# Patient Record
Sex: MALE | Race: WHITE | NOT HISPANIC OR LATINO | Employment: OTHER | URBAN - METROPOLITAN AREA
[De-identification: names, ages, dates, MRNs, and addresses within clinical notes are randomized per-mention and may not be internally consistent; named-entity substitution may affect disease eponyms.]

---

## 2017-03-23 DIAGNOSIS — Z01.810 PRE-OPERATIVE CARDIOVASCULAR EXAMINATION: ICD-10-CM

## 2017-03-23 DIAGNOSIS — Z01.812 PRE-OPERATIVE LABORATORY EXAMINATION: ICD-10-CM

## 2017-03-23 LAB
BASOPHILS # BLD AUTO: 2.3 % (ref 0–1.8)
BASOPHILS # BLD: 0.34 K/UL (ref 0–0.12)
EKG IMPRESSION: NORMAL
EOSINOPHIL # BLD AUTO: 0.73 K/UL (ref 0–0.51)
EOSINOPHIL NFR BLD: 5 % (ref 0–6.9)
ERYTHROCYTE [DISTWIDTH] IN BLOOD BY AUTOMATED COUNT: 44.3 FL (ref 35.9–50)
HCT VFR BLD AUTO: 54.7 % (ref 42–52)
HGB BLD-MCNC: 16.9 G/DL (ref 14–18)
IMM GRANULOCYTES # BLD AUTO: 0.07 K/UL (ref 0–0.11)
IMM GRANULOCYTES NFR BLD AUTO: 0.5 % (ref 0–0.9)
LYMPHOCYTES # BLD AUTO: 1.11 K/UL (ref 1–4.8)
LYMPHOCYTES NFR BLD: 7.5 % (ref 22–41)
MCH RBC QN AUTO: 24.7 PG (ref 27–33)
MCHC RBC AUTO-ENTMCNC: 30.9 G/DL (ref 33.7–35.3)
MCV RBC AUTO: 79.9 FL (ref 81.4–97.8)
MONOCYTES # BLD AUTO: 1.12 K/UL (ref 0–0.85)
MONOCYTES NFR BLD AUTO: 7.6 % (ref 0–13.4)
NEUTROPHILS # BLD AUTO: 11.35 K/UL (ref 1.82–7.42)
NEUTROPHILS NFR BLD: 77.1 % (ref 44–72)
NRBC # BLD AUTO: 0 K/UL
NRBC BLD AUTO-RTO: 0 /100 WBC
PLATELET # BLD AUTO: 474 K/UL (ref 164–446)
PMV BLD AUTO: 9.3 FL (ref 9–12.9)
RBC # BLD AUTO: 6.85 M/UL (ref 4.7–6.1)
WBC # BLD AUTO: 14.7 K/UL (ref 4.8–10.8)

## 2017-03-23 PROCEDURE — 85025 COMPLETE CBC W/AUTO DIFF WBC: CPT

## 2017-03-23 PROCEDURE — 36415 COLL VENOUS BLD VENIPUNCTURE: CPT

## 2017-03-28 ENCOUNTER — APPOINTMENT (OUTPATIENT)
Dept: RADIOLOGY | Facility: MEDICAL CENTER | Age: 72
End: 2017-03-28
Attending: UROLOGY
Payer: MEDICARE

## 2017-03-28 ENCOUNTER — HOSPITAL ENCOUNTER (OUTPATIENT)
Facility: MEDICAL CENTER | Age: 72
End: 2017-03-28
Attending: UROLOGY | Admitting: UROLOGY
Payer: MEDICARE

## 2017-03-28 VITALS
HEIGHT: 72 IN | WEIGHT: 124.78 LBS | OXYGEN SATURATION: 94 % | SYSTOLIC BLOOD PRESSURE: 163 MMHG | RESPIRATION RATE: 17 BRPM | DIASTOLIC BLOOD PRESSURE: 92 MMHG | BODY MASS INDEX: 16.9 KG/M2 | TEMPERATURE: 99.2 F

## 2017-03-28 PROBLEM — N39.41 URGE INCONTINENCE: Status: ACTIVE | Noted: 2017-03-28

## 2017-03-28 PROCEDURE — 700101 HCHG RX REV CODE 250

## 2017-03-28 PROCEDURE — 502240 HCHG MISC OR SUPPLY RC 0272: Performed by: UROLOGY

## 2017-03-28 PROCEDURE — 700111 HCHG RX REV CODE 636 W/ 250 OVERRIDE (IP)

## 2017-03-28 PROCEDURE — 500242 HCHG CATH, HEYMAN FOLLOWER: Performed by: UROLOGY

## 2017-03-28 PROCEDURE — A9270 NON-COVERED ITEM OR SERVICE: HCPCS

## 2017-03-28 PROCEDURE — 500793: Performed by: UROLOGY

## 2017-03-28 PROCEDURE — 110382 HCHG SHELL REV 271: Performed by: UROLOGY

## 2017-03-28 PROCEDURE — 700102 HCHG RX REV CODE 250 W/ 637 OVERRIDE(OP)

## 2017-03-28 PROCEDURE — 160039 HCHG SURGERY MINUTES - EA ADDL 1 MIN LEVEL 3: Performed by: UROLOGY

## 2017-03-28 PROCEDURE — 160002 HCHG RECOVERY MINUTES (STAT): Performed by: UROLOGY

## 2017-03-28 PROCEDURE — 501329 HCHG SET, CYSTO IRRIG Y TUR: Performed by: UROLOGY

## 2017-03-28 PROCEDURE — 500042 HCHG BAG, URINARY DRAINAGE (CLOSED): Performed by: UROLOGY

## 2017-03-28 PROCEDURE — A4338 INDWELLING CATHETER LATEX: HCPCS | Performed by: UROLOGY

## 2017-03-28 PROCEDURE — 160036 HCHG PACU - EA ADDL 30 MINS PHASE I: Performed by: UROLOGY

## 2017-03-28 PROCEDURE — 160009 HCHG ANES TIME/MIN: Performed by: UROLOGY

## 2017-03-28 PROCEDURE — 160028 HCHG SURGERY MINUTES - 1ST 30 MINS LEVEL 3: Performed by: UROLOGY

## 2017-03-28 PROCEDURE — 160048 HCHG OR STATISTICAL LEVEL 1-5: Performed by: UROLOGY

## 2017-03-28 PROCEDURE — A4606 OXYGEN PROBE USED W OXIMETER: HCPCS | Performed by: UROLOGY

## 2017-03-28 PROCEDURE — 160046 HCHG PACU - 1ST 60 MINS PHASE II: Performed by: UROLOGY

## 2017-03-28 PROCEDURE — 160047 HCHG PACU  - EA ADDL 30 MINS PHASE II: Performed by: UROLOGY

## 2017-03-28 PROCEDURE — 160035 HCHG PACU - 1ST 60 MINS PHASE I: Performed by: UROLOGY

## 2017-03-28 PROCEDURE — 110371 HCHG SHELL REV 272: Performed by: UROLOGY

## 2017-03-28 PROCEDURE — C1769 GUIDE WIRE: HCPCS | Performed by: UROLOGY

## 2017-03-28 PROCEDURE — 160025 RECOVERY II MINUTES (STATS): Performed by: UROLOGY

## 2017-03-28 RX ORDER — HYDROCODONE BITARTRATE AND ACETAMINOPHEN 5; 325 MG/1; MG/1
1 TABLET ORAL EVERY 4 HOURS PRN
Status: DISCONTINUED | OUTPATIENT
Start: 2017-03-28 | End: 2017-03-28 | Stop reason: HOSPADM

## 2017-03-28 RX ORDER — OXYCODONE HCL 5 MG/5 ML
SOLUTION, ORAL ORAL
Status: COMPLETED
Start: 2017-03-28 | End: 2017-03-28

## 2017-03-28 RX ORDER — LABETALOL HYDROCHLORIDE 5 MG/ML
INJECTION, SOLUTION INTRAVENOUS
Status: COMPLETED
Start: 2017-03-28 | End: 2017-03-28

## 2017-03-28 RX ORDER — ATROPA BELLADONNA AND OPIUM 16.2; 6 MG/1; MG/1
60 SUPPOSITORY RECTAL
Status: DISCONTINUED | OUTPATIENT
Start: 2017-03-28 | End: 2017-03-28 | Stop reason: HOSPADM

## 2017-03-28 RX ORDER — SODIUM CHLORIDE, SODIUM LACTATE, POTASSIUM CHLORIDE, CALCIUM CHLORIDE 600; 310; 30; 20 MG/100ML; MG/100ML; MG/100ML; MG/100ML
1000 INJECTION, SOLUTION INTRAVENOUS
Status: COMPLETED | OUTPATIENT
Start: 2017-03-28 | End: 2017-03-28

## 2017-03-28 RX ORDER — LIDOCAINE HYDROCHLORIDE 10 MG/ML
INJECTION, SOLUTION INFILTRATION; PERINEURAL
Status: COMPLETED
Start: 2017-03-28 | End: 2017-03-28

## 2017-03-28 RX ORDER — CEFTRIAXONE 1 G/1
INJECTION, POWDER, FOR SOLUTION INTRAMUSCULAR; INTRAVENOUS
Status: DISCONTINUED
Start: 2017-03-28 | End: 2017-03-28 | Stop reason: HOSPADM

## 2017-03-28 RX ADMIN — OXYCODONE HYDROCHLORIDE 5 MG: 5 SOLUTION ORAL at 09:25

## 2017-03-28 RX ADMIN — LIDOCAINE HYDROCHLORIDE: 10 INJECTION, SOLUTION INFILTRATION; PERINEURAL at 06:40

## 2017-03-28 RX ADMIN — LABETALOL HYDROCHLORIDE 5 MG: 5 INJECTION, SOLUTION INTRAVENOUS at 10:43

## 2017-03-28 RX ADMIN — SODIUM CHLORIDE, SODIUM LACTATE, POTASSIUM CHLORIDE, CALCIUM CHLORIDE 1000 ML: 600; 310; 30; 20 INJECTION, SOLUTION INTRAVENOUS at 06:40

## 2017-03-28 ASSESSMENT — PAIN SCALES - GENERAL
PAINLEVEL_OUTOF10: 2
PAINLEVEL_OUTOF10: 5
PAINLEVEL_OUTOF10: 2

## 2017-03-28 NOTE — OP REPORT
DATE OF SERVICE:  03/28/2017    INDICATION FOR PROCEDURE:  Patient is having poor urinary stream and inability   to empty despite urinary incontinence and was found on cystoscopy to have a   membranous and immediately proximal bulbar urethral stricture due to his   previous brachytherapy.  He presents today for retrograde urethrogram dilation   or DVIU of sphincter, possible suprapubic tube and possible incision of   bladder neck as needed.  He has been consented, the risks and benefits of the   procedure, the potential failure rate and outcomes and risks of infection,   bleeding and lower urinary tract injury and he understands these and desires   to proceed.      PREOPERATIVE DIAGNOSES:  Urinary incontinence, prostate cancer and urethral   stricture.    POSTOPERATIVE DIAGNOSES:  Urinary incontinence, prostate cancer and urethral   stricture.    PROCEDURE PERFORMED:  Cystoscopy, retrograde urethrogram and urethral   dilation.     SURGEON:  Gus Borges MD    ANESTHESIA:  Luis Lauren MD    SPECIMEN:  None.    ESTIMATED BLOOD LOSS:  Zero.    FINDINGS:  Stricture membranous urethra into bulbar urethra distal to  sphincter  1-1.5 cm   was dilated due to sphincter involvement  and 20-French catheter placed.    Cystoscopy showed only mild diffuse telangiectasia, but no mass, tumor or   other abnormal lesions.      COMPLICATIONS:  None.      DRAIN:  A 20-French 2-way Barrera.      DESCRIPTION OF PROCEDURE:  The procedure was carried out in the following   fashion.  The patient was properly identified, the labs reviewed, consent was   verified, the H and P updated.  The urine culture was negative.  He was given   preoperative antibiotics.  The patient was taken to the operative theater and   placed in supine position.  He was then anesthetized and we moved into a   modified flank position to shoot a retrograde urethrogram with care being   taken to pad all pressure points and avoid any perturbations of joints  that   may cause injury and this was maintained throughout the procedure.  We then   prepped and draped in sterile fashion and shot a retrograde urethrogram   showing the stricture at the membranous urethra as detailed above.  The   patient was then moved to dorsal lithotomy position again with great care and   prepped and draped.  Surgical timeout had previously been done and there were   no issues.  Antibiotics were previously given.  The site and the operation   were identified.  The patient then had a cystoscopy up to the sphincter, wire   was passed and because of the membranous sphincter involvement, we chose to   dilate to 24-Romanian and place a catheter over a wire, which did uneventfully.    There was no bleeding and there was good dilation of the sphincter with   minimal mucosal disruption.  The catheter was then placed and the bladder   emptied.  Patient tolerated the procedure well and was taken to PACU in stable   condition.       ____________________________________     MD ANTONINA MADDOX / LAMIN    DD:  03/28/2017 08:48:03  DT:  03/28/2017 10:11:36    D#:  [id number]  Job#:  [id number]

## 2017-03-28 NOTE — OR SURGEON
Immediate Post-Operative Note      PreOp Diagnosis: Urinary incontinence, PCA, Urethral stricture  PostOp Diagnosis: Delmy    Procedure(s):  URETHRAL DILATION - Wound Class: Clean Contaminated  RETROGRADE URETHRAGRAM - Wound Class: Clean Contaminated  CYSTOSCOPY - Wound Class: Clean Contaminated    Surgeon(s):  Gus Borges M.D.    Anesthesiologist/Type of Anesthesia:  Anesthesiologist: Luis Lauren M.D./General    Surgical Staff:  Circulator: Jennifer Blackwell R.N.  Scrub Person: Jonny Montilla    Specimen: none    Estimated Blood Loss: 0    Findings: stx at membraenous into immediate post sphincter urethral 1-1.5. Dilated due to sphincter inolvement to 24 F and 20 F catheter placed. Cysto nl x mild telangectasia diffuse.    Complications: none    Drain 20 F 2 way Barrera    [id number]        3/28/2017 8:41 AM Gus Borges

## 2017-03-28 NOTE — OR NURSING
"DISCHARGE INSTRUCTIONS GIVEN TO PATIENT AND SISTER IN [place].  PATIENT TO RETURN TO OFFICE IN SEVEN DAYS FOR FOLLOW UP.   PATIENT GIVEN BACITRACIN OINT AS ORDERED PER DR. LONDON AND SHOWN HOW TO APPLY ON PENIS.  GIVEN URINAL TO EMPTY WALKER. PATIENT STATES\" I HAVE HAD THIS BEFORE AND I KNOW HOW TO USE IT.  PATIENT TAKEN TO [facility] IN W/C ABLE TO TRANSFER WELL WITH ONE ASSIST.  "

## 2017-03-28 NOTE — IP AVS SNAPSHOT
3/28/2017          Isiah Lagos  [address]  [place] [address]    Dear Isiah:    Replaced by [facility] wants to ensure your discharge home is safe and you or your loved ones have had all your questions answered regarding your care after you leave the hospital.    You may receive a telephone call within two days of your discharge.  This call is to make certain you understand your discharge instructions as well as ensure we provided you with the best care possible during your stay with us.     The call will only last approximately 3-5 minutes and will be done by a nurse.    Once again, we want to ensure your discharge home is safe and that you have a clear understanding of any next steps in your care.  If you have any questions or concerns, please do not hesitate to contact us, we are here for you.  Thank you for choosing [facility] for your healthcare needs.    Sincerely,    Randy Lockhart    [facility]

## 2017-03-28 NOTE — IP AVS SNAPSHOT
Home Care Instructions                                                                                                                Name:Isiah Lagos  Medical Record Number:[id number]  CSN: [id number]    YOB: 1945   Age: 71 y.o.  Sex: male  HT:1.829 m (6') WT: 56.6 kg (124 lb 12.5 oz)          Admit Date: 3/28/2017     Discharge Date:   Today's Date: 3/28/2017  Attending Doctor:  Gus Borges M.D.                  Allergies:  Review of patient's allergies indicates no known allergies.                Discharge Instructions         ACTIVITY: Rest and take it easy for the first 24 hours.  A responsible adult is recommended to remain with you during that time.  It is normal to feel sleepy.  We encourage you to not do anything that requires balance, judgment or coordination.    MILD FLU-LIKE SYMPTOMS ARE NORMAL. YOU MAY EXPERIENCE GENERALIZED MUSCLE ACHES, THROAT IRRITATION, HEADACHE AND/OR SOME NAUSEA.    FOR 24 HOURS DO NOT:  Drive, operate machinery or run household appliances.  Drink beer or alcoholic beverages.   Make important decisions or sign legal documents.    SPECIAL INSTRUCTIONS: D/c home with  leg bag and night bag and teaching. Show how to put bacitracon or neosporin ointment  At meatus tid.   RTC in 7 days for VT. increae mybetriq to 50 mg po qd for Urge incontinence. CALL FOR FEVER CHILLS SWELLING NAUSEA AND VOMITTING OR SHORTNESS OF BREATH ledema . Or catheter issues. Meds on chart    DIET: To avoid nausea, slowly advance diet as tolerated, avoiding spicy or greasy foods for the first day.  Add more substantial food to your diet according to your physician's instructions.  Babies can be fed formula or breast milk as soon as they are hungry.  INCREASE FLUIDS AND FIBER TO AVOID CONSTIPATION.    SURGICAL DRESSING/BATHING: CLEAN TIP OF PENIS AS NEEDED.    FOLLOW-UP APPOINTMENT:  A follow-up appointment should be arranged with your doctor in 7 DAYS ; call to schedule.    You should CALL  YOUR PHYSICIAN if you develop:  Fever greater than 101 degrees F.  Pain not relieved by medication, or persistent nausea or vomiting.  Excessive bleeding (blood soaking through dressing) or unexpected drainage from the wound.  Extreme redness or swelling around the incision site, drainage of pus or foul smelling drainage.  Inability to urinate or empty your bladder within 8 hours.  Problems with breathing or chest pain.    You should call 911 if you develop problems with breathing or chest pain.  If you are unable to contact your doctor or surgical center, you should go to the nearest emergency room or urgent care center.  Physician's telephone #: [phone]    If any questions arise, call your doctor.  If your doctor is not available, please feel free to call the Surgical Center at {Surgical Dept Numbers:[id number]}.  The Center is open Monday through Friday from 7AM to 7PM.  You can also call the [employer] HOTLINE open 24 hours/day, 7 days/week and speak to a nurse at [phone], or toll free at [phone].    A registered nurse may call you a few days after your surgery to see how you are doing after your procedure.    MEDICATIONS: Resume taking daily medication.  Take prescribed pain medication with food.  If no medication is prescribed, you may take non-aspirin pain medication if needed.  PAIN MEDICATION CAN BE VERY CONSTIPATING.  Take a stool softener or laxative such as senokot, pericolace, or milk of magnesia if needed.    Prescription given for MYRBETRIQ .  Last pain medication given NONE.    If your physician has prescribed pain medication that includes Acetaminophen (Tylenol), do not take additional Acetaminophen (Tylenol) while taking the prescribed medication.    Depression / Suicide Risk    As you are discharged from this [facility] facility, it is important to learn how to keep safe from harming yourself.    Recognize the warning signs:  · Abrupt changes in personality, positive or negative-  including increase in energy   · Giving away possessions  · Change in eating patterns- significant weight changes-  positive or negative  · Change in sleeping patterns- unable to sleep or sleeping all the time   · Unwillingness or inability to communicate  · Depression  · Unusual sadness, discouragement and loneliness  · Talk of wanting to die  · Neglect of personal appearance   · Rebelliousness- reckless behavior  · Withdrawal from people/activities they love  · Confusion- inability to concentrate     If you or a loved one observes any of these behaviors or has concerns about self-harm, here's what you can do:  · Talk about it- your feelings and reasons for harming yourself  · Remove any means that you might use to hurt yourself (examples: pills, rope, extension cords, firearm)  · Get professional help from the community (Mental Health, Substance Abuse, psychological counseling)  · Do not be alone:Call your Safe Contact- someone whom you trust who will be there for you.  · Call your local CRISIS HOTLINE 232-2668 or 422-697-0821  · Call your local Children's Mobile Crisis Response Team [place] (142) 684-4029 or wwwManymoon  · Call the toll free National Suicide Prevention Hotlines   · National Suicide Prevention Lifeline 775-532-JAPO (4629)  · National Hope Line Network 800-SUICIDE (950-4300)       Medication List      CHANGE how you take these medications        Instructions    Mirabegron ER 25 MG Tb24   What changed:  how much to take   Commonly known as:  MYRBETRIQ    Take 50 mg by mouth every day.   Dose:  50 mg         CONTINUE taking these medications        Instructions    aspirin  MG Tbec   Commonly known as:  ECOTRIN    Take 325 mg by mouth every day.   Dose:  325 mg       levothyroxine 75 MCG Tabs   Commonly known as:  SYNTHROID    Take 75 mcg by mouth every morning before breakfast.   Dose:  75 mcg               Medication Information     Above and/or attached are the medications your  physician expects you to take upon discharge. Review all of your home medications and newly ordered medications with your doctor and/or pharmacist. Follow medication instructions as directed by your doctor and/or pharmacist. Please keep your medication list with you and share with your physician. Update the information when medications are discontinued, doses are changed, or new medications (including over-the-counter products) are added; and carry medication information at all times in the event of emergency situations.        Resources     Quit Smoking / Tobacco Use:    I understand the use of any tobacco products increases my chance of suffering from future heart disease or stroke and could cause other illnesses which may shorten my life. Quitting the use of tobacco products is the single most important thing I can do to improve my health. For further information on smoking / tobacco cessation call a Toll Free Quit Line at 1-321.143.4963 (*National [employer] [place]) or 1-148.954.9609 (American Lung Association) or you can access the web based program at www.lungMD Insider.org.    [place] Tobacco Users Help Line:  (925) 205-8025       Toll Free: 1-171.622.1481  Quit Tobacco Program [facility] (473)595-9031    Crisis Hotline:    [place] Crisis Hotline:  9-164-XOGQWBO or 1-683.456.3215    [place] Crisis Hotline:    1-596.818.9854 or 488-703-2976    Discharge Survey:   Thank you for choosing [facility]. We hope we did everything we could to make your hospital stay a pleasant one. You may be receiving a survey and we would appreciate your time and participation in answering the questions. Your input is very valuable to us in our efforts to improve our service to our patients and their families.            Signatures     My signature on this form indicates that:    1. I acknowledge receipt and understanding of these Home Care Instruction.  2. My questions regarding this information have been answered  to my satisfaction.  3. I have formulated a plan with my discharge nurse to obtain my prescribed medications for home.    __________________________________      __________________________________                   Patient Signature                                 Guardian/Responsible Adult Signature      __________________________________                 __________       ________                       Nurse Signature                                               Date                 Time

## 2017-03-28 NOTE — DISCHARGE INSTRUCTIONS
ACTIVITY: Rest and take it easy for the first 24 hours.  A responsible adult is recommended to remain with you during that time.  It is normal to feel sleepy.  We encourage you to not do anything that requires balance, judgment or coordination.    MILD FLU-LIKE SYMPTOMS ARE NORMAL. YOU MAY EXPERIENCE GENERALIZED MUSCLE ACHES, THROAT IRRITATION, HEADACHE AND/OR SOME NAUSEA.    FOR 24 HOURS DO NOT:  Drive, operate machinery or run household appliances.  Drink beer or alcoholic beverages.   Make important decisions or sign legal documents.    SPECIAL INSTRUCTIONS: D/c home with  leg bag and night bag and teaching. Show how to put bacitracon or neosporin ointment  At meatus tid.   RTC in 7 days for VT. increae mybetriq to 50 mg po qd for Urge incontinence. CALL FOR FEVER CHILLS SWELLING NAUSEA AND VOMITTING OR SHORTNESS OF BREATH ledema . Or catheter issues. Meds on chart    DIET: To avoid nausea, slowly advance diet as tolerated, avoiding spicy or greasy foods for the first day.  Add more substantial food to your diet according to your physician's instructions.  Babies can be fed formula or breast milk as soon as they are hungry.  INCREASE FLUIDS AND FIBER TO AVOID CONSTIPATION.    SURGICAL DRESSING/BATHING: CLEAN TIP OF PENIS AS NEEDED.    FOLLOW-UP APPOINTMENT:  A follow-up appointment should be arranged with your doctor in 7 DAYS ; call to schedule.    You should CALL YOUR PHYSICIAN if you develop:  Fever greater than 101 degrees F.  Pain not relieved by medication, or persistent nausea or vomiting.  Excessive bleeding (blood soaking through dressing) or unexpected drainage from the wound.  Extreme redness or swelling around the incision site, drainage of pus or foul smelling drainage.  Inability to urinate or empty your bladder within 8 hours.  Problems with breathing or chest pain.    You should call 911 if you develop problems with breathing or chest pain.  If you are unable to contact your doctor or surgical  center, you should go to the nearest emergency room or urgent care center.  Physician's telephone #: [phone]    If any questions arise, call your doctor.  If your doctor is not available, please feel free to call the Surgical Center at {Surgical Dept Numbers:[id number]}.  The Center is open Monday through Friday from 7AM to 7PM.  You can also call the HEALTH HOTLINE open 24 hours/day, 7 days/week and speak to a nurse at [phone], or toll free at [phone].    A registered nurse may call you a few days after your surgery to see how you are doing after your procedure.    MEDICATIONS: Resume taking daily medication.  Take prescribed pain medication with food.  If no medication is prescribed, you may take non-aspirin pain medication if needed.  PAIN MEDICATION CAN BE VERY CONSTIPATING.  Take a stool softener or laxative such as senokot, pericolace, or milk of magnesia if needed.    Prescription given for MYRBETRIQ .  Last pain medication given NONE.    If your physician has prescribed pain medication that includes Acetaminophen (Tylenol), do not take additional Acetaminophen (Tylenol) while taking the prescribed medication.    Depression / Suicide Risk    As you are discharged from this [facility] facility, it is important to learn how to keep safe from harming yourself.    Recognize the warning signs:  · Abrupt changes in personality, positive or negative- including increase in energy   · Giving away possessions  · Change in eating patterns- significant weight changes-  positive or negative  · Change in sleeping patterns- unable to sleep or sleeping all the time   · Unwillingness or inability to communicate  · Depression  · Unusual sadness, discouragement and loneliness  · Talk of wanting to die  · Neglect of personal appearance   · Rebelliousness- reckless behavior  · Withdrawal from people/activities they love  · Confusion- inability to concentrate     If you or a loved one observes any of these  behaviors or has concerns about self-harm, here's what you can do:  · Talk about it- your feelings and reasons for harming yourself  · Remove any means that you might use to hurt yourself (examples: pills, rope, extension cords, firearm)  · Get professional help from the community (Mental Health, Substance Abuse, psychological counseling)  · Do not be alone:Call your Safe Contact- someone whom you trust who will be there for you.  · Call your local CRISIS HOTLINE 711-7253 or 068-980-2056  · Call your local Children's Mobile Crisis Response Team [place] (322) 237-6828 or www.The Mother List  · Call the toll free National Suicide Prevention Hotlines   · National Suicide Prevention Lifeline 245-004-FGYP (4813)  · National Hope Line Network 800-SUICIDE (919-5771)

## 2017-03-28 NOTE — OR NURSING
PATIENT B/P 180/110. DR. GILMORE UPDATED AND TO GIVE PATIENT LABATOLOL AS ORDERED.  PAITENT DENIES PAIN. WALKER CATH DRAINING YELLOW URINE. NO DISTRESS.

## 2017-03-28 NOTE — PROGRESS NOTES
The Medication Reconciliation process has been completed by interviewing the patient    Allergies have been reviewed  Antibiotic use in 30 days - NONE    Home Pharmacy:  [facility] [place]

## 2017-04-08 ENCOUNTER — HOSPITAL ENCOUNTER (INPATIENT)
Facility: MEDICAL CENTER | Age: 72
LOS: 12 days | DRG: 252 | End: 2017-04-20
Attending: EMERGENCY MEDICINE | Admitting: HOSPITALIST
Payer: MEDICARE

## 2017-04-08 ENCOUNTER — APPOINTMENT (OUTPATIENT)
Dept: RADIOLOGY | Facility: MEDICAL CENTER | Age: 72
DRG: 252 | End: 2017-04-08
Attending: EMERGENCY MEDICINE
Payer: MEDICARE

## 2017-04-08 ENCOUNTER — RESOLUTE PROFESSIONAL BILLING HOSPITAL PROF FEE (OUTPATIENT)
Dept: HOSPITALIST | Facility: MEDICAL CENTER | Age: 72
End: 2017-04-08
Payer: MEDICARE

## 2017-04-08 DIAGNOSIS — F17.200 SMOKING: ICD-10-CM

## 2017-04-08 DIAGNOSIS — R29.6 FALLS FREQUENTLY: ICD-10-CM

## 2017-04-08 DIAGNOSIS — I99.8 ISCHEMIA OF LEFT LOWER EXTREMITY: ICD-10-CM

## 2017-04-08 DIAGNOSIS — N39.41 URGE INCONTINENCE: ICD-10-CM

## 2017-04-08 DIAGNOSIS — I10 HTN (HYPERTENSION), MALIGNANT: ICD-10-CM

## 2017-04-08 DIAGNOSIS — I73.9 PAD (PERIPHERAL ARTERY DISEASE) (HCC): ICD-10-CM

## 2017-04-08 DIAGNOSIS — G93.40 ENCEPHALOPATHY ACUTE: ICD-10-CM

## 2017-04-08 DIAGNOSIS — I70.202 ATHEROSCLEROSIS OF ARTERY OF LEFT LOWER EXTREMITY (HCC): ICD-10-CM

## 2017-04-08 DIAGNOSIS — L03.116 CELLULITIS OF LEFT LOWER EXTREMITY: ICD-10-CM

## 2017-04-08 DIAGNOSIS — R53.1 WEAKNESS OF LEFT SIDE OF BODY: ICD-10-CM

## 2017-04-08 DIAGNOSIS — I70.90 ARTERIAL OCCLUSION: ICD-10-CM

## 2017-04-08 DIAGNOSIS — E87.1 HYPONATREMIA: ICD-10-CM

## 2017-04-08 PROBLEM — F10.20 ALCOHOL DEPENDENCE (HCC): Status: ACTIVE | Noted: 2017-04-08

## 2017-04-08 LAB
ALBUMIN SERPL BCP-MCNC: 3.5 G/DL (ref 3.2–4.9)
ALBUMIN/GLOB SERPL: 1 G/DL
ALP SERPL-CCNC: 78 U/L (ref 30–99)
ALT SERPL-CCNC: 12 U/L (ref 2–50)
ANION GAP SERPL CALC-SCNC: 10 MMOL/L (ref 0–11.9)
APTT PPP: 33.1 SEC (ref 24.7–36)
AST SERPL-CCNC: 31 U/L (ref 12–45)
BASOPHILS # BLD AUTO: 1.5 % (ref 0–1.8)
BASOPHILS # BLD: 0.24 K/UL (ref 0–0.12)
BILIRUB SERPL-MCNC: 0.9 MG/DL (ref 0.1–1.5)
BUN SERPL-MCNC: 10 MG/DL (ref 8–22)
CALCIUM SERPL-MCNC: 9.1 MG/DL (ref 8.5–10.5)
CHLORIDE SERPL-SCNC: 89 MMOL/L (ref 96–112)
CO2 SERPL-SCNC: 23 MMOL/L (ref 20–33)
CREAT SERPL-MCNC: 0.66 MG/DL (ref 0.5–1.4)
CRP SERPL HS-MCNC: 8.2 MG/DL (ref 0–0.75)
EKG IMPRESSION: NORMAL
EOSINOPHIL # BLD AUTO: 0.46 K/UL (ref 0–0.51)
EOSINOPHIL NFR BLD: 2.9 % (ref 0–6.9)
ERYTHROCYTE [DISTWIDTH] IN BLOOD BY AUTOMATED COUNT: 43.3 FL (ref 35.9–50)
ERYTHROCYTE [SEDIMENTATION RATE] IN BLOOD BY WESTERGREN METHOD: 6 MM/HOUR (ref 0–20)
ETHANOL BLD-MCNC: 0.01 G/DL
GFR SERPL CREATININE-BSD FRML MDRD: >60 ML/MIN/1.73 M 2
GLOBULIN SER CALC-MCNC: 3.5 G/DL (ref 1.9–3.5)
GLUCOSE SERPL-MCNC: 77 MG/DL (ref 65–99)
HCT VFR BLD AUTO: 52.1 % (ref 42–52)
HGB BLD-MCNC: 16.8 G/DL (ref 14–18)
IMM GRANULOCYTES # BLD AUTO: 0.16 K/UL (ref 0–0.11)
IMM GRANULOCYTES NFR BLD AUTO: 1 % (ref 0–0.9)
INR PPP: 1.05 (ref 0.87–1.13)
LACTATE BLD-SCNC: 1.3 MMOL/L (ref 0.5–2)
LACTATE BLD-SCNC: 1.9 MMOL/L (ref 0.5–2)
LYMPHOCYTES # BLD AUTO: 1.12 K/UL (ref 1–4.8)
LYMPHOCYTES NFR BLD: 7 % (ref 22–41)
MCH RBC QN AUTO: 25.1 PG (ref 27–33)
MCHC RBC AUTO-ENTMCNC: 32.2 G/DL (ref 33.7–35.3)
MCV RBC AUTO: 77.8 FL (ref 81.4–97.8)
MONOCYTES # BLD AUTO: 0.99 K/UL (ref 0–0.85)
MONOCYTES NFR BLD AUTO: 6.2 % (ref 0–13.4)
NEUTROPHILS # BLD AUTO: 12.94 K/UL (ref 1.82–7.42)
NEUTROPHILS NFR BLD: 81.4 % (ref 44–72)
NRBC # BLD AUTO: 0 K/UL
NRBC BLD AUTO-RTO: 0 /100 WBC
PLATELET # BLD AUTO: 674 K/UL (ref 164–446)
PMV BLD AUTO: 8.9 FL (ref 9–12.9)
POTASSIUM SERPL-SCNC: 4.2 MMOL/L (ref 3.6–5.5)
PREALB SERPL-MCNC: 6 MG/DL (ref 18–38)
PROT SERPL-MCNC: 7 G/DL (ref 6–8.2)
PROTHROMBIN TIME: 14 SEC (ref 12–14.6)
RBC # BLD AUTO: 6.7 M/UL (ref 4.7–6.1)
SODIUM SERPL-SCNC: 122 MMOL/L (ref 135–145)
WBC # BLD AUTO: 15.9 K/UL (ref 4.8–10.8)

## 2017-04-08 PROCEDURE — 71010 DX-CHEST-PORTABLE (1 VIEW): CPT

## 2017-04-08 PROCEDURE — 93925 LOWER EXTREMITY STUDY: CPT | Mod: 26 | Performed by: SURGERY

## 2017-04-08 PROCEDURE — 85610 PROTHROMBIN TIME: CPT

## 2017-04-08 PROCEDURE — 700105 HCHG RX REV CODE 258: Performed by: HOSPITALIST

## 2017-04-08 PROCEDURE — 93922 UPR/L XTREMITY ART 2 LEVELS: CPT

## 2017-04-08 PROCEDURE — 36415 COLL VENOUS BLD VENIPUNCTURE: CPT

## 2017-04-08 PROCEDURE — 93005 ELECTROCARDIOGRAM TRACING: CPT | Performed by: EMERGENCY MEDICINE

## 2017-04-08 PROCEDURE — 87040 BLOOD CULTURE FOR BACTERIA: CPT

## 2017-04-08 PROCEDURE — 80307 DRUG TEST PRSMV CHEM ANLYZR: CPT

## 2017-04-08 PROCEDURE — 85652 RBC SED RATE AUTOMATED: CPT

## 2017-04-08 PROCEDURE — HZ2ZZZZ DETOXIFICATION SERVICES FOR SUBSTANCE ABUSE TREATMENT: ICD-10-PCS | Performed by: HOSPITALIST

## 2017-04-08 PROCEDURE — 96367 TX/PROPH/DG ADDL SEQ IV INF: CPT

## 2017-04-08 PROCEDURE — 770020 HCHG ROOM/CARE - TELE (206)

## 2017-04-08 PROCEDURE — 86140 C-REACTIVE PROTEIN: CPT

## 2017-04-08 PROCEDURE — 700111 HCHG RX REV CODE 636 W/ 250 OVERRIDE (IP): Performed by: HOSPITALIST

## 2017-04-08 PROCEDURE — 80053 COMPREHEN METABOLIC PANEL: CPT

## 2017-04-08 PROCEDURE — 99223 1ST HOSP IP/OBS HIGH 75: CPT | Mod: AI | Performed by: HOSPITALIST

## 2017-04-08 PROCEDURE — 93925 LOWER EXTREMITY STUDY: CPT

## 2017-04-08 PROCEDURE — 85730 THROMBOPLASTIN TIME PARTIAL: CPT

## 2017-04-08 PROCEDURE — 93922 UPR/L XTREMITY ART 2 LEVELS: CPT | Mod: 26 | Performed by: SURGERY

## 2017-04-08 PROCEDURE — 99285 EMERGENCY DEPT VISIT HI MDM: CPT

## 2017-04-08 PROCEDURE — 73590 X-RAY EXAM OF LOWER LEG: CPT | Mod: LT

## 2017-04-08 PROCEDURE — 94760 N-INVAS EAR/PLS OXIMETRY 1: CPT

## 2017-04-08 PROCEDURE — 700105 HCHG RX REV CODE 258: Performed by: EMERGENCY MEDICINE

## 2017-04-08 PROCEDURE — 85025 COMPLETE CBC W/AUTO DIFF WBC: CPT

## 2017-04-08 PROCEDURE — 96365 THER/PROPH/DIAG IV INF INIT: CPT

## 2017-04-08 PROCEDURE — 96366 THER/PROPH/DIAG IV INF ADDON: CPT

## 2017-04-08 PROCEDURE — 700102 HCHG RX REV CODE 250 W/ 637 OVERRIDE(OP): Performed by: HOSPITALIST

## 2017-04-08 PROCEDURE — 700111 HCHG RX REV CODE 636 W/ 250 OVERRIDE (IP): Performed by: EMERGENCY MEDICINE

## 2017-04-08 PROCEDURE — 73630 X-RAY EXAM OF FOOT: CPT | Mod: LT

## 2017-04-08 PROCEDURE — 84134 ASSAY OF PREALBUMIN: CPT

## 2017-04-08 PROCEDURE — A9270 NON-COVERED ITEM OR SERVICE: HCPCS | Performed by: HOSPITALIST

## 2017-04-08 PROCEDURE — 83605 ASSAY OF LACTIC ACID: CPT | Mod: 91

## 2017-04-08 RX ORDER — HYDRALAZINE HYDROCHLORIDE 10 MG/1
10 TABLET, FILM COATED ORAL EVERY 8 HOURS
Status: DISCONTINUED | OUTPATIENT
Start: 2017-04-08 | End: 2017-04-10

## 2017-04-08 RX ORDER — LORAZEPAM 1 MG/1
1 TABLET ORAL EVERY 4 HOURS PRN
Status: DISCONTINUED | OUTPATIENT
Start: 2017-04-08 | End: 2017-04-20 | Stop reason: HOSPADM

## 2017-04-08 RX ORDER — ACETAMINOPHEN 325 MG/1
650 TABLET ORAL EVERY 6 HOURS PRN
Status: DISCONTINUED | OUTPATIENT
Start: 2017-04-08 | End: 2017-04-20 | Stop reason: HOSPADM

## 2017-04-08 RX ORDER — ISOSORBIDE DINITRATE 10 MG/1
10 TABLET ORAL 3 TIMES DAILY
Status: DISCONTINUED | OUTPATIENT
Start: 2017-04-08 | End: 2017-04-11

## 2017-04-08 RX ORDER — BISACODYL 10 MG
10 SUPPOSITORY, RECTAL RECTAL
Status: DISCONTINUED | OUTPATIENT
Start: 2017-04-08 | End: 2017-04-20 | Stop reason: HOSPADM

## 2017-04-08 RX ORDER — ATORVASTATIN CALCIUM 40 MG/1
40 TABLET, FILM COATED ORAL
Status: DISCONTINUED | OUTPATIENT
Start: 2017-04-08 | End: 2017-04-20 | Stop reason: HOSPADM

## 2017-04-08 RX ORDER — LORAZEPAM 2 MG/ML
0.5 INJECTION INTRAMUSCULAR EVERY 4 HOURS PRN
Status: DISCONTINUED | OUTPATIENT
Start: 2017-04-08 | End: 2017-04-20 | Stop reason: HOSPADM

## 2017-04-08 RX ORDER — AMPICILLIN AND SULBACTAM 2; 1 G/1; G/1
3 INJECTION, POWDER, FOR SOLUTION INTRAMUSCULAR; INTRAVENOUS ONCE
Status: COMPLETED | OUTPATIENT
Start: 2017-04-08 | End: 2017-04-08

## 2017-04-08 RX ORDER — SODIUM CHLORIDE 9 MG/ML
INJECTION, SOLUTION INTRAVENOUS CONTINUOUS
Status: DISCONTINUED | OUTPATIENT
Start: 2017-04-08 | End: 2017-04-12

## 2017-04-08 RX ORDER — HEPARIN SODIUM 5000 [USP'U]/ML
5000 INJECTION, SOLUTION INTRAVENOUS; SUBCUTANEOUS EVERY 8 HOURS
Status: DISCONTINUED | OUTPATIENT
Start: 2017-04-08 | End: 2017-04-20 | Stop reason: HOSPADM

## 2017-04-08 RX ORDER — ACETAMINOPHEN 325 MG/1
650 TABLET ORAL EVERY 6 HOURS
Status: DISCONTINUED | OUTPATIENT
Start: 2017-04-08 | End: 2017-04-10

## 2017-04-08 RX ORDER — ONDANSETRON 2 MG/ML
4 INJECTION INTRAMUSCULAR; INTRAVENOUS EVERY 4 HOURS PRN
Status: DISCONTINUED | OUTPATIENT
Start: 2017-04-08 | End: 2017-04-20 | Stop reason: HOSPADM

## 2017-04-08 RX ORDER — AMOXICILLIN 250 MG
2 CAPSULE ORAL 2 TIMES DAILY
Status: DISCONTINUED | OUTPATIENT
Start: 2017-04-08 | End: 2017-04-20 | Stop reason: HOSPADM

## 2017-04-08 RX ORDER — LORAZEPAM 0.5 MG/1
0.5 TABLET ORAL EVERY 4 HOURS PRN
Status: DISCONTINUED | OUTPATIENT
Start: 2017-04-08 | End: 2017-04-20 | Stop reason: HOSPADM

## 2017-04-08 RX ORDER — POLYETHYLENE GLYCOL 3350 17 G/17G
1 POWDER, FOR SOLUTION ORAL
Status: DISCONTINUED | OUTPATIENT
Start: 2017-04-08 | End: 2017-04-20 | Stop reason: HOSPADM

## 2017-04-08 RX ORDER — LORAZEPAM 2 MG/ML
2 INJECTION INTRAMUSCULAR
Status: DISCONTINUED | OUTPATIENT
Start: 2017-04-08 | End: 2017-04-20 | Stop reason: HOSPADM

## 2017-04-08 RX ORDER — ONDANSETRON 4 MG/1
4 TABLET, ORALLY DISINTEGRATING ORAL EVERY 4 HOURS PRN
Status: DISCONTINUED | OUTPATIENT
Start: 2017-04-08 | End: 2017-04-20 | Stop reason: HOSPADM

## 2017-04-08 RX ORDER — LORAZEPAM 1 MG/1
3 TABLET ORAL
Status: DISCONTINUED | OUTPATIENT
Start: 2017-04-08 | End: 2017-04-20 | Stop reason: HOSPADM

## 2017-04-08 RX ORDER — OXYCODONE HYDROCHLORIDE 5 MG/1
5 TABLET ORAL EVERY 4 HOURS PRN
Status: DISCONTINUED | OUTPATIENT
Start: 2017-04-08 | End: 2017-04-20 | Stop reason: HOSPADM

## 2017-04-08 RX ORDER — NICOTINE 21 MG/24HR
14 PATCH, TRANSDERMAL 24 HOURS TRANSDERMAL
Status: DISCONTINUED | OUTPATIENT
Start: 2017-04-08 | End: 2017-04-20 | Stop reason: HOSPADM

## 2017-04-08 RX ORDER — LEVOTHYROXINE SODIUM 0.07 MG/1
75 TABLET ORAL
Status: DISCONTINUED | OUTPATIENT
Start: 2017-04-08 | End: 2017-04-20 | Stop reason: HOSPADM

## 2017-04-08 RX ORDER — LORAZEPAM 1 MG/1
2 TABLET ORAL
Status: DISCONTINUED | OUTPATIENT
Start: 2017-04-08 | End: 2017-04-20 | Stop reason: HOSPADM

## 2017-04-08 RX ORDER — LORAZEPAM 2 MG/ML
1.5 INJECTION INTRAMUSCULAR
Status: DISCONTINUED | OUTPATIENT
Start: 2017-04-08 | End: 2017-04-20 | Stop reason: HOSPADM

## 2017-04-08 RX ORDER — LORAZEPAM 2 MG/ML
1 INJECTION INTRAMUSCULAR
Status: DISCONTINUED | OUTPATIENT
Start: 2017-04-08 | End: 2017-04-20 | Stop reason: HOSPADM

## 2017-04-08 RX ORDER — LORAZEPAM 1 MG/1
4 TABLET ORAL
Status: DISCONTINUED | OUTPATIENT
Start: 2017-04-08 | End: 2017-04-20 | Stop reason: HOSPADM

## 2017-04-08 RX ORDER — OXYCODONE HYDROCHLORIDE 10 MG/1
10 TABLET ORAL EVERY 4 HOURS PRN
Status: DISCONTINUED | OUTPATIENT
Start: 2017-04-08 | End: 2017-04-20 | Stop reason: HOSPADM

## 2017-04-08 RX ORDER — THIAMINE MONONITRATE (VIT B1) 100 MG
100 TABLET ORAL DAILY
Status: DISCONTINUED | OUTPATIENT
Start: 2017-04-08 | End: 2017-04-20 | Stop reason: HOSPADM

## 2017-04-08 RX ADMIN — HEPARIN SODIUM 5000 UNITS: 5000 INJECTION, SOLUTION INTRAVENOUS; SUBCUTANEOUS at 14:22

## 2017-04-08 RX ADMIN — ATORVASTATIN CALCIUM 40 MG: 40 TABLET, FILM COATED ORAL at 21:13

## 2017-04-08 RX ADMIN — HEPARIN SODIUM 5000 UNITS: 5000 INJECTION, SOLUTION INTRAVENOUS; SUBCUTANEOUS at 21:15

## 2017-04-08 RX ADMIN — ACETAMINOPHEN 650 MG: 325 TABLET, FILM COATED ORAL at 10:46

## 2017-04-08 RX ADMIN — ACETAMINOPHEN 650 MG: 325 TABLET, FILM COATED ORAL at 21:14

## 2017-04-08 RX ADMIN — OXYCODONE HYDROCHLORIDE 5 MG: 5 TABLET ORAL at 13:09

## 2017-04-08 RX ADMIN — LEVOTHYROXINE SODIUM 75 MCG: 75 TABLET ORAL at 10:46

## 2017-04-08 RX ADMIN — Medication 100 MG: at 10:46

## 2017-04-08 RX ADMIN — AMPICILLIN SODIUM AND SULBACTAM SODIUM 3 G: 2; 1 INJECTION, POWDER, FOR SOLUTION INTRAMUSCULAR; INTRAVENOUS at 06:19

## 2017-04-08 RX ADMIN — NICOTINE 14 MG: 14 PATCH, EXTENDED RELEASE TRANSDERMAL at 10:46

## 2017-04-08 RX ADMIN — ISOSORBIDE DINITRATE 10 MG: 10 TABLET ORAL at 14:22

## 2017-04-08 RX ADMIN — HYDRALAZINE HYDROCHLORIDE 10 MG: 10 TABLET, FILM COATED ORAL at 10:46

## 2017-04-08 RX ADMIN — SODIUM CHLORIDE: 9 INJECTION, SOLUTION INTRAVENOUS at 10:55

## 2017-04-08 RX ADMIN — ASPIRIN 325 MG: 325 TABLET, DELAYED RELEASE ORAL at 10:46

## 2017-04-08 RX ADMIN — HYDRALAZINE HYDROCHLORIDE 10 MG: 10 TABLET, FILM COATED ORAL at 21:15

## 2017-04-08 RX ADMIN — ACETAMINOPHEN 650 MG: 325 TABLET, FILM COATED ORAL at 14:22

## 2017-04-08 RX ADMIN — VANCOMYCIN HYDROCHLORIDE 1500 MG: 100 INJECTION, POWDER, LYOPHILIZED, FOR SOLUTION INTRAVENOUS at 07:08

## 2017-04-08 RX ADMIN — LORAZEPAM 0.5 MG: 0.5 TABLET ORAL at 21:23

## 2017-04-08 RX ADMIN — HYDRALAZINE HYDROCHLORIDE 10 MG: 10 TABLET, FILM COATED ORAL at 14:22

## 2017-04-08 RX ADMIN — ISOSORBIDE DINITRATE 10 MG: 10 TABLET ORAL at 21:14

## 2017-04-08 ASSESSMENT — PAIN SCALES - GENERAL
PAINLEVEL_OUTOF10: 8
PAINLEVEL_OUTOF10: 5
PAINLEVEL_OUTOF10: 8
PAINLEVEL_OUTOF10: 8
PAINLEVEL_OUTOF10: 7
PAINLEVEL_OUTOF10: 5
PAINLEVEL_OUTOF10: 3
PAINLEVEL_OUTOF10: 7
PAINLEVEL_OUTOF10: 5

## 2017-04-08 ASSESSMENT — LIFESTYLE VARIABLES
HAVE PEOPLE ANNOYED YOU BY CRITICIZING YOUR DRINKING: NO
VISUAL DISTURBANCES: NOT PRESENT
ORIENTATION AND CLOUDING OF SENSORIUM: ORIENTED AND CAN DO SERIAL ADDITIONS
AUDITORY DISTURBANCES: NOT PRESENT
HEADACHE, FULLNESS IN HEAD: NOT PRESENT
TREMOR: TREMOR NOT VISIBLE BUT CAN BE FELT, FINGERTIP TO FINGERTIP
AGITATION: NORMAL ACTIVITY
TOTAL SCORE: 0
HEADACHE, FULLNESS IN HEAD: NOT PRESENT
NAUSEA AND VOMITING: NO NAUSEA AND NO VOMITING
AVERAGE NUMBER OF DAYS PER WEEK YOU HAVE A DRINK CONTAINING ALCOHOL: 7
AGITATION: NORMAL ACTIVITY
NAUSEA AND VOMITING: NO NAUSEA AND NO VOMITING
TREMOR: NO TREMOR
HAVE YOU EVER FELT YOU SHOULD CUT DOWN ON YOUR DRINKING: NO
EVER_SMOKED: YES
ANXIETY: *
DO YOU DRINK ALCOHOL: YES
TOTAL SCORE: 0
EVER FELT BAD OR GUILTY ABOUT YOUR DRINKING: NO
EVER HAD A DRINK FIRST THING IN THE MORNING TO STEADY YOUR NERVES TO GET RID OF A HANGOVER: NO
HEADACHE, FULLNESS IN HEAD: NOT PRESENT
AGITATION: NORMAL ACTIVITY
AUDITORY DISTURBANCES: NOT PRESENT
ORIENTATION AND CLOUDING OF SENSORIUM: DATE DISORIENTATION BY NO MORE THAN TWO CALENDAR DAYS
VISUAL DISTURBANCES: NOT PRESENT
TOTAL SCORE: 5
HOW MANY TIMES IN THE PAST YEAR HAVE YOU HAD 5 OR MORE DRINKS IN A DAY: 2
PAROXYSMAL SWEATS: NO SWEAT VISIBLE
TREMOR: NO TREMOR
ANXIETY: MILDLY ANXIOUS
TOTAL SCORE: 1
TOTAL SCORE: 3
CONSUMPTION TOTAL: POSITIVE
PAROXYSMAL SWEATS: NO SWEAT VISIBLE
ON A TYPICAL DAY WHEN YOU DRINK ALCOHOL HOW MANY DRINKS DO YOU HAVE: 2
VISUAL DISTURBANCES: NOT PRESENT
TOTAL SCORE: 0
NAUSEA AND VOMITING: NO NAUSEA AND NO VOMITING
ANXIETY: MILDLY ANXIOUS
PAROXYSMAL SWEATS: NO SWEAT VISIBLE
ORIENTATION AND CLOUDING OF SENSORIUM: DATE DISORIENTATION BY NO MORE THAN TWO CALENDAR DAYS
AUDITORY DISTURBANCES: NOT PRESENT

## 2017-04-08 ASSESSMENT — COPD QUESTIONNAIRES
HAVE YOU SMOKED AT LEAST 100 CIGARETTES IN YOUR ENTIRE LIFE: YES
COPD SCREENING SCORE: 5
DO YOU EVER COUGH UP ANY MUCUS OR PHLEGM?: YES, A FEW DAYS A WEEK OR MONTH
DURING THE PAST 4 WEEKS HOW MUCH DID YOU FEEL SHORT OF BREATH: NONE/LITTLE OF THE TIME

## 2017-04-08 NOTE — H&P
PRIMARY CARE:  [facility].    CHIEF COMPLAINT:  He had a heart attack, fell down.    HISTORY OF PRESENT ILLNESS:  The patient is a 71-year-old gentleman with   history of multiple medical problems including history of multiple CVAs,   alcohol dependence and recent hospital for urethral dilation.  Patient now   presents with complaints of having a heart attack and having fallen down.  He   is a very poor historian.  Patient states that he had a heart attack a long   time ago.  Patient also said he was falling and had fallen 2 days ago and they   called the [facility] today.  However, when trying to clarify what happened, the   patient did not recall exactly what happened.  The patient denied chest pain   or shortness of breath, nausea, vomiting or diarrhea.  Patient states that he   wants to have his Barrera catheter removed, although he does not have a Barrera   catheter.    PAST MEDICAL HISTORY:  Recent hospitalization for urethral dilation, CVA,   acute MI, probable COPD, hepatitis C, prostate cancer, hypothyroidism.    PAST SURGICAL HISTORY:  Urethral dilation.    FAMILY HISTORY:  Reviewed and negative.    SOCIAL HISTORY:  Patient has been known to drink approximately quart of beer   per day, but does not know when his last consumption was.  Also, he states   that his tobacco has been cut down to 3 cigarettes a day.  Denies IV drug use   or polysubstance abuse.    ALLERGIES:  NKDA.    MEDICATIONS:  Enteric-coated aspirin 325 mg daily _____ 50 mg daily.    REVIEW OF SYSTEMS:  Not obtainable due to patient's very confused state.    PHYSICAL EXAMINATION:  GENERAL:  Well-developed, undernourished white male in no acute distress.  VITAL SIGNS:  Temp 37.13, heart rate 79, respiration 20, blood pressure   151/74.  HEENT:  Normocephalic, atraumatic.  Pupils are equal, round and reactive to   light.  Anicteric sclerae.  Extraocular muscles intact.  NECK:  No cervical lymphadenopathy appreciated.  Oropharynx is  small with   Mallampati score of 4.  Mucosa is moist, clear, without ulcerations or   exudates.  PULMONARY:  Clear to auscultation bilaterally.  CARDIOVASCULAR:  Regular rate and rhythm without murmurs, rubs or gallops.  ABDOMEN:  Decreased bowel sounds, soft, nontender, nondistended.  EXTREMITIES:  No clubbing.  Significant cyanosis of the left number 2 through   4 toes.  No edema appreciated.  NEUROLOGIC:  Cranial nerves II-XII intact.  SKIN:  Patient has dry eschar as well as significant gangrenous changes to the   left numbers 2 through 4 toes.  No open ulcerations or drainage.    LABORATORY:  WBC of 15.9, hemoglobin 15.8, platelets 674.  Sodium 122,   potassium 4.2, chloride 89, bicarbonate 20, BUN 10, creatinine 0.66, glucose   of 77, calcium 9.1, alkaline phosphatase 78, AST 31, ALT 12, total bili 0.9.    Lactic acid is 1.9, PT of 14.0, INR 1.05, PTT of 33.    EKG shows sinus rhythm.  Chest x-ray shows no acute finding.  Left foot x-ray   shows no acute process.  Left tib-fib x-ray shows no evidence of fracture.    Left lower extremity arterial ultrasound shows right common femoral artery   stenosis of greater than 50% as well as right profunda femoral artery stenosis   greater than 50% and there is evidence of superficial femoral artery   occlusion.  On the left side, there is significant stenosis of the proximal   superficial femoral, proximal mid superficial femoral as mid superficial   femoral greater than 50% and distal peroneal artery occlusion as well as   stenosis of proximal anterior tibial artery greater than 50%.    ASSESSMENT:  A 71-year-old gentleman with history of multiple medical problems   including multiple cerebrovascular accidents, acute myocardial infarction and   probable chronic obstructive pulmonary disease as well as alcohol dependence,   now with having multiple falls and findings of left toe gangrene and   ischemia.    PLAN:  1.  Left lower extremity ischemia of the gangrenous  changes:  We will initiate   aspirin and atorvastatin as well as IVF.  Vascular surgeon has been   consulted.  2.  Ground level falls:  We will obtain PT, OT evaluation.  3.  Acute issues are resolved.  4.  Peripheral artery disease:  Continue with the aspirin and atorvastatin and   check a fasting lipid profile.  5.  Alcohol dependence:  Provide cessation education and monitor for   withdrawal with CIWA protocol.  6.  Leukocytosis:  We will check blood cultures as well as CRP level for   possible infectious etiology.  7.  Thrombocytosis, likely reactive.  Followup.  8.  Hypertension:  This may be secondary to pain.  We will follow with CIWA   and pain regimen and provide hydralazine and Isordil.  9.  Hyponatremia:  Likely secondary to beer consumption.  We will follow with   normal saline infusion.  10.  Chronic obstructive pulmonary disease:  Provide supplementary oxygen,   respiratory protocol, incentive spirometry, Pneumovax and Fluvax as   appropriate.  11.  Prostate cancer history:  Outpatient followup.  12.  Stable issues:  Medical history including hypothyroidism and hepatitis C,   acute MI and CVAs.  13.  Preventives:  Provide stool softener and DVT prophylaxis.  14.  Disposition:  Complex and guarded.       ____________________________________     MD CHRISTIN TANNER / LAMIN    DD:  04/08/2017 09:04:43  DT:  04/08/2017 09:56:58    D#:  [id number]  Job#:  [id number]    cc: CARLOS VELEZ DO

## 2017-04-08 NOTE — ED PROVIDER NOTES
ED Provider Note      CHIEF COMPLAINT   Chief Complaint   Patient presents with   • Foot Pain     left foot wound; foot slate gray color; pain 7/10   • GLF     5x in past week       HPI   Isiah Lagos is a 71 y.o. male who presents with left foot pain. He reports pain for the last 1 week. He states that he thinks he hit his foot on a heavy table. Since then he said progressive pain. Burning and throbbing discomfort over the dorsal left foot. Has mild pain over the left lower leg. No fevers or chills. He does admit to having several falls recently. He denies other associated area of pain. He states that sometimes his walker just gets away from them. He does drink alcohol relatively heavily every night. He smokes cigarettes. He denies having syncope. He denies hitting his head during fall. Denies neck pain or back pain.    REVIEW OF SYSTEMS   As per HPI, all systems reviewed and negative    PAST MEDICAL HISTORY   Past Medical History   Diagnosis Date   • Hypothyroid    • Emphysema    • Arthritis      osteoarthritis   • Cancer (CMS-HCC) 2001     prostate   • Stroke (CMS-HCC) 2015   • Myocardial infarct (CMS-HCC) 2010       SOCIAL HISTORY  Social History   Substance Use Topics   • Smoking status: Current Every Day Smoker -- 0.25 packs/day for 50 years     Types: Cigarettes   • Smokeless tobacco: Never Used   • Alcohol Use: Yes      Comment: 2 quarts of beer/day       ALLERGIES   See chart    PHYSICAL EXAM  VITAL SIGNS: /104 mmHg  Pulse 90  Temp(Src) 37.1 °C (98.8 °F)  Resp 18  Ht 1.829 m (6')  Wt 58.06 kg (128 lb)  BMI 17.36 kg/m2  Head: Atraumatic  Eyes: Eyes normal inspection  Neck: has full range of motion, normal inspection.  Constitutional: Awake alert. Cachectic. Bearded male who appears poorly kept  Cardiovascular: Normal heart rate. Diminished pulses bilateral lower extremities. Diminished to a greater degree on the left than on the right. Bedside Doppler shows normal sounds over the dorsalis  pedis on the left  Thorax & Lungs: No respiratory distress  Skin: Skin breakdown on and/or abrasion over the dorsal left foot. Cyanotic toes over the 2nd 3rd and to a lesser degree over the 1st digit on the left. Cold digits.  Musculoskeletal: Tenderness over the area of skin breakdown and/or abrasion of the left foot. Tenderness diffusely over the left foot. Limited motor function of the left toes. Mild tenderness over the mid left tibia. Patient has diffuse muscle atrophy.  Neurologic:  Normal sensory and limited motor left lower extremity as mentioned above    RADIOLOGY/PROCEDURES  LE ART DUPLX/IMAG (Specify in Comments Left, Right Or Bilateral)   Preliminary Result      LE ART/GREGG   Preliminary Result      DX-FOOT-COMPLETE 3+ LEFT   Final Result      No radiographic evidence of acute traumatic injury or bony erosion. Plain films are frequently insensitive for the detection of osteomyelitis; consider MRI or bone scan for further assessment if clinically warranted.      DX-TIBIA AND FIBULA LEFT   Final Result      No radiographic evidence of acute traumatic injury.      DX-CHEST-PORTABLE (1 VIEW)   Final Result      No acute cardiac or pulmonary abnormalities are identified.        Imaging is interpreted by radiologist    Results for orders placed or performed during the hospital encounter of 04/08/17   PROTHROMBIN TIME   Result Value Ref Range    PT 14.0 12.0 - 14.6 sec    INR 1.05 0.87 - 1.13   APTT   Result Value Ref Range    APTT 33.1 24.7 - 36.0 sec   CBC WITH DIFFERENTIAL   Result Value Ref Range    WBC 15.9 (H) 4.8 - 10.8 K/uL    RBC 6.70 (H) 4.70 - 6.10 M/uL    Hemoglobin 16.8 14.0 - 18.0 g/dL    Hematocrit 52.1 (H) 42.0 - 52.0 %    MCV 77.8 (L) 81.4 - 97.8 fL    MCH 25.1 (L) 27.0 - 33.0 pg    MCHC 32.2 (L) 33.7 - 35.3 g/dL    RDW 43.3 35.9 - 50.0 fL    Platelet Count 674 (H) 164 - 446 K/uL    MPV 8.9 (L) 9.0 - 12.9 fL    Neutrophils-Polys 81.40 (H) 44.00 - 72.00 %    Lymphocytes 7.00 (L) 22.00 - 41.00 %     Monocytes 6.20 0.00 - 13.40 %    Eosinophils 2.90 0.00 - 6.90 %    Basophils 1.50 0.00 - 1.80 %    Immature Granulocytes 1.00 (H) 0.00 - 0.90 %    Nucleated RBC 0.00 /100 WBC    Neutrophils (Absolute) 12.94 (H) 1.82 - 7.42 K/uL    Lymphs (Absolute) 1.12 1.00 - 4.80 K/uL    Monos (Absolute) 0.99 (H) 0.00 - 0.85 K/uL    Eos (Absolute) 0.46 0.00 - 0.51 K/uL    Baso (Absolute) 0.24 (H) 0.00 - 0.12 K/uL    Immature Granulocytes (abs) 0.16 (H) 0.00 - 0.11 K/uL    NRBC (Absolute) 0.00 K/uL   LACTIC ACID   Result Value Ref Range    Lactic Acid 1.9 0.5 - 2.0 mmol/L   LACTIC ACID   Result Value Ref Range    Lactic Acid 1.3 0.5 - 2.0 mmol/L   COMP METABOLIC PANEL   Result Value Ref Range    Sodium 122 (L) 135 - 145 mmol/L    Potassium 4.2 3.6 - 5.5 mmol/L    Chloride 89 (L) 96 - 112 mmol/L    Co2 23 20 - 33 mmol/L    Anion Gap 10.0 0.0 - 11.9    Glucose 77 65 - 99 mg/dL    Bun 10 8 - 22 mg/dL    Creatinine 0.66 0.50 - 1.40 mg/dL    Calcium 9.1 8.5 - 10.5 mg/dL    AST(SGOT) 31 12 - 45 U/L    ALT(SGPT) 12 2 - 50 U/L    Alkaline Phosphatase 78 30 - 99 U/L    Total Bilirubin 0.9 0.1 - 1.5 mg/dL    Albumin 3.5 3.2 - 4.9 g/dL    Total Protein 7.0 6.0 - 8.2 g/dL    Globulin 3.5 1.9 - 3.5 g/dL    A-G Ratio 1.0 g/dL   ESTIMATED GFR   Result Value Ref Range    GFR If African American >60 >60 mL/min/1.73 m 2    GFR If Non African American >60 >60 mL/min/1.73 m 2   EKG (NOW)   Result Value Ref Range    Report       [facility].    Test Date:  2017  Pt Name:    STAN WALLS                    Department: ER  MRN:        [id number]                      Room:       [facility]  Gender:     M                            Technician: [id number]  :        1945                   Requested By:ROLANDO LOBO  Order #:    [id number]                    Reading MD: ROLANDO LOBO MD    Measurements  Intervals                                Axis  Rate:       80                           P:          85  AK:          164                          QRS:        20  QRSD:       100                          T:          68  QT:         444  QTc:        513    Interpretive Statements  SINUS RHYTHM  BIATRIAL ABNORMALITIES  ANTERIOR INFARCT, OLD  PROLONGED QT INTERVAL  Compared to ECG 03/23/2017 15:52:20  No significant changes    Electronically Signed On 4-8-2017 9:26:19 PDT by ROLANDO LOBO MD           COURSE & MEDICAL DECISION MAKING  Patient presents with left foot pain. He reports trauma, but has clinical appearance of ischemic extremity with the necrotic type ulcer and some mild surrounding redness. He has since by Doppler. I obtained arterial ultrasound. He does not have atrial fibrillation. Arterial ultrasound shows occluded popliteal artery. This appears chronic rather than acutely embolic. I consulted Dr. Monroy who will see the patient. Did not recommend immediate anticoagulation and deferred to hospitalist. Laboratory data shows leukocytosis. He does have some mild redness around his eschar. He was given Unasyn and ankle. He does not have evidence otherwise of sepsis. He will be admitted to the hospitalist team. Dr. Grace was consulted.      FINAL IMPRESSION  1. Cellulitis left foot  2. Arterial insufficiency, left foot secondary to occluded popliteal artery  3. Alcoholism  4. Hyponatremia      This dictation was created using voice recognition software. The accuracy of the dictation is limited to the abilities of the software. I expect there may be some errors of grammar and possibly content. The nursing notes were reviewed and certain aspects of this information were incorporated into this note.      Electronically signed by: Rolando Lobo, 4/8/2017 4:23 AM

## 2017-04-08 NOTE — IP AVS SNAPSHOT
Mitralign Access Code: [email or web address]  Expires: 5/20/2017 12:14 PM    Your email address is not on file at [facility].  Email Addresses are required for you to sign up for Mitralign, please contact [phone] to verify your personal information and to provide your email address prior to attempting to register for Mitralign.    Isiah Lagos  [address]  [place] [address]    Mitralign  A secure, online tool to manage your health information     [facility]’s Mitralign® is a secure, online tool that connects you to your personalized health information from the privacy of your home -- day or night - making it very easy for you to manage your healthcare. Once the activation process is completed, you can even access your medical information using the Mitralign brown, which is available for free in the Apple Brown store or Google Play store.     To learn more about Mitralign, visit [email or web address]    There are two levels of access available (as shown below):   My Chart Features  [facility] Primary Care Doctor [facility]  Specialists [facility]  Urgent  Care Non-[facility] Primary Care Doctor   Email your healthcare team securely and privately 24/7 X X X    Manage appointments: schedule your next appointment; view details of past/upcoming appointments X      Request prescription refills. X      View recent personal medical records, including lab and immunizations X X X X   View health record, including health history, allergies, medications X X X X   Read reports about your outpatient visits, procedures, consult and ER notes X X X X   See your discharge summary, which is a recap of your hospital and/or ER visit that includes your diagnosis, lab results, and care plan X X  X     How to register for [employer]-iT Solutionst:  Once your e-mail address has been verified, follow the following steps to sign up for Mitralign.     1. Go to  [email or web address]  2. Click on the Sign Up Now box, which takes you to the New Member Sign Up page. You  will need to provide the following information:  a. Enter your MailInBlack Access Code exactly as it appears at the top of this page. (You will not need to use this code after you’ve completed the sign-up process. If you do not sign up before the expiration date, you must request a new code.)   b. Enter your date of birth.   c. Enter your home email address.   d. Click Submit, and follow the next screen’s instructions.  3. Create a [employer]t ID. This will be your MailInBlack login ID and cannot be changed, so think of one that is secure and easy to remember.  4. Create a MailInBlack password. You can change your password at any time.  5. Enter your Password Reset Question and Answer. This can be used at a later time if you forget your password.   6. Enter your e-mail address. This allows you to receive e-mail notifications when new information is available in MailInBlack.  7. Click Sign Up. You can now view your health information.    For assistance activating your MailInBlack account, call [phone]

## 2017-04-08 NOTE — ED NOTES
"Pt bib EMS from home after caregiver Norma called d/t:  Chief Complaint   Patient presents with   • Foot Pain     left foot wound; foot slate gray color; pain 7/10   • GLF     5x in past week     Patient A&O, reports ambulatory with walker.  Admits to drinking \" a few quarts of beer nightly\".   Pt wearing brief on admit, reports incontinence, skin red, tender and open.      PTA PIV placed.    Pt changed into gown, chart up for ERP.       "

## 2017-04-08 NOTE — ED NOTES
Sister-in-law, Norma Lagos called for update on patient status, patient approved RN to speak with caller.

## 2017-04-08 NOTE — PROGRESS NOTES
Surgical Progress Note    Author: Joesph Monroy Date & Time created: 2017   8:59 AM     Interval Events:  Debilitated alcoholic who barely walks with walker has chronic ischemia of the legs with ulceration of the left dorsal forefoot.  ABIs fair but imaging shows extensive vessel occlusions and pvd  ROS  Hemodynamics:  Temp (24hrs), Av.1 °C (98.8 °F), Min:37.1 °C (98.8 °F), Max:37.1 °C (98.8 °F)  Temperature: 37.1 °C (98.8 °F)  Pulse  Av  Min: 90  Max: 90Heart Rate (Monitored): 84  Blood Pressure : (!) 192/104 mmHg, NIBP: 141/74 mmHg     Respiratory:    Respiration: 17        RUL Breath Sounds: Crackles, RML Breath Sounds: Crackles, RLL Breath Sounds: Diminished, RIGO Breath Sounds: Crackles, LLL Breath Sounds: Diminished  Neuro:  GCS       Fluids:  No intake or output data in the 24 hours ending 17 0859  Weight: 58.06 kg (128 lb)  Current Diet Order   Procedures   • Diet Order     Physical Exam  Labs:  Recent Results (from the past 24 hour(s))   PROTHROMBIN TIME    Collection Time: 17  4:30 AM   Result Value Ref Range    PT 14.0 12.0 - 14.6 sec    INR 1.05 0.87 - 1.13   APTT    Collection Time: 17  4:30 AM   Result Value Ref Range    APTT 33.1 24.7 - 36.0 sec   CBC WITH DIFFERENTIAL    Collection Time: 17  4:30 AM   Result Value Ref Range    WBC 15.9 (H) 4.8 - 10.8 K/uL    RBC 6.70 (H) 4.70 - 6.10 M/uL    Hemoglobin 16.8 14.0 - 18.0 g/dL    Hematocrit 52.1 (H) 42.0 - 52.0 %    MCV 77.8 (L) 81.4 - 97.8 fL    MCH 25.1 (L) 27.0 - 33.0 pg    MCHC 32.2 (L) 33.7 - 35.3 g/dL    RDW 43.3 35.9 - 50.0 fL    Platelet Count 674 (H) 164 - 446 K/uL    MPV 8.9 (L) 9.0 - 12.9 fL    Neutrophils-Polys 81.40 (H) 44.00 - 72.00 %    Lymphocytes 7.00 (L) 22.00 - 41.00 %    Monocytes 6.20 0.00 - 13.40 %    Eosinophils 2.90 0.00 - 6.90 %    Basophils 1.50 0.00 - 1.80 %    Immature Granulocytes 1.00 (H) 0.00 - 0.90 %    Nucleated RBC 0.00 /100 WBC    Neutrophils (Absolute) 12.94 (H) 1.82 - 7.42 K/uL     Lymphs (Absolute) 1.12 1.00 - 4.80 K/uL    Monos (Absolute) 0.99 (H) 0.00 - 0.85 K/uL    Eos (Absolute) 0.46 0.00 - 0.51 K/uL    Baso (Absolute) 0.24 (H) 0.00 - 0.12 K/uL    Immature Granulocytes (abs) 0.16 (H) 0.00 - 0.11 K/uL    NRBC (Absolute) 0.00 K/uL   EKG (NOW)    Collection Time: 17  4:50 AM   Result Value Ref Range    Report       [facility] Emergency Dept.    Test Date:  2017  Pt Name:    STAN WALLS                    Department: ER  MRN:        [id number]                      Room:        20  Gender:     M                            Technician: [id number]  :        1945                   Requested By:ROLANDO LOBO  Order #:    [id number]                    Reading MD:    Measurements  Intervals                                Axis  Rate:       80                           P:          85  IA:         164                          QRS:        20  QRSD:       100                          T:          68  QT:         444  QTc:        513    Interpretive Statements  SINUS RHYTHM  BIATRIAL ABNORMALITIES  ANTERIOR INFARCT, OLD  PROLONGED QT INTERVAL  Compared to ECG 2017 15:52:20  No significant changes     LACTIC ACID    Collection Time: 17  6:25 AM   Result Value Ref Range    Lactic Acid 1.9 0.5 - 2.0 mmol/L   COMP METABOLIC PANEL    Collection Time: 17  6:25 AM   Result Value Ref Range    Sodium 122 (L) 135 - 145 mmol/L    Potassium 4.2 3.6 - 5.5 mmol/L    Chloride 89 (L) 96 - 112 mmol/L    Co2 23 20 - 33 mmol/L    Anion Gap 10.0 0.0 - 11.9    Glucose 77 65 - 99 mg/dL    Bun 10 8 - 22 mg/dL    Creatinine 0.66 0.50 - 1.40 mg/dL    Calcium 9.1 8.5 - 10.5 mg/dL    AST(SGOT) 31 12 - 45 U/L    ALT(SGPT) 12 2 - 50 U/L    Alkaline Phosphatase 78 30 - 99 U/L    Total Bilirubin 0.9 0.1 - 1.5 mg/dL    Albumin 3.5 3.2 - 4.9 g/dL    Total Protein 7.0 6.0 - 8.2 g/dL    Globulin 3.5 1.9 - 3.5 g/dL    A-G Ratio 1.0 g/dL   ESTIMATED GFR    Collection Time: 17  6:25  AM   Result Value Ref Range    GFR If African American >60 >60 mL/min/1.73 m 2    GFR If Non African American >60 >60 mL/min/1.73 m 2   LACTIC ACID    Collection Time: 04/08/17  8:08 AM   Result Value Ref Range    Lactic Acid 1.3 0.5 - 2.0 mmol/L     Medical Decision Making, by Problem:  Active Hospital Problems    Diagnosis   • Alcohol dependence (CMS-HCC) [F10.20]   • Falls frequently [R29.6]   • PAD (peripheral artery disease) (CMS-HCC) [I73.9]   • Ischemia of left lower extremity [I99.8]     Plan:  Will consider angio and possible reconstruction pending correction of hyponatremia and discussions with family    Quality Measures:  Core Measures

## 2017-04-08 NOTE — IP AVS SNAPSHOT
" <p align=\"LEFT\"><IMG SRC=\"//[email or web address]$/[email or web address]\" alt=\"Image\" WIDTH=\"50%\" HEIGHT=\"200\" BORDER=\"\"></p>                   Name:Isiah Lagos  Medical Record Number:[id number]  CSN: [id number]    YOB: 1945   Age: 71 y.o.  Sex: male  HT:1.829 m (6' 0.01\") WT: 64 kg (141 lb 1.5 oz)          Admit Date: 4/8/2017     Discharge Date:   Today's Date: 4/20/2017  Attending Doctor:  Clark Calix M.D.                  Allergies:  Review of patient's allergies indicates no known allergies.          Follow-up Information     1. Follow up with Omaira Avila M.D.. Schedule an appointment as soon as possible for a visit in 2 weeks.    Specialty:  Family Medicine    Why:  Hospital follow-up appointment with PCP    Contact information    [address]  [address]  [facility] [address]  [phone]          2. Follow up In 1 week.        3. Follow up with Omaira Avila M.D. In 1 week.    Specialty:  Family Medicine    Contact information    [address]  [facility] [address]  [phone]          4. Follow up with Joesph Monroy M.D. In 1 week.    Specialties:  Surgery, Radiology    Contact information    [address]  [facility] [id number] [phone]           Medication List      Take these Medications        Instructions    aspirin  MG Tbec   Commonly known as:  ECOTRIN    Take 325 mg by mouth every day.   Dose:  325 mg       atorvastatin 40 MG Tabs   Commonly known as:  LIPITOR    Take 1 Tab by mouth every bedtime.   Dose:  40 mg       ciprofloxacin 500 MG Tabs   Commonly known as:  CIPRO    Take 1 Tab by mouth 2 times a day for 3 days.   Dose:  500 mg       clonidine 0.1 MG Tabs   Commonly known as:  CATAPRES    Take 1 Tab by mouth 2 Times a Day.   Dose:  0.1 mg       levothyroxine 75 MCG Tabs   Commonly known as:  SYNTHROID    Take 75 mcg by mouth every morning before breakfast.   Dose:  75 mcg       metoprolol 50 MG Tabs   Commonly known as:  LOPRESSOR    Take 1 Tab " by mouth 2 Times a Day.   Dose:  50 mg       Mirabegron ER 25 MG Tb24   Commonly known as:  MYRBETRIQ    Take 50 mg by mouth every day.   Dose:  50 mg       oxycodone immediate-release 5 MG Tabs   Commonly known as:  ROXICODONE    Take 1 Tab by mouth every four hours as needed.   Dose:  5 mg       sodium chloride 1 GM Tabs   Commonly known as:  SALT    Take 1 Tab by mouth 3 times a day, with meals.   Dose:  1 g       thiamine 100 MG tablet   Commonly known as:  THIAMINE    Take 1 Tab by mouth every day.   Dose:  100 mg

## 2017-04-08 NOTE — PROGRESS NOTES
Received report from ER RN. Assumed pt care. Assessment completed. AA&Ox4. Pain 8/10.   Pt is currently in bed. Denies SOB, chest pain, dizziness.   Bed alarm on, call light within reach, pt calls appropriately and does not get out of bed. Bed in lowest position, bed locked, RN and CNA numbers provided, no further needs at this time. Safety precautions in place. Hourly rounding in progress.

## 2017-04-08 NOTE — IP AVS SNAPSHOT
4/20/2017    Isiah Lagos  [address]  [place] [address]    Dear Isiah:    [facility] wants to ensure your discharge home is safe and you or your loved ones have had all of your questions answered regarding your care after you leave the hospital.    Below is a list of resources and contact information should you have any questions regarding your hospital stay, follow-up instructions, or active medical symptoms.    Questions or Concerns Regarding… Contact   Medical Questions Related to Your Discharge  (7 days a week, 8am-5pm) Contact a Nurse Care Coordinator   [phone]   Medical Questions Not Related to Your Discharge  (24 hours a day / 7 days a week)  Contact the Nurse Health Line   [phone]    Medications or Discharge Instructions Refer to your discharge packet   or contact your [facility] Primary Care Provider   [phone]   Follow-up Appointment(s) Schedule your appointment via [employer]   or contact Scheduling [phone]   Billing Review your statement via [employer]  or contact Billing [phone]   Medical Records Review your records via [employer]   or contact Medical Records [phone]     You may receive a telephone call within two days of discharge. This call is to make certain you understand your discharge instructions and have the opportunity to have any questions answered. You can also easily access your medical information, test results and upcoming appointments via the [employer] free online health management tool. You can learn more and sign up at [employer]Want[employer]. For assistance setting up your [employer] account, please call [phone].    Once again, we want to ensure your discharge home is safe and that you have a clear understanding of any next steps in your care. If you have any questions or concerns, please do not hesitate to contact us, we are here for you. Thank you for choosing [facility] for your healthcare needs.    Sincerely,    Your [facility] Healthcare Team

## 2017-04-08 NOTE — CONSULTS
DATE OF SERVICE:  04/08/2017    CHIEF COMPLAINT:  Consult for chronic ischemia, left lower extremity.    HISTORY:  This 71-year-old male alcoholic is being admitted by Dr. Fermín Grace.    This patient is a poor historian and a chronic alcoholic.    He is homeless.    He states that he has had some recent foot pain, but his history is completely   unreliable.    He has a skin loss on the dorsum of the left forefoot and severe PVD on   arterial imaging.  He is admitted for correction of profound hyponatremia   related to alcoholism and he is seen now for evaluation.    PAST MEDICAL HISTORY AND OPERATIONS:  Multiple  procedures for urethral   dilatation.    MEDICAL DISEASES:  COPD, chronic alcoholism, generalized debility,   hypothyroidism.    MEDICATIONS:  Aspirin, Synthroid, mirabegron.    ALLERGIES:  None listed.    FAMILY HISTORY:  Positive for heart disease.    SOCIAL HISTORY:  Patient is a smoker and alcoholic.    REVIEW OF SYSTEMS:  His main complaint concerns is discomfort in his foot, but   his history is unreliable.    PHYSICAL EXAMINATION:  VITAL SIGNS:  Temperature 98.8, blood pressure 142/88, pulse 80.  GENERAL:  Very debilitated elderly male in the fetal position.  He is alert   and only oriented x1.  HEENT:  Without icterus.  Pupils equal, reactive to light and accommodation.  NECK:  Without masses, no JVD, no supraclavicular adenopathy, no thyromegaly.  CHEST:  Coarse breath sounds.  CARDIAC:  Auscultation unremarkable.  ABDOMEN:  Scaphoid mid abdominal bruits.  RECTAL AND GENITAL:  Deferred.  EXTREMITIES:  2+ femoral pulse and no distal pulses palpable.  He has rubor   both lower extremities and cyanosis of the toes, left foot.  He has a full   thickness skin loss on the dorsum of the forefoot on the left, which is dry.    IMPRESSION:  1.  Chronic ischemia, left lower extremity secondary to peripheral vascular   disease.  2.  Chronic alcoholism.  3.  Hyponatremia related to alcoholism.  4.  Chronic  obstructive pulmonary disease secondary to smoking.  5.  Generalized debility.  6.  Hypothyroidism.  7.  Anticoagulation with aspirin.    PLAN:  I will have a long discussion with the family.  This patient states   that he ambulates with a walker, but my suspicion is that he does not.    Once his hyponatremia is corrected, he could potentially have angiography   reconstruction, although this is problematic at the present time.    Thank you very much for this consultation.       ____________________________________     MD NICOLE DU / LAMIN    DD:  04/08/2017 09:06:22  DT:  04/08/2017 10:02:50    D#:  [id number]  Job#:  [id number]

## 2017-04-08 NOTE — IP AVS SNAPSHOT
" Home Care Instructions                                                                                                                  Name:Isiah Lagos  Medical Record Number:[id number]  CSN: [id number]    YOB: 1945   Age: 71 y.o.  Sex: male  HT:1.829 m (6' 0.01\") WT: 64 kg (141 lb 1.5 oz)          Admit Date: 4/8/2017     Discharge Date:   Today's Date: 4/20/2017  Attending Doctor:  Clark Calix M.D.                  Allergies:  Review of patient's allergies indicates no known allergies.            Discharge Instructions       Discharge Instructions    Discharged to other by ambulance with escort. Discharged via ambulance, hospital escort: Yes.  Special equipment needed: Not Applicable    Be sure to schedule a follow-up appointment with your primary care doctor or any specialists as instructed.     Discharge Plan: discussed  Smoking Cessation Offered: Patient Refused    I understand that a diet low in cholesterol, fat, and sodium is recommended for good health. Unless I have been given specific instructions below for another diet, I accept this instruction as my diet prescription.   Other diet: cardiac, dysphagia 2, nectar think    Special Instructions: pt going to rehab to improve circulation, follow up appointments.   Deep Vein Thrombosis Discharge Instructions    A deep vein thrombosis (DVT) is a blood clot (thrombus) that develops in a deep vein. A DVT is a clot in the deep, larger veins of the leg, arm, or pelvis. These are more dangerous than clots that might form in veins on the surface of the body. Deep vein thrombosis can lead to complications if the clot breaks off and travels in the bloodstream to the lungs.     CAUSES  Blood clots form in a vein for different reasons. Usually several things cause blood clots. They include:   · The flow of blood slows down.   · The inside of the vein is damaged in some way.   · The person has a condition that makes blood clot more easily. These conditions " may include:  · Older age (especially over 75 years old).  · Having a history of blood clots.  · Having major or lengthy surgery. Hip surgery is particularly high-risk.   · Breaking a hip or leg.  · Sitting or lying still for a long time.  · Cancer or cancer treatment.  · Having a long, thin tube (catheter) placed inside a vein during a medical procedure.   · Being overweight (obese).  · Pregnancy and childbirth.  · Medicines with estrogen.  · Smoking.  · Other circulation or heart problems.     SYMPTOMS  When a clot forms, it can either partially or totally block the blood flow in that vein. Symptoms of a DVT can include:  · Swelling of the leg or arm, especially if one side is much worse.  · Warmth and redness of the leg or arm, especially if one side is much worse.   · Pain in an arm or leg. If the clot is in the leg, symptoms may be more noticeable or worse when standing or walking.  If the blood clot travels to the lung, it may cause:  · Shortness of breath.  · Chest pain. The pain may be worsened by deep breaths.   · Coughing up thick mucus (phlegm), possibly flecked with blood.   Anyone with these symptoms should get emergency medical treatment right away. Call your local emergency  Services (911 in [place] if you have these symptoms.     DIAGNOSIS  If a DVT is suspected, your caregiver will take a full medical history. He or she will also perform a physical exam. Tests that also may be required include:   · Studies of the clotting properties of the blood.   · An ultrasound scan.   · X-rays to show the flow of blood when special dye is injected into the veins (venography).   · Studies of your lungs if you have any chest symptoms.     PREVENTION  · Exercise the legs regularly. Take a brisk 30 minute walk every day.   · Maintain a weight that is appropriate for your height.  · Avoid sitting or lying in bed for long periods of time without moving your legs.   · Women, particularly those over the age of 35, should  consider the risks and benefits of taking estrogen medicine, including birth control pills.   · Do not smoke, especially if you take estrogen medicines.   · Long-distance travel can increase your risk. You should exercise your legs by walking or pumping the muscles every hour.   · In hospital prevention: Prevention may include medical and non medical measures.     TREATMENT  · The most common treatment for DVT is blood thinning (anticoagulant) medicine, which reduces the blood's tendency to clot. Anticoagulants can stop new blood clots from forming and old ones from growing. They cannot dissolve existing clots. Your body does this by itself over time. Anticoagulants can be given by mouth, by intravenous (IV) access, or by injection. Your caregiver will determine the best program for you.   · Less commonly, clot-dissolving drugs (thrombolytics) are used to dissolve a DVT. They carry a high risk of bleeding, so they are used mainly in severe cases.   · Very rarely, a blood clot in the leg needs to be removed surgically.   · If you are unable to take anticoagulants, your caregiver may arrange for you to have a filter placed in a main vein in your belly (abdomen). This filter prevents clots from traveling to your lungs.     HOME CARE INSTRUCTIONS  Take all medicines prescribed by your caregiver. Follow the directions carefully.   · You will most likely continue taking anticoagulants after you leave the hospital. Your caregiver will advise you on the length of treatment (usually 3 to 5 months, sometimes for life).   · Taking too much or too little of an anticoagulant is dangerous. While taking this type of medicine, you will need to have regular blood tests to be sure the dose is correct. The dose can change for many reasons. It is critically important that you take this medicine exactly as prescribed, and that you have blood tests exactly as directed.   · Many foods can interfere with anticoagulants. These include foods  high in vitamin K, such as spinach, kale, broccoli, cabbage, anatoliy and turnip greens, Bonita sprouts, peas, cauliflower, seaweed, parsley, beef and pork liver, green tea, and soybean oil. Your caregiver should discuss limits on these foods with you or you should arrange a visit with a dietician to answer your questions.   · Many medicines can interfere with anticoagulants. You must tell your caregiver about any and all medicines you take. This includes all vitamins and supplements. Be especially cautious with aspirin and anti-inflammatory medicines. Ask your caregiver before taking these.   · Anticoagulants can have side effects, mostly excessive bruising or bleeding. You will need to hold pressure over cuts for longer than usual. Avoid alcoholic drinks or consume only very small amounts while taking this medicine.    If you are taking an anticoagulant:  · Wear a medical alert bracelet.  · Notify your dentist or other caregivers before procedures.  · Avoid contact sports.    · Ask your caregiver how soon you can go back to normal activities. Not being active can lead to new clots. Ask for a list of what you should and should not do.   · Exercise your lower leg muscles. This is important while traveling.   · You may need to wear compression stockings. These are tight elastic stockings that apply pressure to the lower legs. This can help keep the blood in the legs from clotting.   · If you are a smoker, you should quit.   · Learn as much as you can about DVT.     SEEK MEDICAL CARE IF:  · You have unusual bruising or any bleeding problems.  · The swelling or pain in your affected arm or leg is not gradually improving.   · You anticipate surgery or long-distance travel. You should get specific advice on DVT prevention.   · You discover other family members with blood clots. This may require further testing for inherited diseases or conditions.     SEEK IMMEDIATE MEDICAL CARE IF:  · You develop chest pain.  · You  develop severe shortness of breath.  · You begin to cough up bloody mucus or phlegm (sputum).  · You feel dizzy or faint.   · You develop swelling or pain in the leg.  · You have breathing problems after traveling.    MAKE SURE YOU:  · Understand these instructions.  · Will watch your condition.  Will get help right away if you are not doing well or get worse.     · Is patient discharged on Warfarin / Coumadin?   No     · Is patient Post Blood Transfusion?  No    Depression / Suicide Risk    As you are discharged from this [facility] facility, it is important to learn how to keep safe from harming yourself.    Recognize the warning signs:  · Abrupt changes in personality, positive or negative- including increase in energy   · Giving away possessions  · Change in eating patterns- significant weight changes-  positive or negative  · Change in sleeping patterns- unable to sleep or sleeping all the time   · Unwillingness or inability to communicate  · Depression  · Unusual sadness, discouragement and loneliness  · Talk of wanting to die  · Neglect of personal appearance   · Rebelliousness- reckless behavior  · Withdrawal from people/activities they love  · Confusion- inability to concentrate     If you or a loved one observes any of these behaviors or has concerns about self-harm, here's what you can do:  · Talk about it- your feelings and reasons for harming yourself  · Remove any means that you might use to hurt yourself (examples: pills, rope, extension cords, firearm)  · Get professional help from the community (Mental Health, Substance Abuse, psychological counseling)  · Do not be alone:Call your Safe Contact- someone whom you trust who will be there for you.  · Call your local CRISIS HOTLINE 130-9677 or 897-046-3325  · Call your local Children's Mobile Crisis Response Team [place] (388) 292-4580 or www.wesync.tv  · Call the toll free National Suicide Prevention Hotlines   · National Suicide  Prevention Lifeline 922-320-UTFM (4108)  · [facility] Line Network 800-SUICIDE (051-2172)        Follow-up Information     1. Follow up with Omaira Avila M.D.. Schedule an appointment as soon as possible for a visit in 2 weeks.    Specialty:  Family Medicine    Why:  Hospital follow-up appointment with PCP    Contact information    [address]  [address]  [place] [address]  [phone]          2. Follow up In 1 week.        3. Follow up with Omaira Avila M.D. In 1 week.    Specialty:  Family Medicine    Contact information    [address]  [address]  [place] [address]  [phone]          4. Follow up with Joesph Monroy M.D. In 1 week.    Specialties:  Surgery, Radiology    Contact information    [address]  [place] [address]  [phone]           Discharge Medication Instructions:    Below are the medications your physician expects you to take upon discharge:    Review all your home medications and newly ordered medications with your doctor and/or pharmacist. Follow medication instructions as directed by your doctor and/or pharmacist.    Please keep your medication list with you and share with your physician.               Medication List      START taking these medications        Instructions    Morning Afternoon Evening Bedtime    atorvastatin 40 MG Tabs   Last time this was given:  40 mg on 4/19/2017  8:36 PM   Commonly known as:  LIPITOR   Next Dose Due:  Tonight          Take 1 Tab by mouth every bedtime.   Dose:  40 mg                        ciprofloxacin 500 MG Tabs   Commonly known as:  CIPRO   Next Dose Due:  Start tomorrow        Take 1 Tab by mouth 2 times a day for 3 days.   Dose:  500 mg                        clonidine 0.1 MG Tabs   Last time this was given:  0.1 mg on 4/20/2017 10:11 AM   Commonly known as:  CATAPRES   Next Dose Due:  Tonight          Take 1 Tab by mouth 2 Times a Day.   Dose:  0.1 mg                        metoprolol 50 MG Tabs      Last time this was given:  50 mg on 4/20/2017 10:11 AM   Commonly known as:  LOPRESSOR   Next Dose Due:  Tonight          Take 1 Tab by mouth 2 Times a Day.   Dose:  50 mg                        oxycodone immediate-release 5 MG Tabs   Last time this was given:  5 mg on 4/20/2017 12:08 PM   Commonly known as:  ROXICODONE   Next Dose Due:  As needed        Take 1 Tab by mouth every four hours as needed.   Dose:  5 mg                        sodium chloride 1 GM Tabs   Last time this was given:  1 g on 4/20/2017 10:10 AM   Commonly known as:  SALT   Next Dose Due:  3pm and 9 pm        Take 1 Tab by mouth 3 times a day, with meals.   Dose:  1 g                        thiamine 100 MG tablet   Last time this was given:  100 mg on 4/20/2017 10:11 AM   Commonly known as:  THIAMINE   Next Dose Due:  Tonight          Take 1 Tab by mouth every day.   Dose:  100 mg                          CONTINUE taking these medications        Instructions    Morning Afternoon Evening Bedtime    aspirin  MG Tbec   Last time this was given:  325 mg on 4/20/2017 10:11 AM   Commonly known as:  ECOTRIN   Next Dose Due:  Tomorrow Morning            Take 325 mg by mouth every day.   Dose:  325 mg                        levothyroxine 75 MCG Tabs   Last time this was given:  75 mcg on 4/20/2017  5:53 AM   Commonly known as:  SYNTHROID   Next Dose Due:  Tomorrow Morning          Take 75 mcg by mouth every morning before breakfast.   Dose:  75 mcg                        Mirabegron ER 25 MG Tb24   Last time this was given:  50 mg on 4/20/2017 10:12 AM   Commonly known as:  MYRBETRIQ   Next Dose Due:  Tomorrow Morning          Take 50 mg by mouth every day.   Dose:  50 mg                             Where to Get Your Medications      Information about where to get these medications is not yet available     ! Ask your nurse or doctor about these medications    - atorvastatin 40 MG Tabs  - ciprofloxacin 500 MG Tabs  - clonidine 0.1 MG Tabs  -  metoprolol 50 MG Tabs  - oxycodone immediate-release 5 MG Tabs  - sodium chloride 1 GM Tabs  - thiamine 100 MG tablet            Orders for after discharge     REFERRAL TO PHYSIATRY (PMR)    Complete by:  As directed        REFERRAL TO SKILLED NURSING FACILITY    Complete by:  As directed              Instructions           Diet / Nutrition:    Follow any diet instructions given to you by your doctor or the dietician, including how much salt (sodium) you are allowed each day.    If you are overweight, talk to your doctor about a weight reduction plan.    Activity:    Remain physically active following your doctor's instructions about exercise and activity.    Rest often.     Any time you become even a little tired or short of breath, SIT DOWN and rest.    Worsening Symptoms:    Report any of the following signs and symptoms to the doctor's office immediately:    *Pain of jaw, arm, or neck  *Chest pain not relieved by medication                               *Dizziness or loss of consciousness  *Difficulty breathing even when at rest   *More tired than usual                                       *Bleeding drainage or swelling of surgical site  *Swelling of feet, ankles, legs or stomach                 *Fever (>100ºF)  *Pink or blood tinged sputum  *Weight gain (3lbs/day or 5lbs /week)           *Shock from internal defibrillator (if applicable)  *Palpitations or irregular heartbeats                *Cool and/or numb extremities    Stroke Awareness    Common Risk Factors for Stroke include:    Age  Atrial Fibrillation  Carotid Artery Stenosis  Diabetes Mellitus  Excessive alcohol consumption  High blood pressure  Overweight   Physical inactivity  Smoking    Warning signs and symptoms of a stroke include:    *Sudden numbness or weakness of the face, arm or leg (especially on one side of the body).  *Sudden confusion, trouble speaking or understanding.  *Sudden trouble seeing in one or both eyes.  *Sudden trouble  walking, dizziness, loss of balance or coordination.Sudden severe headache with no known cause.    It is very important to get treatment quickly when a stroke occurs. If you experience any of the above warning signs, call 911 immediately.                   Disclaimer         Quit Smoking / Tobacco Use:    I understand the use of any tobacco products increases my chance of suffering from future heart disease or stroke and could cause other illnesses which may shorten my life. Quitting the use of tobacco products is the single most important thing I can do to improve my health. For further information on smoking / tobacco cessation call a Toll Free Quit Line at 1-291.611.9671 (*[place] [employer] [place]) or 1-768.992.9242 (American Lung Association) or you can access the web based program at www.lungFoundations in Learning.org.    [place] Tobacco Users Help Line:  (875) 654-8403       Toll Free: 1-309.509.8814  Quit Tobacco Program [facility] (791)047-3339    Crisis Hotline:    [place] Crisis Hotline:  2-727-VXOKGYU or 1-403.351.1613    [place] Crisis Hotline:    1-298.791.4215 or 951-558-9890    Discharge Survey:   Thank you for choosing [facility]. We hope we did everything we could to make your hospital stay a pleasant one. You may be receiving a phone survey and we would appreciate your time and participation in answering the questions. Your input is very valuable to us in our efforts to improve our service to our patients and their families.        My signature on this form indicates that:    1. I have reviewed and understand the above information.  2. My questions regarding this information have been answered to my satisfaction.  3. I have formulated a plan with my discharge nurse to obtain my prescribed medications for home.                  Disclaimer         __________________________________                     __________       ________                       Patient Signature                                                  Date                    Time

## 2017-04-08 NOTE — DIETARY
Nutrition Services:   Consult received for diabetic diet education. Attempted to visit pt for diabetic diet education, pt was sleeping. RD will follow up for diet education.     RD available prn.

## 2017-04-09 PROBLEM — E87.1 HYPONATREMIA: Status: ACTIVE | Noted: 2017-04-09

## 2017-04-09 PROBLEM — D72.829 LEUCOCYTOSIS: Status: ACTIVE | Noted: 2017-04-09

## 2017-04-09 PROBLEM — F17.200 SMOKING: Status: ACTIVE | Noted: 2017-04-09

## 2017-04-09 PROBLEM — F10.939 ALCOHOL WITHDRAWAL (HCC): Status: ACTIVE | Noted: 2017-04-09

## 2017-04-09 LAB
ANION GAP SERPL CALC-SCNC: 7 MMOL/L (ref 0–11.9)
BUN SERPL-MCNC: 17 MG/DL (ref 8–22)
CALCIUM SERPL-MCNC: 8.7 MG/DL (ref 8.5–10.5)
CHLORIDE SERPL-SCNC: 95 MMOL/L (ref 96–112)
CHOLEST SERPL-MCNC: 136 MG/DL (ref 100–199)
CO2 SERPL-SCNC: 24 MMOL/L (ref 20–33)
CREAT SERPL-MCNC: 0.81 MG/DL (ref 0.5–1.4)
ERYTHROCYTE [DISTWIDTH] IN BLOOD BY AUTOMATED COUNT: 45.2 FL (ref 35.9–50)
GFR SERPL CREATININE-BSD FRML MDRD: >60 ML/MIN/1.73 M 2
GLUCOSE SERPL-MCNC: 80 MG/DL (ref 65–99)
HCT VFR BLD AUTO: 47.2 % (ref 42–52)
HDLC SERPL-MCNC: 53 MG/DL
HGB BLD-MCNC: 14.7 G/DL (ref 14–18)
LDLC SERPL CALC-MCNC: 72 MG/DL
MCH RBC QN AUTO: 24.3 PG (ref 27–33)
MCHC RBC AUTO-ENTMCNC: 31.1 G/DL (ref 33.7–35.3)
MCV RBC AUTO: 77.9 FL (ref 81.4–97.8)
PLATELET # BLD AUTO: 671 K/UL (ref 164–446)
PMV BLD AUTO: 8.7 FL (ref 9–12.9)
POTASSIUM SERPL-SCNC: 4 MMOL/L (ref 3.6–5.5)
RBC # BLD AUTO: 6.06 M/UL (ref 4.7–6.1)
SODIUM SERPL-SCNC: 126 MMOL/L (ref 135–145)
TRIGL SERPL-MCNC: 53 MG/DL (ref 0–149)
WBC # BLD AUTO: 13.2 K/UL (ref 4.8–10.8)

## 2017-04-09 PROCEDURE — 700102 HCHG RX REV CODE 250 W/ 637 OVERRIDE(OP): Performed by: HOSPITALIST

## 2017-04-09 PROCEDURE — 700111 HCHG RX REV CODE 636 W/ 250 OVERRIDE (IP): Performed by: HOSPITALIST

## 2017-04-09 PROCEDURE — 700105 HCHG RX REV CODE 258: Performed by: HOSPITALIST

## 2017-04-09 PROCEDURE — 99407 BEHAV CHNG SMOKING > 10 MIN: CPT | Performed by: INTERNAL MEDICINE

## 2017-04-09 PROCEDURE — 80061 LIPID PANEL: CPT

## 2017-04-09 PROCEDURE — 99233 SBSQ HOSP IP/OBS HIGH 50: CPT | Mod: 25 | Performed by: INTERNAL MEDICINE

## 2017-04-09 PROCEDURE — 302255 BARRIER CREAM MOISTURE BAZA PROTECT: Performed by: INTERNAL MEDICINE

## 2017-04-09 PROCEDURE — 36415 COLL VENOUS BLD VENIPUNCTURE: CPT

## 2017-04-09 PROCEDURE — 85027 COMPLETE CBC AUTOMATED: CPT

## 2017-04-09 PROCEDURE — A9270 NON-COVERED ITEM OR SERVICE: HCPCS | Performed by: HOSPITALIST

## 2017-04-09 PROCEDURE — 700101 HCHG RX REV CODE 250: Performed by: INTERNAL MEDICINE

## 2017-04-09 PROCEDURE — 770020 HCHG ROOM/CARE - TELE (206)

## 2017-04-09 PROCEDURE — 80048 BASIC METABOLIC PNL TOTAL CA: CPT

## 2017-04-09 RX ORDER — LABETALOL HYDROCHLORIDE 5 MG/ML
5 INJECTION, SOLUTION INTRAVENOUS EVERY 4 HOURS PRN
Status: DISCONTINUED | OUTPATIENT
Start: 2017-04-09 | End: 2017-04-20 | Stop reason: HOSPADM

## 2017-04-09 RX ADMIN — LORAZEPAM 2 MG: 1 TABLET ORAL at 03:59

## 2017-04-09 RX ADMIN — HEPARIN SODIUM 5000 UNITS: 5000 INJECTION, SOLUTION INTRAVENOUS; SUBCUTANEOUS at 13:42

## 2017-04-09 RX ADMIN — LORAZEPAM 1 MG: 2 INJECTION INTRAMUSCULAR at 19:45

## 2017-04-09 RX ADMIN — ACETAMINOPHEN 650 MG: 325 TABLET, FILM COATED ORAL at 03:58

## 2017-04-09 RX ADMIN — LORAZEPAM 1 MG: 2 INJECTION INTRAMUSCULAR at 07:54

## 2017-04-09 RX ADMIN — LABETALOL HYDROCHLORIDE 5 MG: 5 INJECTION INTRAVENOUS at 21:09

## 2017-04-09 RX ADMIN — LORAZEPAM 0.5 MG: 2 INJECTION INTRAMUSCULAR at 22:00

## 2017-04-09 RX ADMIN — LORAZEPAM 2 MG: 1 TABLET ORAL at 05:16

## 2017-04-09 RX ADMIN — SODIUM CHLORIDE: 9 INJECTION, SOLUTION INTRAVENOUS at 04:19

## 2017-04-09 RX ADMIN — HYDRALAZINE HYDROCHLORIDE 10 MG: 10 TABLET, FILM COATED ORAL at 05:16

## 2017-04-09 RX ADMIN — SODIUM CHLORIDE: 9 INJECTION, SOLUTION INTRAVENOUS at 21:09

## 2017-04-09 RX ADMIN — LORAZEPAM 2 MG: 1 TABLET ORAL at 01:03

## 2017-04-09 RX ADMIN — HEPARIN SODIUM 5000 UNITS: 5000 INJECTION, SOLUTION INTRAVENOUS; SUBCUTANEOUS at 05:16

## 2017-04-09 RX ADMIN — NICOTINE 14 MG: 14 PATCH, EXTENDED RELEASE TRANSDERMAL at 05:16

## 2017-04-09 RX ADMIN — HEPARIN SODIUM 5000 UNITS: 5000 INJECTION, SOLUTION INTRAVENOUS; SUBCUTANEOUS at 22:01

## 2017-04-09 ASSESSMENT — LIFESTYLE VARIABLES
TREMOR: TREMOR NOT VISIBLE BUT CAN BE FELT, FINGERTIP TO FINGERTIP
VISUAL DISTURBANCES: NOT PRESENT
HEADACHE, FULLNESS IN HEAD: MODERATE
NAUSEA AND VOMITING: NO NAUSEA AND NO VOMITING
AUDITORY DISTURBANCES: NOT PRESENT
TREMOR: NO TREMOR
TOTAL SCORE: 4
ANXIETY: MODERATELY ANXIOUS OR GUARDED, SO ANXIETY IS INFERRED
HEADACHE, FULLNESS IN HEAD: NOT PRESENT
ORIENTATION AND CLOUDING OF SENSORIUM: DISORIENTED FOR PLACE AND / OR PERSON
HEADACHE, FULLNESS IN HEAD: NOT PRESENT
AGITATION: *
TOTAL SCORE: 13
PAROXYSMAL SWEATS: BARELY PERCEPTIBLE SWEATING. PALMS MOIST
PAROXYSMAL SWEATS: NO SWEAT VISIBLE
NAUSEA AND VOMITING: NO NAUSEA AND NO VOMITING
HEADACHE, FULLNESS IN HEAD: VERY MILD
ORIENTATION AND CLOUDING OF SENSORIUM: DISORIENTED FOR PLACE AND / OR PERSON
PAROXYSMAL SWEATS: BARELY PERCEPTIBLE SWEATING. PALMS MOIST
ANXIETY: NO ANXIETY (AT EASE)
TOTAL SCORE: VERY MILD ITCHING, PINS AND NEEDLES SENSATION, BURNING OR NUMBNESS
PAROXYSMAL SWEATS: NO SWEAT VISIBLE
ORIENTATION AND CLOUDING OF SENSORIUM: DISORIENTED FOR PLACE AND / OR PERSON
AGITATION: NORMAL ACTIVITY
ANXIETY: *
ANXIETY: *
AGITATION: NORMAL ACTIVITY
NAUSEA AND VOMITING: NO NAUSEA AND NO VOMITING
TREMOR: NO TREMOR
TREMOR: TREMOR NOT VISIBLE BUT CAN BE FELT, FINGERTIP TO FINGERTIP
NAUSEA AND VOMITING: NO NAUSEA AND NO VOMITING
HEADACHE, FULLNESS IN HEAD: NOT PRESENT
AUDITORY DISTURBANCES: NOT PRESENT
NAUSEA AND VOMITING: NO NAUSEA AND NO VOMITING
AGITATION: MODERATELY FIDGETY AND RESTLESS
HEADACHE, FULLNESS IN HEAD: NOT PRESENT
AUDITORY DISTURBANCES: NOT PRESENT
TOTAL SCORE: VERY MILD ITCHING, PINS AND NEEDLES SENSATION, BURNING OR NUMBNESS
TREMOR: *
TREMOR: TREMOR NOT VISIBLE BUT CAN BE FELT, FINGERTIP TO FINGERTIP
TREMOR: NO TREMOR
VISUAL DISTURBANCES: NOT PRESENT
VISUAL DISTURBANCES: NOT PRESENT
TOTAL SCORE: 12
TOTAL SCORE: 11
ANXIETY: NO ANXIETY (AT EASE)
PAROXYSMAL SWEATS: BARELY PERCEPTIBLE SWEATING. PALMS MOIST
ANXIETY: MILDLY ANXIOUS
TOTAL SCORE: 11
ORIENTATION AND CLOUDING OF SENSORIUM: DATE DISORIENTATION BY NO MORE THAN TWO CALENDAR DAYS
VISUAL DISTURBANCES: NOT PRESENT
TOTAL SCORE: VERY MILD ITCHING, PINS AND NEEDLES SENSATION, BURNING OR NUMBNESS
HEADACHE, FULLNESS IN HEAD: NOT PRESENT
VISUAL DISTURBANCES: NOT PRESENT
AUDITORY DISTURBANCES: NOT PRESENT
PAROXYSMAL SWEATS: NO SWEAT VISIBLE
VISUAL DISTURBANCES: NOT PRESENT
AGITATION: SOMEWHAT MORE THAN NORMAL ACTIVITY
AUDITORY DISTURBANCES: NOT PRESENT
ANXIETY: *
ORIENTATION AND CLOUDING OF SENSORIUM: DISORIENTED FOR PLACE AND / OR PERSON
AGITATION: NORMAL ACTIVITY
AGITATION: *
NAUSEA AND VOMITING: NO NAUSEA AND NO VOMITING
AUDITORY DISTURBANCES: NOT PRESENT
AGITATION: *
TOTAL SCORE: 9
TREMOR: TREMOR NOT VISIBLE BUT CAN BE FELT, FINGERTIP TO FINGERTIP
PAROXYSMAL SWEATS: NO SWEAT VISIBLE
VISUAL DISTURBANCES: NOT PRESENT
ORIENTATION AND CLOUDING OF SENSORIUM: DISORIENTED FOR PLACE AND / OR PERSON
ORIENTATION AND CLOUDING OF SENSORIUM: DISORIENTED FOR PLACE AND / OR PERSON
PAROXYSMAL SWEATS: NO SWEAT VISIBLE
AUDITORY DISTURBANCES: NOT PRESENT
AGITATION: NORMAL ACTIVITY
TOTAL SCORE: 11
ORIENTATION AND CLOUDING OF SENSORIUM: DISORIENTED FOR PLACE AND / OR PERSON
ANXIETY: NO ANXIETY (AT EASE)
AUDITORY DISTURBANCES: NOT PRESENT
ANXIETY: MILDLY ANXIOUS
NAUSEA AND VOMITING: NO NAUSEA AND NO VOMITING
TOTAL SCORE: 4
AUDITORY DISTURBANCES: NOT PRESENT
VISUAL DISTURBANCES: NOT PRESENT
TREMOR: TREMOR NOT VISIBLE BUT CAN BE FELT, FINGERTIP TO FINGERTIP
HEADACHE, FULLNESS IN HEAD: NOT PRESENT
SUBSTANCE_ABUSE: 0
PAROXYSMAL SWEATS: BARELY PERCEPTIBLE SWEATING. PALMS MOIST
NAUSEA AND VOMITING: NO NAUSEA AND NO VOMITING
TOTAL SCORE: 4
NAUSEA AND VOMITING: NO NAUSEA AND NO VOMITING
VISUAL DISTURBANCES: NOT PRESENT
TOTAL SCORE: VERY MILD ITCHING, PINS AND NEEDLES SENSATION, BURNING OR NUMBNESS
HEADACHE, FULLNESS IN HEAD: NOT PRESENT
ORIENTATION AND CLOUDING OF SENSORIUM: DISORIENTED FOR PLACE AND / OR PERSON

## 2017-04-09 ASSESSMENT — ENCOUNTER SYMPTOMS
NECK PAIN: 0
SENSORY CHANGE: 1
SPUTUM PRODUCTION: 0
BLURRED VISION: 0
DIZZINESS: 0
HEARTBURN: 0
FEVER: 0
FALLS: 0
CONSTIPATION: 0
CHILLS: 0
SPEECH CHANGE: 0
EYE PAIN: 0
HALLUCINATIONS: 1
SEIZURES: 0
WHEEZING: 0
ABDOMINAL PAIN: 0
BACK PAIN: 0
VOMITING: 0
PALPITATIONS: 0
WEAKNESS: 0
SHORTNESS OF BREATH: 0
NAUSEA: 0
COUGH: 0
PND: 0
WEIGHT LOSS: 0
TREMORS: 1
DIARRHEA: 0
HEADACHES: 0
DEPRESSION: 1

## 2017-04-09 ASSESSMENT — PAIN SCALES - GENERAL
PAINLEVEL_OUTOF10: ASSUMED PAIN PRESENT
PAINLEVEL_OUTOF10: 4
PAINLEVEL_OUTOF10: 4

## 2017-04-09 NOTE — PROGRESS NOTES
Surgical Progress Note    Author: Joesph Monroy Date & Time created: 2017   5:54 AM     Interval Events:  D/w nursing.  Asleep now but agitated with EtOH withdrawal  ROS  Hemodynamics:  Temp (24hrs), Av.7 °C (98.1 °F), Min:36.2 °C (97.1 °F), Max:37.2 °C (99 °F)  Temperature: 36.3 °C (97.4 °F)  Pulse  Av.8  Min: 70  Max: 90Heart Rate (Monitored): 77  Blood Pressure : 124/66 mmHg, NIBP: 145/67 mmHg     Respiratory:    Respiration: 19, Pulse Oximetry: 94 %, O2 Daily Delivery Respiratory : Room Air with O2 Available     Work Of Breathing / Effort: Mild  RUL Breath Sounds: Crackles, RML Breath Sounds: Crackles, RLL Breath Sounds: Diminished, RIGO Breath Sounds: Crackles, LLL Breath Sounds: Diminished  Neuro:  GCS       Fluids:    Intake/Output Summary (Last 24 hours) at 17 0554  Last data filed at 17 1600   Gross per 24 hour   Intake    360 ml   Output      0 ml   Net    360 ml     Weight: 56.3 kg (124 lb 1.9 oz)  Current Diet Order   Procedures   • Diet Order     Physical Exam  Labs:  Recent Results (from the past 24 hour(s))   LACTIC ACID    Collection Time: 17  6:25 AM   Result Value Ref Range    Lactic Acid 1.9 0.5 - 2.0 mmol/L   COMP METABOLIC PANEL    Collection Time: 17  6:25 AM   Result Value Ref Range    Sodium 122 (L) 135 - 145 mmol/L    Potassium 4.2 3.6 - 5.5 mmol/L    Chloride 89 (L) 96 - 112 mmol/L    Co2 23 20 - 33 mmol/L    Anion Gap 10.0 0.0 - 11.9    Glucose 77 65 - 99 mg/dL    Bun 10 8 - 22 mg/dL    Creatinine 0.66 0.50 - 1.40 mg/dL    Calcium 9.1 8.5 - 10.5 mg/dL    AST(SGOT) 31 12 - 45 U/L    ALT(SGPT) 12 2 - 50 U/L    Alkaline Phosphatase 78 30 - 99 U/L    Total Bilirubin 0.9 0.1 - 1.5 mg/dL    Albumin 3.5 3.2 - 4.9 g/dL    Total Protein 7.0 6.0 - 8.2 g/dL    Globulin 3.5 1.9 - 3.5 g/dL    A-G Ratio 1.0 g/dL   ESTIMATED GFR    Collection Time: 17  6:25 AM   Result Value Ref Range    GFR If African American >60 >60 mL/min/1.73 m 2    GFR If Non African  American >60 >60 mL/min/1.73 m 2   CRP QUANTITIVE (NON-CARDIAC)    Collection Time: 04/08/17  6:25 AM   Result Value Ref Range    Stat C-Reactive Protein 8.20 (H) 0.00 - 0.75 mg/dL   PREALBUMIN    Collection Time: 04/08/17  6:25 AM   Result Value Ref Range    Pre-Albumin 6.0 (L) 18.0 - 38.0 mg/dL   DIAGNOSTIC ALCOHOL    Collection Time: 04/08/17  6:25 AM   Result Value Ref Range    Diagnostic Alcohol 0.01 (H) 0.00 g/dL   LACTIC ACID    Collection Time: 04/08/17  8:08 AM   Result Value Ref Range    Lactic Acid 1.3 0.5 - 2.0 mmol/L   Basic Metabolic Panel (BMP)    Collection Time: 04/09/17  2:45 AM   Result Value Ref Range    Sodium 126 (L) 135 - 145 mmol/L    Potassium 4.0 3.6 - 5.5 mmol/L    Chloride 95 (L) 96 - 112 mmol/L    Co2 24 20 - 33 mmol/L    Glucose 80 65 - 99 mg/dL    Bun 17 8 - 22 mg/dL    Creatinine 0.81 0.50 - 1.40 mg/dL    Calcium 8.7 8.5 - 10.5 mg/dL    Anion Gap 7.0 0.0 - 11.9   CBC without Differential    Collection Time: 04/09/17  2:45 AM   Result Value Ref Range    WBC 13.2 (H) 4.8 - 10.8 K/uL    RBC 6.06 4.70 - 6.10 M/uL    Hemoglobin 14.7 14.0 - 18.0 g/dL    Hematocrit 47.2 42.0 - 52.0 %    MCV 77.9 (L) 81.4 - 97.8 fL    MCH 24.3 (L) 27.0 - 33.0 pg    MCHC 31.1 (L) 33.7 - 35.3 g/dL    RDW 45.2 35.9 - 50.0 fL    Platelet Count 671 (H) 164 - 446 K/uL    MPV 8.7 (L) 9.0 - 12.9 fL   Lipid Profile (Lipid Panel) Fasting    Collection Time: 04/09/17  2:45 AM   Result Value Ref Range    Cholesterol,Tot 136 100 - 199 mg/dL    Triglycerides 53 0 - 149 mg/dL    HDL 53 >=40 mg/dL    LDL 72 <100 mg/dL   ESTIMATED GFR    Collection Time: 04/09/17  2:45 AM   Result Value Ref Range    GFR If African American >60 >60 mL/min/1.73 m 2    GFR If Non African American >60 >60 mL/min/1.73 m 2     Medical Decision Making, by Problem:  Active Hospital Problems    Diagnosis   • Alcohol dependence (CMS-HCC) [F10.20]   • Falls frequently [R29.6]   • PAD (peripheral artery disease) (CMS-HCC) [I73.9]   • Ischemia of left  lower extremity [I99.8]     Plan:  Will angio when withdrawal over and ?vascular reconstruction    Quality Measures:  Core Measures

## 2017-04-09 NOTE — DIETARY
"Nutrition Services: pressure ulcer consult and low BMI <19 (16.8)    72y/o M admit with heart attack and fell down with known past hx significant for COPD, urethral dilation, CVA, acute MI, COPD, prostate cancer, hypothyroidism, and alcohol dependence per H&P. Pressure ulcer consult received, per RN documentation pt w/ wound to L dorsal foot. However, wound team evaluation not yet completed. Also noted pt on cardiac diet with suboptimal intake 2' observed untouched meal trays at pt bedside. Noted pt with hx of ETOH abuse which may contribute to pt's low BMI d/t inefficient fat storage/synthesis 2' thiamine deficiency. Also, ETOH intake may have replaced adequate kcal/protein intake from food. Pt delirious and an increased fatigue at this time - intake may improve once mental status improves.     PMHx: COPD, urethral dilation, CVA, acute MI, COPD, hepatitis C, prostate cancer, hypothyroidism, alcohol dependence     Ht: 72\" Weight: 56.3kg (124#) BMI: 16.8 IBW: 81kg (178#) % IBW: 70%    Labs: glucose: 80 Na: 126 K: 4.0 albumin: 3.5 Pre-albumin: 6.0  Meds: synthroid, senna, thiamine  GI: last BM PTA   Skin: wound to L dorsal foot per RN documentation   Diet: Cardiac    Recommendations:  1) RD to encourage adequate intake of meals and nutrition rep to honor pt food preferences to optimize PO intake.  2) Pt to benefit from Boost Plus TID for additional kcals/protein daily.     RD to monitor PO adequacy, wt, and labs to leave further recommendations accordingly.     "

## 2017-04-09 NOTE — PROGRESS NOTES
Pt sleeping, in no distress. Perineal care done, sheets changed. Condom catheter in place.    POC discussed with pt's spouse.

## 2017-04-09 NOTE — PROGRESS NOTES
Hospital Medicine Interval Note  Date of Service:  4/9/2017    Chief Complaint  71 y.o.-year-old male admitted 4/8/2017 with ETOH withdraw and ischemic leg    Interval Problem Update  4/9 still quite delirius due to withdraw.need CIWA and ativan. Leg pain Patient's pain is local, 6-8/10, intermittent and does not radiate to other location, sharp and with some tingling. Can be controlled by pain meds. Dressing in place.      Consultants/Specialty  Vascular surgery    Disposition  tele     Review of Systems   Constitutional: Negative for fever, chills and weight loss.   HENT: Negative for congestion, ear discharge, ear pain, hearing loss and nosebleeds.    Eyes: Negative for blurred vision and pain.   Respiratory: Negative for cough, sputum production, shortness of breath and wheezing.    Cardiovascular: Negative for chest pain, palpitations, leg swelling and PND.   Gastrointestinal: Negative for heartburn, nausea, vomiting, abdominal pain, diarrhea and constipation.   Genitourinary: Negative for dysuria, frequency and hematuria.   Musculoskeletal: Negative for back pain, joint pain, falls and neck pain.   Skin: Negative for rash.   Neurological: Positive for tremors and sensory change. Negative for dizziness, speech change, seizures, weakness and headaches.   Psychiatric/Behavioral: Positive for depression and hallucinations. Negative for suicidal ideas and substance abuse.      Physical Exam Laboratory/Imaging   Filed Vitals:    04/09/17 0000 04/09/17 0400 04/09/17 0800 04/09/17 1200   BP: 133/77 124/66 118/68 125/79   Pulse: 76 70 96 97   Temp: 36.9 °C (98.4 °F) 36.3 °C (97.4 °F) 36.3 °C (97.4 °F) 36.6 °C (97.9 °F)   Resp: 16 19 18 18   Height:       Weight:       SpO2: 93% 94% 95% 95%     Physical Exam   Constitutional: He appears well-developed and well-nourished.   HENT:   Head: Normocephalic.   Eyes: EOM are normal. Pupils are equal, round, and reactive to light.   Neck: Normal range of motion. Neck supple. No  JVD present. No thyromegaly present.   Cardiovascular: Normal rate, regular rhythm and normal heart sounds.  Exam reveals no gallop and no friction rub.    No murmur heard.  Pulmonary/Chest: Effort normal and breath sounds normal. No respiratory distress. He has no wheezes. He has no rales. He exhibits no tenderness.   Abdominal: Soft. Bowel sounds are normal. He exhibits no distension and no mass. There is no tenderness. There is no rebound and no guarding.   Musculoskeletal: Normal range of motion. He exhibits no edema.   Lymphadenopathy:     He has no cervical adenopathy.   Neurological: He is alert. He displays normal reflexes. No cranial nerve deficit.   AAO x 2 due to detox   Skin: Skin is dry. No rash noted. There is erythema (left foot necrotic changes but no significant signs of infection).   Psychiatric: He has a normal mood and affect.   Nursing note and vitals reviewed.   Lab Results   Component Value Date/Time    WBC 13.2* 04/09/2017 02:45 AM    HEMOGLOBIN 14.7 04/09/2017 02:45 AM    HEMATOCRIT 47.2 04/09/2017 02:45 AM    PLATELET COUNT 671* 04/09/2017 02:45 AM     Lab Results   Component Value Date/Time    SODIUM 126* 04/09/2017 02:45 AM    POTASSIUM 4.0 04/09/2017 02:45 AM    CHLORIDE 95* 04/09/2017 02:45 AM    CO2 24 04/09/2017 02:45 AM    GLUCOSE 80 04/09/2017 02:45 AM    BUN 17 04/09/2017 02:45 AM    CREATININE 0.81 04/09/2017 02:45 AM      Assessment/Plan    Alcohol dependence (CMS-HCC)  Assessment & Plan  - education provided  - patient says do not want to quit  - but need surgery so can not DC with ETOH    Ischemia of left lower extremity  Assessment & Plan  - vascular to follow  - need angiogram after detox  - cont to follow      Leucocytosis  Assessment & Plan  - stress induced?  - no fever  - LE not infected  - cont to monitor    Alcohol withdrawal (CMS-HCC)  Assessment & Plan  - CIWA  - banana bag iv  - thiamin and B12 and folate po  - tele    Hypothyroidism  Assessment & Plan  - stbale and  cont home meds     Falls frequently  Assessment & Plan  -pt/ot    Hyponatremia  Assessment & Plan  - chronic ETOH  - volume depletion  - ivf    Smoking  Assessment & Plan  -cessation education provided     Spent 11 minutes on tobacco cessation counseling including nicotine patches, gum, and dangers of smoking.    The pt indicated that he will consider quiting.     32146 (smoking and tobacco cessation counseling visit; intensive, greater than 10 minutes).   Labs reviewed, Medications reviewed and Radiology images reviewed  Barrera catheter: No Barrera      DVT Prophylaxis: Heparin  DVT prophylaxis - mechanical: SCDs      Assessed for rehab: Patient was assess for and/or received rehabilitation services during this hospitalization

## 2017-04-09 NOTE — CARE PLAN
Problem: Venous Thromboembolism (VTW)/Deep Vein Thrombosis (DVT) Prevention:  Goal: Patient will participate in Venous Thrombosis (VTE)/Deep Vein Thrombosis (DVT)Prevention Measures  Outcome: PROGRESSING AS EXPECTED  Intervention: Encourage patient to perform ankle flex, foot rotation, and knee flex exercises in addition to other prophylatic measures every hour while awake  Heparin subcut as VTE prophylaxis.      Problem: Psychosocial Needs:  Goal: Level of anxiety will decrease  Outcome: PROGRESSING AS EXPECTED  Intervention: Identify and develop with patient strategies to cope with anxiety triggers  Ativan available per [facility] protocol.

## 2017-04-09 NOTE — PROGRESS NOTES
Skin assessment completed with Mustapha RN. Small cyst noted below right ear lobe, Pink and balanching noted on buttocks, IAD noted on groin and penis, Wound on left dorsal foot and digit 2,3 and 4 on left foot.  Scab noted on right forearm/wrist

## 2017-04-09 NOTE — PROGRESS NOTES
During hourly rounding, found patient sitting at edge of bed having removed his gown and pulled out his IV.  Patient oriented to self only at this time and appeared agitated and anxious. Completed CIWA assessment and gave PRN medication as ordered.

## 2017-04-09 NOTE — ASSESSMENT & PLAN NOTE
- stress induced?he may have aspirated    - cont to monitor    Now likely due to LLE gangrene foot

## 2017-04-09 NOTE — PROGRESS NOTES
Patient again disoriented, pulled out IV and removed tele monitor.  Areas of redness and excoriation observed around lead stickers.  Replaced with hypoallergenic lead stickers and tele box in place.

## 2017-04-09 NOTE — PROGRESS NOTES
PT's SBP is 182, pt has not received any BP meds since he is not awake enough to take any. Labetalol PRN available for  & above. Per Dr. Kulkarni, pt does not have to be medicated for current BP as he is a CIWA pt & Ativan is used per [facility] protocol.

## 2017-04-09 NOTE — PROGRESS NOTES
Bedside report completed.  Assumed pt care. Morning assessments done.  Patient in bed, restless, & agitated. Ativan given per CIWA score. Pt in no apparent distress.  AAOx0. Linens & gown changed. Bed alarms on, safety precautions in place.

## 2017-04-09 NOTE — RESPIRATORY CARE
COPD EDUCATION by COPD CLINICAL EDUCATOR  4/9/2017 at 7:01 AM by Keely Scherer     Patient reviewed by COPD education team. Patient does not qualify for COPD program.

## 2017-04-09 NOTE — CARE PLAN
Problem: Safety  Goal: Will remain free from injury  Outcome: PROGRESSING AS EXPECTED  Fall precautions in place. Treaded socks on pt. Appropriate signs on doorway. IV pole on same side as bathroom. Bedrails up. Bed in lowest position and locked.  Call light and phone within reach. Patient educated on importance of calling nurses before getting out of bed, verbalizes understanding. Bed alarm active    Problem: Knowledge Deficit  Goal: Knowledge of disease process/condition, treatment plan, diagnostic tests, and medications will improve  Outcome: PROGRESSING AS EXPECTED  Patient  educated about POC.  All questions answered in regards to disease process, treatment, and diet.  Patient  verbalized understanding and voiced no further questions at this time.

## 2017-04-09 NOTE — PROGRESS NOTES
Bedside report received, pt care assumed, tele box on.  Pt denies any additional needs at this time. Bed in lowest position, bed alarm on pt educated on fall risk and verbalized understanding, call light within reach.

## 2017-04-10 PROCEDURE — G8991 OTHER PT/OT GOAL STATUS: HCPCS | Mod: CH

## 2017-04-10 PROCEDURE — 700101 HCHG RX REV CODE 250: Performed by: INTERNAL MEDICINE

## 2017-04-10 PROCEDURE — 700105 HCHG RX REV CODE 258: Performed by: HOSPITALIST

## 2017-04-10 PROCEDURE — 302146: Performed by: INTERNAL MEDICINE

## 2017-04-10 PROCEDURE — 700105 HCHG RX REV CODE 258: Performed by: INTERNAL MEDICINE

## 2017-04-10 PROCEDURE — 99232 SBSQ HOSP IP/OBS MODERATE 35: CPT | Performed by: INTERNAL MEDICINE

## 2017-04-10 PROCEDURE — 700111 HCHG RX REV CODE 636 W/ 250 OVERRIDE (IP): Performed by: INTERNAL MEDICINE

## 2017-04-10 PROCEDURE — 700102 HCHG RX REV CODE 250 W/ 637 OVERRIDE(OP): Performed by: HOSPITALIST

## 2017-04-10 PROCEDURE — G8990 OTHER PT/OT CURRENT STATUS: HCPCS | Mod: CH

## 2017-04-10 PROCEDURE — 770020 HCHG ROOM/CARE - TELE (206)

## 2017-04-10 PROCEDURE — A9270 NON-COVERED ITEM OR SERVICE: HCPCS | Performed by: HOSPITALIST

## 2017-04-10 PROCEDURE — 97161 PT EVAL LOW COMPLEX 20 MIN: CPT

## 2017-04-10 PROCEDURE — 700111 HCHG RX REV CODE 636 W/ 250 OVERRIDE (IP): Performed by: HOSPITALIST

## 2017-04-10 PROCEDURE — 81001 URINALYSIS AUTO W/SCOPE: CPT

## 2017-04-10 PROCEDURE — 700102 HCHG RX REV CODE 250 W/ 637 OVERRIDE(OP): Performed by: INTERNAL MEDICINE

## 2017-04-10 RX ORDER — FOLIC ACID 1 MG/1
1 TABLET ORAL DAILY
Status: COMPLETED | OUTPATIENT
Start: 2017-04-11 | End: 2017-04-14

## 2017-04-10 RX ORDER — HYDRALAZINE HYDROCHLORIDE 25 MG/1
25 TABLET, FILM COATED ORAL EVERY 8 HOURS
Status: DISCONTINUED | OUTPATIENT
Start: 2017-04-10 | End: 2017-04-11

## 2017-04-10 RX ORDER — MICONAZOLE NITRATE 20 MG/G
CREAM TOPICAL EVERY 6 HOURS PRN
Status: DISCONTINUED | OUTPATIENT
Start: 2017-04-10 | End: 2017-04-20 | Stop reason: HOSPADM

## 2017-04-10 RX ORDER — THIAMINE MONONITRATE (VIT B1) 100 MG
100 TABLET ORAL DAILY
Status: DISCONTINUED | OUTPATIENT
Start: 2017-04-11 | End: 2017-04-10

## 2017-04-10 RX ADMIN — MICONAZOLE NITRATE: 20 CREAM TOPICAL at 20:10

## 2017-04-10 RX ADMIN — HYDRALAZINE HYDROCHLORIDE 10 MG: 10 TABLET, FILM COATED ORAL at 05:22

## 2017-04-10 RX ADMIN — LABETALOL HYDROCHLORIDE 5 MG: 5 INJECTION INTRAVENOUS at 14:39

## 2017-04-10 RX ADMIN — LORAZEPAM 0.5 MG: 2 INJECTION INTRAMUSCULAR at 02:03

## 2017-04-10 RX ADMIN — NICOTINE 14 MG: 14 PATCH, EXTENDED RELEASE TRANSDERMAL at 05:22

## 2017-04-10 RX ADMIN — LORAZEPAM 0.5 MG: 2 INJECTION INTRAMUSCULAR at 10:08

## 2017-04-10 RX ADMIN — LORAZEPAM 1 MG: 2 INJECTION INTRAMUSCULAR at 20:00

## 2017-04-10 RX ADMIN — FOLIC ACID: 5 INJECTION, SOLUTION INTRAMUSCULAR; INTRAVENOUS; SUBCUTANEOUS at 16:51

## 2017-04-10 RX ADMIN — LORAZEPAM 1 MG: 2 INJECTION INTRAMUSCULAR at 22:28

## 2017-04-10 RX ADMIN — LORAZEPAM 0.5 MG: 2 INJECTION INTRAMUSCULAR at 14:23

## 2017-04-10 RX ADMIN — LORAZEPAM 1 MG: 2 INJECTION INTRAMUSCULAR at 16:47

## 2017-04-10 RX ADMIN — LEVOTHYROXINE SODIUM 75 MCG: 75 TABLET ORAL at 05:22

## 2017-04-10 RX ADMIN — SODIUM CHLORIDE: 9 INJECTION, SOLUTION INTRAVENOUS at 14:27

## 2017-04-10 RX ADMIN — HEPARIN SODIUM 5000 UNITS: 5000 INJECTION, SOLUTION INTRAVENOUS; SUBCUTANEOUS at 20:06

## 2017-04-10 RX ADMIN — HEPARIN SODIUM 5000 UNITS: 5000 INJECTION, SOLUTION INTRAVENOUS; SUBCUTANEOUS at 14:00

## 2017-04-10 RX ADMIN — LORAZEPAM 1 MG: 2 INJECTION INTRAMUSCULAR at 18:51

## 2017-04-10 RX ADMIN — HEPARIN SODIUM 5000 UNITS: 5000 INJECTION, SOLUTION INTRAVENOUS; SUBCUTANEOUS at 05:22

## 2017-04-10 ASSESSMENT — LIFESTYLE VARIABLES
NAUSEA AND VOMITING: NO NAUSEA AND NO VOMITING
VISUAL DISTURBANCES: NOT PRESENT
TOTAL SCORE: 13
TOTAL SCORE: 13
VISUAL DISTURBANCES: NOT PRESENT
NAUSEA AND VOMITING: NO NAUSEA AND NO VOMITING
PAROXYSMAL SWEATS: BARELY PERCEPTIBLE SWEATING. PALMS MOIST
AUDITORY DISTURBANCES: NOT PRESENT
TOTAL SCORE: 13
TREMOR: *
NAUSEA AND VOMITING: NO NAUSEA AND NO VOMITING
AGITATION: *
TREMOR: TREMOR NOT VISIBLE BUT CAN BE FELT, FINGERTIP TO FINGERTIP
NAUSEA AND VOMITING: NO NAUSEA AND NO VOMITING
TOTAL SCORE: VERY MILD ITCHING, PINS AND NEEDLES SENSATION, BURNING OR NUMBNESS
ORIENTATION AND CLOUDING OF SENSORIUM: DISORIENTED FOR PLACE AND / OR PERSON
HEADACHE, FULLNESS IN HEAD: NOT PRESENT
HEADACHE, FULLNESS IN HEAD: NOT PRESENT
TREMOR: *
AGITATION: *
PAROXYSMAL SWEATS: NO SWEAT VISIBLE
AUDITORY DISTURBANCES: NOT PRESENT
VISUAL DISTURBANCES: NOT PRESENT
AUDITORY DISTURBANCES: NOT PRESENT
VISUAL DISTURBANCES: NOT PRESENT
TREMOR: *
AGITATION: NORMAL ACTIVITY
TOTAL SCORE: 14
ORIENTATION AND CLOUDING OF SENSORIUM: DISORIENTED FOR PLACE AND / OR PERSON
NAUSEA AND VOMITING: NO NAUSEA AND NO VOMITING
AGITATION: NORMAL ACTIVITY
ANXIETY: MILDLY ANXIOUS
ORIENTATION AND CLOUDING OF SENSORIUM: DISORIENTED FOR PLACE AND / OR PERSON
AUDITORY DISTURBANCES: NOT PRESENT
HEADACHE, FULLNESS IN HEAD: NOT PRESENT
ANXIETY: *
TOTAL SCORE: 9
VISUAL DISTURBANCES: NOT PRESENT
HEADACHE, FULLNESS IN HEAD: VERY MILD
AUDITORY DISTURBANCES: NOT PRESENT
AUDITORY DISTURBANCES: NOT PRESENT
NAUSEA AND VOMITING: NO NAUSEA AND NO VOMITING
PAROXYSMAL SWEATS: BARELY PERCEPTIBLE SWEATING. PALMS MOIST
AGITATION: *
PAROXYSMAL SWEATS: BARELY PERCEPTIBLE SWEATING. PALMS MOIST
TREMOR: TREMOR NOT VISIBLE BUT CAN BE FELT, FINGERTIP TO FINGERTIP
NAUSEA AND VOMITING: NO NAUSEA AND NO VOMITING
NAUSEA AND VOMITING: NO NAUSEA AND NO VOMITING
ANXIETY: *
ORIENTATION AND CLOUDING OF SENSORIUM: DISORIENTED FOR PLACE AND / OR PERSON
TOTAL SCORE: 4
VISUAL DISTURBANCES: NOT PRESENT
ORIENTATION AND CLOUDING OF SENSORIUM: DISORIENTED FOR PLACE AND / OR PERSON
PAROXYSMAL SWEATS: BARELY PERCEPTIBLE SWEATING. PALMS MOIST
HEADACHE, FULLNESS IN HEAD: NOT PRESENT
AGITATION: *
ORIENTATION AND CLOUDING OF SENSORIUM: DISORIENTED FOR PLACE AND / OR PERSON
HEADACHE, FULLNESS IN HEAD: NOT PRESENT
TREMOR: TREMOR NOT VISIBLE BUT CAN BE FELT, FINGERTIP TO FINGERTIP
ANXIETY: *
VISUAL DISTURBANCES: NOT PRESENT
AUDITORY DISTURBANCES: NOT PRESENT
ANXIETY: NO ANXIETY (AT EASE)
SUBSTANCE_ABUSE: 0
HEADACHE, FULLNESS IN HEAD: NOT PRESENT
VISUAL DISTURBANCES: NOT PRESENT
ORIENTATION AND CLOUDING OF SENSORIUM: DISORIENTED FOR PLACE AND / OR PERSON
TOTAL SCORE: 8
ANXIETY: *
ORIENTATION AND CLOUDING OF SENSORIUM: DISORIENTED FOR PLACE AND / OR PERSON
PAROXYSMAL SWEATS: BARELY PERCEPTIBLE SWEATING. PALMS MOIST
TOTAL SCORE: 10
ANXIETY: *
TREMOR: *
TREMOR: NO TREMOR
AGITATION: SOMEWHAT MORE THAN NORMAL ACTIVITY
HEADACHE, FULLNESS IN HEAD: NOT PRESENT
PAROXYSMAL SWEATS: NO SWEAT VISIBLE
PAROXYSMAL SWEATS: BARELY PERCEPTIBLE SWEATING. PALMS MOIST
AGITATION: *
AUDITORY DISTURBANCES: NOT PRESENT
ANXIETY: *

## 2017-04-10 ASSESSMENT — ENCOUNTER SYMPTOMS
TREMORS: 1
WHEEZING: 0
DIZZINESS: 0
SEIZURES: 0
FEVER: 0
DIAPHORESIS: 0
BLURRED VISION: 0
DEPRESSION: 1
SENSORY CHANGE: 1
PHOTOPHOBIA: 0
NECK PAIN: 0
SHORTNESS OF BREATH: 0
PND: 0
PALPITATIONS: 0
VOMITING: 0
EYE PAIN: 0
CONSTIPATION: 0
HEARTBURN: 0
COUGH: 0
SPEECH CHANGE: 0
WEIGHT LOSS: 0
DIARRHEA: 0
NAUSEA: 0
FALLS: 0
HEADACHES: 0
HALLUCINATIONS: 1

## 2017-04-10 NOTE — PROGRESS NOTES
Full bed bath. Pt is incontinent. Unable to collect UA. Applied float boots bilaterally and barrier cream to back and buttocks.

## 2017-04-10 NOTE — PROGRESS NOTES
Received bedside report from PM nurse. Assumed patient care. Chart reviewed. Pt was resting in bed. A&O x 0. Patient moans with repositioning. Left foot is necrotic with 1+pulses. POC updated with pt and on the patient communication board. Bed locked and in the lowest position. Call light within reach. Tele box on. Will continue to monitor.

## 2017-04-10 NOTE — PROGRESS NOTES
Bedside report received, assumed care of patient. Assessment performed. Pt becoming restless, claiming he has a headache and asking for beer.   Discussed plan of care with pt. Reassessed CIWA score and medicated per policy. Bed alarm on, call light within reach, pt educated to call for assistance.

## 2017-04-10 NOTE — PROGRESS NOTES
Surgical Progress Note    Author: Joesph Monroy Date & Time created: 4/10/2017   5:36 AM     Interval Events:  Delirious with EtOH withdrawal.  Foot stable and has probably been like that for some time.  Pt somewhat contractured at knee and hip  ROS  Hemodynamics:  Temp (24hrs), Av.7 °C (98.1 °F), Min:36.3 °C (97.4 °F), Max:37.3 °C (99.2 °F)  Temperature: 36.7 °C (98.1 °F)  Pulse  Av.3  Min: 70  Max: 101   Blood Pressure : (!) 178/87 mmHg (rn noitified)     Respiratory:    Respiration: 18, Pulse Oximetry: 91 %     Work Of Breathing / Effort: Mild  RUL Breath Sounds: Diminished, RML Breath Sounds: Diminished, RLL Breath Sounds: Diminished, RIGO Breath Sounds: Diminished, LLL Breath Sounds: Diminished  Neuro:  GCS       Fluids:    Intake/Output Summary (Last 24 hours) at 04/10/17 0536  Last data filed at 17 2200   Gross per 24 hour   Intake    820 ml   Output      0 ml   Net    820 ml     Weight: 56.3 kg (124 lb 1.9 oz)  Current Diet Order   Procedures   • Diet Order     Physical Exam  Labs:  No results found for this or any previous visit (from the past 24 hour(s)).  Medical Decision Making, by Problem:  Active Hospital Problems    Diagnosis   • Leucocytosis [D72.829]     Priority: High   • Alcohol withdrawal (CMS-HCC) [F10.239]     Priority: High   • Alcohol dependence (CMS-HCC) [F10.20]     Priority: High   • Ischemia of left lower extremity [I99.8]     Priority: High   • Hyponatremia [E87.1]   • Smoking [F17.200]   • Falls frequently [R29.6]   • Hypothyroidism [E03.9]     Plan:  Will consider angio if and when CNS better    Quality Measures:  Core Measures

## 2017-04-10 NOTE — PROGRESS NOTES
Rounds with Dr. Kulkarni. Updated MD that pt is unable to swallow and follow commands. Pt is A&O x 0, unable to tell me his name. Pt mumbles, not forming words.   PO route is unsafe. Per MD, medicate for SBP over 190 with PRNs and hold all PO meds at this time.   Will keep re assessing for ability to swallow and mentation.

## 2017-04-10 NOTE — PROGRESS NOTES
Hospital Medicine Interval Note  Date of Service:  4/10/2017    Chief Complaint  71 y.o.-year-old male admitted 4/8/2017 with ETOH withdraw and ischemic leg    Interval Problem Update  4/9 still quite delirius due to withdraw.need CIWA and ativan. Leg pain Patient's pain is local, 6-8/10, intermittent and does not radiate to other location, sharp and with some tingling. Can be controlled by pain meds. Dressing in place.  4/10 still very confused due to detox, no CP or SOB. No fever.    Consultants/Specialty  Vascular surgery    Disposition  tele     Review of Systems   Constitutional: Negative for fever, weight loss, malaise/fatigue and diaphoresis.   HENT: Negative for ear pain, hearing loss, nosebleeds and tinnitus.    Eyes: Negative for blurred vision, photophobia and pain.   Respiratory: Negative for cough, shortness of breath and wheezing.    Cardiovascular: Negative for chest pain, palpitations, leg swelling and PND.   Gastrointestinal: Negative for heartburn, nausea, vomiting, diarrhea and constipation.   Genitourinary: Negative for dysuria, frequency and hematuria.   Musculoskeletal: Negative for joint pain, falls and neck pain.   Skin: Negative for rash.   Neurological: Positive for tremors and sensory change. Negative for dizziness, speech change, seizures and headaches.   Psychiatric/Behavioral: Positive for depression and hallucinations. Negative for suicidal ideas and substance abuse.      Physical Exam Laboratory/Imaging   Filed Vitals:    04/10/17 0400 04/10/17 0800 04/10/17 1200 04/10/17 1440   BP: 178/87 189/102 192/104 189/103   Pulse: 86 80 107 115   Temp: 36.7 °C (98.1 °F) 37.3 °C (99.1 °F) 37.1 °C (98.7 °F)    Resp: 18 20 20 22   Height:       Weight:       SpO2: 91% 93% 95% 96%     Physical Exam   Constitutional: He appears well-developed.   HENT:   Head: Normocephalic and atraumatic.   Eyes: Conjunctivae and EOM are normal. Pupils are equal, round, and reactive to light.   Neck: Normal range  of motion. Neck supple. No JVD present. No tracheal deviation present. No thyromegaly present.   Cardiovascular: Normal rate, regular rhythm and normal heart sounds.  Exam reveals no friction rub.    No murmur heard.  Pulmonary/Chest: Effort normal and breath sounds normal. No respiratory distress. He has no wheezes. He exhibits no tenderness.   Abdominal: Soft. Bowel sounds are normal. He exhibits no distension and no mass. There is no tenderness. There is no rebound.   Musculoskeletal: Normal range of motion. He exhibits no edema.   Lymphadenopathy:     He has no cervical adenopathy.   Neurological: He is alert. He displays normal reflexes. No cranial nerve deficit.   AAO x 2 due to detox   Skin: Skin is dry. No rash noted. There is erythema (left foot necrotic changes but no significant signs of infection).   Psychiatric: He has a normal mood and affect.   Nursing note and vitals reviewed.   Lab Results   Component Value Date/Time    WBC 13.2* 04/09/2017 02:45 AM    HEMOGLOBIN 14.7 04/09/2017 02:45 AM    HEMATOCRIT 47.2 04/09/2017 02:45 AM    PLATELET COUNT 671* 04/09/2017 02:45 AM     Lab Results   Component Value Date/Time    SODIUM 126* 04/09/2017 02:45 AM    POTASSIUM 4.0 04/09/2017 02:45 AM    CHLORIDE 95* 04/09/2017 02:45 AM    CO2 24 04/09/2017 02:45 AM    GLUCOSE 80 04/09/2017 02:45 AM    BUN 17 04/09/2017 02:45 AM    CREATININE 0.81 04/09/2017 02:45 AM      Assessment/Plan    Alcohol dependence (CMS-HCC)  Assessment & Plan  - education provided  - patient says do not want to quit  - need to cont with CIWAA and prepare for surgery    Ischemia of left lower extremity  Assessment & Plan  - vascular to follow  - need angiogram after detox  - cont to follow      Leucocytosis  Assessment & Plan  - stress induced? WBC down 15.9->13.2  - no fever  - LE not infected  - cont to monitor  - stable    Alcohol withdrawal (CMS-HCC)  Assessment & Plan  - CIWA  - banana bag iv  - thiamin and B12 and folate po  -  tele    Hypothyroidism  Assessment & Plan  - stbale and cont home meds     Falls frequently  Assessment & Plan  -pt/ot    Hyponatremia  Assessment & Plan  - chronic ETOH  - volume depletion  - ivf    Smoking  Assessment & Plan  -cessation education provided       Spent 11 minutes on tobacco cessation counseling including nicotine patches, gum, and dangers of smoking.    The pt indicated that he will consider quiting.     37789 (smoking and tobacco cessation counseling visit; intensive, greater than 10 minutes).   Labs reviewed, Medications reviewed and Radiology images reviewed  Barrera catheter: No Barrera      DVT Prophylaxis: Heparin  DVT prophylaxis - mechanical: SCDs      Assessed for rehab: Patient was assess for and/or received rehabilitation services during this hospitalization

## 2017-04-10 NOTE — CARE PLAN
Problem: Communication  Goal: The ability to communicate needs accurately and effectively will improve  Intervention: Reorient patient to environment as needed  Patient reoriented as needed to environment.      Problem: Safety  Goal: Will remain free from falls  Intervention: Implement fall precautions  Bed alarm on and fall precautions in place.

## 2017-04-10 NOTE — CARE PLAN
Problem: Skin Integrity  Goal: Risk for impaired skin integrity will decrease  Skin is assessed for integrity throughout the shift. Pt is encouraged to reposition and is turned at least every 2 hours. Pt is kept dry and clean. Heel float boots and barrier cream applied.    Problem: Psychosocial Needs:  Goal: Level of anxiety will decrease  Anxiety triggers identified. Calming techniques provided to patient. Patient is involved in POC and is encouraged to verbalize questions and concerns.   CIWA assessed.

## 2017-04-10 NOTE — PROGRESS NOTES
Patient now more alert when aroused and able to swallow some pills.  Still only oriented to self at this time.

## 2017-04-10 NOTE — WOUND TEAM
[facility]  Inpatient Services  Initial Wound and Skin Care Evaluation    Admission Date:     HPI, PMH, SH: Reviewed  Unit where seen by Wound Team:    WOUND CONSULT RELATED TO:     SUBJECTIVE:      Self Report / Pain Level:     OBJECTIVE:    WOUND TYPE, LOCATION, CHARACTERISTICS (Pressure ulcers: location, stage, POA or date identified)    Location and type of wound: POA IAD, sacrum/coccyx, buttocks, perineum, scrotum, penis        Periwound: red, satellite lesions, blanching everywhere     Drainage: none     Tissue Type and %:   100% red  Wound Edges: attached    Odor:  none     Exposed structure(s): skin on scrotum and penis looks very raw  S&S of Infection: none      Location and type of wound: L foot dorsal surface, toes 3 and 4- gangrenous changes        Periwound: pink, peeling skin     Drainage: none     Tissue Type and %: 100% brown/black    Wound Edges: attached    Odor:  none     Exposed structure(s): JORGE  S&S of Infection: JORGE      Measurements: 4/10/17 ( dorsal foot wound )  Length: 4.5     Width:  4.5     Depth: JORGE    Wound is circumferential on toes       INTERVENTIONS BY WOUND TEAM:Groin, penis and scrotum and sacrum examined. Nsg has been using Tania protect. nsg will order antifungal therapy for area. Skin care orders written.      Interdisciplinary consultation: Nsg    EVALUATION:Patient has GREGG of .54 on R and .83 on left. Vascular surgery has been consulted. Arterial duplex has been done. Patient is going through active ETOH withdrawal. He was asleep when he was examined and did not wake up.    Factors affecting wound healing:PAD, ischemia of LLE, alcohol dependence, smoking  Goals: decrease IAD fungal infection     NURSING PLAN OF CARE ORDERS (X):    Dressing changes: See Dressing Maintenance orders:   Skin care: See Skin Care orders: X  Rectal tube care: See Rectal Tube Care orders:   Other orders:    RSKIN: CURRENT (X) ORDERED (O)  Q shift Pool:  X  Q shift pressure point  assessments:  X  Pressure redistribution mattress   X     SARAH      Bariatric SARAH      Bariatric foam        Heel float boots  X     Heels floated on pillows      Barrier wipes      Barrier Cream     Barrier paste      Sacral silicone dressing      Silicone O2 tubing x     Anchorfast      Trach with Optifoam split foam       Waffle cushion      Rectal tube or BMS      Antifungal tx  X  Turn q 2 hours X    Up to chair     Ambulate   PT/OT   X  Dietician      PO     TF   TPN     PVN    NPO   # days   Other       WOUND TEAM PLAN OF CARE (X):   NPWT change 3 x week:        Dressing changes by wound team:       Follow up as needed:  X     Other (explain):    Anticipated discharge plans (X):  SNF:           Home Care:           Outpatient Wound Center:            Self Care:            Other:  tbd

## 2017-04-11 ENCOUNTER — APPOINTMENT (OUTPATIENT)
Dept: RADIOLOGY | Facility: MEDICAL CENTER | Age: 72
DRG: 252 | End: 2017-04-11
Attending: SURGERY
Payer: MEDICARE

## 2017-04-11 ENCOUNTER — APPOINTMENT (OUTPATIENT)
Dept: RADIOLOGY | Facility: MEDICAL CENTER | Age: 72
DRG: 252 | End: 2017-04-11
Attending: HOSPITALIST
Payer: MEDICARE

## 2017-04-11 PROBLEM — I10 HTN (HYPERTENSION), MALIGNANT: Status: ACTIVE | Noted: 2017-04-11

## 2017-04-11 PROBLEM — G93.40 ENCEPHALOPATHY ACUTE: Status: ACTIVE | Noted: 2017-04-11

## 2017-04-11 PROCEDURE — 700111 HCHG RX REV CODE 636 W/ 250 OVERRIDE (IP): Performed by: NURSE PRACTITIONER

## 2017-04-11 PROCEDURE — 700102 HCHG RX REV CODE 250 W/ 637 OVERRIDE(OP): Performed by: HOSPITALIST

## 2017-04-11 PROCEDURE — G8987 SELF CARE CURRENT STATUS: HCPCS | Mod: CM

## 2017-04-11 PROCEDURE — 97166 OT EVAL MOD COMPLEX 45 MIN: CPT

## 2017-04-11 PROCEDURE — 770020 HCHG ROOM/CARE - TELE (206)

## 2017-04-11 PROCEDURE — G8988 SELF CARE GOAL STATUS: HCPCS | Mod: CJ

## 2017-04-11 PROCEDURE — A9270 NON-COVERED ITEM OR SERVICE: HCPCS | Performed by: INTERNAL MEDICINE

## 2017-04-11 PROCEDURE — 700101 HCHG RX REV CODE 250: Performed by: INTERNAL MEDICINE

## 2017-04-11 PROCEDURE — 700102 HCHG RX REV CODE 250 W/ 637 OVERRIDE(OP): Performed by: INTERNAL MEDICINE

## 2017-04-11 PROCEDURE — 99233 SBSQ HOSP IP/OBS HIGH 50: CPT | Performed by: HOSPITALIST

## 2017-04-11 PROCEDURE — 700105 HCHG RX REV CODE 258: Performed by: HOSPITALIST

## 2017-04-11 PROCEDURE — 97530 THERAPEUTIC ACTIVITIES: CPT

## 2017-04-11 PROCEDURE — 700111 HCHG RX REV CODE 636 W/ 250 OVERRIDE (IP): Performed by: HOSPITALIST

## 2017-04-11 PROCEDURE — G8978 MOBILITY CURRENT STATUS: HCPCS | Mod: CM

## 2017-04-11 PROCEDURE — A9270 NON-COVERED ITEM OR SERVICE: HCPCS | Performed by: HOSPITALIST

## 2017-04-11 PROCEDURE — G8979 MOBILITY GOAL STATUS: HCPCS | Mod: CJ

## 2017-04-11 PROCEDURE — 97161 PT EVAL LOW COMPLEX 20 MIN: CPT

## 2017-04-11 PROCEDURE — 302136 NUTRITION PUMP: Performed by: HOSPITALIST

## 2017-04-11 RX ORDER — HYDRALAZINE HYDROCHLORIDE 20 MG/ML
20 INJECTION INTRAMUSCULAR; INTRAVENOUS EVERY 6 HOURS PRN
Status: DISCONTINUED | OUTPATIENT
Start: 2017-04-11 | End: 2017-04-20 | Stop reason: HOSPADM

## 2017-04-11 RX ORDER — METOPROLOL TARTRATE 50 MG/1
50 TABLET, FILM COATED ORAL TWICE DAILY
Status: DISCONTINUED | OUTPATIENT
Start: 2017-04-11 | End: 2017-04-18

## 2017-04-11 RX ADMIN — FOLIC ACID 1 MG: 1 TABLET ORAL at 17:51

## 2017-04-11 RX ADMIN — METOPROLOL TARTRATE 50 MG: 50 TABLET, FILM COATED ORAL at 14:30

## 2017-04-11 RX ADMIN — LORAZEPAM 1.5 MG: 2 INJECTION INTRAMUSCULAR at 00:10

## 2017-04-11 RX ADMIN — NICOTINE 14 MG: 14 PATCH, EXTENDED RELEASE TRANSDERMAL at 05:35

## 2017-04-11 RX ADMIN — LABETALOL HYDROCHLORIDE 5 MG: 5 INJECTION INTRAVENOUS at 00:00

## 2017-04-11 RX ADMIN — SODIUM CHLORIDE: 9 INJECTION, SOLUTION INTRAVENOUS at 06:42

## 2017-04-11 RX ADMIN — HEPARIN SODIUM 5000 UNITS: 5000 INJECTION, SOLUTION INTRAVENOUS; SUBCUTANEOUS at 05:34

## 2017-04-11 RX ADMIN — ACETAMINOPHEN 650 MG: 325 TABLET, FILM COATED ORAL at 17:49

## 2017-04-11 RX ADMIN — THERA TABS 1 TABLET: TAB at 17:50

## 2017-04-11 RX ADMIN — METOPROLOL TARTRATE 50 MG: 50 TABLET, FILM COATED ORAL at 21:15

## 2017-04-11 RX ADMIN — LORAZEPAM 1 MG: 2 INJECTION INTRAMUSCULAR at 03:50

## 2017-04-11 RX ADMIN — SODIUM CHLORIDE: 9 INJECTION, SOLUTION INTRAVENOUS at 22:37

## 2017-04-11 RX ADMIN — STANDARDIZED SENNA CONCENTRATE AND DOCUSATE SODIUM 2 TABLET: 8.6; 5 TABLET, FILM COATED ORAL at 21:15

## 2017-04-11 RX ADMIN — LORAZEPAM 1.5 MG: 2 INJECTION INTRAMUSCULAR at 01:12

## 2017-04-11 RX ADMIN — LORAZEPAM 1 MG: 2 INJECTION INTRAMUSCULAR at 05:34

## 2017-04-11 RX ADMIN — ATORVASTATIN CALCIUM 40 MG: 40 TABLET, FILM COATED ORAL at 21:16

## 2017-04-11 RX ADMIN — HEPARIN SODIUM 5000 UNITS: 5000 INJECTION, SOLUTION INTRAVENOUS; SUBCUTANEOUS at 21:15

## 2017-04-11 RX ADMIN — HYDRALAZINE HYDROCHLORIDE 20 MG: 20 INJECTION INTRAMUSCULAR; INTRAVENOUS at 01:12

## 2017-04-11 ASSESSMENT — LIFESTYLE VARIABLES
PAROXYSMAL SWEATS: BARELY PERCEPTIBLE SWEATING. PALMS MOIST
AGITATION: NORMAL ACTIVITY
TREMOR: NO TREMOR
AUDITORY DISTURBANCES: NOT PRESENT
TOTAL SCORE: 4
ANXIETY: *
NAUSEA AND VOMITING: NO NAUSEA AND NO VOMITING
ORIENTATION AND CLOUDING OF SENSORIUM: DISORIENTED FOR PLACE AND / OR PERSON
ANXIETY: NO ANXIETY (AT EASE)
ORIENTATION AND CLOUDING OF SENSORIUM: DISORIENTED FOR PLACE AND / OR PERSON
AUDITORY DISTURBANCES: NOT PRESENT
TREMOR: NO TREMOR
ANXIETY: *
NAUSEA AND VOMITING: NO NAUSEA AND NO VOMITING
VISUAL DISTURBANCES: NOT PRESENT
AUDITORY DISTURBANCES: NOT PRESENT
VISUAL DISTURBANCES: NOT PRESENT
TREMOR: NO TREMOR
AGITATION: NORMAL ACTIVITY
TOTAL SCORE: 12
TOTAL SCORE: 5
VISUAL DISTURBANCES: NOT PRESENT
VISUAL DISTURBANCES: NOT PRESENT
HEADACHE, FULLNESS IN HEAD: NOT PRESENT
AGITATION: MODERATELY FIDGETY AND RESTLESS
AGITATION: NORMAL ACTIVITY
ANXIETY: NO ANXIETY (AT EASE)
TREMOR: NO TREMOR
AUDITORY DISTURBANCES: NOT PRESENT
NAUSEA AND VOMITING: NO NAUSEA AND NO VOMITING
ORIENTATION AND CLOUDING OF SENSORIUM: DISORIENTED FOR PLACE AND / OR PERSON
TREMOR: NO TREMOR
TOTAL SCORE: 13
ANXIETY: NO ANXIETY (AT EASE)
TREMOR: *
AUDITORY DISTURBANCES: NOT PRESENT
TOTAL SCORE: 4
VISUAL DISTURBANCES: NOT PRESENT
TOTAL SCORE: 13
PAROXYSMAL SWEATS: BARELY PERCEPTIBLE SWEATING. PALMS MOIST
AUDITORY DISTURBANCES: NOT PRESENT
TREMOR: *
PAROXYSMAL SWEATS: BARELY PERCEPTIBLE SWEATING. PALMS MOIST
PAROXYSMAL SWEATS: NO SWEAT VISIBLE
ORIENTATION AND CLOUDING OF SENSORIUM: DISORIENTED FOR PLACE AND / OR PERSON
AGITATION: NORMAL ACTIVITY
VISUAL DISTURBANCES: NOT PRESENT
TOTAL SCORE: 5
AGITATION: NORMAL ACTIVITY
PAROXYSMAL SWEATS: *
ORIENTATION AND CLOUDING OF SENSORIUM: DISORIENTED FOR PLACE AND / OR PERSON
AUDITORY DISTURBANCES: NOT PRESENT
ORIENTATION AND CLOUDING OF SENSORIUM: DISORIENTED FOR PLACE AND / OR PERSON
TREMOR: *
AGITATION: *
NAUSEA AND VOMITING: NO NAUSEA AND NO VOMITING
VISUAL DISTURBANCES: NOT PRESENT
NAUSEA AND VOMITING: NO NAUSEA AND NO VOMITING
TOTAL SCORE: 16
AUDITORY DISTURBANCES: NOT PRESENT
NAUSEA AND VOMITING: NO NAUSEA AND NO VOMITING
PAROXYSMAL SWEATS: BARELY PERCEPTIBLE SWEATING. PALMS MOIST
PAROXYSMAL SWEATS: BARELY PERCEPTIBLE SWEATING. PALMS MOIST
AGITATION: *
HEADACHE, FULLNESS IN HEAD: NOT PRESENT
ANXIETY: MODERATELY ANXIOUS OR GUARDED, SO ANXIETY IS INFERRED
ORIENTATION AND CLOUDING OF SENSORIUM: DISORIENTED FOR PLACE AND / OR PERSON
PAROXYSMAL SWEATS: NO SWEAT VISIBLE
PAROXYSMAL SWEATS: *
TOTAL SCORE: 5
HEADACHE, FULLNESS IN HEAD: NOT PRESENT
TOTAL SCORE: 15
VISUAL DISTURBANCES: NOT PRESENT
NAUSEA AND VOMITING: NO NAUSEA AND NO VOMITING
TREMOR: *
AUDITORY DISTURBANCES: NOT PRESENT
TREMOR: *
ANXIETY: NO ANXIETY (AT EASE)
HEADACHE, FULLNESS IN HEAD: NOT PRESENT
ORIENTATION AND CLOUDING OF SENSORIUM: DISORIENTED FOR PLACE AND / OR PERSON
ANXIETY: *
HEADACHE, FULLNESS IN HEAD: NOT PRESENT
AGITATION: *
VISUAL DISTURBANCES: NOT PRESENT
PAROXYSMAL SWEATS: BARELY PERCEPTIBLE SWEATING. PALMS MOIST
AUDITORY DISTURBANCES: NOT PRESENT
ORIENTATION AND CLOUDING OF SENSORIUM: DISORIENTED FOR PLACE AND / OR PERSON
AGITATION: *
NAUSEA AND VOMITING: NO NAUSEA AND NO VOMITING
ORIENTATION AND CLOUDING OF SENSORIUM: DATE DISORIENTATION BY MORE THAN TWO CALENDAR DAYS
HEADACHE, FULLNESS IN HEAD: NOT PRESENT
ANXIETY: NO ANXIETY (AT EASE)
HEADACHE, FULLNESS IN HEAD: NOT PRESENT
NAUSEA AND VOMITING: NO NAUSEA AND NO VOMITING
HEADACHE, FULLNESS IN HEAD: NOT PRESENT
NAUSEA AND VOMITING: NO NAUSEA AND NO VOMITING
VISUAL DISTURBANCES: NOT PRESENT
ANXIETY: *

## 2017-04-11 ASSESSMENT — ENCOUNTER SYMPTOMS: HALLUCINATIONS: 0

## 2017-04-11 ASSESSMENT — GAIT ASSESSMENTS
GAIT LEVEL OF ASSIST: UNABLE TO PARTICIPATE
GAIT LEVEL OF ASSIST: UNABLE TO PARTICIPATE

## 2017-04-11 ASSESSMENT — PAIN SCALES - GENERAL: PAINLEVEL_OUTOF10: 0

## 2017-04-11 NOTE — CARE PLAN
Problem: Safety  Goal: Will remain free from injury  Pt on safety precautions.. Pt is wearing moon boots, in bed in the low locked position and the call light is within reach. Strip alarm in place. Frequent rounding    Problem: Skin Integrity  Goal: Risk for impaired skin integrity will decrease  Skin and mobility assessment complete. Q2h turns with pillows in place. Schulz boots on. Tania cream applied to groin and sacrum

## 2017-04-11 NOTE — DIETARY
Nutrition Services: Patient with ischemic leg and EtOH withdrawal. Following for PO adequacy and low BMI (16.35 kg/m^2). Day 3 of admit. The patient has been NPO since yesterday, prior to that he was on a cardiac diet with 0% intake of meals. The patient appears thin and cachetic with obvious subcutaneous fat loss and muscle wasting. The patient appears malnourished and is in need of nutrition.    Plan/Recommendation: Consider SLP eval to evaluate swallow safety. Start PO diet as soon as medically feasible or re-consult RD for tube feed assessment if the patient is unable to take PO diet. RD monitoring.

## 2017-04-11 NOTE — PROGRESS NOTES
Pt SBP maintaining mid 180's to mid 190's, notified CULLEN Garrison and received order for hydralazine PRN.  After administration of hydralazine, pt SBP dropped to 140's-160's.

## 2017-04-11 NOTE — DOCUMENTATION QUERY
"DOCUMENTATION QUERY    PROVIDERS: Please select “Cosign w/ note”to reply to query.    To better represent the severity of illness and risk of mortality of your patient, please review the following information and exercise your independent professional judgment in responding to this query.     A low BMI 16.8 (<19), Pre-Albumin 6 and cachectic are noted in the Lab Results, Dietary notes and ED report. Considering the clinical findings, risk factors, and treatment, can a diagnosis be provided to support these findings?    • Mild Protein Calorie Malnutrition  • Moderate Protein Calorie Malnutrition  • Severe Protein Calorie Malnutrition  • Cachexia  • Underweight  • Findings of no clinical significance  • Other explanation of clinical findings  • Unable to determine        The medical record reflects the following:   Clinical Findings 4/11- RD notes: \" The patient appears thin and cachetic with obvious subcutaneous fat loss and muscle wasting. The patient appears malnourished and is in need of nutrition; BMI 16.8; Pre-Albumin 6; Wounds   Treatment  Dietary evaluation/monitoring/treatment; Cortrak; Tube feedings   Risk Factors  Alcohol abuse/withdrawl; Age   Location within medical record  Progress Notes, Lab Results  and ED Report     Thank you,   Renee Woodson RN   Clinical   Phone - [phone]          "

## 2017-04-11 NOTE — PROGRESS NOTES
Hospital Medicine Interval Note  Date of Service:  4/11/2017    Chief Complaint  71 y.o.-year-old male admitted 4/8/2017 with ETOH withdraw and ischemic leg    Interval Problem Update  Going thru active withdrawals    Not following commands    Unsafe to eat  By mouth at this time    bp is elevated and uncontrolled    tachycardia      Consultants/Specialty  Vascular surgery    Disposition  tele     Review of Systems   Psychiatric/Behavioral: Negative for hallucinations.      Physical Exam Laboratory/Imaging   Filed Vitals:    04/11/17 0400 04/11/17 0800 04/11/17 1111 04/11/17 1200   BP: 153/75 175/97  178/97   Pulse: 74 112 94 105   Temp: 36.8 °C (98.3 °F) 37.3 °C (99.2 °F)  37.1 °C (98.7 °F)   Resp: 26 24 26 22   Height:       Weight:       SpO2: 92% 94%  95%     Physical Exam   Constitutional: He appears well-developed and well-nourished. No distress.   HENT:   Head: Normocephalic.   Eyes: EOM are normal. Pupils are equal, round, and reactive to light.   Neck: Normal range of motion. Neck supple. No JVD present. No thyromegaly present.   Cardiovascular: Regular rhythm and normal heart sounds.  Exam reveals no gallop and no friction rub.    No murmur heard.  tachycardia   Pulmonary/Chest: Effort normal and breath sounds normal. No respiratory distress. He has no wheezes. He has no rales. He exhibits no tenderness.   Abdominal: Soft. Bowel sounds are normal. He exhibits no distension and no mass. There is no tenderness. There is no rebound and no guarding.   Musculoskeletal: Normal range of motion. He exhibits no edema.   Lymphadenopathy:     He has no cervical adenopathy.   Neurological: He is alert. He displays normal reflexes. No cranial nerve deficit.   arousable  Will not follow commands    Not oriented   Skin: Skin is dry. No rash noted. He is not diaphoretic. There is erythema (left foot necrotic changes ).   Psychiatric: He has a normal mood and affect.   Nursing note and vitals reviewed.   Lab Results    Component Value Date/Time    WBC 13.2* 04/09/2017 02:45 AM    HEMOGLOBIN 14.7 04/09/2017 02:45 AM    HEMATOCRIT 47.2 04/09/2017 02:45 AM    PLATELET COUNT 671* 04/09/2017 02:45 AM     Lab Results   Component Value Date/Time    SODIUM 126* 04/09/2017 02:45 AM    POTASSIUM 4.0 04/09/2017 02:45 AM    CHLORIDE 95* 04/09/2017 02:45 AM    CO2 24 04/09/2017 02:45 AM    GLUCOSE 80 04/09/2017 02:45 AM    BUN 17 04/09/2017 02:45 AM    CREATININE 0.81 04/09/2017 02:45 AM      Assessment/Plan    Alcohol dependence (CMS-HCC)  Assessment & Plan  - education provided earlier this admission  -he is too altered, at this time    Ischemia of left lower extremity  Assessment & Plan  - vascular to follow  - need angiogram after detox  - cont to follow      Leucocytosis  Assessment & Plan  - stress induced?  - no fever  - LE not infected  - cont to monitor    Alcohol withdrawal (CMS-HCC)  Assessment & Plan  - CIWA  - banana bag iv  - thiamin and B12 and folate po  - tele    Hypothyroidism  Assessment & Plan  - stable and cont home meds     Falls frequently  Assessment & Plan  -pt/ot    Hyponatremia  Assessment & Plan  - chronic ETOH  - volume depletion  - ivf    Smoking  Assessment & Plan  -cessation education provided earlier    Encephalopathy acute  Assessment & Plan  unsafe to eat    Place cortrack    Start tube feeds    Restraint for safety    HTN (hypertension), malignant  Assessment & Plan  Due to etoh w/d    Stop hydralazine    Started lopressor 50mg po bid as pt is tachycardic as well         Check am cbc, bmp Labs reviewed, Medications reviewed and Radiology images reviewed  Barrera catheter: No Barrera      DVT Prophylaxis: Heparin  DVT prophylaxis - mechanical: SCDs      Assessed for rehab: Patient was assess for and/or received rehabilitation services during this hospitalization

## 2017-04-11 NOTE — PROGRESS NOTES
Bedside report completed. Assumed pt care. Pt A&OX0, pt does not respond to any commands. Pt frequently twists in bed to a side-ways position, bed alarm is on, feet are elevated in heal float boots.  Pt is sleeping with no signs of labored breathing on RA. Pt tele monitor in place, cardiac rhythm being monitored. Pt call light within reach, bed in low position, 3 bed rails up, heel float boots in place. Pt doesn't appear to be in pain or distress at this time. Patient was updated on the plan of care for the night. All fall precautions in place. Will continue to monitor. Pt is on CIWA protocol.

## 2017-04-11 NOTE — DISCHARGE PLANNING
Current documentation indicates a potential for acute inpatient rehabilitation. Please consider a rehabilitation consult/referral to assist with discharge planning. Anticipated therapy need.

## 2017-04-11 NOTE — PROGRESS NOTES
Received patient from night shift nurse. Assessment complete. A&Ox1. Denies pain. Call light within reach. Bed in low position, moon boots on feet. Pt is somnolent. Pt is able to follow some commands, but is not speaking clearly. Pt mumbles and moans. Held Ativan dose for CIWA of 5 d/t somnolence. MD notified.  All needs attended to at this time.

## 2017-04-11 NOTE — THERAPY
"Physical Therapy Evaluation completed.   Bed Mobility:  Supine to Sit: Maximal Assist  Transfers: Sit to Stand: Maximal Assist  Gait: Level Of Assist: Unable to Participate with Front-Wheel Walker       Plan of Care: Will benefit from Physical Therapy 3 times per week  Discharge Recommendations: Equipment: Will Continue to Assess for Equipment Needs. Post-acute therapy Discharge to a transitional care facility for continued skilled therapy services.    See \"Rehab Therapy-Acute\" Patient Summary Report for complete documentation.     "

## 2017-04-11 NOTE — THERAPY
"Physical Therapy Treatment completed.   Bed Mobility:  Supine to Sit: Maximal Assist  Transfers: Sit to Stand: Maximal Assist (holding legs off the ground)  Gait: Level Of Assist: Unable to Participate       Plan of Care: Will benefit from Physical Therapy 3 times per week  Discharge Recommendations: Equipment: Will Continue to Assess for Equipment Needs.   Pt mouthing words this session; appears to respond to painful stimuli in left foot with flexion syngery of whole body; 4WW at bedside and muscle length in hips/knees suggest he was ambulatory; no flexion contractors noted in hips/knees but required manual assist to relax into extension. Acute PT will continue to follow; anticipate need for placement, however will continue to update as pt becomes more responsive.   See \"Rehab Therapy-Acute\" Patient Summary Report for complete documentation.       "

## 2017-04-11 NOTE — PROGRESS NOTES
Surgical Progress Note    Author: Joesph Monroy Date & Time created: 2017   5:13 AM     Interval Events:  Sedated.  Pt tentatively scheduled for angio, but has contractures at hip and knee and positioning may be problematic.  No family available  ROS  Hemodynamics:  Temp (24hrs), Av.3 °C (99.1 °F), Min:36.8 °C (98.3 °F), Max:37.8 °C (100 °F)  Temperature: 36.8 °C (98.3 °F)  Pulse  Av.1  Min: 70  Max: 115   Blood Pressure : 153/75 mmHg     Respiratory:    Respiration: (!) 26, Pulse Oximetry: 92 %     Work Of Breathing / Effort: Mild  RUL Breath Sounds: Diminished, RML Breath Sounds: Diminished, RLL Breath Sounds: Diminished, RIGO Breath Sounds: Diminished, LLL Breath Sounds: Diminished  Neuro:  GCS       Fluids:    Intake/Output Summary (Last 24 hours) at 17 0513  Last data filed at 17 0500   Gross per 24 hour   Intake   1524 ml   Output    350 ml   Net   1174 ml     Weight: 54.7 kg (120 lb 9.5 oz)  Current Diet Order   Procedures   • DIET NPO     Physical Exam  Labs:  Recent Results (from the past 24 hour(s))   URINALYSIS    Collection Time: 04/10/17  6:42 PM   Result Value Ref Range    Micro Urine Req Microscopic      Medical Decision Making, by Problem:  Active Hospital Problems    Diagnosis   • Leucocytosis [D72.829]     Priority: High   • Alcohol withdrawal (CMS-HCC) [F10.239]     Priority: High   • Alcohol dependence (CMS-HCC) [F10.20]     Priority: High   • Ischemia of left lower extremity [I99.8]     Priority: High   • Hyponatremia [E87.1]   • Smoking [F17.200]   • Falls frequently [R29.6]   • Hypothyroidism [E03.9]     Plan:  Pt's problem is chronic, so if we have to, pt could be followed after stint in ECF, etc for w/u.  Long term outcome for limb salvage poor    Quality Measures:  Core Measures

## 2017-04-11 NOTE — CARE PLAN
Problem: Nutritional:  Goal: Achieve adequate nutritional intake  Patient will consume 50% of meals   Outcome: NOT MET

## 2017-04-12 ENCOUNTER — APPOINTMENT (OUTPATIENT)
Dept: RADIOLOGY | Facility: MEDICAL CENTER | Age: 72
DRG: 252 | End: 2017-04-12
Attending: HOSPITALIST
Payer: MEDICARE

## 2017-04-12 ENCOUNTER — APPOINTMENT (OUTPATIENT)
Dept: RADIOLOGY | Facility: MEDICAL CENTER | Age: 72
DRG: 252 | End: 2017-04-12
Attending: SURGERY
Payer: MEDICARE

## 2017-04-12 PROBLEM — J69.0 ASPIRATION PNEUMONIA (HCC): Status: ACTIVE | Noted: 2017-04-12

## 2017-04-12 LAB
AMMONIA PLAS-SCNC: 41 UMOL/L (ref 11–45)
ANION GAP SERPL CALC-SCNC: 6 MMOL/L (ref 0–11.9)
APPEARANCE UR: CLEAR
APPEARANCE UR: CLEAR
BACTERIA #/AREA URNS HPF: ABNORMAL /HPF
BACTERIA #/AREA URNS HPF: ABNORMAL /HPF
BASOPHILS # BLD AUTO: 1.3 % (ref 0–1.8)
BASOPHILS # BLD: 0.24 K/UL (ref 0–0.12)
BILIRUB UR QL STRIP.AUTO: NEGATIVE
BILIRUB UR QL STRIP.AUTO: NEGATIVE
BUN SERPL-MCNC: 8 MG/DL (ref 8–22)
CALCIUM SERPL-MCNC: 8.6 MG/DL (ref 8.5–10.5)
CHLORIDE SERPL-SCNC: 100 MMOL/L (ref 96–112)
CO2 SERPL-SCNC: 22 MMOL/L (ref 20–33)
COLOR UR: ABNORMAL
COLOR UR: YELLOW
CREAT SERPL-MCNC: 0.59 MG/DL (ref 0.5–1.4)
EOSINOPHIL # BLD AUTO: 0.35 K/UL (ref 0–0.51)
EOSINOPHIL NFR BLD: 1.9 % (ref 0–6.9)
EPI CELLS #/AREA URNS HPF: ABNORMAL /HPF
EPI CELLS #/AREA URNS HPF: ABNORMAL /HPF
ERYTHROCYTE [DISTWIDTH] IN BLOOD BY AUTOMATED COUNT: 47.9 FL (ref 35.9–50)
GFR SERPL CREATININE-BSD FRML MDRD: >60 ML/MIN/1.73 M 2
GLUCOSE BLD-MCNC: 82 MG/DL (ref 65–99)
GLUCOSE BLD-MCNC: 91 MG/DL (ref 65–99)
GLUCOSE SERPL-MCNC: 114 MG/DL (ref 65–99)
GLUCOSE UR STRIP.AUTO-MCNC: NEGATIVE MG/DL
GLUCOSE UR STRIP.AUTO-MCNC: NEGATIVE MG/DL
HCT VFR BLD AUTO: 50 % (ref 42–52)
HGB BLD-MCNC: 15.4 G/DL (ref 14–18)
HYALINE CASTS #/AREA URNS LPF: ABNORMAL /LPF
IMM GRANULOCYTES # BLD AUTO: 0.14 K/UL (ref 0–0.11)
IMM GRANULOCYTES NFR BLD AUTO: 0.8 % (ref 0–0.9)
KETONES UR STRIP.AUTO-MCNC: 80 MG/DL
KETONES UR STRIP.AUTO-MCNC: NEGATIVE MG/DL
LEUKOCYTE ESTERASE UR QL STRIP.AUTO: ABNORMAL
LEUKOCYTE ESTERASE UR QL STRIP.AUTO: ABNORMAL
LYMPHOCYTES # BLD AUTO: 0.9 K/UL (ref 1–4.8)
LYMPHOCYTES NFR BLD: 4.9 % (ref 22–41)
MCH RBC QN AUTO: 24.9 PG (ref 27–33)
MCHC RBC AUTO-ENTMCNC: 30.8 G/DL (ref 33.7–35.3)
MCV RBC AUTO: 80.8 FL (ref 81.4–97.8)
MICRO URNS: ABNORMAL
MICRO URNS: ABNORMAL
MONOCYTES # BLD AUTO: 1.38 K/UL (ref 0–0.85)
MONOCYTES NFR BLD AUTO: 7.5 % (ref 0–13.4)
MUCOUS THREADS #/AREA URNS HPF: ABNORMAL /HPF
MUCOUS THREADS #/AREA URNS HPF: ABNORMAL /HPF
NEUTROPHILS # BLD AUTO: 15.34 K/UL (ref 1.82–7.42)
NEUTROPHILS NFR BLD: 83.6 % (ref 44–72)
NITRITE UR QL STRIP.AUTO: NEGATIVE
NITRITE UR QL STRIP.AUTO: NEGATIVE
NRBC # BLD AUTO: 0 K/UL
NRBC BLD AUTO-RTO: 0 /100 WBC
PH UR STRIP.AUTO: 5 [PH]
PH UR STRIP.AUTO: 5.5 [PH]
PLATELET # BLD AUTO: 449 K/UL (ref 164–446)
PMV BLD AUTO: 8.9 FL (ref 9–12.9)
POTASSIUM SERPL-SCNC: 3.7 MMOL/L (ref 3.6–5.5)
PROT UR QL STRIP: 70 MG/DL
PROT UR QL STRIP: NEGATIVE MG/DL
RBC # BLD AUTO: 6.19 M/UL (ref 4.7–6.1)
RBC # URNS HPF: ABNORMAL /HPF
RBC # URNS HPF: ABNORMAL /HPF
RBC UR QL AUTO: ABNORMAL
RBC UR QL AUTO: ABNORMAL
SODIUM SERPL-SCNC: 128 MMOL/L (ref 135–145)
SP GR UR STRIP.AUTO: 1.01
SP GR UR STRIP.AUTO: 1.01
WBC # BLD AUTO: 18.4 K/UL (ref 4.8–10.8)
WBC #/AREA URNS HPF: ABNORMAL /HPF
WBC #/AREA URNS HPF: ABNORMAL /HPF
YEAST BUDDING URNS QL: PRESENT /HPF
YEAST HYPHAE #/AREA URNS HPF: PRESENT /HPF
YEAST HYPHAE #/AREA URNS HPF: PRESENT /HPF

## 2017-04-12 PROCEDURE — 82140 ASSAY OF AMMONIA: CPT

## 2017-04-12 PROCEDURE — 85025 COMPLETE CBC W/AUTO DIFF WBC: CPT

## 2017-04-12 PROCEDURE — 75625 CONTRAST EXAM ABDOMINL AORTA: CPT

## 2017-04-12 PROCEDURE — 82962 GLUCOSE BLOOD TEST: CPT | Mod: 91

## 2017-04-12 PROCEDURE — 700111 HCHG RX REV CODE 636 W/ 250 OVERRIDE (IP)

## 2017-04-12 PROCEDURE — 770020 HCHG ROOM/CARE - TELE (206)

## 2017-04-12 PROCEDURE — 700111 HCHG RX REV CODE 636 W/ 250 OVERRIDE (IP): Performed by: HOSPITALIST

## 2017-04-12 PROCEDURE — 99233 SBSQ HOSP IP/OBS HIGH 50: CPT | Performed by: HOSPITALIST

## 2017-04-12 PROCEDURE — 75630 X-RAY AORTA LEG ARTERIES: CPT

## 2017-04-12 PROCEDURE — 36415 COLL VENOUS BLD VENIPUNCTURE: CPT

## 2017-04-12 PROCEDURE — 700102 HCHG RX REV CODE 250 W/ 637 OVERRIDE(OP): Performed by: HOSPITALIST

## 2017-04-12 PROCEDURE — C1769 GUIDE WIRE: HCPCS

## 2017-04-12 PROCEDURE — B41D1ZZ FLUOROSCOPY OF AORTA AND BILATERAL LOWER EXTREMITY ARTERIES USING LOW OSMOLAR CONTRAST: ICD-10-PCS | Performed by: SURGERY

## 2017-04-12 PROCEDURE — 700111 HCHG RX REV CODE 636 W/ 250 OVERRIDE (IP): Performed by: SURGERY

## 2017-04-12 PROCEDURE — 700102 HCHG RX REV CODE 250 W/ 637 OVERRIDE(OP): Performed by: INTERNAL MEDICINE

## 2017-04-12 PROCEDURE — 36200 PLACE CATHETER IN AORTA: CPT

## 2017-04-12 PROCEDURE — 700101 HCHG RX REV CODE 250

## 2017-04-12 PROCEDURE — 700105 HCHG RX REV CODE 258: Performed by: HOSPITALIST

## 2017-04-12 PROCEDURE — 81001 URINALYSIS AUTO W/SCOPE: CPT

## 2017-04-12 PROCEDURE — A9270 NON-COVERED ITEM OR SERVICE: HCPCS | Performed by: HOSPITALIST

## 2017-04-12 PROCEDURE — 80048 BASIC METABOLIC PNL TOTAL CA: CPT

## 2017-04-12 PROCEDURE — 75716 ARTERY X-RAYS ARMS/LEGS: CPT

## 2017-04-12 PROCEDURE — A9270 NON-COVERED ITEM OR SERVICE: HCPCS | Performed by: INTERNAL MEDICINE

## 2017-04-12 RX ORDER — CLONIDINE HYDROCHLORIDE 0.1 MG/1
0.1 TABLET ORAL TWICE DAILY
Status: DISCONTINUED | OUTPATIENT
Start: 2017-04-12 | End: 2017-04-18

## 2017-04-12 RX ORDER — SODIUM CHLORIDE 1 G/1
1 TABLET ORAL
Status: DISCONTINUED | OUTPATIENT
Start: 2017-04-12 | End: 2017-04-20 | Stop reason: HOSPADM

## 2017-04-12 RX ORDER — PROTAMINE SULFATE 10 MG/ML
25 INJECTION, SOLUTION INTRAVENOUS ONCE
Status: COMPLETED | OUTPATIENT
Start: 2017-04-12 | End: 2017-04-12

## 2017-04-12 RX ORDER — FUROSEMIDE 10 MG/ML
20 INJECTION INTRAMUSCULAR; INTRAVENOUS ONCE
Status: COMPLETED | OUTPATIENT
Start: 2017-04-12 | End: 2017-04-12

## 2017-04-12 RX ORDER — FLUMAZENIL 0.1 MG/ML
INJECTION INTRAVENOUS
Status: COMPLETED
Start: 2017-04-12 | End: 2017-04-12

## 2017-04-12 RX ORDER — NALOXONE HYDROCHLORIDE 0.4 MG/ML
INJECTION, SOLUTION INTRAMUSCULAR; INTRAVENOUS; SUBCUTANEOUS
Status: COMPLETED
Start: 2017-04-12 | End: 2017-04-12

## 2017-04-12 RX ORDER — MIDAZOLAM HYDROCHLORIDE 1 MG/ML
INJECTION INTRAMUSCULAR; INTRAVENOUS
Status: COMPLETED
Start: 2017-04-12 | End: 2017-04-12

## 2017-04-12 RX ADMIN — AMPICILLIN SODIUM AND SULBACTAM SODIUM 3 G: 2; 1 INJECTION, POWDER, FOR SOLUTION INTRAMUSCULAR; INTRAVENOUS at 14:33

## 2017-04-12 RX ADMIN — ATORVASTATIN CALCIUM 40 MG: 40 TABLET, FILM COATED ORAL at 21:01

## 2017-04-12 RX ADMIN — NICOTINE 14 MG: 14 PATCH, EXTENDED RELEASE TRANSDERMAL at 05:16

## 2017-04-12 RX ADMIN — HEPARIN SODIUM 5000 UNITS: 5000 INJECTION, SOLUTION INTRAVENOUS; SUBCUTANEOUS at 14:33

## 2017-04-12 RX ADMIN — THERA TABS 1 TABLET: TAB at 10:34

## 2017-04-12 RX ADMIN — HEPARIN SODIUM 5000 UNITS: 5000 INJECTION, SOLUTION INTRAVENOUS; SUBCUTANEOUS at 05:16

## 2017-04-12 RX ADMIN — AMPICILLIN SODIUM AND SULBACTAM SODIUM 3 G: 2; 1 INJECTION, POWDER, FOR SOLUTION INTRAMUSCULAR; INTRAVENOUS at 18:42

## 2017-04-12 RX ADMIN — HEPARIN SODIUM 5000 UNITS: 5000 INJECTION, SOLUTION INTRAVENOUS; SUBCUTANEOUS at 20:58

## 2017-04-12 RX ADMIN — ASPIRIN 325 MG: 325 TABLET, DELAYED RELEASE ORAL at 10:34

## 2017-04-12 RX ADMIN — CLONIDINE HYDROCHLORIDE 0.1 MG: 0.1 TABLET ORAL at 21:01

## 2017-04-12 RX ADMIN — Medication 100 MG: at 10:35

## 2017-04-12 RX ADMIN — PROTAMINE SULFATE 25 MG: 10 INJECTION, SOLUTION INTRAVENOUS at 15:32

## 2017-04-12 RX ADMIN — STANDARDIZED SENNA CONCENTRATE AND DOCUSATE SODIUM 2 TABLET: 8.6; 5 TABLET, FILM COATED ORAL at 21:01

## 2017-04-12 RX ADMIN — METOPROLOL TARTRATE 50 MG: 50 TABLET, FILM COATED ORAL at 21:01

## 2017-04-12 RX ADMIN — FUROSEMIDE 20 MG: 10 INJECTION, SOLUTION INTRAVENOUS at 11:04

## 2017-04-12 RX ADMIN — METOPROLOL TARTRATE 50 MG: 50 TABLET, FILM COATED ORAL at 10:34

## 2017-04-12 RX ADMIN — LEVOTHYROXINE SODIUM 75 MCG: 75 TABLET ORAL at 05:10

## 2017-04-12 RX ADMIN — AMPICILLIN SODIUM AND SULBACTAM SODIUM 3 G: 2; 1 INJECTION, POWDER, FOR SOLUTION INTRAMUSCULAR; INTRAVENOUS at 23:45

## 2017-04-12 RX ADMIN — FOLIC ACID 1 MG: 1 TABLET ORAL at 10:34

## 2017-04-12 RX ADMIN — SODIUM CHLORIDE TAB 1 GM 1 G: 1 TAB at 23:46

## 2017-04-12 ASSESSMENT — LIFESTYLE VARIABLES
HEADACHE, FULLNESS IN HEAD: NOT PRESENT
VISUAL DISTURBANCES: NOT PRESENT
ANXIETY: NO ANXIETY (AT EASE)
ORIENTATION AND CLOUDING OF SENSORIUM: DATE DISORIENTATION BY MORE THAN TWO CALENDAR DAYS
TOTAL SCORE: 3
TOTAL SCORE: 3
AUDITORY DISTURBANCES: NOT PRESENT
ANXIETY: NO ANXIETY (AT EASE)
AUDITORY DISTURBANCES: NOT PRESENT
HEADACHE, FULLNESS IN HEAD: NOT PRESENT
NAUSEA AND VOMITING: NO NAUSEA AND NO VOMITING
AUDITORY DISTURBANCES: NOT PRESENT
VISUAL DISTURBANCES: NOT PRESENT
PAROXYSMAL SWEATS: NO SWEAT VISIBLE
NAUSEA AND VOMITING: NO NAUSEA AND NO VOMITING
AGITATION: NORMAL ACTIVITY
TREMOR: NO TREMOR
AGITATION: NORMAL ACTIVITY
TREMOR: NO TREMOR
NAUSEA AND VOMITING: NO NAUSEA AND NO VOMITING
TREMOR: NO TREMOR
AGITATION: NORMAL ACTIVITY
TREMOR: NO TREMOR
TOTAL SCORE: 3
ORIENTATION AND CLOUDING OF SENSORIUM: DATE DISORIENTATION BY MORE THAN TWO CALENDAR DAYS
TREMOR: NO TREMOR
TOTAL SCORE: 3
HEADACHE, FULLNESS IN HEAD: NOT PRESENT
VISUAL DISTURBANCES: NOT PRESENT
ANXIETY: NO ANXIETY (AT EASE)
AUDITORY DISTURBANCES: NOT PRESENT
ORIENTATION AND CLOUDING OF SENSORIUM: DISORIENTED FOR PLACE AND / OR PERSON
AGITATION: NORMAL ACTIVITY
HEADACHE, FULLNESS IN HEAD: NOT PRESENT
VISUAL DISTURBANCES: NOT PRESENT
PAROXYSMAL SWEATS: NO SWEAT VISIBLE
AGITATION: NORMAL ACTIVITY
AUDITORY DISTURBANCES: NOT PRESENT
PAROXYSMAL SWEATS: NO SWEAT VISIBLE
VISUAL DISTURBANCES: NOT PRESENT
PAROXYSMAL SWEATS: NO SWEAT VISIBLE
ANXIETY: NO ANXIETY (AT EASE)
NAUSEA AND VOMITING: NO NAUSEA AND NO VOMITING
ORIENTATION AND CLOUDING OF SENSORIUM: DATE DISORIENTATION BY MORE THAN TWO CALENDAR DAYS
PAROXYSMAL SWEATS: NO SWEAT VISIBLE
HEADACHE, FULLNESS IN HEAD: NOT PRESENT
NAUSEA AND VOMITING: NO NAUSEA AND NO VOMITING
TOTAL SCORE: 4
ANXIETY: NO ANXIETY (AT EASE)
ORIENTATION AND CLOUDING OF SENSORIUM: DATE DISORIENTATION BY MORE THAN TWO CALENDAR DAYS

## 2017-04-12 ASSESSMENT — PAIN SCALES - GENERAL
PAINLEVEL_OUTOF10: 0
PAINLEVEL_OUTOF10: 3
PAINLEVEL_OUTOF10: 0
PAINLEVEL_OUTOF10: 0

## 2017-04-12 NOTE — PROGRESS NOTES
IR Progress Note:    Abdominal aortagram with bilateral runoff by Dr. Monroy. No sedation per Dr. Monroy. Pt tolerated procedure well and remained hemodynamically stable throughout. Pt report to JESSICA Gonzales. Pt on transport to [facility].    [facility], Angio-seal VIP Vascular Closure Device, REF# [id number], LOT #[id number]

## 2017-04-12 NOTE — THERAPY
"Occupational Therapy Evaluation completed.   Functional Status:  Pt appeared lethargic laying in fetal position in bed.  Pt able to achieve full ext of BLE with assistance to position them.  Pt did not consistently follow commands.  Pt noted to have DIP flex contractures in his right hand of 3rd, 4th & 5th digits as well as his left 5th DIP.  Pt required Max A for supine to sit EOB.  Pt unable to maintain sitting balance unsupported EOB.  Pt attempted to stand with Max A x 2 but was unable.  Pt mumbled some uninteligable words.  Plan of Care: Will benefit from Occupational Therapy 2 times per week  Discharge Recommendations:  Equipment: Will Continue to Assess for Equipment Needs. Post-acute therapy Discharge to a transitional care facility for continued skilled therapy services.    See \"Rehab Therapy-Acute\" Patient Summary Report for complete documentation.    "

## 2017-04-12 NOTE — OR SURGEON
Immediate Post-Operative Note      PreOp Diagnosis: ischemic left foot    PostOp Diagnosis:   Same    Aortogram and runoff            Anesthesiologist/Type of Anesthesia:  Anesthesia staff cannot be found from this context./* No surgery found *    Surgical Staff:  * Surgery not found *    Specimen:       Estimated Blood Loss:       Findings:       Complications:           4/12/2017 3:47 PM Joesph Monroy

## 2017-04-12 NOTE — CARE PLAN
Problem: Safety  Goal: Will remain free from injury  Intervention: Provide assistance with mobility  Pt mobility assessed at beginning of shift, pt on bedrest for unstable condition.  Requiring 2 assist for repositioning.  Fall precautions in place, non-slip socks on, bed in lowest, locked position, pt is in soft wrist restraints to prohibit pt from pulling out cortrak and IV line.  Pt is confused and lethargic, does not call for assistance, hourly rounding in place.         Problem: Skin Integrity  Goal: Risk for impaired skin integrity will decrease  Intervention: Assess risk factors for impaired skin integrity and/or pressure ulcers  pt turns self side to side, moisture and barrier cream in use.  Floating heels in heel float boots.

## 2017-04-12 NOTE — PROGRESS NOTES
Surgical Progress Note    Author: Joesph Monroy Date & Time created: 2017   5:32 AM     Interval Events:  Some confusion about date of angio, which is today  ROS  Hemodynamics:  Temp (24hrs), Av.5 °C (99.5 °F), Min:37.1 °C (98.7 °F), Max:38.1 °C (100.6 °F)  Temperature: 37.9 °C (100.2 °F)  Pulse  Av.8  Min: 70  Max: 118   Blood Pressure : (!) 167/92 mmHg (rn notified )     Respiratory:    Respiration: (!) 28, Pulse Oximetry: 93 %     Work Of Breathing / Effort: Mild  RUL Breath Sounds: Diminished, RML Breath Sounds: Diminished, RLL Breath Sounds: Diminished, RIGO Breath Sounds: Diminished, LLL Breath Sounds: Diminished  Neuro:  GCS       Fluids:    Intake/Output Summary (Last 24 hours) at 17 0532  Last data filed at 17   Gross per 24 hour   Intake      0 ml   Output      0 ml   Net      0 ml     Weight: 59.1 kg (130 lb 4.7 oz)  Current Diet Order   Procedures   • DIET NPO     Physical Exam  Labs:  Recent Results (from the past 24 hour(s))   CBC WITH DIFFERENTIAL    Collection Time: 17  4:55 AM   Result Value Ref Range    WBC 18.4 (H) 4.8 - 10.8 K/uL    RBC 6.19 (H) 4.70 - 6.10 M/uL    Hemoglobin 15.4 14.0 - 18.0 g/dL    Hematocrit 50.0 42.0 - 52.0 %    MCV 80.8 (L) 81.4 - 97.8 fL    MCH 24.9 (L) 27.0 - 33.0 pg    MCHC 30.8 (L) 33.7 - 35.3 g/dL    RDW 47.9 35.9 - 50.0 fL    Platelet Count 449 (H) 164 - 446 K/uL    MPV 8.9 (L) 9.0 - 12.9 fL    Neutrophils-Polys 83.60 (H) 44.00 - 72.00 %    Lymphocytes 4.90 (L) 22.00 - 41.00 %    Monocytes 7.50 0.00 - 13.40 %    Eosinophils 1.90 0.00 - 6.90 %    Basophils 1.30 0.00 - 1.80 %    Immature Granulocytes 0.80 0.00 - 0.90 %    Nucleated RBC 0.00 /100 WBC    Neutrophils (Absolute) 15.34 (H) 1.82 - 7.42 K/uL    Lymphs (Absolute) 0.90 (L) 1.00 - 4.80 K/uL    Monos (Absolute) 1.38 (H) 0.00 - 0.85 K/uL    Eos (Absolute) 0.35 0.00 - 0.51 K/uL    Baso (Absolute) 0.24 (H) 0.00 - 0.12 K/uL    Immature Granulocytes (abs) 0.14 (H) 0.00 - 0.11  K/uL    NRBC (Absolute) 0.00 K/uL     Medical Decision Making, by Problem:  Active Hospital Problems    Diagnosis   • Leucocytosis [D72.829]     Priority: High   • Alcohol withdrawal (CMS-HCC) [F10.239]     Priority: High   • Alcohol dependence (CMS-HCC) [F10.20]     Priority: High   • Ischemia of left lower extremity [I99.8]     Priority: High   • Encephalopathy acute [G93.40]   • HTN (hypertension), malignant [I10]   • Hyponatremia [E87.1]   • Smoking [F17.200]   • Falls frequently [R29.6]   • Hypothyroidism [E03.9]     Plan:  Angio and ?bypass--will try to find a family member for discussion    Quality Measures:  Core Measures

## 2017-04-12 NOTE — PROGRESS NOTES
Bedside report completed. Assumed pt care. Pt A&O 0-1(pt sometimes will respond with his name when asked what his name is), resting in bed comfortably with no signs of labored breathing on 2L O2. Pt tele monitor in place, cardiac rhythm being monitored. Pt call light within reach, bed in low position, upper bed rails up, non skid socks in place, soft wrist restraints in place. Pt doesn't appear to be in any pain or distress. Patient was updated on the plan of care for the night.  All fall precautions in place. Will continue to monitor. Cortrak in place, will start fibersource at 25 per protocol once kangaroo pump arrives.

## 2017-04-12 NOTE — PROGRESS NOTES
1430 Pt pt down to preop.  1620 Pt returned to preop, right groin soft, no eccy, no oozing, no hematoma noted.

## 2017-04-12 NOTE — PROGRESS NOTES
Pt resting in bed at this time, even visible chest rise, no apparent signs of distress, bed alarm on, call light in place, bed in low and locked position. Soft wrist restraints in place.  Kangaroo pump running Fibersource at 25 through cortrak per protocol.

## 2017-04-12 NOTE — PROGRESS NOTES
Hospital Medicine Interval Note  Date of Service:  4/12/2017    Chief Complaint  71 y.o.-year-old male admitted 4/8/2017 with ETOH withdraw and ischemic leg    Interval Problem Update  Going thru active withdrawals    Not following commands    Unsafe to eat  By mouth at this time..cortrack placed    He may of aspirated    bp is elevated and uncontrolled    Tachycardia    Wbc worse at 18    Sodium 128      Consultants/Specialty  Vascular surgery    Disposition  tele     Review of Systems   Unable to perform ROS: mental status change      Physical Exam Laboratory/Imaging   Filed Vitals:    04/11/17 1600 04/11/17 2000 04/12/17 0000 04/12/17 0400   BP: 175/91 160/90 152/81 167/92   Pulse: 118 93 90 109   Temp: 38.1 °C (100.6 °F) 37.4 °C (99.3 °F) 37.3 °C (99.2 °F) 37.9 °C (100.2 °F)   Resp: 22 18 22 28   Height:       Weight:  59.1 kg (130 lb 4.7 oz)     SpO2: 99% 96% 94% 93%     Physical Exam   Constitutional: He appears well-developed and well-nourished. No distress.   HENT:   Head: Normocephalic.   Eyes: EOM are normal. Pupils are equal, round, and reactive to light.   Neck: Normal range of motion. Neck supple. No JVD present. No thyromegaly present.   Cardiovascular: Regular rhythm and normal heart sounds.  Exam reveals no gallop and no friction rub.    No murmur heard.  tachycardia   Pulmonary/Chest: Effort normal and breath sounds normal. No respiratory distress. He has no wheezes. He has no rales. He exhibits no tenderness.   Mod b/l rhonchi   Abdominal: Soft. Bowel sounds are normal. He exhibits no distension and no mass. There is no tenderness. There is no rebound and no guarding.   Musculoskeletal: Normal range of motion. He exhibits no edema.   Lymphadenopathy:     He has no cervical adenopathy.   Neurological: He is alert. He displays normal reflexes. No cranial nerve deficit.   arousable  Will not follow commands    Not oriented   Skin: Skin is dry. No rash noted. He is not diaphoretic. There is erythema  (left foot necrotic changes ).   Psychiatric: He has a normal mood and affect.   Nursing note and vitals reviewed.   Lab Results   Component Value Date/Time    WBC 18.4* 04/12/2017 04:55 AM    HEMOGLOBIN 15.4 04/12/2017 04:55 AM    HEMATOCRIT 50.0 04/12/2017 04:55 AM    PLATELET COUNT 449* 04/12/2017 04:55 AM     Lab Results   Component Value Date/Time    SODIUM 128* 04/12/2017 04:55 AM    POTASSIUM 3.7 04/12/2017 04:55 AM    CHLORIDE 100 04/12/2017 04:55 AM    CO2 22 04/12/2017 04:55 AM    GLUCOSE 114* 04/12/2017 04:55 AM    BUN 8 04/12/2017 04:55 AM    CREATININE 0.59 04/12/2017 04:55 AM      Assessment/Plan    Alcohol dependence (CMS-HCC)  Assessment & Plan  - education provided earlier this admission  -he is too altered, at this time    Ischemia of left lower extremity  Assessment & Plan  - vascular to follow  -  angiogram today  - cont to follow      Leucocytosis  Assessment & Plan  - stress induced?he may have aspirated in last 2 days    - cont to monitor    Alcohol withdrawal (CMS-[facility])  Assessment & Plan  - CIWA  - banana bag iv  - thiamin and B12 and folate po  - tele    Hypothyroidism  Assessment & Plan  - stable and cont home meds     Falls frequently  Assessment & Plan  -pt/ot    Hyponatremia  Assessment & Plan  - chronic ETOH  - volume depletion improved  - ivf stopped due to congestion    Started salt tabs via cortrack    Smoking  Assessment & Plan  -cessation education provided earlier    Encephalopathy acute  Assessment & Plan  unsafe to eat         tube feeds via cortrack    Restraint for safety    HTN (hypertension), malignant  Assessment & Plan  Due to etoh w/d         lopressor 50mg po bid as pt is tachycardic as well    Added clonidine 0.1 mg po bid    Aspiration pneumonia (HCC)  Assessment & Plan  Started iv unasyn         Check am cbc, bmp Labs reviewed, Medications reviewed and Radiology images reviewed  Barrera catheter: No Barrera      DVT Prophylaxis: Heparin  DVT prophylaxis - mechanical:  SCDs      Assessed for rehab: Patient was assess for and/or received rehabilitation services during this hospitalization

## 2017-04-12 NOTE — DIETARY
"Nutrition Support Assessment - Male  Isiah Lagos is a 71 y.o. male with admitting DX of Ischemia of left lower extremity, PAD (peripheral artery disease) (CMS-[facility]), Falls frequently, Alcohol dependence (CMS-HCC).    Pertinent History: Hypothyroid, emphysema, arthritis, prostate cancer, stroke,  MI.  Allergies:  Review of patient's allergies indicates no known allergies.  Height: 182.9 cm (6' 0.01\")  Weight: 59.1 kg (130 lb 4.7 oz)  Weight to Use in Calculations: 54.4 kg (119 lb 14.9 oz)  Ideal Body Weight: 80.74 kg (178 lb)  Percent Ideal Body Weight: 73.2  Body mass index is 17.67 kg/(m^2).     Pertinent Labs:   Recent Labs      17   0455   SODIUM 101  128*   POTASSIUM 102  3.7     8   CREATININE 109  0.59   GLUCOSE 112  114*   CALCIUM 105  8.6   WBC 1501  18.4*   HGB 1503  15.4   HCT 1504  50.0   RBC 1502  6.19*     Last BM:  (PTA)  Pertinent Medications: Lipitor, folic acid, theragran, synthroid, lopressor, pericolace, thiamine,   Pertinent Fluids: NS infusion  Surgery / Procedures: None noted this admit;   Skin: AID to buttock, sacrum/coccyx, perineum, scrotum and penis + wound to left foot.    Estimated Needs: MSJ x 1.2 = 1606 kcal/day  Total Calories / day: 1523 - 1741  (Calories / k - 32)  Total Grams Protein / day: 59.9 - 71  (Grams Protein / k.1 - 1.3)  Total Fluids ml / day: 1362.4 ml         Assessment / Evaluation:   Consult received for tube feeding. Gastric cortrak in place and verified.  Per discussion in rounds, Fibersource running @ 25 ml/hr per standard protocol.  Patient at risk for refeeding - monitor for refeeding: Order BMP w/ Mg and Phos x 7 days. Replete K, Phos and Mg prn. Supplement 100 mg Thiamine x 7 days to reduce risk of refeeding.      Additionally a consult was received  for diabetic diet education.   Patient with no noted history of diabetes and blood sugars have only been as high as 114 since admit.  No HbA1c documented and no insulin in MAR.  Diabetic " diet education does not appear necessary or warranted given patient with no history of diabetes.  Please re-consult RD if needed for DM diet education prior to D/C and check HbA1c for monitoring.  Synthroid in MAR.       Plan / Recommendation:   1) Advance TF per protocol to goal rate of Fibersource HN @ 55 ml/hr.  This will provide: 1584 kcals, 71 grams protein, 1069 mL free water.  2) Fluids per MD  3) Monitor for refeeding: Order BMP w/ Mg and Phos x 7 days. Replete K, Phos and Mg prn. Supplement 100 mg Thiamine x 7 days to reduce risk of refeeding.  4) Bi-weekly weights to monitor fluid and nutrition status.   5) PO diet when safe and appropriate.     RD monitoring

## 2017-04-13 LAB
ANION GAP SERPL CALC-SCNC: 10 MMOL/L (ref 0–11.9)
BACTERIA BLD CULT: NORMAL
BACTERIA BLD CULT: NORMAL
BASOPHILS # BLD AUTO: 2.3 % (ref 0–1.8)
BASOPHILS # BLD: 0.32 K/UL (ref 0–0.12)
BNP SERPL-MCNC: 257 PG/ML (ref 0–100)
BUN SERPL-MCNC: 11 MG/DL (ref 8–22)
CALCIUM SERPL-MCNC: 8.4 MG/DL (ref 8.5–10.5)
CHLORIDE SERPL-SCNC: 99 MMOL/L (ref 96–112)
CO2 SERPL-SCNC: 22 MMOL/L (ref 20–33)
CREAT SERPL-MCNC: 0.63 MG/DL (ref 0.5–1.4)
EOSINOPHIL # BLD AUTO: 0.65 K/UL (ref 0–0.51)
EOSINOPHIL NFR BLD: 4.8 % (ref 0–6.9)
ERYTHROCYTE [DISTWIDTH] IN BLOOD BY AUTOMATED COUNT: 47.8 FL (ref 35.9–50)
GFR SERPL CREATININE-BSD FRML MDRD: >60 ML/MIN/1.73 M 2
GLUCOSE SERPL-MCNC: 72 MG/DL (ref 65–99)
HCT VFR BLD AUTO: 46.1 % (ref 42–52)
HGB BLD-MCNC: 14.3 G/DL (ref 14–18)
IMM GRANULOCYTES # BLD AUTO: 0.08 K/UL (ref 0–0.11)
IMM GRANULOCYTES NFR BLD AUTO: 0.6 % (ref 0–0.9)
LYMPHOCYTES # BLD AUTO: 1.28 K/UL (ref 1–4.8)
LYMPHOCYTES NFR BLD: 9.4 % (ref 22–41)
MAGNESIUM SERPL-MCNC: 1.5 MG/DL (ref 1.5–2.5)
MCH RBC QN AUTO: 24.6 PG (ref 27–33)
MCHC RBC AUTO-ENTMCNC: 31 G/DL (ref 33.7–35.3)
MCV RBC AUTO: 79.3 FL (ref 81.4–97.8)
MONOCYTES # BLD AUTO: 1.09 K/UL (ref 0–0.85)
MONOCYTES NFR BLD AUTO: 8 % (ref 0–13.4)
NEUTROPHILS # BLD AUTO: 10.21 K/UL (ref 1.82–7.42)
NEUTROPHILS NFR BLD: 74.9 % (ref 44–72)
NRBC # BLD AUTO: 0 K/UL
NRBC BLD AUTO-RTO: 0 /100 WBC
PHOSPHATE SERPL-MCNC: 3 MG/DL (ref 2.5–4.5)
PLATELET # BLD AUTO: 472 K/UL (ref 164–446)
PMV BLD AUTO: 9 FL (ref 9–12.9)
POTASSIUM SERPL-SCNC: 3.4 MMOL/L (ref 3.6–5.5)
RBC # BLD AUTO: 5.81 M/UL (ref 4.7–6.1)
SIGNIFICANT IND 70042: NORMAL
SIGNIFICANT IND 70042: NORMAL
SITE SITE: NORMAL
SITE SITE: NORMAL
SODIUM SERPL-SCNC: 131 MMOL/L (ref 135–145)
SOURCE SOURCE: NORMAL
SOURCE SOURCE: NORMAL
WBC # BLD AUTO: 13.6 K/UL (ref 4.8–10.8)

## 2017-04-13 PROCEDURE — 99233 SBSQ HOSP IP/OBS HIGH 50: CPT | Performed by: HOSPITALIST

## 2017-04-13 PROCEDURE — 87186 SC STD MICRODIL/AGAR DIL: CPT

## 2017-04-13 PROCEDURE — 700102 HCHG RX REV CODE 250 W/ 637 OVERRIDE(OP): Performed by: HOSPITALIST

## 2017-04-13 PROCEDURE — 700102 HCHG RX REV CODE 250 W/ 637 OVERRIDE(OP): Performed by: NURSE PRACTITIONER

## 2017-04-13 PROCEDURE — 700105 HCHG RX REV CODE 258: Performed by: HOSPITALIST

## 2017-04-13 PROCEDURE — 700111 HCHG RX REV CODE 636 W/ 250 OVERRIDE (IP): Performed by: HOSPITALIST

## 2017-04-13 PROCEDURE — 87077 CULTURE AEROBIC IDENTIFY: CPT

## 2017-04-13 PROCEDURE — A9270 NON-COVERED ITEM OR SERVICE: HCPCS | Performed by: HOSPITALIST

## 2017-04-13 PROCEDURE — 83880 ASSAY OF NATRIURETIC PEPTIDE: CPT

## 2017-04-13 PROCEDURE — 770020 HCHG ROOM/CARE - TELE (206)

## 2017-04-13 PROCEDURE — 80048 BASIC METABOLIC PNL TOTAL CA: CPT

## 2017-04-13 PROCEDURE — A9270 NON-COVERED ITEM OR SERVICE: HCPCS | Performed by: NURSE PRACTITIONER

## 2017-04-13 PROCEDURE — A9270 NON-COVERED ITEM OR SERVICE: HCPCS | Performed by: INTERNAL MEDICINE

## 2017-04-13 PROCEDURE — 84100 ASSAY OF PHOSPHORUS: CPT

## 2017-04-13 PROCEDURE — 83735 ASSAY OF MAGNESIUM: CPT

## 2017-04-13 PROCEDURE — 85025 COMPLETE CBC W/AUTO DIFF WBC: CPT

## 2017-04-13 PROCEDURE — 700102 HCHG RX REV CODE 250 W/ 637 OVERRIDE(OP): Performed by: INTERNAL MEDICINE

## 2017-04-13 PROCEDURE — 87086 URINE CULTURE/COLONY COUNT: CPT

## 2017-04-13 PROCEDURE — 36415 COLL VENOUS BLD VENIPUNCTURE: CPT

## 2017-04-13 RX ORDER — DIPHENHYDRAMINE HCL 25 MG
25 TABLET ORAL ONCE
Status: COMPLETED | OUTPATIENT
Start: 2017-04-13 | End: 2017-04-13

## 2017-04-13 RX ORDER — POTASSIUM CHLORIDE 20 MEQ/1
40 TABLET, EXTENDED RELEASE ORAL ONCE
Status: COMPLETED | OUTPATIENT
Start: 2017-04-13 | End: 2017-04-13

## 2017-04-13 RX ADMIN — STANDARDIZED SENNA CONCENTRATE AND DOCUSATE SODIUM 2 TABLET: 8.6; 5 TABLET, FILM COATED ORAL at 21:34

## 2017-04-13 RX ADMIN — SODIUM CHLORIDE TAB 1 GM 1 G: 1 TAB at 09:25

## 2017-04-13 RX ADMIN — POTASSIUM CHLORIDE 40 MEQ: 1500 TABLET, EXTENDED RELEASE ORAL at 15:28

## 2017-04-13 RX ADMIN — HEPARIN SODIUM 5000 UNITS: 5000 INJECTION, SOLUTION INTRAVENOUS; SUBCUTANEOUS at 21:33

## 2017-04-13 RX ADMIN — NICOTINE 14 MG: 14 PATCH, EXTENDED RELEASE TRANSDERMAL at 05:29

## 2017-04-13 RX ADMIN — OXYCODONE HYDROCHLORIDE 10 MG: 10 TABLET ORAL at 09:24

## 2017-04-13 RX ADMIN — METOPROLOL TARTRATE 50 MG: 50 TABLET, FILM COATED ORAL at 09:25

## 2017-04-13 RX ADMIN — AMPICILLIN SODIUM AND SULBACTAM SODIUM 3 G: 2; 1 INJECTION, POWDER, FOR SOLUTION INTRAMUSCULAR; INTRAVENOUS at 18:19

## 2017-04-13 RX ADMIN — AMPICILLIN SODIUM AND SULBACTAM SODIUM 3 G: 2; 1 INJECTION, POWDER, FOR SOLUTION INTRAMUSCULAR; INTRAVENOUS at 05:30

## 2017-04-13 RX ADMIN — ATORVASTATIN CALCIUM 40 MG: 40 TABLET, FILM COATED ORAL at 21:35

## 2017-04-13 RX ADMIN — FOLIC ACID 1 MG: 1 TABLET ORAL at 09:25

## 2017-04-13 RX ADMIN — SODIUM CHLORIDE TAB 1 GM 1 G: 1 TAB at 18:21

## 2017-04-13 RX ADMIN — Medication 100 MG: at 09:12

## 2017-04-13 RX ADMIN — ASPIRIN 325 MG: 325 TABLET, DELAYED RELEASE ORAL at 09:25

## 2017-04-13 RX ADMIN — CLONIDINE HYDROCHLORIDE 0.1 MG: 0.1 TABLET ORAL at 09:25

## 2017-04-13 RX ADMIN — AMPICILLIN SODIUM AND SULBACTAM SODIUM 3 G: 2; 1 INJECTION, POWDER, FOR SOLUTION INTRAMUSCULAR; INTRAVENOUS at 12:51

## 2017-04-13 RX ADMIN — HEPARIN SODIUM 5000 UNITS: 5000 INJECTION, SOLUTION INTRAVENOUS; SUBCUTANEOUS at 13:25

## 2017-04-13 RX ADMIN — DIPHENHYDRAMINE HCL 25 MG: 25 TABLET ORAL at 00:37

## 2017-04-13 RX ADMIN — HEPARIN SODIUM 5000 UNITS: 5000 INJECTION, SOLUTION INTRAVENOUS; SUBCUTANEOUS at 05:29

## 2017-04-13 RX ADMIN — THERA TABS 1 TABLET: TAB at 09:24

## 2017-04-13 RX ADMIN — OXYCODONE HYDROCHLORIDE 10 MG: 10 TABLET ORAL at 15:46

## 2017-04-13 RX ADMIN — CLONIDINE HYDROCHLORIDE 0.1 MG: 0.1 TABLET ORAL at 21:33

## 2017-04-13 ASSESSMENT — LIFESTYLE VARIABLES
NAUSEA AND VOMITING: NO NAUSEA AND NO VOMITING
AGITATION: NORMAL ACTIVITY
NAUSEA AND VOMITING: NO NAUSEA AND NO VOMITING
PAROXYSMAL SWEATS: NO SWEAT VISIBLE
AUDITORY DISTURBANCES: NOT PRESENT
HEADACHE, FULLNESS IN HEAD: NOT PRESENT
AGITATION: NORMAL ACTIVITY
ORIENTATION AND CLOUDING OF SENSORIUM: DATE DISORIENTATION BY MORE THAN TWO CALENDAR DAYS
VISUAL DISTURBANCES: NOT PRESENT
TOTAL SCORE: 3
ANXIETY: NO ANXIETY (AT EASE)
PAROXYSMAL SWEATS: NO SWEAT VISIBLE
ORIENTATION AND CLOUDING OF SENSORIUM: DATE DISORIENTATION BY MORE THAN TWO CALENDAR DAYS
TOTAL SCORE: 1
AUDITORY DISTURBANCES: NOT PRESENT
EVER HAD A DRINK FIRST THING IN THE MORNING TO STEADY YOUR NERVES TO GET RID OF A HANGOVER: YES
AGITATION: NORMAL ACTIVITY
AGITATION: NORMAL ACTIVITY
TREMOR: NO TREMOR
HEADACHE, FULLNESS IN HEAD: NOT PRESENT
ANXIETY: NO ANXIETY (AT EASE)
HAVE PEOPLE ANNOYED YOU BY CRITICIZING YOUR DRINKING: NO
NAUSEA AND VOMITING: NO NAUSEA AND NO VOMITING
PAROXYSMAL SWEATS: NO SWEAT VISIBLE
NAUSEA AND VOMITING: NO NAUSEA AND NO VOMITING
ANXIETY: NO ANXIETY (AT EASE)
NAUSEA AND VOMITING: NO NAUSEA AND NO VOMITING
PAROXYSMAL SWEATS: NO SWEAT VISIBLE
VISUAL DISTURBANCES: NOT PRESENT
HEADACHE, FULLNESS IN HEAD: NOT PRESENT
AVERAGE NUMBER OF DAYS PER WEEK YOU HAVE A DRINK CONTAINING ALCOHOL: 7
ORIENTATION AND CLOUDING OF SENSORIUM: DATE DISORIENTATION BY MORE THAN TWO CALENDAR DAYS
TREMOR: NO TREMOR
TOTAL SCORE: 3
PAROXYSMAL SWEATS: NO SWEAT VISIBLE
NAUSEA AND VOMITING: NO NAUSEA AND NO VOMITING
HEADACHE, FULLNESS IN HEAD: NOT PRESENT
AUDITORY DISTURBANCES: NOT PRESENT
VISUAL DISTURBANCES: NOT PRESENT
AUDITORY DISTURBANCES: NOT PRESENT
HEADACHE, FULLNESS IN HEAD: NOT PRESENT
DO YOU DRINK ALCOHOL: YES
CONSUMPTION TOTAL: POSITIVE
AUDITORY DISTURBANCES: NOT PRESENT
VISUAL DISTURBANCES: NOT PRESENT
HOW MANY TIMES IN THE PAST YEAR HAVE YOU HAD 5 OR MORE DRINKS IN A DAY: 2
TOTAL SCORE: 3
ANXIETY: NO ANXIETY (AT EASE)
TOTAL SCORE: 1
TOTAL SCORE: 3
ORIENTATION AND CLOUDING OF SENSORIUM: DATE DISORIENTATION BY MORE THAN TWO CALENDAR DAYS
ON A TYPICAL DAY WHEN YOU DRINK ALCOHOL HOW MANY DRINKS DO YOU HAVE: 2
PAROXYSMAL SWEATS: NO SWEAT VISIBLE
TREMOR: NO TREMOR
HEADACHE, FULLNESS IN HEAD: NOT PRESENT
TREMOR: NO TREMOR
AUDITORY DISTURBANCES: NOT PRESENT
TREMOR: NO TREMOR
TOTAL SCORE: 3
TOTAL SCORE: 3
TOTAL SCORE: 1
TREMOR: NO TREMOR
VISUAL DISTURBANCES: NOT PRESENT
EVER FELT BAD OR GUILTY ABOUT YOUR DRINKING: NO
AGITATION: NORMAL ACTIVITY
AGITATION: NORMAL ACTIVITY
VISUAL DISTURBANCES: NOT PRESENT
ANXIETY: NO ANXIETY (AT EASE)
ORIENTATION AND CLOUDING OF SENSORIUM: DATE DISORIENTATION BY MORE THAN TWO CALENDAR DAYS
ANXIETY: NO ANXIETY (AT EASE)
ORIENTATION AND CLOUDING OF SENSORIUM: DATE DISORIENTATION BY MORE THAN TWO CALENDAR DAYS
HAVE YOU EVER FELT YOU SHOULD CUT DOWN ON YOUR DRINKING: NO

## 2017-04-13 ASSESSMENT — ENCOUNTER SYMPTOMS
COUGH: 0
DIZZINESS: 0
SENSORY CHANGE: 0
NAUSEA: 0
HEADACHES: 0
SPEECH CHANGE: 0
TINGLING: 0
DOUBLE VISION: 0
TREMORS: 0
ABDOMINAL PAIN: 0
HEARTBURN: 0
ORTHOPNEA: 0
PALPITATIONS: 0
BLURRED VISION: 0
VOMITING: 0
HEMOPTYSIS: 0
WEAKNESS: 1
WEIGHT LOSS: 0
MYALGIAS: 1
CHILLS: 0

## 2017-04-13 ASSESSMENT — PAIN SCALES - GENERAL
PAINLEVEL_OUTOF10: 0
PAINLEVEL_OUTOF10: 8
PAINLEVEL_OUTOF10: 2
PAINLEVEL_OUTOF10: 2
PAINLEVEL_OUTOF10: 8

## 2017-04-13 NOTE — CARE PLAN
Problem: Knowledge Deficit  Goal: Knowledge of disease process/condition, treatment plan, diagnostic tests, and medications will improve  Outcome: PROGRESSING AS EXPECTED  Pt family (sister in law- Natalie) educated about disease process/condition and treatment plan. Patient agrees to all and states understanding.     Problem: Pain Management  Goal: Pain level will decrease to patient’s comfort goal  Outcome: PROGRESSING AS EXPECTED

## 2017-04-13 NOTE — PROGRESS NOTES
Bedside report completed. Assumed pt care. Pt A&O 2 (oriented to person and place), resting in bed comfortably with no signs of labored breathing on 2L O2. Pt tele monitor in place, cardiac rhythm being monitored. Pt call light within reach, bed in low position, upper bed rails up, non skid socks in place. Pt denies any pain or other distress at this time. Patient was updated on the plan of care for the night.  All fall precautions in place.  Will continue to monitor. Pt pulled out cortrak today, awaiting X-ray confirmation of placement then will restart food.  Pt to be NPO by midnight for procedure tomorrow AM.  Pt currently in 3 point restraints. Will D/C leg restraint at 0730 per order and continue soft wrist restraints.

## 2017-04-13 NOTE — PROGRESS NOTES
Received bedside report from JESSICA Willams. Patient laying in bed with eyes closed with rise and fall of chest present. Call bell within patient reach. Will continue with plan of care.

## 2017-04-13 NOTE — OR NURSING
"Upon arrival of transport to pt room it was noted pt was eating a \"pepermint felicity\"   Transport held at this time while discussion with anesthesia r/t doing surg today or having to reschedule   "

## 2017-04-13 NOTE — PROGRESS NOTES
Hospital Medicine Interval Note  Date of Service:  4/13/2017    Chief Complaint  71 y.o.-year-old male admitted 4/8/2017 with ETOH withdraw and ischemic leg    Interval Problem Update  Going thru active withdrawals    More awake    weak    ..cortrack in place    He may of aspirated in 2 days ago    bp is better      Wbc13    Sodium 131      Consultants/Specialty  Vascular surgery    Disposition  tele     Review of Systems   Constitutional: Positive for malaise/fatigue. Negative for chills and weight loss.   HENT: Negative for hearing loss.    Eyes: Negative for blurred vision and double vision.   Respiratory: Negative for cough and hemoptysis.    Cardiovascular: Negative for chest pain, palpitations and orthopnea.   Gastrointestinal: Negative for heartburn, nausea, vomiting and abdominal pain.   Genitourinary: Negative for dysuria, urgency, frequency and hematuria.   Musculoskeletal: Positive for myalgias.   Neurological: Positive for weakness. Negative for dizziness, tingling, tremors, sensory change, speech change and headaches.   All other systems reviewed and are negative.     Physical Exam Laboratory/Imaging   Filed Vitals:    04/13/17 0000 04/13/17 0400 04/13/17 0800 04/13/17 1200   BP: 127/67 126/73 137/71 119/60   Pulse: 65 98 72 62   Temp: 36.7 °C (98.1 °F) 36.9 °C (98.5 °F) 37 °C (98.6 °F) 36.9 °C (98.4 °F)   Resp: 18 18 17 18   Height:       Weight:       SpO2: 100% 99% 95% 96%     Physical Exam   Constitutional: He appears well-developed and well-nourished. No distress.   HENT:   Head: Normocephalic.   Eyes: EOM are normal. Pupils are equal, round, and reactive to light.   Neck: Normal range of motion. Neck supple. No JVD present. No thyromegaly present.   Cardiovascular: Regular rhythm and normal heart sounds.  Exam reveals no gallop and no friction rub.    No murmur heard.  tachycardia   Pulmonary/Chest: Effort normal and breath sounds normal. No respiratory distress. He has no wheezes. He has no  rales. He exhibits no tenderness.   Mod b/l rhonchi   Abdominal: Soft. Bowel sounds are normal. He exhibits no distension and no mass. There is no tenderness. There is no rebound and no guarding.   Musculoskeletal: Normal range of motion. He exhibits no edema.   Lymphadenopathy:     He has no cervical adenopathy.   Neurological: He is alert. He displays normal reflexes. No cranial nerve deficit.   Awake    Oriented to person and place   Skin: Skin is dry. No rash noted. He is not diaphoretic. There is erythema (left foot necrotic changes ).   Psychiatric: He has a normal mood and affect.   Nursing note and vitals reviewed.   Lab Results   Component Value Date/Time    WBC 13.6* 04/13/2017 04:52 AM    HEMOGLOBIN 14.3 04/13/2017 04:52 AM    HEMATOCRIT 46.1 04/13/2017 04:52 AM    PLATELET COUNT 472* 04/13/2017 04:52 AM     Lab Results   Component Value Date/Time    SODIUM 131* 04/13/2017 04:52 AM    POTASSIUM 3.4* 04/13/2017 04:52 AM    CHLORIDE 99 04/13/2017 04:52 AM    CO2 22 04/13/2017 04:52 AM    GLUCOSE 72 04/13/2017 04:52 AM    BUN 11 04/13/2017 04:52 AM    CREATININE 0.63 04/13/2017 04:52 AM      Assessment/Plan    Alcohol dependence (CMS-HCC)  Assessment & Plan  - education provided earlier this admission  -ativan as needed    Ischemia of left lower extremity  Assessment & Plan  - vascular on case.. Bypass pending  - cont to follow      Leucocytosis  Assessment & Plan  - stress induced?he may have aspirated in last 2 days    - cont to monitor    Alcohol withdrawal (CMS-HCC)  Assessment & Plan  - CIWA  - banana bag iv  - thiamin and B12 and folate po  - tele    Hypothyroidism  Assessment & Plan  - stable and cont home meds     Falls frequently  Assessment & Plan  -pt/ot    Hyponatremia  Assessment & Plan  - chronic ETOH  - volume depletion improved  - ivf stopped due to congestion     salt tabs via cortrack    Smoking  Assessment & Plan  -cessation education provided earlier    Encephalopathy acute  Assessment &  Plan  unsafe to eat for now         tube feeds via cortrack    Restraint for safety    Swallow therapy consult    HTN (hypertension), malignant  Assessment & Plan  Due to etoh w/d         lopressor 50mg po bid      clonidine 0.1 mg po bid    Aspiration pneumonia (HCC)  Assessment & Plan   iv unasyn day #2/7         Check am cbc, bmp Labs reviewed, Medications reviewed and Radiology images reviewed  Barrera catheter: No Barrera      DVT Prophylaxis: Heparin  DVT prophylaxis - mechanical: SCDs      Assessed for rehab: Patient was assess for and/or received rehabilitation services during this hospitalization

## 2017-04-13 NOTE — PROGRESS NOTES
Discontinued soft wrist restraints. Pt more alert and oriented, able to answer what his name is, where he is, the year and the President accurately.  Pt vocalizing understanding of not getting out of bed without calling first and to not pull on lines.  Both bed and strip alarm are on.

## 2017-04-13 NOTE — CARE PLAN
Problem: Safety  Goal: Will remain free from injury  Intervention: Provide assistance with mobility  Pt mobility assessed at beginning of shift, pt on bedrest for unstable condition. Requiring 2 assist for repositioning. Fall precautions in place, non-slip socks on, bed in lowest, locked position, pt is in soft wrist restraints to prohibit pt from pulling out cortrak and IV line (pt already pulled out cortrak once during day shift). A&OX2 (only to self and place).             Problem: Skin Integrity  Goal: Risk for impaired skin integrity will decrease  Intervention: Assess risk factors for impaired skin integrity and/or pressure ulcers  Q2 turns, pt turns self side to side as well, moisture and barrier cream in use.  Floating heels.

## 2017-04-13 NOTE — PROGRESS NOTES
PT unable to follow commands to keep left leg angiogram straight, see new restraint orders for leg, 2hrs temp.

## 2017-04-13 NOTE — PROGRESS NOTES
Pt resting in bed at this time, even visible chest rise, no apparent signs of distress, bed alarm on, call light in place, Q2 turns, bed in low and locked position.  Pt currently in soft wrist restraints.  Stopped tube feed for Fem Pop in AM.

## 2017-04-13 NOTE — OP REPORT
DATE OF SERVICE:  04/12/2017    SURGEON:  Joesph Monroy MD    PROCEDURE:  Aortogram and bilateral lower extremity runoff.    ANESTHESIA:  Local.    CONTRAST USED:  92 mL.    PREOPERATIVE DIAGNOSIS:  Ischemic left leg.    POSTOPERATIVE DIAGNOSIS:  Ischemic left leg.    SUMMARY:  This 71-year-old alcoholic male is in the hospital with gangrene of   the left forefoot.  He at this time is scheduled for angiography for   evaluation after arterial duplex showed significant PVD.    DESCRIPTION OF PROCEDURE:  The patient was taken to the angiographic suite and   placed supine on the table.  Groins were shaved, prepped and draped in   appropriate fashion.  Local was instilled overlying the right femoral pulse   and a nick made with an 11 blade.  Vessel was punctured without incident with   a guidewire inserted and a 5-Uzbek sheath inserted through this.    A 0.035 J wire was passed into the infrarenal aorta and over this an Omni   flush catheter.    An aortoiliac angiogram was obtained.    Catheter was then brought down for a runoff study.    Angio-Seal was then successfully deployed.    FINDINGS:  The patient is noted to have mild bilateral renal artery disease.    The aortoiliac segment is patent with mild plaquing.    The right lower extremity, the patient has a patent but diseased common   femoral artery and the profunda femoris vessel is patent with multiple   collaterals.    The right SFA is occluded throughout its course with reconstitution above the   knee popliteal artery, which shows mild to moderate disease, which continues   into the below knee popliteal artery and 3-vessel runoff.    Left lower extremity, the patient is noted to have disease in the common   femoral artery and occlusion of the left profunda femoris vessel.    There is disease throughout and a patent superficial femoral artery extending   into the popliteal artery above and below the knee with 3-vessel runoff with a   disease in those vessels  as well.    IMPRESSION:  1.  Mild aortoiliac occlusive disease.  2.  Occluded right SFA with reconstitution of the above-the-knee popliteal   artery.  3.  Occluded left profunda femoris vessel.  4.  Diffuse disease throughout left SFA.  5.  Patent below knee popliteal arteries bilaterally.  6.  Diseased 3-vessel runoff bilaterally.       ____________________________________     MD NICOLE DU / LAMIN    DD:  04/12/2017 15:51:01  DT:  04/12/2017 18:10:41    D#:  [id number]  Job#:  [id number]

## 2017-04-13 NOTE — PROGRESS NOTES
Cortrak Placement    Tube Team verified patient name and medical record number prior to tube placement.  Cortrak tube (43 inches, 10 Citizen of the [place]) placed at 85 cm in right nare.  Per Cortrak picture, tube appears to be in the small bowel. Bridle securement in place.   Nursing Instructions: Awaiting KUB to confirm placement before use for medications or feeding. Once placement confirmed, flush tube with 30 ml of water, and then remove and save stylet, in patient medication drawer.

## 2017-04-13 NOTE — DISCHARGE PLANNING
Care Transition Team Assessment    Pt sister was present during screening. Pt currently has a walker but the brake system is broken. Sister stated that medicare will only pay for a new one after five years and his is only 2 years old. Sister stated that they are unsure of discharge plans or equipment needed upon discharge. Communicated to SW.     Information Source  Orientation : Oriented x 4  Information Given By: Relative  Informant's Name: Sister Laury  Who is responsible for making decisions for patient? : Patient    Elopement Risk  Legal Hold: No  Ambulatory or Self Mobile in Wheelchair: No-Not an Elopement Risk  Disoriented: Time-At Risk for Elopement  Psychiatric Symptoms: None  History of Wandering: No  Elopement this Admit: No  Vocalizing Wanting to Leave: No  Displays Behaviors, Body Language Wanting to Leave: No-Not at Risk for Elopement  Elopement Risk: Not at Risk for Elopement    Interdisciplinary Discharge Planning  Primary Care Physician:  unsure  Lives with - Patient's Self Care Capacity:  (Sister)  Patient or legal guardian wants to designate a caregiver (see row info): No  Support Systems:  (Sister)  Housing / Facility: 1 Story Apartment / Condo  Do You Take your Prescribed Medications Regularly: Yes  Able to Return to Previous ADL's: Yes  Mobility Issues: Yes  Prior Services: Continuous (24 Hour) Care Giving Family ([facility] M-F Mornings)  Patient Expects to be Discharged to:: Uncertain - pending  Assistance Needed: Unknown at this Time  Durable Medical Equipment: Walker  DME Provider / Phone: Unsure, pt walker brake system is broken and Medicare rambo lnot pay for another for 5 years    Discharge Preparedness  What is your plan after discharge?: Uncertain - pending medical team collaboration  What are your discharge supports?: Sibling  Prior Functional Level: Independent with Activities of Daily Living, Independent with Medication Management, Uses Walker  Difficulity with ADLs:  Walking  Difficulty with ADLs Comment: Uses walker  Difficulity with IADLs: Cooking, Driving, Laundry, Shopping  Difficulity with IADL Comments: Pt caregivers and sister assists    Functional Assesment  Prior Functional Level: Independent with Activities of Daily Living, Independent with Medication Management, Uses Walker    Finances  Financial Barriers to Discharge: No  Prescription Coverage: Yes ([facility] [address])                   Domestic Abuse  Have you ever been the victim of abuse or violence?: No    Psychological Assessment  History of Substance Abuse: Alcohol  Date Last Used - Alcohol: Day of admission  Substance Abuse Comments: MD had to detox patient prior to surgery - does not wish to quit    Discharge Risks or Barriers  Discharge risks or barriers?: No    Anticipated Discharge Information  Anticipated discharge disposition:  (Unknown at this time)  Discharge Contact Phone Number: [phone]

## 2017-04-14 ENCOUNTER — APPOINTMENT (OUTPATIENT)
Dept: RADIOLOGY | Facility: MEDICAL CENTER | Age: 72
DRG: 252 | End: 2017-04-14
Attending: HOSPITALIST
Payer: MEDICARE

## 2017-04-14 LAB
ANION GAP SERPL CALC-SCNC: 12 MMOL/L (ref 0–11.9)
BASOPHILS # BLD AUTO: 2.4 % (ref 0–1.8)
BASOPHILS # BLD: 0.29 K/UL (ref 0–0.12)
BUN SERPL-MCNC: 14 MG/DL (ref 8–22)
CALCIUM SERPL-MCNC: 8.4 MG/DL (ref 8.5–10.5)
CHLORIDE SERPL-SCNC: 103 MMOL/L (ref 96–112)
CO2 SERPL-SCNC: 18 MMOL/L (ref 20–33)
CREAT SERPL-MCNC: 0.65 MG/DL (ref 0.5–1.4)
EOSINOPHIL # BLD AUTO: 0.61 K/UL (ref 0–0.51)
EOSINOPHIL NFR BLD: 5 % (ref 0–6.9)
ERYTHROCYTE [DISTWIDTH] IN BLOOD BY AUTOMATED COUNT: 50.8 FL (ref 35.9–50)
GFR SERPL CREATININE-BSD FRML MDRD: >60 ML/MIN/1.73 M 2
GLUCOSE SERPL-MCNC: 71 MG/DL (ref 65–99)
HCT VFR BLD AUTO: 48.7 % (ref 42–52)
HGB BLD-MCNC: 14.8 G/DL (ref 14–18)
IMM GRANULOCYTES # BLD AUTO: 0.08 K/UL (ref 0–0.11)
IMM GRANULOCYTES NFR BLD AUTO: 0.7 % (ref 0–0.9)
LYMPHOCYTES # BLD AUTO: 1.03 K/UL (ref 1–4.8)
LYMPHOCYTES NFR BLD: 8.5 % (ref 22–41)
MAGNESIUM SERPL-MCNC: 1.7 MG/DL (ref 1.5–2.5)
MCH RBC QN AUTO: 25.2 PG (ref 27–33)
MCHC RBC AUTO-ENTMCNC: 30.4 G/DL (ref 33.7–35.3)
MCV RBC AUTO: 82.8 FL (ref 81.4–97.8)
MONOCYTES # BLD AUTO: 0.88 K/UL (ref 0–0.85)
MONOCYTES NFR BLD AUTO: 7.3 % (ref 0–13.4)
NEUTROPHILS # BLD AUTO: 9.22 K/UL (ref 1.82–7.42)
NEUTROPHILS NFR BLD: 76.1 % (ref 44–72)
NRBC # BLD AUTO: 0 K/UL
NRBC BLD AUTO-RTO: 0 /100 WBC
PHOSPHATE SERPL-MCNC: 2.7 MG/DL (ref 2.5–4.5)
PLATELET # BLD AUTO: 502 K/UL (ref 164–446)
PMV BLD AUTO: 9.2 FL (ref 9–12.9)
POTASSIUM SERPL-SCNC: 4.1 MMOL/L (ref 3.6–5.5)
RBC # BLD AUTO: 5.88 M/UL (ref 4.7–6.1)
SODIUM SERPL-SCNC: 133 MMOL/L (ref 135–145)
WBC # BLD AUTO: 12.1 K/UL (ref 4.8–10.8)

## 2017-04-14 PROCEDURE — 700102 HCHG RX REV CODE 250 W/ 637 OVERRIDE(OP): Performed by: HOSPITALIST

## 2017-04-14 PROCEDURE — 85025 COMPLETE CBC W/AUTO DIFF WBC: CPT

## 2017-04-14 PROCEDURE — 92610 EVALUATE SWALLOWING FUNCTION: CPT

## 2017-04-14 PROCEDURE — A9270 NON-COVERED ITEM OR SERVICE: HCPCS | Performed by: INTERNAL MEDICINE

## 2017-04-14 PROCEDURE — 99232 SBSQ HOSP IP/OBS MODERATE 35: CPT | Performed by: HOSPITALIST

## 2017-04-14 PROCEDURE — 84100 ASSAY OF PHOSPHORUS: CPT

## 2017-04-14 PROCEDURE — 94760 N-INVAS EAR/PLS OXIMETRY 1: CPT

## 2017-04-14 PROCEDURE — 83735 ASSAY OF MAGNESIUM: CPT

## 2017-04-14 PROCEDURE — A9270 NON-COVERED ITEM OR SERVICE: HCPCS | Performed by: HOSPITALIST

## 2017-04-14 PROCEDURE — 770020 HCHG ROOM/CARE - TELE (206)

## 2017-04-14 PROCEDURE — G8997 SWALLOW GOAL STATUS: HCPCS | Mod: CI

## 2017-04-14 PROCEDURE — 700105 HCHG RX REV CODE 258: Performed by: HOSPITALIST

## 2017-04-14 PROCEDURE — 700111 HCHG RX REV CODE 636 W/ 250 OVERRIDE (IP): Performed by: HOSPITALIST

## 2017-04-14 PROCEDURE — 36415 COLL VENOUS BLD VENIPUNCTURE: CPT

## 2017-04-14 PROCEDURE — 97116 GAIT TRAINING THERAPY: CPT

## 2017-04-14 PROCEDURE — 80048 BASIC METABOLIC PNL TOTAL CA: CPT

## 2017-04-14 PROCEDURE — 700102 HCHG RX REV CODE 250 W/ 637 OVERRIDE(OP): Performed by: INTERNAL MEDICINE

## 2017-04-14 PROCEDURE — G8996 SWALLOW CURRENT STATUS: HCPCS | Mod: CJ

## 2017-04-14 RX ADMIN — SODIUM CHLORIDE TAB 1 GM 1 G: 1 TAB at 17:29

## 2017-04-14 RX ADMIN — THERA TABS 1 TABLET: TAB at 13:10

## 2017-04-14 RX ADMIN — AMPICILLIN SODIUM AND SULBACTAM SODIUM 3 G: 2; 1 INJECTION, POWDER, FOR SOLUTION INTRAMUSCULAR; INTRAVENOUS at 17:29

## 2017-04-14 RX ADMIN — HEPARIN SODIUM 5000 UNITS: 5000 INJECTION, SOLUTION INTRAVENOUS; SUBCUTANEOUS at 14:00

## 2017-04-14 RX ADMIN — LORAZEPAM 0.5 MG: 0.5 TABLET ORAL at 00:24

## 2017-04-14 RX ADMIN — SODIUM CHLORIDE TAB 1 GM 1 G: 1 TAB at 13:09

## 2017-04-14 RX ADMIN — METOPROLOL TARTRATE 50 MG: 50 TABLET, FILM COATED ORAL at 13:09

## 2017-04-14 RX ADMIN — ATORVASTATIN CALCIUM 40 MG: 40 TABLET, FILM COATED ORAL at 20:21

## 2017-04-14 RX ADMIN — Medication 400 MG: at 13:09

## 2017-04-14 RX ADMIN — HEPARIN SODIUM 5000 UNITS: 5000 INJECTION, SOLUTION INTRAVENOUS; SUBCUTANEOUS at 20:21

## 2017-04-14 RX ADMIN — AMPICILLIN SODIUM AND SULBACTAM SODIUM 3 G: 2; 1 INJECTION, POWDER, FOR SOLUTION INTRAMUSCULAR; INTRAVENOUS at 06:40

## 2017-04-14 RX ADMIN — Medication 100 MG: at 13:09

## 2017-04-14 RX ADMIN — LORAZEPAM 0.5 MG: 0.5 TABLET ORAL at 20:22

## 2017-04-14 RX ADMIN — CLONIDINE HYDROCHLORIDE 0.1 MG: 0.1 TABLET ORAL at 13:10

## 2017-04-14 RX ADMIN — CLONIDINE HYDROCHLORIDE 0.1 MG: 0.1 TABLET ORAL at 20:21

## 2017-04-14 RX ADMIN — OXYCODONE HYDROCHLORIDE 10 MG: 10 TABLET ORAL at 17:30

## 2017-04-14 RX ADMIN — STANDARDIZED SENNA CONCENTRATE AND DOCUSATE SODIUM 2 TABLET: 8.6; 5 TABLET, FILM COATED ORAL at 20:21

## 2017-04-14 RX ADMIN — FOLIC ACID 1 MG: 1 TABLET ORAL at 13:10

## 2017-04-14 RX ADMIN — LEVOTHYROXINE SODIUM 75 MCG: 75 TABLET ORAL at 07:00

## 2017-04-14 RX ADMIN — AMPICILLIN SODIUM AND SULBACTAM SODIUM 3 G: 2; 1 INJECTION, POWDER, FOR SOLUTION INTRAMUSCULAR; INTRAVENOUS at 11:36

## 2017-04-14 RX ADMIN — METOPROLOL TARTRATE 50 MG: 50 TABLET, FILM COATED ORAL at 20:21

## 2017-04-14 RX ADMIN — NICOTINE 14 MG: 14 PATCH, EXTENDED RELEASE TRANSDERMAL at 05:53

## 2017-04-14 RX ADMIN — OXYCODONE HYDROCHLORIDE 5 MG: 5 TABLET ORAL at 13:09

## 2017-04-14 RX ADMIN — AMPICILLIN SODIUM AND SULBACTAM SODIUM 3 G: 2; 1 INJECTION, POWDER, FOR SOLUTION INTRAMUSCULAR; INTRAVENOUS at 00:24

## 2017-04-14 RX ADMIN — ASPIRIN 325 MG: 325 TABLET, DELAYED RELEASE ORAL at 13:10

## 2017-04-14 ASSESSMENT — LIFESTYLE VARIABLES
NAUSEA AND VOMITING: NO NAUSEA AND NO VOMITING
ORIENTATION AND CLOUDING OF SENSORIUM: DATE DISORIENTATION BY MORE THAN TWO CALENDAR DAYS
VISUAL DISTURBANCES: NOT PRESENT
AGITATION: SOMEWHAT MORE THAN NORMAL ACTIVITY
HEADACHE, FULLNESS IN HEAD: NOT PRESENT
HEADACHE, FULLNESS IN HEAD: NOT PRESENT
TOTAL SCORE: 5
NAUSEA AND VOMITING: NO NAUSEA AND NO VOMITING
ORIENTATION AND CLOUDING OF SENSORIUM: DATE DISORIENTATION BY MORE THAN TWO CALENDAR DAYS
ANXIETY: MILDLY ANXIOUS
NAUSEA AND VOMITING: NO NAUSEA AND NO VOMITING
AUDITORY DISTURBANCES: NOT PRESENT
PAROXYSMAL SWEATS: NO SWEAT VISIBLE
HEADACHE, FULLNESS IN HEAD: NOT PRESENT
NAUSEA AND VOMITING: NO NAUSEA AND NO VOMITING
TREMOR: NO TREMOR
VISUAL DISTURBANCES: NOT PRESENT
TREMOR: NO TREMOR
ANXIETY: NO ANXIETY (AT EASE)
PAROXYSMAL SWEATS: NO SWEAT VISIBLE
AUDITORY DISTURBANCES: NOT PRESENT
AGITATION: NORMAL ACTIVITY
ORIENTATION AND CLOUDING OF SENSORIUM: CANNOT DO SERIAL ADDITIONS OR IS UNCERTAIN ABOUT DATE
TOTAL SCORE: 3
PAROXYSMAL SWEATS: NO SWEAT VISIBLE
AUDITORY DISTURBANCES: NOT PRESENT
TOTAL SCORE: 1
TREMOR: NO TREMOR
AGITATION: NORMAL ACTIVITY
VISUAL DISTURBANCES: NOT PRESENT
ORIENTATION AND CLOUDING OF SENSORIUM: DATE DISORIENTATION BY MORE THAN TWO CALENDAR DAYS
AUDITORY DISTURBANCES: NOT PRESENT
ANXIETY: NO ANXIETY (AT EASE)
TOTAL SCORE: 5
DO YOU DRINK ALCOHOL: YES
VISUAL DISTURBANCES: NOT PRESENT
HEADACHE, FULLNESS IN HEAD: NOT PRESENT
PAROXYSMAL SWEATS: NO SWEAT VISIBLE
ANXIETY: MILDLY ANXIOUS
AGITATION: SOMEWHAT MORE THAN NORMAL ACTIVITY
TREMOR: NO TREMOR

## 2017-04-14 ASSESSMENT — GAIT ASSESSMENTS
ASSISTIVE DEVICE: 4 WHEEL WALKER
GAIT LEVEL OF ASSIST: MINIMAL ASSIST
DISTANCE (FEET): 3

## 2017-04-14 ASSESSMENT — ENCOUNTER SYMPTOMS
TINGLING: 0
HEARTBURN: 0
NAUSEA: 0
CHILLS: 0
ABDOMINAL PAIN: 0
MYALGIAS: 1
HEMOPTYSIS: 0
WEIGHT LOSS: 0
DOUBLE VISION: 0
DIZZINESS: 0
COUGH: 0
WEAKNESS: 1
ORTHOPNEA: 0
SPEECH CHANGE: 0
HEADACHES: 0
BLURRED VISION: 0
PALPITATIONS: 0
VOMITING: 0

## 2017-04-14 ASSESSMENT — PAIN SCALES - GENERAL
PAINLEVEL_OUTOF10: 0
PAINLEVEL_OUTOF10: 7
PAINLEVEL_OUTOF10: 5
PAINLEVEL_OUTOF10: 4
PAINLEVEL_OUTOF10: 0
PAINLEVEL_OUTOF10: 7
PAINLEVEL_OUTOF10: 0
PAINLEVEL_OUTOF10: 7
PAINLEVEL_OUTOF10: 7

## 2017-04-14 NOTE — PROGRESS NOTES
Patient pulled out second coretrack.  Per NP Den ok to leave out for now since patient is NPO status for possible surgery today.

## 2017-04-14 NOTE — PROGRESS NOTES
Cortrak Placement    Tube Team verified patient name and medical record number prior to tube placement.  Cortrak tube (43 inches, 10 Estonian) placed at 65 cm in right nare.  Per Cortrak picture, tube appears to be in the stomach.  Nursing Instructions: Awaiting ABD Xray to confirm placement before use for medications or feeding. Once placement confirmed, flush tube with 30 ml of water, and then remove and save stylet, in patient medication drawer.          Pt tolerated procedure well. Cortrak Secured with bridle.

## 2017-04-14 NOTE — CARE PLAN
Problem: Skin Integrity  Goal: Risk for impaired skin integrity will decrease  Intervention: Implement precautions to protect skin integrity in collaboration with the interdisciplinary team  Repositioning at least every two hours.  Heel protectors on.  Mepelex to sacrum.  Bathed and placed new linens/gown.

## 2017-04-14 NOTE — PROGRESS NOTES
Surgical Progress Note    Author: Joesph Monroy Date & Time created: 2017   5:21 AM     Interval Events:  Surgery cancelled yesterday afternoon after family unbelievably fed pt immediately before call to OR.  Schedule tight today and might not be able to re schedule for today  ROS  Hemodynamics:  Temp (24hrs), Av.7 °C (98.1 °F), Min:36.1 °C (97 °F), Max:37.1 °C (98.8 °F)  Temperature: 36.1 °C (97 °F)  Pulse  Av.7  Min: 55  Max: 118   Blood Pressure : 136/69 mmHg     Respiratory:    Respiration: 18, Pulse Oximetry: 93 %     Work Of Breathing / Effort: Mild  RUL Breath Sounds: Diminished, RML Breath Sounds: Diminished, RLL Breath Sounds: Diminished, RIGO Breath Sounds: Diminished, LLL Breath Sounds: Diminished  Neuro:  GCS       Fluids:    Intake/Output Summary (Last 24 hours) at 17 0521  Last data filed at 17 0400   Gross per 24 hour   Intake    480 ml   Output   1000 ml   Net   -520 ml     Weight: 58.2 kg (128 lb 4.9 oz)  Current Diet Order   Procedures   • DIET NPO     Physical Exam  Labs:  No results found for this or any previous visit (from the past 24 hour(s)).  Medical Decision Making, by Problem:  Active Hospital Problems    Diagnosis   • Leucocytosis [D72.829]     Priority: High   • Alcohol withdrawal (CMS-HCC) [F10.239]     Priority: High   • Alcohol dependence (CMS-HCC) [F10.20]     Priority: High   • Ischemia of left lower extremity [I99.8]     Priority: High   • Aspiration pneumonia (HCC) [J69.0]   • Encephalopathy acute [G93.40]   • HTN (hypertension), malignant [I10]   • Hyponatremia [E87.1]   • Smoking [F17.200]   • Falls frequently [R29.6]   • Hypothyroidism [E03.9]     Plan:  Will re schedule OR    Quality Measures:  Core Measures

## 2017-04-14 NOTE — PROGRESS NOTES
Patient sister-in-law refused to sign consent for patient Fem pop until she talks to Dr. Monroy about procedure. Dr. Monroy called and states he will meet her at the OR waiting room when patient is transported down around 1430. Pt sister- in -law (Sejal) agrees.

## 2017-04-14 NOTE — DISCHARGE PLANNING
Thank you for the opportunity to assist with this patient as they transition to post acute services.  We are aware of the Rehab referral from Dr. Hill.  Dr. Leone from the [facility] group to consult this referral.  Our Transitional Case Coordinator will follow.  At this time patient is showing to have  Medicare as their coverage.   Please do not hesitate to call us if you require additional assistance my phone number is [phone] Hitesh.

## 2017-04-14 NOTE — THERAPY
"Speech Language Therapy Clinical Swallow Evaluation completed.  Functional Status: The patient was seen for clinical swallow evaluation this date. The patient was awake, alert but confused. The patient was able to follow oral motor directives with generalized weakness noted. The patient was given PO trials of ice chips, nectars, purees, thin liquids and soft solids. The patient presented with overt and immediate s/s concerning for aspiration on ice chips and thin liquids. Nectar thick liquids, purees and single consistency soft solids resulted in no overt s/s of aspiration when provided min A for bite/sip size and cues to complete double swallow. At this time, patient appears at a level to begin a modified PO diet of D2/NTL with standby feeding assistance to ensure tolerance and f/t with swallow strategies. Please hold PO with any difficulty..    Recommendations - Diet: Dysphagia II, Nectar Thick Liquid                          Strategies: Direct supervision during meals and Head of Bed at 90 Degrees                          Medication Administration: Crush all Medications in Puree, Float Whole with Puree  Plan of Care: Will benefit from Speech Therapy 3 times per week    Post-Acute Therapy: Discharge to a transitional care facility for continued skilled therapy services.    See \"Rehab Therapy-Acute\" Patient Summary Report for complete documentation.   "

## 2017-04-14 NOTE — CARE PLAN
Problem: Safety  Goal: Will remain free from injury  Outcome: PROGRESSING AS EXPECTED  In bed with alarm on and call light in hand.  Verbalized understanding of the use of call light for assistance.

## 2017-04-14 NOTE — THERAPY
"Physical Therapy Treatment completed.   Bed Mobility:  Supine to Sit: Supervised  Transfers: Sit to Stand: Minimal Assist  Gait: Level Of Assist: Minimal Assist with 4-Wheel Walker     3 ft  Plan of Care: Will benefit from Physical Therapy 3 times per week  Discharge Recommendations: Equipment: Will Continue to Assess for Equipment Needs.       See \"Rehab Therapy-Acute\" Patient Summary Report for complete documentation.       "

## 2017-04-14 NOTE — DISCHARGE PLANNING
PMR from Dr. Hill      Acute encephalopathy dysphagia pneumonia ischemic left leg ongoing medical management as well a therapy intervention. Anticipate post acute services Physiaty consult ordered per protocol, Dr. Leone to consult.   Thank you for the opportunity to participate in this patients care as he prepares to transition to post acute services.

## 2017-04-14 NOTE — PROGRESS NOTES
Transport personnel reported patient was eating a peppermint felicity. Patient's sister-in-law states she gave it to him because she did not know that he cannot have anything to eat or drink. Educated patient again and sister-in-law about strictly having nothing to eat or drink via mouth for this procedure and also since 4/11 due to NG tube feeding. Patient and sister-in-law states understanding. Per Dr. Monroy cancelled procedure for today and will be re-scheduled tomorrow. Pt NPO at midnight (order in place). Will endorse to RN night shift. Patient and sister-inlaw aware.

## 2017-04-14 NOTE — CARE PLAN
Problem: Nutritional:  Goal: Achieve adequate nutritional intake  Patient will consume 50% of meals   Outcome: NOT MET  Diet just advanced to cardiac, dysphagia 2 w/NTL - no melas have been consumed at this time   Goal: Nutrition support tolerated and meeting greater than 85% of estimated needs  Outcome: MET Date Met:  04/14/17  TF was at goal prior to pt pulling yassine.

## 2017-04-14 NOTE — PROGRESS NOTES
Patient comfortable at this time.  Alert/oriented x3.  VSS.  Remained NPO after midnight for possible surgery today.  12 hour chart check completed.

## 2017-04-14 NOTE — PROGRESS NOTES
Surgical Progress Note    Author: Joesph Monroy Date & Time created: 2017   11:14 AM     Interval Events:  Case bumped for emergency heart.  ROS  Hemodynamics:  Temp (24hrs), Av.6 °C (97.9 °F), Min:36.1 °C (97 °F), Max:37.1 °C (98.8 °F)  Temperature: 36.3 °C (97.4 °F)  Pulse  Av.4  Min: 55  Max: 118   Blood Pressure : 158/72 mmHg     Respiratory:    Respiration: 20, Pulse Oximetry: 93 %     Work Of Breathing / Effort: Mild  RUL Breath Sounds: Diminished, RML Breath Sounds: Diminished, RLL Breath Sounds: Diminished, RIGO Breath Sounds: Diminished, LLL Breath Sounds: Diminished  Neuro:  GCS       Fluids:    Intake/Output Summary (Last 24 hours) at 17 1114  Last data filed at 17 0600   Gross per 24 hour   Intake    640 ml   Output    700 ml   Net    -60 ml     Weight: 58.2 kg (128 lb 4.9 oz)  Current Diet Order   Procedures   • DIET NPO     Physical Exam  Labs:  Recent Results (from the past 24 hour(s))   CBC WITH DIFFERENTIAL    Collection Time: 17  5:25 AM   Result Value Ref Range    WBC 12.1 (H) 4.8 - 10.8 K/uL    RBC 5.88 4.70 - 6.10 M/uL    Hemoglobin 14.8 14.0 - 18.0 g/dL    Hematocrit 48.7 42.0 - 52.0 %    MCV 82.8 81.4 - 97.8 fL    MCH 25.2 (L) 27.0 - 33.0 pg    MCHC 30.4 (L) 33.7 - 35.3 g/dL    RDW 50.8 (H) 35.9 - 50.0 fL    Platelet Count 502 (H) 164 - 446 K/uL    MPV 9.2 9.0 - 12.9 fL    Neutrophils-Polys 76.10 (H) 44.00 - 72.00 %    Lymphocytes 8.50 (L) 22.00 - 41.00 %    Monocytes 7.30 0.00 - 13.40 %    Eosinophils 5.00 0.00 - 6.90 %    Basophils 2.40 (H) 0.00 - 1.80 %    Immature Granulocytes 0.70 0.00 - 0.90 %    Nucleated RBC 0.00 /100 WBC    Neutrophils (Absolute) 9.22 (H) 1.82 - 7.42 K/uL    Lymphs (Absolute) 1.03 1.00 - 4.80 K/uL    Monos (Absolute) 0.88 (H) 0.00 - 0.85 K/uL    Eos (Absolute) 0.61 (H) 0.00 - 0.51 K/uL    Baso (Absolute) 0.29 (H) 0.00 - 0.12 K/uL    Immature Granulocytes (abs) 0.08 0.00 - 0.11 K/uL    NRBC (Absolute) 0.00 K/uL   MAGNESIUM     Collection Time: 04/14/17  5:25 AM   Result Value Ref Range    Magnesium 1.7 1.5 - 2.5 mg/dL   PHOSPHORUS    Collection Time: 04/14/17  5:25 AM   Result Value Ref Range    Phosphorus 2.7 2.5 - 4.5 mg/dL   BASIC METABOLIC PANEL    Collection Time: 04/14/17  5:25 AM   Result Value Ref Range    Sodium 133 (L) 135 - 145 mmol/L    Potassium 4.1 3.6 - 5.5 mmol/L    Chloride 103 96 - 112 mmol/L    Co2 18 (L) 20 - 33 mmol/L    Glucose 71 65 - 99 mg/dL    Bun 14 8 - 22 mg/dL    Creatinine 0.65 0.50 - 1.40 mg/dL    Calcium 8.4 (L) 8.5 - 10.5 mg/dL    Anion Gap 12.0 (H) 0.0 - 11.9   ESTIMATED GFR    Collection Time: 04/14/17  5:25 AM   Result Value Ref Range    GFR If African American >60 >60 mL/min/1.73 m 2    GFR If Non African American >60 >60 mL/min/1.73 m 2     Medical Decision Making, by Problem:  Active Hospital Problems    Diagnosis   • Leucocytosis [D72.829]     Priority: High   • Alcohol withdrawal (CMS-HCC) [F10.239]     Priority: High   • Alcohol dependence (CMS-HCC) [F10.20]     Priority: High   • Ischemia of left lower extremity [I99.8]     Priority: High   • Aspiration pneumonia (HCC) [J69.0]   • Encephalopathy acute [G93.40]   • HTN (hypertension), malignant [I10]   • Hyponatremia [E87.1]   • Smoking [F17.200]   • Falls frequently [R29.6]   • Hypothyroidism [E03.9]     Plan:  Will reschedule    Quality Measures:  Core Measures

## 2017-04-14 NOTE — PROGRESS NOTES
Hospital Medicine Interval Note  Date of Service:  4/14/2017    Chief Complaint  71 y.o.-year-old male admitted 4/8/2017 with ETOH withdraw and ischemic leg    Interval Problem Update      More awake    weak    ..jitendrack ipulled out--> he passed swallow eval    He may of aspirated in 3 days ago    bp is better      Wbc12    Sodium 133      Noted slight --> increase anion gap    Consultants/Specialty  Vascular surgery    Disposition  tele     Review of Systems   Constitutional: Positive for malaise/fatigue. Negative for chills and weight loss.   HENT: Negative for hearing loss.    Eyes: Negative for blurred vision and double vision.   Respiratory: Negative for cough and hemoptysis.    Cardiovascular: Negative for chest pain, palpitations and orthopnea.   Gastrointestinal: Negative for heartburn, nausea, vomiting and abdominal pain.   Genitourinary: Negative for dysuria, urgency, frequency and hematuria.   Musculoskeletal: Positive for myalgias.   Neurological: Positive for weakness. Negative for dizziness, tingling, speech change and headaches.   All other systems reviewed and are negative.     Physical Exam Laboratory/Imaging   Filed Vitals:    04/14/17 0000 04/14/17 0400 04/14/17 0800 04/14/17 1223   BP: 149/80 136/69 158/72    Pulse: 69 70 73 76   Temp: 37.1 °C (98.8 °F) 36.1 °C (97 °F) 36.3 °C (97.4 °F)    Resp: 17 18 20 20   Height:       Weight:       SpO2: 96% 93% 93% 98%     Physical Exam   Constitutional: He appears well-developed and well-nourished. No distress.   HENT:   Head: Normocephalic.   Eyes: EOM are normal. Pupils are equal, round, and reactive to light.   Neck: Normal range of motion. Neck supple. No JVD present. No thyromegaly present.   Cardiovascular: Normal rate, regular rhythm and normal heart sounds.  Exam reveals no gallop and no friction rub.    No murmur heard.  Pulmonary/Chest: Effort normal and breath sounds normal. No respiratory distress. He has no wheezes. He has no rales. He  exhibits no tenderness.   Mod b/l rhonchi   Abdominal: Soft. Bowel sounds are normal. He exhibits no distension and no mass. There is no tenderness. There is no rebound and no guarding.   Musculoskeletal: Normal range of motion. He exhibits no edema.   Lymphadenopathy:     He has no cervical adenopathy.   Neurological: He is alert. He displays normal reflexes. No cranial nerve deficit.   Awake    Oriented to person and place   Skin: Skin is dry. No rash noted. He is not diaphoretic. There is erythema (left foot necrotic changes ).   Psychiatric: He has a normal mood and affect.   Nursing note and vitals reviewed.   Lab Results   Component Value Date/Time    WBC 12.1* 04/14/2017 05:25 AM    HEMOGLOBIN 14.8 04/14/2017 05:25 AM    HEMATOCRIT 48.7 04/14/2017 05:25 AM    PLATELET COUNT 502* 04/14/2017 05:25 AM     Lab Results   Component Value Date/Time    SODIUM 133* 04/14/2017 05:25 AM    POTASSIUM 4.1 04/14/2017 05:25 AM    CHLORIDE 103 04/14/2017 05:25 AM    CO2 18* 04/14/2017 05:25 AM    GLUCOSE 71 04/14/2017 05:25 AM    BUN 14 04/14/2017 05:25 AM    CREATININE 0.65 04/14/2017 05:25 AM      Assessment/Plan    Alcohol dependence (CMS-HCC)  Assessment & Plan  - education provided earlier this admission  -ativan as needed    Ischemia of left lower extremity  Assessment & Plan  - vascular on case.. Bypass pending  - cont to follow      Leucocytosis  Assessment & Plan  - stress induced?he may have aspirated in last 3 days    - cont to monitor    Alcohol withdrawal (CMS-HCC)  Assessment & Plan  - CIWA    - thiamin and B12 and folate po  - tele    Hypothyroidism  Assessment & Plan  - stable and cont home meds     Falls frequently  Assessment & Plan  -pt/ot    Hyponatremia  Assessment & Plan  - chronic ETOH  - volume depletion improved  - ivf stopped due to congestion     salt tabs po    Smoking  Assessment & Plan  -cessation education provided earlier    Encephalopathy acute  Assessment & Plan  Now safe to eat      Diet as  per speech            HTN (hypertension), malignant  Assessment & Plan  Due to etoh w/d         lopressor 50mg po bid      clonidine 0.1 mg po bid    Aspiration pneumonia (HCC)  Assessment & Plan   iv unasyn day #3/7         Check am cbc, bmp Labs reviewed, Medications reviewed and Radiology images reviewed  Barrera catheter: No Barrera      DVT Prophylaxis: Heparin  DVT prophylaxis - mechanical: SCDs      Assessed for rehab: Patient was assess for and/or received rehabilitation services during this hospitalization

## 2017-04-14 NOTE — PROGRESS NOTES
Pt A&Ox3 - unable to state day of the week. Bedside swallow eval completed, pt unable to swallow without coughing, held morning meds - swallow eval ordered and pending. Pt to remain NPO for possible surgical procedure today. Safety precautions in place, hourly rounding in place.

## 2017-04-15 ENCOUNTER — HOSPITAL ENCOUNTER (INPATIENT)
Facility: REHABILITATION | Age: 72
End: 2017-04-15
Attending: PHYSICAL MEDICINE & REHABILITATION | Admitting: PHYSICAL MEDICINE & REHABILITATION
Payer: MEDICARE

## 2017-04-15 LAB
MAGNESIUM SERPL-MCNC: 1.6 MG/DL (ref 1.5–2.5)
PHOSPHATE SERPL-MCNC: 2.6 MG/DL (ref 2.5–4.5)

## 2017-04-15 PROCEDURE — 700111 HCHG RX REV CODE 636 W/ 250 OVERRIDE (IP): Performed by: HOSPITALIST

## 2017-04-15 PROCEDURE — 700105 HCHG RX REV CODE 258: Performed by: HOSPITALIST

## 2017-04-15 PROCEDURE — 700102 HCHG RX REV CODE 250 W/ 637 OVERRIDE(OP): Performed by: HOSPITALIST

## 2017-04-15 PROCEDURE — 84100 ASSAY OF PHOSPHORUS: CPT

## 2017-04-15 PROCEDURE — 770020 HCHG ROOM/CARE - TELE (206)

## 2017-04-15 PROCEDURE — A9270 NON-COVERED ITEM OR SERVICE: HCPCS | Performed by: HOSPITALIST

## 2017-04-15 PROCEDURE — 99232 SBSQ HOSP IP/OBS MODERATE 35: CPT | Performed by: HOSPITALIST

## 2017-04-15 PROCEDURE — 36415 COLL VENOUS BLD VENIPUNCTURE: CPT

## 2017-04-15 PROCEDURE — 83735 ASSAY OF MAGNESIUM: CPT

## 2017-04-15 RX ORDER — FLUCONAZOLE 100 MG/1
100 TABLET ORAL DAILY
Status: DISCONTINUED | OUTPATIENT
Start: 2017-04-15 | End: 2017-04-19

## 2017-04-15 RX ADMIN — OXYCODONE HYDROCHLORIDE 10 MG: 10 TABLET ORAL at 16:16

## 2017-04-15 RX ADMIN — SODIUM CHLORIDE TAB 1 GM 1 G: 1 TAB at 11:26

## 2017-04-15 RX ADMIN — FLUCONAZOLE 100 MG: 100 TABLET ORAL at 13:06

## 2017-04-15 RX ADMIN — METOPROLOL TARTRATE 50 MG: 50 TABLET, FILM COATED ORAL at 08:32

## 2017-04-15 RX ADMIN — HEPARIN SODIUM 5000 UNITS: 5000 INJECTION, SOLUTION INTRAVENOUS; SUBCUTANEOUS at 05:25

## 2017-04-15 RX ADMIN — LEVOTHYROXINE SODIUM 75 MCG: 75 TABLET ORAL at 05:25

## 2017-04-15 RX ADMIN — Medication 100 MG: at 08:32

## 2017-04-15 RX ADMIN — AMPICILLIN SODIUM AND SULBACTAM SODIUM 3 G: 2; 1 INJECTION, POWDER, FOR SOLUTION INTRAMUSCULAR; INTRAVENOUS at 17:06

## 2017-04-15 RX ADMIN — ATORVASTATIN CALCIUM 40 MG: 40 TABLET, FILM COATED ORAL at 22:48

## 2017-04-15 RX ADMIN — HEPARIN SODIUM 5000 UNITS: 5000 INJECTION, SOLUTION INTRAVENOUS; SUBCUTANEOUS at 22:48

## 2017-04-15 RX ADMIN — AMPICILLIN SODIUM AND SULBACTAM SODIUM 3 G: 2; 1 INJECTION, POWDER, FOR SOLUTION INTRAMUSCULAR; INTRAVENOUS at 11:26

## 2017-04-15 RX ADMIN — AMPICILLIN SODIUM AND SULBACTAM SODIUM 3 G: 2; 1 INJECTION, POWDER, FOR SOLUTION INTRAMUSCULAR; INTRAVENOUS at 05:25

## 2017-04-15 RX ADMIN — SODIUM CHLORIDE TAB 1 GM 1 G: 1 TAB at 08:32

## 2017-04-15 RX ADMIN — SODIUM CHLORIDE TAB 1 GM 1 G: 1 TAB at 16:16

## 2017-04-15 RX ADMIN — OXYCODONE HYDROCHLORIDE 10 MG: 10 TABLET ORAL at 22:48

## 2017-04-15 RX ADMIN — ASPIRIN 325 MG: 325 TABLET, DELAYED RELEASE ORAL at 08:32

## 2017-04-15 RX ADMIN — Medication 400 MG: at 08:33

## 2017-04-15 RX ADMIN — STANDARDIZED SENNA CONCENTRATE AND DOCUSATE SODIUM 2 TABLET: 8.6; 5 TABLET, FILM COATED ORAL at 08:32

## 2017-04-15 RX ADMIN — CLONIDINE HYDROCHLORIDE 0.1 MG: 0.1 TABLET ORAL at 08:32

## 2017-04-15 RX ADMIN — AMPICILLIN SODIUM AND SULBACTAM SODIUM 3 G: 2; 1 INJECTION, POWDER, FOR SOLUTION INTRAMUSCULAR; INTRAVENOUS at 00:12

## 2017-04-15 RX ADMIN — CLONIDINE HYDROCHLORIDE 0.1 MG: 0.1 TABLET ORAL at 22:48

## 2017-04-15 RX ADMIN — HEPARIN SODIUM 5000 UNITS: 5000 INJECTION, SOLUTION INTRAVENOUS; SUBCUTANEOUS at 13:06

## 2017-04-15 RX ADMIN — NICOTINE 14 MG: 14 PATCH, EXTENDED RELEASE TRANSDERMAL at 05:25

## 2017-04-15 RX ADMIN — METOPROLOL TARTRATE 50 MG: 50 TABLET, FILM COATED ORAL at 22:48

## 2017-04-15 ASSESSMENT — ENCOUNTER SYMPTOMS
HEADACHES: 0
DOUBLE VISION: 0
COUGH: 0
VOMITING: 0
TINGLING: 0
ORTHOPNEA: 0
WEAKNESS: 1
PALPITATIONS: 0
MYALGIAS: 1
BLURRED VISION: 0
ABDOMINAL PAIN: 0
WEIGHT LOSS: 0
HEARTBURN: 0
CHILLS: 0
SPEECH CHANGE: 0
NAUSEA: 0
HEMOPTYSIS: 0
DIZZINESS: 0

## 2017-04-15 ASSESSMENT — LIFESTYLE VARIABLES
AUDITORY DISTURBANCES: NOT PRESENT
AUDITORY DISTURBANCES: NOT PRESENT
VISUAL DISTURBANCES: NOT PRESENT
ORIENTATION AND CLOUDING OF SENSORIUM: DATE DISORIENTATION BY MORE THAN TWO CALENDAR DAYS
PAROXYSMAL SWEATS: NO SWEAT VISIBLE
ANXIETY: NO ANXIETY (AT EASE)
NAUSEA AND VOMITING: NO NAUSEA AND NO VOMITING
NAUSEA AND VOMITING: NO NAUSEA AND NO VOMITING
ORIENTATION AND CLOUDING OF SENSORIUM: DATE DISORIENTATION BY MORE THAN TWO CALENDAR DAYS
TOTAL SCORE: 0
VISUAL DISTURBANCES: NOT PRESENT
TREMOR: NO TREMOR
AGITATION: NORMAL ACTIVITY
VISUAL DISTURBANCES: NOT PRESENT
AGITATION: NORMAL ACTIVITY
AGITATION: NORMAL ACTIVITY
AUDITORY DISTURBANCES: NOT PRESENT
TOTAL SCORE: 3
HEADACHE, FULLNESS IN HEAD: NOT PRESENT
HEADACHE, FULLNESS IN HEAD: NOT PRESENT
ANXIETY: NO ANXIETY (AT EASE)
NAUSEA AND VOMITING: NO NAUSEA AND NO VOMITING
HEADACHE, FULLNESS IN HEAD: NOT PRESENT
ANXIETY: NO ANXIETY (AT EASE)
NAUSEA AND VOMITING: NO NAUSEA AND NO VOMITING
ORIENTATION AND CLOUDING OF SENSORIUM: ORIENTED AND CAN DO SERIAL ADDITIONS
AUDITORY DISTURBANCES: NOT PRESENT
PAROXYSMAL SWEATS: NO SWEAT VISIBLE
PAROXYSMAL SWEATS: NO SWEAT VISIBLE
HEADACHE, FULLNESS IN HEAD: NOT PRESENT
TREMOR: NO TREMOR
ORIENTATION AND CLOUDING OF SENSORIUM: ORIENTED AND CAN DO SERIAL ADDITIONS
TOTAL SCORE: 0
AGITATION: NORMAL ACTIVITY
TREMOR: NO TREMOR
PAROXYSMAL SWEATS: NO SWEAT VISIBLE
VISUAL DISTURBANCES: NOT PRESENT
TOTAL SCORE: 3
ANXIETY: NO ANXIETY (AT EASE)
TREMOR: NO TREMOR

## 2017-04-15 ASSESSMENT — PAIN SCALES - GENERAL
PAINLEVEL_OUTOF10: 0
PAINLEVEL_OUTOF10: 4
PAINLEVEL_OUTOF10: 7
PAINLEVEL_OUTOF10: 4
PAINLEVEL_OUTOF10: 0
PAINLEVEL_OUTOF10: 4
PAINLEVEL_OUTOF10: 6

## 2017-04-15 NOTE — PROGRESS NOTES
Assumed care of patient.  Patient is alert to self only and anxious.  Will follow CIWA protocol per MD orders.  Will monitor.

## 2017-04-15 NOTE — CARE PLAN
Problem: Urinary Elimination:  Goal: Ability to reestablish a normal urinary elimination pattern will improve  Intervention: Assess and monitor for signs and symptoms of urinary retention  Voiding well in urinal with nurse assist.

## 2017-04-15 NOTE — CARE PLAN
Problem: Knowledge Deficit  Goal: Knowledge of disease process/condition, treatment plan, diagnostic tests, and medications will improve  Outcome: PROGRESSING AS EXPECTED  POC discussed with pt at bedside. Plan for surgery Monday 4/17. All questions answered. Verbalized understanding.     Problem: Pain Management  Goal: Pain level will decrease to patient’s comfort goal  Outcome: PROGRESSING SLOWER THAN EXPECTED  Pain assessed q4 hours and PRN. Medication given per MAR. Pre and post pain assessment completed.

## 2017-04-15 NOTE — PROGRESS NOTES
Patient is asleep in bed with alarm on and call light in hand.  No signs/symptoms of distress at this time.  Will continue to monitor.

## 2017-04-15 NOTE — CARE PLAN
Problem: Safety  Goal: Will remain free from falls  Outcome: PROGRESSING AS EXPECTED  Remains in bed with call light in reach.  Bed alarm on at all times.  Room close to nurses station.

## 2017-04-15 NOTE — PROGRESS NOTES
Hospital Medicine Interval Note  Date of Service:  4/15/2017    Chief Complaint  71 y.o.-year-old male admitted 4/8/2017 with ETOH withdraw and ischemic leg    Interval Problem Update      More awake    weak      He may of aspirated in 4 days ago    bp is better      Wbc12    Sodium 133      Has UTI - yeast and gram negative growing    Consultants/Specialty  Vascular surgery    Disposition  tele     Review of Systems   Constitutional: Positive for malaise/fatigue. Negative for chills and weight loss.   HENT: Negative for hearing loss.    Eyes: Negative for blurred vision and double vision.   Respiratory: Negative for cough and hemoptysis.    Cardiovascular: Negative for chest pain, palpitations and orthopnea.   Gastrointestinal: Negative for heartburn, nausea, vomiting and abdominal pain.   Genitourinary: Negative for dysuria, urgency, frequency and hematuria.   Musculoskeletal: Positive for myalgias.   Neurological: Positive for weakness. Negative for dizziness, tingling, speech change and headaches.   All other systems reviewed and are negative.     Physical Exam Laboratory/Imaging   Filed Vitals:    04/15/17 0000 04/15/17 0400 04/15/17 0800 04/15/17 1200   BP: 131/74 142/70 143/77 133/63   Pulse: 70 66 67 64   Temp: 36.1 °C (96.9 °F) 36.6 °C (97.8 °F) 36.2 °C (97.1 °F) 36.8 °C (98.3 °F)   Resp: 19 19 19 19   Height:       Weight:       SpO2: 100% 96% 93% 93%     Physical Exam   Constitutional: He appears well-developed and well-nourished. No distress.   HENT:   Head: Normocephalic.   Eyes: EOM are normal. Pupils are equal, round, and reactive to light.   Neck: Normal range of motion. Neck supple. No JVD present. No thyromegaly present.   Cardiovascular: Normal rate, regular rhythm and normal heart sounds.  Exam reveals no gallop and no friction rub.    No murmur heard.  Pulmonary/Chest: Effort normal and breath sounds normal. No respiratory distress. He has no wheezes. He has no rales. He exhibits no tenderness.    Mod b/l rhonchi   Abdominal: Soft. Bowel sounds are normal. He exhibits no distension and no mass. There is no tenderness. There is no rebound and no guarding.   Musculoskeletal: Normal range of motion. He exhibits no edema.   Lymphadenopathy:     He has no cervical adenopathy.   Neurological: He is alert. He displays normal reflexes. No cranial nerve deficit.   Awake    Oriented to person and place   Skin: Skin is dry. No rash noted. He is not diaphoretic. There is erythema (left foot necrotic changes ).   Psychiatric: He has a normal mood and affect.   Nursing note and vitals reviewed.   Lab Results   Component Value Date/Time    WBC 12.1* 04/14/2017 05:25 AM    HEMOGLOBIN 14.8 04/14/2017 05:25 AM    HEMATOCRIT 48.7 04/14/2017 05:25 AM    PLATELET COUNT 502* 04/14/2017 05:25 AM     Lab Results   Component Value Date/Time    SODIUM 133* 04/14/2017 05:25 AM    POTASSIUM 4.1 04/14/2017 05:25 AM    CHLORIDE 103 04/14/2017 05:25 AM    CO2 18* 04/14/2017 05:25 AM    GLUCOSE 71 04/14/2017 05:25 AM    BUN 14 04/14/2017 05:25 AM    CREATININE 0.65 04/14/2017 05:25 AM      Assessment/Plan    Alcohol dependence (CMS-HCC)  Assessment & Plan  - education provided earlier this admission  -ativan as needed    Ischemia of left lower extremity  Assessment & Plan  - vascular on case.. Bypass pending  - cont to follow      Leucocytosis  Assessment & Plan  - stress induced?he may have aspirated in last 3 days    - cont to monitor    Alcohol withdrawal (CMS-HCC)  Assessment & Plan  - CIWA    - thiamin and B12 and folate po  - tele    Hypothyroidism  Assessment & Plan  - stable and cont home meds     UTI (urinary tract infection)  Assessment & Plan  Already on  iv unasyn    Added diflucan    Falls frequently  Assessment & Plan  -pt/ot    Hyponatremia  Assessment & Plan  - chronic ETOH  - volume depletion improved  - ivf stopped due to congestion     salt tabs po    Smoking  Assessment & Plan  -cessation education provided  earlier    Encephalopathy acute  Assessment & Plan  Now safe to eat      Diet as per speech            HTN (hypertension), malignant  Assessment & Plan  Due to etoh w/d         lopressor 50mg po bid      clonidine 0.1 mg po bid    Aspiration pneumonia (HCC)  Assessment & Plan   iv unasyn day #3/7         Check am cbc, bmp Labs reviewed, Medications reviewed and Radiology images reviewed  Barrera catheter: No Barrera      DVT Prophylaxis: Heparin  DVT prophylaxis - mechanical: SCDs      Assessed for rehab: Patient was assess for and/or received rehabilitation services during this hospitalization

## 2017-04-16 LAB
ANION GAP SERPL CALC-SCNC: 7 MMOL/L (ref 0–11.9)
BACTERIA UR CULT: ABNORMAL
BUN SERPL-MCNC: 13 MG/DL (ref 8–22)
CALCIUM SERPL-MCNC: 8.6 MG/DL (ref 8.5–10.5)
CHLORIDE SERPL-SCNC: 105 MMOL/L (ref 96–112)
CO2 SERPL-SCNC: 25 MMOL/L (ref 20–33)
CREAT SERPL-MCNC: 0.7 MG/DL (ref 0.5–1.4)
ERYTHROCYTE [DISTWIDTH] IN BLOOD BY AUTOMATED COUNT: 49.1 FL (ref 35.9–50)
GFR SERPL CREATININE-BSD FRML MDRD: >60 ML/MIN/1.73 M 2
GLUCOSE SERPL-MCNC: 94 MG/DL (ref 65–99)
HCT VFR BLD AUTO: 46.3 % (ref 42–52)
HGB BLD-MCNC: 14.4 G/DL (ref 14–18)
MAGNESIUM SERPL-MCNC: 1.6 MG/DL (ref 1.5–2.5)
MCH RBC QN AUTO: 25.1 PG (ref 27–33)
MCHC RBC AUTO-ENTMCNC: 31.1 G/DL (ref 33.7–35.3)
MCV RBC AUTO: 80.8 FL (ref 81.4–97.8)
PHOSPHATE SERPL-MCNC: 2.8 MG/DL (ref 2.5–4.5)
PLATELET # BLD AUTO: 524 K/UL (ref 164–446)
PMV BLD AUTO: 9.4 FL (ref 9–12.9)
POTASSIUM SERPL-SCNC: 3.7 MMOL/L (ref 3.6–5.5)
RBC # BLD AUTO: 5.73 M/UL (ref 4.7–6.1)
SIGNIFICANT IND 70042: ABNORMAL
SITE SITE: ABNORMAL
SODIUM SERPL-SCNC: 137 MMOL/L (ref 135–145)
SOURCE SOURCE: ABNORMAL
WBC # BLD AUTO: 12.7 K/UL (ref 4.8–10.8)

## 2017-04-16 PROCEDURE — A9270 NON-COVERED ITEM OR SERVICE: HCPCS | Performed by: HOSPITALIST

## 2017-04-16 PROCEDURE — 99232 SBSQ HOSP IP/OBS MODERATE 35: CPT | Performed by: HOSPITALIST

## 2017-04-16 PROCEDURE — 700105 HCHG RX REV CODE 258: Performed by: HOSPITALIST

## 2017-04-16 PROCEDURE — 83735 ASSAY OF MAGNESIUM: CPT

## 2017-04-16 PROCEDURE — 700101 HCHG RX REV CODE 250: Performed by: INTERNAL MEDICINE

## 2017-04-16 PROCEDURE — 36415 COLL VENOUS BLD VENIPUNCTURE: CPT

## 2017-04-16 PROCEDURE — 770020 HCHG ROOM/CARE - TELE (206)

## 2017-04-16 PROCEDURE — 84100 ASSAY OF PHOSPHORUS: CPT

## 2017-04-16 PROCEDURE — 700102 HCHG RX REV CODE 250 W/ 637 OVERRIDE(OP): Performed by: HOSPITALIST

## 2017-04-16 PROCEDURE — 700111 HCHG RX REV CODE 636 W/ 250 OVERRIDE (IP): Performed by: HOSPITALIST

## 2017-04-16 PROCEDURE — 80048 BASIC METABOLIC PNL TOTAL CA: CPT

## 2017-04-16 PROCEDURE — 85027 COMPLETE CBC AUTOMATED: CPT

## 2017-04-16 RX ADMIN — NICOTINE 14 MG: 14 PATCH, EXTENDED RELEASE TRANSDERMAL at 05:55

## 2017-04-16 RX ADMIN — OXYCODONE HYDROCHLORIDE 10 MG: 10 TABLET ORAL at 21:36

## 2017-04-16 RX ADMIN — METOPROLOL TARTRATE 50 MG: 50 TABLET, FILM COATED ORAL at 21:36

## 2017-04-16 RX ADMIN — SODIUM CHLORIDE TAB 1 GM 1 G: 1 TAB at 11:21

## 2017-04-16 RX ADMIN — METOPROLOL TARTRATE 50 MG: 50 TABLET, FILM COATED ORAL at 09:13

## 2017-04-16 RX ADMIN — Medication 400 MG: at 09:12

## 2017-04-16 RX ADMIN — Medication 100 MG: at 09:13

## 2017-04-16 RX ADMIN — LEVOTHYROXINE SODIUM 75 MCG: 75 TABLET ORAL at 05:55

## 2017-04-16 RX ADMIN — ASPIRIN 325 MG: 325 TABLET, DELAYED RELEASE ORAL at 09:12

## 2017-04-16 RX ADMIN — LABETALOL HYDROCHLORIDE 5 MG: 5 INJECTION INTRAVENOUS at 21:18

## 2017-04-16 RX ADMIN — FLUCONAZOLE 100 MG: 100 TABLET ORAL at 09:13

## 2017-04-16 RX ADMIN — CLONIDINE HYDROCHLORIDE 0.1 MG: 0.1 TABLET ORAL at 09:13

## 2017-04-16 RX ADMIN — SODIUM CHLORIDE TAB 1 GM 1 G: 1 TAB at 17:03

## 2017-04-16 RX ADMIN — HEPARIN SODIUM 5000 UNITS: 5000 INJECTION, SOLUTION INTRAVENOUS; SUBCUTANEOUS at 12:58

## 2017-04-16 RX ADMIN — SODIUM CHLORIDE TAB 1 GM 1 G: 1 TAB at 09:12

## 2017-04-16 RX ADMIN — HEPARIN SODIUM 5000 UNITS: 5000 INJECTION, SOLUTION INTRAVENOUS; SUBCUTANEOUS at 21:35

## 2017-04-16 RX ADMIN — AMPICILLIN SODIUM AND SULBACTAM SODIUM 3 G: 2; 1 INJECTION, POWDER, FOR SOLUTION INTRAMUSCULAR; INTRAVENOUS at 05:53

## 2017-04-16 RX ADMIN — CEFTRIAXONE SODIUM 1 G: 1 INJECTION, POWDER, FOR SOLUTION INTRAMUSCULAR; INTRAVENOUS at 11:21

## 2017-04-16 RX ADMIN — AMPICILLIN SODIUM AND SULBACTAM SODIUM 3 G: 2; 1 INJECTION, POWDER, FOR SOLUTION INTRAMUSCULAR; INTRAVENOUS at 01:59

## 2017-04-16 RX ADMIN — ATORVASTATIN CALCIUM 40 MG: 40 TABLET, FILM COATED ORAL at 21:36

## 2017-04-16 RX ADMIN — CLONIDINE HYDROCHLORIDE 0.1 MG: 0.1 TABLET ORAL at 21:36

## 2017-04-16 RX ADMIN — HEPARIN SODIUM 5000 UNITS: 5000 INJECTION, SOLUTION INTRAVENOUS; SUBCUTANEOUS at 05:55

## 2017-04-16 ASSESSMENT — ENCOUNTER SYMPTOMS
HEADACHES: 0
WEAKNESS: 1
ABDOMINAL PAIN: 0
PALPITATIONS: 0
DOUBLE VISION: 0
ORTHOPNEA: 0
BLURRED VISION: 0
NAUSEA: 0
COUGH: 0
WEIGHT LOSS: 0
TINGLING: 0
HEMOPTYSIS: 0
SPEECH CHANGE: 0
HEARTBURN: 0
MYALGIAS: 1
DIZZINESS: 0
VOMITING: 0
CHILLS: 0

## 2017-04-16 ASSESSMENT — PAIN SCALES - GENERAL
PAINLEVEL_OUTOF10: 0
PAINLEVEL_OUTOF10: 0
PAINLEVEL_OUTOF10: 8
PAINLEVEL_OUTOF10: 4
PAINLEVEL_OUTOF10: 4

## 2017-04-16 ASSESSMENT — LIFESTYLE VARIABLES
ORIENTATION AND CLOUDING OF SENSORIUM: ORIENTED AND CAN DO SERIAL ADDITIONS
AGITATION: NORMAL ACTIVITY
ANXIETY: NO ANXIETY (AT EASE)
AUDITORY DISTURBANCES: NOT PRESENT
VISUAL DISTURBANCES: NOT PRESENT
PAROXYSMAL SWEATS: NO SWEAT VISIBLE
TREMOR: NO TREMOR
TOTAL SCORE: 0
HEADACHE, FULLNESS IN HEAD: NOT PRESENT
NAUSEA AND VOMITING: NO NAUSEA AND NO VOMITING

## 2017-04-16 NOTE — CONSULTS
DATE OF SERVICE:  04/15/2017    Dear Dr. Fermín Grace, Thank you for this consultation.    PROBLEM LIST:  1.  Ischemic lower extremity with mobility and activities of daily living   deficits evaluated by Dr. Monroy with angiogram.  2.  Peripheral arterial disease.  3.  Leukocytosis.  4.  Alcohol dependence.  5.  Thrombocytosis.  6.  Hypertension.  7.  Hyponatremia.  8.  COPD.  9.  History of prostate cancer.  10.  Hepatitis C and a history of coronary artery disease with acute MI and   CVA.    HISTORY OF PRESENT ILLNESS:  The patient is a pleasant 71-year-old gentleman   who was admitted to [facility] [place] with complaint of falling down.  He   thought he had a heart attack.  He was evaluated and stabilized by Dr. Fermín Grace.  He was evaluated for ischemic lower extremity with some gangrenous   changes.  Dr. Monroy was consulted and did an aortogram follow through.  He   is now participating in therapies and showing good participation.    PAST MEDICAL HISTORY:  Significant for his prior history of coronary artery   disease, cerebrovascular accident, undergone urethral dilation, COPD,   hepatitis, prostate cancer, hypothyroidism.    ALLERGIES:  He has no known drug allergies.    MEDICATIONS:  As follows:  Unasyn 3 grams q. 6 hours, Ecotrin 325 q. day,   Lipitor 40 mg p.o. daily, Catapres 0.1 mg twice a day, Diflucan 100 mg daily,   heparin 5000 units subQ q. 8 hours, Synthroid 75 mcg q. day, magnesium oxide   400 mg q. day,  Lopressor 50 mg b.i.d., Mirabegron ER 50 mg q. day, Nicoderm   14 mg patch daily, salt 1 gram 3 times a day, Meghna-Colace once a day and   thiamine 100 mg a day.    SOCIAL HISTORY:  Patient is single.  Per family he was walking prior to his   current admission with a front-wheel walker.  Lives in one-level home, smokes   daily, drinks alcohol daily and denies any drug use.    FAMILY HISTORY:  Significant for coronary artery disease.    REVIEW OF SYSTEMS:  Negative for AMA and CMS  guidelines.    PHYSICAL EXAMINATION:  GENERAL:  He is a well-developed, undernourished gentleman in no apparent   distress.  VITAL SIGNS:  Stable.  He is afebrile.  HEENT:  Normocephalic, atraumatic.  Conjunctivae anicteric.  Nose patent.    Mouth, moist membranes.  NECK:  Supple, no lymphadenopathy.  LUNGS:  Coarse breath sounds bilaterally with fair respiratory effort.  HEART:  Regular rate.  ABDOMEN:  Positive bowel sounds, soft.  GENITOURINARY AND RECTAL:  Deferred.  EXTREMITIES:  He has a bad looking left lower extremity with cyanotic looking   toes, right side not too bad.  MENTAL STATUS:  Patient is alert and oriented times person, place and date.  NEUROLOGIC:  Upper extremity strength is approximately 4+/5, left could not be   evaluated very well.  Right is approximately 4/5.  Sensation is intact, but   decreased sensation from about mid shin distally and appears far worse for the   diminished sensation on the left compared to the right.  Deep tendon reflex   1+ biceps, triceps, patella is trace and could not assess ankles and toe was   not assessed on the left, on the right, equivocal.    ASSESSMENT:  1.  Ischemic left foot and lower extremity with history of peripheral vascular   disease.  2.  Debilitating deconditioning secondary to above.  3.  History of myocardial infarction, coronary artery disease as well as a   history of cerebrovascular accident.  4.  Leukocytosis.  5.  Alcohol dependence.  6.  COPD.  7.  Prostate cancer history.  8.  Hyponatremia.    PLAN:  To continue to monitor patient's progress with therapies.  His   participation at this time looks like he is working very hard and would   benefit from further rehabilitative care post-acute with physical therapy and   occupational therapy.  We will continue to monitor his progress and look for   the best possible rehabilitative course to evaluate him.  With his acuity of   medical issues, internal medicine following the patient post-acute would    probably be very helpful as well.       ____________________________________     M MD HOA MAHAN    DD:  04/15/2017 14:29:26  DT:  04/15/2017 17:56:47    D#:  [id number]  Job#:  [id number]

## 2017-04-16 NOTE — PROGRESS NOTES
Hospital Medicine Interval Note  Date of Service:  4/16/2017    Chief Complaint  71 y.o.-year-old male admitted 4/8/2017 with ETOH withdraw and ischemic leg    Interval Problem Update      More awake    weak      He may of aspirated in 5 days ago    bp is better      Wbc12    Sodium 137      Has UTI - yeast and enterobacter    Consultants/Specialty  Vascular surgery    Disposition  tele     Review of Systems   Constitutional: Positive for malaise/fatigue. Negative for chills and weight loss.   HENT: Negative for hearing loss.    Eyes: Negative for blurred vision and double vision.   Respiratory: Negative for cough and hemoptysis.    Cardiovascular: Negative for chest pain, palpitations and orthopnea.   Gastrointestinal: Negative for heartburn, nausea, vomiting and abdominal pain.   Genitourinary: Negative for dysuria, urgency, frequency and hematuria.   Musculoskeletal: Positive for myalgias.   Neurological: Positive for weakness. Negative for dizziness, tingling, speech change and headaches.   All other systems reviewed and are negative.     Physical Exam Laboratory/Imaging   Filed Vitals:    04/15/17 2100 04/16/17 0000 04/16/17 0500 04/16/17 0800   BP: 159/85 150/84 134/77 145/77   Pulse: 56 75 66 66   Temp: 36.7 °C (98 °F) 36.1 °C (97 °F) 36.9 °C (98.4 °F) 37.9 °C (100.3 °F)   Resp: 20 16 16 17   Height:       Weight: 59.5 kg (131 lb 2.8 oz)      SpO2: 90% 90% 92% 91%     Physical Exam   Constitutional: He appears well-developed and well-nourished. No distress.   HENT:   Head: Normocephalic.   Eyes: EOM are normal. Pupils are equal, round, and reactive to light.   Neck: Normal range of motion. Neck supple. No JVD present. No thyromegaly present.   Cardiovascular: Normal rate, regular rhythm and normal heart sounds.  Exam reveals no gallop and no friction rub.    No murmur heard.  Pulmonary/Chest: Effort normal and breath sounds normal. No respiratory distress. He has no wheezes. He has no rales. He exhibits no  tenderness.   Mod b/l rhonchi   Abdominal: Soft. Bowel sounds are normal. He exhibits no distension and no mass. There is no tenderness. There is no rebound and no guarding.   Musculoskeletal: Normal range of motion. He exhibits no edema.   Lymphadenopathy:     He has no cervical adenopathy.   Neurological: He is alert. He displays normal reflexes. No cranial nerve deficit.   Awake    Oriented to person and place   Skin: Skin is dry. No rash noted. He is not diaphoretic. There is erythema (left foot necrotic changes ).   Psychiatric: He has a normal mood and affect.   Nursing note and vitals reviewed.   Lab Results   Component Value Date/Time    WBC 12.7* 04/16/2017 03:17 AM    HEMOGLOBIN 14.4 04/16/2017 03:17 AM    HEMATOCRIT 46.3 04/16/2017 03:17 AM    PLATELET COUNT 524* 04/16/2017 03:17 AM     Lab Results   Component Value Date/Time    SODIUM 137 04/16/2017 03:17 AM    POTASSIUM 3.7 04/16/2017 03:17 AM    CHLORIDE 105 04/16/2017 03:17 AM    CO2 25 04/16/2017 03:17 AM    GLUCOSE 94 04/16/2017 03:17 AM    BUN 13 04/16/2017 03:17 AM    CREATININE 0.70 04/16/2017 03:17 AM      Assessment/Plan    Alcohol dependence (CMS-HCC)  Assessment & Plan  - education provided earlier this admission  -ativan as needed    Ischemia of left lower extremity  Assessment & Plan  - vascular on case.. Bypass pending  - cont to follow      Leucocytosis  Assessment & Plan  - stress induced?he may have aspirated in last 3 days    - cont to monitor    Alcohol withdrawal (CMS-HCC)  Assessment & Plan  - CIWA    - thiamin and B12 and folate po  - tele    Hypothyroidism  Assessment & Plan  - stable and cont home meds     UTI (urinary tract infection)  Assessment & Plan  Already on  iv unasyn to iv rocephin    Diflucan for 3 days    Falls frequently  Assessment & Plan  -pt/ot    Hyponatremia  Assessment & Plan  - chronic ETOH  - volume depletion improved  - ivf stopped due to congestion     salt tabs po    Smoking  Assessment & Plan  -cessation  education provided earlier    Encephalopathy acute  Assessment & Plan  Now safe to eat      Diet as per speech            HTN (hypertension), malignant  Assessment & Plan  Due to etoh w/d         lopressor 50mg po bid      clonidine 0.1 mg po bid    Aspiration pneumonia (HCC)  Assessment & Plan   iv unasyn day #5/7         Check am cbc, bmp Labs reviewed, Medications reviewed and Radiology images reviewed  Barrera catheter: No Barrera      DVT Prophylaxis: Heparin  DVT prophylaxis - mechanical: SCDs      Assessed for rehab: Patient was assess for and/or received rehabilitation services during this hospitalization

## 2017-04-16 NOTE — CARE PLAN
Problem: Pain Management  Goal: Pain level will decrease to patient’s comfort goal  Intervention: Follow pain managment plan developed in collaboration with patient and Interdisciplinary Team  Assess and medicate pt pain as per orders and MAR. Provide ice/heat packs for breakthrough pain as needed.       Problem: Skin Integrity  Goal: Risk for impaired skin integrity will decrease  Intervention: Assess and monitor skin integrity, appearance and/or temperature  Assess and monitor skin integrity and utilize condom catheter to protect skin from incontinence.

## 2017-04-17 PROCEDURE — 700111 HCHG RX REV CODE 636 W/ 250 OVERRIDE (IP)

## 2017-04-17 PROCEDURE — A9270 NON-COVERED ITEM OR SERVICE: HCPCS | Performed by: HOSPITALIST

## 2017-04-17 PROCEDURE — C1751 CATH, INF, PER/CENT/MIDLINE: HCPCS | Performed by: SURGERY

## 2017-04-17 PROCEDURE — 110382 HCHG SHELL REV 271: Performed by: SURGERY

## 2017-04-17 PROCEDURE — 501499 HCHG SURG-I-LOOP, MINI (BLUE): Performed by: SURGERY

## 2017-04-17 PROCEDURE — 700101 HCHG RX REV CODE 250

## 2017-04-17 PROCEDURE — 501498 HCHG SURG-I-LOOP, MAXI (BLUE): Performed by: SURGERY

## 2017-04-17 PROCEDURE — 500887 HCHG PACK, MAJOR BASIN: Performed by: SURGERY

## 2017-04-17 PROCEDURE — 160048 HCHG OR STATISTICAL LEVEL 1-5: Performed by: SURGERY

## 2017-04-17 PROCEDURE — 160029 HCHG SURGERY MINUTES - 1ST 30 MINS LEVEL 4: Performed by: SURGERY

## 2017-04-17 PROCEDURE — 99232 SBSQ HOSP IP/OBS MODERATE 35: CPT | Performed by: HOSPITALIST

## 2017-04-17 PROCEDURE — 700111 HCHG RX REV CODE 636 W/ 250 OVERRIDE (IP): Performed by: HOSPITALIST

## 2017-04-17 PROCEDURE — 160009 HCHG ANES TIME/MIN: Performed by: SURGERY

## 2017-04-17 PROCEDURE — 500700 HCHG HEMOCLIP, SMALL (RED): Performed by: SURGERY

## 2017-04-17 PROCEDURE — 700102 HCHG RX REV CODE 250 W/ 637 OVERRIDE(OP): Performed by: HOSPITALIST

## 2017-04-17 PROCEDURE — 770020 HCHG ROOM/CARE - TELE (206)

## 2017-04-17 PROCEDURE — 700105 HCHG RX REV CODE 258: Performed by: HOSPITALIST

## 2017-04-17 PROCEDURE — 160035 HCHG PACU - 1ST 60 MINS PHASE I: Performed by: SURGERY

## 2017-04-17 PROCEDURE — A4606 OXYGEN PROBE USED W OXIMETER: HCPCS | Performed by: SURGERY

## 2017-04-17 PROCEDURE — 700102 HCHG RX REV CODE 250 W/ 637 OVERRIDE(OP)

## 2017-04-17 PROCEDURE — 500698 HCHG HEMOCLIP, MEDIUM: Performed by: SURGERY

## 2017-04-17 PROCEDURE — 501838 HCHG SUTURE GENERAL: Performed by: SURGERY

## 2017-04-17 PROCEDURE — 160002 HCHG RECOVERY MINUTES (STAT): Performed by: SURGERY

## 2017-04-17 PROCEDURE — 04CL0ZZ EXTIRPATION OF MATTER FROM LEFT FEMORAL ARTERY, OPEN APPROACH: ICD-10-PCS | Performed by: SURGERY

## 2017-04-17 PROCEDURE — 500043 HCHG BAG-A-JET: Performed by: SURGERY

## 2017-04-17 PROCEDURE — A4314 CATH W/DRAINAGE 2-WAY LATEX: HCPCS | Performed by: SURGERY

## 2017-04-17 PROCEDURE — 041L09L BYPASS LEFT FEMORAL ARTERY TO POPLITEAL ARTERY WITH AUTOLOGOUS VENOUS TISSUE, OPEN APPROACH: ICD-10-PCS | Performed by: SURGERY

## 2017-04-17 PROCEDURE — 501445 HCHG STAPLER, SKIN DISP: Performed by: SURGERY

## 2017-04-17 PROCEDURE — 160041 HCHG SURGERY MINUTES - EA ADDL 1 MIN LEVEL 4: Performed by: SURGERY

## 2017-04-17 PROCEDURE — 501699 HCHG VALVULOTOME: Performed by: SURGERY

## 2017-04-17 PROCEDURE — 500697 HCHG HEMOCLIP, LARGE (ORANGE): Performed by: SURGERY

## 2017-04-17 PROCEDURE — 04CN0ZZ EXTIRPATION OF MATTER FROM LEFT POPLITEAL ARTERY, OPEN APPROACH: ICD-10-PCS | Performed by: SURGERY

## 2017-04-17 PROCEDURE — A9270 NON-COVERED ITEM OR SERVICE: HCPCS

## 2017-04-17 PROCEDURE — 110371 HCHG SHELL REV 272: Performed by: SURGERY

## 2017-04-17 PROCEDURE — 501837 HCHG SUTURE CV: Performed by: SURGERY

## 2017-04-17 PROCEDURE — 502240 HCHG MISC OR SUPPLY RC 0272: Performed by: SURGERY

## 2017-04-17 PROCEDURE — 501422 HCHG SPONGE, SURGIFOAM 100: Performed by: SURGERY

## 2017-04-17 RX ORDER — OXYCODONE HCL 5 MG/5 ML
SOLUTION, ORAL ORAL
Status: COMPLETED
Start: 2017-04-17 | End: 2017-04-17

## 2017-04-17 RX ORDER — HEPARIN SODIUM,PORCINE 1000/ML
VIAL (ML) INJECTION
Status: DISCONTINUED | OUTPATIENT
Start: 2017-04-17 | End: 2017-04-17 | Stop reason: HOSPADM

## 2017-04-17 RX ADMIN — OXYCODONE HYDROCHLORIDE 1750 MG: 5 SOLUTION ORAL at 18:00

## 2017-04-17 RX ADMIN — OXYCODONE HYDROCHLORIDE 10 MG: 10 TABLET ORAL at 09:41

## 2017-04-17 RX ADMIN — LEVOTHYROXINE SODIUM 75 MCG: 75 TABLET ORAL at 05:17

## 2017-04-17 RX ADMIN — CLONIDINE HYDROCHLORIDE 0.1 MG: 0.1 TABLET ORAL at 21:31

## 2017-04-17 RX ADMIN — Medication 100 MG: at 09:35

## 2017-04-17 RX ADMIN — ATORVASTATIN CALCIUM 40 MG: 40 TABLET, FILM COATED ORAL at 21:32

## 2017-04-17 RX ADMIN — SODIUM CHLORIDE TAB 1 GM 1 G: 1 TAB at 09:34

## 2017-04-17 RX ADMIN — FLUCONAZOLE 100 MG: 100 TABLET ORAL at 09:34

## 2017-04-17 RX ADMIN — OXYCODONE HYDROCHLORIDE 10 MG: 10 TABLET ORAL at 21:36

## 2017-04-17 RX ADMIN — FENTANYL CITRATE 25 MCG: 50 INJECTION, SOLUTION INTRAMUSCULAR; INTRAVENOUS at 17:46

## 2017-04-17 RX ADMIN — CEFTRIAXONE SODIUM 1 G: 1 INJECTION, POWDER, FOR SOLUTION INTRAMUSCULAR; INTRAVENOUS at 09:35

## 2017-04-17 RX ADMIN — OXYCODONE HYDROCHLORIDE 10 MG: 10 TABLET ORAL at 05:20

## 2017-04-17 RX ADMIN — NICOTINE 14 MG: 14 PATCH, EXTENDED RELEASE TRANSDERMAL at 05:17

## 2017-04-17 RX ADMIN — Medication 400 MG: at 09:34

## 2017-04-17 RX ADMIN — CLONIDINE HYDROCHLORIDE 0.1 MG: 0.1 TABLET ORAL at 09:34

## 2017-04-17 RX ADMIN — SODIUM CHLORIDE TAB 1 GM 1 G: 1 TAB at 13:06

## 2017-04-17 RX ADMIN — HEPARIN SODIUM 5000 UNITS: 5000 INJECTION, SOLUTION INTRAVENOUS; SUBCUTANEOUS at 21:32

## 2017-04-17 RX ADMIN — METOPROLOL TARTRATE 50 MG: 50 TABLET, FILM COATED ORAL at 21:31

## 2017-04-17 RX ADMIN — METOPROLOL TARTRATE 50 MG: 50 TABLET, FILM COATED ORAL at 09:34

## 2017-04-17 ASSESSMENT — LIFESTYLE VARIABLES
VISUAL DISTURBANCES: NOT PRESENT
VISUAL DISTURBANCES: NOT PRESENT
HEADACHE, FULLNESS IN HEAD: NOT PRESENT
NAUSEA AND VOMITING: NO NAUSEA AND NO VOMITING
TOTAL SCORE: 0
NAUSEA AND VOMITING: NO NAUSEA AND NO VOMITING
AUDITORY DISTURBANCES: NOT PRESENT
AGITATION: NORMAL ACTIVITY
ANXIETY: NO ANXIETY (AT EASE)
ANXIETY: NO ANXIETY (AT EASE)
ORIENTATION AND CLOUDING OF SENSORIUM: ORIENTED AND CAN DO SERIAL ADDITIONS
TOTAL SCORE: 0
AUDITORY DISTURBANCES: NOT PRESENT
PAROXYSMAL SWEATS: NO SWEAT VISIBLE
ORIENTATION AND CLOUDING OF SENSORIUM: ORIENTED AND CAN DO SERIAL ADDITIONS
HEADACHE, FULLNESS IN HEAD: NOT PRESENT
TREMOR: NO TREMOR
AGITATION: NORMAL ACTIVITY
PAROXYSMAL SWEATS: NO SWEAT VISIBLE
TREMOR: NO TREMOR

## 2017-04-17 ASSESSMENT — PAIN SCALES - GENERAL
PAINLEVEL_OUTOF10: 7
PAINLEVEL_OUTOF10: 8
PAINLEVEL_OUTOF10: 0
PAINLEVEL_OUTOF10: 0
PAINLEVEL_OUTOF10: 8

## 2017-04-17 ASSESSMENT — ENCOUNTER SYMPTOMS
HEADACHES: 0
TINGLING: 0
WEIGHT LOSS: 0
PALPITATIONS: 0
DOUBLE VISION: 0
COUGH: 0
SPEECH CHANGE: 0
MYALGIAS: 1
DIZZINESS: 0
ABDOMINAL PAIN: 0
HEMOPTYSIS: 0
ORTHOPNEA: 0
VOMITING: 0
NAUSEA: 0
CHILLS: 0
HEARTBURN: 0
WEAKNESS: 1
BLURRED VISION: 0

## 2017-04-17 NOTE — CARE PLAN
Problem: Safety  Goal: Will remain free from injury  Outcome: PROGRESSING AS EXPECTED  Fall precautions remain in place. Bed alarm on. Hourly rounding.     Problem: Knowledge Deficit  Goal: Knowledge of disease process/condition, treatment plan, diagnostic tests, and medications will improve  Outcome: PROGRESSING SLOWER THAN EXPECTED  POC discussed with pt and family at bedside. Pt is confused today. Needs re-education throughout shift. Family verbalized understanding of POC. Plan for surgery tomorrow 4/17.

## 2017-04-17 NOTE — THERAPY
Physical Therapy Contact Note    off floor for vascular sx upon attempt; will follow up when able/appropriate for continuation of PT sessions;     Gaviota Ham PT, DPT Pager: [phone]

## 2017-04-17 NOTE — PROGRESS NOTES
Condom catheter came off and pt incontinent of urine. Full linen change completed. Applied new condom catheter.

## 2017-04-17 NOTE — PROGRESS NOTES
Hospital Medicine Interval Note  Date of Service:  4/17/2017    Chief Complaint  71 y.o.-year-old male admitted 4/8/2017 with ETOH withdraw and ischemic leg    Interval Problem Update      More awake    weak      He may of aspirated in 6 days ago    bp is better      Wbc12.7      Has UTI - yeast and enterobacter      Possible surgery on foot today      Consultants/Specialty  Vascular surgery    Disposition  tele     Review of Systems   Constitutional: Positive for malaise/fatigue. Negative for chills and weight loss.   HENT: Negative for hearing loss.    Eyes: Negative for blurred vision and double vision.   Respiratory: Negative for cough and hemoptysis.    Cardiovascular: Negative for chest pain, palpitations and orthopnea.   Gastrointestinal: Negative for heartburn, nausea, vomiting and abdominal pain.   Genitourinary: Negative for dysuria, urgency, frequency and hematuria.   Musculoskeletal: Positive for myalgias and joint pain.   Neurological: Positive for weakness. Negative for dizziness, tingling, speech change and headaches.   All other systems reviewed and are negative.     Physical Exam Laboratory/Imaging   Filed Vitals:    04/16/17 1600 04/16/17 2000 04/17/17 0000 04/17/17 0400   BP: 154/85 196/114 143/82 142/93   Pulse: 72 98 81 75   Temp: 37.8 °C (100 °F) 37.9 °C (100.3 °F) 36.8 °C (98.3 °F) 37 °C (98.6 °F)   Resp: 17 17 18 18   Height:       Weight:  59.7 kg (131 lb 9.8 oz)     SpO2: 91% 90% 90% 91%     Physical Exam   Constitutional: He appears well-developed and well-nourished. No distress.   HENT:   Head: Normocephalic.   Eyes: EOM are normal. Pupils are equal, round, and reactive to light.   Neck: Normal range of motion. Neck supple. No JVD present. No thyromegaly present.   Cardiovascular: Normal rate, regular rhythm and normal heart sounds.  Exam reveals no gallop and no friction rub.    No murmur heard.  Pulmonary/Chest: Effort normal and breath sounds normal. No respiratory distress. He has no  wheezes. He has no rales. He exhibits no tenderness.   Mod b/l rhonchi   Abdominal: Soft. Bowel sounds are normal. He exhibits no distension and no mass. There is no tenderness. There is no rebound and no guarding.   Musculoskeletal: Normal range of motion. He exhibits no edema.   Lymphadenopathy:     He has no cervical adenopathy.   Neurological: He is alert. He displays normal reflexes. No cranial nerve deficit.   Awake    Oriented to person and place   Skin: Skin is dry. No rash noted. He is not diaphoretic. There is erythema (left foot necrotic changes ).   Psychiatric: He has a normal mood and affect.   Nursing note and vitals reviewed.   Lab Results   Component Value Date/Time    WBC 12.7* 04/16/2017 03:17 AM    HEMOGLOBIN 14.4 04/16/2017 03:17 AM    HEMATOCRIT 46.3 04/16/2017 03:17 AM    PLATELET COUNT 524* 04/16/2017 03:17 AM     Lab Results   Component Value Date/Time    SODIUM 137 04/16/2017 03:17 AM    POTASSIUM 3.7 04/16/2017 03:17 AM    CHLORIDE 105 04/16/2017 03:17 AM    CO2 25 04/16/2017 03:17 AM    GLUCOSE 94 04/16/2017 03:17 AM    BUN 13 04/16/2017 03:17 AM    CREATININE 0.70 04/16/2017 03:17 AM      Assessment/Plan    Alcohol dependence (CMS-HCC)  Assessment & Plan  - education provided earlier this admission  -ativan as needed    Ischemia of left lower extremity  Assessment & Plan  - vascular on case.. Bypass pending  - cont to follow      Leucocytosis  Assessment & Plan  - stress induced?he may have aspirated    - cont to monitor    Now likely due to LLE gangrene foot    Alcohol withdrawal (CMS-HCC)  Assessment & Plan  Resolved    - CIWA prn    - thiamin and B12 and folate po  - tele    Hypothyroidism  Assessment & Plan  - stable and cont home meds     UTI (urinary tract infection)  Assessment & Plan  Already on iv rocephin for 3 days    Diflucan for 3 days    Falls frequently  Assessment & Plan  -pt/ot    Hyponatremia  Assessment & Plan  - chronic ETOH  - volume depletion improved  - ivf stopped  due to congestion     salt tabs po    Smoking  Assessment & Plan  -cessation education provided earlier    Encephalopathy acute  Assessment & Plan  Now safe to eat      Diet as per speech            HTN (hypertension), malignant  Assessment & Plan  Due to etoh w/d         lopressor 50mg po bid      clonidine 0.1 mg po bid    Aspiration pneumonia (HCC)  Assessment & Plan   iv abx day #6/7         Check am cbc, bmp Labs reviewed, Medications reviewed and Radiology images reviewed  Barrera catheter: No Barrera      DVT Prophylaxis: Heparin  DVT prophylaxis - mechanical: SCDs      Assessed for rehab: Patient was assess for and/or received rehabilitation services during this hospitalization

## 2017-04-17 NOTE — THERAPY
Speech Therapy Contact Note:  Attempted to see patient for dysphagia therapy and check his safety of modified diet (Dysphagia 2, nectar thick liquid). Patient NPO today for surgery this afternoon. RN reporting surgery date was pushed a day as family had brought in patient food during first NPO attempt. SLP will continue to follow for dysphagia treatment and patient/family education.

## 2017-04-17 NOTE — CARE PLAN
Problem: Communication  Goal: The ability to communicate needs accurately and effectively will improve  Intervention: Educate patient and significant other/support system about the plan of care, procedures, treatments, medications and allow for questions  Discussed POC with pt and the anticipated surgery time.       Problem: Infection  Goal: Will remain free from infection  Intervention: Assess signs and symptoms of infection  Assess for s/s of infection at IV site and wound sites.

## 2017-04-17 NOTE — PROGRESS NOTES
Surgical Progress Note    Author: Joesph Monroy Date & Time created: 2017   5:59 AM     Interval Events:  Plan bypass later today assuming family does not feed pt again  ROS  Hemodynamics:  Temp (24hrs), Av.6 °C (99.7 °F), Min:36.8 °C (98.3 °F), Max:38 °C (100.4 °F)  Temperature: 37 °C (98.6 °F)  Pulse  Av.6  Min: 55  Max: 118   Blood Pressure : 142/93 mmHg     Respiratory:    Respiration: 18, Pulse Oximetry: 91 %     Work Of Breathing / Effort: Moderate  RUL Breath Sounds: Diminished, RML Breath Sounds: Diminished, RLL Breath Sounds: Diminished, RIGO Breath Sounds: Diminished, LLL Breath Sounds: Diminished  Neuro:  GCS       Fluids:    Intake/Output Summary (Last 24 hours) at 17 0559  Last data filed at 17 1800   Gross per 24 hour   Intake    600 ml   Output    100 ml   Net    500 ml     Weight: 59.7 kg (131 lb 9.8 oz)  Current Diet Order   Procedures   • DIET NPO     Physical Exam  Labs:  No results found for this or any previous visit (from the past 24 hour(s)).  Medical Decision Making, by Problem:  Active Hospital Problems    Diagnosis   • Leucocytosis [D72.829]     Priority: High   • Alcohol withdrawal (CMS-HCC) [F10.239]     Priority: High   • Alcohol dependence (CMS-HCC) [F10.20]     Priority: High   • Ischemia of left lower extremity [I99.8]     Priority: High   • Aspiration pneumonia (HCC) [J69.0]   • Encephalopathy acute [G93.40]   • HTN (hypertension), malignant [I10]   • Hyponatremia [E87.1]   • Smoking [F17.200]   • Falls frequently [R29.6]   • Hypothyroidism [E03.9]   • UTI (urinary tract infection) [N39.0]     Plan:  OR today    Quality Measures:  Core Measures

## 2017-04-17 NOTE — PROGRESS NOTES
Bedside report received, Pt care assumed. Tele box on. VSS, Pt assessment complete. Pt AOX4, no signs of distress noted at this time.  Pt c/o of 0/10 pain. Pt denies any additional needs at this time. Bed in lowest position, bed alarm is on, ambulates with X1 assistance. POC discussed with Pt/family, verbalizes understanding, no questions at this time. Pt educated on fall risk and verbalizes understanding, call light within reach, will continue to monitor.

## 2017-04-18 LAB
HCT VFR BLD AUTO: 41.8 % (ref 42–52)
HGB BLD-MCNC: 12.7 G/DL (ref 14–18)

## 2017-04-18 PROCEDURE — 700102 HCHG RX REV CODE 250 W/ 637 OVERRIDE(OP): Performed by: HOSPITALIST

## 2017-04-18 PROCEDURE — 85018 HEMOGLOBIN: CPT

## 2017-04-18 PROCEDURE — 85014 HEMATOCRIT: CPT

## 2017-04-18 PROCEDURE — 700105 HCHG RX REV CODE 258: Performed by: HOSPITALIST

## 2017-04-18 PROCEDURE — 700111 HCHG RX REV CODE 636 W/ 250 OVERRIDE (IP): Performed by: HOSPITALIST

## 2017-04-18 PROCEDURE — 36415 COLL VENOUS BLD VENIPUNCTURE: CPT

## 2017-04-18 PROCEDURE — A9270 NON-COVERED ITEM OR SERVICE: HCPCS | Performed by: HOSPITALIST

## 2017-04-18 PROCEDURE — 770020 HCHG ROOM/CARE - TELE (206)

## 2017-04-18 PROCEDURE — 99232 SBSQ HOSP IP/OBS MODERATE 35: CPT | Performed by: INTERNAL MEDICINE

## 2017-04-18 RX ORDER — CLONIDINE HYDROCHLORIDE 0.1 MG/1
0.1 TABLET ORAL TWICE DAILY
Status: DISCONTINUED | OUTPATIENT
Start: 2017-04-19 | End: 2017-04-20 | Stop reason: HOSPADM

## 2017-04-18 RX ORDER — METOPROLOL TARTRATE 50 MG/1
50 TABLET, FILM COATED ORAL TWICE DAILY
Status: DISCONTINUED | OUTPATIENT
Start: 2017-04-19 | End: 2017-04-20 | Stop reason: HOSPADM

## 2017-04-18 RX ORDER — ENEMA 19; 7 G/133ML; G/133ML
1 ENEMA RECTAL
Status: DISCONTINUED | OUTPATIENT
Start: 2017-04-18 | End: 2017-04-18

## 2017-04-18 RX ADMIN — LEVOTHYROXINE SODIUM 75 MCG: 75 TABLET ORAL at 06:11

## 2017-04-18 RX ADMIN — OXYCODONE HYDROCHLORIDE 10 MG: 10 TABLET ORAL at 17:53

## 2017-04-18 RX ADMIN — HEPARIN SODIUM 5000 UNITS: 5000 INJECTION, SOLUTION INTRAVENOUS; SUBCUTANEOUS at 22:37

## 2017-04-18 RX ADMIN — CEFTRIAXONE SODIUM 1 G: 1 INJECTION, POWDER, FOR SOLUTION INTRAMUSCULAR; INTRAVENOUS at 08:43

## 2017-04-18 RX ADMIN — CLONIDINE HYDROCHLORIDE 0.1 MG: 0.1 TABLET ORAL at 08:39

## 2017-04-18 RX ADMIN — SODIUM CHLORIDE TAB 1 GM 1 G: 1 TAB at 17:54

## 2017-04-18 RX ADMIN — OXYCODONE HYDROCHLORIDE 10 MG: 10 TABLET ORAL at 01:37

## 2017-04-18 RX ADMIN — FLUCONAZOLE 100 MG: 100 TABLET ORAL at 08:39

## 2017-04-18 RX ADMIN — OXYCODONE HYDROCHLORIDE 10 MG: 10 TABLET ORAL at 22:37

## 2017-04-18 RX ADMIN — HEPARIN SODIUM 5000 UNITS: 5000 INJECTION, SOLUTION INTRAVENOUS; SUBCUTANEOUS at 06:11

## 2017-04-18 RX ADMIN — METOPROLOL TARTRATE 50 MG: 50 TABLET, FILM COATED ORAL at 08:39

## 2017-04-18 RX ADMIN — ASPIRIN 325 MG: 325 TABLET, DELAYED RELEASE ORAL at 08:39

## 2017-04-18 RX ADMIN — Medication 400 MG: at 08:39

## 2017-04-18 RX ADMIN — HEPARIN SODIUM 5000 UNITS: 5000 INJECTION, SOLUTION INTRAVENOUS; SUBCUTANEOUS at 14:54

## 2017-04-18 RX ADMIN — OXYCODONE HYDROCHLORIDE 10 MG: 10 TABLET ORAL at 12:33

## 2017-04-18 RX ADMIN — ATORVASTATIN CALCIUM 40 MG: 40 TABLET, FILM COATED ORAL at 22:37

## 2017-04-18 RX ADMIN — SODIUM CHLORIDE TAB 1 GM 1 G: 1 TAB at 12:33

## 2017-04-18 RX ADMIN — NICOTINE 14 MG: 14 PATCH, EXTENDED RELEASE TRANSDERMAL at 06:11

## 2017-04-18 RX ADMIN — OXYCODONE HYDROCHLORIDE 10 MG: 10 TABLET ORAL at 08:39

## 2017-04-18 RX ADMIN — Medication 100 MG: at 08:40

## 2017-04-18 RX ADMIN — SODIUM CHLORIDE TAB 1 GM 1 G: 1 TAB at 08:43

## 2017-04-18 ASSESSMENT — ENCOUNTER SYMPTOMS
STRIDOR: 0
SHORTNESS OF BREATH: 0
BLURRED VISION: 0
DEPRESSION: 0
NERVOUS/ANXIOUS: 0
HEARTBURN: 0
SPUTUM PRODUCTION: 0
ORTHOPNEA: 0
NECK PAIN: 0
DIARRHEA: 0
FEVER: 0
VOMITING: 0
WEAKNESS: 1
EYE PAIN: 0
ABDOMINAL PAIN: 0
COUGH: 0
FOCAL WEAKNESS: 0
BACK PAIN: 0
PALPITATIONS: 0
DIZZINESS: 0
INSOMNIA: 0
WEIGHT LOSS: 0
NAUSEA: 0
EYE DISCHARGE: 0
SEIZURES: 0
HEADACHES: 0
EYE REDNESS: 0
MYALGIAS: 0
CHILLS: 0

## 2017-04-18 ASSESSMENT — LIFESTYLE VARIABLES
AGITATION: NORMAL ACTIVITY
TREMOR: NO TREMOR
ANXIETY: NO ANXIETY (AT EASE)
HEADACHE, FULLNESS IN HEAD: NOT PRESENT
NAUSEA AND VOMITING: NO NAUSEA AND NO VOMITING
ORIENTATION AND CLOUDING OF SENSORIUM: ORIENTED AND CAN DO SERIAL ADDITIONS
TOTAL SCORE: 0
VISUAL DISTURBANCES: NOT PRESENT
PAROXYSMAL SWEATS: NO SWEAT VISIBLE
AUDITORY DISTURBANCES: NOT PRESENT

## 2017-04-18 ASSESSMENT — PAIN SCALES - GENERAL
PAINLEVEL_OUTOF10: 6
PAINLEVEL_OUTOF10: 7
PAINLEVEL_OUTOF10: 4
PAINLEVEL_OUTOF10: 7
PAINLEVEL_OUTOF10: 7
PAINLEVEL_OUTOF10: 0
PAINLEVEL_OUTOF10: 9
PAINLEVEL_OUTOF10: 7
PAINLEVEL_OUTOF10: 9

## 2017-04-18 NOTE — CARE PLAN
Problem: Pain Management  Goal: Pain level will decrease to patient’s comfort goal  Intervention: Follow pain managment plan developed in collaboration with patient and Interdisciplinary Team  Pt assessed for pain regularly and medicated PRN per MAR      Problem: Skin Integrity  Goal: Risk for impaired skin integrity will decrease  Intervention: Implement precautions to protect skin integrity in collaboration with the interdisciplinary team  Pt turned and positioned every 2 hours. Incontinence care provided as needed. Condom Cath in place.

## 2017-04-18 NOTE — OP REPORT
DATE OF SERVICE:  04/17/2017    SURGEON:  Joesph Monroy MD    ASSISTANT:  Patrick Mensah MD    PROCEDURES:  1.  Left below-knee fem-pop bypass graft utilizing in situ greater saphenous   vein.  2.  Left common femoral artery endarterectomy.  3.  Left below-knee popliteal artery endarterectomy.    ANESTHESIA:  General.    ESTIMATED BLOOD LOSS:  Less than 50 mL.    PREOPERATIVE DIAGNOSIS:  Gangrene, left forefoot.    POSTOPERATIVE DIAGNOSIS:  Gangrene, left forefoot.    SUMMARY:  This 71-year-old alcoholic male was admitted to hospital with severe   hyponatremia as well as gangrene of his forefoot.  He was noted to have   significant PVD and at this time is undergoing reconstruction for limb   salvage.  He underwent angiography confirming an SFA occlusion as well as   distal disease and an occluded left profunda femoris vessel.    DESCRIPTION OF PROCEDURE:  The patient was taken to the operating room.    Satisfactory general anesthesia was induced via endotracheal intubation.    Patient was prepped and draped.  Incision was made in the left groin and the   underlying common femoral artery was skeletonized and circled with tapes.  The   saphenofemoral junction was skeletonized and side branches ligated and   through a series of longitudinal incisions on the thigh and proximal calf, a   greater saphenous vein exposed with side branches clipped.    Dissection further below the knee allowed a below-knee popliteal artery to be   skeletonized and circled with tapes.    The patient was heparinized with 5000 units of heparin intravenously.  The   saphenofemoral junction disconnected with the stump oversewn with 5-0 Prolene   suture.  The initial valve was removed under direct vision.  A common femoral   artery incision was then made and an endarterectomy was performed.  The   saphenofemoral junction was then sewn to the common femoral artery with   running 5-0 Prolene suture.    Distally, the vein was clipped and divided  after being marked and retrograde   valvulotome was used to transect the valves.  A distal anastomosis was   performed to the below-knee popliteal artery, which was endarterectomized for   a small segment.  This demonstrated good distal signal on Doppler.    The patient was partially reversed with protamine sulfate.  The wounds were   irrigated copiously with normal saline solution and sewn with 2-0 Vicryl and   staples.  Wounds were dressed and patient sent to recovery room in good   condition.  No specimens were sent to pathology.       ____________________________________     ANA RODRIGUEZ MD    TER / NTS    DD:  04/17/2017 17:18:21  DT:  04/17/2017 19:29:28    D#:  [id number]  Job#:  [id number]    cc: Patrick Mensah MD, NORMAN BEST MD

## 2017-04-18 NOTE — THERAPY
Pt laying in bed refusing any movement secondary to pain; discussed importance of use of moon boots in bed to off load heels for wound management pt refusing. Nursing notified.     Minna Verde PT, [phone]

## 2017-04-18 NOTE — OR SURGEON
Immediate Post-Operative Note      PreOp Diagnosis: gangrene left forefoot  PostOp Diagnosis:   same    Procedure(s):  FEMORAL POPLITEAL BYPASS - Wound Class: Clean    Surgeon(s):  Joesph Monroy M.D./Rodrick QUIROS  Anesthesiologist/Type of Anesthesia:  Anesthesiologist: Pilar Hernandez M.D./General    Surgical Staff:  Circulator: Vivi Bhatt R.N.  Relief Circulator: Norma Chong R.N.  Scrub Person: Annabel Crawford    Specimen:   no    Estimated Blood Loss:       Findings:       Complications:           4/17/2017 5:10 PM Joesph Monroy

## 2017-04-18 NOTE — PROGRESS NOTES
Pt c/o pain in legs, medicated per MAR. No other needs at this time. No change from previous assessment.  Fall precautions in place, call light in reach.

## 2017-04-18 NOTE — THERAPY
Attempted to see pt for OT tx; however pt declined 2/2 increased pain post BR use with nursing. Will attempt later as appropriate.

## 2017-04-18 NOTE — PROGRESS NOTES
Surgical Progress Note    Author: Joesph Monroy Date & Time created: 2017   5:56 AM     Interval Events:  S/p left fem pop bypass yesterday for forefoot gangrene.  Good pulse in graft.  D/w nursing and talked to family yesterday  ROS  Hemodynamics:  Temp (24hrs), Av.7 °C (98.1 °F), Min:36.1 °C (96.9 °F), Max:37.1 °C (98.7 °F)  Temperature: 36.1 °C (96.9 °F)  Pulse  Av.4  Min: 55  Max: 118   Blood Pressure : 124/66 mmHg, NIBP: 140/72 mmHg     Respiratory:    Respiration: 17, Pulse Oximetry: 99 %     Work Of Breathing / Effort: Mild  RUL Breath Sounds: Clear, RML Breath Sounds: Diminished, RLL Breath Sounds: Diminished, RIGO Breath Sounds: Clear, LLL Breath Sounds: Diminished  Neuro:  GCS       Fluids:    Intake/Output Summary (Last 24 hours) at 17 0556  Last data filed at 17 0400   Gross per 24 hour   Intake    120 ml   Output    500 ml   Net   -380 ml     Weight: 61.5 kg (135 lb 9.3 oz)  Current Diet Order   Procedures   • DIET NPO     Physical Exam  Labs:  No results found for this or any previous visit (from the past 24 hour(s)).  Medical Decision Making, by Problem:  Active Hospital Problems    Diagnosis   • Leucocytosis [D72.829]     Priority: High   • Alcohol withdrawal (CMS-HCC) [F10.239]     Priority: High   • Alcohol dependence (CMS-HCC) [F10.20]     Priority: High   • Ischemia of left lower extremity [I99.8]     Priority: High   • Aspiration pneumonia (HCC) [J69.0]   • Encephalopathy acute [G93.40]   • HTN (hypertension), malignant [I10]   • Hyponatremia [E87.1]   • Smoking [F17.200]   • Falls frequently [R29.6]   • Hypothyroidism [E03.9]   • UTI (urinary tract infection) [N39.0]     Plan:  Needs PT.  Placed in knee immobilizers because of developing contractures.  Could go to ECF/SNF prior to any amputations on foot    Quality Measures:  Core Measures    Discussed patient condition with RN

## 2017-04-18 NOTE — CARE PLAN
Problem: Nutritional:  Goal: Achieve adequate nutritional intake  Patient will consume 50% of meals   Outcome: PROGRESSING SLOWER THAN EXPECTED  Pt recently advanced to Cardiac; dysphagia II + NTL diet. Pt's diet just advanced this date; therefore, unable to determine PO intake at this time. However, pt without adequate nutrition x4 days 2' recent procedure. RD to encourage adequate intake of meals and nutrition rep to honor pt food preferences within dietary restrictions to optimize PO intake.     RD to monitor PO adequacy, wt, and labs to leave further recommendations accordingly.

## 2017-04-18 NOTE — PROGRESS NOTES
Bedside report received, Pt care assumed. Tele box on. VSS, Pt assessment complete. Pt AOX4, no signs of distress noted at this time.  Pt c/o of 9/10 pain, PRN pain medications given. Pt denies any additional needs at this time. Bed in lowest position, bed alarm is on, ambulates with X2 assistance. POC discussed with Pt/family, verbalizes understanding, no questions at this time. Pt educated on fall risk and verbalizes understanding, call light within reach, will continue to monitor.

## 2017-04-18 NOTE — PROGRESS NOTES
Hospital Medicine Progress Note, Adult, Complex               Author: Clark Calix Date & Time created: 4/18/2017  8:17 AM     CC: found down    Interval History:  70 y/o M with PMH of CVA, CAD, hepatitis C, prostate cancer, COPD, admitted for above.    Complaint about left foot pain.    Review of Systems:  Review of Systems   Constitutional: Positive for malaise/fatigue. Negative for fever, chills and weight loss.   HENT: Negative for congestion and nosebleeds.    Eyes: Negative for blurred vision, pain, discharge and redness.   Respiratory: Negative for cough, sputum production, shortness of breath and stridor.    Cardiovascular: Negative for chest pain, palpitations and orthopnea.   Gastrointestinal: Negative for heartburn, nausea, vomiting, abdominal pain and diarrhea.   Genitourinary: Negative for dysuria, urgency and frequency.   Musculoskeletal: Negative for myalgias, back pain and neck pain.        Left foot pain   Skin: Negative for itching and rash.   Neurological: Positive for weakness. Negative for dizziness, focal weakness, seizures and headaches.   Psychiatric/Behavioral: Negative for depression. The patient is not nervous/anxious and does not have insomnia.        Physical Exam:  Physical Exam   Constitutional: He is oriented to person, place, and time. No distress.   HENT:   Head: Normocephalic and atraumatic.   Mouth/Throat: Oropharynx is clear and moist.   Eyes: Conjunctivae and EOM are normal. Pupils are equal, round, and reactive to light.   Neck: Normal range of motion. Neck supple. No tracheal deviation present. No thyromegaly present.   Cardiovascular: Normal rate and regular rhythm.    No murmur heard.  Pulmonary/Chest: Effort normal and breath sounds normal. No respiratory distress. He has no wheezes.   Abdominal: Soft. Bowel sounds are normal. He exhibits no distension. There is no tenderness.   Musculoskeletal: He exhibits tenderness. He exhibits no edema.   Neurological: He is alert and  oriented to person, place, and time. No cranial nerve deficit.   Skin: Skin is warm and dry. He is not diaphoretic. There is pallor.   Gangrenous left toes   Psychiatric: He has a normal mood and affect. His behavior is normal. Thought content normal.       Labs:        Invalid input(s): [id number]      Recent Labs      17   SODIUM  137   POTASSIUM  3.7   CHLORIDE  105   CO2  25   BUN  13   CREATININE  0.70   MAGNESIUM  1.6   PHOSPHORUS  2.8   CALCIUM  8.6     Recent Labs      17   GLUCOSE  94     Recent Labs      17   RBC  5.73   HEMOGLOBIN  14.4   HEMATOCRIT  46.3   PLATELETCT  524*     Recent Labs      17   WBC  12.7*           Hemodynamics:  Temp (24hrs), Av.7 °C (98.1 °F), Min:36.1 °C (96.9 °F), Max:37.1 °C (98.7 °F)  Temperature: 36.1 °C (96.9 °F)  Pulse  Av.4  Min: 55  Max: 118   Blood Pressure : 124/66 mmHg, NIBP: 140/72 mmHg     Respiratory:    Respiration: 17, Pulse Oximetry: 99 %     Work Of Breathing / Effort: Mild  RUL Breath Sounds: Clear, RML Breath Sounds: Diminished, RLL Breath Sounds: Diminished, RIGO Breath Sounds: Clear, LLL Breath Sounds: Diminished  Fluids:    Intake/Output Summary (Last 24 hours) at 17 0817  Last data filed at 17 0400   Gross per 24 hour   Intake      0 ml   Output    500 ml   Net   -500 ml     Weight: 61.5 kg (135 lb 9.3 oz)  GI/Nutrition:  Orders Placed This Encounter   Procedures   • DIET NPO     Standing Status: Standing      Number of Occurrences: 8      Standing Expiration Date:      Order Specific Question:  Restrict to:     Answer:  Sips with Medications [3]     Medical Decision Making, by Problem:  Active Hospital Problems    Diagnosis   • Leucocytosis [D72.829]  - improving     • Alcohol withdrawal (CMS-HCC) [F10.239]  - recovered     • Alcohol dependence (CMS-HCC) [F10.20]     • Ischemia of left lower extremity [I99.8]  - post fem-pop bypass on   - vascular surgeon following     •  Aspiration pneumonia (HCC) [J69.0]     • Encephalopathy acute [G93.40]  - resolved     • HTN (hypertension), malignant [I10]  - improving     • Hyponatremia [E87.1]     • Smoking [F17.200]     • Falls frequently [R29.6]     • Hypothyroidism [E03.9]     • UTI (urinary tract infection) [N39.0]  - on ceftriaxone total 7 days       Labs reviewed, Medications reviewed and Radiology images reviewed        DVT Prophylaxis: Heparin        Assessed for rehab: Patient was assess for and/or received rehabilitation services during this hospitalization

## 2017-04-18 NOTE — PROGRESS NOTES
Received report and assumed care of patient. Assessment complete, VSS, no complaints at this time. Pedal pulses heard by doppler. Incision sites checked with day RN, old drainage present and marked on dressing. Discussed plan of care with patient and answered all questions. Fall precautions in place, bed alarm on, and call light in reach. Will continue to monitor.

## 2017-04-19 PROCEDURE — 770020 HCHG ROOM/CARE - TELE (206)

## 2017-04-19 PROCEDURE — 700102 HCHG RX REV CODE 250 W/ 637 OVERRIDE(OP): Performed by: HOSPITALIST

## 2017-04-19 PROCEDURE — 700111 HCHG RX REV CODE 636 W/ 250 OVERRIDE (IP): Performed by: HOSPITALIST

## 2017-04-19 PROCEDURE — 99232 SBSQ HOSP IP/OBS MODERATE 35: CPT | Performed by: INTERNAL MEDICINE

## 2017-04-19 PROCEDURE — 700105 HCHG RX REV CODE 258: Performed by: HOSPITALIST

## 2017-04-19 PROCEDURE — A9270 NON-COVERED ITEM OR SERVICE: HCPCS | Performed by: HOSPITALIST

## 2017-04-19 PROCEDURE — 97530 THERAPEUTIC ACTIVITIES: CPT

## 2017-04-19 PROCEDURE — A9270 NON-COVERED ITEM OR SERVICE: HCPCS | Performed by: NURSE PRACTITIONER

## 2017-04-19 PROCEDURE — 700102 HCHG RX REV CODE 250 W/ 637 OVERRIDE(OP): Performed by: NURSE PRACTITIONER

## 2017-04-19 RX ORDER — CLONIDINE HYDROCHLORIDE 0.1 MG/1
0.1 TABLET ORAL 2 TIMES DAILY
Qty: 60 TAB
Start: 2017-04-19 | End: 2017-07-07

## 2017-04-19 RX ORDER — OXYCODONE HYDROCHLORIDE 5 MG/1
5 TABLET ORAL EVERY 4 HOURS PRN
Qty: 30 TAB | Refills: 0
Start: 2017-04-19 | End: 2017-07-07

## 2017-04-19 RX ORDER — SODIUM CHLORIDE 1 G/1
1 TABLET ORAL
Qty: 90 TAB | Refills: 3 | Status: ON HOLD
Start: 2017-04-19 | End: 2017-05-26

## 2017-04-19 RX ORDER — CIPROFLOXACIN 500 MG/1
500 TABLET, FILM COATED ORAL 2 TIMES DAILY
Qty: 6 TAB | Refills: 0
Start: 2017-04-19 | End: 2017-04-22

## 2017-04-19 RX ORDER — ATORVASTATIN CALCIUM 40 MG/1
40 TABLET, FILM COATED ORAL
Qty: 30 TAB
Start: 2017-04-19 | End: 2017-07-07

## 2017-04-19 RX ORDER — LANOLIN ALCOHOL/MO/W.PET/CERES
100 CREAM (GRAM) TOPICAL DAILY
Qty: 30 TAB
Start: 2017-04-19 | End: 2017-07-07

## 2017-04-19 RX ORDER — METOPROLOL TARTRATE 50 MG/1
50 TABLET, FILM COATED ORAL 2 TIMES DAILY
Qty: 60 TAB
Start: 2017-04-19 | End: 2017-07-07

## 2017-04-19 RX ADMIN — METOPROLOL TARTRATE 50 MG: 50 TABLET, FILM COATED ORAL at 20:36

## 2017-04-19 RX ADMIN — CEFTRIAXONE SODIUM 1 G: 1 INJECTION, POWDER, FOR SOLUTION INTRAMUSCULAR; INTRAVENOUS at 09:04

## 2017-04-19 RX ADMIN — Medication 400 MG: at 09:01

## 2017-04-19 RX ADMIN — ASPIRIN 325 MG: 325 TABLET, DELAYED RELEASE ORAL at 09:00

## 2017-04-19 RX ADMIN — HEPARIN SODIUM 5000 UNITS: 5000 INJECTION, SOLUTION INTRAVENOUS; SUBCUTANEOUS at 22:50

## 2017-04-19 RX ADMIN — OXYCODONE HYDROCHLORIDE 10 MG: 10 TABLET ORAL at 20:36

## 2017-04-19 RX ADMIN — ACETAMINOPHEN 650 MG: 325 TABLET, FILM COATED ORAL at 17:46

## 2017-04-19 RX ADMIN — Medication 100 MG: at 09:01

## 2017-04-19 RX ADMIN — ACETAMINOPHEN 650 MG: 325 TABLET, FILM COATED ORAL at 12:07

## 2017-04-19 RX ADMIN — CLONIDINE HYDROCHLORIDE 0.1 MG: 0.1 TABLET ORAL at 09:02

## 2017-04-19 RX ADMIN — SODIUM CHLORIDE TAB 1 GM 1 G: 1 TAB at 11:30

## 2017-04-19 RX ADMIN — SODIUM CHLORIDE TAB 1 GM 1 G: 1 TAB at 17:40

## 2017-04-19 RX ADMIN — HEPARIN SODIUM 5000 UNITS: 5000 INJECTION, SOLUTION INTRAVENOUS; SUBCUTANEOUS at 06:22

## 2017-04-19 RX ADMIN — SODIUM CHLORIDE TAB 1 GM 1 G: 1 TAB at 09:03

## 2017-04-19 RX ADMIN — OXYCODONE HYDROCHLORIDE 10 MG: 10 TABLET ORAL at 03:41

## 2017-04-19 RX ADMIN — NICOTINE 14 MG: 14 PATCH, EXTENDED RELEASE TRANSDERMAL at 06:22

## 2017-04-19 RX ADMIN — HEPARIN SODIUM 5000 UNITS: 5000 INJECTION, SOLUTION INTRAVENOUS; SUBCUTANEOUS at 16:17

## 2017-04-19 RX ADMIN — CLONIDINE HYDROCHLORIDE 0.1 MG: 0.1 TABLET ORAL at 20:36

## 2017-04-19 RX ADMIN — METOPROLOL TARTRATE 50 MG: 50 TABLET, FILM COATED ORAL at 09:01

## 2017-04-19 RX ADMIN — LEVOTHYROXINE SODIUM 75 MCG: 75 TABLET ORAL at 06:23

## 2017-04-19 RX ADMIN — ATORVASTATIN CALCIUM 40 MG: 40 TABLET, FILM COATED ORAL at 20:36

## 2017-04-19 ASSESSMENT — PAIN SCALES - GENERAL
PAINLEVEL_OUTOF10: 7
PAINLEVEL_OUTOF10: 0
PAINLEVEL_OUTOF10: 0
PAINLEVEL_OUTOF10: 5
PAINLEVEL_OUTOF10: 7
PAINLEVEL_OUTOF10: 0
PAINLEVEL_OUTOF10: 6
PAINLEVEL_OUTOF10: 0
PAINLEVEL_OUTOF10: 6

## 2017-04-19 ASSESSMENT — GAIT ASSESSMENTS: GAIT LEVEL OF ASSIST: UNABLE TO PARTICIPATE

## 2017-04-19 NOTE — DISCHARGE SUMMARY
Discharge summary    Admission date: 4/8/2017    Discharge date: 4/19/2016    Patient's Name: Isiah Lagos  MRN: [id number]    Discharge diagnosis  Left ischemic foot post femoral-popliteal bypass by Dr. Monroy  Alcohol withdrawal: resolved  Acute encephalopathy: resolved  Urinary tract infection  History of CVA    Consultant:   Dr. Monroy(vascular)    Condition on discharge: stable    Disposition: SNF    Diet: cardiac    Hospital summary:  Please refer to H&P for details. In short 71 old M initially admitted after he was found down. Had history of CVA and alcohol. He was found to have left foot ischemia. Dr. Monroy was consulted and did fem-popliteal by pass done and recommended rehab and follow up with him as outpatient for further amputation. He went through alcohol withdrawal, and currently resolved from it. He had AMS due to alcohol withdrawal and resolved. PT/OT saw him and recommended rehab. Also found to have UTI and to finish cirpro until 4/22.       Medication List      START taking these medications       Instructions    atorvastatin 40 MG Tabs   Last time this was given:  40 mg on 4/18/2017 10:37 PM   Commonly known as:  LIPITOR    Take 1 Tab by mouth every bedtime.   Dose:  40 mg       ciprofloxacin 500 MG Tabs   Commonly known as:  CIPRO    Take 1 Tab by mouth 2 times a day for 3 days.   Dose:  500 mg       clonidine 0.1 MG Tabs   Last time this was given:  0.1 mg on 4/19/2017  9:02 AM   Commonly known as:  CATAPRES    Take 1 Tab by mouth 2 Times a Day.   Dose:  0.1 mg       metoprolol 50 MG Tabs   Last time this was given:  50 mg on 4/19/2017  9:01 AM   Commonly known as:  LOPRESSOR    Take 1 Tab by mouth 2 Times a Day.   Dose:  50 mg       oxycodone immediate-release 5 MG Tabs   Last time this was given:  10 mg on 4/19/2017  3:41 AM   Commonly known as:  ROXICODONE    Take 1 Tab by mouth every four hours as needed.   Dose:  5 mg       sodium chloride 1 GM Tabs   Last time this was given:  1 g  on 4/19/2017  9:03 AM   Commonly known as:  SALT    Take 1 Tab by mouth 3 times a day, with meals.   Dose:  1 g       thiamine 100 MG tablet   Last time this was given:  100 mg on 4/19/2017  9:01 AM   Commonly known as:  THIAMINE    Take 1 Tab by mouth every day.   Dose:  100 mg         CONTINUE taking these medications       Instructions    aspirin  MG Tbec   Last time this was given:  325 mg on 4/19/2017  9:00 AM   Commonly known as:  ECOTRIN    Take 325 mg by mouth every day.   Dose:  325 mg       levothyroxine 75 MCG Tabs   Last time this was given:  75 mcg on 4/19/2017  6:23 AM   Commonly known as:  SYNTHROID    Take 75 mcg by mouth every morning before breakfast.   Dose:  75 mcg       Mirabegron ER 25 MG Tb24   Last time this was given:  50 mg on 4/19/2017  9:07 AM   Commonly known as:  MYRBETRIQ    Take 50 mg by mouth every day.   Dose:  50 mg             Lab Results   Component Value Date/Time    SODIUM 137 04/16/2017 03:17 AM    POTASSIUM 3.7 04/16/2017 03:17 AM    CHLORIDE 105 04/16/2017 03:17 AM    CO2 25 04/16/2017 03:17 AM    GLUCOSE 94 04/16/2017 03:17 AM    BUN 13 04/16/2017 03:17 AM    CREATININE 0.70 04/16/2017 03:17 AM        Lab Results   Component Value Date/Time    WBC 12.7* 04/16/2017 03:17 AM    RBC 5.73 04/16/2017 03:17 AM    HEMOGLOBIN 12.7* 04/18/2017 09:59 PM    HEMATOCRIT 41.8* 04/18/2017 09:59 PM    MCV 80.8* 04/16/2017 03:17 AM    MCH 25.1* 04/16/2017 03:17 AM    MCHC 31.1* 04/16/2017 03:17 AM    MPV 9.4 04/16/2017 03:17 AM    NEUTROPHILS-POLYS 76.10* 04/14/2017 05:25 AM    LYMPHOCYTES 8.50* 04/14/2017 05:25 AM    MONOCYTES 7.30 04/14/2017 05:25 AM    EOSINOPHILS 5.00 04/14/2017 05:25 AM    BASOPHILS 2.40* 04/14/2017 05:25 AM    HYPOCHROMIA 1+ 04/16/2012 04:45 PM        Procedure      Imaging        INSTRUCTIONS:     The patient was instructed to return to the ER in the event of worsening symptoms. I have counseled the patient on the importance of compliance and the patient has  agreed to proceed with all medical recommendations and follow up plan indicated above.   The patient understands that all medications come with benefits and risks. Risks may include permanent injury or death and these risks can be minimized with close reassessment and monitoring.        Follow up with PCP in 1 week  Follow up with Dr. Monroy in 1 week    Time spent on discharge day patient visit, preparing discharge paperwork and arranging for patient follow up 35 mins.

## 2017-04-19 NOTE — PROGRESS NOTES
"Pt got very upset with CNA and RN when told we were going to reposition him. He started yelling, \"I do not want to be moved. Why cant I just be comfortable?\" Refusal is documented in flowsheet.  "

## 2017-04-19 NOTE — DISCHARGE PLANNING
[facility]S received a SNF choice form. Per the choice form the referral has been sent to [facility]

## 2017-04-19 NOTE — PROGRESS NOTES
Dr. Echols paged regarding large amount of blood on dressing over incision site. Incision cleaned with NS and dressing changed. Orders to monitor site and apply pressure if bleeding continues.

## 2017-04-19 NOTE — DISCHARGE PLANNING
Follow up for rehab documentation indicates not tolerating therapy will monitor for IRF versus skilled nursing.

## 2017-04-19 NOTE — CARE PLAN
Problem: Safety  Goal: Will remain free from falls  Intervention: Implement fall precautions    04/18/17 2000   OTHER   Environmental Precautions Personal Belongings, Wastebasket, Call Bell etc. in Easy Reach;Treaded Slipper Socks on Patient;Bed in Low Position;Transferred to Stronger Side;Communication Sign for Patients & Families   IV Pole on Same Side of Bed as Bathroom Yes   Bedrails Bedrails Closest to Bathroom Down   Chair/Bed Strip Alarm Yes - Alarm On   Bed Alarm (Built in - for ICU ONLY) Yes - Alarm On           Problem: Skin Integrity  Goal: Risk for impaired skin integrity will decrease  Intervention: Assess and monitor skin integrity, appearance and/or temperature  Pt's incision site assessed and dressing replaced.

## 2017-04-19 NOTE — PROGRESS NOTES
Bedside report received. Patient A&O X 3, confused at times, tele- SR,  2L NC,  Voids- condom cath, incont, Activity- x2, Diet- cardiac,dyspha,nectar thick. Complains of pain 0/10 medicated per MAR. POC discussed with patient. Pt verbalized understanding. Call light and belongings with in reach.  Bed locked and in lowest position, alarm and fall precautions in place.

## 2017-04-19 NOTE — THERAPY
"Pt laying in bed; reluctant to participate secondary to pain. Pt sitting to EOB w/ Man and returning to bed with stand by assist. Attempting to stand x3 however does not accept assistance therefore does not complete task. B knee immobilizers order by surgeon to be donned in bed to limit flexion. Additionally donning moon boots at ankles for wound prevention. Pt will require further rehabiliation at transitional facility.    Physical Therapy Treatment completed.   Bed Mobility:  Supine to Sit: Minimal Assist  Transfers: Sit to Stand:  (Attempting x3 pt not accepting assistance from PT)  Gait: Level Of Assist: Unable to Participate   Plan of Care: Will benefit from Physical Therapy 3 times per week  Discharge Recommendations: Equipment: Will Continue to Assess for Equipment Needs. Post-acute therapy Discharge to a transitional care facility for continued skilled therapy services.    [place] PT, -[phone]     See \"Rehab Therapy-Acute\" Patient Summary Report for complete documentation.       "

## 2017-04-19 NOTE — PROGRESS NOTES
Received bed side report from AM nurse. Patient is A&Ox3.  Patient experiencing pain in left leg where incision is. Repositioned and incision site assessed. Patient is resting in bed. Bed alarm is on and call light within reach. Will continue to monitor.     12 hr cc

## 2017-04-19 NOTE — PROGRESS NOTES
Surgical Progress Note    Author: Joesph Monroy Date & Time created: 2017   8:34 AM     Interval Events:  Orders written for bathing and dressings  ROS  Hemodynamics:  Temp (24hrs), Av.3 °C (97.3 °F), Min:36 °C (96.8 °F), Max:36.8 °C (98.2 °F)  Temperature: 36.8 °C (98.2 °F)  Pulse  Av.6  Min: 55  Max: 118   Blood Pressure : 131/62 mmHg     Respiratory:    Respiration: 18, Pulse Oximetry: 96 %     Work Of Breathing / Effort: Mild  RUL Breath Sounds: Clear, RML Breath Sounds: Diminished, RLL Breath Sounds: Diminished, RIGO Breath Sounds: Clear, LLL Breath Sounds: Diminished  Neuro:  GCS       Fluids:    Intake/Output Summary (Last 24 hours) at 17 0834  Last data filed at 17 0629   Gross per 24 hour   Intake    780 ml   Output    350 ml   Net    430 ml     Weight: 62.5 kg (137 lb 12.6 oz)  Current Diet Order   Procedures   • DIET ORDER     Physical Exam  Labs:  Recent Results (from the past 24 hour(s))   HEMOGLOBIN AND HEMATOCRIT    Collection Time: 17  9:59 PM   Result Value Ref Range    Hemoglobin 12.7 (L) 14.0 - 18.0 g/dL    Hematocrit 41.8 (L) 42.0 - 52.0 %     Medical Decision Making, by Problem:  Active Hospital Problems    Diagnosis   • Leucocytosis [D72.829]     Priority: High   • Alcohol withdrawal (CMS-[facility]) [F10.239]     Priority: High   • Alcohol dependence (CMS-[facility]) [F10.20]     Priority: High   • Ischemia of left lower extremity [I99.8]     Priority: High   • Aspiration pneumonia (HCC) [J69.0]   • Encephalopathy acute [G93.40]   • HTN (hypertension), malignant [I10]   • Hyponatremia [E87.1]   • Smoking [F17.200]   • Falls frequently [R29.6]   • Hypothyroidism [E03.9]   • UTI (urinary tract infection) [N39.0]     Plan:  Will see in f/u and decide type of foot amputation    Quality Measures:  Core Measures

## 2017-04-19 NOTE — DISCHARGE PLANNING
Medical Social Work    TRUONG spoke with Rehab Clinical Admissions Coordinator who stated SNF would be a better placement for pt. SNF order already in, TRUONG obtained choice and faxed to [facility].

## 2017-04-20 VITALS
OXYGEN SATURATION: 93 % | BODY MASS INDEX: 19.11 KG/M2 | RESPIRATION RATE: 17 BRPM | HEIGHT: 72 IN | HEART RATE: 70 BPM | DIASTOLIC BLOOD PRESSURE: 77 MMHG | WEIGHT: 141.09 LBS | SYSTOLIC BLOOD PRESSURE: 143 MMHG | TEMPERATURE: 97.4 F

## 2017-04-20 PROCEDURE — A9270 NON-COVERED ITEM OR SERVICE: HCPCS | Performed by: HOSPITALIST

## 2017-04-20 PROCEDURE — 700111 HCHG RX REV CODE 636 W/ 250 OVERRIDE (IP): Performed by: HOSPITALIST

## 2017-04-20 PROCEDURE — A9270 NON-COVERED ITEM OR SERVICE: HCPCS | Performed by: NURSE PRACTITIONER

## 2017-04-20 PROCEDURE — 700102 HCHG RX REV CODE 250 W/ 637 OVERRIDE(OP): Performed by: HOSPITALIST

## 2017-04-20 PROCEDURE — 92526 ORAL FUNCTION THERAPY: CPT

## 2017-04-20 PROCEDURE — 700105 HCHG RX REV CODE 258: Performed by: HOSPITALIST

## 2017-04-20 PROCEDURE — 99239 HOSP IP/OBS DSCHRG MGMT >30: CPT | Performed by: INTERNAL MEDICINE

## 2017-04-20 PROCEDURE — 700102 HCHG RX REV CODE 250 W/ 637 OVERRIDE(OP): Performed by: NURSE PRACTITIONER

## 2017-04-20 RX ADMIN — SODIUM CHLORIDE TAB 1 GM 1 G: 1 TAB at 10:10

## 2017-04-20 RX ADMIN — ASPIRIN 325 MG: 325 TABLET, DELAYED RELEASE ORAL at 10:11

## 2017-04-20 RX ADMIN — CEFTRIAXONE SODIUM 1 G: 1 INJECTION, POWDER, FOR SOLUTION INTRAMUSCULAR; INTRAVENOUS at 10:13

## 2017-04-20 RX ADMIN — OXYCODONE HYDROCHLORIDE 5 MG: 5 TABLET ORAL at 12:08

## 2017-04-20 RX ADMIN — Medication 100 MG: at 10:11

## 2017-04-20 RX ADMIN — CLONIDINE HYDROCHLORIDE 0.1 MG: 0.1 TABLET ORAL at 10:11

## 2017-04-20 RX ADMIN — LEVOTHYROXINE SODIUM 75 MCG: 75 TABLET ORAL at 05:53

## 2017-04-20 RX ADMIN — HEPARIN SODIUM 5000 UNITS: 5000 INJECTION, SOLUTION INTRAVENOUS; SUBCUTANEOUS at 05:53

## 2017-04-20 RX ADMIN — METOPROLOL TARTRATE 50 MG: 50 TABLET, FILM COATED ORAL at 10:11

## 2017-04-20 RX ADMIN — OXYCODONE HYDROCHLORIDE 10 MG: 10 TABLET ORAL at 05:52

## 2017-04-20 RX ADMIN — Medication 400 MG: at 10:11

## 2017-04-20 RX ADMIN — NICOTINE 14 MG: 14 PATCH, EXTENDED RELEASE TRANSDERMAL at 05:53

## 2017-04-20 ASSESSMENT — ENCOUNTER SYMPTOMS
HEARTBURN: 0
DIARRHEA: 0
NAUSEA: 0
VOMITING: 0
CHILLS: 0
INSOMNIA: 0
DEPRESSION: 0
SHORTNESS OF BREATH: 0
BLURRED VISION: 0
STRIDOR: 0
NECK PAIN: 0
NERVOUS/ANXIOUS: 0
EYE PAIN: 0
FEVER: 0
SEIZURES: 0
FOCAL WEAKNESS: 0
BACK PAIN: 0
COUGH: 0
MYALGIAS: 0
ABDOMINAL PAIN: 0
SPUTUM PRODUCTION: 0
EYE DISCHARGE: 0
ORTHOPNEA: 0
PALPITATIONS: 0
WEIGHT LOSS: 0
DIZZINESS: 0
WEAKNESS: 1
EYE REDNESS: 0
HEADACHES: 0

## 2017-04-20 ASSESSMENT — PAIN SCALES - GENERAL
PAINLEVEL_OUTOF10: 0
PAINLEVEL_OUTOF10: 5
PAINLEVEL_OUTOF10: 0
PAINLEVEL_OUTOF10: 6
PAINLEVEL_OUTOF10: 0

## 2017-04-20 NOTE — CARE PLAN
Problem: Pain Management  Goal: Pain level will decrease to patient’s comfort goal  Intervention: Follow pain managment plan developed in collaboration with patient and Interdisciplinary Team  Pt being medicated for pain appropriately.       Problem: Skin Integrity  Goal: Risk for impaired skin integrity will decrease  Intervention: Assess and monitor skin integrity, appearance and/or temperature  Pt 's dressing changed and incision cleaned with NS

## 2017-04-20 NOTE — DISCHARGE PLANNING
[facility]S received a message from Kwasi at [facility], the referral has been accepted.

## 2017-04-20 NOTE — CARE PLAN
Problem: Safety  Goal: Will remain free from injury  Outcome: PROGRESSING AS EXPECTED  Bed locked and in lowest position. Bed alarm on. Treaded socks. Call light and belongings with reach. Patient educated to call for assistance. Pt verbalized understanding. Hourly rounding in place.

## 2017-04-20 NOTE — DISCHARGE PLANNING
Transportation has been confirmed with [facility] for 1200 pm SW on floor Zoe and Marce at [facility] have been notified via phone.

## 2017-04-20 NOTE — DISCHARGE PLANNING
CCS received a transportation form to change the patient's transport to Ambulance. [facility] faxed the face sheet and PCS to [facility]. [facility] also called [place] to obtain authorization for the transportation. [facility] will follow.

## 2017-04-20 NOTE — PROGRESS NOTES
Bedside report received. Patient A&O X 3, confused at times, tele- SR,  RA- pt refuses to wear 2L NC,  Voids- condom cath, Activity- bed rest, leg hurst too much, Diet- cardiac/dysphagia . Complains of pain 5/10 left leg medicated per MAR. POC discussed with patient. Pt verbalized understanding. Call light and belongings with in reach.  Bed locked and in lowest position, alarm and fall precautions in place.

## 2017-04-20 NOTE — DISCHARGE PLANNING
CCS received a transport form to arrange transportation to transfer the patient to [facility]. [facility] called and spoke to Marce at [facility]. Marce has arranged for the [facility] to  the patient at 1200. TRUONG on floor Zoe has been notified via phone.

## 2017-04-20 NOTE — PROGRESS NOTES
No changes in pt status, AOX4, RA, pt keeps taking 2L NC off, tele/HR - SB/SR, voids- incontinent, does not keep the condom cath in place , tolerates cardiac diet.  5/10 Pain. Reviewed plan of care, continue to monitor vitals and manage pain.

## 2017-04-20 NOTE — DISCHARGE INSTRUCTIONS
Discharge Instructions    Discharged to other by ambulance with escort. Discharged via ambulance, hospital escort: Yes.  Special equipment needed: Not Applicable    Be sure to schedule a follow-up appointment with your primary care doctor or any specialists as instructed.     Discharge Plan: discussed  Smoking Cessation Offered: Patient Refused    I understand that a diet low in cholesterol, fat, and sodium is recommended for good health. Unless I have been given specific instructions below for another diet, I accept this instruction as my diet prescription.   Other diet: cardiac, dysphagia 2, nectar think    Special Instructions: pt going to rehab to improve circulation, follow up appointments.   Deep Vein Thrombosis Discharge Instructions    A deep vein thrombosis (DVT) is a blood clot (thrombus) that develops in a deep vein. A DVT is a clot in the deep, larger veins of the leg, arm, or pelvis. These are more dangerous than clots that might form in veins on the surface of the body. Deep vein thrombosis can lead to complications if the clot breaks off and travels in the bloodstream to the lungs.     CAUSES  Blood clots form in a vein for different reasons. Usually several things cause blood clots. They include:   · The flow of blood slows down.   · The inside of the vein is damaged in some way.   · The person has a condition that makes blood clot more easily. These conditions may include:  · Older age (especially over 75 years old).  · Having a history of blood clots.  · Having major or lengthy surgery. Hip surgery is particularly high-risk.   · Breaking a hip or leg.  · Sitting or lying still for a long time.  · Cancer or cancer treatment.  · Having a long, thin tube (catheter) placed inside a vein during a medical procedure.   · Being overweight (obese).  · Pregnancy and childbirth.  · Medicines with estrogen.  · Smoking.  · Other circulation or heart problems.     SYMPTOMS  When a clot forms, it can either  partially or totally block the blood flow in that vein. Symptoms of a DVT can include:  · Swelling of the leg or arm, especially if one side is much worse.  · Warmth and redness of the leg or arm, especially if one side is much worse.   · Pain in an arm or leg. If the clot is in the leg, symptoms may be more noticeable or worse when standing or walking.  If the blood clot travels to the lung, it may cause:  · Shortness of breath.  · Chest pain. The pain may be worsened by deep breaths.   · Coughing up thick mucus (phlegm), possibly flecked with blood.   Anyone with these symptoms should get emergency medical treatment right away. Call your local emergency  Services (911 in [place] if you have these symptoms.     DIAGNOSIS  If a DVT is suspected, your caregiver will take a full medical history. He or she will also perform a physical exam. Tests that also may be required include:   · Studies of the clotting properties of the blood.   · An ultrasound scan.   · X-rays to show the flow of blood when special dye is injected into the veins (venography).   · Studies of your lungs if you have any chest symptoms.     PREVENTION  · Exercise the legs regularly. Take a brisk 30 minute walk every day.   · Maintain a weight that is appropriate for your height.  · Avoid sitting or lying in bed for long periods of time without moving your legs.   · Women, particularly those over the age of 35, should consider the risks and benefits of taking estrogen medicine, including birth control pills.   · Do not smoke, especially if you take estrogen medicines.   · Long-distance travel can increase your risk. You should exercise your legs by walking or pumping the muscles every hour.   · In hospital prevention: Prevention may include medical and non medical measures.     TREATMENT  · The most common treatment for DVT is blood thinning (anticoagulant) medicine, which reduces the blood's tendency to clot. Anticoagulants can stop new blood clots  from forming and old ones from growing. They cannot dissolve existing clots. Your body does this by itself over time. Anticoagulants can be given by mouth, by intravenous (IV) access, or by injection. Your caregiver will determine the best program for you.   · Less commonly, clot-dissolving drugs (thrombolytics) are used to dissolve a DVT. They carry a high risk of bleeding, so they are used mainly in severe cases.   · Very rarely, a blood clot in the leg needs to be removed surgically.   · If you are unable to take anticoagulants, your caregiver may arrange for you to have a filter placed in a main vein in your belly (abdomen). This filter prevents clots from traveling to your lungs.     HOME CARE INSTRUCTIONS  Take all medicines prescribed by your caregiver. Follow the directions carefully.   · You will most likely continue taking anticoagulants after you leave the hospital. Your caregiver will advise you on the length of treatment (usually 3 to 5 months, sometimes for life).   · Taking too much or too little of an anticoagulant is dangerous. While taking this type of medicine, you will need to have regular blood tests to be sure the dose is correct. The dose can change for many reasons. It is critically important that you take this medicine exactly as prescribed, and that you have blood tests exactly as directed.   · Many foods can interfere with anticoagulants. These include foods high in vitamin K, such as spinach, kale, broccoli, cabbage, anatoliy and turnip greens, [place] sprouts, peas, cauliflower, seaweed, parsley, beef and pork liver, green tea, and soybean oil. Your caregiver should discuss limits on these foods with you or you should arrange a visit with a dietician to answer your questions.   · Many medicines can interfere with anticoagulants. You must tell your caregiver about any and all medicines you take. This includes all vitamins and supplements. Be especially cautious with aspirin and  anti-inflammatory medicines. Ask your caregiver before taking these.   · Anticoagulants can have side effects, mostly excessive bruising or bleeding. You will need to hold pressure over cuts for longer than usual. Avoid alcoholic drinks or consume only very small amounts while taking this medicine.    If you are taking an anticoagulant:  · Wear a medical alert bracelet.  · Notify your dentist or other caregivers before procedures.  · Avoid contact sports.    · Ask your caregiver how soon you can go back to normal activities. Not being active can lead to new clots. Ask for a list of what you should and should not do.   · Exercise your lower leg muscles. This is important while traveling.   · You may need to wear compression stockings. These are tight elastic stockings that apply pressure to the lower legs. This can help keep the blood in the legs from clotting.   · If you are a smoker, you should quit.   · Learn as much as you can about DVT.     SEEK MEDICAL CARE IF:  · You have unusual bruising or any bleeding problems.  · The swelling or pain in your affected arm or leg is not gradually improving.   · You anticipate surgery or long-distance travel. You should get specific advice on DVT prevention.   · You discover other family members with blood clots. This may require further testing for inherited diseases or conditions.     SEEK IMMEDIATE MEDICAL CARE IF:  · You develop chest pain.  · You develop severe shortness of breath.  · You begin to cough up bloody mucus or phlegm (sputum).  · You feel dizzy or faint.   · You develop swelling or pain in the leg.  · You have breathing problems after traveling.    MAKE SURE YOU:  · Understand these instructions.  · Will watch your condition.  Will get help right away if you are not doing well or get worse.     · Is patient discharged on Warfarin / Coumadin?   No     · Is patient Post Blood Transfusion?  No    Depression / Suicide Risk    As you are discharged from this  [facility] facility, it is important to learn how to keep safe from harming yourself.    Recognize the warning signs:  · Abrupt changes in personality, positive or negative- including increase in energy   · Giving away possessions  · Change in eating patterns- significant weight changes-  positive or negative  · Change in sleeping patterns- unable to sleep or sleeping all the time   · Unwillingness or inability to communicate  · Depression  · Unusual sadness, discouragement and loneliness  · Talk of wanting to die  · Neglect of personal appearance   · Rebelliousness- reckless behavior  · Withdrawal from people/activities they love  · Confusion- inability to concentrate     If you or a loved one observes any of these behaviors or has concerns about self-harm, here's what you can do:  · Talk about it- your feelings and reasons for harming yourself  · Remove any means that you might use to hurt yourself (examples: pills, rope, extension cords, firearm)  · Get professional help from the community (Mental Health, Substance Abuse, psychological counseling)  · Do not be alone:Call your Safe Contact- someone whom you trust who will be there for you.  · Call your local CRISIS HOTLINE 961-2800 or 489-545-8781  · Call your local Children's Mobile Crisis Response Team [place] (064) 252-8842 or www.EachNet  · Call the toll free National Suicide Prevention Hotlines   · National Suicide Prevention Lifeline 855-438-OUWZ (2733)  · National Hope Line Network 800-SUICIDE (321-7409)

## 2017-04-20 NOTE — CARE PLAN
Problem: Communication  Goal: The ability to communicate needs accurately and effectively will improve  Outcome: PROGRESSING AS EXPECTED  Discussed Plan of Care, daily medications with patient, reviewed diet, and activity.  Patient verbalized understanding but forgets very easily, reinforcement is needed

## 2017-04-20 NOTE — CARE PLAN
Problem: Communication  Goal: The ability to communicate needs accurately and effectively will improve  Outcome: PROGRESSING AS EXPECTED  Discussed Plan of Care, daily medications with patient, reviewed diet, and activity.  Patient verbalized understanding

## 2017-04-20 NOTE — PROGRESS NOTES
Hospital Medicine Progress Note, Adult, Complex               Author: Clark Calix Date & Time created: 4/20/2017  7:08 AM     CC: found down    Interval History:  70 y/o M with PMH of CVA, CAD, hepatitis C, prostate cancer, COPD, admitted for above.    Complaint about left foot pain. Other than that denies cp, n/v/d.    Review of Systems:  Review of Systems   Constitutional: Positive for malaise/fatigue. Negative for fever, chills and weight loss.   HENT: Negative for congestion and nosebleeds.    Eyes: Negative for blurred vision, pain, discharge and redness.   Respiratory: Negative for cough, sputum production, shortness of breath and stridor.    Cardiovascular: Negative for chest pain, palpitations and orthopnea.   Gastrointestinal: Negative for heartburn, nausea, vomiting, abdominal pain and diarrhea.   Genitourinary: Negative for dysuria, urgency and frequency.   Musculoskeletal: Negative for myalgias, back pain and neck pain.        Left foot pain   Skin: Negative for itching and rash.   Neurological: Positive for weakness. Negative for dizziness, focal weakness, seizures and headaches.   Psychiatric/Behavioral: Negative for depression. The patient is not nervous/anxious and does not have insomnia.        Physical Exam:  Physical Exam   Constitutional: He is oriented to person, place, and time. No distress.   HENT:   Head: Normocephalic and atraumatic.   Mouth/Throat: Oropharynx is clear and moist.   Eyes: Conjunctivae and EOM are normal. Pupils are equal, round, and reactive to light.   Neck: Normal range of motion. Neck supple. No tracheal deviation present. No thyromegaly present.   Cardiovascular: Normal rate and regular rhythm.    No murmur heard.  Pulmonary/Chest: Effort normal and breath sounds normal. No respiratory distress. He has no wheezes.   Abdominal: Soft. Bowel sounds are normal. He exhibits no distension. There is no tenderness.   Musculoskeletal: He exhibits tenderness. He exhibits no edema.    Neurological: He is alert and oriented to person, place, and time. No cranial nerve deficit.   Skin: Skin is warm and dry. He is not diaphoretic. There is pallor.   Gangrenous left toes   Psychiatric: He has a normal mood and affect. His behavior is normal. Thought content normal.       Labs:        Invalid input(s): [id number]      No results for input(s): SODIUM, POTASSIUM, CHLORIDE, CO2, BUN, CREATININE, MAGNESIUM, PHOSPHORUS, CALCIUM in the last 72 hours.  No results for input(s): ALTSGPT, ASTSGOT, ALKPHOSPHAT, TBILIRUBIN, DBILIRUBIN, GAMMAGT, AMYLASE, LIPASE, ALB, PREALBUMIN, GLUCOSE in the last 72 hours.  Recent Labs      17   2159   HEMOGLOBIN  12.7*   HEMATOCRIT  41.8*               Hemodynamics:  Temp (24hrs), Av.6 °C (97.8 °F), Min:36.3 °C (97.3 °F), Max:36.8 °C (98.3 °F)  Temperature: 36.5 °C (97.7 °F)  Pulse  Av  Min: 55  Max: 118   Blood Pressure : 129/79 mmHg     Respiratory:    Respiration: 17, Pulse Oximetry: 95 %     Work Of Breathing / Effort: Mild  RUL Breath Sounds: Clear, RML Breath Sounds: Diminished, RLL Breath Sounds: Diminished, RIGO Breath Sounds: Clear, LLL Breath Sounds: Diminished  Fluids:    Intake/Output Summary (Last 24 hours) at 17 0708  Last data filed at 17 0400   Gross per 24 hour   Intake    720 ml   Output    900 ml   Net   -180 ml     Weight: 64 kg (141 lb 1.5 oz)  GI/Nutrition:  Orders Placed This Encounter   Procedures   • DIET ORDER     Standing Status: Standing      Number of Occurrences: 1      Standing Expiration Date:      Order Specific Question:  Diet:     Answer:  Cardiac [6]      Comments:  NO ICE CHIPS      Order Specific Question:  Texture/Fiber modifications:     Answer:  Dysphagia 2(Pureed/Chopped)specify fluid consistency(question 6) [2]     Order Specific Question:  Consistency/Fluid modifications:     Answer:  Nectar Thick [2]     Medical Decision Making, by Problem:  Active Hospital Problems    Diagnosis   • Leucocytosis  [D72.829]  - improving     • Alcohol withdrawal (CMS-HCC) [F10.239]  - recovered     • Alcohol dependence (CMS-HCC) [F10.20]     • Ischemia of left lower extremity [I99.8]  - post fem-pop bypass on 4/17  - vascular surgeon following: plan amputation after SNF on follow up.     • Aspiration pneumonia (HCC) [J69.0]     • Encephalopathy acute [G93.40]  - resolved     • HTN (hypertension), malignant [I10]  - improving     • Hyponatremia [E87.1]     • Smoking [F17.200]     • Falls frequently [R29.6]     • Hypothyroidism [E03.9]     • UTI (urinary tract infection) [N39.0]  - on ceftriaxone total 7 days       Labs reviewed, Medications reviewed and Radiology images reviewed        DVT Prophylaxis: Heparin        Assessed for rehab: Patient was assess for and/or received rehabilitation services during this hospitalization

## 2017-04-20 NOTE — PROGRESS NOTES
No changes in pt status, AOX4,  RA- pt does not keep the 2L NC on, tele/HR - SB/ST, voids- incontinent, keeps taking condom cath off, tolerates cardiac diet.  5/10 Pain. Reviewed plan of care, continue to monitor vitals and manage pain.

## 2017-04-20 NOTE — THERAPY
"Speech Language Therapy dysphagia treatment completed.   Functional Status:  The patient was seen for dysphagia therapy this date. The patient was seen during his D2/NTL meal tray with PO trials of thin liquids given patient has most likely been consuming thin liquids. The patient consumed D2/NTL meal items with no overt s/s of aspiration however, required min cues to clear oral cavity between bites/sips. The patient was given PO trials of thin liquids which resulted in overt s/s of aspiration in 75% of PO trials. Patient unable to follow swallow strategies consistently enough to decrease s/s of aspriation on thin liquid trials.Of note, patient exhibited s/s concerning for memory deficits and insight into his deficits during dysphagia therapy.       Recommendations: 1) ontinue D2/Nectar thick liquids. 2) Please consider a comprehensive cognitive evaluation if patient to discharge home instead of a post acute placement.    Plan of Care: Will benefit from Speech Therapy 3 times per week    Post-Acute Therapy: Discharge to a transitional care facility for continued skilled therapy services.    See \"Rehab Therapy-Acute\" Patient Summary Report for complete documentation.     "

## 2017-04-20 NOTE — PROGRESS NOTES
Received bed side report from AM nurse. Patient is A&Ox4. Patient denies pain at this time. Bed alarm is on and call light within reach. Will continue to monitor. Plan is to DC to SNF in the morning.    12 hr cc

## 2017-04-20 NOTE — PROGRESS NOTES
Bedside report received. Patient A&O X 4, tele- SR,  RA- pt refuses to use 2L NC,  Voids- condom cath- at times he pulls it off- incontinent , Activity- PT and bed rest, Diet- dysphagia 2, nectar thick. Complains of pain 5/10 medicated per MAR. POC discussed with patient. Pt verbalized understanding. Call light and belongings with in reach.  Bed locked and in lowest position, alarm and fall precautions in place.  Left leg's incision is dry, staples intact- bilateral leg immobilizers in place.

## 2017-04-21 NOTE — PROGRESS NOTES
No changes in pt status, AOX4,  RA- pt refuses to use 2L NC, tele/HR -SR- tele off , voids- incontinent , tolerates dysphagia 2, nectar thick diet.  5/10 Pain. Reviewed plan of care, continue to monitor vitals and manage pain. Pt DC to Rehab. Called rehab, Transport is here.

## 2017-05-26 ENCOUNTER — HOSPITAL ENCOUNTER (OUTPATIENT)
Facility: MEDICAL CENTER | Age: 72
End: 2017-05-26
Attending: SURGERY | Admitting: SURGERY
Payer: MEDICARE

## 2017-05-26 VITALS
OXYGEN SATURATION: 96 % | TEMPERATURE: 99.3 F | BODY MASS INDEX: 16.82 KG/M2 | SYSTOLIC BLOOD PRESSURE: 163 MMHG | HEART RATE: 82 BPM | WEIGHT: 120.15 LBS | HEIGHT: 71 IN | DIASTOLIC BLOOD PRESSURE: 84 MMHG | RESPIRATION RATE: 14 BRPM

## 2017-05-26 PROBLEM — I70.262 ATHEROSCLEROSIS OF NATIVE ARTERY OF LEFT LOWER EXTREMITY WITH GANGRENE (HCC): Status: ACTIVE | Noted: 2017-05-26

## 2017-05-26 PROCEDURE — 160039 HCHG SURGERY MINUTES - EA ADDL 1 MIN LEVEL 3: Performed by: SURGERY

## 2017-05-26 PROCEDURE — 160025 RECOVERY II MINUTES (STATS): Performed by: SURGERY

## 2017-05-26 PROCEDURE — 700102 HCHG RX REV CODE 250 W/ 637 OVERRIDE(OP)

## 2017-05-26 PROCEDURE — 160002 HCHG RECOVERY MINUTES (STAT): Performed by: SURGERY

## 2017-05-26 PROCEDURE — 500881 HCHG PACK, EXTREMITY: Performed by: SURGERY

## 2017-05-26 PROCEDURE — 500088 HCHG BLADE, SAGITTAL: Performed by: SURGERY

## 2017-05-26 PROCEDURE — 700101 HCHG RX REV CODE 250: Mod: JW

## 2017-05-26 PROCEDURE — 700111 HCHG RX REV CODE 636 W/ 250 OVERRIDE (IP)

## 2017-05-26 PROCEDURE — 501838 HCHG SUTURE GENERAL: Performed by: SURGERY

## 2017-05-26 PROCEDURE — 501445 HCHG STAPLER, SKIN DISP: Performed by: SURGERY

## 2017-05-26 PROCEDURE — 700101 HCHG RX REV CODE 250

## 2017-05-26 PROCEDURE — 502240 HCHG MISC OR SUPPLY RC 0272: Performed by: SURGERY

## 2017-05-26 PROCEDURE — A9270 NON-COVERED ITEM OR SERVICE: HCPCS

## 2017-05-26 PROCEDURE — 160046 HCHG PACU - 1ST 60 MINS PHASE II: Performed by: SURGERY

## 2017-05-26 PROCEDURE — 160036 HCHG PACU - EA ADDL 30 MINS PHASE I: Performed by: SURGERY

## 2017-05-26 PROCEDURE — 160028 HCHG SURGERY MINUTES - 1ST 30 MINS LEVEL 3: Performed by: SURGERY

## 2017-05-26 PROCEDURE — 160009 HCHG ANES TIME/MIN: Performed by: SURGERY

## 2017-05-26 PROCEDURE — 160035 HCHG PACU - 1ST 60 MINS PHASE I: Performed by: SURGERY

## 2017-05-26 PROCEDURE — 88307 TISSUE EXAM BY PATHOLOGIST: CPT

## 2017-05-26 PROCEDURE — 88311 DECALCIFY TISSUE: CPT

## 2017-05-26 PROCEDURE — 160047 HCHG PACU  - EA ADDL 30 MINS PHASE II: Performed by: SURGERY

## 2017-05-26 PROCEDURE — 160048 HCHG OR STATISTICAL LEVEL 1-5: Performed by: SURGERY

## 2017-05-26 RX ORDER — MIDAZOLAM HYDROCHLORIDE 1 MG/ML
INJECTION INTRAMUSCULAR; INTRAVENOUS
Status: DISCONTINUED
Start: 2017-05-26 | End: 2017-05-26 | Stop reason: HOSPADM

## 2017-05-26 RX ORDER — OXYCODONE HCL 5 MG/5 ML
SOLUTION, ORAL ORAL
Status: COMPLETED
Start: 2017-05-26 | End: 2017-05-26

## 2017-05-26 RX ORDER — DOCUSATE SODIUM 100 MG/1
100 CAPSULE, LIQUID FILLED ORAL 2 TIMES DAILY
COMMUNITY
End: 2017-07-07

## 2017-05-26 RX ORDER — POLYETHYLENE GLYCOL 3350 17 G/17G
17 POWDER, FOR SOLUTION ORAL DAILY
COMMUNITY
End: 2017-06-14

## 2017-05-26 RX ORDER — SODIUM CHLORIDE, SODIUM LACTATE, POTASSIUM CHLORIDE, CALCIUM CHLORIDE 600; 310; 30; 20 MG/100ML; MG/100ML; MG/100ML; MG/100ML
1000 INJECTION, SOLUTION INTRAVENOUS
Status: COMPLETED | OUTPATIENT
Start: 2017-05-26 | End: 2017-05-26

## 2017-05-26 RX ORDER — SODIUM CHLORIDE 1 G/1
1 TABLET ORAL DAILY
COMMUNITY
End: 2017-06-14

## 2017-05-26 RX ORDER — LIDOCAINE HYDROCHLORIDE 10 MG/ML
INJECTION, SOLUTION INFILTRATION; PERINEURAL
Status: COMPLETED
Start: 2017-05-26 | End: 2017-05-26

## 2017-05-26 RX ORDER — ARGININE/GLUTAMINE/CALCIUM BMB 7G-7G-1.5G
1 POWDER IN PACKET (EA) ORAL 2 TIMES DAILY
COMMUNITY
End: 2017-06-14

## 2017-05-26 RX ORDER — HYDROMORPHONE HYDROCHLORIDE 2 MG/ML
INJECTION, SOLUTION INTRAMUSCULAR; INTRAVENOUS; SUBCUTANEOUS
Status: COMPLETED
Start: 2017-05-26 | End: 2017-05-26

## 2017-05-26 RX ADMIN — FENTANYL CITRATE 50 MCG: 50 INJECTION, SOLUTION INTRAMUSCULAR; INTRAVENOUS at 13:01

## 2017-05-26 RX ADMIN — SODIUM CHLORIDE, SODIUM LACTATE, POTASSIUM CHLORIDE, CALCIUM CHLORIDE 1000 ML: 600; 310; 30; 20 INJECTION, SOLUTION INTRAVENOUS at 10:44

## 2017-05-26 RX ADMIN — HYDROMORPHONE HYDROCHLORIDE 0.5 MG: 2 INJECTION INTRAMUSCULAR; INTRAVENOUS; SUBCUTANEOUS at 14:00

## 2017-05-26 RX ADMIN — HYDROMORPHONE HYDROCHLORIDE 0.5 MG: 2 INJECTION INTRAMUSCULAR; INTRAVENOUS; SUBCUTANEOUS at 13:18

## 2017-05-26 RX ADMIN — OXYCODONE HYDROCHLORIDE 10 MG: 5 SOLUTION ORAL at 13:22

## 2017-05-26 RX ADMIN — HYDROMORPHONE HYDROCHLORIDE 0.5 MG: 2 INJECTION INTRAMUSCULAR; INTRAVENOUS; SUBCUTANEOUS at 13:38

## 2017-05-26 RX ADMIN — FENTANYL CITRATE 50 MCG: 50 INJECTION, SOLUTION INTRAMUSCULAR; INTRAVENOUS at 13:10

## 2017-05-26 RX ADMIN — LIDOCAINE HYDROCHLORIDE 0.1 ML: 10 INJECTION, SOLUTION INFILTRATION; PERINEURAL at 10:30

## 2017-05-26 RX ADMIN — HYDROMORPHONE HYDROCHLORIDE 0.5 MG: 2 INJECTION INTRAMUSCULAR; INTRAVENOUS; SUBCUTANEOUS at 13:31

## 2017-05-26 ASSESSMENT — PAIN SCALES - WONG BAKER
WONGBAKER_NUMERICALRESPONSE: HURTS AS MUCH AS POSSIBLE
WONGBAKER_NUMERICALRESPONSE: HURTS A WHOLE LOT
WONGBAKER_NUMERICALRESPONSE: HURTS A LITTLE MORE

## 2017-05-26 ASSESSMENT — PAIN SCALES - GENERAL
PAINLEVEL_OUTOF10: ASSUMED PAIN PRESENT
PAINLEVEL_OUTOF10: 0
PAINLEVEL_OUTOF10: ASSUMED PAIN PRESENT
PAINLEVEL_OUTOF10: ASSUMED PAIN PRESENT
PAINLEVEL_OUTOF10: 0

## 2017-05-26 NOTE — IP AVS SNAPSHOT
" Home Care Instructions                                                                                                                Name:Isiah Lagos  Medical Record Number:[id number]  CSN: [id number]    YOB: 1945   Age: 71 y.o.  Sex: male  HT:1.803 m (5' 11\") WT: 54.5 kg (120 lb 2.4 oz)          Admit Date: 5/26/2017     Discharge Date:   Today's Date: 5/26/2017  Attending Doctor:  Joesph Monroy M.D.                  Allergies:  Review of patient's allergies indicates no known allergies.                Discharge Instructions         ACTIVITY: Rest and take it easy for the first 24 hours.  A responsible adult is recommended to remain with you during that time.  It is normal to feel sleepy.  We encourage you to not do anything that requires balance, judgment or coordination.    MILD FLU-LIKE SYMPTOMS ARE NORMAL. YOU MAY EXPERIENCE GENERALIZED MUSCLE ACHES, THROAT IRRITATION, HEADACHE AND/OR SOME NAUSEA.    FOR 24 HOURS DO NOT:  Drive, operate machinery or run household appliances.  Drink beer or alcoholic beverages.   Make important decisions or sign legal documents.    SPECIAL INSTRUCTIONS: Follow all instructions given to you by your MD    DIET: To avoid nausea, slowly advance diet as tolerated, avoiding spicy or greasy foods for the first day.  Add more substantial food to your diet according to your physician's instructions.    INCREASE FLUIDS AND FIBER TO AVOID CONSTIPATION.    SURGICAL DRESSING/BATHING: Keep your dressing clean and dry until your follow up appointment.     FOLLOW-UP APPOINTMENT:  A follow-up appointment should be arranged with your doctor in 2 weeks ; call to schedule or confirm.    You should CALL YOUR PHYSICIAN if you develop:  Fever greater than 101 degrees F.  Pain not relieved by medication, or persistent nausea or vomiting.  Excessive bleeding (blood soaking through dressing) or unexpected drainage from the wound.  Extreme redness or swelling around the incision site, " drainage of pus or foul smelling drainage.  Inability to urinate or empty your bladder within 8 hours.  Problems with breathing or chest pain.    You should call 911 if you develop problems with breathing or chest pain.  If you are unable to contact your doctor or surgical center, you should go to the nearest emergency room or urgent care center.  Physician's telephone #: [phone]    If any questions arise, call your doctor.  If your doctor is not available, please feel free to call the Surgical Center at [phone].  The Center is open Monday through Friday from 7AM to 7PM.  You can also call the [facility] HOTLINE open 24 hours/day, 7 days/week and speak to a nurse at [phone], or toll free at [phone].    A registered nurse may call you a few days after your surgery to see how you are doing after your procedure.    MEDICATIONS: Resume taking daily medication.  Take prescribed pain medication with food.  If no medication is prescribed, you may take non-aspirin pain medication if needed.  PAIN MEDICATION CAN BE VERY CONSTIPATING.  Take a stool softener or laxative such as senokot, pericolace, or milk of magnesia if needed.    Prescription given for Percocet.  Last pain medication given at 1:22 pm.    If your physician has prescribed pain medication that includes Acetaminophen (Tylenol), do not take additional Acetaminophen (Tylenol) while taking the prescribed medication.    Depression / Suicide Risk    As you are discharged from this [facility] facility, it is important to learn how to keep safe from harming yourself.    Recognize the warning signs:  · Abrupt changes in personality, positive or negative- including increase in energy   · Giving away possessions  · Change in eating patterns- significant weight changes-  positive or negative  · Change in sleeping patterns- unable to sleep or sleeping all the time   · Unwillingness or inability to communicate  · Depression  · Unusual sadness,  discouragement and loneliness  · Talk of wanting to die  · Neglect of personal appearance   · Rebelliousness- reckless behavior  · Withdrawal from people/activities they love  · Confusion- inability to concentrate     If you or a loved one observes any of these behaviors or has concerns about self-harm, here's what you can do:  · Talk about it- your feelings and reasons for harming yourself  · Remove any means that you might use to hurt yourself (examples: pills, rope, extension cords, firearm)  · Get professional help from the community (Mental Health, Substance Abuse, psychological counseling)  · Do not be alone:Call your Safe Contact- someone whom you trust who will be there for you.  · Call your local CRISIS HOTLINE 085-3301 or 248-370-4731  · Call your local Children's Mobile Crisis Response Team [place] (831) 243-7497 or www.[employer]  · Call the toll free National Suicide Prevention Hotlines   · National Suicide Prevention Lifeline 411-437-UAKD (2702)  · [employer] Hope Line Network 800-SUICIDE (292-3222)       Medication List      CONTINUE taking these medications        Instructions    Morning Afternoon Evening Bedtime    aspirin  MG Tbec   Commonly known as:  ECOTRIN        Take 325 mg by mouth every day.   Dose:  325 mg                        atorvastatin 40 MG Tabs   Commonly known as:  LIPITOR        Take 1 Tab by mouth every bedtime.   Dose:  40 mg                        clonidine 0.1 MG Tabs   Commonly known as:  CATAPRES        Take 1 Tab by mouth 2 Times a Day.   Dose:  0.1 mg                        DECUBI-ITALO PO        Take 1 Tab by mouth every day.   Dose:  1 Tab                        docusate sodium 100 MG Caps   Commonly known as:  COLACE        Take 100 mg by mouth 2 times a day.   Dose:  100 mg                        MARIAJOSE NUTRIVIGOR Pack        Take 1 Each by mouth 2 Times a Day.   Dose:  1 Each                        levothyroxine 75 MCG Tabs   Commonly known as:   SYNTHROID        Take 75 mcg by mouth every morning before breakfast.   Dose:  75 mcg                        metoprolol 50 MG Tabs   Commonly known as:  LOPRESSOR        Take 1 Tab by mouth 2 Times a Day.   Dose:  50 mg                        Mirabegron ER 25 MG Tb24   Commonly known as:  MYRBETRIQ        Take 50 mg by mouth every day.   Dose:  50 mg                        oxycodone immediate-release 5 MG Tabs   Commonly known as:  ROXICODONE        Take 1 Tab by mouth every four hours as needed.   Dose:  5 mg                        polyethylene glycol/lytes Pack   Commonly known as:  MIRALAX        Take 17 g by mouth every day.   Dose:  17 g                        sodium chloride 1 GM Tabs   Commonly known as:  SALT        Take 1 g by mouth every day.   Dose:  1 g                        thiamine 100 MG tablet   Commonly known as:  THIAMINE        Take 1 Tab by mouth every day.   Dose:  100 mg                                Medication Information     Above and/or attached are the medications your physician expects you to take upon discharge. Review all of your home medications and newly ordered medications with your doctor and/or pharmacist. Follow medication instructions as directed by your doctor and/or pharmacist. Please keep your medication list with you and share with your physician. Update the information when medications are discontinued, doses are changed, or new medications (including over-the-counter products) are added; and carry medication information at all times in the event of emergency situations.        Resources     Quit Smoking / Tobacco Use:    I understand the use of any tobacco products increases my chance of suffering from future heart disease or stroke and could cause other illnesses which may shorten my life. Quitting the use of tobacco products is the single most important thing I can do to improve my health. For further information on smoking / tobacco cessation call a Toll Free Quit Line  at 1-386.703.3564 (*National [employer] [place]) or 1-388.949.5050 (American Lung Association) or you can access the web based program at www.lungusa.org.    [place] Tobacco Users Help Line:  (271) 574-1418       Toll Free: 1-455.620.3363  Quit Tobacco Program [facility] (224)164-0643    Crisis Hotline:    [place] Crisis Hotline:  7-715-IEGVWJG or 1-584.679.6806    [place] Crisis Hotline:    1-988.184.9196 or 980-546-1824    Discharge Survey:   Thank you for choosing [facility]. We hope we did everything we could to make your hospital stay a pleasant one. You may be receiving a survey and we would appreciate your time and participation in answering the questions. Your input is very valuable to us in our efforts to improve our service to our patients and their families.            Signatures     My signature on this form indicates that:    1. I acknowledge receipt and understanding of these Home Care Instruction.  2. My questions regarding this information have been answered to my satisfaction.  3. I have formulated a plan with my discharge nurse to obtain my prescribed medications for home.    __________________________________      __________________________________                   Patient Signature                                 Guardian/Responsible Adult Signature      __________________________________                 __________       ________                       Nurse Signature                                               Date                 Time

## 2017-05-26 NOTE — OR SURGEON
Immediate Post-Operative Note      PreOp Diagnosis: gangrene toes left foot    PostOp Diagnosis:   same    Procedure(s):  FOOT AMPUTATION- TRANSMETATARSAL - Wound Class: Dirty or Infected    Surgeon(s):  Joesph Monroy M.D.    Anesthesiologist/Type of Anesthesia:  Anesthesiologist: Leydi Carrasco M.D./General    Surgical Staff:  Circulator: Olivia Turcios R.N.  Scrub Person: Annabel Crawford    Specimen:   As above    Estimated Blood Loss:   scant    Findings:       Complications:           5/26/2017 12:36 PM Joesph Monroy

## 2017-05-26 NOTE — OP REPORT
DATE OF SERVICE:  05/26/2017    DATE OF OPERATION:  05/27/2017    SURGEON:  Joesph Rodriguez MD    PROCEDURE:  Left transmetatarsal amputation.    ANESTHESIA:  General.    ESTIMATED BLOOD LOSS:  Less than 5 mL.    PREOPERATIVE DIAGNOSIS:  Gangrene toes, left foot.    POSTOPERATIVE DIAGNOSES:  Gangrene toes, left foot.    SUMMARY:  This is a 71-year-old male alcoholic, smoker who was admitted with   ischemic leg several weeks ago.  The patient had multiple comorbid conditions   including profound hyponatremia and alcohol withdrawal.  The patient   ultimately underwent a bypass for limb salvage and now has evidence for   gangrene of toes 2, 3, and 4 with ischemia of the toes 1 and 5 at this time he   will undergo transmetatarsal amputation.    DESCRIPTION OF PROCEDURE:  The patient was taken to the operating room and   satisfactory general anesthesia was induced by way of a laryngeal mask airway.    Patient was prepped and draped.  Curvilinear incision was made at the base   of the toes on the plantar and dorsal aspect of the foot and with the aid of   rongeurs, the toes were all removed.  The skin bridges were connected and   rongeurs were used to cut the remaining bone back into the metatarsal joints   with the cartilage being removed.  This was then irrigated copiously and   necrotic debris removed.  The wound was closed with interrupted 3-0 Vicryl   pop-offs and staples.  Wound was then dressed with ointment, Xeroform, Kerlix   and a cast shoe with no air for compression.  The patient sent to recovery   room in good condition.  Specimen sent to pathology was labeled a   transmetatarsal amputation.       ____________________________________     JOESPH RODRIGUEZ MD    TER / NTS    DD:  05/26/2017 12:38:39  DT:  05/26/2017 15:53:40    D#:  [id number]  Job#:  [id number]

## 2017-05-26 NOTE — OR NURSING
Pt in phase 2, vital signs stable, pain tolerable and pt states he is ready to go. All discharge criteria met. Report called to JESSICA Vela at [facility] in [place] at 1510. Discharge instructions gone over with patient and copy given to caregiver/transport Annabella.

## 2017-05-26 NOTE — IP AVS SNAPSHOT
5/26/2017    Isiah Lagos  [address]  [place] [address]    Dear Isiah:    [facility] wants to ensure your discharge home is safe and you or your loved ones have had all of your questions answered regarding your care after you leave the hospital.    Below is a list of resources and contact information should you have any questions regarding your hospital stay, follow-up instructions, or active medical symptoms.    Questions or Concerns Regarding… Contact   Medical Questions Related to Your Discharge  (7 days a week, 8am-5pm) Contact a Nurse Care Coordinator   [phone]   Medical Questions Not Related to Your Discharge  (24 hours a day / 7 days a week)  Contact the Nurse Health Line   [phone]    Medications or Discharge Instructions Refer to your discharge packet   or contact your [facility] Primary Care Provider   [phone]   Follow-up Appointment(s) Schedule your appointment via [employer]   or contact Scheduling [phone]   Billing Review your statement via [employer]  or contact Billing [phone]   Medical Records Review your records via [employer]   or contact Medical Records [phone]     You may receive a telephone call within two days of discharge. This call is to make certain you understand your discharge instructions and have the opportunity to have any questions answered. You can also easily access your medical information, test results and upcoming appointments via the [employer] free online health management tool. You can learn more and sign up at [employer]. For assistance setting up your [employer] account, please call [phone].    Once again, we want to ensure your discharge home is safe and that you have a clear understanding of any next steps in your care. If you have any questions or concerns, please do not hesitate to contact us, we are here for you. Thank you for choosing [facility] for your healthcare needs.    Sincerely,    Your [facility] Healthcare Team

## 2017-05-31 NOTE — OP REPORT
DATE OF SERVICE:  05/26/2017    SURGEON:  Joesph Rodriguez MD    PROCEDURE:  Left transmetatarsal amputation.    ANESTHESIA:  General.    ESTIMATED BLOOD LOSS:  Less than 5 mL.    PREOPERATIVE DIAGNOSIS:  Gangrene toes, left foot.    POSTOPERATIVE DIAGNOSES:  Gangrene toes, left foot.    SUMMARY:  This is a 71-year-old male alcoholic, smoker who was admitted with   ischemic leg several weeks ago.  The patient had multiple comorbid conditions   including profound hyponatremia and alcohol withdrawal.  The patient   ultimately underwent a bypass for limb salvage and now has evidence for   gangrene of toes 2, 3, and 4 with ischemia of the toes 1 and 5 at this time he   will undergo transmetatarsal amputation.    DESCRIPTION OF PROCEDURE:  The patient was taken to the operating room and   satisfactory general anesthesia was induced by way of a laryngeal mask airway.    Patient was prepped and draped.  Curvilinear incision was made at the base   of the toes on the plantar and dorsal aspect of the foot and with the aid of   rongeurs, the toes were all removed.  The skin bridges were connected and   rongeurs were used to cut the remaining bone back into the metatarsal joints   with the cartilage being removed.  This was then irrigated copiously and   necrotic debris removed.  The wound was closed with interrupted 3-0 Vicryl   pop-offs and staples.  Wound was then dressed with ointment, Xeroform, Kerlix   and a cast shoe with no air for compression.  The patient sent to recovery   room in good condition.  Specimen sent to pathology was labeled a   transmetatarsal amputation.       ____________________________________     JOESPH RODRIGUEZ MD    TER / NTS    DD:  05/26/2017 12:38:39  DT:  05/26/2017 15:53:40    D#:  [id number]  Job#:  [id number]

## 2017-06-13 ENCOUNTER — HOME HEALTH ADMISSION (OUTPATIENT)
Dept: HOME HEALTH SERVICES | Facility: HOME HEALTHCARE | Age: 72
End: 2017-06-13
Payer: MEDICARE

## 2017-06-13 ENCOUNTER — HOME CARE VISIT (OUTPATIENT)
Dept: HOME HEALTH SERVICES | Facility: HOME HEALTHCARE | Age: 72
End: 2017-06-13
Payer: MEDICARE

## 2017-06-13 PROCEDURE — A6250 SKIN SEAL PROTECT MOISTURIZR: HCPCS

## 2017-06-13 PROCEDURE — A6403 STERILE GAUZE>16 <= 48 SQ IN: HCPCS

## 2017-06-13 PROCEDURE — A6402 STERILE GAUZE <= 16 SQ IN: HCPCS

## 2017-06-13 PROCEDURE — A5120 SKIN BARRIER, WIPE OR SWAB: HCPCS

## 2017-06-13 PROCEDURE — A4216 STERILE WATER/SALINE, 10 ML: HCPCS

## 2017-06-13 PROCEDURE — A6223 GAUZE >16<=48 NO W/SAL W/O B: HCPCS

## 2017-06-14 ENCOUNTER — HOME CARE VISIT (OUTPATIENT)
Dept: HOME HEALTH SERVICES | Facility: HOME HEALTHCARE | Age: 72
End: 2017-06-14
Payer: MEDICARE

## 2017-06-14 ENCOUNTER — HOSPITAL ENCOUNTER (OUTPATIENT)
Facility: MEDICAL CENTER | Age: 72
End: 2017-06-14
Attending: NURSE PRACTITIONER
Payer: MEDICARE

## 2017-06-14 VITALS
HEIGHT: 71 IN | TEMPERATURE: 98.9 F | HEART RATE: 67 BPM | BODY MASS INDEX: 16.8 KG/M2 | WEIGHT: 120 LBS | RESPIRATION RATE: 14 BRPM | DIASTOLIC BLOOD PRESSURE: 50 MMHG | SYSTOLIC BLOOD PRESSURE: 88 MMHG

## 2017-06-14 LAB
ANION GAP SERPL CALC-SCNC: 11 MMOL/L (ref 0–11.9)
BUN SERPL-MCNC: 17 MG/DL (ref 8–22)
CALCIUM SERPL-MCNC: 9.5 MG/DL (ref 8.5–10.5)
CHLORIDE SERPL-SCNC: 100 MMOL/L (ref 96–112)
CO2 SERPL-SCNC: 22 MMOL/L (ref 20–33)
CREAT SERPL-MCNC: 0.72 MG/DL (ref 0.5–1.4)
GFR SERPL CREATININE-BSD FRML MDRD: >60 ML/MIN/1.73 M 2
GLUCOSE SERPL-MCNC: 83 MG/DL (ref 65–99)
POTASSIUM SERPL-SCNC: 3.9 MMOL/L (ref 3.6–5.5)
SODIUM SERPL-SCNC: 133 MMOL/L (ref 135–145)

## 2017-06-14 PROCEDURE — 665999 HH PPS REVENUE DEBIT

## 2017-06-14 PROCEDURE — 80048 BASIC METABOLIC PNL TOTAL CA: CPT

## 2017-06-14 PROCEDURE — A6403 STERILE GAUZE>16 <= 48 SQ IN: HCPCS

## 2017-06-14 PROCEDURE — 665001 SOC-HOME HEALTH

## 2017-06-14 PROCEDURE — A5120 SKIN BARRIER, WIPE OR SWAB: HCPCS

## 2017-06-14 PROCEDURE — A4216 STERILE WATER/SALINE, 10 ML: HCPCS

## 2017-06-14 PROCEDURE — A6222 GAUZE <=16 IN NO W/SAL W/O B: HCPCS

## 2017-06-14 PROCEDURE — A6223 GAUZE >16<=48 NO W/SAL W/O B: HCPCS

## 2017-06-14 PROCEDURE — G0162 HHC RN E&M PLAN SVS, 15 MIN: HCPCS

## 2017-06-14 PROCEDURE — 665998 HH PPS REVENUE CREDIT

## 2017-06-14 PROCEDURE — A6402 STERILE GAUZE <= 16 SQ IN: HCPCS

## 2017-06-14 PROCEDURE — A6446 CONFORM BAND S W>=3" <5"/YD: HCPCS

## 2017-06-14 PROCEDURE — A6250 SKIN SEAL PROTECT MOISTURIZR: HCPCS

## 2017-06-14 SDOH — ECONOMIC STABILITY: HOUSING INSECURITY: UNSAFE APPLIANCES: 0

## 2017-06-14 SDOH — ECONOMIC STABILITY: HOUSING INSECURITY: UNSAFE COOKING RANGE AREA: 0

## 2017-06-14 ASSESSMENT — PATIENT HEALTH QUESTIONNAIRE - PHQ9
1. LITTLE INTEREST OR PLEASURE IN DOING THINGS: 00
2. FEELING DOWN, DEPRESSED, IRRITABLE, OR HOPELESS: 00

## 2017-06-14 ASSESSMENT — ACTIVITIES OF DAILY LIVING (ADL)
HOME_HEALTH_OASIS: 01
OASIS_M1830: 03

## 2017-06-15 ENCOUNTER — HOME CARE VISIT (OUTPATIENT)
Dept: HOME HEALTH SERVICES | Facility: HOME HEALTHCARE | Age: 72
End: 2017-06-15
Payer: MEDICARE

## 2017-06-15 VITALS
DIASTOLIC BLOOD PRESSURE: 64 MMHG | SYSTOLIC BLOOD PRESSURE: 132 MMHG | TEMPERATURE: 211.6 F | HEART RATE: 78 BPM | RESPIRATION RATE: 16 BRPM

## 2017-06-15 PROCEDURE — 665998 HH PPS REVENUE CREDIT

## 2017-06-15 PROCEDURE — G0152 HHCP-SERV OF OT,EA 15 MIN: HCPCS

## 2017-06-15 PROCEDURE — 665999 HH PPS REVENUE DEBIT

## 2017-06-15 ASSESSMENT — ACTIVITIES OF DAILY LIVING (ADL)
TELEPHONE_ASSISTANCE: 0
BATHING_ASSISTANCE: 6
GROOMING_ASSISTANCE: 1
BATHING_ASSISTIVE_EQUIPMENT_USED: TUB TRANSFER BENCH
DRESSING_UB_ASSISTANCE: 1
TRANSPORTATION_ASSISTANCE: 6
MEAL_PREP_ASSISTANCE: 6
HOUSEKEEPING_ASSISTANCE: 6
TOILETING_ASSISTANCE: 1
EATING_ASSISTANCE: 0
DRESSING_LB_ASSISTANCE: 4
SHOPPING_ASSISTANCE: 6
LAUNDRY_ASSISTANCE: 6
ORAL_CARE_ASSISTANCE: 1

## 2017-06-16 ENCOUNTER — HOME CARE VISIT (OUTPATIENT)
Dept: HOME HEALTH SERVICES | Facility: HOME HEALTHCARE | Age: 72
End: 2017-06-16
Payer: MEDICARE

## 2017-06-16 PROCEDURE — 665999 HH PPS REVENUE DEBIT

## 2017-06-16 PROCEDURE — G0299 HHS/HOSPICE OF RN EA 15 MIN: HCPCS

## 2017-06-16 PROCEDURE — 665998 HH PPS REVENUE CREDIT

## 2017-06-17 VITALS
TEMPERATURE: 99.4 F | RESPIRATION RATE: 20 BRPM | DIASTOLIC BLOOD PRESSURE: 62 MMHG | HEART RATE: 88 BPM | SYSTOLIC BLOOD PRESSURE: 118 MMHG

## 2017-06-17 PROCEDURE — 665999 HH PPS REVENUE DEBIT

## 2017-06-17 PROCEDURE — 665998 HH PPS REVENUE CREDIT

## 2017-06-17 ASSESSMENT — ENCOUNTER SYMPTOMS
VOMITING: DENIES
NAUSEA: DENIES
RESPIRATORY SYMPTOMS COMMENTS: PATIENT DEMONSTRATED DEEP BREATHING EXERCISES, LUNG FIELDS CLEAR, NO SOB, WOB MINIMAL, NO SORE THROAT, NO COUGH OR SPUTUM.

## 2017-06-18 PROCEDURE — 665999 HH PPS REVENUE DEBIT

## 2017-06-18 PROCEDURE — 665998 HH PPS REVENUE CREDIT

## 2017-06-19 ENCOUNTER — HOME CARE VISIT (OUTPATIENT)
Dept: HOME HEALTH SERVICES | Facility: HOME HEALTHCARE | Age: 72
End: 2017-06-19
Payer: MEDICARE

## 2017-06-19 VITALS
HEART RATE: 93 BPM | TEMPERATURE: 98.9 F | SYSTOLIC BLOOD PRESSURE: 124 MMHG | DIASTOLIC BLOOD PRESSURE: 64 MMHG | RESPIRATION RATE: 18 BRPM

## 2017-06-19 VITALS
RESPIRATION RATE: 18 BRPM | HEART RATE: 88 BPM | DIASTOLIC BLOOD PRESSURE: 64 MMHG | SYSTOLIC BLOOD PRESSURE: 124 MMHG | TEMPERATURE: 98.9 F

## 2017-06-19 PROCEDURE — 665999 HH PPS REVENUE DEBIT

## 2017-06-19 PROCEDURE — G0299 HHS/HOSPICE OF RN EA 15 MIN: HCPCS

## 2017-06-19 PROCEDURE — 665998 HH PPS REVENUE CREDIT

## 2017-06-19 PROCEDURE — G0151 HHCP-SERV OF PT,EA 15 MIN: HCPCS

## 2017-06-19 PROCEDURE — G0152 HHCP-SERV OF OT,EA 15 MIN: HCPCS

## 2017-06-19 SDOH — ECONOMIC STABILITY: HOUSING INSECURITY: HOME SAFETY: NARROWED SPACES, COUCH TOO LOW

## 2017-06-19 ASSESSMENT — ENCOUNTER SYMPTOMS: DEBILITATING PAIN: 1

## 2017-06-20 VITALS
TEMPERATURE: 98.7 F | HEART RATE: 90 BPM | RESPIRATION RATE: 20 BRPM | DIASTOLIC BLOOD PRESSURE: 64 MMHG | SYSTOLIC BLOOD PRESSURE: 124 MMHG

## 2017-06-20 PROCEDURE — 665999 HH PPS REVENUE DEBIT

## 2017-06-20 PROCEDURE — 665998 HH PPS REVENUE CREDIT

## 2017-06-20 ASSESSMENT — ENCOUNTER SYMPTOMS
NAUSEA: DENIES
VOMITING: DENIES
SHORTNESS OF BREATH: T
RESPIRATORY SYMPTOMS COMMENTS: PATIENT DEMONSTRATED DEEP BREATHING EXERCISES, LUNG FIELDS CLEAR, NO SOB, WOB MINIMAL, NO SORE THROAT, NO COUGH OR SPUTUM.

## 2017-06-21 ENCOUNTER — HOME CARE VISIT (OUTPATIENT)
Dept: HOME HEALTH SERVICES | Facility: HOME HEALTHCARE | Age: 72
End: 2017-06-21
Payer: MEDICARE

## 2017-06-21 VITALS
TEMPERATURE: 98.3 F | HEART RATE: 78 BPM | DIASTOLIC BLOOD PRESSURE: 64 MMHG | RESPIRATION RATE: 18 BRPM | SYSTOLIC BLOOD PRESSURE: 122 MMHG

## 2017-06-21 PROCEDURE — G0152 HHCP-SERV OF OT,EA 15 MIN: HCPCS

## 2017-06-21 PROCEDURE — 665998 HH PPS REVENUE CREDIT

## 2017-06-21 PROCEDURE — 665999 HH PPS REVENUE DEBIT

## 2017-06-22 ENCOUNTER — HOME CARE VISIT (OUTPATIENT)
Dept: HOME HEALTH SERVICES | Facility: HOME HEALTHCARE | Age: 72
End: 2017-06-22
Payer: MEDICARE

## 2017-06-22 VITALS
HEART RATE: 76 BPM | SYSTOLIC BLOOD PRESSURE: 140 MMHG | DIASTOLIC BLOOD PRESSURE: 70 MMHG | RESPIRATION RATE: 18 BRPM | TEMPERATURE: 99.4 F

## 2017-06-22 PROCEDURE — G0151 HHCP-SERV OF PT,EA 15 MIN: HCPCS

## 2017-06-22 PROCEDURE — 665999 HH PPS REVENUE DEBIT

## 2017-06-22 PROCEDURE — 665998 HH PPS REVENUE CREDIT

## 2017-06-22 ASSESSMENT — ENCOUNTER SYMPTOMS: DEBILITATING PAIN: 1

## 2017-06-23 ENCOUNTER — HOME CARE VISIT (OUTPATIENT)
Dept: HOME HEALTH SERVICES | Facility: HOME HEALTHCARE | Age: 72
End: 2017-06-23
Payer: MEDICARE

## 2017-06-23 VITALS
SYSTOLIC BLOOD PRESSURE: 110 MMHG | DIASTOLIC BLOOD PRESSURE: 66 MMHG | HEART RATE: 108 BPM | RESPIRATION RATE: 22 BRPM | TEMPERATURE: 98.4 F

## 2017-06-23 PROCEDURE — A6403 STERILE GAUZE>16 <= 48 SQ IN: HCPCS

## 2017-06-23 PROCEDURE — A6207 CONTACT LAYER >16<= 48 SQ IN: HCPCS

## 2017-06-23 PROCEDURE — G0299 HHS/HOSPICE OF RN EA 15 MIN: HCPCS

## 2017-06-23 PROCEDURE — A6446 CONFORM BAND S W>=3" <5"/YD: HCPCS

## 2017-06-23 PROCEDURE — 665998 HH PPS REVENUE CREDIT

## 2017-06-23 PROCEDURE — A4450 NON-WATERPROOF TAPE: HCPCS

## 2017-06-23 PROCEDURE — 665999 HH PPS REVENUE DEBIT

## 2017-06-23 PROCEDURE — A6402 STERILE GAUZE <= 16 SQ IN: HCPCS

## 2017-06-23 SDOH — ECONOMIC STABILITY: HOUSING INSECURITY: HOME SAFETY: LIVES ALONE WITH CAREGIVERS THAT COME IN TO HELP. STATES HE DOES HIS OWN MEAL PREP.

## 2017-06-23 SDOH — ECONOMIC STABILITY: HOUSING INSECURITY: UNSAFE COOKING RANGE AREA: 0

## 2017-06-23 SDOH — ECONOMIC STABILITY: HOUSING INSECURITY: UNSAFE APPLIANCES: 0

## 2017-06-23 ASSESSMENT — ENCOUNTER SYMPTOMS
NAUSEA: DENIES
VOMITING: DENIES
MENTAL STATUS CHANGE: 0

## 2017-06-24 PROCEDURE — 665998 HH PPS REVENUE CREDIT

## 2017-06-24 PROCEDURE — 665999 HH PPS REVENUE DEBIT

## 2017-06-25 PROCEDURE — 665998 HH PPS REVENUE CREDIT

## 2017-06-25 PROCEDURE — 665999 HH PPS REVENUE DEBIT

## 2017-06-26 ENCOUNTER — HOME CARE VISIT (OUTPATIENT)
Dept: HOME HEALTH SERVICES | Facility: HOME HEALTHCARE | Age: 72
End: 2017-06-26
Payer: MEDICARE

## 2017-06-26 VITALS
HEART RATE: 88 BPM | TEMPERATURE: 99.5 F | SYSTOLIC BLOOD PRESSURE: 110 MMHG | RESPIRATION RATE: 18 BRPM | DIASTOLIC BLOOD PRESSURE: 60 MMHG

## 2017-06-26 VITALS
SYSTOLIC BLOOD PRESSURE: 110 MMHG | HEART RATE: 87 BPM | DIASTOLIC BLOOD PRESSURE: 60 MMHG | TEMPERATURE: 99.5 F | RESPIRATION RATE: 18 BRPM

## 2017-06-26 PROCEDURE — A6446 CONFORM BAND S W>=3" <5"/YD: HCPCS

## 2017-06-26 PROCEDURE — A6212 FOAM DRG <=16 SQ IN W/BORDER: HCPCS

## 2017-06-26 PROCEDURE — A6216 NON-STERILE GAUZE<=16 SQ IN: HCPCS

## 2017-06-26 PROCEDURE — 665998 HH PPS REVENUE CREDIT

## 2017-06-26 PROCEDURE — A6258 TRANSPARENT FILM >16<=48 IN: HCPCS

## 2017-06-26 PROCEDURE — 665999 HH PPS REVENUE DEBIT

## 2017-06-26 PROCEDURE — A6403 STERILE GAUZE>16 <= 48 SQ IN: HCPCS

## 2017-06-26 PROCEDURE — G0299 HHS/HOSPICE OF RN EA 15 MIN: HCPCS

## 2017-06-26 PROCEDURE — A6214 FOAM DRG > 48 SQ IN W/BORDER: HCPCS

## 2017-06-26 PROCEDURE — A4450 NON-WATERPROOF TAPE: HCPCS

## 2017-06-26 PROCEDURE — G0151 HHCP-SERV OF PT,EA 15 MIN: HCPCS

## 2017-06-26 SDOH — ECONOMIC STABILITY: HOUSING INSECURITY: UNSAFE COOKING RANGE AREA: 0

## 2017-06-26 SDOH — ECONOMIC STABILITY: HOUSING INSECURITY: UNSAFE APPLIANCES: 0

## 2017-06-26 ASSESSMENT — ENCOUNTER SYMPTOMS
VOMITING: DENIES
NAUSEA: DENIES
DEBILITATING PAIN: 1

## 2017-06-27 ENCOUNTER — HOME CARE VISIT (OUTPATIENT)
Dept: HOME HEALTH SERVICES | Facility: HOME HEALTHCARE | Age: 72
End: 2017-06-27
Payer: MEDICARE

## 2017-06-27 VITALS
HEART RATE: 81 BPM | DIASTOLIC BLOOD PRESSURE: 78 MMHG | TEMPERATURE: 98.9 F | RESPIRATION RATE: 18 BRPM | SYSTOLIC BLOOD PRESSURE: 130 MMHG

## 2017-06-27 PROCEDURE — 665998 HH PPS REVENUE CREDIT

## 2017-06-27 PROCEDURE — 665999 HH PPS REVENUE DEBIT

## 2017-06-27 PROCEDURE — G0152 HHCP-SERV OF OT,EA 15 MIN: HCPCS

## 2017-06-27 ASSESSMENT — ENCOUNTER SYMPTOMS: DEBILITATING PAIN: 1

## 2017-06-28 PROCEDURE — 665999 HH PPS REVENUE DEBIT

## 2017-06-28 PROCEDURE — 665998 HH PPS REVENUE CREDIT

## 2017-06-29 ENCOUNTER — HOME CARE VISIT (OUTPATIENT)
Dept: HOME HEALTH SERVICES | Facility: HOME HEALTHCARE | Age: 72
End: 2017-06-29
Payer: MEDICARE

## 2017-06-29 VITALS
TEMPERATURE: 99.5 F | HEART RATE: 87 BPM | RESPIRATION RATE: 18 BRPM | DIASTOLIC BLOOD PRESSURE: 70 MMHG | SYSTOLIC BLOOD PRESSURE: 128 MMHG

## 2017-06-29 VITALS
DIASTOLIC BLOOD PRESSURE: 70 MMHG | TEMPERATURE: 98.8 F | HEART RATE: 84 BPM | SYSTOLIC BLOOD PRESSURE: 130 MMHG | RESPIRATION RATE: 16 BRPM

## 2017-06-29 PROCEDURE — G0151 HHCP-SERV OF PT,EA 15 MIN: HCPCS

## 2017-06-29 PROCEDURE — 665998 HH PPS REVENUE CREDIT

## 2017-06-29 PROCEDURE — G0152 HHCP-SERV OF OT,EA 15 MIN: HCPCS

## 2017-06-29 PROCEDURE — 665999 HH PPS REVENUE DEBIT

## 2017-06-30 ENCOUNTER — HOME CARE VISIT (OUTPATIENT)
Dept: HOME HEALTH SERVICES | Facility: HOME HEALTHCARE | Age: 72
End: 2017-06-30
Payer: MEDICARE

## 2017-06-30 PROCEDURE — G0299 HHS/HOSPICE OF RN EA 15 MIN: HCPCS

## 2017-06-30 PROCEDURE — 665998 HH PPS REVENUE CREDIT

## 2017-06-30 PROCEDURE — 665999 HH PPS REVENUE DEBIT

## 2017-07-01 PROCEDURE — 665999 HH PPS REVENUE DEBIT

## 2017-07-01 PROCEDURE — 665998 HH PPS REVENUE CREDIT

## 2017-07-02 VITALS
SYSTOLIC BLOOD PRESSURE: 140 MMHG | HEART RATE: 96 BPM | DIASTOLIC BLOOD PRESSURE: 80 MMHG | TEMPERATURE: 98.5 F | RESPIRATION RATE: 16 BRPM

## 2017-07-02 PROCEDURE — 665998 HH PPS REVENUE CREDIT

## 2017-07-02 PROCEDURE — 665999 HH PPS REVENUE DEBIT

## 2017-07-02 SDOH — ECONOMIC STABILITY: HOUSING INSECURITY: UNSAFE APPLIANCES: 0

## 2017-07-02 SDOH — ECONOMIC STABILITY: HOUSING INSECURITY: UNSAFE COOKING RANGE AREA: 0

## 2017-07-02 ASSESSMENT — ENCOUNTER SYMPTOMS
VOMITING: DENIES
NAUSEA: DENIES

## 2017-07-03 ENCOUNTER — HOME CARE VISIT (OUTPATIENT)
Dept: HOME HEALTH SERVICES | Facility: HOME HEALTHCARE | Age: 72
End: 2017-07-03
Payer: MEDICARE

## 2017-07-03 VITALS
RESPIRATION RATE: 18 BRPM | DIASTOLIC BLOOD PRESSURE: 84 MMHG | TEMPERATURE: 99.3 F | HEART RATE: 88 BPM | SYSTOLIC BLOOD PRESSURE: 174 MMHG

## 2017-07-03 VITALS
DIASTOLIC BLOOD PRESSURE: 80 MMHG | RESPIRATION RATE: 18 BRPM | TEMPERATURE: 99.3 F | HEART RATE: 88 BPM | SYSTOLIC BLOOD PRESSURE: 142 MMHG

## 2017-07-03 PROCEDURE — G0300 HHS/HOSPICE OF LPN EA 15 MIN: HCPCS

## 2017-07-03 PROCEDURE — 665999 HH PPS REVENUE DEBIT

## 2017-07-03 PROCEDURE — 665998 HH PPS REVENUE CREDIT

## 2017-07-03 PROCEDURE — G0151 HHCP-SERV OF PT,EA 15 MIN: HCPCS

## 2017-07-04 ENCOUNTER — HOME CARE VISIT (OUTPATIENT)
Dept: HOME HEALTH SERVICES | Facility: HOME HEALTHCARE | Age: 72
End: 2017-07-04
Payer: MEDICARE

## 2017-07-04 VITALS
RESPIRATION RATE: 16 BRPM | DIASTOLIC BLOOD PRESSURE: 62 MMHG | TEMPERATURE: 98.2 F | SYSTOLIC BLOOD PRESSURE: 110 MMHG | HEART RATE: 82 BPM

## 2017-07-04 PROCEDURE — 665999 HH PPS REVENUE DEBIT

## 2017-07-04 PROCEDURE — G0152 HHCP-SERV OF OT,EA 15 MIN: HCPCS

## 2017-07-04 PROCEDURE — 665998 HH PPS REVENUE CREDIT

## 2017-07-04 ASSESSMENT — ENCOUNTER SYMPTOMS: DEBILITATING PAIN: 1

## 2017-07-05 ENCOUNTER — HOME CARE VISIT (OUTPATIENT)
Dept: HOME HEALTH SERVICES | Facility: HOME HEALTHCARE | Age: 72
End: 2017-07-05
Payer: MEDICARE

## 2017-07-05 VITALS
DIASTOLIC BLOOD PRESSURE: 54 MMHG | HEART RATE: 90 BPM | RESPIRATION RATE: 18 BRPM | SYSTOLIC BLOOD PRESSURE: 126 MMHG | TEMPERATURE: 98.5 F

## 2017-07-05 PROCEDURE — G0151 HHCP-SERV OF PT,EA 15 MIN: HCPCS

## 2017-07-05 PROCEDURE — 665999 HH PPS REVENUE DEBIT

## 2017-07-05 PROCEDURE — 665998 HH PPS REVENUE CREDIT

## 2017-07-05 ASSESSMENT — ENCOUNTER SYMPTOMS
MENTAL STATUS CHANGE: 0
DEBILITATING PAIN: 1

## 2017-07-06 ENCOUNTER — HOME CARE VISIT (OUTPATIENT)
Dept: HOME HEALTH SERVICES | Facility: HOME HEALTHCARE | Age: 72
End: 2017-07-06
Payer: MEDICARE

## 2017-07-06 VITALS
RESPIRATION RATE: 18 BRPM | SYSTOLIC BLOOD PRESSURE: 150 MMHG | DIASTOLIC BLOOD PRESSURE: 70 MMHG | TEMPERATURE: 98.9 F | HEART RATE: 98 BPM

## 2017-07-06 PROCEDURE — 665999 HH PPS REVENUE DEBIT

## 2017-07-06 PROCEDURE — 665998 HH PPS REVENUE CREDIT

## 2017-07-06 PROCEDURE — G0152 HHCP-SERV OF OT,EA 15 MIN: HCPCS

## 2017-07-06 ASSESSMENT — ENCOUNTER SYMPTOMS: MENTAL STATUS CHANGE: 0

## 2017-07-07 ENCOUNTER — HOME CARE VISIT (OUTPATIENT)
Dept: HOME HEALTH SERVICES | Facility: HOME HEALTHCARE | Age: 72
End: 2017-07-07
Payer: MEDICARE

## 2017-07-07 ENCOUNTER — APPOINTMENT (OUTPATIENT)
Dept: RADIOLOGY | Facility: MEDICAL CENTER | Age: 72
DRG: 270 | End: 2017-07-07
Attending: EMERGENCY MEDICINE
Payer: MEDICARE

## 2017-07-07 ENCOUNTER — HOSPITAL ENCOUNTER (INPATIENT)
Facility: MEDICAL CENTER | Age: 72
LOS: 6 days | DRG: 270 | End: 2017-07-13
Attending: EMERGENCY MEDICINE | Admitting: HOSPITALIST
Payer: MEDICARE

## 2017-07-07 ENCOUNTER — RESOLUTE PROFESSIONAL BILLING HOSPITAL PROF FEE (OUTPATIENT)
Dept: HOSPITALIST | Facility: MEDICAL CENTER | Age: 72
End: 2017-07-07
Payer: MEDICARE

## 2017-07-07 VITALS
TEMPERATURE: 99.3 F | SYSTOLIC BLOOD PRESSURE: 160 MMHG | RESPIRATION RATE: 20 BRPM | HEART RATE: 95 BPM | DIASTOLIC BLOOD PRESSURE: 90 MMHG

## 2017-07-07 DIAGNOSIS — R53.1 WEAKNESS OF LEFT SIDE OF BODY: ICD-10-CM

## 2017-07-07 DIAGNOSIS — E87.1 HYPONATREMIA: ICD-10-CM

## 2017-07-07 DIAGNOSIS — Z89.511 STATUS POST BELOW KNEE AMPUTATION OF RIGHT LOWER EXTREMITY (HCC): ICD-10-CM

## 2017-07-07 DIAGNOSIS — I70.261 ATHEROSCLEROSIS OF NATIVE ARTERY OF RIGHT LOWER EXTREMITY WITH GANGRENE (HCC): ICD-10-CM

## 2017-07-07 DIAGNOSIS — L03.116 CELLULITIS OF LEFT LOWER EXTREMITY: ICD-10-CM

## 2017-07-07 DIAGNOSIS — I96 GANGRENE OF FOOT (HCC): ICD-10-CM

## 2017-07-07 DIAGNOSIS — I73.9 PAD (PERIPHERAL ARTERY DISEASE) (HCC): ICD-10-CM

## 2017-07-07 DIAGNOSIS — F10.29 ALCOHOL DEPENDENCE WITH UNSPECIFIED ALCOHOL-INDUCED DISORDER (HCC): ICD-10-CM

## 2017-07-07 PROBLEM — L03.115 CELLULITIS OF RIGHT LEG: Status: ACTIVE | Noted: 2017-07-07

## 2017-07-07 LAB
ALBUMIN SERPL BCP-MCNC: 2.8 G/DL (ref 3.2–4.9)
ALBUMIN/GLOB SERPL: 0.7 G/DL
ALP SERPL-CCNC: 80 U/L (ref 30–99)
ALT SERPL-CCNC: 10 U/L (ref 2–50)
ANION GAP SERPL CALC-SCNC: 11 MMOL/L (ref 0–11.9)
APTT PPP: 34.9 SEC (ref 24.7–36)
AST SERPL-CCNC: 27 U/L (ref 12–45)
BASOPHILS # BLD AUTO: 1.4 % (ref 0–1.8)
BASOPHILS # BLD: 0.27 K/UL (ref 0–0.12)
BILIRUB SERPL-MCNC: 0.7 MG/DL (ref 0.1–1.5)
BUN SERPL-MCNC: 8 MG/DL (ref 8–22)
CALCIUM SERPL-MCNC: 8.2 MG/DL (ref 8.5–10.5)
CHLORIDE SERPL-SCNC: 92 MMOL/L (ref 96–112)
CO2 SERPL-SCNC: 22 MMOL/L (ref 20–33)
CREAT SERPL-MCNC: 0.56 MG/DL (ref 0.5–1.4)
EKG IMPRESSION: NORMAL
EOSINOPHIL # BLD AUTO: 0.6 K/UL (ref 0–0.51)
EOSINOPHIL NFR BLD: 3 % (ref 0–6.9)
ERYTHROCYTE [DISTWIDTH] IN BLOOD BY AUTOMATED COUNT: 45.7 FL (ref 35.9–50)
GFR SERPL CREATININE-BSD FRML MDRD: >60 ML/MIN/1.73 M 2
GLOBULIN SER CALC-MCNC: 3.8 G/DL (ref 1.9–3.5)
GLUCOSE SERPL-MCNC: 80 MG/DL (ref 65–99)
HCT VFR BLD AUTO: 43.3 % (ref 42–52)
HGB BLD-MCNC: 13.7 G/DL (ref 14–18)
IMM GRANULOCYTES # BLD AUTO: 0.1 K/UL (ref 0–0.11)
IMM GRANULOCYTES NFR BLD AUTO: 0.5 % (ref 0–0.9)
INR PPP: 1.05 (ref 0.87–1.13)
LACTATE BLD-SCNC: 2 MMOL/L (ref 0.5–2)
LYMPHOCYTES # BLD AUTO: 1.41 K/UL (ref 1–4.8)
LYMPHOCYTES NFR BLD: 7.1 % (ref 22–41)
MCH RBC QN AUTO: 24.5 PG (ref 27–33)
MCHC RBC AUTO-ENTMCNC: 31.6 G/DL (ref 33.7–35.3)
MCV RBC AUTO: 77.5 FL (ref 81.4–97.8)
MONOCYTES # BLD AUTO: 1.1 K/UL (ref 0–0.85)
MONOCYTES NFR BLD AUTO: 5.6 % (ref 0–13.4)
NEUTROPHILS # BLD AUTO: 16.27 K/UL (ref 1.82–7.42)
NEUTROPHILS NFR BLD: 82.4 % (ref 44–72)
NRBC # BLD AUTO: 0 K/UL
NRBC BLD AUTO-RTO: 0 /100 WBC
PLATELET # BLD AUTO: 444 K/UL (ref 164–446)
PMV BLD AUTO: 8.6 FL (ref 9–12.9)
POTASSIUM SERPL-SCNC: 3.7 MMOL/L (ref 3.6–5.5)
PROT SERPL-MCNC: 6.6 G/DL (ref 6–8.2)
PROTHROMBIN TIME: 14 SEC (ref 12–14.6)
RBC # BLD AUTO: 5.59 M/UL (ref 4.7–6.1)
SODIUM SERPL-SCNC: 125 MMOL/L (ref 135–145)
WBC # BLD AUTO: 19.8 K/UL (ref 4.8–10.8)

## 2017-07-07 PROCEDURE — 83605 ASSAY OF LACTIC ACID: CPT

## 2017-07-07 PROCEDURE — 96367 TX/PROPH/DG ADDL SEQ IV INF: CPT

## 2017-07-07 PROCEDURE — 85610 PROTHROMBIN TIME: CPT

## 2017-07-07 PROCEDURE — 85730 THROMBOPLASTIN TIME PARTIAL: CPT

## 2017-07-07 PROCEDURE — 87040 BLOOD CULTURE FOR BACTERIA: CPT | Mod: 91

## 2017-07-07 PROCEDURE — 665997 HH PPS REVENUE ADJ

## 2017-07-07 PROCEDURE — 96365 THER/PROPH/DIAG IV INF INIT: CPT

## 2017-07-07 PROCEDURE — 93926 LOWER EXTREMITY STUDY: CPT

## 2017-07-07 PROCEDURE — 99285 EMERGENCY DEPT VISIT HI MDM: CPT

## 2017-07-07 PROCEDURE — 75635 CT ANGIO ABDOMINAL ARTERIES: CPT

## 2017-07-07 PROCEDURE — 93922 UPR/L XTREMITY ART 2 LEVELS: CPT

## 2017-07-07 PROCEDURE — 770006 HCHG ROOM/CARE - MED/SURG/GYN SEMI*

## 2017-07-07 PROCEDURE — G0493 RN CARE EA 15 MIN HH/HOSPICE: HCPCS

## 2017-07-07 PROCEDURE — 700102 HCHG RX REV CODE 250 W/ 637 OVERRIDE(OP): Performed by: HOSPITALIST

## 2017-07-07 PROCEDURE — 665998 HH PPS REVENUE CREDIT

## 2017-07-07 PROCEDURE — 700117 HCHG RX CONTRAST REV CODE 255: Performed by: EMERGENCY MEDICINE

## 2017-07-07 PROCEDURE — G0155 HHCP-SVS OF CSW,EA 15 MIN: HCPCS

## 2017-07-07 PROCEDURE — 700111 HCHG RX REV CODE 636 W/ 250 OVERRIDE (IP): Performed by: EMERGENCY MEDICINE

## 2017-07-07 PROCEDURE — 94760 N-INVAS EAR/PLS OXIMETRY 1: CPT

## 2017-07-07 PROCEDURE — 71010 DX-CHEST-PORTABLE (1 VIEW): CPT

## 2017-07-07 PROCEDURE — 85025 COMPLETE CBC W/AUTO DIFF WBC: CPT

## 2017-07-07 PROCEDURE — 80053 COMPREHEN METABOLIC PANEL: CPT

## 2017-07-07 PROCEDURE — 665999 HH PPS REVENUE DEBIT

## 2017-07-07 PROCEDURE — 700105 HCHG RX REV CODE 258: Performed by: EMERGENCY MEDICINE

## 2017-07-07 PROCEDURE — 99223 1ST HOSP IP/OBS HIGH 75: CPT | Performed by: HOSPITALIST

## 2017-07-07 PROCEDURE — A9270 NON-COVERED ITEM OR SERVICE: HCPCS | Performed by: HOSPITALIST

## 2017-07-07 PROCEDURE — 93005 ELECTROCARDIOGRAM TRACING: CPT | Performed by: EMERGENCY MEDICINE

## 2017-07-07 RX ORDER — ACETAMINOPHEN 325 MG/1
650 TABLET ORAL EVERY 6 HOURS PRN
Status: DISCONTINUED | OUTPATIENT
Start: 2017-07-07 | End: 2017-07-09

## 2017-07-07 RX ORDER — LEVOTHYROXINE SODIUM 0.07 MG/1
75 TABLET ORAL
Status: DISCONTINUED | OUTPATIENT
Start: 2017-07-08 | End: 2017-07-13 | Stop reason: HOSPADM

## 2017-07-07 RX ORDER — BISACODYL 10 MG
10 SUPPOSITORY, RECTAL RECTAL
Status: DISCONTINUED | OUTPATIENT
Start: 2017-07-07 | End: 2017-07-13 | Stop reason: HOSPADM

## 2017-07-07 RX ORDER — ONDANSETRON 2 MG/ML
4 INJECTION INTRAMUSCULAR; INTRAVENOUS EVERY 4 HOURS PRN
Status: DISCONTINUED | OUTPATIENT
Start: 2017-07-07 | End: 2017-07-13 | Stop reason: HOSPADM

## 2017-07-07 RX ORDER — TRAMADOL HYDROCHLORIDE 50 MG/1
50 TABLET ORAL 2 TIMES DAILY
Status: DISCONTINUED | OUTPATIENT
Start: 2017-07-08 | End: 2017-07-13 | Stop reason: HOSPADM

## 2017-07-07 RX ORDER — OXYCODONE HYDROCHLORIDE 5 MG/1
2.5 TABLET ORAL
Status: DISCONTINUED | OUTPATIENT
Start: 2017-07-07 | End: 2017-07-09

## 2017-07-07 RX ORDER — SODIUM CHLORIDE 9 MG/ML
2000 INJECTION, SOLUTION INTRAVENOUS ONCE
Status: COMPLETED | OUTPATIENT
Start: 2017-07-07 | End: 2017-07-09

## 2017-07-07 RX ORDER — ATORVASTATIN CALCIUM 40 MG/1
40 TABLET, FILM COATED ORAL
Status: DISCONTINUED | OUTPATIENT
Start: 2017-07-07 | End: 2017-07-13 | Stop reason: HOSPADM

## 2017-07-07 RX ORDER — DOXYCYCLINE 100 MG/1
100 TABLET ORAL EVERY 12 HOURS
Status: DISCONTINUED | OUTPATIENT
Start: 2017-07-07 | End: 2017-07-08 | Stop reason: ALTCHOICE

## 2017-07-07 RX ORDER — CLONIDINE HYDROCHLORIDE 0.1 MG/1
0.1 TABLET ORAL 2 TIMES DAILY
Status: DISCONTINUED | OUTPATIENT
Start: 2017-07-07 | End: 2017-07-07

## 2017-07-07 RX ORDER — SODIUM CHLORIDE 9 MG/ML
1000 INJECTION, SOLUTION INTRAVENOUS
Status: DISCONTINUED | OUTPATIENT
Start: 2017-07-07 | End: 2017-07-09

## 2017-07-07 RX ORDER — CLONIDINE HYDROCHLORIDE 0.1 MG/1
0.1 TABLET ORAL 2 TIMES DAILY
Status: DISCONTINUED | OUTPATIENT
Start: 2017-07-07 | End: 2017-07-13 | Stop reason: HOSPADM

## 2017-07-07 RX ORDER — AMOXICILLIN 250 MG
2 CAPSULE ORAL 2 TIMES DAILY
Status: DISCONTINUED | OUTPATIENT
Start: 2017-07-08 | End: 2017-07-13 | Stop reason: HOSPADM

## 2017-07-07 RX ORDER — HEPARIN SODIUM 5000 [USP'U]/ML
5000 INJECTION, SOLUTION INTRAVENOUS; SUBCUTANEOUS EVERY 8 HOURS
Status: DISCONTINUED | OUTPATIENT
Start: 2017-07-08 | End: 2017-07-09

## 2017-07-07 RX ORDER — MORPHINE SULFATE 4 MG/ML
2 INJECTION, SOLUTION INTRAMUSCULAR; INTRAVENOUS
Status: DISCONTINUED | OUTPATIENT
Start: 2017-07-07 | End: 2017-07-09

## 2017-07-07 RX ORDER — LABETALOL HYDROCHLORIDE 5 MG/ML
10 INJECTION, SOLUTION INTRAVENOUS EVERY 4 HOURS PRN
Status: DISCONTINUED | OUTPATIENT
Start: 2017-07-07 | End: 2017-07-09

## 2017-07-07 RX ORDER — OXYCODONE HYDROCHLORIDE 5 MG/1
5 TABLET ORAL
Status: DISCONTINUED | OUTPATIENT
Start: 2017-07-07 | End: 2017-07-09

## 2017-07-07 RX ORDER — ONDANSETRON 4 MG/1
4 TABLET, ORALLY DISINTEGRATING ORAL EVERY 4 HOURS PRN
Status: DISCONTINUED | OUTPATIENT
Start: 2017-07-07 | End: 2017-07-13 | Stop reason: HOSPADM

## 2017-07-07 RX ORDER — POLYETHYLENE GLYCOL 3350 17 G/17G
1 POWDER, FOR SOLUTION ORAL
Status: DISCONTINUED | OUTPATIENT
Start: 2017-07-07 | End: 2017-07-13 | Stop reason: HOSPADM

## 2017-07-07 RX ADMIN — METOPROLOL TARTRATE 25 MG: 25 TABLET, FILM COATED ORAL at 21:04

## 2017-07-07 RX ADMIN — PIPERACILLIN SODIUM AND TAZOBACTAM SODIUM 3.38 G: 3; .375 INJECTION, POWDER, FOR SOLUTION INTRAVENOUS at 20:22

## 2017-07-07 RX ADMIN — DOXYCYCLINE 100 MG: 100 TABLET ORAL at 21:04

## 2017-07-07 RX ADMIN — IOHEXOL 100 ML: 350 INJECTION, SOLUTION INTRAVENOUS at 21:58

## 2017-07-07 RX ADMIN — OXYCODONE HYDROCHLORIDE 5 MG: 5 TABLET ORAL at 22:42

## 2017-07-07 RX ADMIN — CLONIDINE HYDROCHLORIDE 0.1 MG: 0.1 TABLET ORAL at 21:04

## 2017-07-07 RX ADMIN — VANCOMYCIN HYDROCHLORIDE 1400 MG: 100 INJECTION, POWDER, LYOPHILIZED, FOR SOLUTION INTRAVENOUS at 21:00

## 2017-07-07 RX ADMIN — ATORVASTATIN CALCIUM 40 MG: 40 TABLET, FILM COATED ORAL at 22:42

## 2017-07-07 ASSESSMENT — PAIN SCALES - GENERAL
PAINLEVEL_OUTOF10: 5
PAINLEVEL_OUTOF10: 9

## 2017-07-07 ASSESSMENT — LIFESTYLE VARIABLES
HAVE PEOPLE ANNOYED YOU BY CRITICIZING YOUR DRINKING: NO
EVER FELT BAD OR GUILTY ABOUT YOUR DRINKING: NO
AVERAGE NUMBER OF DAYS PER WEEK YOU HAVE A DRINK CONTAINING ALCOHOL: 7
ON A TYPICAL DAY WHEN YOU DRINK ALCOHOL HOW MANY DRINKS DO YOU HAVE: 4
TOTAL SCORE: 0
ALCOHOL_USE: YES
TOTAL SCORE: 0
EVER HAD A DRINK FIRST THING IN THE MORNING TO STEADY YOUR NERVES TO GET RID OF A HANGOVER: NO
HOW MANY TIMES IN THE PAST YEAR HAVE YOU HAD 5 OR MORE DRINKS IN A DAY: 5
CONSUMPTION TOTAL: POSITIVE
EVER_SMOKED: YES
HAVE YOU EVER FELT YOU SHOULD CUT DOWN ON YOUR DRINKING: NO
TOTAL SCORE: 0

## 2017-07-07 NOTE — IP AVS SNAPSHOT
Home Care Instructions                                                                                                                  Name:Isiah Lagos  Medical Record Number:[id number]  CSN: [id number]    YOB: 1945   Age: 71 y.o.  Sex: male  HT:1.829 m (6') WT: 57.2 kg (126 lb 1.7 oz)          Admit Date: 7/7/2017     Discharge Date:   Today's Date: 7/13/2017  Attending Doctor:  Cody Villaseñor*                  Allergies:  Review of patient's allergies indicates no known allergies.            Discharge Instructions       Discharge Instructions    Discharged to other by medical transportation with escort. Discharged via wheelchair, hospital escort: Yes.  Special equipment needed: Not Applicable    Be sure to schedule a follow-up appointment with your primary care doctor or any specialists as instructed.     Discharge Plan:   Diet Plan: Discussed  Activity Level: Discussed  Smoking Cessation Offered: Patient Refused  Confirmed Follow up Appointment: Patient to Call and Schedule Appointment  Confirmed Symptoms Management: Discussed  Medication Reconciliation Updated: Yes  Influenza Vaccine Indication: Indicated: Not available from distributor/    I understand that a diet low in cholesterol, fat, and sodium is recommended for good health. Unless I have been given specific instructions below for another diet, I accept this instruction as my diet prescription.   Other diet: Regular    Special Instructions: None    · Is patient discharged on Warfarin / Coumadin?   No     · Is patient Post Blood Transfusion?  No    Depression / Suicide Risk    As you are discharged from this [facility] facility, it is important to learn how to keep safe from harming yourself.    Recognize the warning signs:  · Abrupt changes in personality, positive or negative- including increase in energy   · Giving away possessions  · Change in eating patterns- significant weight changes-  positive or  negative  · Change in sleeping patterns- unable to sleep or sleeping all the time   · Unwillingness or inability to communicate  · Depression  · Unusual sadness, discouragement and loneliness  · Talk of wanting to die  · Neglect of personal appearance   · Rebelliousness- reckless behavior  · Withdrawal from people/activities they love  · Confusion- inability to concentrate     If you or a loved one observes any of these behaviors or has concerns about self-harm, here's what you can do:  · Talk about it- your feelings and reasons for harming yourself  · Remove any means that you might use to hurt yourself (examples: pills, rope, extension cords, firearm)  · Get professional help from the community (Mental Health, Substance Abuse, psychological counseling)  · Do not be alone:Call your Safe Contact- someone whom you trust who will be there for you.  · Call your local CRISIS HOTLINE 744-4421 or 924-975-1099  · Call your local Children's Mobile Crisis Response Team [place] (080) 589-3506 or wwwTVDeck  · Call the toll free National Suicide Prevention Hotlines   · National Suicide Prevention Lifeline 634-825-PNTB (5697)  · National Hope Line Network 800-SUICIDE (368-8830)    Living With an Amputation  The most common causes of amputation from the hip down include:  · Diseases that:  · Reduce the blood flow to an area of your body.  · Decrease your body's ability to fight infection.  · Traumatic injuries that cause significant damage to body tissues.  · Birth defects.  · Cancerous lumps (malignant tumors).  Amputation above the hip is usually the result of trauma or birth defect. Disease is a less common cause.  Living with an amputation can be challenging, but you can still live a long, productive life.  WHAT ARE THE COMMON CHALLENGES OF LIVING WITH AN AMPUTATION?  The most common challenges are mobility and self-care. With some new habits, though, it is often possible to do all of the activities you used  to do.  You may be able to use a device that substitutes for your limb (prosthesis). The prosthesis helps you adapt more quickly to these challenges. Your health care provider can help select a prosthesis to meet your needs. A person who helps you choose and fits you with a prosthesis (prosthetist) may also help.  A rehabilitation program can also help you gain mobility and self-reliance. Your rehabilitation team may include:  · Physicians.  · Physical and occupational therapists.  · Prosthetists.  · Nurses.  · Social workers.  · Psychologists.  · Dietitians.  Your rehabilitation team will help you with all aspects of recovery and returning to work, home, sports, and your community. You will learn to:  · Get around safely.  · Adjust your home.  · Exercise.  · Use a prosthetic.  · Work through emotional challenges.  · Connect with other people who have gone through the same experience.  Additional challenges may include:  · Grieving period.  · Body image issues.  · Lifestyle issues, such as sex.  · Maintaining a healthy weight.  These issues are normal. Discuss these with your rehabilitation team.  WHEN CAN I RETURN TO MY REGULAR ACTIVITIES?   Returning to your normal activities is part of healing. Changes can often be made to equipment that allow you to return to a sport or hobby. Some companies design special equipment for this. Discuss all of your leisure interests with your health care provider and prosthetist.  WHEN CAN I RETURN TO WORK?  When you are ready to return to work, your therapists can perform job site evaluations and make recommendations to help you perform your job. You may not be able to return to your same job. Your local Office of Vocational Rehabilitation can assist you in job retraining.  FOR MORE INFORMATION:  Visit these online resources. You can find tips on everything from getting dressed and using bathrooms to driving and travel considerations. These resources can also connect you to a  network of emotional support, activities, and innovations. You may also search the Internet to find a local support group.  · Amputee Coalition: http://www.amputee-coalition.org/ensuring-fall-safety/http://www.amputee-coalition.org/ensuring-fall-safety/  · Amputee Support Groups: http://amputee.BISON.100e.com/http://amputee.supportxaitment.com  · Daily Strength: http://www.dailystrenApaceWave Technologies.org/c/Amputees/support-grouphttp://www.dailyBerGenBio.org/c/Amputees/support-group  · National Amputee Foundation: http://www.nationalamputation.org/http://www.nationalamputation.org  · National Center on Health, Physical Activity and Disability: http://www.nchpad.org/http://www.nchpad.org  · Disabled Sports USA: http://www.disabledsportsusa.orghttp://www.disabledsportsusa.org  · American Academy of Orthotists and Prosthetists: http://www.oandp.org/http://www.oandp.org     This information is not intended to replace advice given to you by your health care provider. Make sure you discuss any questions you have with your health care provider.     Document Released: 09/09/2003 Document Revised: 01/08/2016 Document Reviewed: 05/05/2015  Elsevier Interactive Patient Education ©2016 Elsevier Inc.    Phantom Limb Pain  Phantom limb pain occurs in an arm or leg following an amputation. It is pain in an extremity that no longer exists. This pain varies with different patients. Different activities may cause the pain. Some people with an amputated limb experience the opposite of phantom pain, which is phantom pleasure.   The trouble may start in a part of the brain known as the sensory cortex. The sensory cortex is the portion of your brain that processes sensations from the rest of your body. It is hypothesized that when a body part is lost, the corresponding part of the brain is not able to handle the loss and rewires its circuitry to make up for the signals it no longer receives from the missing extremity. The exact mechanism of how phantom  limb pain occurs is not known.  The severity of pain seems to be correlated with personal problems such as stress and attitude. It also seems to correlate with the amount of pain a person had before the operation.  CAUSES   · Damaged nerve endings.  · Scar tissue at the amputation site.  TREATMENT   Phantom limb pain can be severe and debilitating. Most cases of phantom limb pain only last briefly. There are a number of different therapies and medications that may give relief. Keep working with your health care provider until relief is obtained.  Some treatments that may be helpful include:  · Hypnosis and mental imagery. Their techniques can give patients the needed impetus to recognize their ability to regain control.  · Biofeedback.  · Relaxation techniques. They are related to hypnosis techniques and use the mind and body to control pain.  · Acupuncture.  · Massage.  · Exercise.  · Antidepressant medicine.  · Anticonvulsant medicine.  · Narcotics or pain medicines.  SEEK MEDICAL CARE IF:  Pain is not relieved or increases.     This information is not intended to replace advice given to you by your health care provider. Make sure you discuss any questions you have with your health care provider.     Document Released: 03/09/2004 Document Revised: 08/20/2014 Document Reviewed: 05/20/2014  Anita Margarita Interactive Patient Education ©2016 [employer].      Follow-up Information     1. Follow up with [facility] .    Specialty:  Skilled Nursing Facility    Contact information    [address]   [place] [address]  [phone]        2. Follow up with Frantz Rodriguez M.D..    Specialty:  Surgery    Contact information    [address]  [place] [address] [phone]           Discharge Medication Instructions:    Below are the medications your physician expects you to take upon discharge:    Review all your home medications and newly ordered medications with your doctor and/or  pharmacist. Follow medication instructions as directed by your doctor and/or pharmacist.    Please keep your medication list with you and share with your physician.               Medication List      START taking these medications        Instructions    Morning Afternoon Evening Bedtime    amoxicillin-clavulanate 875-125 MG Tabs   Commonly known as:  AUGMENTIN        Take 1 Tab by mouth 2 times a day.   Dose:  1 Tab                        aspirin 81 MG EC tablet   Last time this was given:  81 mg on 7/13/2017  8:22 AM        Take 1 Tab by mouth every day.   Dose:  81 mg                        clopidogrel 75 MG Tabs   Last time this was given:  75 mg on 7/13/2017  8:23 AM   Commonly known as:  PLAVIX        Take 1 Tab by mouth every day.   Dose:  75 mg                        enoxaparin 30 MG/0.3ML Soln inj   Last time this was given:  30 mg on 7/13/2017  8:23 AM   Commonly known as:  LOVENOX        Inject 0.3 mL as instructed every day.   Dose:  30 mg                        folic acid 1 MG Tabs   Last time this was given:  1 mg on 7/13/2017  8:23 AM   Commonly known as:  FOLVITE        Take 1 Tab by mouth every day.   Dose:  1 mg                        gabapentin 100 MG Caps   Last time this was given:  100 mg on 7/13/2017  4:24 PM   Commonly known as:  NEURONTIN        Take 1 Cap by mouth 3 times a day.   Dose:  100 mg                        ipratropium-albuterol 0.5-2.5 (3) MG/3ML nebulizer solution   Commonly known as:  DUONEB        3 mL by Nebulization route every 1 hour as needed.   Dose:  3 mL                        multivitamin Tabs   Last time this was given:  1 Tab on 7/13/2017  8:22 AM        Take 1 Tab by mouth every day.   Dose:  1 Tab                        ondansetron 4 MG Tbdp   Commonly known as:  ZOFRAN ODT        Take 1 Tab by mouth every four hours as needed for Nausea/Vomiting (give PO if IV route is unavailable. May give per feeding tube.).   Dose:  4 mg                        oxycodone  immediate release 10 MG immediate release tablet   Last time this was given:  10 mg on 7/13/2017  4:24 PM   Commonly known as:  ROXICODONE        Take 1 Tab by mouth every 3 hours as needed (Severe Pain (NRS Pain Scale 7-10; CPOT Pain Scale 6-8)).   Dose:  10 mg                        scopolamine 1.5 MG/3DAYS Pt72   Commonly known as:  TRANSDERM-SCOP        Apply 1 Patch to skin as directed every 72 hours as needed (Nausea/Vomiting if ondansetron, dexamethasone, diphenhydramine, and/or haloperidol ineffective or not ordered).   Dose:  1 Patch                        thiamine 50 MG tablet   Last time this was given:  50 mg on 7/13/2017  8:25 AM        Take 1 Tab by mouth every day.   Dose:  50 mg                          CHANGE how you take these medications        Instructions    Morning Afternoon Evening Bedtime    metoprolol 50 MG Tabs   What changed:  how much to take   Last time this was given:  25 mg on 7/13/2017  8:23 AM   Commonly known as:  LOPRESSOR        Take 1 Tab by mouth 2 Times a Day.   Dose:  50 mg                          CONTINUE taking these medications        Instructions    Morning Afternoon Evening Bedtime    atorvastatin 40 MG Tabs   Last time this was given:  40 mg on 7/12/2017  8:40 PM   Commonly known as:  LIPITOR        Take 1 Tab by mouth every bedtime.   Dose:  40 mg                        levothyroxine 75 MCG Tabs   Last time this was given:  75 mcg on 7/13/2017  5:52 AM   Commonly known as:  SYNTHROID        Take 75 mcg by mouth every morning before breakfast.    Indications: Underactive Thyroid   Dose:  75 mcg                        tramadol 50 MG Tabs   Last time this was given:  50 mg on 7/13/2017  8:23 AM   Commonly known as:  ULTRAM        Take 50 mg by mouth 2 times a day as needed for Moderate Pain.   Dose:  50 mg                             Where to Get Your Medications      Information about where to get these medications is not yet available     ! Ask your nurse or doctor  about these medications    - amoxicillin-clavulanate 875-125 MG Tabs  - aspirin 81 MG EC tablet  - atorvastatin 40 MG Tabs  - clopidogrel 75 MG Tabs  - enoxaparin 30 MG/0.3ML Soln inj  - folic acid 1 MG Tabs  - gabapentin 100 MG Caps  - ipratropium-albuterol 0.5-2.5 (3) MG/3ML nebulizer solution  - metoprolol 50 MG Tabs  - multivitamin Tabs  - ondansetron 4 MG Tbdp  - oxycodone immediate release 10 MG immediate release tablet  - scopolamine 1.5 MG/3DAYS Pt72  - thiamine 50 MG tablet            Orders for after discharge     REFERRAL TO PHYSIATRY (PMR)    Complete by:  As directed        REFERRAL TO SKILLED NURSING FACILITY    Complete by:  As directed              Instructions           Diet / Nutrition:    Follow any diet instructions given to you by your doctor or the dietician, including how much salt (sodium) you are allowed each day.    If you are overweight, talk to your doctor about a weight reduction plan.    Activity:    Remain physically active following your doctor's instructions about exercise and activity.    Rest often.     Any time you become even a little tired or short of breath, SIT DOWN and rest.    Worsening Symptoms:    Report any of the following signs and symptoms to the doctor's office immediately:    *Pain of jaw, arm, or neck  *Chest pain not relieved by medication                               *Dizziness or loss of consciousness  *Difficulty breathing even when at rest   *More tired than usual                                       *Bleeding drainage or swelling of surgical site  *Swelling of feet, ankles, legs or stomach                 *Fever (>100ºF)  *Pink or blood tinged sputum  *Weight gain (3lbs/day or 5lbs /week)           *Shock from internal defibrillator (if applicable)  *Palpitations or irregular heartbeats                *Cool and/or numb extremities    Stroke Awareness    Common Risk Factors for Stroke include:    Age  Atrial Fibrillation  Carotid Artery Stenosis  Diabetes  Mellitus  Excessive alcohol consumption  High blood pressure  Overweight   Physical inactivity  Smoking    Warning signs and symptoms of a stroke include:    *Sudden numbness or weakness of the face, arm or leg (especially on one side of the body).  *Sudden confusion, trouble speaking or understanding.  *Sudden trouble seeing in one or both eyes.  *Sudden trouble walking, dizziness, loss of balance or coordination.Sudden severe headache with no known cause.    It is very important to get treatment quickly when a stroke occurs. If you experience any of the above warning signs, call 911 immediately.                   Disclaimer         Quit Smoking / Tobacco Use:    I understand the use of any tobacco products increases my chance of suffering from future heart disease or stroke and could cause other illnesses which may shorten my life. Quitting the use of tobacco products is the single most important thing I can do to improve my health. For further information on smoking / tobacco cessation call a Toll Free Quit Line at 1-296.861.9083 (*National [employer] [place]) or 1-214.419.4644 (American Lung Association) or you can access the web based program at www.lungThe Fab Shoes.org.    [place] Tobacco Users Help Line:  (340) 496-2716       Toll Free: 1-643.984.4755  Quit Tobacco Program [facility] (423)177-8924    Crisis Hotline:    [place] Crisis Hotline:  3-307-TOLCCUM or 1-392.426.7277    [place] Crisis Hotline:    1-626.909.9633 or 002-231-4140    Discharge Survey:   Thank you for choosing [facility]. We hope we did everything we could to make your hospital stay a pleasant one. You may be receiving a phone survey and we would appreciate your time and participation in answering the questions. Your input is very valuable to us in our efforts to improve our service to our patients and their families.        My signature on this form indicates that:    1. I have reviewed and understand the above  information.  2. My questions regarding this information have been answered to my satisfaction.  3. I have formulated a plan with my discharge nurse to obtain my prescribed medications for home.                  Disclaimer         __________________________________                     __________       ________                       Patient Signature                                                 Date                    Time

## 2017-07-07 NOTE — ED PROVIDER NOTES
ED Provider Note    CHIEF COMPLAINT  Foot pain    HPI  Isiah Lagos is a 71 y.o. male who presents for evaluation of right foot pain.  The patient has had previous surgery on his left lower extremity and April by Dr. Monroy.  The patient had a femoral popliteal bypass graft as well as endarterectomy.  In addition, the patient required partial foot amputation in May.  The patient denies: Fever, chills, cough, sputum, chest pain, abdominal pain, vomiting, diarrhea.  The patient has swelling and redness to his left leg but actually complains of pain in his right leg.  No other acute symptomatology or complaints.    REVIEW OF SYSTEMS  See HPI for further details. All other systems negative.    PAST MEDICAL HISTORY  Past Medical History   Diagnosis Date   • Hypothyroid    • Emphysema    • Arthritis      osteoarthritis   • Cancer (CMS-HCC) 2001     prostate   • Stroke (CMS-HCC) 2015   • Myocardial infarct (CMS-HCC) 2010       FAMILY HISTORY  No family history on file.    SOCIAL HISTORY  Positive tobacco use; positive alcohol abuse;    SURGICAL HISTORY  Past Surgical History   Procedure Laterality Date   • Other       Thyroid Brachi (Laser instead of cutting)   • Other       Vasectomy   • Urethrotomy internal N/A 3/28/2017     Procedure: URETHRAL DILATION;  Surgeon: Gus Borges M.D.;  Location: [facility];  Service:    • Retrogrades N/A 3/28/2017     Procedure: RETROGRADE URETHRAGRAM;  Surgeon: Gus Borges M.D.;  Location: [facility];  Service:    • Cystoscopy  3/28/2017     Procedure: CYSTOSCOPY;  Surgeon: Gus Borges M.D.;  Location: [facility];  Service:    • Femoral popliteal bypass Left 4/17/2017     Procedure: FEMORAL POPLITEAL BYPASS;  Surgeon: Joesph Monroy M.D.;  Location: [facility];  Service:    • Foot amputation Left 5/26/2017     Procedure: FOOT AMPUTATION- TRANSMETATARSAL;  Surgeon: Joesph Monroy M.D.;  Location: [facility];  Service:        CURRENT MEDICATIONS  See nurses notes    ALLERGIES  No Known Allergies    PHYSICAL EXAM  VITAL SIGNS: /76 mmHg  Pulse 83  Temp(Src) 37.1 °C (98.8 °F)  Resp 16  Ht 1.829 m (6')  Wt 54.432 kg (120 lb)  BMI 16.27 kg/m2  SpO2 94%   Constitutional: A 71-year-old  male, appears older than his stated age, but awake and oriented ×3  HENT: ,Atraumatic, Bilateral external ears normal, tympanic membranes clear, Oropharynx mildly dry mucous membranes, No oral exudates, Nose normal.   Eyes: PERRL, EOMI, Conjunctiva normal, No discharge.   Neck: Normal range of motion, No tenderness, Supple, No stridor.   Lymphatic: No lymphadenopathy noted.   Cardiovascular: Normal heart rate, Normal rhythm, No murmurs, No rubs, No gallops.   Thorax & Lungs: Decreased breath sounds with bilateral rhonchi, No respiratory distress, No wheezing, no stridor, no rales. No chest tenderness.   Abdomen: Soft, nontender, nondistended, no organomegaly, positive bowel sounds normal in quality. No guarding or rebound.  Skin: Mildly decreased skin turgor, pink, warm, dry. No rashes, petechiae, purpura. Normal capillary refill.   Back: No tenderness, No CVA tenderness.   Extremities: Intact distal pulses, No edema, No tenderness, No cyanosis, No clubbing. Vascular: Pulses are 1+, symmetric in the upper and lower extremities, except the feet bilaterally; the patient has erythema and edema more to the left foot with surgical scars identified; the right lower extremity shows erythema, edema, warmth along with necrosis and is malodorous;  Musculoskeletal: Diffuse arthritic changes with diffuse muscular atrophy;. No tenderness to palpation or major deformities noted.   Neurologic: Weak appearing, oriented x 3, Normal motor function, Normal sensory function, No gross focal deficits noted.       EKG  I have interpreted: Rate 75, rhythm sinus, axis normal, AK QRS Q-T intervals normal, Q waves anteriorly, 12-lead  EKG, no acute change compared to a previous tracing in April 2017;    RADIOLOGY/PROCEDURES  DX-CHEST-PORTABLE (1 VIEW)   Final Result         1. No acute cardiopulmonary abnormalities are identified.      LE ART/GREGG (Specify in Comments Left, Right Or Bilateral)    (Results Pending)   LE ART DUPLX/IMAG (Specify in Comments Left, Right Or Bilateral)    (Results Pending)   CT-CTA AORTA-RO WITH & W/O-POST PROCESS    (Results Pending)         COURSE & MEDICAL DECISION MAKING  Pertinent Labs & Imaging studies reviewed. (See chart for details)  1.  Monitor  2.  IV normal saline: Sepsis protocol  3.  Vancomycin  4.  Zosyn    Laboratory studies: CBC shows white count 19.8, 82% neutrophils, 7% lymphocytes, 5% monocytes, hemoglobin 13.7, hematocrit 43.3; lactic acid 2.0; CMP shows sodium 125, chloride 92, calcium 8.2, albumin 2.8, globulin 3.8, otherwise within normal; otherwise within normal;    Discussion/consultation: At this time, the patient presents for evaluation of pain in his right lower 70.  The patient has evidence of infection with gangrenous findings and probable vascular insufficiency.  Patient also has cellulitis in his left lower extremity.  I spoke with the hospitalist on-call.  I spoke with plastic surgery on call.  Patient will be admitted for workup and treatment.    FINAL IMPRESSION  1. 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 Cellulitis of left lower extremity    3. PAD (peripheral artery disease) (CMS-[facility])    4. Alcohol dependence with unspecified alcohol-induced disorder (CMS-[facility])    5. Hyponatremia           PLAN  1.  The patient will be admitted for further monitoring, treatment, and care.    Electronically signed by: Guy G Gansert, 7/7/2017 4:03 PM

## 2017-07-07 NOTE — ED NOTES
Arrived via [facility] with right lower leg pain to have surgery this week per patient hade surgery few weeks ago on right lower foot appears very red and warm to the touch

## 2017-07-07 NOTE — IP AVS SNAPSHOT
" <p align=\"LEFT\"><IMG SRC=\"//[email or web address]$/Images/[email or web address]\" alt=\"Image\" WIDTH=\"50%\" HEIGHT=\"200\" BORDER=\"\"></p>                   Name:Isiah Lagos  Medical Record Number:[id number]  CSN: [id number]    YOB: 1945   Age: 71 y.o.  Sex: male  HT:1.829 m (6') WT: 57.2 kg (126 lb 1.7 oz)          Admit Date: 7/7/2017     Discharge Date:   Today's Date: 7/13/2017  Attending Doctor:  Cody Villaseñor*                  Allergies:  Review of patient's allergies indicates no known allergies.          Follow-up Information     1. Follow up with [facility] ([facility]) .    Specialty:  Skilled Nursing Facility    Contact information    [address] [address]  [phone]        2. Follow up with Frantz oRdriguez M.D..    Specialty:  Surgery    Contact information    [address]  [address]  [facility] [id number] [phone]           Medication List      Take these Medications        Instructions    amoxicillin-clavulanate 875-125 MG Tabs   Commonly known as:  AUGMENTIN    Take 1 Tab by mouth 2 times a day.   Dose:  1 Tab       aspirin 81 MG EC tablet    Take 1 Tab by mouth every day.   Dose:  81 mg       atorvastatin 40 MG Tabs   Commonly known as:  LIPITOR    Take 1 Tab by mouth every bedtime.   Dose:  40 mg       clopidogrel 75 MG Tabs   Commonly known as:  PLAVIX    Take 1 Tab by mouth every day.   Dose:  75 mg       enoxaparin 30 MG/0.3ML Soln inj   Commonly known as:  LOVENOX    Inject 0.3 mL as instructed every day.   Dose:  30 mg       folic acid 1 MG Tabs   Commonly known as:  FOLVITE    Take 1 Tab by mouth every day.   Dose:  1 mg       gabapentin 100 MG Caps   Commonly known as:  NEURONTIN    Take 1 Cap by mouth 3 times a day.   Dose:  100 mg       ipratropium-albuterol 0.5-2.5 (3) MG/3ML nebulizer solution   Commonly known as:  DUONEB    3 mL by Nebulization route every 1 hour as needed.   Dose:  3 mL       levothyroxine 75 MCG Tabs   Commonly known as:  SYNTHROID   " Take 75 mcg by mouth every morning before breakfast.    Indications: Underactive Thyroid   Dose:  75 mcg       metoprolol 50 MG Tabs   What changed:  how much to take   Commonly known as:  LOPRESSOR    Take 1 Tab by mouth 2 Times a Day.   Dose:  50 mg       multivitamin Tabs    Take 1 Tab by mouth every day.   Dose:  1 Tab       ondansetron 4 MG Tbdp   Commonly known as:  ZOFRAN ODT    Take 1 Tab by mouth every four hours as needed for Nausea/Vomiting (give PO if IV route is unavailable. May give per feeding tube.).   Dose:  4 mg       oxycodone immediate release 10 MG immediate release tablet   Commonly known as:  ROXICODONE    Take 1 Tab by mouth every 3 hours as needed (Severe Pain (NRS Pain Scale 7-10; CPOT Pain Scale 6-8)).   Dose:  10 mg       scopolamine 1.5 MG/3DAYS Pt72   Commonly known as:  TRANSDERM-SCOP    Apply 1 Patch to skin as directed every 72 hours as needed (Nausea/Vomiting if ondansetron, dexamethasone, diphenhydramine, and/or haloperidol ineffective or not ordered).   Dose:  1 Patch       thiamine 50 MG tablet    Take 1 Tab by mouth every day.   Dose:  50 mg       tramadol 50 MG Tabs   Commonly known as:  ULTRAM    Take 50 mg by mouth 2 times a day as needed for Moderate Pain.   Dose:  50 mg

## 2017-07-07 NOTE — IP AVS SNAPSHOT
7/13/2017    Isiah Lagos  [address]  [place] [address]    Dear Isiah:    [facility] wants to ensure your discharge home is safe and you or your loved ones have had all of your questions answered regarding your care after you leave the hospital.    Below is a list of resources and contact information should you have any questions regarding your hospital stay, follow-up instructions, or active medical symptoms.    Questions or Concerns Regarding… Contact   Medical Questions Related to Your Discharge  (7 days a week, 8am-5pm) Contact a Nurse Care Coordinator   [phone]   Medical Questions Not Related to Your Discharge  (24 hours a day / 7 days a week)  Contact the Nurse Health Line   [phone]    Medications or Discharge Instructions Refer to your discharge packet   or contact your [facility] Primary Care Provider   [phone]   Follow-up Appointment(s) Schedule your appointment via [employer]   or contact Scheduling [phone]   Billing Review your statement via [employer]  or contact Billing [phone]   Medical Records Review your records via [employer]   or contact Medical Records [phone]     You may receive a telephone call within two days of discharge. This call is to make certain you understand your discharge instructions and have the opportunity to have any questions answered. You can also easily access your medical information, test results and upcoming appointments via the [employer] free online health management tool. You can learn more and sign up at [employer]. For assistance setting up your [employer] account, please call [phone].    Once again, we want to ensure your discharge home is safe and that you have a clear understanding of any next steps in your care. If you have any questions or concerns, please do not hesitate to contact us, we are here for you. Thank you for choosing [facility] for your healthcare needs.    Sincerely,    Your [facility] Healthcare Team

## 2017-07-07 NOTE — IP AVS SNAPSHOT
W[employer]aa Access Code: [email or web address]  Expires: 8/12/2017  4:37 PM    Your email address is not on file at [facility].  Email Addresses are required for you to sign up for [employer], please contact [phone] to verify your personal information and to provide your email address prior to attempting to register for [employer].    Isiah Lagos  [address]  [place] [address]    W[employer]waa  A secure, online tool to manage your health information     [facility]’s W[employer]waa® is a secure, online tool that connects you to your personalized health information from the privacy of your home -- day or night - making it very easy for you to manage your healthcare. Once the activation process is completed, you can even access your medical information using the W[employer]waa brown, which is available for free in the Apple Brown store or Google Play store.     To learn more about W[employer], visit [email or web address]    There are two levels of access available (as shown below):   My Chart Features  [facility] Primary Care Doctor [facility]  Specialists [facility]  Urgent  Care Non-[facility] Primary Care Doctor   Email your healthcare team securely and privately 24/7 X X X    Manage appointments: schedule your next appointment; view details of past/upcoming appointments X      Request prescription refills. X      View recent personal medical records, including lab and immunizations X X X X   View health record, including health history, allergies, medications X X X X   Read reports about your outpatient visits, procedures, consult and ER notes X X X X   See your discharge summary, which is a recap of your hospital and/or ER visit that includes your diagnosis, lab results, and care plan X X  X     How to register for InsideTrackt:  Once your e-mail address has been verified, follow the following steps to sign up for InsideTrackt.     1. Go to  [email or web address]  2. Click on the Sign Up Now box, which takes you to the New Member Sign Up page. You  will need to provide the following information:  a. Enter your [email or web address] Access Code exactly as it appears at the top of this page. (You will not need to use this code after you’ve completed the sign-up process. If you do not sign up before the expiration date, you must request a new code.)   b. Enter your date of birth.   c. Enter your home email address.   d. Click Submit, and follow the next screen’s instructions.  3. Create a Vovicit ID. This will be your [email or web address] login ID and cannot be changed, so think of one that is secure and easy to remember.  4. Create a [email or web address] password. You can change your password at any time.  5. Enter your Password Reset Question and Answer. This can be used at a later time if you forget your password.   6. Enter your e-mail address. This allows you to receive e-mail notifications when new information is available in [email or web address].  7. Click Sign Up. You can now view your health information.    For assistance activating your [email or web address] account, call [phone]

## 2017-07-08 ENCOUNTER — APPOINTMENT (OUTPATIENT)
Dept: RADIOLOGY | Facility: MEDICAL CENTER | Age: 72
DRG: 270 | End: 2017-07-08
Attending: SURGERY
Payer: MEDICARE

## 2017-07-08 PROBLEM — Z65.9 SOCIAL PROBLEM: Status: ACTIVE | Noted: 2017-07-08

## 2017-07-08 PROBLEM — I96 GANGRENE OF FOOT (HCC): Status: ACTIVE | Noted: 2017-07-07

## 2017-07-08 PROBLEM — R82.90 ABNORMAL URINALYSIS: Status: ACTIVE | Noted: 2017-07-08

## 2017-07-08 PROBLEM — E43 SEVERE PROTEIN-CALORIE MALNUTRITION (HCC): Status: ACTIVE | Noted: 2017-07-08

## 2017-07-08 PROBLEM — E03.9 HYPOTHYROID: Status: ACTIVE | Noted: 2017-07-08

## 2017-07-08 LAB
ABO GROUP BLD: NORMAL
ABO GROUP BLD: NORMAL
ANION GAP SERPL CALC-SCNC: 10 MMOL/L (ref 0–11.9)
BLD GP AB SCN SERPL QL: NORMAL
BUN SERPL-MCNC: 7 MG/DL (ref 8–22)
CALCIUM SERPL-MCNC: 8.2 MG/DL (ref 8.5–10.5)
CHLORIDE SERPL-SCNC: 94 MMOL/L (ref 96–112)
CO2 SERPL-SCNC: 24 MMOL/L (ref 20–33)
CREAT SERPL-MCNC: 0.62 MG/DL (ref 0.5–1.4)
ERYTHROCYTE [DISTWIDTH] IN BLOOD BY AUTOMATED COUNT: 45.9 FL (ref 35.9–50)
GFR SERPL CREATININE-BSD FRML MDRD: >60 ML/MIN/1.73 M 2
GLUCOSE SERPL-MCNC: 82 MG/DL (ref 65–99)
HCT VFR BLD AUTO: 45.1 % (ref 42–52)
HGB BLD-MCNC: 14.2 G/DL (ref 14–18)
MCH RBC QN AUTO: 24.3 PG (ref 27–33)
MCHC RBC AUTO-ENTMCNC: 31.5 G/DL (ref 33.7–35.3)
MCV RBC AUTO: 77.1 FL (ref 81.4–97.8)
PLATELET # BLD AUTO: 459 K/UL (ref 164–446)
PMV BLD AUTO: 8.7 FL (ref 9–12.9)
POTASSIUM SERPL-SCNC: 3.7 MMOL/L (ref 3.6–5.5)
PREALB SERPL-MCNC: 5 MG/DL (ref 18–38)
RBC # BLD AUTO: 5.85 M/UL (ref 4.7–6.1)
RH BLD: NORMAL
SODIUM SERPL-SCNC: 128 MMOL/L (ref 135–145)
TSH SERPL DL<=0.005 MIU/L-ACNC: 31.1 UIU/ML (ref 0.3–3.7)
WBC # BLD AUTO: 16.6 K/UL (ref 4.8–10.8)

## 2017-07-08 PROCEDURE — 04UK0KZ SUPPLEMENT RIGHT FEMORAL ARTERY WITH NONAUTOLOGOUS TISSUE SUBSTITUTE, OPEN APPROACH: ICD-10-PCS | Performed by: SURGERY

## 2017-07-08 PROCEDURE — 500166 HCHG CATH, ANGIO: Performed by: SURGERY

## 2017-07-08 PROCEDURE — 502240 HCHG MISC OR SUPPLY RC 0272: Performed by: SURGERY

## 2017-07-08 PROCEDURE — C1757 CATH, THROMBECTOMY/EMBOLECT: HCPCS | Performed by: SURGERY

## 2017-07-08 PROCEDURE — 160036 HCHG PACU - EA ADDL 30 MINS PHASE I: Performed by: SURGERY

## 2017-07-08 PROCEDURE — 0Y6H0Z1 DETACHMENT AT RIGHT LOWER LEG, HIGH, OPEN APPROACH: ICD-10-PCS | Performed by: SURGERY

## 2017-07-08 PROCEDURE — 500258 HCHG CATH, SIZING OMNI 5F 70CM: Performed by: SURGERY

## 2017-07-08 PROCEDURE — 047H0DZ DILATION OF RIGHT EXTERNAL ILIAC ARTERY WITH INTRALUMINAL DEVICE, OPEN APPROACH: ICD-10-PCS | Performed by: SURGERY

## 2017-07-08 PROCEDURE — 501837 HCHG SUTURE CV: Performed by: SURGERY

## 2017-07-08 PROCEDURE — 665999 HH PPS REVENUE DEBIT

## 2017-07-08 PROCEDURE — HZ2ZZZZ DETOXIFICATION SERVICES FOR SUBSTANCE ABUSE TREATMENT: ICD-10-PCS | Performed by: INTERNAL MEDICINE

## 2017-07-08 PROCEDURE — 500043 HCHG BAG-A-JET: Performed by: SURGERY

## 2017-07-08 PROCEDURE — B41F1ZZ FLUOROSCOPY OF RIGHT LOWER EXTREMITY ARTERIES USING LOW OSMOLAR CONTRAST: ICD-10-PCS | Performed by: SURGERY

## 2017-07-08 PROCEDURE — 500053 HCHG BANDAGE, ELASTIC 4: Performed by: SURGERY

## 2017-07-08 PROCEDURE — 160048 HCHG OR STATISTICAL LEVEL 1-5: Performed by: SURGERY

## 2017-07-08 PROCEDURE — 99233 SBSQ HOSP IP/OBS HIGH 50: CPT | Performed by: INTERNAL MEDICINE

## 2017-07-08 PROCEDURE — A6402 STERILE GAUZE <= 16 SQ IN: HCPCS | Performed by: SURGERY

## 2017-07-08 PROCEDURE — 500229 HCHG CATH, GLIDE STR TAPER 5FR 100: Performed by: SURGERY

## 2017-07-08 PROCEDURE — 500700 HCHG HEMOCLIP, SMALL (RED): Performed by: SURGERY

## 2017-07-08 PROCEDURE — 160029 HCHG SURGERY MINUTES - 1ST 30 MINS LEVEL 4: Performed by: SURGERY

## 2017-07-08 PROCEDURE — C1725 CATH, TRANSLUMIN NON-LASER: HCPCS | Performed by: SURGERY

## 2017-07-08 PROCEDURE — A6403 STERILE GAUZE>16 <= 48 SQ IN: HCPCS | Performed by: SURGERY

## 2017-07-08 PROCEDURE — 500896 HCHG PACK, VASCULAR (FOR ROOM 10): Performed by: SURGERY

## 2017-07-08 PROCEDURE — 665998 HH PPS REVENUE CREDIT

## 2017-07-08 PROCEDURE — 700102 HCHG RX REV CODE 250 W/ 637 OVERRIDE(OP): Performed by: HOSPITALIST

## 2017-07-08 PROCEDURE — 700105 HCHG RX REV CODE 258

## 2017-07-08 PROCEDURE — 160041 HCHG SURGERY MINUTES - EA ADDL 1 MIN LEVEL 4: Performed by: SURGERY

## 2017-07-08 PROCEDURE — 160002 HCHG RECOVERY MINUTES (STAT): Performed by: SURGERY

## 2017-07-08 PROCEDURE — 160035 HCHG PACU - 1ST 60 MINS PHASE I: Performed by: SURGERY

## 2017-07-08 PROCEDURE — 500698 HCHG HEMOCLIP, MEDIUM: Performed by: SURGERY

## 2017-07-08 PROCEDURE — 501445 HCHG STAPLER, SKIN DISP: Performed by: SURGERY

## 2017-07-08 PROCEDURE — 700101 HCHG RX REV CODE 250

## 2017-07-08 PROCEDURE — 700111 HCHG RX REV CODE 636 W/ 250 OVERRIDE (IP)

## 2017-07-08 PROCEDURE — B4101ZZ FLUOROSCOPY OF ABDOMINAL AORTA USING LOW OSMOLAR CONTRAST: ICD-10-PCS | Performed by: SURGERY

## 2017-07-08 PROCEDURE — C1894 INTRO/SHEATH, NON-LASER: HCPCS | Performed by: SURGERY

## 2017-07-08 PROCEDURE — 88307 TISSUE EXAM BY PATHOLOGIST: CPT

## 2017-07-08 PROCEDURE — 84443 ASSAY THYROID STIM HORMONE: CPT

## 2017-07-08 PROCEDURE — 500440 HCHG DRESSING, STERILE ROLL (KERLIX): Performed by: SURGERY

## 2017-07-08 PROCEDURE — 160009 HCHG ANES TIME/MIN: Performed by: SURGERY

## 2017-07-08 PROCEDURE — A9270 NON-COVERED ITEM OR SERVICE: HCPCS | Performed by: INTERNAL MEDICINE

## 2017-07-08 PROCEDURE — 501498 HCHG SURG-I-LOOP, MAXI (BLUE): Performed by: SURGERY

## 2017-07-08 PROCEDURE — A6222 GAUZE <=16 IN NO W/SAL W/O B: HCPCS | Performed by: SURGERY

## 2017-07-08 PROCEDURE — 500389 HCHG DRAIN, RESERVOIR SUCT JP 100CC: Performed by: SURGERY

## 2017-07-08 PROCEDURE — C1876 STENT, NON-COA/NON-COV W/DEL: HCPCS | Performed by: SURGERY

## 2017-07-08 PROCEDURE — 700111 HCHG RX REV CODE 636 W/ 250 OVERRIDE (IP): Performed by: HOSPITALIST

## 2017-07-08 PROCEDURE — 500697 HCHG HEMOCLIP, LARGE (ORANGE): Performed by: SURGERY

## 2017-07-08 PROCEDURE — 700102 HCHG RX REV CODE 250 W/ 637 OVERRIDE(OP): Performed by: INTERNAL MEDICINE

## 2017-07-08 PROCEDURE — 86901 BLOOD TYPING SEROLOGIC RH(D): CPT

## 2017-07-08 PROCEDURE — 500301 HCHG CLIP, HEMOLOCK: Performed by: SURGERY

## 2017-07-08 PROCEDURE — 88311 DECALCIFY TISSUE: CPT

## 2017-07-08 PROCEDURE — 84134 ASSAY OF PREALBUMIN: CPT

## 2017-07-08 PROCEDURE — 500088 HCHG BLADE, SAGITTAL: Performed by: SURGERY

## 2017-07-08 PROCEDURE — 500371 HCHG DRAIN, BLAKE 10MM: Performed by: SURGERY

## 2017-07-08 PROCEDURE — 502626 HCHG SURGIFLO HEMOSTATIC MATRIX 6ML: Performed by: SURGERY

## 2017-07-08 PROCEDURE — 503056: Performed by: SURGERY

## 2017-07-08 PROCEDURE — C1769 GUIDE WIRE: HCPCS | Performed by: SURGERY

## 2017-07-08 PROCEDURE — 86850 RBC ANTIBODY SCREEN: CPT

## 2017-07-08 PROCEDURE — 86900 BLOOD TYPING SEROLOGIC ABO: CPT

## 2017-07-08 PROCEDURE — 80048 BASIC METABOLIC PNL TOTAL CA: CPT

## 2017-07-08 PROCEDURE — A9270 NON-COVERED ITEM OR SERVICE: HCPCS | Performed by: HOSPITALIST

## 2017-07-08 PROCEDURE — 04CK0ZZ EXTIRPATION OF MATTER FROM RIGHT FEMORAL ARTERY, OPEN APPROACH: ICD-10-PCS | Performed by: SURGERY

## 2017-07-08 PROCEDURE — 501838 HCHG SUTURE GENERAL: Performed by: SURGERY

## 2017-07-08 PROCEDURE — 04CH0ZZ EXTIRPATION OF MATTER FROM RIGHT EXTERNAL ILIAC ARTERY, OPEN APPROACH: ICD-10-PCS | Performed by: SURGERY

## 2017-07-08 PROCEDURE — 770006 HCHG ROOM/CARE - MED/SURG/GYN SEMI*

## 2017-07-08 PROCEDURE — 700105 HCHG RX REV CODE 258: Performed by: HOSPITALIST

## 2017-07-08 PROCEDURE — 85027 COMPLETE CBC AUTOMATED: CPT

## 2017-07-08 PROCEDURE — 501499 HCHG SURG-I-LOOP, MINI (BLUE): Performed by: SURGERY

## 2017-07-08 DEVICE — STENT ABSOLUTE PRO 7X60X135 - VASCULAR: Type: IMPLANTABLE DEVICE | Status: FUNCTIONAL

## 2017-07-08 DEVICE — PATCH 2X9CM XENOSURE BIOLOGIC VASCULAR---ORDER IN MULTIPLES OF 5---: Type: IMPLANTABLE DEVICE | Status: FUNCTIONAL

## 2017-07-08 RX ORDER — LORAZEPAM 1 MG/1
2 TABLET ORAL
Status: DISCONTINUED | OUTPATIENT
Start: 2017-07-08 | End: 2017-07-13 | Stop reason: HOSPADM

## 2017-07-08 RX ORDER — FOLIC ACID 1 MG/1
1 TABLET ORAL DAILY
Status: COMPLETED | OUTPATIENT
Start: 2017-07-08 | End: 2017-07-11

## 2017-07-08 RX ORDER — LORAZEPAM 1 MG/1
1 TABLET ORAL EVERY 4 HOURS PRN
Status: DISCONTINUED | OUTPATIENT
Start: 2017-07-08 | End: 2017-07-13 | Stop reason: HOSPADM

## 2017-07-08 RX ORDER — LORAZEPAM 2 MG/ML
1.5 INJECTION INTRAMUSCULAR
Status: DISCONTINUED | OUTPATIENT
Start: 2017-07-08 | End: 2017-07-10

## 2017-07-08 RX ORDER — IODIXANOL 270 MG/ML
INJECTION, SOLUTION INTRAVASCULAR
Status: DISCONTINUED | OUTPATIENT
Start: 2017-07-08 | End: 2017-07-09 | Stop reason: HOSPADM

## 2017-07-08 RX ORDER — HEPARIN SODIUM,PORCINE 1000/ML
VIAL (ML) INJECTION
Status: DISCONTINUED | OUTPATIENT
Start: 2017-07-08 | End: 2017-07-09 | Stop reason: HOSPADM

## 2017-07-08 RX ORDER — LORAZEPAM 2 MG/ML
0.5 INJECTION INTRAMUSCULAR EVERY 4 HOURS PRN
Status: DISCONTINUED | OUTPATIENT
Start: 2017-07-08 | End: 2017-07-13 | Stop reason: HOSPADM

## 2017-07-08 RX ORDER — THIAMINE MONONITRATE (VIT B1) 100 MG
100 TABLET ORAL DAILY
Status: COMPLETED | OUTPATIENT
Start: 2017-07-08 | End: 2017-07-11

## 2017-07-08 RX ORDER — LORAZEPAM 2 MG/ML
2 INJECTION INTRAMUSCULAR
Status: DISCONTINUED | OUTPATIENT
Start: 2017-07-08 | End: 2017-07-10

## 2017-07-08 RX ORDER — BUPIVACAINE HYDROCHLORIDE AND EPINEPHRINE 5; 5 MG/ML; UG/ML
INJECTION, SOLUTION EPIDURAL; INTRACAUDAL; PERINEURAL
Status: DISCONTINUED | OUTPATIENT
Start: 2017-07-08 | End: 2017-07-09 | Stop reason: HOSPADM

## 2017-07-08 RX ORDER — LORAZEPAM 2 MG/ML
1 INJECTION INTRAMUSCULAR
Status: DISCONTINUED | OUTPATIENT
Start: 2017-07-08 | End: 2017-07-13 | Stop reason: HOSPADM

## 2017-07-08 RX ORDER — LORAZEPAM 1 MG/1
4 TABLET ORAL
Status: DISCONTINUED | OUTPATIENT
Start: 2017-07-08 | End: 2017-07-10

## 2017-07-08 RX ORDER — LORAZEPAM 1 MG/1
0.5 TABLET ORAL EVERY 4 HOURS PRN
Status: DISCONTINUED | OUTPATIENT
Start: 2017-07-08 | End: 2017-07-13 | Stop reason: HOSPADM

## 2017-07-08 RX ORDER — LORAZEPAM 1 MG/1
3 TABLET ORAL
Status: DISCONTINUED | OUTPATIENT
Start: 2017-07-08 | End: 2017-07-10

## 2017-07-08 RX ADMIN — Medication 100 MG: at 10:09

## 2017-07-08 RX ADMIN — AMPICILLIN SODIUM AND SULBACTAM SODIUM 3 G: 2; 1 INJECTION, POWDER, FOR SOLUTION INTRAMUSCULAR; INTRAVENOUS at 02:36

## 2017-07-08 RX ADMIN — OXYCODONE HYDROCHLORIDE 5 MG: 5 TABLET ORAL at 02:48

## 2017-07-08 RX ADMIN — DOXYCYCLINE 100 MG: 100 TABLET ORAL at 09:35

## 2017-07-08 RX ADMIN — FOLIC ACID 1 MG: 1 TABLET ORAL at 10:09

## 2017-07-08 RX ADMIN — HEPARIN SODIUM 5000 UNITS: 5000 INJECTION, SOLUTION INTRAVENOUS; SUBCUTANEOUS at 05:30

## 2017-07-08 RX ADMIN — CLONIDINE HYDROCHLORIDE 0.1 MG: 0.1 TABLET ORAL at 09:34

## 2017-07-08 RX ADMIN — LEVOTHYROXINE SODIUM 75 MCG: 75 TABLET ORAL at 05:30

## 2017-07-08 RX ADMIN — VANCOMYCIN HYDROCHLORIDE 1000 MG: 100 INJECTION, POWDER, LYOPHILIZED, FOR SOLUTION INTRAVENOUS at 15:52

## 2017-07-08 RX ADMIN — THERA TABS 1 TABLET: TAB at 10:09

## 2017-07-08 RX ADMIN — AMPICILLIN SODIUM AND SULBACTAM SODIUM 3 G: 2; 1 INJECTION, POWDER, FOR SOLUTION INTRAMUSCULAR; INTRAVENOUS at 09:37

## 2017-07-08 RX ADMIN — ACETAMINOPHEN 650 MG: 325 TABLET, FILM COATED ORAL at 11:58

## 2017-07-08 RX ADMIN — OXYCODONE HYDROCHLORIDE 5 MG: 5 TABLET ORAL at 11:58

## 2017-07-08 RX ADMIN — METOPROLOL TARTRATE 25 MG: 25 TABLET, FILM COATED ORAL at 09:35

## 2017-07-08 RX ADMIN — SODIUM CHLORIDE 2000 ML: 9 INJECTION, SOLUTION INTRAVENOUS at 01:50

## 2017-07-08 RX ADMIN — TRAMADOL HYDROCHLORIDE 50 MG: 50 TABLET, COATED ORAL at 01:50

## 2017-07-08 RX ADMIN — AMPICILLIN SODIUM AND SULBACTAM SODIUM 3 G: 2; 1 INJECTION, POWDER, FOR SOLUTION INTRAMUSCULAR; INTRAVENOUS at 13:42

## 2017-07-08 RX ADMIN — OXYCODONE HYDROCHLORIDE 5 MG: 5 TABLET ORAL at 15:54

## 2017-07-08 RX ADMIN — TRAMADOL HYDROCHLORIDE 50 MG: 50 TABLET, COATED ORAL at 09:34

## 2017-07-08 SDOH — ECONOMIC STABILITY: HOUSING INSECURITY: UNSAFE APPLIANCES: 0

## 2017-07-08 SDOH — ECONOMIC STABILITY: HOUSING INSECURITY: UNSAFE COOKING RANGE AREA: 0

## 2017-07-08 ASSESSMENT — PAIN SCALES - GENERAL
PAINLEVEL_OUTOF10: 5
PAINLEVEL_OUTOF10: 9
PAINLEVEL_OUTOF10: 9
PAINLEVEL_OUTOF10: 8

## 2017-07-08 ASSESSMENT — ENCOUNTER SYMPTOMS
SHORTNESS OF BREATH: 0
SHORTNESS OF BREATH: T
NAUSEA: DENIES
DEBILITATING PAIN: 1
CONSTIPATION: 0
WEAKNESS: 1
ABDOMINAL PAIN: 0
FEVER: 0
CHILLS: 0
DIARRHEA: 0
DIZZINESS: 0
NAUSEA: 0
VOMITING: 0
VOMITING: DENIES

## 2017-07-08 NOTE — CARE PLAN
Problem: Communication  Goal: The ability to communicate needs accurately and effectively will improve  Outcome: PROGRESSING AS EXPECTED  Q 2 hour rounding in place    Problem: Pain Management  Goal: Pain level will decrease to patient’s comfort goal  Outcome: PROGRESSING AS EXPECTED  Consistent pain scale used

## 2017-07-08 NOTE — CARE PLAN
Problem: Nutritional:  Goal: Achieve adequate nutritional intake  Patient will consume >50% of meals and supplements.   Outcome: NOT MET

## 2017-07-08 NOTE — PROGRESS NOTES
Pharmacy Kinetics 71 y.o. male on vancomycin day # 2 2017    Currently on Vancomycin 1400 mg loading dose @ 2220 2017 followed by 1000 mg IV q 24 hr (1600)    Indication for Treatment: cellulitis, R foot with PVD    Pertinent history per medical record: Admitted on 2017 for complaints of right foot injury with pain. He fell in his home up roughly 1 week ago and scraped the top of his foot on a rug. He states that prior to this, he had no discoloration in his feet. He still has sensation and reports pain with ambulation.  Known history of peripheral vascular disease who was recently admitted in April for a left femoral bypass.      Other antibiotics: Ampicillin/Sulbactam 3 g IV q 6 hours    Allergies: Review of patient's allergies indicates no known allergies.     List concerns for renal function: age, known PVD, albumin low (2.8)    Pertinent cultures to date:     Results for STAN WALLS (MRN [id number]) as of 2017 10:07   2017 17:50 2017 18:00   Source BLD BLD   Site PERIPHERAL PERIPHERAL   Significant Indicator NEG NEG       Recent Labs      17   1750  17   0248   WBC  19.8*  16.6*   NEUTSPOLYS  82.40*   --      Recent Labs      17   1750  17   0248   BUN  8  7*   CREATININE  0.56  0.62   ALBUMIN  2.8*   --      No results for input(s): VANCOTROUGH, VANCOPEAK, VANCORANDOM in the last 72 hours.  Intake/Output Summary (Last 24 hours) at 17 1003  Last data filed at 17 0857   Gross per 24 hour   Intake    470 ml   Output      0 ml   Net    470 ml      Blood pressure 149/93, pulse 91, temperature 37.1 °C (98.8 °F), resp. rate 17, height 1.829 m (6'), weight 54.432 kg (120 lb), SpO2 93 %. Temp (24hrs), Av °C (98.6 °F), Min:36.9 °C (98.4 °F), Max:37.1 °C (98.8 °F)      A/P   1. Vancomycin dose change: started per protocol  2. Next vancomycin level: will order tomorrow after schedule is established.  3. Goal trough: 12-16 mcg/mL  4. Comments: I/O data  incomplete.  Received loading dose last night in the ED.  Will resume dosing 18 hours after loading dose with 24 hour interval.  Blood cultures negative to date.  Surgical intervention pending, note reviewed.  Renal function of concern: monitor closely.    DIEGO Elizondo, PharmD, BCPS

## 2017-07-08 NOTE — ED NOTES
Pt Taken to floor after CT per Charge nurse all belongings given to tech so that it can go up with him

## 2017-07-08 NOTE — PROGRESS NOTES
Vascular    Patient has severe chronic occlusive disease bilaterally.  The left bypass is open  The right iliac, common femoral, superficial femoral, and popliteal are occluded, likely chronic, as the patient reportedly has gangrene on the right foot as well.    Will discuss revascularization surgery of the right leg with patient and family.    Frantz Rodriguez MD  General and Vascular Surgery  [facility]  Cell: [phone]

## 2017-07-08 NOTE — H&P
PATIENT ID:  NAME:  Isiah Lagos  MRN:               [id number]  YOB: 1945    PMD: Pooja Wing N.P.    CC: Right foot infection    HPI: Isiah Lagos is a 71 y.o. male with chief complaint of worsening right foot infection and pain. This is a 71-year-old gentleman with a known history of peripheral vascular disease who was recently admitted in April for a left femoral bypass with Dr. Estela barber. Following this, he needed to return 6 weeks later for amputations of his left toes. He now comes in with complaints of right foot injury with pain. He fell in his home up roughly 1 week ago and scraped the top of his foot on a rug. He states that prior to this, he had no discoloration in his feet. He still has sensation and reports pain with ambulation. The patient is unkempt and smells strongly of urine and apparently lives with a caregiver. He denies any fevers nausea or vomiting chest pain headaches or shortness of breath but does report chills.                PAST MEDICAL HISTORY  Past Medical History   Diagnosis Date   • Hypothyroid    • Emphysema    • Arthritis      osteoarthritis   • Cancer (CMS-HCC) 2001     prostate   • Stroke (CMS-HCC) 2015   • Myocardial infarct (CMS-HCC) 2010       PAST SURGICAL HISTORY  Past Surgical History   Procedure Laterality Date   • Other       Thyroid Brachi (Laser instead of cutting)   • Other       Vasectomy   • Urethrotomy internal N/A 3/28/2017     Procedure: URETHRAL DILATION;  Surgeon: uGs Borges M.D.;  Location: [facility];  Service:    • Retrogrades N/A 3/28/2017     Procedure: RETROGRADE URETHRAGRAM;  Surgeon: Gus Borges M.D.;  Location: [facility];  Service:    • Cystoscopy  3/28/2017     Procedure: CYSTOSCOPY;  Surgeon: Gus Borges M.D.;  Location: [facility];  Service:    • Femoral popliteal bypass Left 4/17/2017     Procedure: FEMORAL POPLITEAL BYPASS;  Surgeon: Joesph Monroy M.D.;  Location:  [facility];  Service:    • Foot amputation Left 5/26/2017     Procedure: FOOT AMPUTATION- TRANSMETATARSAL;  Surgeon: Joesph Monroy M.D.;  Location: SURGERY [facility];  Service:        FAMILY HISTORY  No family history on file.    SOCIAL HISTORY  Social History   Substance Use Topics   • Smoking status: Current Every Day Smoker -- 0.25 packs/day for 50 years     Types: Cigarettes   • Smokeless tobacco: Never Used   • Alcohol Use: Yes      Comment: 2 quarts of beer/day       ALLERGIES  Allergies as of 07/07/2017   • (No Known Allergies)       OUTPATIENT MEDICATIONS     Medication List      ASK your doctor about these medications       Instructions    atorvastatin 40 MG Tabs   Commonly known as:  LIPITOR    Take 1 Tab by mouth every bedtime.   Dose:  40 mg       clonidine 0.1 MG Tabs   Commonly known as:  CATAPRES    Take 1 Tab by mouth 2 Times a Day.   Dose:  0.1 mg       docusate sodium 100 MG Caps   Commonly known as:  COLACE    Take 100 mg by mouth 2 times a day. Indications: Constipation   Dose:  100 mg       levothyroxine 75 MCG Tabs   Commonly known as:  SYNTHROID    Take 75 mcg by mouth every morning before breakfast.    Indications: Underactive Thyroid   Dose:  75 mcg       metoprolol 50 MG Tabs   Commonly known as:  LOPRESSOR    Take 1 Tab by mouth 2 Times a Day.   Dose:  50 mg       Mirabegron ER 25 MG Tb24   Commonly known as:  MYRBETRIQ    Take 50 mg by mouth every day.   Dose:  50 mg       oxycodone immediate-release 5 MG Tabs   Commonly known as:  ROXICODONE    Take 1 Tab by mouth every four hours as needed.   Dose:  5 mg       thiamine 100 MG tablet   Commonly known as:  THIAMINE    Take 1 Tab by mouth every day.   Dose:  100 mg       tramadol 50 MG Tabs   Commonly known as:  ULTRAM    Take 50 mg by mouth 2 times a day as needed for Moderate Pain.   Dose:  50 mg             REVIEW OF SYSTEMS  A full review of systems was completed and all pertinent positives and negatives are  included in the HPI above by AMA/CMS criteria.    PHYSICAL EXAM:  Blood pressure 153/76, pulse 77, temperature 37.1 °C (98.8 °F), resp. rate 16, height 1.829 m (6'), weight 54.432 kg (120 lb), SpO2 94 %.  Oxygen: Pulse Oximetry: 94 %, O2 Delivery: None (Room Air)    Gen: NAD, comfortable, unkempt and smells strongly of urine  HEENT: NCAT, EOMI, no LAD, no JVD, neck supple, MMM  Heart: +S1/S2, RRR, no murmur appreciated  Lungs: CTAB, no accessory muscle use  GI: NTND, soft, +BS, no rebound/guarding  Extremities: Warm, well-perfused, patient's right foot is purple and discolored. He has an open wound above the 2nd digit which appears necrotic. His left foot surgical wounds are healing well. It is pink and well perfused.  MSK: 5/5 strength in all 4 extremities, sensation intact to light touch  Neuro: AO x 3, CN II-XII grossly intact    LAB TESTS and IMAGES:   Recent Labs      07/07/17   1750   WBC  19.8*   RBC  5.59   HEMOGLOBIN  13.7*   HEMATOCRIT  43.3   MCV  77.5*   MCH  24.5*   RDW  45.7   PLATELETCT  444   MPV  8.6*   NEUTSPOLYS  82.40*   LYMPHOCYTES  7.10*   MONOCYTES  5.60   EOSINOPHILS  3.00   BASOPHILS  1.40         Recent Labs      07/07/17   1750   APTT  34.9   INR  1.05     Recent Labs      07/07/17   1750   SODIUM  125*   POTASSIUM  3.7   CHLORIDE  92*   CO2  22   BUN  8   CREATININE  0.56   CALCIUM  8.2*   ALBUMIN  2.8*     Estimated GFR/CRCL = Estimated Creatinine Clearance: 93.1 mL/min (by C-G formula based on Cr of 0.56).  Recent Labs      07/07/17   1750   GLUCOSE  80     FREE T-4   Date/Time Value Ref Range Status   10/01/2012 08:30 PM 0.90 0.53 - 1.43 ng/dL Final   05/08/2010 05:30 AM 1.10 0.53 - 1.43 ng/dL Final     Recent Labs      07/07/17   1750   ASTSGOT  27   ALTSGPT  10   TBILIRUBIN  0.7   ALKPHOSPHAT  80   GLOBULIN  3.8*   INR  1.05           Invalid input(s): [id number]  VITAMIN B12 -TRUE COBALAMIN   Date/Time Value Ref Range Status   12/22/2005 04:10  211 - 911 pg/mL Final      FOLATE -FOLIC ACID   Date/Time Value Ref Range Status   12/22/2005 04:10 PM 9.38 >3.1 ng/mL Final     Dx-chest-portable (1 View)    7/7/2017 7/7/2017 5:00 PM HISTORY/REASON FOR EXAM:  Sepsis TECHNIQUE/EXAM DESCRIPTION AND NUMBER OF VIEWS: Single portable view of the chest. COMPARISON: 4/8/17 FINDINGS: No pulmonary infiltrates or consolidations are noted. No pleural effusion. No pneumothorax. Stable cardiopericardial silhouette.     7/7/2017  1. No acute cardiopulmonary abnormalities are identified.      ASSESSMENT/PLAN: Isiah Lagos is a 71 y.o. male who presents with right foot infection and is noted to have right lower extremity cellulitis with necrotic and possibly gangrenous right foot.    1.  Right lower extremity cellulitis and necrotic/gangrenous right foot: The patient has a known history of PVD. He does have leukocytosis otherwise he is afebrile with stable vital signs. I will continue him on his Lipitor and start him on IV Unasyn and doxycycline although I expect that this may not be efficacious in light of his PVD. He will have symptomatic management and wound care as well as limb preservation services consultation. Dr. Monroy of surgery has been consulted by the ER physician and I look for to his recommendations.    2. Hypertension: Continue home Catapres and metoprolol    3. Hypothyroidism: Continue home Synthroid    PPX: Sequential compression devices for DVT prophylaxis, no PPI indicated, stool softeners ordered    DISPO: Surgical floor    CODE STATUS: FULL    Anticipate that patient will need greater than 2 midnights for management of the above discussed medical issues.

## 2017-07-08 NOTE — PROGRESS NOTES
2 RN skin check:   Open wound on dorsal side of right foot  Open wound on heel of right foot  Buttocks, generalized redness. Open skin tear on sacrum.   Wounds documented and photos taken

## 2017-07-08 NOTE — WOUND TEAM
"[facility]  Inpatient Services  Initial Wound and Skin Care Evaluation    Admission Date:  07/07/17     HPI, PMH, SH: Reviewed  Unit where seen by Wound Team:  [facility]     WOUND CONSULT RELATED TO:  Right foot      SUBJECTIVE:  \"I'm aloud 2 cigarettes a day.\"      Self Report / Pain Level:  Pain with assessment, very sensitive to touch.     OBJECTIVE:  In bed, foot open to air.    WOUND TYPE, LOCATION, CHARACTERISTICS (Pressure ulcers: location, stage, POA or date identified)    Location and type of wound: right dorsal foot and heel, arterial wounds         Periwound:  Discolored, atrophied       Drainage:   None    Tissue Type and %:  100% black/purple    Wound Edges:  Attached    Odor:    None    Exposed structure(s): exposed bone to right second toe?  S&S of Infection:  None       INTERVENTIONS BY WOUND TEAM:  Area assessed, left with incision from previous TMA with Eliana, appears cellulitic, on antibiotics.  Right foot covered with ABD pad and secured with roll guaze for protection.  Communicated with Dr. Rodriguez, likely surgery today.         Interdisciplinary consultation:  Dr. Rodriguez, RN, patient and patients wife.     EVALUATION:  Patient with arterial disease.  Previous fem pop to left leg.  Right foot and heel ischemic.  Dry and intact, cover and protect until further plan clear.      Factors affecting wound healing:  PVD, tobacco use    Goals:  Surgery     NURSING PLAN OF CARE ORDERS (X):    Dressing changes: See Dressing Maintenance orders:   Skin care: See Skin Care orders: X  Rectal tube care: See Rectal Tube Care orders:   Other orders:    RSKIN: CURRENT (X) ORDERED (O)  Q shift Pool:  X  Q shift pressure point assessments:  X  Pressure redistribution mattress      X  SARAH      Bariatric SARAH      Bariatric foam        Heel float boots       Heels floated on pillows    X  Barrier wipes      Barrier Cream      Barrier paste      Sacral silicone dressing    PRN  Silicone O2 tubing    "   Anchorfast      Trach with Optifoam split foam       Waffle cushion      Rectal tube or BMS      Antifungal tx    Turn q 2 hours   Ensure patient is turning  Up to chair     Ambulate   PT/OT     Dietician      PO     TF   TPN     PVN    NPO X  # days   Other       WOUND TEAM PLAN OF CARE (X):   NPWT change 3 x week:        Dressing changes by wound team:       Follow up as needed:     X  Other (explain):    Anticipated discharge plans (X):  SNF:         X  Home Care:           Outpatient Wound Center:            Self Care:            Other:

## 2017-07-08 NOTE — CARE PLAN
Problem: Pain Management  Goal: Pain level will decrease to patient’s comfort goal  Scheduled and PRN pain medication ordered. Pain assessments implemented. Extremities elevated. Ice pack offered. Pain med education provided.    Problem: Skin Integrity  Goal: Risk for impaired skin integrity will decrease  Outcome: PROGRESSING AS EXPECTED  Barrier cream in use. Mepilex in place. Waffle mattress in use. Q2 turns implemented. Abx ordered for cellulitis. Wound care following pt.

## 2017-07-08 NOTE — ED NOTES
Full linen change on pt, also placed 2 chucks under pt and a diaper as he is incontinent, urinated as we were changing sheets.

## 2017-07-08 NOTE — PROGRESS NOTES
Received pt from ED. A&Ox 3, unaware of date. 2 person assist. Room air, sating at 95%. Moves all extremities, generlized weakness.  Voiding, hyperactive BS, negative flatus, LBM 7/6. Cardiac diet, tolerating well, no nausea/ vomiting. Wound(s): left foot previous toes amputation, right foot purple, open wound, no pulse. Patient reports 9 out of 10 pain, medicated per MAR. Bed alarm on. Pt refusing treaded slipper socks, IV pole is on the same side as the bathroom, lower bedrails are down. Pt calls appropriatly. Discussed POC, all questions answered at this time. Bed is locked and in the lowest position, call light within reach, Q2H rounding in place, will continue to monitor.

## 2017-07-08 NOTE — PROGRESS NOTES
Spoke with Dr. Rodriguez, pt requesting to have surgery done tomorrow. Orders to start on cardiac diet with supplements and make NPO at midnight.

## 2017-07-08 NOTE — ED NOTES
Med Rec completed per patient at bedside. Patient only takes Synthroid 75mcg, and Tramadol 50mg  Allergies reviewed   No antibiotics within the last 30 days.

## 2017-07-08 NOTE — PROGRESS NOTES
[facility] Hospitalist Progress Note    Date of Service: 2017    Chief Complaint  This is a 71-year-old  male with past medical history of peripheral artery disease and lower extremity wounds, recently admitted in April for femoral bypass with Dr. Monroy. He subsequently had transmetatarsal amputation and had home health was signed following this. He additionally has a history of chronic alcohol abuse and has had alcohol withdrawal issues in the past. He takes levothyroxine for hypothyroidism. He has chronic urinary incontinence. He presents after injuring his foot at home approximately one week ago, the admitting physician notes that patient is extremely disheveled and smells of urine.    Interval Problem Update  Patient seen prior to operating room. He is noted to be very thin but claims to eat okay, he only admits to 1 quart of beer daily. He has a caregiver, seems to have no issue with the care provided. He is aware that he is at risk for amputation of his right foot pending outcome of vascular assessment    Consultants/Specialty  Eliana- vascular surgery ([facility])    Disposition  TBD        Review of Systems   Constitutional: Negative for fever and chills.   Respiratory: Negative for shortness of breath.    Cardiovascular: Negative for chest pain.   Gastrointestinal: Negative for nausea, vomiting, abdominal pain, diarrhea and constipation.   Musculoskeletal:        Chief complaint- denies pain   Neurological: Positive for weakness. Negative for dizziness.      Physical Exam  Laboratory/Imaging   Hemodynamics  Temp (24hrs), Av °C (98.6 °F), Min:36.9 °C (98.4 °F), Max:37.1 °C (98.8 °F)   Temperature: 37.1 °C (98.8 °F)  Pulse  Av.1  Min: 65  Max: 91    Blood Pressure : 149/93 mmHg, NIBP: 156/76 mmHg      Respiratory      Respiration: 17, Pulse Oximetry: 93 %        RUL Breath Sounds: Diminished, RML Breath Sounds: Diminished, RLL Breath Sounds: Diminished, RIGO Breath Sounds:  Diminished, LLL Breath Sounds: Diminished    Fluids    Intake/Output Summary (Last 24 hours) at 07/08/17 1025  Last data filed at 07/08/17 0857   Gross per 24 hour   Intake    470 ml   Output      0 ml   Net    470 ml       Nutrition  Orders Placed This Encounter   Procedures   • DIET NPO     Standing Status: Standing      Number of Occurrences: 1      Standing Expiration Date:      Order Specific Question:  Restrict to:     Answer:  Sips with Medications [3]     Physical Exam   Constitutional: He appears well-developed. No distress.   Cachectic disheveled male, nontoxic, no distress   HENT:   Head: Normocephalic and atraumatic.   Mouth/Throat: Oropharynx is clear and moist.   Has some type of residue in beard, dentition poor.   Eyes: EOM are normal. Pupils are equal, round, and reactive to light. Right eye exhibits no discharge. Left eye exhibits no discharge.   Neck: Neck supple.   Cardiovascular: Normal rate and regular rhythm.    Pulmonary/Chest: Effort normal. No respiratory distress. He has no wheezes.   Slightly decreased   Abdominal: Soft. Bowel sounds are normal. He exhibits no distension. There is no tenderness.   Musculoskeletal:   Despite his transmetatarsal amputation being on 5/26, staples are still present- miraculously the wound appears to be healing well. Right lower extremity is cyanotic from mid calf down with necrotic-looking toes and ulcers   Neurological: He is alert. No cranial nerve deficit.   Oriented to self and place  guesses year to be 1971, month to be August  But is aware of current and prior president  Also aware of cause of admission   Skin: Skin is warm and dry. He is not diaphoretic.   Poor turgor  Macular rash on face, no flaking, no papules  Patient was unaware of rash. Denies itching   Nursing note and vitals reviewed.      Recent Labs      07/07/17   1750  07/08/17   0248   WBC  19.8*  16.6*   RBC  5.59  5.85   HEMOGLOBIN  13.7*  14.2   HEMATOCRIT  43.3  45.1   MCV  77.5*  77.1*    MCH  24.5*  24.3*   MCHC  31.6*  31.5*   RDW  45.7  45.9   PLATELETCT  444  459*   MPV  8.6*  8.7*     Recent Labs      07/07/17   1750  07/08/17   0248   SODIUM  125*  128*   POTASSIUM  3.7  3.7   CHLORIDE  92*  94*   CO2  22  24   GLUCOSE  80  82   BUN  8  7*   CREATININE  0.56  0.62   CALCIUM  8.2*  8.2*     Recent Labs      07/07/17   1750   APTT  34.9   INR  1.05                  Assessment/Plan     Alcohol dependence (CMS-[facility]) (present on admission)  Assessment & Plan  Gives variable history as far as intake & has had withdrawal prior- will place on CIWA protocol    Leucocytosis (present on admission)  Assessment & Plan  Secondary to cellulitis and gangrene of the right extremity, monitor    PAD (peripheral artery disease) (CMS-[facility]) (present on admission)  Assessment & Plan  Re-eval today right lower extremity    Hyponatremia (present on admission)  Assessment & Plan  Mild    Smoking (present on admission)  Assessment & Plan  Nicotine replacement    Gangrene of foot (CMS-[facility]) (present on admission)  Assessment & Plan  Vascular evaluation today, May require amputation    Social problem (present on admission)  Assessment & Plan  Lives with caregiver however is disheveled and malnourished has not followed up surgically, we will ask social work to place EPS referral    Severe protein-calorie malnutrition (CMS-[facility]) (present on admission)  Assessment & Plan  Supplements will order tomorrow-as is nothing by mouth today    Hypothyroid  Assessment & Plan  replacement      Labs reviewed and Medications reviewed  Barrera catheter: No Barrera      DVT Prophylaxis: Heparin    Ulcer prophylaxis: Not indicated  Antibiotics: Treating active infection/contamination beyond 24 hours perioperative coverage  Assessed for rehab: Patient unable to tolerate rehabilitation therapeutic regimen

## 2017-07-08 NOTE — PROGRESS NOTES
Updated Dr. Alejo that pt has a positive CAGE score, per MD continue to monitor. Per Dr Alejo entered order for 50 mg of Tramadol 2x daily.

## 2017-07-08 NOTE — DIETARY
"Nutrition Services: Consult poor PO intake (and weight loss and wounds); BMI <19    Pt is a 71 y.o. Male with Dx: Cellulitis of R leg     PMH: PAD/PVD, popliteal bypass graft, endarterectomy, partial foot amputation, prostate CA, stroke, MI, smoker, ETOH    Ht: 72\", Wt: 54.432 kg, BMI= 16.27; Protein-energy malnutrition Grade II  Labs: Na 128, BUN 7, pre-albumin 5.0, alb 2.8 (7/7)  Meds: reviewed; +CIWA supplementation  GI: Last BM PTA  Skin: Wounds on feet, skin tear on sacrum    Assessment/Plan:  Poor PO intake, weight loss, and wounds noted per nutritional admit screen. Weight loss not quantified.  BMI, albumin, and pre-albumin indicative of poor nutritional status.   ETOH abuse noted.  Pt NPO for revascularization surgery today per Dr. Rodriguez.  Spoke with RN about adding nutrition supplements when pt is back on a diet; RN confirmed having the same discussion with MD earlier.    Recommend:  ADAT after surgery.   Add order for nutrition supplements.  Monitor for refeeding syndrome.    RD following.  "

## 2017-07-08 NOTE — RESPIRATORY CARE
COPD EDUCATION by COPD CLINICAL EDUCATOR  7/8/2017 at 7:32 AM by Bettye Pantoja     Patient reviewed by COPD education team. Patient does not qualify for COPD program.

## 2017-07-08 NOTE — PROGRESS NOTES
Pt AAOx4.  Wife present at bedside.  Complaining of pain at a 9/10 in right toe. Pt medicated per MAR.   Doppler used to hear pedal and tibial pulses in BLE.   Wound nurse placed dressing on right foot open wound.   Mepilex in place on sacrum. Sacrum is red with rash and tear noted.   Q2 hour turning in place.   Condom catheter placed on pt for control on incontinence.   Last BM prior to admission. Normoactive bowel sounds noted.  Call light within reach. Pt educated on need to call for assistance.   Hourly rounding in place, all needs addressed at this time.

## 2017-07-09 PROBLEM — L03.115 CELLULITIS OF RIGHT LEG: Status: ACTIVE | Noted: 2017-07-09

## 2017-07-09 LAB
ANION GAP SERPL CALC-SCNC: 10 MMOL/L (ref 0–11.9)
BASE EXCESS BLDA CALC-SCNC: -4 MMOL/L (ref -4–3)
BASOPHILS # BLD AUTO: 1.6 % (ref 0–1.8)
BASOPHILS # BLD: 0.32 K/UL (ref 0–0.12)
BODY TEMPERATURE: ABNORMAL CENTIGRADE
BUN SERPL-MCNC: 7 MG/DL (ref 8–22)
CALCIUM SERPL-MCNC: 7.7 MG/DL (ref 8.5–10.5)
CHLORIDE SERPL-SCNC: 100 MMOL/L (ref 96–112)
CO2 SERPL-SCNC: 19 MMOL/L (ref 20–33)
CREAT SERPL-MCNC: 0.6 MG/DL (ref 0.5–1.4)
EOSINOPHIL # BLD AUTO: 0.78 K/UL (ref 0–0.51)
EOSINOPHIL NFR BLD: 4 % (ref 0–6.9)
ERYTHROCYTE [DISTWIDTH] IN BLOOD BY AUTOMATED COUNT: 48.2 FL (ref 35.9–50)
GFR SERPL CREATININE-BSD FRML MDRD: >60 ML/MIN/1.73 M 2
GLUCOSE SERPL-MCNC: 96 MG/DL (ref 65–99)
HCO3 BLDA-SCNC: 22 MMOL/L (ref 17–25)
HCT VFR BLD AUTO: 41.6 % (ref 42–52)
HGB BLD-MCNC: 12.9 G/DL (ref 14–18)
IMM GRANULOCYTES # BLD AUTO: 0.14 K/UL (ref 0–0.11)
IMM GRANULOCYTES NFR BLD AUTO: 0.7 % (ref 0–0.9)
LYMPHOCYTES # BLD AUTO: 1.23 K/UL (ref 1–4.8)
LYMPHOCYTES NFR BLD: 6.3 % (ref 22–41)
MAGNESIUM SERPL-MCNC: 1.6 MG/DL (ref 1.5–2.5)
MCH RBC QN AUTO: 24.7 PG (ref 27–33)
MCHC RBC AUTO-ENTMCNC: 31 G/DL (ref 33.7–35.3)
MCV RBC AUTO: 79.5 FL (ref 81.4–97.8)
MONOCYTES # BLD AUTO: 0.85 K/UL (ref 0–0.85)
MONOCYTES NFR BLD AUTO: 4.4 % (ref 0–13.4)
NEUTROPHILS # BLD AUTO: 16.19 K/UL (ref 1.82–7.42)
NEUTROPHILS NFR BLD: 83 % (ref 44–72)
NRBC # BLD AUTO: 0 K/UL
NRBC BLD AUTO-RTO: 0 /100 WBC
PCO2 BLDA: 41 MMHG (ref 26–37)
PH BLDA: 7.35 [PH] (ref 7.4–7.5)
PHOSPHATE SERPL-MCNC: 3.2 MG/DL (ref 2.5–4.5)
PLATELET # BLD AUTO: 450 K/UL (ref 164–446)
PMV BLD AUTO: 8.4 FL (ref 9–12.9)
PO2 BLDA: 80.9 MMHG (ref 64–87)
POTASSIUM SERPL-SCNC: 3.4 MMOL/L (ref 3.6–5.5)
RBC # BLD AUTO: 5.23 M/UL (ref 4.7–6.1)
SAO2 % BLDA: 94.3 % (ref 93–99)
SODIUM SERPL-SCNC: 129 MMOL/L (ref 135–145)
WBC # BLD AUTO: 19.5 K/UL (ref 4.8–10.8)

## 2017-07-09 PROCEDURE — 700105 HCHG RX REV CODE 258: Performed by: HOSPITALIST

## 2017-07-09 PROCEDURE — 700101 HCHG RX REV CODE 250: Performed by: SURGERY

## 2017-07-09 PROCEDURE — 80048 BASIC METABOLIC PNL TOTAL CA: CPT

## 2017-07-09 PROCEDURE — 36415 COLL VENOUS BLD VENIPUNCTURE: CPT

## 2017-07-09 PROCEDURE — A9270 NON-COVERED ITEM OR SERVICE: HCPCS | Performed by: HOSPITALIST

## 2017-07-09 PROCEDURE — 94667 MNPJ CHEST WALL 1ST: CPT

## 2017-07-09 PROCEDURE — A9270 NON-COVERED ITEM OR SERVICE: HCPCS | Performed by: SURGERY

## 2017-07-09 PROCEDURE — 700102 HCHG RX REV CODE 250 W/ 637 OVERRIDE(OP): Performed by: SURGERY

## 2017-07-09 PROCEDURE — A9270 NON-COVERED ITEM OR SERVICE: HCPCS | Performed by: INTERNAL MEDICINE

## 2017-07-09 PROCEDURE — 700111 HCHG RX REV CODE 636 W/ 250 OVERRIDE (IP): Performed by: HOSPITALIST

## 2017-07-09 PROCEDURE — 94668 MNPJ CHEST WALL SBSQ: CPT

## 2017-07-09 PROCEDURE — 82803 BLOOD GASES ANY COMBINATION: CPT

## 2017-07-09 PROCEDURE — 85025 COMPLETE CBC W/AUTO DIFF WBC: CPT

## 2017-07-09 PROCEDURE — 665999 HH PPS REVENUE DEBIT

## 2017-07-09 PROCEDURE — 700111 HCHG RX REV CODE 636 W/ 250 OVERRIDE (IP): Performed by: SURGERY

## 2017-07-09 PROCEDURE — 665998 HH PPS REVENUE CREDIT

## 2017-07-09 PROCEDURE — 700105 HCHG RX REV CODE 258: Performed by: SURGERY

## 2017-07-09 PROCEDURE — 770022 HCHG ROOM/CARE - ICU (200)

## 2017-07-09 PROCEDURE — 700102 HCHG RX REV CODE 250 W/ 637 OVERRIDE(OP): Performed by: HOSPITALIST

## 2017-07-09 PROCEDURE — 700102 HCHG RX REV CODE 250 W/ 637 OVERRIDE(OP): Performed by: INTERNAL MEDICINE

## 2017-07-09 PROCEDURE — 99233 SBSQ HOSP IP/OBS HIGH 50: CPT | Performed by: SURGERY

## 2017-07-09 PROCEDURE — 84100 ASSAY OF PHOSPHORUS: CPT

## 2017-07-09 PROCEDURE — 700111 HCHG RX REV CODE 636 W/ 250 OVERRIDE (IP)

## 2017-07-09 PROCEDURE — 83735 ASSAY OF MAGNESIUM: CPT

## 2017-07-09 RX ORDER — DEXTROSE MONOHYDRATE 25 G/50ML
25 INJECTION, SOLUTION INTRAVENOUS
Status: DISCONTINUED | OUTPATIENT
Start: 2017-07-09 | End: 2017-07-13 | Stop reason: HOSPADM

## 2017-07-09 RX ORDER — DEXAMETHASONE SODIUM PHOSPHATE 4 MG/ML
4 INJECTION, SOLUTION INTRA-ARTICULAR; INTRALESIONAL; INTRAMUSCULAR; INTRAVENOUS; SOFT TISSUE
Status: DISCONTINUED | OUTPATIENT
Start: 2017-07-09 | End: 2017-07-13 | Stop reason: HOSPADM

## 2017-07-09 RX ORDER — ACETAMINOPHEN 325 MG/1
650 TABLET ORAL EVERY 6 HOURS PRN
Status: DISCONTINUED | OUTPATIENT
Start: 2017-07-09 | End: 2017-07-13 | Stop reason: HOSPADM

## 2017-07-09 RX ORDER — IPRATROPIUM BROMIDE AND ALBUTEROL SULFATE 2.5; .5 MG/3ML; MG/3ML
3 SOLUTION RESPIRATORY (INHALATION)
Status: DISCONTINUED | OUTPATIENT
Start: 2017-07-09 | End: 2017-07-13 | Stop reason: HOSPADM

## 2017-07-09 RX ORDER — ACETAMINOPHEN 325 MG/1
650 TABLET ORAL EVERY 6 HOURS
Status: DISCONTINUED | OUTPATIENT
Start: 2017-07-09 | End: 2017-07-13 | Stop reason: HOSPADM

## 2017-07-09 RX ORDER — KETOROLAC TROMETHAMINE 30 MG/ML
15 INJECTION, SOLUTION INTRAMUSCULAR; INTRAVENOUS EVERY 6 HOURS
Status: COMPLETED | OUTPATIENT
Start: 2017-07-09 | End: 2017-07-11

## 2017-07-09 RX ORDER — DEXTROSE MONOHYDRATE, SODIUM CHLORIDE, AND POTASSIUM CHLORIDE 50; 1.49; 9 G/1000ML; G/1000ML; G/1000ML
INJECTION, SOLUTION INTRAVENOUS CONTINUOUS
Status: DISCONTINUED | OUTPATIENT
Start: 2017-07-09 | End: 2017-07-09

## 2017-07-09 RX ORDER — SCOLOPAMINE TRANSDERMAL SYSTEM 1 MG/1
1 PATCH, EXTENDED RELEASE TRANSDERMAL
Status: DISCONTINUED | OUTPATIENT
Start: 2017-07-09 | End: 2017-07-13 | Stop reason: HOSPADM

## 2017-07-09 RX ORDER — SODIUM CHLORIDE, SODIUM LACTATE, POTASSIUM CHLORIDE, CALCIUM CHLORIDE 600; 310; 30; 20 MG/100ML; MG/100ML; MG/100ML; MG/100ML
500 INJECTION, SOLUTION INTRAVENOUS CONTINUOUS
Status: DISCONTINUED | OUTPATIENT
Start: 2017-07-09 | End: 2017-07-09

## 2017-07-09 RX ORDER — OXYCODONE HYDROCHLORIDE 10 MG/1
10 TABLET ORAL
Status: DISCONTINUED | OUTPATIENT
Start: 2017-07-09 | End: 2017-07-13 | Stop reason: HOSPADM

## 2017-07-09 RX ORDER — DOCUSATE SODIUM 100 MG/1
100 CAPSULE, LIQUID FILLED ORAL 2 TIMES DAILY
Status: DISCONTINUED | OUTPATIENT
Start: 2017-07-09 | End: 2017-07-13 | Stop reason: HOSPADM

## 2017-07-09 RX ORDER — DIPHENHYDRAMINE HYDROCHLORIDE 50 MG/ML
25 INJECTION INTRAMUSCULAR; INTRAVENOUS EVERY 6 HOURS PRN
Status: DISCONTINUED | OUTPATIENT
Start: 2017-07-09 | End: 2017-07-13 | Stop reason: HOSPADM

## 2017-07-09 RX ORDER — ACETAMINOPHEN 325 MG/1
650 TABLET ORAL EVERY 4 HOURS PRN
Status: DISCONTINUED | OUTPATIENT
Start: 2017-07-09 | End: 2017-07-09

## 2017-07-09 RX ORDER — HALOPERIDOL 5 MG/ML
1 INJECTION INTRAMUSCULAR EVERY 6 HOURS PRN
Status: DISCONTINUED | OUTPATIENT
Start: 2017-07-09 | End: 2017-07-13 | Stop reason: HOSPADM

## 2017-07-09 RX ORDER — GABAPENTIN 100 MG/1
100 CAPSULE ORAL 3 TIMES DAILY
Status: DISCONTINUED | OUTPATIENT
Start: 2017-07-09 | End: 2017-07-11

## 2017-07-09 RX ORDER — ONDANSETRON 2 MG/ML
4 INJECTION INTRAMUSCULAR; INTRAVENOUS EVERY 4 HOURS PRN
Status: DISCONTINUED | OUTPATIENT
Start: 2017-07-09 | End: 2017-07-13 | Stop reason: HOSPADM

## 2017-07-09 RX ORDER — LABETALOL HYDROCHLORIDE 5 MG/ML
10 INJECTION, SOLUTION INTRAVENOUS
Status: DISCONTINUED | OUTPATIENT
Start: 2017-07-09 | End: 2017-07-13 | Stop reason: HOSPADM

## 2017-07-09 RX ORDER — HYDRALAZINE HYDROCHLORIDE 20 MG/ML
10 INJECTION INTRAMUSCULAR; INTRAVENOUS
Status: DISCONTINUED | OUTPATIENT
Start: 2017-07-09 | End: 2017-07-13 | Stop reason: HOSPADM

## 2017-07-09 RX ORDER — SODIUM CHLORIDE, SODIUM LACTATE, POTASSIUM CHLORIDE, CALCIUM CHLORIDE 600; 310; 30; 20 MG/100ML; MG/100ML; MG/100ML; MG/100ML
INJECTION, SOLUTION INTRAVENOUS CONTINUOUS
Status: DISCONTINUED | OUTPATIENT
Start: 2017-07-09 | End: 2017-07-11

## 2017-07-09 RX ORDER — OXYCODONE HYDROCHLORIDE 5 MG/1
5 TABLET ORAL
Status: DISCONTINUED | OUTPATIENT
Start: 2017-07-09 | End: 2017-07-12

## 2017-07-09 RX ADMIN — POTASSIUM CHLORIDE, DEXTROSE MONOHYDRATE AND SODIUM CHLORIDE: 150; 5; 900 INJECTION, SOLUTION INTRAVENOUS at 01:42

## 2017-07-09 RX ADMIN — GABAPENTIN 100 MG: 100 CAPSULE ORAL at 14:28

## 2017-07-09 RX ADMIN — KETOROLAC TROMETHAMINE 15 MG: 30 INJECTION, SOLUTION INTRAMUSCULAR at 13:07

## 2017-07-09 RX ADMIN — AMPICILLIN SODIUM AND SULBACTAM SODIUM 3 G: 2; 1 INJECTION, POWDER, FOR SOLUTION INTRAMUSCULAR; INTRAVENOUS at 08:35

## 2017-07-09 RX ADMIN — FAMOTIDINE 20 MG: 10 INJECTION INTRAVENOUS at 08:14

## 2017-07-09 RX ADMIN — GABAPENTIN 100 MG: 100 CAPSULE ORAL at 20:48

## 2017-07-09 RX ADMIN — FENTANYL CITRATE 50 MCG: 50 INJECTION, SOLUTION INTRAMUSCULAR; INTRAVENOUS at 01:54

## 2017-07-09 RX ADMIN — KETOROLAC TROMETHAMINE 15 MG: 30 INJECTION, SOLUTION INTRAMUSCULAR at 01:44

## 2017-07-09 RX ADMIN — THERA TABS 1 TABLET: TAB at 08:14

## 2017-07-09 RX ADMIN — SODIUM CHLORIDE, POTASSIUM CHLORIDE, SODIUM LACTATE AND CALCIUM CHLORIDE 500 ML: 600; 310; 30; 20 INJECTION, SOLUTION INTRAVENOUS at 07:50

## 2017-07-09 RX ADMIN — ENOXAPARIN SODIUM 30 MG: 100 INJECTION SUBCUTANEOUS at 01:45

## 2017-07-09 RX ADMIN — TRAMADOL HYDROCHLORIDE 50 MG: 50 TABLET, COATED ORAL at 08:14

## 2017-07-09 RX ADMIN — AMPICILLIN SODIUM AND SULBACTAM SODIUM 3 G: 2; 1 INJECTION, POWDER, FOR SOLUTION INTRAMUSCULAR; INTRAVENOUS at 14:28

## 2017-07-09 RX ADMIN — ACETAMINOPHEN 650 MG: 325 TABLET, FILM COATED ORAL at 17:44

## 2017-07-09 RX ADMIN — OXYCODONE HYDROCHLORIDE 5 MG: 5 TABLET ORAL at 08:14

## 2017-07-09 RX ADMIN — FOLIC ACID 1 MG: 1 TABLET ORAL at 08:14

## 2017-07-09 RX ADMIN — KETOROLAC TROMETHAMINE 15 MG: 30 INJECTION, SOLUTION INTRAMUSCULAR at 17:44

## 2017-07-09 RX ADMIN — ACETAMINOPHEN 650 MG: 325 TABLET, FILM COATED ORAL at 13:07

## 2017-07-09 RX ADMIN — FENTANYL CITRATE 50 MCG: 50 INJECTION, SOLUTION INTRAMUSCULAR; INTRAVENOUS at 14:28

## 2017-07-09 RX ADMIN — SODIUM CHLORIDE, POTASSIUM CHLORIDE, SODIUM LACTATE AND CALCIUM CHLORIDE: 600; 310; 30; 20 INJECTION, SOLUTION INTRAVENOUS at 13:08

## 2017-07-09 RX ADMIN — Medication 100 MG: at 08:14

## 2017-07-09 RX ADMIN — AMPICILLIN SODIUM AND SULBACTAM SODIUM 3 G: 2; 1 INJECTION, POWDER, FOR SOLUTION INTRAMUSCULAR; INTRAVENOUS at 02:30

## 2017-07-09 RX ADMIN — ACETAMINOPHEN 650 MG: 325 TABLET, FILM COATED ORAL at 23:25

## 2017-07-09 RX ADMIN — HYDRALAZINE HYDROCHLORIDE 10 MG: 20 INJECTION INTRAMUSCULAR; INTRAVENOUS at 02:40

## 2017-07-09 RX ADMIN — KETOROLAC TROMETHAMINE 15 MG: 30 INJECTION, SOLUTION INTRAMUSCULAR at 06:37

## 2017-07-09 RX ADMIN — AMPICILLIN SODIUM AND SULBACTAM SODIUM 3 G: 2; 1 INJECTION, POWDER, FOR SOLUTION INTRAMUSCULAR; INTRAVENOUS at 20:44

## 2017-07-09 RX ADMIN — METOPROLOL TARTRATE 25 MG: 25 TABLET, FILM COATED ORAL at 08:14

## 2017-07-09 RX ADMIN — CLONIDINE HYDROCHLORIDE 0.1 MG: 0.1 TABLET ORAL at 08:14

## 2017-07-09 RX ADMIN — ATORVASTATIN CALCIUM 40 MG: 40 TABLET, FILM COATED ORAL at 20:48

## 2017-07-09 RX ADMIN — LEVOTHYROXINE SODIUM 75 MCG: 75 TABLET ORAL at 06:39

## 2017-07-09 ASSESSMENT — COPD QUESTIONNAIRES
DURING THE PAST 4 WEEKS HOW MUCH DID YOU FEEL SHORT OF BREATH: SOME OF THE TIME
DO YOU EVER COUGH UP ANY MUCUS OR PHLEGM?: NO/ONLY WITH OCCASIONAL COLDS OR INFECTIONS
COPD SCREENING SCORE: 7
HAVE YOU SMOKED AT LEAST 100 CIGARETTES IN YOUR ENTIRE LIFE: YES

## 2017-07-09 ASSESSMENT — PAIN SCALES - GENERAL
PAINLEVEL_OUTOF10: 6
PAINLEVEL_OUTOF10: 0
PAINLEVEL_OUTOF10: 6
PAINLEVEL_OUTOF10: 6
PAINLEVEL_OUTOF10: 8
PAINLEVEL_OUTOF10: 0
PAINLEVEL_OUTOF10: 0
PAINLEVEL_OUTOF10: 4
PAINLEVEL_OUTOF10: 0
PAINLEVEL_OUTOF10: 6
PAINLEVEL_OUTOF10: 0
PAINLEVEL_OUTOF10: 0
PAINLEVEL_OUTOF10: 3
PAINLEVEL_OUTOF10: 6
PAINLEVEL_OUTOF10: 0
PAINLEVEL_OUTOF10: 8

## 2017-07-09 ASSESSMENT — ENCOUNTER SYMPTOMS
ABDOMINAL PAIN: 0
POLYDIPSIA: 0
SPEECH CHANGE: 0
FEVER: 0
SHORTNESS OF BREATH: 1
NAUSEA: 0
VOMITING: 0

## 2017-07-09 ASSESSMENT — LIFESTYLE VARIABLES
SUBSTANCE_ABUSE: 1
EVER_SMOKED: YES

## 2017-07-09 NOTE — PROGRESS NOTES
Trauma/Surgical Progress Note    Author: Deysi Persaud Date & Time created: 7/9/2017   12:06 PM     Interval Events:  PO Day #1   Extensive vascular procedure, bilateral lower extremities.  Transfer to [facility] in care of Dr. Rodriguez    Review of Systems   Constitutional: Negative for fever.   Respiratory: Positive for shortness of breath.         Supplemental O2   Cardiovascular: Negative for chest pain.   Gastrointestinal: Negative for nausea, vomiting and abdominal pain.   Musculoskeletal: Positive for joint pain.        Bilateral lower extremities   Neurological: Negative for speech change.   Endo/Heme/Allergies: Negative for polydipsia.   Psychiatric/Behavioral: Positive for substance abuse.     Hemodynamics:  Blood pressure 135/74, pulse 62, temperature 37.3 °C (99.1 °F), resp. rate 18, height 1.829 m (6'), weight 52.5 kg (115 lb 11.9 oz), SpO2 95 %.     Respiratory:    Respiration: 18, Pulse Oximetry: 95 %, O2 Daily Delivery Respiratory : Room Air with O2 Available     Work Of Breathing / Effort: Mild  RUL Breath Sounds: Clear, RML Breath Sounds: Diminished, RLL Breath Sounds: Diminished, RIGO Breath Sounds: Clear, LLL Breath Sounds: Diminished  Fluids:    Intake/Output Summary (Last 24 hours) at 07/09/17 1206  Last data filed at 07/09/17 1000   Gross per 24 hour   Intake   3510 ml   Output    700 ml   Net   2810 ml     Admit Weight: 54.432 kg (120 lb)  Current Weight: 52.5 kg (115 lb 11.9 oz)    Physical Exam   Constitutional: He is oriented to person, place, and time. He appears well-nourished.   HENT:   Head: Atraumatic.   Eyes: Pupils are equal, round, and reactive to light.   Neck: Normal range of motion.   Cardiovascular: Normal rate.    Pulmonary/Chest: Effort normal.   Abdominal: Soft.   Musculoskeletal:   Right lower BKA  Left lower transmet.     Neurological: He is alert and oriented to person, place, and time.   Skin: Skin is warm.   Psychiatric: His behavior is normal.       Medical Decision  Making/Problem List:    Active Hospital Problems    Diagnosis   • Leucocytosis [D72.829]     Priority: High   • Alcohol dependence (CMS-HCC) [F10.20]     Priority: High   • Cellulitis of right leg [L03.115]     7/9   Dr. Rodriguez  Procedure:  1) endarterectomy of the right external iliac artery, common femoral artery and profunda femoris artery.  2) bovine pericardial patch angioplasty of the right common femoral artery and profunda femoris artery  3) right iliac artery angiography via direct catheter placement in the artery.  4) percutaneous access of the left common femoral artery  5) nonselective catheter placement in the aorta with aortoiliac angiography    6) right external iliac artery balloon angioplasty  7) right external iliac artery stent placement: 7 mm x 60 mm bare metal self-expanding stent  8) right external iliac artery balloon angioplasty post-stent placement  9) right external iliac artery angiography via direct catheter placement in the artery  10) runoff angiography of the right lower extremity via direct catheter placement in the common femoral artery  11) right below-knee amputation     • Social problem [Z65.9]   • Severe protein-calorie malnutrition (CMS-HCC) [E43]   • Hypothyroid [E03.9]   • Gangrene of foot (CMS-HCC) [I96]   • Smoking [F17.200]   • Hyponatremia [E87.1]   • PAD (peripheral artery disease) (CMS-HCC) [I73.9]     Core Measures & Quality Metrics:    Barrera catheter: No Barrera      DVT Prophylaxis: Enoxaparin (Lovenox)    Ulcer prophylaxis: Not indicated    Assessed for rehab: Patient returned to prior level of function, rehabilitation not indicated at this time    SAMM Score  Discussed patient condition with RN, Patient and trauma surgery.  Dr. Snow

## 2017-07-09 NOTE — CONSULTS
Vascular Surgery Consult Note  -------------------------------------------------------------------------------------------------  Date: 7/8/2017    Consulting Physician: Frantz Rodriguez M.D. [facility]  -------------------------------------------------------------------------------------------------    Reason for consultation: right foot gangrene    HPI: This is a 71 y.o. male who is presenting with gangrene of the right foot.  He says this has been progressing over the past couple weeks.  He does have constant pain in the right leg and foot as well.    Past Medical History   Diagnosis Date   • Hypothyroid    • Emphysema    • Arthritis      osteoarthritis   • Cancer (CMS-HCC) 2001     prostate   • Stroke (CMS-HCC) 2015   • Myocardial infarct (CMS-HCC) 2010       Past Surgical History   Procedure Laterality Date   • Other       Thyroid Brachi (Laser instead of cutting)   • Other       Vasectomy   • Urethrotomy internal N/A 3/28/2017     Procedure: URETHRAL DILATION;  Surgeon: Gus Borges M.D.;  Location: [facility];  Service:    • Retrogrades N/A 3/28/2017     Procedure: RETROGRADE URETHRAGRAM;  Surgeon: Gus Borges M.D.;  Location: [facility];  Service:    • Cystoscopy  3/28/2017     Procedure: CYSTOSCOPY;  Surgeon: Gus Borges M.D.;  Location: [facility];  Service:    • Femoral popliteal bypass Left 4/17/2017     Procedure: FEMORAL POPLITEAL BYPASS;  Surgeon: Joesph Monroy M.D.;  Location: [facility];  Service:    • Foot amputation Left 5/26/2017     Procedure: FOOT AMPUTATION- TRANSMETATARSAL;  Surgeon: Joesph Monroy M.D.;  Location: [facility];  Service:        Current Facility-Administered Medications   Medication Dose Route Frequency Provider Last Rate Last Dose   • Pharmacy Consult Request ...Pain Management Review 1 Each  1 Each Other PRN Frantz Rodriguez M.D.       • ondansetron (ZOFRAN)  syringe/vial injection 4 mg  4 mg Intravenous Q4HRS PRN Frantz Rodriguez M.D.       • dexamethasone (DECADRON) injection 4 mg  4 mg Intravenous Once PRN Frantz Rodriguez M.D.       • diphenhydrAMINE (BENADRYL) injection 25 mg  25 mg Intravenous Q6HRS PRN Frantz Rodriguez M.D.       • haloperidol lactate (HALDOL) injection 1 mg  1 mg Intravenous Q6HRS PRN Frantz Rodriguez M.D.       • scopolamine (TRANSDERM-SCOP) 1.5 MG/3DAYS patch 1 Patch  1 Patch Transdermal Q72HRS PRN Frantz Rodriguez M.D.       • Respiratory Care per Protocol   Nebulization Continuous RT Frantz Rodriguez M.D.       • [START ON 7/10/2017] enoxaparin (LOVENOX) inj 40 mg  40 mg Subcutaneous QDAY Frantz Rodriguez M.D.       • dextrose 5 % and 0.9 % NaCl with KCl 20 mEq infusion   Intravenous Continuous Frantz Rodriguez M.D.       • ketorolac (TORADOL) injection 15 mg  15 mg Intravenous Q6HR Frantz Rodriguez M.D.       • oxycodone immediate release (ROXICODONE) tablet 5 mg  5 mg Oral Q3HRS PRN Frantz Rodriguez M.D.       • oxycodone immediate release (ROXICODONE) tablet 10 mg  10 mg Oral Q3HRS PRN Frantz Rodriguez M.D.       • fentaNYL (SUBLIMAZE) injection 50 mcg  50 mcg Intravenous Q3HRS PRN Frantz Rodriguez M.D.       • glucose 4 g chewable tablet 16 g  16 g Oral Q15 MIN PRN Frantz Rodriguez M.D.        And   • dextrose 50% (D50W) injection 25 mL  25 mL Intravenous Q15 MIN PRN Frantz Rodriguez M.D.       • menthol/phenol ame (CEPASTAT) lozenge 1 Lozenge  1 Lozenge Oral Q HOUR PRN Frantz Rodriguez M.D.       • docusate sodium (COLACE) capsule 100 mg  100 mg Oral BID Frantz Rodriguez M.D.       • famotidine (PEPCID) injection 20 mg  20 mg Intravenous BID Frantz Rodriguez M.D.       • acetaminophen (TYLENOL) tablet 650 mg  650 mg Oral Q4HRS PRN Frantz Rodriguez M.D.       • hydrALAZINE (APRESOLINE) injection 10 mg  10 mg Intravenous Q HOUR PRN Frantz Rodriguez M.D.       • labetalol  (NORMODYNE,TRANDATE) injection 10 mg  10 mg Intravenous Q HOUR PRN Frantz Rodriguez M.D.       • ipratropium-albuterol (DUONEB) nebulizer solution 3 mL  3 mL Nebulization Q HOUR PRN Frantz Rodriguez M.D.       • MD ALERT... vancomycin per pharmacy protocol   Other pharmacy to dose Dianelys Kc M.D.       • lorazepam (ATIVAN) tablet 0.5 mg  0.5 mg Oral Q4HRS PRN Dianelys Kc M.D.       • lorazepam (ATIVAN) tablet 1 mg  1 mg Oral Q4HRS PRN Dianelys Kc M.D.        Or   • lorazepam (ATIVAN) injection 0.5 mg  0.5 mg Intravenous Q4HRS PRN Dianelys Kc M.D.       • lorazepam (ATIVAN) tablet 2 mg  2 mg Oral Q2HRS PRN Dianelys Kc M.D.        Or   • lorazepam (ATIVAN) injection 1 mg  1 mg Intravenous Q2HRS PRN Dianelys Kc M.D.       • lorazepam (ATIVAN) tablet 3 mg  3 mg Oral Q HOUR PRN Dianelys Kc M.D.        Or   • lorazepam (ATIVAN) injection 1.5 mg  1.5 mg Intravenous Q HOUR PRN Dianelys Kc M.D.       • lorazepam (ATIVAN) tablet 4 mg  4 mg Oral Q15 MIN PRN Dianelys Kc M.D.        Or   • lorazepam (ATIVAN) injection 2 mg  2 mg Intravenous Q15 MIN PRN Dianelys Kc M.D.       • thiamine (THIAMINE) tablet 100 mg  100 mg Oral DAILY Dianelys Kc M.D.   100 mg at 07/08/17 1009    And   • multivitamin (THERAGRAN) tablet 1 Tab  1 Tab Oral DAILY Dianelys Kc M.D.   1 Tab at 07/08/17 1009    And   • folic acid (FOLVITE) tablet 1 mg  1 mg Oral DAILY Dianelys Kc M.D.   1 mg at 07/08/17 1009   • vancomycin 1,000 mg in  mL IVPB  18 mg/kg Intravenous Q24HR Gui H. Mikhail, PHARMD 50 mL/hr at 07/08/17 1552 1,000 mg at 07/08/17 1552   • bupivacaine-0.5%-epinephrine 1:776416 PF (MARCAINE/SENSORCAINE) injection    Intra-Op Once PRN Frantz Rodriguez M.D.   9 mL at 07/08/17 1935   • heparin OR USE ONLY    Intra-Op Once PRN Frantz Rodriguez M.D.   5,000 Units at 07/08/17 1907   • thrombin (THROMBINAR)  5000 UNIT vial    Intra-Op Once PRN Frantz Rodriguez M.D.   5,000 Units at 07/08/17 2200   • iodixanol (VISIPAQUE) SOLN    Intra-Op Once PRN Frantz Rordiguez M.D.   100 mL at 07/08/17 2145   • NS (BOLUS) infusion 1,000 mL  1,000 mL Intravenous Once PRN Guy G Gansert, M.D.       • levothyroxine (SYNTHROID) tablet 75 mcg  75 mcg Oral QAM AC Elizabeth Alejo M.D.   75 mcg at 07/08/17 0530   • atorvastatin (LIPITOR) tablet 40 mg  40 mg Oral QHS Elizabeth Alejo M.D.   40 mg at 07/07/17 2242   • ampicillin/sulbactam (UNASYN) 3 g in  mL IVPB  3 g Intravenous Q6HRS Elizabeth Alejo M.D.   Stopped at 07/08/17 1412   • senna-docusate (PERICOLACE or SENOKOT S) 8.6-50 MG per tablet 2 Tab  2 Tab Oral BID Elizabeth Alejo M.D.        And   • polyethylene glycol/lytes (MIRALAX) PACKET 1 Packet  1 Packet Oral QDAY PRN Elizabeth Alejo M.D.        And   • magnesium hydroxide (MILK OF MAGNESIA) suspension 30 mL  30 mL Oral QDAY PRN Elizabeth Alejo M.D.        And   • bisacodyl (DULCOLAX) suppository 10 mg  10 mg Rectal QDAY PRN Elizabeth Alejo M.D.       • heparin injection 5,000 Units  5,000 Units Subcutaneous Q8HRS Elizabeth Alejo M.D.   Stopped at 07/08/17 1339   • labetalol (NORMODYNE,TRANDATE) injection 10 mg  10 mg Intravenous Q4HRS PRN Elizabeth Alejo M.D.       • ondansetron (ZOFRAN) syringe/vial injection 4 mg  4 mg Intravenous Q4HRS PRN Elizabeth Alejo M.D.       • ondansetron (ZOFRAN ODT) dispertab 4 mg  4 mg Oral Q4HRS PRN Elizabeth Alejo M.D.       • acetaminophen (TYLENOL) tablet 650 mg  650 mg Oral Q6HRS PRN Elizabeth Alejo M.D.   650 mg at 07/08/17 1158   • oxycodone immediate-release (ROXICODONE) tablet 2.5 mg  2.5 mg Oral Q3HRS PRN Elizabeth Alejo M.D.   0 mg at 07/07/17 2237    And   • oxycodone immediate-release (ROXICODONE) tablet 5 mg  5 mg Oral Q3HRS PRN Elizabeth Alejo M.D.   5 mg at 07/08/17 1554    And   • morphine (pf) 4 mg/ml injection 2 mg  2 mg Intravenous Q3HRS PRN Elizabeth Alejo M.D.       • metoprolol  (LOPRESSOR) tablet 25 mg  25 mg Oral BID Elizabeth Alejo M.D.   25 mg at 07/08/17 0935   • clonidine (CATAPRES) tablet 0.1 mg  0.1 mg Oral BID Elizabeth Alejo M.D.   0.1 mg at 07/08/17 0934   • tramadol (ULTRAM) 50 MG tablet 50 mg  50 mg Oral BID Elizabeth Alejo M.D.   50 mg at 07/08/17 0934       Social History     Social History   • Marital Status: Single     Spouse Name: N/A   • Number of Children: N/A   • Years of Education: N/A     Occupational History   • Not on file.     Social History Main Topics   • Smoking status: Current Every Day Smoker -- 0.25 packs/day for 50 years     Types: Cigarettes   • Smokeless tobacco: Never Used   • Alcohol Use: Yes      Comment: 2 quarts of beer/day   • Drug Use: No   • Sexual Activity: Not on file     Other Topics Concern   • Not on file     Social History Narrative       History reviewed. No pertinent family history.    Allergies:  Review of patient's allergies indicates no known allergies.    Review of Systems:  Constitutional: Negative for fever, chills, weight loss,   HENT:   Negative for hearing loss or tinnitus    Eyes:    Negative for blurred vision, double vision, or loss of vision  Respiratory:  Negative for cough, hemoptysis, or wheezing    Cardiac:  Negative for chest pain or palpitations or orthopnea  Vascular:  As per HPI  Gastrointestinal: Negative for nausea, vomiting, or abdominal pain     Negative for hematochezia or melena   Genitourinary: Negative for dysuria, frequency, or hematuria   Musculoskeletal: Negative for myalgias, back pain, or joint pain  Skin:   Negative for itching or rash  Neurological:  Negative for dizziness, headaches, or tremors     Negative for speech disturbance     Negative for extremity weakness or paresthesias  Endo/Heme:  Negative for easy bruising or bleeding  Psychiatric:  Negative for depression, suicidal ideas, or hallucinations    Physical Exam:  Blood pressure 135/74, pulse 76, temperature 36.2 °C (97.1 °F), resp. rate 16, height  1.829 m (6'), weight 54.432 kg (120 lb), SpO2 97 %.    Constitutional: Alert, oriented, no acute distress. Cachectic  HEENT:  Normocephalic and atraumatic, EOMI  Neck:   Supple, no JVD, no bruits  Cardiovascular: Regular rate and rhythm, no murmurs  Pulmonary:  Good air entry bilaterally, no wheezing   Abdominal:  Soft, non-tender, non-distended     Aortic impulse not widened  Musculoskeletal: No edema, no tenderness     Left TMA, palpable left PT pulse, incisions well healed.     Right forefoot gangrenous.  No palpable femoral pulse.  Neurological:  CN II-XII grossly intact, no focal deficits  Skin:   Skin is warm and dry. No rash noted.  Psychiatric:  Normal mood and affect.    Labs:  Recent Labs      07/07/17 1750 07/08/17   0248   WBC  19.8*  16.6*   RBC  5.59  5.85   HEMOGLOBIN  13.7*  14.2   HEMATOCRIT  43.3  45.1   MCV  77.5*  77.1*   MCH  24.5*  24.3*   MCHC  31.6*  31.5*   RDW  45.7  45.9   PLATELETCT  444  459*   MPV  8.6*  8.7*     Recent Labs      07/07/17   1750  07/08/17   0248   SODIUM  125*  128*   POTASSIUM  3.7  3.7   CHLORIDE  92*  94*   CO2  22  24   GLUCOSE  80  82   BUN  8  7*   CREATININE  0.56  0.62   CALCIUM  8.2*  8.2*     Recent Labs      07/07/17   1750   APTT  34.9   INR  1.05     Recent Labs      07/07/17   1750   ASTSGOT  27   ALTSGPT  10   TBILIRUBIN  0.7   ALKPHOSPHAT  80   GLOBULIN  3.8*   INR  1.05       Radiology:  CTA: occlusion of the right external iliac, common femoral, profunda, and superficial femoral arteries.  There is heavy calcification, however the lumen appears to contain soft occlusive material as well.    Assessment: This is a 71 y.o. Male with right foot gangrene.  The foot cannot be saved. In fact, currently the patient may not even heal an AKA as his perfusion is so poor.      Plan:   OR for right femoral endarterectomy, profundoplasty, retrograde right external iliac stent, and right BKA.    I explained the details of the operation, alternatives, and potential  risks, including but not limited to bleeding, infection, injury to vessels or nerves, possible multiple incisions, use of xray and contrast exposure, and risks of anesthesia and potentially dying from the operation or the recovery.  All questions were answered. Patient understands and agrees to proceed.      Thank you very much for this consultation.    Frantz Rodriguez M.D.  [facility]  [phone]  Cell: [phone]

## 2017-07-09 NOTE — ASSESSMENT & PLAN NOTE
Lives with caregiver however is disheveled and malnourished has not followed up surgically, asked social work to place EPS referral

## 2017-07-09 NOTE — DIETARY
Nutrition:  Patient evaluated by dietitian 7/8.  See progress note.  Diet advanced to full liquid, high calorie today.  Boost on trays BID.    Rec:  Advance diet as tolerated.  Nutrition Representative will continue to see him for ongoing meal and snack preferences.  RD following.

## 2017-07-09 NOTE — OR NURSING
Pt received from or via icu bed. Monitors on vs wnl. o2 2 l nc with 99%. Sats.     Pt arousable to voice.denies pain. Anesthesia provider signed out pt.     Plan to transfer to s icu once space made available.

## 2017-07-09 NOTE — OR NURSING
poc for pt to transfer to [facility]. Pt resting comfortably. Rx'd labs collected and sent.     Awaiting call from unit to report.

## 2017-07-09 NOTE — PROGRESS NOTES
Pt's belonging that were in [facility] room 414-2, including clothing. Were taken to [place] [address] by Loretta RACHEL. Pt had left unit for surgery with upper dentures in place.

## 2017-07-09 NOTE — CARE PLAN
Problem: Safety  Goal: Will remain free from injury  Patient assessed for risk to fall and safety measures implemented. Educated on importance of using call light and calling for assistance. Bed rails up, call light within reach.     Problem: Pain Management  Goal: Pain level will decrease to patient’s comfort goal  Pain assessed and communicated using appropriate pain scale. Pharmacologic and non pharmacologic methods used to reduce pain. Goal is comfort at rest.     Problem: Mobility  Goal: Risk for activity intolerance will decrease  Patient mobilized as tolerated. Encouraged active range of motion and assisted as needed. Pain management assessed prior to mobilization to allow for adequate mobility progress.

## 2017-07-09 NOTE — OR SURGEON
Immediate Post-Operative Note      PreOp Diagnosis: right foot gangrene, severe multi-level arterial occlusive disease    PostOp Diagnosis: same    Procedure(s):  RIGHT FEMORAL ENDARTERECTOMY - Wound Class: Clean  RIGHT ILIAC ANGIOPLASTY WITH STENT - Wound Class: Clean  RIGHT KNEE AMPUTATION BELOW - Wound Class: Dirty or Infected    Surgeon(s):  Frantz Rodriguez M.D.    Anesthesiologist/Type of Anesthesia:  Anesthesiologist: Aureliano Gill M.D./General    Surgical Staff:  Circulator: Maikol Pierre R.N.; Mic James R.N.; Jovi Sorensen R.N.  Scrub Person: Leopold von Garcia; Jonny Montilla  Radiology Technologist: Mary Houser    Specimen: RIGHT FOOT    Estimated Blood Loss: 400cc    Findings: 7mm by 60mm self-expanding stent placed in the right external iliac artery.  12cm bovine patch extending from the inguinal ligament far down onto the profunda.    Fluoro: 27 minutes    Contrast: 100cc    Complications: none        7/9/2017 12:38 AM Frantz Rodriguez

## 2017-07-09 NOTE — PROGRESS NOTES
12 hour chart check complete. Assumed care of patient. Bedside report completed, verified drips, VSS, patient resting comfortably.

## 2017-07-09 NOTE — OP REPORT
Vascular Surgery Operative Note  --------------------------------------------    Date of Service:          7/9/2017    Patient Name:             Isiah Lagos    Patient MRN:              [id number]    --------------------------------------------------------------------------------------------------    Preoperative Diagnosis:  - Peripheral arterial occlusive disease with gangrene of the right lower extremity   - History of alcoholism  - Coronary artery disease with history of myocardial infarction  - Prostate cancer  - Stroke  - Hypothyroidism  - History of left lower extremity arterial bypass and a left transmetatarsal irritation  - Emphysema  - Severe protein calorie malnutrition with a BMI of 16     Postoperative Diagnosis:  Same    Procedure:  1) endarterectomy of the right external iliac artery, common femoral artery and profunda femoris artery.  2) bovine pericardial patch angioplasty of the right common femoral artery and profunda femoris artery  3) right iliac artery angiography via direct catheter placement in the artery.  4) percutaneous access of the left common femoral artery  5) nonselective catheter placement in the aorta with aortoiliac angiography   6) right external iliac artery balloon angioplasty  7) right external iliac artery stent placement: 7 mm x 60 mm bare metal self-expanding stent  8) right external iliac artery balloon angioplasty post-stent placement  9) right external iliac artery angiography via direct catheter placement in the artery  10) runoff angiography of the right lower extremity via direct catheter placement in the common femoral artery  11) right below-knee amputation    -------------------------------------------------------------------------------------------------    Surgeon:                                 Frantz Rodriguez MD    Assistant:   none        Anesthesia:                            General endotracheal  anesthesia    IVF:                                         Per anesthesia report    UOP:                                      Per anesthesia report    EBL:                                        400 cc    Blood Products:                      none    Specimen:   Right foot to pathology    Fluoro:    27 minutes    Contrast:   100cc    Complications:                        none     Disposition:                             Extubated and to recovery in stable condition    -----------------------------------------------------------------------------------------------------    History:  Isiah Lagos is a 71 y.o. male with peripheral arterial occlusive disease and gangrene of the right foot.  I explained to the patient that the foot could not be salvaged and that amputation was recommended.  I explained the details of the operation, alternatives, and potential risks, including but not limited to bleeding, infection, risk of non-healing, and risks of anesthesia and potentially dying from the operation or the recovery.  All questions were answered. They understand and agree to proceed.    Procedure Summary:  The patient was brought to the operating suite, placed supine on the OR table, and GETA was administered by the anesthesia service.  The right leg was prepped and draped in the usual sterile fashion.  Timeout was called to identify the correct patient procedure and equipment.  Everyone was in agreement.    Procedure began with infiltration of local anesthetic overlying the right common femoral artery and profunda femoris artery. A vertical incision was then made stretching from the ligament of Treitz following the course of the artery down onto the thigh. The common femoral artery was circumferentially dissected as well as the origin of the superficial femoral artery and then dissection was carried out down along the course of the profunda. We continued dissecting out the profunda and all of its individual branches  for a length of about 8 cm or more until we found a relatively soft spot in the artery that would serve as an endpoint for the endarterectomy. Vesseloops were placed around all branches the patient was then systemically heparinized. The vessel was clamped proximally and distally and then a longitudinal arteriotomy was made stretching from the common femoral artery down along the length of the profunda. Severe course plaque was encountered along the entire length of the arteriotomy as well as a small amount of fresh thrombus in the lumen of the common femoral artery. Endarterectomy was performed. We used a Lyla clamp to reach upper retrograde into the external iliac artery and retrieved as much plaque as possible. However even with doing this we did not achieve antegrade in flow into the common femoral artery. At this point we cut a 2 cm wide by 9 cm long bovine pericardial patch in half to achieve to 1 cm wide patches.  The patches were sewn on to the arteriotomy using a running 5-0 Prolene suture line the patches were joined to one another using horizontal mattress sutures. Once the patch angioplasty was complete the clamps were removed and the artery was allowed to perfuse and there was good hemostasis.    At this point we accessed the patch with a 6 Azeri sheath up it retrograde into the right external iliac artery. Retrograde angiography was performed. This demonstrated occlusion of the external iliac artery with no contrast reaching up into the common iliac artery. We attempted to cross the occlusion with various wires and catheters however we could not cross and remaining intraluminal proximal to the lesion. Therefore at this point we accessed the left common femoral artery and placed a 4 Azeri sheath retrograde into the left iliac artery. We were able to pass a wire and Omni Flush catheter easily up into the aorta and then we performed aortoiliac angiography. This confirmed the occlusion in the right  external iliac artery. There was no significant disease in the right common iliac artery. Using the Omni Flush catheter we were able to go up and over the aortic bifurcation and possible wire down the right iliac system into the right common femoral artery with relative ease. We opened the patch on the right common femoral artery and retrieved the wire. We now had a flossing Glidewire from the left femoral artery up over into the right femoral artery. We passed a glide catheter up the right iliac artery using the Glidewire to guide the catheter up to the aorta. We then switched out the Glidewire for an Amplatz wire and we removed the catheter from the left groin leaving the sheath in place for the time being. We predilated the right external iliac artery with a 6 mm diameter angioplasty balloon and then we stented the right external iliac artery with a 7 mm x 60 mm self-expanding bare metal stent. We postdilated the stent with a 7 mm angioplasty balloon. Retrograde angiography confirmed patency of the right iliac system and there was a strong palpable pulse in the right common femoral artery. At this point we performed runoff angiography of the right leg and the profundoplasty was widely patent down onto the thigh. There were several collateral vessels that carried blood down to the level of the knee and about 3 dominant collateral vessels that carried the blood beyond the knee down to the level of proposed below-knee amputation.    Patient remained systemically heparinized throughout these steps of the procedure. At this point the sheath was removed from the right common femoral artery and the bovine patch was closed with Prolene sutures. The 4 Syrian sheath was removed from the left groin and direct manual pressure was held for 10 minutes with good hemostasis. The patient's heparin was reversed with protamine.  Topical hemostatic was applied to the right groin dissection. The groin was then closed with multiple  layers of absorbable suture and staples for the skin.    At this point we turned our attention to performing the right below-knee amputation. The skin was incised sharply and then a combination of sharp dissection and electrocautery was used to dissect the soft tissues circumferentially around the tibia and fibula.  Larger vessels were suture ligated as needed.  The tibia was transected with the oscillating saw at a length of approximately 12 cm, and the fibula was transected slightly shorter.  The anterior edge of the tibia was beveled. The posterior flap was created with the amputation knife, freeing the specimen and allowing it to be passed off the field.  The flap was inspected for hemostasis, copiously irrigated, excess muscle was debrid ed as needed, and the flap was tailored to the appropriate length to cover the stump.  The soft tissue and fascia were approximated with multiple layers of absorbable sutures.  The skin was reapproximated with interrupted vertical mattress Prolene sutures.     All sponge, instrument, and needle counts were correct at the conclusion of the case.  Bulky sterile dressings were placed on the stump.  Patient was extubated and sent to PACU in stable condition.    Frantz Rodriguez MD  General and Vascular Surgery  [facility]  Cell: [phone]

## 2017-07-10 ENCOUNTER — HOME CARE VISIT (OUTPATIENT)
Dept: HOME HEALTH SERVICES | Facility: HOME HEALTHCARE | Age: 72
End: 2017-07-10
Payer: MEDICARE

## 2017-07-10 LAB
ANION GAP SERPL CALC-SCNC: 6 MMOL/L (ref 0–11.9)
BASOPHILS # BLD AUTO: 1.3 % (ref 0–1.8)
BASOPHILS # BLD: 0.2 K/UL (ref 0–0.12)
BUN SERPL-MCNC: 10 MG/DL (ref 8–22)
CALCIUM SERPL-MCNC: 7.3 MG/DL (ref 8.5–10.5)
CHLORIDE SERPL-SCNC: 102 MMOL/L (ref 96–112)
CO2 SERPL-SCNC: 23 MMOL/L (ref 20–33)
CREAT SERPL-MCNC: 0.69 MG/DL (ref 0.5–1.4)
EOSINOPHIL # BLD AUTO: 0.85 K/UL (ref 0–0.51)
EOSINOPHIL NFR BLD: 5.5 % (ref 0–6.9)
ERYTHROCYTE [DISTWIDTH] IN BLOOD BY AUTOMATED COUNT: 50.2 FL (ref 35.9–50)
GFR SERPL CREATININE-BSD FRML MDRD: >60 ML/MIN/1.73 M 2
GLUCOSE SERPL-MCNC: 95 MG/DL (ref 65–99)
HCT VFR BLD AUTO: 35.5 % (ref 42–52)
HGB BLD-MCNC: 10.9 G/DL (ref 14–18)
IMM GRANULOCYTES # BLD AUTO: 0.11 K/UL (ref 0–0.11)
IMM GRANULOCYTES NFR BLD AUTO: 0.7 % (ref 0–0.9)
LYMPHOCYTES # BLD AUTO: 1.04 K/UL (ref 1–4.8)
LYMPHOCYTES NFR BLD: 6.7 % (ref 22–41)
MAGNESIUM SERPL-MCNC: 1.5 MG/DL (ref 1.5–2.5)
MCH RBC QN AUTO: 24.5 PG (ref 27–33)
MCHC RBC AUTO-ENTMCNC: 30.7 G/DL (ref 33.7–35.3)
MCV RBC AUTO: 79.8 FL (ref 81.4–97.8)
MONOCYTES # BLD AUTO: 0.99 K/UL (ref 0–0.85)
MONOCYTES NFR BLD AUTO: 6.4 % (ref 0–13.4)
NEUTROPHILS # BLD AUTO: 12.28 K/UL (ref 1.82–7.42)
NEUTROPHILS NFR BLD: 79.4 % (ref 44–72)
NRBC # BLD AUTO: 0 K/UL
NRBC BLD AUTO-RTO: 0 /100 WBC
PHOSPHATE SERPL-MCNC: 2.4 MG/DL (ref 2.5–4.5)
PLATELET # BLD AUTO: 444 K/UL (ref 164–446)
PMV BLD AUTO: 8.6 FL (ref 9–12.9)
POTASSIUM SERPL-SCNC: 3.7 MMOL/L (ref 3.6–5.5)
RBC # BLD AUTO: 4.45 M/UL (ref 4.7–6.1)
SODIUM SERPL-SCNC: 131 MMOL/L (ref 135–145)
WBC # BLD AUTO: 15.5 K/UL (ref 4.8–10.8)

## 2017-07-10 PROCEDURE — 84100 ASSAY OF PHOSPHORUS: CPT

## 2017-07-10 PROCEDURE — 700102 HCHG RX REV CODE 250 W/ 637 OVERRIDE(OP): Performed by: SURGERY

## 2017-07-10 PROCEDURE — 700111 HCHG RX REV CODE 636 W/ 250 OVERRIDE (IP): Performed by: HOSPITALIST

## 2017-07-10 PROCEDURE — 99232 SBSQ HOSP IP/OBS MODERATE 35: CPT | Performed by: INTERNAL MEDICINE

## 2017-07-10 PROCEDURE — A9270 NON-COVERED ITEM OR SERVICE: HCPCS | Performed by: SURGERY

## 2017-07-10 PROCEDURE — 85025 COMPLETE CBC W/AUTO DIFF WBC: CPT

## 2017-07-10 PROCEDURE — 665998 HH PPS REVENUE CREDIT

## 2017-07-10 PROCEDURE — 700102 HCHG RX REV CODE 250 W/ 637 OVERRIDE(OP): Performed by: HOSPITALIST

## 2017-07-10 PROCEDURE — 700105 HCHG RX REV CODE 258: Performed by: SURGERY

## 2017-07-10 PROCEDURE — 700102 HCHG RX REV CODE 250 W/ 637 OVERRIDE(OP): Performed by: INTERNAL MEDICINE

## 2017-07-10 PROCEDURE — 700111 HCHG RX REV CODE 636 W/ 250 OVERRIDE (IP): Performed by: SURGERY

## 2017-07-10 PROCEDURE — 665999 HH PPS REVENUE DEBIT

## 2017-07-10 PROCEDURE — 770006 HCHG ROOM/CARE - MED/SURG/GYN SEMI*

## 2017-07-10 PROCEDURE — 83735 ASSAY OF MAGNESIUM: CPT

## 2017-07-10 PROCEDURE — 700112 HCHG RX REV CODE 229: Performed by: SURGERY

## 2017-07-10 PROCEDURE — 80048 BASIC METABOLIC PNL TOTAL CA: CPT

## 2017-07-10 PROCEDURE — 94669 MECHANICAL CHEST WALL OSCILL: CPT

## 2017-07-10 PROCEDURE — 700105 HCHG RX REV CODE 258: Performed by: HOSPITALIST

## 2017-07-10 PROCEDURE — A9270 NON-COVERED ITEM OR SERVICE: HCPCS | Performed by: INTERNAL MEDICINE

## 2017-07-10 PROCEDURE — A9270 NON-COVERED ITEM OR SERVICE: HCPCS | Performed by: HOSPITALIST

## 2017-07-10 PROCEDURE — 94668 MNPJ CHEST WALL SBSQ: CPT

## 2017-07-10 RX ADMIN — OXYCODONE HYDROCHLORIDE 10 MG: 10 TABLET ORAL at 07:50

## 2017-07-10 RX ADMIN — OXYCODONE HYDROCHLORIDE 10 MG: 10 TABLET ORAL at 02:30

## 2017-07-10 RX ADMIN — TRAMADOL HYDROCHLORIDE 50 MG: 50 TABLET, COATED ORAL at 21:25

## 2017-07-10 RX ADMIN — OXYCODONE HYDROCHLORIDE 10 MG: 10 TABLET ORAL at 17:32

## 2017-07-10 RX ADMIN — KETOROLAC TROMETHAMINE 15 MG: 30 INJECTION, SOLUTION INTRAMUSCULAR at 07:49

## 2017-07-10 RX ADMIN — LEVOTHYROXINE SODIUM 75 MCG: 75 TABLET ORAL at 07:50

## 2017-07-10 RX ADMIN — STANDARDIZED SENNA CONCENTRATE AND DOCUSATE SODIUM 2 TABLET: 8.6; 5 TABLET, FILM COATED ORAL at 08:27

## 2017-07-10 RX ADMIN — KETOROLAC TROMETHAMINE 15 MG: 30 INJECTION, SOLUTION INTRAMUSCULAR at 12:32

## 2017-07-10 RX ADMIN — FOLIC ACID 1 MG: 1 TABLET ORAL at 08:27

## 2017-07-10 RX ADMIN — THERA TABS 1 TABLET: TAB at 08:24

## 2017-07-10 RX ADMIN — ENOXAPARIN SODIUM 30 MG: 100 INJECTION SUBCUTANEOUS at 08:23

## 2017-07-10 RX ADMIN — KETOROLAC TROMETHAMINE 15 MG: 30 INJECTION, SOLUTION INTRAMUSCULAR at 00:23

## 2017-07-10 RX ADMIN — GABAPENTIN 100 MG: 100 CAPSULE ORAL at 14:56

## 2017-07-10 RX ADMIN — ACETAMINOPHEN 650 MG: 325 TABLET, FILM COATED ORAL at 05:26

## 2017-07-10 RX ADMIN — Medication 100 MG: at 08:27

## 2017-07-10 RX ADMIN — MAGNESIUM HYDROXIDE 30 ML: 400 SUSPENSION ORAL at 08:23

## 2017-07-10 RX ADMIN — GABAPENTIN 100 MG: 100 CAPSULE ORAL at 08:24

## 2017-07-10 RX ADMIN — ATORVASTATIN CALCIUM 40 MG: 40 TABLET, FILM COATED ORAL at 21:25

## 2017-07-10 RX ADMIN — AMPICILLIN SODIUM AND SULBACTAM SODIUM 3 G: 2; 1 INJECTION, POWDER, FOR SOLUTION INTRAMUSCULAR; INTRAVENOUS at 14:56

## 2017-07-10 RX ADMIN — AMPICILLIN SODIUM AND SULBACTAM SODIUM 3 G: 2; 1 INJECTION, POWDER, FOR SOLUTION INTRAMUSCULAR; INTRAVENOUS at 08:24

## 2017-07-10 RX ADMIN — ACETAMINOPHEN 650 MG: 325 TABLET, FILM COATED ORAL at 17:32

## 2017-07-10 RX ADMIN — METOPROLOL TARTRATE 25 MG: 25 TABLET, FILM COATED ORAL at 21:25

## 2017-07-10 RX ADMIN — CLONIDINE HYDROCHLORIDE 0.1 MG: 0.1 TABLET ORAL at 21:25

## 2017-07-10 RX ADMIN — ACETAMINOPHEN 650 MG: 325 TABLET, FILM COATED ORAL at 12:31

## 2017-07-10 RX ADMIN — SODIUM CHLORIDE, POTASSIUM CHLORIDE, SODIUM LACTATE AND CALCIUM CHLORIDE: 600; 310; 30; 20 INJECTION, SOLUTION INTRAVENOUS at 02:28

## 2017-07-10 RX ADMIN — FENTANYL CITRATE 50 MCG: 50 INJECTION, SOLUTION INTRAMUSCULAR; INTRAVENOUS at 05:29

## 2017-07-10 RX ADMIN — AMPICILLIN SODIUM AND SULBACTAM SODIUM 3 G: 2; 1 INJECTION, POWDER, FOR SOLUTION INTRAMUSCULAR; INTRAVENOUS at 21:27

## 2017-07-10 RX ADMIN — GABAPENTIN 100 MG: 100 CAPSULE ORAL at 21:25

## 2017-07-10 RX ADMIN — SODIUM CHLORIDE, POTASSIUM CHLORIDE, SODIUM LACTATE AND CALCIUM CHLORIDE: 600; 310; 30; 20 INJECTION, SOLUTION INTRAVENOUS at 12:38

## 2017-07-10 RX ADMIN — CLONIDINE HYDROCHLORIDE 0.1 MG: 0.1 TABLET ORAL at 08:25

## 2017-07-10 RX ADMIN — OXYCODONE HYDROCHLORIDE 10 MG: 10 TABLET ORAL at 21:25

## 2017-07-10 RX ADMIN — KETOROLAC TROMETHAMINE 15 MG: 30 INJECTION, SOLUTION INTRAMUSCULAR at 17:32

## 2017-07-10 RX ADMIN — AMPICILLIN SODIUM AND SULBACTAM SODIUM 3 G: 2; 1 INJECTION, POWDER, FOR SOLUTION INTRAMUSCULAR; INTRAVENOUS at 02:27

## 2017-07-10 RX ADMIN — METOPROLOL TARTRATE 25 MG: 25 TABLET, FILM COATED ORAL at 08:28

## 2017-07-10 RX ADMIN — TRAMADOL HYDROCHLORIDE 50 MG: 50 TABLET, COATED ORAL at 08:27

## 2017-07-10 RX ADMIN — DOCUSATE SODIUM 100 MG: 100 CAPSULE ORAL at 08:27

## 2017-07-10 RX ADMIN — SODIUM CHLORIDE, POTASSIUM CHLORIDE, SODIUM LACTATE AND CALCIUM CHLORIDE: 600; 310; 30; 20 INJECTION, SOLUTION INTRAVENOUS at 21:25

## 2017-07-10 ASSESSMENT — ENCOUNTER SYMPTOMS
DIARRHEA: 0
VOMITING: 0
CHILLS: 0
ABDOMINAL PAIN: 0
NAUSEA: 0
SHORTNESS OF BREATH: 0
FEVER: 0
CONSTIPATION: 0
DIZZINESS: 0

## 2017-07-10 ASSESSMENT — PAIN SCALES - GENERAL
PAINLEVEL_OUTOF10: 8
PAINLEVEL_OUTOF10: 7
PAINLEVEL_OUTOF10: 8
PAINLEVEL_OUTOF10: 7
PAINLEVEL_OUTOF10: 10
PAINLEVEL_OUTOF10: 6
PAINLEVEL_OUTOF10: 9
PAINLEVEL_OUTOF10: 8
PAINLEVEL_OUTOF10: 4

## 2017-07-10 NOTE — DIETARY
Nutrition Services: Update  Pt is on a regular, high calorie diet and receiving Boost BID with lunch and dinner. Pt states he is eating. Pt enjoys the Boost. Pt requested high protein milkshake as snacks BID. Per chart pt PO 50-75%. Wt on 7/10 - 57.2 kg via bed scale    Pertinent Labs: Na 131, Phosphorus 2.4  Pertinent Meds: unasyn, lipitor, catapres, colace, lovenox, neurontin, toradol, synthroid, lopressor, pericolace, thiamine, MVI, folvite, ultram, roxicodone (prn), milk of magnesia (prn)  Fluids: lactated ringers infusion @ 100 ml/hr  Skin: surgical incision left distal foot, surgical incision right groin, surgical incision right lower leg  GI: Last BM PTA    PLAN/RECOMMEND -   1) Nutrition rep to see patient daily for meal and snack preferences.  2) Encourage PO  3) Weekly weights to monitor fluid and nutrition status  4) Please document PO intake for each meal as percentage of meals consumed    RD available prn

## 2017-07-10 NOTE — CARE PLAN
Problem: Pain Management  Goal: Pain level will decrease to patient’s comfort goal  Assess pts pain level and treat as needed    Problem: Skin Integrity  Goal: Risk for impaired skin integrity will decrease  Turn pt Q2H

## 2017-07-10 NOTE — PROGRESS NOTES
Pt arrived to [facility]-   Transferred to GSU bed via slide board.   Pt tolerated well. IVF running. C/o pain 8/10  Medicating per MAR.

## 2017-07-10 NOTE — PROGRESS NOTES
Vascular    No acute changes overnight  Pain is adequately controlled  Patient tolerated breakfast this morning  Patient remains bedrest for the 1st 48 hours after the surgery    AF VSS    Right groin incision clean dry and intact  Right amputation site clean dry and intact    Assessment and plan:  Postoperative day 1 from right femoral endarterectomy, right iliac stent, right below-knee amputation  Doing well  Continue diet as tolerated  Continue bedrest for 48 hours from the date of surgery  We will change the stump dressing in 2-3 days and apply a cast  Can be transferred to the floor today    Appreciate the critical care services help in managing Mayte Rodriguez MD  General and Vascular Surgery  [facility]  Cell: [phone]

## 2017-07-10 NOTE — PROGRESS NOTES
Pt A&OX4. However, pt is forgetful and a little loose with his words. VSS. Pt denies chest pain/shortness of breath. Pt denies numbness/tingling. Pt tolerating liquid diet with no c/o nausea/vomiting, advanced to regular. Incision noted to LLE from toe amputation, approximated. Dressing in place to RLE from BKA, CDI. Pt c/o phantom limb pain, recently medicated with PRN. Will continue to monitor pain level and provide intervention as needed. Per active order pt remain on bed rest until 7/11 @ 0000. Plan of care reviewed. Pt oriented to room. Pt requested for wallet to be placed in bedside table in blue belongings bag, wallet placed there at this time. Bed in lowest position. Call light within reach. Continue to monitor.

## 2017-07-10 NOTE — DISCHARGE PLANNING
Patient is currently on service with [facility]. Please write home care orders upon discharge to home for continuum of care.[facility] at  /[phone] if there are any questions.

## 2017-07-10 NOTE — CARE PLAN
Problem: Nutritional:  Goal: Achieve adequate nutritional intake  Patient will consume >50% of meals and supplements.   Outcome: MET Date Met:  07/10/17  Per chart pt PO 50-75%.

## 2017-07-11 PROBLEM — E83.42 HYPOMAGNESEMIA: Status: ACTIVE | Noted: 2017-07-11

## 2017-07-11 LAB
ANION GAP SERPL CALC-SCNC: 3 MMOL/L (ref 0–11.9)
BUN SERPL-MCNC: 14 MG/DL (ref 8–22)
CALCIUM SERPL-MCNC: 7.5 MG/DL (ref 8.5–10.5)
CHLORIDE SERPL-SCNC: 100 MMOL/L (ref 96–112)
CO2 SERPL-SCNC: 26 MMOL/L (ref 20–33)
CREAT SERPL-MCNC: 0.75 MG/DL (ref 0.5–1.4)
GFR SERPL CREATININE-BSD FRML MDRD: >60 ML/MIN/1.73 M 2
GLUCOSE SERPL-MCNC: 81 MG/DL (ref 65–99)
MAGNESIUM SERPL-MCNC: 1.6 MG/DL (ref 1.5–2.5)
PHOSPHATE SERPL-MCNC: 2.4 MG/DL (ref 2.5–4.5)
POTASSIUM SERPL-SCNC: 4.5 MMOL/L (ref 3.6–5.5)
SODIUM SERPL-SCNC: 129 MMOL/L (ref 135–145)

## 2017-07-11 PROCEDURE — 700111 HCHG RX REV CODE 636 W/ 250 OVERRIDE (IP): Performed by: SURGERY

## 2017-07-11 PROCEDURE — 700102 HCHG RX REV CODE 250 W/ 637 OVERRIDE(OP): Performed by: SURGERY

## 2017-07-11 PROCEDURE — A9270 NON-COVERED ITEM OR SERVICE: HCPCS | Performed by: SURGERY

## 2017-07-11 PROCEDURE — 36415 COLL VENOUS BLD VENIPUNCTURE: CPT

## 2017-07-11 PROCEDURE — 665999 HH PPS REVENUE DEBIT

## 2017-07-11 PROCEDURE — 700102 HCHG RX REV CODE 250 W/ 637 OVERRIDE(OP): Performed by: HOSPITALIST

## 2017-07-11 PROCEDURE — A9270 NON-COVERED ITEM OR SERVICE: HCPCS | Performed by: HOSPITALIST

## 2017-07-11 PROCEDURE — 84100 ASSAY OF PHOSPHORUS: CPT

## 2017-07-11 PROCEDURE — 665998 HH PPS REVENUE CREDIT

## 2017-07-11 PROCEDURE — 700102 HCHG RX REV CODE 250 W/ 637 OVERRIDE(OP): Performed by: INTERNAL MEDICINE

## 2017-07-11 PROCEDURE — 700105 HCHG RX REV CODE 258: Performed by: SURGERY

## 2017-07-11 PROCEDURE — 700111 HCHG RX REV CODE 636 W/ 250 OVERRIDE (IP): Performed by: HOSPITALIST

## 2017-07-11 PROCEDURE — 700112 HCHG RX REV CODE 229: Performed by: SURGERY

## 2017-07-11 PROCEDURE — 80048 BASIC METABOLIC PNL TOTAL CA: CPT

## 2017-07-11 PROCEDURE — 99233 SBSQ HOSP IP/OBS HIGH 50: CPT | Performed by: HOSPITALIST

## 2017-07-11 PROCEDURE — 700105 HCHG RX REV CODE 258: Performed by: HOSPITALIST

## 2017-07-11 PROCEDURE — A9270 NON-COVERED ITEM OR SERVICE: HCPCS | Performed by: INTERNAL MEDICINE

## 2017-07-11 PROCEDURE — 770006 HCHG ROOM/CARE - MED/SURG/GYN SEMI*

## 2017-07-11 PROCEDURE — 94669 MECHANICAL CHEST WALL OSCILL: CPT

## 2017-07-11 PROCEDURE — 83735 ASSAY OF MAGNESIUM: CPT

## 2017-07-11 PROCEDURE — 94668 MNPJ CHEST WALL SBSQ: CPT

## 2017-07-11 RX ORDER — GABAPENTIN 100 MG/1
200 CAPSULE ORAL 3 TIMES DAILY
Status: DISCONTINUED | OUTPATIENT
Start: 2017-07-11 | End: 2017-07-12

## 2017-07-11 RX ORDER — MAGNESIUM SULFATE HEPTAHYDRATE 40 MG/ML
4 INJECTION, SOLUTION INTRAVENOUS ONCE
Status: COMPLETED | OUTPATIENT
Start: 2017-07-11 | End: 2017-07-11

## 2017-07-11 RX ORDER — FOLIC ACID 1 MG/1
1 TABLET ORAL DAILY
Status: DISCONTINUED | OUTPATIENT
Start: 2017-07-11 | End: 2017-07-13 | Stop reason: HOSPADM

## 2017-07-11 RX ORDER — THIAMINE MONONITRATE (VIT B1) 100 MG
50 TABLET ORAL DAILY
Status: DISCONTINUED | OUTPATIENT
Start: 2017-07-11 | End: 2017-07-13 | Stop reason: HOSPADM

## 2017-07-11 RX ORDER — CLOPIDOGREL BISULFATE 75 MG/1
300 TABLET ORAL ONCE
Status: COMPLETED | OUTPATIENT
Start: 2017-07-11 | End: 2017-07-11

## 2017-07-11 RX ORDER — CLOPIDOGREL BISULFATE 75 MG/1
75 TABLET ORAL DAILY
Status: DISCONTINUED | OUTPATIENT
Start: 2017-07-12 | End: 2017-07-13 | Stop reason: HOSPADM

## 2017-07-11 RX ADMIN — STANDARDIZED SENNA CONCENTRATE AND DOCUSATE SODIUM 2 TABLET: 8.6; 5 TABLET, FILM COATED ORAL at 08:53

## 2017-07-11 RX ADMIN — ASPIRIN 81 MG: 81 TABLET ORAL at 13:36

## 2017-07-11 RX ADMIN — THERA TABS 1 TABLET: TAB at 17:17

## 2017-07-11 RX ADMIN — METOPROLOL TARTRATE 25 MG: 25 TABLET, FILM COATED ORAL at 08:53

## 2017-07-11 RX ADMIN — GABAPENTIN 200 MG: 100 CAPSULE ORAL at 13:36

## 2017-07-11 RX ADMIN — ACETAMINOPHEN 650 MG: 325 TABLET, FILM COATED ORAL at 06:08

## 2017-07-11 RX ADMIN — STANDARDIZED SENNA CONCENTRATE AND DOCUSATE SODIUM 2 TABLET: 8.6; 5 TABLET, FILM COATED ORAL at 21:40

## 2017-07-11 RX ADMIN — TRAMADOL HYDROCHLORIDE 50 MG: 50 TABLET, COATED ORAL at 08:53

## 2017-07-11 RX ADMIN — ATORVASTATIN CALCIUM 40 MG: 40 TABLET, FILM COATED ORAL at 21:40

## 2017-07-11 RX ADMIN — ENOXAPARIN SODIUM 30 MG: 100 INJECTION SUBCUTANEOUS at 08:53

## 2017-07-11 RX ADMIN — KETOROLAC TROMETHAMINE 15 MG: 30 INJECTION, SOLUTION INTRAMUSCULAR at 11:55

## 2017-07-11 RX ADMIN — OXYCODONE HYDROCHLORIDE 5 MG: 5 TABLET ORAL at 21:40

## 2017-07-11 RX ADMIN — ACETAMINOPHEN 650 MG: 325 TABLET, FILM COATED ORAL at 11:55

## 2017-07-11 RX ADMIN — CLOPIDOGREL 300 MG: 75 TABLET, FILM COATED ORAL at 13:36

## 2017-07-11 RX ADMIN — ACETAMINOPHEN 650 MG: 325 TABLET, FILM COATED ORAL at 00:52

## 2017-07-11 RX ADMIN — OXYCODONE HYDROCHLORIDE 10 MG: 10 TABLET ORAL at 00:53

## 2017-07-11 RX ADMIN — OXYCODONE HYDROCHLORIDE 10 MG: 10 TABLET ORAL at 06:08

## 2017-07-11 RX ADMIN — AMPICILLIN SODIUM AND SULBACTAM SODIUM 3 G: 2; 1 INJECTION, POWDER, FOR SOLUTION INTRAMUSCULAR; INTRAVENOUS at 13:36

## 2017-07-11 RX ADMIN — ACETAMINOPHEN 650 MG: 325 TABLET, FILM COATED ORAL at 17:17

## 2017-07-11 RX ADMIN — KETOROLAC TROMETHAMINE 15 MG: 30 INJECTION, SOLUTION INTRAMUSCULAR at 00:53

## 2017-07-11 RX ADMIN — MAGNESIUM SULFATE IN WATER 4 G: 40 INJECTION, SOLUTION INTRAVENOUS at 17:16

## 2017-07-11 RX ADMIN — CLONIDINE HYDROCHLORIDE 0.1 MG: 0.1 TABLET ORAL at 21:40

## 2017-07-11 RX ADMIN — AMPICILLIN SODIUM AND SULBACTAM SODIUM 3 G: 2; 1 INJECTION, POWDER, FOR SOLUTION INTRAMUSCULAR; INTRAVENOUS at 08:53

## 2017-07-11 RX ADMIN — AMPICILLIN SODIUM AND SULBACTAM SODIUM 3 G: 2; 1 INJECTION, POWDER, FOR SOLUTION INTRAMUSCULAR; INTRAVENOUS at 03:55

## 2017-07-11 RX ADMIN — KETOROLAC TROMETHAMINE 15 MG: 30 INJECTION, SOLUTION INTRAMUSCULAR at 17:17

## 2017-07-11 RX ADMIN — GABAPENTIN 200 MG: 100 CAPSULE ORAL at 21:39

## 2017-07-11 RX ADMIN — FOLIC ACID 1 MG: 1 TABLET ORAL at 08:53

## 2017-07-11 RX ADMIN — CLONIDINE HYDROCHLORIDE 0.1 MG: 0.1 TABLET ORAL at 08:53

## 2017-07-11 RX ADMIN — Medication 50 MG: at 17:17

## 2017-07-11 RX ADMIN — FOLIC ACID 1 MG: 1 TABLET ORAL at 17:17

## 2017-07-11 RX ADMIN — THERA TABS 1 TABLET: TAB at 08:53

## 2017-07-11 RX ADMIN — DOCUSATE SODIUM 100 MG: 100 CAPSULE ORAL at 08:53

## 2017-07-11 RX ADMIN — AMPICILLIN SODIUM AND SULBACTAM SODIUM 3 G: 2; 1 INJECTION, POWDER, FOR SOLUTION INTRAMUSCULAR; INTRAVENOUS at 21:40

## 2017-07-11 RX ADMIN — METOPROLOL TARTRATE 25 MG: 25 TABLET, FILM COATED ORAL at 21:40

## 2017-07-11 RX ADMIN — Medication 100 MG: at 08:53

## 2017-07-11 RX ADMIN — KETOROLAC TROMETHAMINE 15 MG: 30 INJECTION, SOLUTION INTRAMUSCULAR at 06:08

## 2017-07-11 RX ADMIN — DOCUSATE SODIUM 100 MG: 100 CAPSULE ORAL at 21:40

## 2017-07-11 RX ADMIN — SODIUM CHLORIDE, POTASSIUM CHLORIDE, SODIUM LACTATE AND CALCIUM CHLORIDE: 600; 310; 30; 20 INJECTION, SOLUTION INTRAVENOUS at 09:00

## 2017-07-11 RX ADMIN — OXYCODONE HYDROCHLORIDE 10 MG: 10 TABLET ORAL at 17:16

## 2017-07-11 RX ADMIN — GABAPENTIN 100 MG: 100 CAPSULE ORAL at 08:53

## 2017-07-11 RX ADMIN — TRAMADOL HYDROCHLORIDE 50 MG: 50 TABLET, COATED ORAL at 21:40

## 2017-07-11 RX ADMIN — LEVOTHYROXINE SODIUM 75 MCG: 75 TABLET ORAL at 06:09

## 2017-07-11 ASSESSMENT — PAIN SCALES - GENERAL
PAINLEVEL_OUTOF10: 6
PAINLEVEL_OUTOF10: 8
PAINLEVEL_OUTOF10: 7
PAINLEVEL_OUTOF10: 8
PAINLEVEL_OUTOF10: 8
PAINLEVEL_OUTOF10: 10
PAINLEVEL_OUTOF10: 8

## 2017-07-11 ASSESSMENT — ENCOUNTER SYMPTOMS
DIZZINESS: 0
CHILLS: 0
DIARRHEA: 0
FEVER: 0
COUGH: 0
BACK PAIN: 0
HEADACHES: 0
NAUSEA: 0
ABDOMINAL PAIN: 0
VOMITING: 0
LOSS OF CONSCIOUSNESS: 0
SHORTNESS OF BREATH: 0

## 2017-07-11 NOTE — DISCHARGE PLANNING
For potential SNF placement: Submitted PASRR but it went for Manual review. Screen ID [id number]. Also tried to submit for LOC but it states a LOC is already completed.
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
## 2017-07-11 NOTE — PROGRESS NOTES
[employer] has been contacted in the attention of LEFT BKA casting. [employer] can be reached at ext # [phone]

## 2017-07-11 NOTE — PROGRESS NOTES
Pt A&OX4. However, pt is forgetful and a little loose with his words. VSS. Pt denies chest pain/shortness of breath. Pt denies numbness/tingling. Pt tolerating regular diet with no c/o nausea/vomiting. Incision noted to LLE from toe amputation, approximated. Dressing in place to RLE from CHELI, [place]YS[place]. Pt c/o phantom limb pain, recently medicated with PRN. Will continue to monitor pain level and provide intervention as needed. MD notified in round of pt color change to LLE. SW asked to provide updated notes as well. Plan of care reviewed. Bed in lowest position. Call light within reach. Continue to monitor.

## 2017-07-11 NOTE — PROGRESS NOTES
Pt A&O x4. Forgetful, repetitively asks questions.     Vitals: /75 mmHg  Pulse 83  Temp(Src) 36.1 °C (96.9 °F)  Resp 16  Ht 1.829 m (6')  Wt 57.2 kg (126 lb 1.7 oz)  BMI 17.10 kg/m2  SpO2 93%    Pt rates pain 9 out of 10. Medicated for pain per MAR.    Neuro: FERRIS. Denies new onset of numbness/ tingling. RLE phantom limb pain.    Cardiac: Denies new onset of chest pain.    Vascular: BUE pulses 2+, right BKA popliteal unable to assess due to compression dressing. LLE cool, pale, and dusky. Patient able to move extremity but extremely sensitive to touch.    Respiratory: Lungs sound clear in upper lobes, diminished bilateral lower lobes and right middle lobe to auscultation. Pulling 750 on IS, needs encouragement. On room air.  93%. Denies SOB.    GI: Abdomen soft. + hyperactive bowel sounds, + flatus, - BM today. On reg diet, tolerating well. Denies nausea/ vomiting.    : Pt voiding adequately. Incontinent, currently has condom catheter in place.     MSK: Generalized decrease muscle tone. Right BKA, active/passive ROM. LLE, amputated toes, weakness, active/passive ROM.     Integumentary: Bilateral groin incisions, unable to assess, dressings CDI. Mepilex on sacrum. RLE BKA surgical dressing, clean dry, and intact, observed. Left toe amputation site, approximated, open to air, CDI.     Labs noted.    Fall precautions in place: Bed locked in lowest position, Upper bed rails up, personal belongings within reach, call light within reach, appropriate mobility signs in place, bed alarm on. Pt calls appropriately.     Pt updated on POC.

## 2017-07-11 NOTE — PROGRESS NOTES
Vascular    No acute changes overnight  Pain is adequately controlled  Patient tolerated breakfast this morning  Patient can start to mobilize as tolerated now    AF VSS    Right groin incision clean dry and intact  Right amputation site clean dry and intact    Assessment and plan:  Postoperative day 3 from right femoral endarterectomy, right iliac stent, right below-knee amputation  Doing well    Plavix loading today and then 75 daily for the next 30 days  ASA 81 indefinitely    Continue diet as tolerated  Mobilize as tolerated, PT/OT  We will change the stump dressing in 1-2 days and apply a cast      Frantz Rodriguez MD  General and Vascular Surgery  [facility]  Cell: [phone]

## 2017-07-11 NOTE — CARE PLAN
Problem: Safety  Goal: Will remain free from falls  Outcome: PROGRESSING AS EXPECTED  Intervention: Assess risk factors for falls    07/10/17 2124   OTHER   Fall Risk High Risk to Fall - 2 or more points    Risk for Injury-Any positive answers results in the pt being at high risk for fall related injury Surgery: Thoracic or Abdominal Surgery or Lower Limb Amputation   Mobility Status Assessment 2-2 Healthcare Providers Required for Assistance with Ambulation & Transfer   History of fall 2   Pt Calls for Assistance Yes       Intervention: Implement fall precautions    07/10/17 2124   OTHER   Environmental Precautions Treaded Slipper Socks on Patient;Personal Belongings, Wastebasket, Call Bell etc. in Easy Reach;Transferred to Stronger Side;Report Given to Other Health Care Providers Regarding Fall Risk;Bed in Low Position;Communication Sign for Patients & Families;Mobility Assessed & Appropriate Sign Placed   IV Pole on Same Side of Bed as Bathroom Yes   Bedrails Bedrails Closest to Bathroom Down   Chair/Bed Strip Alarm Yes - Alarm On           Problem: Venous Thromboembolism (VTW)/Deep Vein Thrombosis (DVT) Prevention:  Goal: Patient will participate in Venous Thrombosis (VTE)/Deep Vein Thrombosis (DVT)Prevention Measures  Outcome: PROGRESSING AS EXPECTED    07/10/17 2124   OTHER   Risk Assessment Score 7   VTE RISK Very High   Mechanical Prophylaxis Contraindicated Mechanical Prophylaxis (Comments)   Pharmacologic Prophylaxis Used LMWH: Enoxaparin(Lovenox)

## 2017-07-11 NOTE — DISCHARGE PLANNING
TCN met with patient and sister at bedside to discuss the patients transitional care needs for Rehab or SNF. Both were agreeable to suggestion. Rehab referral requested, RN CM to follow up with MD. If patient is unable to go to acute rehab they are agreeable to SNF. Patient has been to [facility] and refuses to return. Agreeable to choice being submitted to [facility], [facility], [facility] and [facility]. Choice signed and faxed to [facility]. [facility] also presented and signed no issues noted. TCN to follow as needed.

## 2017-07-11 NOTE — DISCHARGE PLANNING
[facility] received a SNF choice form per the choice form the referral has been sent to [facility], [facility], [facility] and [facility].

## 2017-07-11 NOTE — DISCHARGE PLANNING
EPS worker Levi Purvis from EPS here to meet with pt. Report for self neglect was filed by SW yesterday.

## 2017-07-11 NOTE — DISCHARGE PLANNING
Aware of PMR referral from Dr. Villaseñor. Current chart documentation indicates diagnosis consistent with CMS criteria for IRF level care. Anticipate therapy needs; at present, there are no therapy evaluations noted in chart. Will forward to Physiatry for consult per protocol. Would appreciate initial therapy evaluations - PT/OT - as medically appropriate, to assist with Physiatry review. Chart review indicates questionable resources to support return to community. TCC to follow for Physiatry recommendation.

## 2017-07-11 NOTE — PROGRESS NOTES
Vascular    No acute changes overnight  Pain is adequately controlled  Patient tolerated breakfast this morning  Patient remains bedrest for the 1st 48 hours after the surgery    AF VSS    Right groin incision clean dry and intact  Right amputation site clean dry and intact    Assessment and plan:  Postoperative day 2 from right femoral endarterectomy, right iliac stent, right below-knee amputation  Doing well  Continue diet as tolerated  Continue bedrest for 48 hours from the date of surgery, mobilize tomorrow  We will change the stump dressing in 2-3 days and apply a cast      Frantz Rodriguez MD  General and Vascular Surgery  [facility]  Cell: [phone]

## 2017-07-11 NOTE — PROGRESS NOTES
"[facility] Hospitalist Progress Note    Date of Service: 7/11/2017    Chief Complaint  This is a 71-year-old  male with past medical history of peripheral artery disease and lower extremity wounds, recently admitted in April for femoral bypass with Dr. Monroy. He subsequently had transmetatarsal amputation and had home health was signed following this. He additionally has a history of chronic alcohol abuse and has had alcohol withdrawal issues in the past. He takes levothyroxine for hypothyroidism. He has chronic urinary incontinence. He presents after injuring his foot at home approximately one week ago, the admitting physician notes that patient is extremely disheveled and smells of urine. on 7/9 he was taken to the OR and underwent endarterectomy of the right external iliac artery common femoral artery and profunda femoris artery, he had bovine pericardial patch angioplasty of the right common femoral artery and profunda femoris artery he had right iliac angiography percutaneous access of the left common femoral artery or the iliac angiography right external iliac balloon angioma right external iliac stent placement right external iliac balloon angioplasty post stent placement and follow-up angiography followed by a right BKA. He stayed in surgical ICU postop and is now back on the general surgical unit    Interval Problem Update  Pt states he feels \"pretty good\" overall.  Does note pain where his amputated toes would be    Consultants/Specialty  Dr Aye Veloz/Vasc surger    Disposition  Cont in house      Review of Systems   Constitutional: Negative for fever and chills.   Respiratory: Negative for cough and shortness of breath.    Cardiovascular: Negative for chest pain.   Gastrointestinal: Negative for nausea, vomiting, abdominal pain and diarrhea.   Musculoskeletal: Negative for back pain.        Pain in amputated R toes   Skin: Negative for rash.   Neurological: Negative for dizziness, loss of " consciousness and headaches.      Physical Exam  Laboratory/Imaging   Hemodynamics  Temp (24hrs), Av.4 °C (97.6 °F), Min:36.1 °C (96.9 °F), Max:36.8 °C (98.2 °F)   Temperature: 36.7 °C (98 °F)  Pulse  Av.4  Min: 60  Max: 107    Blood Pressure : 122/70 mmHg      Respiratory      Respiration: 17, Pulse Oximetry: 93 %, O2 Daily Delivery Respiratory : Room Air with O2 Available     PEP/CPT Method: Positive Airway Pressure Device, Work Of Breathing / Effort: Mild  RUL Breath Sounds: Clear, RML Breath Sounds: Diminished, RLL Breath Sounds: Diminished, RIGO Breath Sounds: Clear, LLL Breath Sounds: Diminished    Fluids    Intake/Output Summary (Last 24 hours) at 17 1459  Last data filed at 17 1135   Gross per 24 hour   Intake   2100 ml   Output    800 ml   Net   1300 ml       Nutrition  Orders Placed This Encounter   Procedures   • DIET ORDER     Standing Status: Standing      Number of Occurrences: 1      Standing Expiration Date:      Order Specific Question:  Diet:     Answer:  Regular [1]     Order Specific Question:  Calorie modifications:     Answer:  High Calorie [1]     Physical Exam   Constitutional: He is oriented to person, place, and time. He appears well-developed and well-nourished. No distress.   HENT:   Head: Normocephalic and atraumatic.   Eyes: Conjunctivae are normal.   Neck: No JVD present.   Cardiovascular: Normal rate.  Exam reveals no gallop.    Murmur heard.  Pulmonary/Chest: Effort normal. No stridor. No respiratory distress. He has no wheezes. He has no rales.   Abdominal: Soft. There is no tenderness. There is no rebound and no guarding.   Musculoskeletal: He exhibits edema.   Post op dressing noted CDI   Neurological: He is oriented to person, place, and time.   Skin: Skin is warm and dry. No rash noted. He is not diaphoretic.   Psychiatric: He has a normal mood and affect. Thought content normal.   Nursing note and vitals reviewed.      Recent Labs      17   0103   07/10/17   0237   WBC  19.5*  15.5*   RBC  5.23  4.45*   HEMOGLOBIN  12.9*  10.9*   HEMATOCRIT  41.6*  35.5*   MCV  79.5*  79.8*   MCH  24.7*  24.5*   MCHC  31.0*  30.7*   RDW  48.2  50.2*   PLATELETCT  450*  444   MPV  8.4*  8.6*     Recent Labs      07/09/17   0108  07/10/17   0237  07/11/17   0321   SODIUM  129*  131*  129*   POTASSIUM  3.4*  3.7  4.5   CHLORIDE  100  102  100   CO2  19*  23  26   GLUCOSE  96  95  81   BUN  7*  10  14   CREATININE  0.60  0.69  0.75   CALCIUM  7.7*  7.3*  7.5*                      Assessment/Plan     Alcohol dependence (CMS-HCC) (present on admission)  Assessment & Plan  Gives variable history as far as intake & has had withdrawal prior- will place on CIWA protocol    Leucocytosis (present on admission)  Assessment & Plan  Secondary to cellulitis and gangrene of the right extremity, monitor    Cellulitis of right leg (present on admission)  Assessment & Plan  Status post BKA    PAD (peripheral artery disease) (CMS-HCC) (present on admission)  Assessment & Plan  Status post angioplasty,  Stent right lower extremity    Hyponatremia (present on admission)  Assessment & Plan  Mild    Smoking (present on admission)  Assessment & Plan  Nicotine replacement    Gangrene of foot (CMS-HCC) (present on admission)  Assessment & Plan  Status post BKA    Social problem (present on admission)  Assessment & Plan  Lives with caregiver however is disheveled and malnourished has not followed up surgically, asked social work to place EPS referral    Severe protein-calorie malnutrition (CMS-HCC) (present on admission)  Assessment & Plan  Supplements     Hypothyroid  Assessment & Plan  replacement    Radiology images reviewed, Labs reviewed and Medications reviewed        DVT Prophylaxis: Enoxaparin (Lovenox)  DVT prophylaxis - mechanical: SCDs  Ulcer prophylaxis: Not indicated  Antibiotics: Treating active infection/contamination beyond 24 hours perioperative coverage

## 2017-07-11 NOTE — PROGRESS NOTES
[facility] Hospitalist Progress Note    Date of Service: 2017    Chief Complaint  This is a 71-year-old  male with past medical history of peripheral artery disease and lower extremity wounds, recently admitted in April for femoral bypass with Dr. oMnroy. He subsequently had transmetatarsal amputation and had home health was signed following this. He additionally has a history of chronic alcohol abuse and has had alcohol withdrawal issues in the past. He takes levothyroxine for hypothyroidism. He has chronic urinary incontinence. He presents after injuring his foot at home approximately one week ago, the admitting physician notes that patient is extremely disheveled and smells of urine. on  he was taken to the OR and underwent endarterectomy of the right external iliac artery common femoral artery and profunda femoris artery, he had bovine pericardial patch angioplasty of the right common femoral artery and profunda femoris artery he had right iliac angiography percutaneous access of the left common femoral artery or the iliac angiography right external iliac balloon angioma right external iliac stent placement right external iliac balloon angioplasty post stent placement and follow-up angiography followed by a right BKA. He stayed in surgical ICU postop and is now back on the general surgical unit    Interval Problem Update  ***    Consultants/Specialty  ***    Disposition  ***        ROS   Physical Exam  Laboratory/Imaging   Hemodynamics  Temp (24hrs), Av.4 °C (97.5 °F), Min:36.1 °C (96.9 °F), Max:36.8 °C (98.2 °F)   Temperature: 36.8 °C (98.2 °F)  Pulse  Av.5  Min: 60  Max: 107    Blood Pressure : 150/85 mmHg      Respiratory      Respiration: 18, Pulse Oximetry: 92 %, O2 Daily Delivery Respiratory : Room Air with O2 Available     PEP/CPT Method: Positive Airway Pressure Device, Work Of Breathing / Effort: Mild  RUL Breath Sounds: Clear, RML Breath Sounds: Diminished, RLL Breath Sounds:  Diminished, RIGO Breath Sounds: Clear, LLL Breath Sounds: Diminished    Fluids    Intake/Output Summary (Last 24 hours) at 07/11/17 1101  Last data filed at 07/11/17 0911   Gross per 24 hour   Intake   2340 ml   Output    500 ml   Net   1840 ml       Nutrition  Orders Placed This Encounter   Procedures   • DIET ORDER     Standing Status: Standing      Number of Occurrences: 1      Standing Expiration Date:      Order Specific Question:  Diet:     Answer:  Regular [1]     Order Specific Question:  Calorie modifications:     Answer:  High Calorie [1]     Physical Exam    Recent Labs      07/09/17   0108  07/10/17   0237   WBC  19.5*  15.5*   RBC  5.23  4.45*   HEMOGLOBIN  12.9*  10.9*   HEMATOCRIT  41.6*  35.5*   MCV  79.5*  79.8*   MCH  24.7*  24.5*   MCHC  31.0*  30.7*   RDW  48.2  50.2*   PLATELETCT  450*  444   MPV  8.4*  8.6*     Recent Labs      07/09/17   0108  07/10/17   0237  07/11/17   0321   SODIUM  129*  131*  129*   POTASSIUM  3.4*  3.7  4.5   CHLORIDE  100  102  100   CO2  19*  23  26   GLUCOSE  96  95  81   BUN  7*  10  14   CREATININE  0.60  0.69  0.75   CALCIUM  7.7*  7.3*  7.5*                      Assessment/Plan     Alcohol dependence (CMS-HCC) (present on admission)  Assessment & Plan  Gives variable history as far as intake & has had withdrawal prior- will place on [facility] protocol    Leucocytosis (present on admission)  Assessment & Plan  Secondary to cellulitis and gangrene of the right extremity, monitor    Cellulitis of right leg (present on admission)  Assessment & Plan  Status post BKA    PAD (peripheral artery disease) (CMS-HCC) (present on admission)  Assessment & Plan  Status post angioplasty,  Stent right lower extremity    Hyponatremia (present on admission)  Assessment & Plan  Mild    Smoking (present on admission)  Assessment & Plan  Nicotine replacement    Gangrene of foot (CMS-HCC) (present on admission)  Assessment & Plan  Status post BKA    Social problem (present on  admission)  Assessment & Plan  Lives with caregiver however is disheveled and malnourished has not followed up surgically, asked social work to place EPS referral    Severe protein-calorie malnutrition (CMS-HCC) (present on admission)  Assessment & Plan  Supplements     Hypothyroid  Assessment & Plan  replacement    Core Measures

## 2017-07-12 ENCOUNTER — HOSPITAL ENCOUNTER (INPATIENT)
Facility: REHABILITATION | Age: 72
End: 2017-07-12
Attending: PHYSICAL MEDICINE & REHABILITATION | Admitting: PHYSICAL MEDICINE & REHABILITATION
Payer: MEDICARE

## 2017-07-12 PROBLEM — D72.829 LEUCOCYTOSIS: Status: RESOLVED | Noted: 2017-04-09 | Resolved: 2017-07-12

## 2017-07-12 LAB
ANION GAP SERPL CALC-SCNC: 5 MMOL/L (ref 0–11.9)
BACTERIA BLD CULT: NORMAL
BACTERIA BLD CULT: NORMAL
BASOPHILS # BLD AUTO: 1.4 % (ref 0–1.8)
BASOPHILS # BLD: 0.23 K/UL (ref 0–0.12)
BUN SERPL-MCNC: 14 MG/DL (ref 8–22)
CALCIUM SERPL-MCNC: 8.1 MG/DL (ref 8.5–10.5)
CHLORIDE SERPL-SCNC: 98 MMOL/L (ref 96–112)
CO2 SERPL-SCNC: 26 MMOL/L (ref 20–33)
CREAT SERPL-MCNC: 0.74 MG/DL (ref 0.5–1.4)
EOSINOPHIL # BLD AUTO: 1.11 K/UL (ref 0–0.51)
EOSINOPHIL NFR BLD: 6.6 % (ref 0–6.9)
ERYTHROCYTE [DISTWIDTH] IN BLOOD BY AUTOMATED COUNT: 49.9 FL (ref 35.9–50)
GFR SERPL CREATININE-BSD FRML MDRD: >60 ML/MIN/1.73 M 2
GLUCOSE SERPL-MCNC: 87 MG/DL (ref 65–99)
HCT VFR BLD AUTO: 39.6 % (ref 42–52)
HGB BLD-MCNC: 12.1 G/DL (ref 14–18)
IMM GRANULOCYTES # BLD AUTO: 0.12 K/UL (ref 0–0.11)
IMM GRANULOCYTES NFR BLD AUTO: 0.7 % (ref 0–0.9)
LYMPHOCYTES # BLD AUTO: 1.46 K/UL (ref 1–4.8)
LYMPHOCYTES NFR BLD: 8.7 % (ref 22–41)
MAGNESIUM SERPL-MCNC: 2.5 MG/DL (ref 1.5–2.5)
MCH RBC QN AUTO: 24.4 PG (ref 27–33)
MCHC RBC AUTO-ENTMCNC: 30.6 G/DL (ref 33.7–35.3)
MCV RBC AUTO: 80 FL (ref 81.4–97.8)
MONOCYTES # BLD AUTO: 0.82 K/UL (ref 0–0.85)
MONOCYTES NFR BLD AUTO: 4.9 % (ref 0–13.4)
NEUTROPHILS # BLD AUTO: 13.03 K/UL (ref 1.82–7.42)
NEUTROPHILS NFR BLD: 77.7 % (ref 44–72)
NRBC # BLD AUTO: 0 K/UL
NRBC BLD AUTO-RTO: 0 /100 WBC
PLATELET # BLD AUTO: 665 K/UL (ref 164–446)
PMV BLD AUTO: 8.8 FL (ref 9–12.9)
POTASSIUM SERPL-SCNC: 4.6 MMOL/L (ref 3.6–5.5)
RBC # BLD AUTO: 4.95 M/UL (ref 4.7–6.1)
SIGNIFICANT IND 70042: NORMAL
SIGNIFICANT IND 70042: NORMAL
SITE SITE: NORMAL
SITE SITE: NORMAL
SODIUM SERPL-SCNC: 129 MMOL/L (ref 135–145)
SOURCE SOURCE: NORMAL
SOURCE SOURCE: NORMAL
WBC # BLD AUTO: 16.8 K/UL (ref 4.8–10.8)

## 2017-07-12 PROCEDURE — 770006 HCHG ROOM/CARE - MED/SURG/GYN SEMI*

## 2017-07-12 PROCEDURE — 700102 HCHG RX REV CODE 250 W/ 637 OVERRIDE(OP): Performed by: HOSPITALIST

## 2017-07-12 PROCEDURE — A9270 NON-COVERED ITEM OR SERVICE: HCPCS | Performed by: SURGERY

## 2017-07-12 PROCEDURE — 97162 PT EVAL MOD COMPLEX 30 MIN: CPT

## 2017-07-12 PROCEDURE — 665999 HH PPS REVENUE DEBIT

## 2017-07-12 PROCEDURE — 302146: Performed by: SURGERY

## 2017-07-12 PROCEDURE — 36415 COLL VENOUS BLD VENIPUNCTURE: CPT

## 2017-07-12 PROCEDURE — 99232 SBSQ HOSP IP/OBS MODERATE 35: CPT | Performed by: HOSPITALIST

## 2017-07-12 PROCEDURE — 665998 HH PPS REVENUE CREDIT

## 2017-07-12 PROCEDURE — 83735 ASSAY OF MAGNESIUM: CPT

## 2017-07-12 PROCEDURE — G8987 SELF CARE CURRENT STATUS: HCPCS | Mod: CL

## 2017-07-12 PROCEDURE — 97166 OT EVAL MOD COMPLEX 45 MIN: CPT

## 2017-07-12 PROCEDURE — A9270 NON-COVERED ITEM OR SERVICE: HCPCS | Performed by: HOSPITALIST

## 2017-07-12 PROCEDURE — G8979 MOBILITY GOAL STATUS: HCPCS | Mod: CK

## 2017-07-12 PROCEDURE — 700112 HCHG RX REV CODE 229: Performed by: SURGERY

## 2017-07-12 PROCEDURE — 700105 HCHG RX REV CODE 258

## 2017-07-12 PROCEDURE — 700105 HCHG RX REV CODE 258: Performed by: HOSPITALIST

## 2017-07-12 PROCEDURE — 700102 HCHG RX REV CODE 250 W/ 637 OVERRIDE(OP): Performed by: SURGERY

## 2017-07-12 PROCEDURE — 700111 HCHG RX REV CODE 636 W/ 250 OVERRIDE (IP): Performed by: HOSPITALIST

## 2017-07-12 PROCEDURE — G8988 SELF CARE GOAL STATUS: HCPCS | Mod: CJ

## 2017-07-12 PROCEDURE — 85025 COMPLETE CBC W/AUTO DIFF WBC: CPT

## 2017-07-12 PROCEDURE — G8978 MOBILITY CURRENT STATUS: HCPCS | Mod: CM

## 2017-07-12 PROCEDURE — 700111 HCHG RX REV CODE 636 W/ 250 OVERRIDE (IP): Performed by: SURGERY

## 2017-07-12 PROCEDURE — 80048 BASIC METABOLIC PNL TOTAL CA: CPT

## 2017-07-12 RX ORDER — SODIUM CHLORIDE 9 MG/ML
INJECTION, SOLUTION INTRAVENOUS
Status: COMPLETED
Start: 2017-07-12 | End: 2017-07-12

## 2017-07-12 RX ORDER — GABAPENTIN 100 MG/1
100 CAPSULE ORAL 3 TIMES DAILY
Status: DISCONTINUED | OUTPATIENT
Start: 2017-07-12 | End: 2017-07-13 | Stop reason: HOSPADM

## 2017-07-12 RX ADMIN — SODIUM CHLORIDE 500 ML: 9 INJECTION, SOLUTION INTRAVENOUS at 19:43

## 2017-07-12 RX ADMIN — FOLIC ACID 1 MG: 1 TABLET ORAL at 08:03

## 2017-07-12 RX ADMIN — ASPIRIN 81 MG: 81 TABLET ORAL at 08:03

## 2017-07-12 RX ADMIN — THERA TABS 1 TABLET: TAB at 08:07

## 2017-07-12 RX ADMIN — OXYCODONE HYDROCHLORIDE 10 MG: 10 TABLET ORAL at 21:28

## 2017-07-12 RX ADMIN — STANDARDIZED SENNA CONCENTRATE AND DOCUSATE SODIUM 2 TABLET: 8.6; 5 TABLET, FILM COATED ORAL at 08:07

## 2017-07-12 RX ADMIN — ATORVASTATIN CALCIUM 40 MG: 40 TABLET, FILM COATED ORAL at 20:40

## 2017-07-12 RX ADMIN — ACETAMINOPHEN 650 MG: 325 TABLET, FILM COATED ORAL at 06:24

## 2017-07-12 RX ADMIN — OXYCODONE HYDROCHLORIDE 10 MG: 10 TABLET ORAL at 06:24

## 2017-07-12 RX ADMIN — DOCUSATE SODIUM 100 MG: 100 CAPSULE ORAL at 08:03

## 2017-07-12 RX ADMIN — CLONIDINE HYDROCHLORIDE 0.1 MG: 0.1 TABLET ORAL at 08:13

## 2017-07-12 RX ADMIN — DOCUSATE SODIUM 100 MG: 100 CAPSULE ORAL at 20:41

## 2017-07-12 RX ADMIN — CLOPIDOGREL 75 MG: 75 TABLET, FILM COATED ORAL at 08:06

## 2017-07-12 RX ADMIN — ENOXAPARIN SODIUM 30 MG: 100 INJECTION SUBCUTANEOUS at 08:07

## 2017-07-12 RX ADMIN — AMPICILLIN SODIUM AND SULBACTAM SODIUM 3 G: 2; 1 INJECTION, POWDER, FOR SOLUTION INTRAMUSCULAR; INTRAVENOUS at 03:40

## 2017-07-12 RX ADMIN — AMPICILLIN SODIUM AND SULBACTAM SODIUM 3 G: 2; 1 INJECTION, POWDER, FOR SOLUTION INTRAMUSCULAR; INTRAVENOUS at 08:03

## 2017-07-12 RX ADMIN — TRAMADOL HYDROCHLORIDE 50 MG: 50 TABLET, COATED ORAL at 20:40

## 2017-07-12 RX ADMIN — GABAPENTIN 200 MG: 100 CAPSULE ORAL at 08:06

## 2017-07-12 RX ADMIN — METOPROLOL TARTRATE 25 MG: 25 TABLET, FILM COATED ORAL at 20:40

## 2017-07-12 RX ADMIN — OXYCODONE HYDROCHLORIDE 10 MG: 10 TABLET ORAL at 12:09

## 2017-07-12 RX ADMIN — STANDARDIZED SENNA CONCENTRATE AND DOCUSATE SODIUM 2 TABLET: 8.6; 5 TABLET, FILM COATED ORAL at 20:40

## 2017-07-12 RX ADMIN — TRAMADOL HYDROCHLORIDE 50 MG: 50 TABLET, COATED ORAL at 08:07

## 2017-07-12 RX ADMIN — GABAPENTIN 100 MG: 100 CAPSULE ORAL at 20:41

## 2017-07-12 RX ADMIN — CLONIDINE HYDROCHLORIDE 0.1 MG: 0.1 TABLET ORAL at 20:40

## 2017-07-12 RX ADMIN — LEVOTHYROXINE SODIUM 75 MCG: 75 TABLET ORAL at 06:24

## 2017-07-12 RX ADMIN — AMPICILLIN SODIUM AND SULBACTAM SODIUM 3 G: 2; 1 INJECTION, POWDER, FOR SOLUTION INTRAMUSCULAR; INTRAVENOUS at 20:56

## 2017-07-12 RX ADMIN — Medication 50 MG: at 08:04

## 2017-07-12 RX ADMIN — GABAPENTIN 100 MG: 100 CAPSULE ORAL at 14:39

## 2017-07-12 RX ADMIN — METOPROLOL TARTRATE 25 MG: 25 TABLET, FILM COATED ORAL at 08:12

## 2017-07-12 ASSESSMENT — ENCOUNTER SYMPTOMS
BACK PAIN: 0
DIZZINESS: 0
VOMITING: 0
ABDOMINAL PAIN: 0
COUGH: 0
FEVER: 0
LOSS OF CONSCIOUSNESS: 0
NAUSEA: 0
SHORTNESS OF BREATH: 0
DIARRHEA: 0
CHILLS: 0
HEADACHES: 0

## 2017-07-12 ASSESSMENT — COGNITIVE AND FUNCTIONAL STATUS - GENERAL
WALKING IN HOSPITAL ROOM: TOTAL
MOBILITY SCORE: 6
PERSONAL GROOMING: A LITTLE
HELP NEEDED FOR BATHING: A LOT
EATING MEALS: A LITTLE
SUGGESTED CMS G CODE MODIFIER DAILY ACTIVITY: CK
DRESSING REGULAR LOWER BODY CLOTHING: A LOT
TURNING FROM BACK TO SIDE WHILE IN FLAT BAD: UNABLE
DAILY ACTIVITIY SCORE: 15
MOVING TO AND FROM BED TO CHAIR: UNABLE
TOILETING: A LOT
DRESSING REGULAR UPPER BODY CLOTHING: A LITTLE
CLIMB 3 TO 5 STEPS WITH RAILING: TOTAL
MOVING FROM LYING ON BACK TO SITTING ON SIDE OF FLAT BED: UNABLE
STANDING UP FROM CHAIR USING ARMS: TOTAL
SUGGESTED CMS G CODE MODIFIER MOBILITY: CN

## 2017-07-12 ASSESSMENT — ACTIVITIES OF DAILY LIVING (ADL): TOILETING: INDEPENDENT

## 2017-07-12 ASSESSMENT — PAIN SCALES - GENERAL
PAINLEVEL_OUTOF10: 7
PAINLEVEL_OUTOF10: 8
PAINLEVEL_OUTOF10: 2
PAINLEVEL_OUTOF10: 5

## 2017-07-12 ASSESSMENT — GAIT ASSESSMENTS: GAIT LEVEL OF ASSIST: UNABLE TO PARTICIPATE

## 2017-07-12 NOTE — DISCHARGE PLANNING
Medical Social Work    Late Entry: TRUONG met with pt's family this AM regarding financial concerns. Family concerned that they would have to pay out of pocket after receiving a PC from [facility] notifying them that pt is out of Medicare SNF days. TRUONG confirmed with [facility] and [facility] that pt is out of SNF full days but has co-pay days left and Medicaid will  the co-pay cost. TRUONG relayed this information to family.     PLAN: Pt to transfer to [facility] when medically cleared.

## 2017-07-12 NOTE — PROGRESS NOTES
"[facility] Hospitalist Progress Note    Date of Service: 7/12/2017    Chief Complaint  This is a 71-year-old  male with past medical history of peripheral artery disease and lower extremity wounds, recently admitted in April for femoral bypass with Dr. Monroy. He subsequently had transmetatarsal amputation and had home health was signed following this. He additionally has a history of chronic alcohol abuse and has had alcohol withdrawal issues in the past. He takes levothyroxine for hypothyroidism. He has chronic urinary incontinence. He presents after injuring his foot at home approximately one week ago, the admitting physician notes that patient is extremely disheveled and smells of urine. on 7/9 he was taken to the OR and underwent endarterectomy of the right external iliac artery common femoral artery and profunda femoris artery, he had bovine pericardial patch angioplasty of the right common femoral artery and profunda femoris artery he had right iliac angiography percutaneous access of the left common femoral artery or the iliac angiography right external iliac balloon angioma right external iliac stent placement right external iliac balloon angioplasty post stent placement and follow-up angiography followed by a right BKA. He stayed in surgical ICU postop and is now back on the general surgical unit    Interval Problem Update  Pt reports pain in phantom limb.  Also feels \"dizzy, kind of\".    RN notes she has to wake him and then he reports pain before nodding off immediately    Consultants/Specialty  Dr Aye Veloz/Vasc surger    Disposition  Cont in house      Review of Systems   Constitutional: Negative for fever and chills.   Respiratory: Negative for cough and shortness of breath.    Cardiovascular: Negative for chest pain.   Gastrointestinal: Negative for nausea, vomiting, abdominal pain and diarrhea.   Musculoskeletal: Negative for back pain.        Pain in amputated R toes   Skin: Negative for " rash.   Neurological: Negative for dizziness, loss of consciousness and headaches.      Physical Exam  Laboratory/Imaging   Hemodynamics  Temp (24hrs), Av.6 °C (97.8 °F), Min:36.4 °C (97.6 °F), Max:36.7 °C (98 °F)   Temperature: 36.4 °C (97.6 °F)  Pulse  Av.4  Min: 60  Max: 107    Blood Pressure : 155/90 mmHg      Respiratory      Respiration: 16, Pulse Oximetry: 93 %     Work Of Breathing / Effort: Mild  RUL Breath Sounds: Clear, RML Breath Sounds: Diminished, RLL Breath Sounds: Diminished, RIGO Breath Sounds: Clear, LLL Breath Sounds: Diminished    Fluids    Intake/Output Summary (Last 24 hours) at 17 1548  Last data filed at 17 1215   Gross per 24 hour   Intake   1016 ml   Output   1600 ml   Net   -584 ml       Nutrition  Orders Placed This Encounter   Procedures   • DIET ORDER     Standing Status: Standing      Number of Occurrences: 1      Standing Expiration Date:      Order Specific Question:  Diet:     Answer:  Regular [1]     Order Specific Question:  Calorie modifications:     Answer:  High Calorie [1]     Physical Exam   Constitutional: He is oriented to person, place, and time. He appears well-developed and well-nourished. No distress.   HENT:   Head: Normocephalic and atraumatic.   Eyes: Conjunctivae are normal.   Neck: No JVD present.   Cardiovascular: Normal rate.  Exam reveals no gallop.    Murmur heard.  Pulmonary/Chest: Effort normal. No stridor. No respiratory distress. He has no wheezes. He has no rales.   Abdominal: Soft. There is no tenderness. There is no rebound and no guarding.   Musculoskeletal: He exhibits edema.   Post op dressing noted CDI   Neurological: He is oriented to person, place, and time.   Skin: Skin is warm and dry. No rash noted. He is not diaphoretic.   Psychiatric: He has a normal mood and affect. Thought content normal.   Nursing note and vitals reviewed.      Recent Labs      07/10/17   0237  17   0324   WBC  15.5*  16.8*   RBC  4.45*  4.95    HEMOGLOBIN  10.9*  12.1*   HEMATOCRIT  35.5*  39.6*   MCV  79.8*  80.0*   MCH  24.5*  24.4*   MCHC  30.7*  30.6*   RDW  50.2*  49.9   PLATELETCT  444  665*   MPV  8.6*  8.8*     Recent Labs      07/10/17   0237  07/11/17   0321  07/12/17   0324   SODIUM  131*  129*  129*   POTASSIUM  3.7  4.5  4.6   CHLORIDE  102  100  98   CO2  23  26  26   GLUCOSE  95  81  87   BUN  10  14  14   CREATININE  0.69  0.75  0.74   CALCIUM  7.3*  7.5*  8.1*                      Assessment/Plan     Alcohol dependence (CMS-[facility]) (present on admission)  Assessment & Plan  No evidence of DT's at present  MVI  Thiamine  folate    Cellulitis of right leg (present on admission)  Assessment & Plan  Status post BKA  Titrate pain meds    PAD (peripheral artery disease) (CMS-[facility]) (present on admission)  Assessment & Plan  Status post angioplasty,  Stent right lower extremity    Hyponatremia (present on admission)  Assessment & Plan  Mild    Smoking (present on admission)  Assessment & Plan  Nicotine replacement    Gangrene of foot (CMS-[facility])  Assessment & Plan  Status post BKA    Social problem (present on admission)  Assessment & Plan  Lives with caregiver however is disheveled and malnourished has not followed up surgically, asked social work to place EPS referral    Severe protein-calorie malnutrition (CMS-[facility]) (present on admission)  Assessment & Plan  Supplements     Hypothyroid  Assessment & Plan  replacement    Hypomagnesemia  Assessment & Plan  4g IV  Repeat lab in am      Radiology images reviewed, Labs reviewed and Medications reviewed        DVT Prophylaxis: Enoxaparin (Lovenox)  DVT prophylaxis - mechanical: SCDs  Ulcer prophylaxis: Not indicated  Antibiotics: Treating active infection/contamination beyond 24 hours perioperative coverage

## 2017-07-12 NOTE — PROGRESS NOTES
Pt saw by PT/OT. Pt is dependent, needing max assist.  Pt is also being fitted for a prosthetic the the right BKA by [facility].   Wife is at bedside requesting updates. Case management notified.

## 2017-07-12 NOTE — WOUND TEAM
Wound consult placed for evaluation of possible penile pressure ulcer due to condom catheter.  Nurse had removed catheter.  Penis has partial thickness wounds (2 linear) at base of penis and these are not pressure.  Instructed RN to write nursing orders for no condom catheter and staff to wash penis every shift and keep clean and dry.  No involvement by wound team at this time.

## 2017-07-12 NOTE — PROGRESS NOTES
Report received from NOC shift RN. Patient is A/O X 4 and on room air. Pt has hx of HTN. /90 mmHg  Pulse 77  Temp(Src) 36.5 °C (97.7 °F)  Resp 18  Ht 1.829 m (6')  Wt 57.2 kg (126 lb 1.7 oz)  BMI 17.10 kg/m2  SpO2 92%Labs reviewed. WBC 16.8; Na 129; Ca 8.1 from 7.5. BS hypoactive X 4 but patient tolerating diet. No reports of N&V. Barrera catheter in place and 300mL of clear yellow UO in reservoir. Patient reports burning sensation at urethra. Patient is poor historian and cannot remember last BM. Toes missing from left leg, CONNOR and site appears PWD. Right BKA dressed and CDI. Patient reports increased pain last 24 hrs but nods off to sleep. Bilateral groin dressings in place, CDI.  JORGE mobility, PT/OT orders in place. Call light at bedside and patient calls appropriately.

## 2017-07-12 NOTE — PROGRESS NOTES
Pt A&O x4. Forgetful and repetitive at times.    Vitals: /91 mmHg  Pulse 83  Temp(Src) 36.7 °C (98 °F)  Resp 18  Ht 1.829 m (6')  Wt 57.2 kg (126 lb 1.7 oz)  BMI 17.10 kg/m2  SpO2 95%    Pt rates pain 10 out of 10. Medicated for pain.    Neuro: FERRIS. Denies new onset of numbness/ tingling. Experiencing phantom limb pain RLE.    Cardiac: Denies new onset of chest pain.    Vascular: LLE pedal pulse heard by doppler. Extremity is pink, dry, and warm. Patient has sensation and is able to move extremity. Right BKA, unable to assess popliteal pulse, compression dressing in place.    Respiratory: Lungs sounds clear bilateral upper lobes, diminished bilateral lower and middle lobes to auscultation. Pulling 1000 on IS, effective, needs assistance and reminders. On room air.  95%. Denies SOB.    GI: Abdomen soft to palpation. Normoactive bowel sounds, positive flatus, negative BM today. On regular diet, tolerating well. Denies nausea/ vomiting.    : Pt voiding adequately. Condom catheter on, incontinence otherwise. Urine yellow and clear.     MSK: Generalized weakness, 2-assist, right BKA, LLE amputated toes.     Integumentary: Right BKA compression dressing in place, CDI, old drainage noted. Bilateral groin incisions, unable to assess, dressings CDI. LLE, amputation of toes, incision open to air, approximated, pink, some scarring. Skin is warm to touch.     Labs noted.    Fall precautions in place: Bed locked in lowest position, Upper bed rails up, treaded socks in place, personal belongings within reach, call light within reach, appropriate mobility signs in place, bed alarm on. Pt calls appropriately.     Pt updated on POC.

## 2017-07-12 NOTE — CARE PLAN
Problem: Safety  Goal: Will remain free from falls  Outcome: PROGRESSING AS EXPECTED  Intervention: Assess risk factors for falls    07/11/17 2145 07/12/17 0320   OTHER   Fall Risk High Risk to Fall - 2 or more points  --    Risk for Injury-Any positive answers results in the pt being at high risk for fall related injury Surgery: Thoracic or Abdominal Surgery or Lower Limb Amputation --    Mobility Status Assessment 2-2 Healthcare Providers Required for Assistance with Ambulation & Transfer --    History of fall 2 --    Pt Calls for Assistance --  Yes       Intervention: Implement fall precautions    07/11/17 2145   OTHER   IV Pole on Same Side of Bed as Bathroom Yes   Bedrails Bedrails Closest to Bathroom Down   Chair/Bed Strip Alarm Yes - Alarm On           Problem: Urinary Elimination:  Goal: Ability to reestablish a normal urinary elimination pattern will improve  Outcome: PROGRESSING AS EXPECTED    07/11/17 2145   OTHER   Urinary Elimination Incontinence     Utilizing condom catheter at this time to prevent skin breakdown from incontinence.

## 2017-07-13 VITALS
SYSTOLIC BLOOD PRESSURE: 126 MMHG | DIASTOLIC BLOOD PRESSURE: 65 MMHG | TEMPERATURE: 97.5 F | OXYGEN SATURATION: 93 % | BODY MASS INDEX: 17.08 KG/M2 | RESPIRATION RATE: 18 BRPM | WEIGHT: 126.1 LBS | HEART RATE: 71 BPM | HEIGHT: 72 IN

## 2017-07-13 LAB
ANION GAP SERPL CALC-SCNC: 8 MMOL/L (ref 0–11.9)
BASOPHILS # BLD AUTO: 1.5 % (ref 0–1.8)
BASOPHILS # BLD: 0.25 K/UL (ref 0–0.12)
BUN SERPL-MCNC: 13 MG/DL (ref 8–22)
CALCIUM SERPL-MCNC: 8 MG/DL (ref 8.5–10.5)
CHLORIDE SERPL-SCNC: 97 MMOL/L (ref 96–112)
CO2 SERPL-SCNC: 21 MMOL/L (ref 20–33)
CREAT SERPL-MCNC: 0.61 MG/DL (ref 0.5–1.4)
EOSINOPHIL # BLD AUTO: 0.75 K/UL (ref 0–0.51)
EOSINOPHIL NFR BLD: 4.5 % (ref 0–6.9)
ERYTHROCYTE [DISTWIDTH] IN BLOOD BY AUTOMATED COUNT: 49.7 FL (ref 35.9–50)
GFR SERPL CREATININE-BSD FRML MDRD: >60 ML/MIN/1.73 M 2
GLUCOSE SERPL-MCNC: 79 MG/DL (ref 65–99)
HCT VFR BLD AUTO: 37.3 % (ref 42–52)
HGB BLD-MCNC: 11.4 G/DL (ref 14–18)
IMM GRANULOCYTES # BLD AUTO: 0.13 K/UL (ref 0–0.11)
IMM GRANULOCYTES NFR BLD AUTO: 0.8 % (ref 0–0.9)
LYMPHOCYTES # BLD AUTO: 1.5 K/UL (ref 1–4.8)
LYMPHOCYTES NFR BLD: 9 % (ref 22–41)
MAGNESIUM SERPL-MCNC: 1.9 MG/DL (ref 1.5–2.5)
MCH RBC QN AUTO: 24.6 PG (ref 27–33)
MCHC RBC AUTO-ENTMCNC: 30.6 G/DL (ref 33.7–35.3)
MCV RBC AUTO: 80.4 FL (ref 81.4–97.8)
MONOCYTES # BLD AUTO: 1.35 K/UL (ref 0–0.85)
MONOCYTES NFR BLD AUTO: 8.1 % (ref 0–13.4)
NEUTROPHILS # BLD AUTO: 12.68 K/UL (ref 1.82–7.42)
NEUTROPHILS NFR BLD: 76.1 % (ref 44–72)
NRBC # BLD AUTO: 0 K/UL
NRBC BLD AUTO-RTO: 0 /100 WBC
PLATELET # BLD AUTO: 626 K/UL (ref 164–446)
PMV BLD AUTO: 8.9 FL (ref 9–12.9)
POTASSIUM SERPL-SCNC: 4.3 MMOL/L (ref 3.6–5.5)
RBC # BLD AUTO: 4.64 M/UL (ref 4.7–6.1)
SODIUM SERPL-SCNC: 126 MMOL/L (ref 135–145)
WBC # BLD AUTO: 16.7 K/UL (ref 4.8–10.8)

## 2017-07-13 PROCEDURE — A9270 NON-COVERED ITEM OR SERVICE: HCPCS | Performed by: SURGERY

## 2017-07-13 PROCEDURE — 85025 COMPLETE CBC W/AUTO DIFF WBC: CPT

## 2017-07-13 PROCEDURE — 665998 HH PPS REVENUE CREDIT

## 2017-07-13 PROCEDURE — A9270 NON-COVERED ITEM OR SERVICE: HCPCS | Performed by: HOSPITALIST

## 2017-07-13 PROCEDURE — 665999 HH PPS REVENUE DEBIT

## 2017-07-13 PROCEDURE — 83735 ASSAY OF MAGNESIUM: CPT

## 2017-07-13 PROCEDURE — 700102 HCHG RX REV CODE 250 W/ 637 OVERRIDE(OP): Performed by: HOSPITALIST

## 2017-07-13 PROCEDURE — 36415 COLL VENOUS BLD VENIPUNCTURE: CPT

## 2017-07-13 PROCEDURE — 700102 HCHG RX REV CODE 250 W/ 637 OVERRIDE(OP): Performed by: SURGERY

## 2017-07-13 PROCEDURE — 700111 HCHG RX REV CODE 636 W/ 250 OVERRIDE (IP): Performed by: HOSPITALIST

## 2017-07-13 PROCEDURE — 700111 HCHG RX REV CODE 636 W/ 250 OVERRIDE (IP): Performed by: SURGERY

## 2017-07-13 PROCEDURE — 700105 HCHG RX REV CODE 258: Performed by: HOSPITALIST

## 2017-07-13 PROCEDURE — 80048 BASIC METABOLIC PNL TOTAL CA: CPT

## 2017-07-13 PROCEDURE — 700112 HCHG RX REV CODE 229: Performed by: SURGERY

## 2017-07-13 PROCEDURE — 99239 HOSP IP/OBS DSCHRG MGMT >30: CPT | Performed by: HOSPITALIST

## 2017-07-13 RX ORDER — ONDANSETRON 4 MG/1
4 TABLET, ORALLY DISINTEGRATING ORAL EVERY 4 HOURS PRN
Qty: 10 TAB | Refills: 0
Start: 2017-07-13 | End: 2017-09-30

## 2017-07-13 RX ORDER — AMOXICILLIN AND CLAVULANATE POTASSIUM 875; 125 MG/1; MG/1
1 TABLET, FILM COATED ORAL 2 TIMES DAILY
Refills: 0
Start: 2017-07-13 | End: 2017-09-30

## 2017-07-13 RX ORDER — METOPROLOL TARTRATE 50 MG/1
50 TABLET, FILM COATED ORAL 2 TIMES DAILY
Qty: 60 TAB
Start: 2017-07-13

## 2017-07-13 RX ORDER — OXYCODONE HYDROCHLORIDE 10 MG/1
10 TABLET ORAL
Qty: 28 TAB | Status: ON HOLD
Start: 2017-07-13 | End: 2017-11-29

## 2017-07-13 RX ORDER — ASPIRIN 81 MG/1
81 TABLET ORAL DAILY
Qty: 30 TAB
Start: 2017-07-13

## 2017-07-13 RX ORDER — FOLIC ACID 1 MG/1
1 TABLET ORAL DAILY
Qty: 30 TAB
Start: 2017-07-13

## 2017-07-13 RX ORDER — CLOPIDOGREL BISULFATE 75 MG/1
75 TABLET ORAL DAILY
Qty: 30 TAB
Start: 2017-07-13 | End: 2017-09-30

## 2017-07-13 RX ORDER — ATORVASTATIN CALCIUM 40 MG/1
40 TABLET, FILM COATED ORAL
Qty: 30 TAB
Start: 2017-07-13

## 2017-07-13 RX ORDER — GABAPENTIN 100 MG/1
100 CAPSULE ORAL 3 TIMES DAILY
Qty: 90 CAP
Start: 2017-07-13

## 2017-07-13 RX ORDER — SCOLOPAMINE TRANSDERMAL SYSTEM 1 MG/1
1 PATCH, EXTENDED RELEASE TRANSDERMAL
Qty: 4 PATCH | Refills: 3
Start: 2017-07-13 | End: 2017-09-30

## 2017-07-13 RX ORDER — IPRATROPIUM BROMIDE AND ALBUTEROL SULFATE 2.5; .5 MG/3ML; MG/3ML
3 SOLUTION RESPIRATORY (INHALATION)
Qty: 30 BULLET
Start: 2017-07-13

## 2017-07-13 RX ADMIN — ACETAMINOPHEN 650 MG: 325 TABLET, FILM COATED ORAL at 05:53

## 2017-07-13 RX ADMIN — GABAPENTIN 100 MG: 100 CAPSULE ORAL at 16:24

## 2017-07-13 RX ADMIN — CLOPIDOGREL 75 MG: 75 TABLET, FILM COATED ORAL at 08:23

## 2017-07-13 RX ADMIN — ACETAMINOPHEN 650 MG: 325 TABLET, FILM COATED ORAL at 12:35

## 2017-07-13 RX ADMIN — CLONIDINE HYDROCHLORIDE 0.1 MG: 0.1 TABLET ORAL at 08:23

## 2017-07-13 RX ADMIN — OXYCODONE HYDROCHLORIDE 10 MG: 10 TABLET ORAL at 16:24

## 2017-07-13 RX ADMIN — ASPIRIN 81 MG: 81 TABLET ORAL at 08:22

## 2017-07-13 RX ADMIN — AMPICILLIN SODIUM AND SULBACTAM SODIUM 3 G: 2; 1 INJECTION, POWDER, FOR SOLUTION INTRAMUSCULAR; INTRAVENOUS at 02:46

## 2017-07-13 RX ADMIN — OXYCODONE HYDROCHLORIDE 10 MG: 10 TABLET ORAL at 12:35

## 2017-07-13 RX ADMIN — ENOXAPARIN SODIUM 30 MG: 100 INJECTION SUBCUTANEOUS at 08:23

## 2017-07-13 RX ADMIN — AMPICILLIN SODIUM AND SULBACTAM SODIUM 3 G: 2; 1 INJECTION, POWDER, FOR SOLUTION INTRAMUSCULAR; INTRAVENOUS at 16:24

## 2017-07-13 RX ADMIN — Medication 50 MG: at 08:25

## 2017-07-13 RX ADMIN — STANDARDIZED SENNA CONCENTRATE AND DOCUSATE SODIUM 2 TABLET: 8.6; 5 TABLET, FILM COATED ORAL at 08:22

## 2017-07-13 RX ADMIN — GABAPENTIN 100 MG: 100 CAPSULE ORAL at 08:22

## 2017-07-13 RX ADMIN — METOPROLOL TARTRATE 25 MG: 25 TABLET, FILM COATED ORAL at 08:23

## 2017-07-13 RX ADMIN — THERA TABS 1 TABLET: TAB at 08:22

## 2017-07-13 RX ADMIN — OXYCODONE HYDROCHLORIDE 10 MG: 10 TABLET ORAL at 08:47

## 2017-07-13 RX ADMIN — LEVOTHYROXINE SODIUM 75 MCG: 75 TABLET ORAL at 05:52

## 2017-07-13 RX ADMIN — AMPICILLIN SODIUM AND SULBACTAM SODIUM 3 G: 2; 1 INJECTION, POWDER, FOR SOLUTION INTRAMUSCULAR; INTRAVENOUS at 08:47

## 2017-07-13 RX ADMIN — FOLIC ACID 1 MG: 1 TABLET ORAL at 08:23

## 2017-07-13 RX ADMIN — TRAMADOL HYDROCHLORIDE 50 MG: 50 TABLET, COATED ORAL at 08:23

## 2017-07-13 RX ADMIN — OXYCODONE HYDROCHLORIDE 10 MG: 10 TABLET ORAL at 05:53

## 2017-07-13 RX ADMIN — DOCUSATE SODIUM 100 MG: 100 CAPSULE ORAL at 08:22

## 2017-07-13 ASSESSMENT — PAIN SCALES - GENERAL
PAINLEVEL_OUTOF10: 8

## 2017-07-13 NOTE — DISCHARGE PLANNING
CCS called [facility] Left Message for Naeem admissions inquiring about bed availability for this patient today.

## 2017-07-13 NOTE — DISCHARGE PLANNING
CCS received a call back from Naeem at [facility]. Naeem stated that they have a bed available for the patient and will call this CCS back with transportation time.

## 2017-07-13 NOTE — CARE PLAN
Problem: Pain Management  Goal: Pain level will decrease to patient’s comfort goal  Outcome: PROGRESSING SLOWER THAN EXPECTED  Patient reports continued pain after pain meds despite going to sleep after meds.    Problem: Urinary Elimination:  Goal: Ability to reestablish a normal urinary elimination pattern will improve  Outcome: PROGRESSING SLOWER THAN EXPECTED  Patient is incontinent.

## 2017-07-13 NOTE — DISCHARGE PLANNING
Met with pt and sister, they prefer [facility]. Awaiting confirmation that they have a bed today.

## 2017-07-13 NOTE — CONSULTS
Medical chart review completed. Patient is a 71 year-old male admitted with gangrene of right foot or s/p right Twyla Rodriguez on 7/9/2017  and functional deficits in mobility/self-cares and severe deconditioning. Premorbidly, this patient lived in a home with ex sister-in-law however not able to care for himself and as per therapy notes unclear how much assistance patient needed. The patient was evaluated by acute care physical therapy/occupational therapy/speech therapy; currently requiring maximal assistance x 2 which is dependent for mobility and ADLs. The patient is not a candidate for an acute inpatient rehabilitation program due to being so low functioning at this time. Recommend skilled nursing until the patient is at a moderate assistance level with self-care and mobility with a feasible discharge plan that patient receive assistance at home. Thank you for allowing me to participate in the care of your patient.

## 2017-07-13 NOTE — DISCHARGE INSTRUCTIONS
Discharge Instructions    Discharged to other by medical transportation with escort. Discharged via wheelchair, hospital escort: Yes.  Special equipment needed: Not Applicable    Be sure to schedule a follow-up appointment with your primary care doctor or any specialists as instructed.     Discharge Plan:   Diet Plan: Discussed  Activity Level: Discussed  Smoking Cessation Offered: Patient Refused  Confirmed Follow up Appointment: Patient to Call and Schedule Appointment  Confirmed Symptoms Management: Discussed  Medication Reconciliation Updated: Yes  Influenza Vaccine Indication: Indicated: Not available from distributor/    I understand that a diet low in cholesterol, fat, and sodium is recommended for good health. Unless I have been given specific instructions below for another diet, I accept this instruction as my diet prescription.   Other diet: Regular    Special Instructions: None    · Is patient discharged on Warfarin / Coumadin?   No     · Is patient Post Blood Transfusion?  No    Depression / Suicide Risk    As you are discharged from this [facility] facility, it is important to learn how to keep safe from harming yourself.    Recognize the warning signs:  · Abrupt changes in personality, positive or negative- including increase in energy   · Giving away possessions  · Change in eating patterns- significant weight changes-  positive or negative  · Change in sleeping patterns- unable to sleep or sleeping all the time   · Unwillingness or inability to communicate  · Depression  · Unusual sadness, discouragement and loneliness  · Talk of wanting to die  · Neglect of personal appearance   · Rebelliousness- reckless behavior  · Withdrawal from people/activities they love  · Confusion- inability to concentrate     If you or a loved one observes any of these behaviors or has concerns about self-harm, here's what you can do:  · Talk about it- your feelings and reasons for harming yourself  · Remove  any means that you might use to hurt yourself (examples: pills, rope, extension cords, firearm)  · Get professional help from the community (Mental Health, Substance Abuse, psychological counseling)  · Do not be alone:Call your Safe Contact- someone whom you trust who will be there for you.  · Call your local CRISIS HOTLINE 925-6379 or 454-394-1718  · Call your local Children's Mobile Crisis Response Team [place] (503) 060-1132 or [employer]  · Call the toll free National Suicide Prevention Hotlines   · National Suicide Prevention Lifeline 833-028-ULRO (7943)  · [facility] Line Network 800-SUICIDE (202-1727)    Living With an Amputation  The most common causes of amputation from the hip down include:  · Diseases that:  · Reduce the blood flow to an area of your body.  · Decrease your body's ability to fight infection.  · Traumatic injuries that cause significant damage to body tissues.  · Birth defects.  · Cancerous lumps (malignant tumors).  Amputation above the hip is usually the result of trauma or birth defect. Disease is a less common cause.  Living with an amputation can be challenging, but you can still live a long, productive life.  WHAT ARE THE COMMON CHALLENGES OF LIVING WITH AN AMPUTATION?  The most common challenges are mobility and self-care. With some new habits, though, it is often possible to do all of the activities you used to do.  You may be able to use a device that substitutes for your limb (prosthesis). The prosthesis helps you adapt more quickly to these challenges. Your health care provider can help select a prosthesis to meet your needs. A person who helps you choose and fits you with a prosthesis (prosthetist) may also help.  A rehabilitation program can also help you gain mobility and self-reliance. Your rehabilitation team may include:  · Physicians.  · Physical and occupational therapists.  · Prosthetists.  · Nurses.  · Social  workers.  · Psychologists.  · Dietitians.  Your rehabilitation team will help you with all aspects of recovery and returning to work, home, sports, and your community. You will learn to:  · Get around safely.  · Adjust your home.  · Exercise.  · Use a prosthetic.  · Work through emotional challenges.  · Connect with other people who have gone through the same experience.  Additional challenges may include:  · Grieving period.  · Body image issues.  · Lifestyle issues, such as sex.  · Maintaining a healthy weight.  These issues are normal. Discuss these with your rehabilitation team.  WHEN CAN I RETURN TO MY REGULAR ACTIVITIES?   Returning to your normal activities is part of healing. Changes can often be made to equipment that allow you to return to a sport or hobby. Some companies design special equipment for this. Discuss all of your leisure interests with your health care provider and prosthetist.  WHEN CAN I RETURN TO WORK?  When you are ready to return to work, your therapists can perform job site evaluations and make recommendations to help you perform your job. You may not be able to return to your same job. Your local Office of Vocational Rehabilitation can assist you in job retraining.  FOR MORE INFORMATION:  Visit these online resources. You can find tips on everything from getting dressed and using bathrooms to driving and travel considerations. These resources can also connect you to a network of emotional support, activities, and innovations. You may also search the Internet to find a local support group.  · Amputee Coalition: http://www.amputee-coalition.org/ensuring-fall-safety/http://www.amputee-coalition.org/ensuring-fall-safety/  · Amputee Support Groups: http://amputee.supportgroups.com/http://amputee.supportgroups.com  · Daily Strength: http://www.dailystrength.org/c/Amputees/support-grouphttp://www.dailystrength.org/c/Amputees/support-group  · National Amputee Foundation:  http://www.nationalamputation.org/http://www.nationalamputation.org  · National Center on Health, Physical Activity and Disability: http://www.nchpad.org/http://www.nchpad.org  · Disabled Sports USA: http://www.disabledsportsusa.orghttp://www.disabledsportsusa.org  · American Academy of Orthotists and Prosthetists: http://www.oandp.org/http://www.oandp.org     This information is not intended to replace advice given to you by your health care provider. Make sure you discuss any questions you have with your health care provider.     Document Released: 09/09/2003 Document Revised: 01/08/2016 Document Reviewed: 05/05/2015  [employer] Interactive Patient Education ©2016 [employer]    Phantom Limb Pain  Phantom limb pain occurs in an arm or leg following an amputation. It is pain in an extremity that no longer exists. This pain varies with different patients. Different activities may cause the pain. Some people with an amputated limb experience the opposite of phantom pain, which is phantom pleasure.   The trouble may start in a part of the brain known as the sensory cortex. The sensory cortex is the portion of your brain that processes sensations from the rest of your body. It is hypothesized that when a body part is lost, the corresponding part of the brain is not able to handle the loss and rewires its circuitry to make up for the signals it no longer receives from the missing extremity. The exact mechanism of how phantom limb pain occurs is not known.  The severity of pain seems to be correlated with personal problems such as stress and attitude. It also seems to correlate with the amount of pain a person had before the operation.  CAUSES   · Damaged nerve endings.  · Scar tissue at the amputation site.  TREATMENT   Phantom limb pain can be severe and debilitating. Most cases of phantom limb pain only last briefly. There are a number of different therapies and medications that may give relief. Keep working with your  health care provider until relief is obtained.  Some treatments that may be helpful include:  · Hypnosis and mental imagery. Their techniques can give patients the needed impetus to recognize their ability to regain control.  · Biofeedback.  · Relaxation techniques. They are related to hypnosis techniques and use the mind and body to control pain.  · Acupuncture.  · Massage.  · Exercise.  · Antidepressant medicine.  · Anticonvulsant medicine.  · Narcotics or pain medicines.  SEEK MEDICAL CARE IF:  Pain is not relieved or increases.     This information is not intended to replace advice given to you by your health care provider. Make sure you discuss any questions you have with your health care provider.     Document Released: 03/09/2004 Document Revised: 08/20/2014 Document Reviewed: 05/20/2014  [employer] Interactive Patient Education ©2016 [employer].

## 2017-07-13 NOTE — PROGRESS NOTES
Update received from NOC shift RN. Patient is A/O x 4 and forgetful. Pt on room air. VSS. /76 mmHg  Pulse 70  Temp(Src) 36.4 °C (97.6 °F)  Resp 18  Ht 1.829 m (6')  Wt 57.2 kg (126 lb 1.7 oz)  BMI 17.10 kg/m2  SpO2 97% Labs reviewed. WBC at 16.7 from 16.8. Platelet count at 626 from 665. Na at 126 from 128.  Ca at 8.0 from 8.1.   BS normoactive X 4. +BM. +void. Wound team has assessed skin tear on penis d/t condom catheter. Skin is red and inflamed. Patient incontinent of urine and does no call or use urinal. Patient has been unable to mobilized d/t increased fearfulness since removal of RLE. Cast in place at right BKA site, CDI. LLE missing toes and extremity is cool this am. Used doppler to assess for pulses, unable to locate pedal or post tibial pulses. Call light at bedside.

## 2017-07-13 NOTE — DISCHARGE PLANNING
CCS received a call back from Naeem at [facility]. Transportation has been arranged to transfer the patient today at 1700 via [employer] [facility] Pat has been notified.

## 2017-07-13 NOTE — THERAPY
"Occupational Therapy Evaluation completed.   Functional Status:  Pt educated on the importance of maintaining good knee ext on his new right BKA.  Pt very fearful & anxious with all movement.  Pt required Max A x 2 for supine to sit EOB.  Pt with strong post lean in sitting & had strong use of his hip flex while seated EOB.  Pt required Max A for LB dressing. Pt was Max A x 2 for partial sit-stand.  Pt very fearful & requested back to supine in bed.  Pt to have a cast placed over his BKA.  Plan of Care: Will benefit from Occupational Therapy 3 times per week  Discharge Recommendations:  Equipment: Will Continue to Assess for Equipment Needs. Post-acute therapy Discharge to a transitional care facility for continued skilled therapy services.    See \"Rehab Therapy-Acute\" Patient Summary Report for complete documentation.    "

## 2017-07-13 NOTE — THERAPY
"Physical Therapy Evaluation completed.   Bed Mobility:  Supine to Sit: Maximal Assist  Transfers: Sit to Stand: Unable to Participate  Gait: Level Of Assist: Unable to Participate      Plan of Care: Will benefit from Physical Therapy 3 times per week  Discharge Recommendations: Equipment: Will Continue to Assess for Equipment Needs.  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 Pt exhibited generalized weakness and was fearful of movement. Pt moved to sitting EOB with max A, was able to maintain upright seat holding bed rail. Provided education re: sedentary behavior, muscle shortening and ROM needed to ambulate, pt receptive and stated he will work on extension of knees and hips. Pt currently self limiting, likely also limited 2' to decreased strength, decreased endurance, and deconditioning. Pt will benefit from continued skilled acute PT services, anticipate pt will require continued skilled PT services/placement (SNF) following medical d/c prior to returning home due to current objective findings, and limited ability of social supports to provide current level of assist required.    See \"Rehab Therapy-Acute\" Patient Summary Report for complete documentation.     "

## 2017-07-13 NOTE — DISCHARGE PLANNING
Pt scheduled for transfer to [facility] at 1700 today. Pt and sister aware of plan and agree.

## 2017-07-13 NOTE — PROGRESS NOTES
Vascular    No acute changes overnight  Pain is adequately controlled  Patient tolerated breakfast this morning  Patient can start to mobilize as tolerated now    AF VSS    Right groin incision clean dry and intact  Right amputation casted earlier today    Assessment and plan:  Postoperative day 4 from right femoral endarterectomy, right iliac stent, right below-knee amputation  Doing well    Plavix 75 daily for the next 30 days  ASA 81 indefinitely    Continue diet as tolerated  Mobilize as tolerated, PT/OT  We will change the stump cast in 1 week.  We can do this at rehab hospital if patient is discharged    Very much appreciate the hospitalist service's help in managing Mr. Lagos.    Frantz Rodriguez MD  General and Vascular Surgery  [facility]  Cell: [phone]

## 2017-07-14 PROCEDURE — 665998 HH PPS REVENUE CREDIT

## 2017-07-14 PROCEDURE — 665999 HH PPS REVENUE DEBIT

## 2017-07-15 PROCEDURE — 665999 HH PPS REVENUE DEBIT

## 2017-07-15 PROCEDURE — 665998 HH PPS REVENUE CREDIT

## 2017-07-16 PROCEDURE — 665998 HH PPS REVENUE CREDIT

## 2017-07-16 PROCEDURE — 665999 HH PPS REVENUE DEBIT

## 2017-07-17 PROCEDURE — 665999 HH PPS REVENUE DEBIT

## 2017-07-17 PROCEDURE — 665998 HH PPS REVENUE CREDIT

## 2017-07-18 PROCEDURE — 665999 HH PPS REVENUE DEBIT

## 2017-07-18 PROCEDURE — 665998 HH PPS REVENUE CREDIT

## 2017-07-19 PROCEDURE — 665999 HH PPS REVENUE DEBIT

## 2017-07-19 PROCEDURE — 665998 HH PPS REVENUE CREDIT

## 2017-07-20 PROCEDURE — 665998 HH PPS REVENUE CREDIT

## 2017-07-20 PROCEDURE — 665999 HH PPS REVENUE DEBIT

## 2017-07-21 PROCEDURE — 665998 HH PPS REVENUE CREDIT

## 2017-07-21 PROCEDURE — 665999 HH PPS REVENUE DEBIT

## 2017-07-22 PROCEDURE — 665999 HH PPS REVENUE DEBIT

## 2017-07-22 PROCEDURE — 665998 HH PPS REVENUE CREDIT

## 2017-07-23 PROCEDURE — 665999 HH PPS REVENUE DEBIT

## 2017-07-23 PROCEDURE — 665998 HH PPS REVENUE CREDIT

## 2017-07-24 PROCEDURE — 665998 HH PPS REVENUE CREDIT

## 2017-07-24 PROCEDURE — 665999 HH PPS REVENUE DEBIT

## 2017-07-25 PROCEDURE — 665999 HH PPS REVENUE DEBIT

## 2017-07-25 PROCEDURE — 665998 HH PPS REVENUE CREDIT

## 2017-07-26 PROCEDURE — 665999 HH PPS REVENUE DEBIT

## 2017-07-26 PROCEDURE — 665998 HH PPS REVENUE CREDIT

## 2017-07-27 PROCEDURE — 665999 HH PPS REVENUE DEBIT

## 2017-07-27 PROCEDURE — 665998 HH PPS REVENUE CREDIT

## 2017-07-28 PROCEDURE — 665999 HH PPS REVENUE DEBIT

## 2017-07-28 PROCEDURE — 665998 HH PPS REVENUE CREDIT

## 2017-07-29 PROCEDURE — 665999 HH PPS REVENUE DEBIT

## 2017-07-29 PROCEDURE — 665998 HH PPS REVENUE CREDIT

## 2017-07-30 PROCEDURE — 665999 HH PPS REVENUE DEBIT

## 2017-07-30 PROCEDURE — 665998 HH PPS REVENUE CREDIT

## 2017-07-31 ENCOUNTER — HOSPITAL ENCOUNTER (OUTPATIENT)
Dept: RADIOLOGY | Facility: MEDICAL CENTER | Age: 72
End: 2017-07-31
Attending: NURSE PRACTITIONER
Payer: MEDICARE

## 2017-07-31 DIAGNOSIS — D72.829 LEUKOCYTOSIS, UNSPECIFIED TYPE: ICD-10-CM

## 2017-07-31 PROCEDURE — 665998 HH PPS REVENUE CREDIT

## 2017-07-31 PROCEDURE — 36569 INSJ PICC 5 YR+ W/O IMAGING: CPT

## 2017-07-31 PROCEDURE — 665999 HH PPS REVENUE DEBIT

## 2017-07-31 PROCEDURE — C1751 CATH, INF, PER/CENT/MIDLINE: HCPCS

## 2017-08-01 PROCEDURE — 665999 HH PPS REVENUE DEBIT

## 2017-08-01 PROCEDURE — 665998 HH PPS REVENUE CREDIT

## 2017-08-01 NOTE — PROGRESS NOTES
MD order and indication(s) for peripherally inserted central catheter confirmed. Labs reviewed.Nursing care plan includes knowledge deficit, potential for pain and anxiety, potential for infection. Risks and benefits of procedure explained to patent/family emphasizing risk of central line associated bloodstream infection.POC includes pt teaching, comfort measures, and sterile technique using the 5 step bundle to prevent CR-BSI. Questions answered. Patient verbalized understanding. Consent obtained/ confirmed.    Vessel patency confirmed with ultrasound.    Sterile procedure followed and patient draped per protocol.3cc of1% lidocaine injected intradermally to insertion site at LUE. 21 gauge Micro-introducer needle used to access brachial vein. Modified Seldinger technique used to insert  4Fr however PICC line would not feed past 19cm. Procedure terminated and right picc placed as follows          Sterile procedure followed and patient draped per protocol.3cc of1% lidocaine injected intradermally to insertion site at RUE. 21 gauge Micro-introducer needle used to access brachial vein. Modified Seldinger technique used to insert  5Fr double lumen 38cm catheter  with blood return noted through each lumen. Each lumen flushed without resistance with 10 cc 0.9% normal saline. PICC secured with Statlock. Biopatch and Tegaderm applied over insertion site.  Patient tolerated procedure well.  # of attempts  two   BARD Power PICC REF# [id number] LOT # [id number]  Post-PICC CXR ordered. Radiologist will interpret to confirm PICC tip location.    Time out and LDA documentation completed. Patient condition and procedure outcome reported to unit RN and/or ordering physician via this post-procedure note.    Pt teaching done with patient/family regarding PICC care, handout given.

## 2017-08-02 PROCEDURE — 665998 HH PPS REVENUE CREDIT

## 2017-08-02 PROCEDURE — 665999 HH PPS REVENUE DEBIT

## 2017-08-03 PROCEDURE — 665999 HH PPS REVENUE DEBIT

## 2017-08-03 PROCEDURE — 665998 HH PPS REVENUE CREDIT

## 2017-08-04 PROCEDURE — 665998 HH PPS REVENUE CREDIT

## 2017-08-04 PROCEDURE — 665999 HH PPS REVENUE DEBIT

## 2017-08-05 PROCEDURE — 665999 HH PPS REVENUE DEBIT

## 2017-08-05 PROCEDURE — 665998 HH PPS REVENUE CREDIT

## 2017-08-06 PROCEDURE — 665999 HH PPS REVENUE DEBIT

## 2017-08-06 PROCEDURE — 665998 HH PPS REVENUE CREDIT

## 2017-08-07 PROCEDURE — 665999 HH PPS REVENUE DEBIT

## 2017-08-07 PROCEDURE — 665998 HH PPS REVENUE CREDIT

## 2017-08-08 VITALS
BODY MASS INDEX: 16.8 KG/M2 | SYSTOLIC BLOOD PRESSURE: 88 MMHG | HEART RATE: 67 BPM | WEIGHT: 120 LBS | TEMPERATURE: 98.9 F | HEIGHT: 71 IN | RESPIRATION RATE: 14 BRPM | DIASTOLIC BLOOD PRESSURE: 50 MMHG

## 2017-08-08 PROCEDURE — 665999 HH PPS REVENUE DEBIT

## 2017-08-08 PROCEDURE — 665998 HH PPS REVENUE CREDIT

## 2017-08-08 SDOH — ECONOMIC STABILITY: HOUSING INSECURITY: UNSAFE APPLIANCES: 0

## 2017-08-08 SDOH — ECONOMIC STABILITY: HOUSING INSECURITY: UNSAFE COOKING RANGE AREA: 0

## 2017-08-08 ASSESSMENT — ACTIVITIES OF DAILY LIVING (ADL)
OASIS_M1830: 03
HOME_HEALTH_OASIS: 01

## 2017-08-08 ASSESSMENT — ENCOUNTER SYMPTOMS: SHORTNESS OF BREATH: T

## 2017-08-09 PROCEDURE — 665998 HH PPS REVENUE CREDIT

## 2017-08-09 PROCEDURE — 665999 HH PPS REVENUE DEBIT

## 2017-08-10 PROCEDURE — 665998 HH PPS REVENUE CREDIT

## 2017-08-10 PROCEDURE — 665999 HH PPS REVENUE DEBIT

## 2017-08-11 PROCEDURE — 665999 HH PPS REVENUE DEBIT

## 2017-08-11 PROCEDURE — 665998 HH PPS REVENUE CREDIT

## 2017-08-12 PROCEDURE — 665999 HH PPS REVENUE DEBIT

## 2017-08-12 PROCEDURE — 665998 HH PPS REVENUE CREDIT

## 2017-08-12 PROCEDURE — 665997 HH PPS REVENUE ADJ

## 2017-08-15 ENCOUNTER — HOME CARE VISIT (OUTPATIENT)
Dept: HOME HEALTH SERVICES | Facility: HOME HEALTHCARE | Age: 72
End: 2017-08-15

## 2017-09-30 ENCOUNTER — APPOINTMENT (OUTPATIENT)
Dept: RADIOLOGY | Facility: MEDICAL CENTER | Age: 72
DRG: 640 | End: 2017-09-30
Attending: EMERGENCY MEDICINE
Payer: MEDICARE

## 2017-09-30 ENCOUNTER — RESOLUTE PROFESSIONAL BILLING HOSPITAL PROF FEE (OUTPATIENT)
Dept: HOSPITALIST | Facility: MEDICAL CENTER | Age: 72
End: 2017-09-30
Payer: MEDICARE

## 2017-09-30 ENCOUNTER — HOSPITAL ENCOUNTER (INPATIENT)
Facility: MEDICAL CENTER | Age: 72
LOS: 61 days | DRG: 640 | End: 2017-11-30
Attending: EMERGENCY MEDICINE | Admitting: HOSPITALIST
Payer: MEDICARE

## 2017-09-30 DIAGNOSIS — E86.0 DEHYDRATION: ICD-10-CM

## 2017-09-30 DIAGNOSIS — R65.10 SIRS (SYSTEMIC INFLAMMATORY RESPONSE SYNDROME) (HCC): ICD-10-CM

## 2017-09-30 DIAGNOSIS — L03.115 CELLULITIS OF RIGHT KNEE: ICD-10-CM

## 2017-09-30 DIAGNOSIS — E87.1 HYPONATREMIA: ICD-10-CM

## 2017-09-30 PROBLEM — E43 SEVERE PROTEIN-CALORIE MALNUTRITION (HCC): Chronic | Status: ACTIVE | Noted: 2017-07-08

## 2017-09-30 PROBLEM — I73.9 PAD (PERIPHERAL ARTERY DISEASE) (HCC): Chronic | Status: ACTIVE | Noted: 2017-04-08

## 2017-09-30 PROBLEM — I70.262 ATHEROSCLEROSIS OF NATIVE ARTERY OF LEFT LOWER EXTREMITY WITH GANGRENE (HCC): Status: RESOLVED | Noted: 2017-05-26 | Resolved: 2017-09-30

## 2017-09-30 PROBLEM — I10 ESSENTIAL HYPERTENSION: Chronic | Status: ACTIVE | Noted: 2017-09-30

## 2017-09-30 LAB
ALBUMIN SERPL BCP-MCNC: 3.2 G/DL (ref 3.2–4.9)
ALBUMIN/GLOB SERPL: 0.9 G/DL
ALP SERPL-CCNC: 74 U/L (ref 30–99)
ALT SERPL-CCNC: 18 U/L (ref 2–50)
ANION GAP SERPL CALC-SCNC: 8 MMOL/L (ref 0–11.9)
APPEARANCE UR: CLEAR
AST SERPL-CCNC: 53 U/L (ref 12–45)
BACTERIA #/AREA URNS HPF: NEGATIVE /HPF
BASOPHILS # BLD AUTO: 2.1 % (ref 0–1.8)
BASOPHILS # BLD: 0.28 K/UL (ref 0–0.12)
BILIRUB SERPL-MCNC: 0.7 MG/DL (ref 0.1–1.5)
BILIRUB UR QL STRIP.AUTO: NEGATIVE
BLOOD CULTURE HOLD CXBCH: NORMAL
BUN SERPL-MCNC: 8 MG/DL (ref 8–22)
CALCIUM SERPL-MCNC: 8.5 MG/DL (ref 8.5–10.5)
CHLORIDE SERPL-SCNC: 86 MMOL/L (ref 96–112)
CO2 SERPL-SCNC: 20 MMOL/L (ref 20–33)
COLOR UR: YELLOW
CREAT SERPL-MCNC: 0.44 MG/DL (ref 0.5–1.4)
EOSINOPHIL # BLD AUTO: 0.52 K/UL (ref 0–0.51)
EOSINOPHIL NFR BLD: 3.9 % (ref 0–6.9)
EPI CELLS #/AREA URNS HPF: ABNORMAL /HPF
ERYTHROCYTE [DISTWIDTH] IN BLOOD BY AUTOMATED COUNT: 41 FL (ref 35.9–50)
GFR SERPL CREATININE-BSD FRML MDRD: >60 ML/MIN/1.73 M 2
GLOBULIN SER CALC-MCNC: 3.5 G/DL (ref 1.9–3.5)
GLUCOSE SERPL-MCNC: 79 MG/DL (ref 65–99)
GLUCOSE UR STRIP.AUTO-MCNC: NEGATIVE MG/DL
HCT VFR BLD AUTO: 39.1 % (ref 42–52)
HGB BLD-MCNC: 12.3 G/DL (ref 14–18)
HYALINE CASTS #/AREA URNS LPF: ABNORMAL /LPF
IMM GRANULOCYTES # BLD AUTO: 0.06 K/UL (ref 0–0.11)
IMM GRANULOCYTES NFR BLD AUTO: 0.4 % (ref 0–0.9)
KETONES UR STRIP.AUTO-MCNC: 100 MG/DL
LACTATE BLD-SCNC: 1.4 MMOL/L (ref 0.5–2)
LACTATE BLD-SCNC: 1.4 MMOL/L (ref 0.5–2)
LEUKOCYTE ESTERASE UR QL STRIP.AUTO: ABNORMAL
LYMPHOCYTES # BLD AUTO: 0.86 K/UL (ref 1–4.8)
LYMPHOCYTES NFR BLD: 6.4 % (ref 22–41)
MCH RBC QN AUTO: 23.7 PG (ref 27–33)
MCHC RBC AUTO-ENTMCNC: 31.5 G/DL (ref 33.7–35.3)
MCV RBC AUTO: 75.5 FL (ref 81.4–97.8)
MICRO URNS: ABNORMAL
MONOCYTES # BLD AUTO: 0.66 K/UL (ref 0–0.85)
MONOCYTES NFR BLD AUTO: 4.9 % (ref 0–13.4)
MUCOUS THREADS #/AREA URNS HPF: ABNORMAL /HPF
NEUTROPHILS # BLD AUTO: 11.1 K/UL (ref 1.82–7.42)
NEUTROPHILS NFR BLD: 82.3 % (ref 44–72)
NITRITE UR QL STRIP.AUTO: NEGATIVE
NRBC # BLD AUTO: 0 K/UL
NRBC BLD AUTO-RTO: 0 /100 WBC
PH UR STRIP.AUTO: 6 [PH]
PLATELET # BLD AUTO: 528 K/UL (ref 164–446)
PMV BLD AUTO: 8.6 FL (ref 9–12.9)
POTASSIUM SERPL-SCNC: 4.3 MMOL/L (ref 3.6–5.5)
PROT SERPL-MCNC: 6.7 G/DL (ref 6–8.2)
PROT UR QL STRIP: 100 MG/DL
RBC # BLD AUTO: 5.18 M/UL (ref 4.7–6.1)
RBC # URNS HPF: ABNORMAL /HPF
RBC UR QL AUTO: ABNORMAL
RENAL EPI CELLS #/AREA URNS HPF: ABNORMAL /HPF
SODIUM SERPL-SCNC: 114 MMOL/L (ref 135–145)
SP GR UR STRIP.AUTO: 1.01
T4 FREE SERPL-MCNC: 0.88 NG/DL (ref 0.53–1.43)
TSH SERPL DL<=0.005 MIU/L-ACNC: 15.85 UIU/ML (ref 0.3–3.7)
WBC # BLD AUTO: 13.5 K/UL (ref 4.8–10.8)
WBC #/AREA URNS HPF: ABNORMAL /HPF

## 2017-09-30 PROCEDURE — 99285 EMERGENCY DEPT VISIT HI MDM: CPT

## 2017-09-30 PROCEDURE — 700105 HCHG RX REV CODE 258: Performed by: EMERGENCY MEDICINE

## 2017-09-30 PROCEDURE — 96360 HYDRATION IV INFUSION INIT: CPT

## 2017-09-30 PROCEDURE — 94760 N-INVAS EAR/PLS OXIMETRY 1: CPT

## 2017-09-30 PROCEDURE — 87086 URINE CULTURE/COLONY COUNT: CPT

## 2017-09-30 PROCEDURE — 85025 COMPLETE CBC W/AUTO DIFF WBC: CPT

## 2017-09-30 PROCEDURE — 81001 URINALYSIS AUTO W/SCOPE: CPT

## 2017-09-30 PROCEDURE — A9270 NON-COVERED ITEM OR SERVICE: HCPCS | Performed by: HOSPITALIST

## 2017-09-30 PROCEDURE — 700102 HCHG RX REV CODE 250 W/ 637 OVERRIDE(OP): Performed by: HOSPITALIST

## 2017-09-30 PROCEDURE — 84443 ASSAY THYROID STIM HORMONE: CPT

## 2017-09-30 PROCEDURE — 96361 HYDRATE IV INFUSION ADD-ON: CPT

## 2017-09-30 PROCEDURE — 99221 1ST HOSP IP/OBS SF/LOW 40: CPT | Mod: AI | Performed by: HOSPITALIST

## 2017-09-30 PROCEDURE — 83605 ASSAY OF LACTIC ACID: CPT

## 2017-09-30 PROCEDURE — 87040 BLOOD CULTURE FOR BACTERIA: CPT

## 2017-09-30 PROCEDURE — 71010 DX-CHEST-PORTABLE (1 VIEW): CPT

## 2017-09-30 PROCEDURE — 80053 COMPREHEN METABOLIC PANEL: CPT

## 2017-09-30 PROCEDURE — 84439 ASSAY OF FREE THYROXINE: CPT

## 2017-09-30 PROCEDURE — 770006 HCHG ROOM/CARE - MED/SURG/GYN SEMI*

## 2017-09-30 PROCEDURE — 700105 HCHG RX REV CODE 258: Performed by: HOSPITALIST

## 2017-09-30 PROCEDURE — 700111 HCHG RX REV CODE 636 W/ 250 OVERRIDE (IP): Performed by: HOSPITALIST

## 2017-09-30 RX ORDER — HEPARIN SODIUM 5000 [USP'U]/ML
5000 INJECTION, SOLUTION INTRAVENOUS; SUBCUTANEOUS EVERY 8 HOURS
Status: DISCONTINUED | OUTPATIENT
Start: 2017-09-30 | End: 2017-10-25

## 2017-09-30 RX ORDER — ATORVASTATIN CALCIUM 20 MG/1
40 TABLET, FILM COATED ORAL
Status: DISCONTINUED | OUTPATIENT
Start: 2017-09-30 | End: 2017-11-30 | Stop reason: HOSPADM

## 2017-09-30 RX ORDER — METOPROLOL TARTRATE 50 MG/1
50 TABLET, FILM COATED ORAL 2 TIMES DAILY
Status: DISCONTINUED | OUTPATIENT
Start: 2017-09-30 | End: 2017-10-07

## 2017-09-30 RX ORDER — LABETALOL HYDROCHLORIDE 5 MG/ML
10 INJECTION, SOLUTION INTRAVENOUS EVERY 4 HOURS PRN
Status: DISCONTINUED | OUTPATIENT
Start: 2017-09-30 | End: 2017-11-29

## 2017-09-30 RX ORDER — AMOXICILLIN 250 MG
2 CAPSULE ORAL 2 TIMES DAILY
Status: DISCONTINUED | OUTPATIENT
Start: 2017-09-30 | End: 2017-11-30 | Stop reason: HOSPADM

## 2017-09-30 RX ORDER — ONDANSETRON 2 MG/ML
4 INJECTION INTRAMUSCULAR; INTRAVENOUS EVERY 4 HOURS PRN
Status: DISCONTINUED | OUTPATIENT
Start: 2017-09-30 | End: 2017-11-29

## 2017-09-30 RX ORDER — BISACODYL 10 MG
10 SUPPOSITORY, RECTAL RECTAL
Status: DISCONTINUED | OUTPATIENT
Start: 2017-09-30 | End: 2017-11-30 | Stop reason: HOSPADM

## 2017-09-30 RX ORDER — POLYETHYLENE GLYCOL 3350 17 G/17G
1 POWDER, FOR SOLUTION ORAL
Status: DISCONTINUED | OUTPATIENT
Start: 2017-09-30 | End: 2017-11-30 | Stop reason: HOSPADM

## 2017-09-30 RX ORDER — FOLIC ACID 1 MG/1
1 TABLET ORAL DAILY
Status: DISCONTINUED | OUTPATIENT
Start: 2017-10-01 | End: 2017-11-30 | Stop reason: HOSPADM

## 2017-09-30 RX ORDER — GABAPENTIN 100 MG/1
100 CAPSULE ORAL 3 TIMES DAILY
Status: DISCONTINUED | OUTPATIENT
Start: 2017-09-30 | End: 2017-11-30 | Stop reason: HOSPADM

## 2017-09-30 RX ORDER — SODIUM CHLORIDE 9 MG/ML
INJECTION, SOLUTION INTRAVENOUS CONTINUOUS
Status: DISCONTINUED | OUTPATIENT
Start: 2017-09-30 | End: 2017-10-05

## 2017-09-30 RX ORDER — LEVOTHYROXINE SODIUM 0.07 MG/1
75 TABLET ORAL
Status: DISCONTINUED | OUTPATIENT
Start: 2017-10-01 | End: 2017-11-22

## 2017-09-30 RX ORDER — CEFTRIAXONE 2 G/1
2 INJECTION, POWDER, FOR SOLUTION INTRAMUSCULAR; INTRAVENOUS ONCE
Status: DISCONTINUED | OUTPATIENT
Start: 2017-09-30 | End: 2017-09-30

## 2017-09-30 RX ORDER — ONDANSETRON 4 MG/1
4 TABLET, ORALLY DISINTEGRATING ORAL EVERY 4 HOURS PRN
Status: DISCONTINUED | OUTPATIENT
Start: 2017-09-30 | End: 2017-11-30 | Stop reason: HOSPADM

## 2017-09-30 RX ORDER — SODIUM CHLORIDE 9 MG/ML
30 INJECTION, SOLUTION INTRAVENOUS
Status: COMPLETED | OUTPATIENT
Start: 2017-09-30 | End: 2017-09-30

## 2017-09-30 RX ORDER — ACETAMINOPHEN 325 MG/1
650 TABLET ORAL EVERY 6 HOURS PRN
Status: DISCONTINUED | OUTPATIENT
Start: 2017-09-30 | End: 2017-11-30 | Stop reason: HOSPADM

## 2017-09-30 RX ADMIN — STANDARDIZED SENNA CONCENTRATE AND DOCUSATE SODIUM 2 TABLET: 8.6; 5 TABLET, FILM COATED ORAL at 20:47

## 2017-09-30 RX ADMIN — SODIUM CHLORIDE: 9 INJECTION, SOLUTION INTRAVENOUS at 21:05

## 2017-09-30 RX ADMIN — ATORVASTATIN CALCIUM 40 MG: 40 TABLET, FILM COATED ORAL at 20:47

## 2017-09-30 RX ADMIN — SODIUM CHLORIDE: 9 INJECTION, SOLUTION INTRAVENOUS at 14:19

## 2017-09-30 RX ADMIN — GABAPENTIN 100 MG: 100 CAPSULE ORAL at 14:27

## 2017-09-30 RX ADMIN — ASPIRIN 81 MG: 81 TABLET, COATED ORAL at 14:27

## 2017-09-30 RX ADMIN — THERA TABS 1 TABLET: TAB at 14:27

## 2017-09-30 RX ADMIN — HEPARIN SODIUM 5000 UNITS: 5000 INJECTION, SOLUTION INTRAVENOUS; SUBCUTANEOUS at 21:05

## 2017-09-30 RX ADMIN — GABAPENTIN 100 MG: 100 CAPSULE ORAL at 20:47

## 2017-09-30 RX ADMIN — AMPICILLIN SODIUM AND SULBACTAM SODIUM 3 G: 2; 1 INJECTION, POWDER, FOR SOLUTION INTRAMUSCULAR; INTRAVENOUS at 14:19

## 2017-09-30 RX ADMIN — AMPICILLIN SODIUM AND SULBACTAM SODIUM 3 G: 2; 1 INJECTION, POWDER, FOR SOLUTION INTRAMUSCULAR; INTRAVENOUS at 18:28

## 2017-09-30 RX ADMIN — SODIUM CHLORIDE 1770 ML: 9 INJECTION, SOLUTION INTRAVENOUS at 10:15

## 2017-09-30 RX ADMIN — METOPROLOL TARTRATE 50 MG: 50 TABLET, FILM COATED ORAL at 20:47

## 2017-09-30 ASSESSMENT — PAIN SCALES - GENERAL
PAINLEVEL_OUTOF10: 0
PAINLEVEL_OUTOF10: 0

## 2017-09-30 ASSESSMENT — LIFESTYLE VARIABLES
EVER_SMOKED: YES
DO YOU DRINK ALCOHOL: YES

## 2017-09-30 NOTE — ED NOTES
The Medication Reconciliation process has been completed by reviewing the patients med profile from [facility], caregiver was able to tell the nurse when the meds were given last but unsure of some of them.      Allergies have been reviewed    Home Pharmacy:  [facility]

## 2017-09-30 NOTE — ED PROVIDER NOTES
ED Provider Note    Scribed for Joel Devries M.D. by Isaura Yip. 9/30/2017  9:58 AM    Primary care provider: Pooja Arguello N.P.  Means of arrival: EMS  History obtained from: Patient  History limited by: None    CHIEF COMPLAINT  Chief Complaint   Patient presents with   • ALOC     x 5 days per care giver. A&O x 2   • UTI     smeels of urine, denies urinary sympt. pt incont of urine. wearing breif.      HPI  Isiah Lagos is a 72 y.o. male who presents to the Emergency Department by EMS for altered level of consciousness after being stuck on the couch for the last two days. The patient appears very confused and disoriented. The patient states that he lives at home with his wife and she called EMS this morning.  The patient denies any dysuria or hematuria but notes that he has been incontinence of urine and wear a brief. The patient states he is experiencing some abdominal pain which he describes as cramping and 5/10 severity. He also notes that his left leg is newly red. The patient denies any fevers, nausea, vomiting, chest pain, shortness of breath, or diarrhea.     REVIEW OF SYSTEMS  Pertinent negatives include no dysuria, hematuria, fevers, nausea, vomiting, chest pain, shortness of breath, diarrhea. As above, all other systems reviewed and are negative.   See HPI for further details. C    PAST MEDICAL HISTORY   has a past medical history of Arthritis; Cancer (CMS-HCC) (2001); Emphysema; Hypothyroid; Myocardial infarct (2010); and Stroke (CMS-HCC) (2015).    SURGICAL HISTORY   has a past surgical history that includes other; other; urethrotomy internal (N/A, 3/28/2017); retrogrades (N/A, 3/28/2017); cystoscopy (3/28/2017); femoral popliteal bypass (Left, 4/17/2017); foot amputation (Left, 5/26/2017); femoral endarterectomy (Right, 7/8/2017); iliac angioplasty with stent (Right, 7/8/2017); and knee amputation below (Right, 7/8/2017).    SOCIAL HISTORY  Social History   Substance Use Topics    • Smoking status: Current Every Day Smoker     Packs/day: 0.25     Years: 50.00     Types: Cigarettes   • Smokeless tobacco: Never Used   • Alcohol use Yes      Comment: 2 quarts of beer/day      History   Drug Use No     FAMILY HISTORY  No family history noted    CURRENT MEDICATIONS  No current facility-administered medications on file prior to encounter.      Current Outpatient Prescriptions on File Prior to Encounter   Medication Sig Dispense Refill   • oxycodone immediate release (ROXICODONE) 10 MG immediate release tablet Take 1 Tab by mouth every 3 hours as needed (Severe Pain (NRS Pain Scale 7-10; CPOT Pain Scale 6-8)). 28 Tab    • aspirin EC 81 MG EC tablet Take 1 Tab by mouth every day. 30 Tab    • ipratropium-albuterol (DUONEB) 0.5-2.5 (3) MG/3ML nebulizer solution 3 mL by Nebulization route every 1 hour as needed. 30 Bullet    • gabapentin (NEURONTIN) 100 MG Cap Take 1 Cap by mouth 3 times a day. 90 Cap    • ondansetron (ZOFRAN ODT) 4 MG TABLET DISPERSIBLE Take 1 Tab by mouth every four hours as needed for Nausea/Vomiting (give PO if IV route is unavailable. May give per feeding tube.). 10 Tab 0   • scopolamine (TRANSDERM-SCOP) 1.5 MG/3DAYS PATCH 72 HR Apply 1 Patch to skin as directed every 72 hours as needed (Nausea/Vomiting if ondansetron, dexamethasone, diphenhydramine, and/or haloperidol ineffective or not ordered). 4 Patch 3   • atorvastatin (LIPITOR) 40 MG Tab Take 1 Tab by mouth every bedtime. 30 Tab    • metoprolol (LOPRESSOR) 50 MG Tab Take 1 Tab by mouth 2 Times a Day. 60 Tab    • folic acid (FOLVITE) 1 MG Tab Take 1 Tab by mouth every day. 30 Tab    • multivitamin (THERAGRAN) Tab Take 1 Tab by mouth every day. 30 Tab    • thiamine 50 MG tablet Take 1 Tab by mouth every day. 30 Tab    • tramadol (ULTRAM) 50 MG Tab Take 50 mg by mouth 2 times a day as needed for Moderate Pain.     • levothyroxine (SYNTHROID) 75 MCG TABS Take 75 mcg by mouth every morning before breakfast.    Indications:  Underactive Thyroid       ALLERGIES  No Known Allergies    PHYSICAL EXAM  VITAL SIGNS: /67   Pulse 73   Temp 36.7 °C (98.1 °F)   Resp 16   Wt 59 kg (130 lb)   BMI 17.63 kg/m²   Constitutional: Well developed, appears weak and debilitated  HENT: Normocephalic, Atraumatic, Bilateral external ears normal, extremely dry oropharynx. No oral exudates or nasal discharge.   Eyes: Pupils are equal round and reactive, EOMI, Conjunctiva normal, No discharge.   Neck: Normal range of motion, No tenderness, Supple, No stridor. No meningismus.  Lymphatic: No lymphadenopathy noted.   Cardiovascular: Regular rate and rhythm without murmur rub or gallop.  Thorax & Lungs: Clear breath sounds bilaterally without wheezes, rhonchi or rales. There is no chest wall tenderness.   Abdomen: Slightly firm,  Moderately tender on palpation,  non-distended. There is no rebound or guarding. No organomegaly is appreciated. Bowel sounds are normal.  Skin: Normal without rash. Grade 1 sacral decubitus ulcers,erythema to left foot into the ankle  Back: No CVA or spinal tenderness.   Extremities: Intact distal pulses, No edema, No tenderness, No cyanosis, No clubbing. Capillary refill is less than 2 seconds. Status post right BKA, erythema to left foot into the ankle, distal foot amputation   Musculoskeletal: Good range of motion in all major joints. No tenderness to palpation.   Neurologic: Alert & oriented x 3, Normal motor function, Normal sensory function, No focal deficits noted. Reflexes are normal.  Psychiatric: Affect normal, Judgment normal, Mood normal. There is no suicidal ideation or patient reported hallucinations.     DIAGNOSTIC STUDIES / PROCEDURES    LABS  Labs Reviewed   CBC WITH DIFFERENTIAL - Abnormal; Notable for the following:        Result Value    WBC 13.5 (*)     Hemoglobin 12.3 (*)     Hematocrit 39.1 (*)     MCV 75.5 (*)     MCH 23.7 (*)     MCHC 31.5 (*)     Platelet Count 528 (*)     MPV 8.6 (*)      Neutrophils-Polys 82.30 (*)     Lymphocytes 6.40 (*)     Basophils 2.10 (*)     Neutrophils (Absolute) 11.10 (*)     Lymphs (Absolute) 0.86 (*)     Eos (Absolute) 0.52 (*)     Baso (Absolute) 0.28 (*)     All other components within normal limits   COMP METABOLIC PANEL - Abnormal; Notable for the following:     Sodium 114 (*)     Chloride 86 (*)     Creatinine 0.44 (*)     AST(SGOT) 53 (*)     All other components within normal limits   LACTIC ACID   ESTIMATED GFR   LACTIC ACID   URINALYSIS   URINE CULTURE(NEW)   BLOOD CULTURE   BLOOD CULTURE      All labs reviewed by me.    RADIOLOGY  DX-CHEST-PORTABLE (1 VIEW)   Final Result      1.  There is no acute cardiopulmonary process.        The radiologist's interpretation of all radiological studies have been reviewed by me.    COURSE & MEDICAL DECISION MAKING  Nursing notes, VS, PMSFHx reviewed in chart.    9:58 AM Patient seen and examined at bedside. I am concerned the patient is septic and I have activated sepsis protocol. Ordered for chest xray, lactic acid, CBC with differential, CMP, urinalysis, urine culture, blood culture, estimated GFR to evaluate. Patient was treated with NS bolus 1770mL for his symptoms dehydration and possible sepsis.      11:01 AM Patient will be treated with 2g Rocephin IV    11:35 AM Informed of patient's critical lab value Sodium 114. The patient's white blood cell count elevated at 13,500 with an 82% neutrophil shift. This may represent infection however the patient has been elevated on his platelet count and his white blood cell count 4 many months and this may be a myelodysplastic type syndrome. We gave him broad spectrum antibiotics for appropriate coverage initially. Urinalysis is still pending but the patient has not been hypotensive but still is at risk and is quite dehydrated. We will correct his sodium with 1700 mL of normal saline and monitor his mentation.    11:41 AM Paged Hospitalist    11:43 AM  Spoke with Dr. Mckeon,  Hospitalist about the patient's condition. They will admit the patient, care is transferred at this time. He does have signs of early stage I decubitus ulcer in the sacrum from being on the couch for so long.    CRITICAL CARE  The very real possibilty of a deterioration of this patient's condition required the highest level of my preparedness for sudden, emergent intervention.  I provided critical care services, which included medication orders, frequent reevaluations of the patient's condition and response to treatment, ordering and reviewing test results, and discussing the case with various consultants.  The critical care time associated with the care of the patient was 35 minutes. Review chart for interventions. This time is exclusive of any other billable procedures.     DISPOSITION:  Patient will be admitted to Dr. Mckeon in critical condition.    FINAL IMPRESSION  1. SIRS (systemic inflammatory response syndrome) (CMS-HCC)    2. Hyponatremia    3. Dehydration          Isaura PAZ (Scribe), am scribing for, and in the presence of, Joel Devries M.D..    Electronically signed by: Isaura Yip (Celestineibe), 9/30/2017    IJoel M.D. personally performed the services described in this documentation, as scribed by Isaura Yip in my presence, and it is both accurate and complete.    The note accurately reflects work and decisions made by me.  Joel Devries  9/30/2017  11:49 AM

## 2017-09-30 NOTE — H&P
" Hospital Medicine History and Physical    Date of Service  9/30/2017    Chief Complaint  Chief Complaint   Patient presents with   • ALOC     x 5 days per care giver. A&O x 2   • UTI     smeels of urine, denies urinary sympt. pt incont of urine. wearing breif.        History of Presenting Illness  72 y.o. male who presented 9/30/2017 with general malaise. He gives no real history and is not very talkative. He states he feels generally weak. Per caregiver on arrival, has been confused for a few days. He is unable to participate in his exam or history really.     Primary Care Physician  Pooja Arguello NEMEKA.    Consultants  None    Code Status  Full per my conversation with the patient, \"positive thinking will fix it! I'm not gonna die.\"    Review of Systems  Review of Systems   Unable to perform ROS: Mental status change        Past Medical History  Past Medical History:   Diagnosis Date   • Stroke (CMS-HCC) 2015   • Myocardial infarct 2010   • Cancer (CMS-HCC) 2001    prostate   • Arthritis     osteoarthritis   • Emphysema    • Hypothyroid        Surgical History  Past Surgical History:   Procedure Laterality Date   • FEMORAL ENDARTERECTOMY Right 7/8/2017    Procedure: FEMORAL ENDARTERECTOMY;  Surgeon: Frantz Rodriguez M.D.;  Location: [facility];  Service:    • ILIAC ANGIOPLASTY WITH STENT Right 7/8/2017    Procedure: ILIAC ANGIOPLASTY WITH STENT;  Surgeon: Frantz Rodriguez M.D.;  Location: [facility];  Service:    • KNEE AMPUTATION BELOW Right 7/8/2017    Procedure: KNEE AMPUTATION BELOW;  Surgeon: Frantz Rodriguez M.D.;  Location: [facility];  Service:    • FOOT AMPUTATION Left 5/26/2017    Procedure: FOOT AMPUTATION- TRANSMETATARSAL;  Surgeon: Joesph Monroy M.D.;  Location: [facility];  Service:    • FEMORAL POPLITEAL BYPASS Left 4/17/2017    Procedure: FEMORAL POPLITEAL BYPASS;  Surgeon: Joesph Monroy M.D.;  Location: [facility]" ORS;  Service:    • URETHROTOMY INTERNAL N/A 3/28/2017    Procedure: URETHRAL DILATION;  Surgeon: Gus Borges M.D.;  Location: [facility];  Service:    • RETROGRADES N/A 3/28/2017    Procedure: RETROGRADE URETHRAGRAM;  Surgeon: Gus Borges M.D.;  Location: SURGERY [facility];  Service:    • CYSTOSCOPY  3/28/2017    Procedure: CYSTOSCOPY;  Surgeon: Gus Borges M.D.;  Location: SURGERY [facility];  Service:    • OTHER      Thyroid Brachi (Laser instead of cutting)   • OTHER      Vasectomy       Medications  No current facility-administered medications on file prior to encounter.      Current Outpatient Prescriptions on File Prior to Encounter   Medication Sig Dispense Refill   • oxycodone immediate release (ROXICODONE) 10 MG immediate release tablet Take 1 Tab by mouth every 3 hours as needed (Severe Pain (NRS Pain Scale 7-10; CPOT Pain Scale 6-8)). 28 Tab    • aspirin EC 81 MG EC tablet Take 1 Tab by mouth every day. 30 Tab    • ipratropium-albuterol (DUONEB) 0.5-2.5 (3) MG/3ML nebulizer solution 3 mL by Nebulization route every 1 hour as needed. 30 Bullet    • gabapentin (NEURONTIN) 100 MG Cap Take 1 Cap by mouth 3 times a day. 90 Cap    • atorvastatin (LIPITOR) 40 MG Tab Take 1 Tab by mouth every bedtime. 30 Tab    • metoprolol (LOPRESSOR) 50 MG Tab Take 1 Tab by mouth 2 Times a Day. 60 Tab    • folic acid (FOLVITE) 1 MG Tab Take 1 Tab by mouth every day. 30 Tab    • multivitamin (THERAGRAN) Tab Take 1 Tab by mouth every day. 30 Tab    • thiamine 50 MG tablet Take 1 Tab by mouth every day. 30 Tab    • tramadol (ULTRAM) 50 MG Tab Take 50 mg by mouth 2 times a day as needed for Moderate Pain.     • levothyroxine (SYNTHROID) 75 MCG TABS Take 75 mcg by mouth every morning before breakfast.    Indications: Underactive Thyroid         Family History  No family history on file.    Social History  Social History   Substance Use Topics   • Smoking status: Current Every Day Smoker      Packs/day: 0.25     Years: 50.00     Types: Cigarettes   • Smokeless tobacco: Never Used   • Alcohol use Yes      Comment: 2 quarts of beer/day       Allergies  No Known Allergies     Physical Exam  Laboratory   Hemodynamics  Temp (24hrs), Av.7 °C (98.1 °F), Min:36.7 °C (98.1 °F), Max:36.7 °C (98.1 °F)   Temperature: 36.7 °C (98.1 °F)  Pulse  Av.7  Min: 69  Max: 82 Heart Rate (Monitored): 80  Blood Pressure : 148/67, NIBP: 154/77      Respiratory      Respiration: 15, Pulse Oximetry: 91 %             Physical Exam   Constitutional: He appears well-developed.   Disheveled and chronically ill appearing, cachectic   HENT:   Head: Normocephalic and atraumatic.   Mouth/Throat: Oropharynx is clear and moist.   Eyes: Conjunctivae and EOM are normal. Pupils are equal, round, and reactive to light. No scleral icterus.   Neck: Normal range of motion. Neck supple. No tracheal deviation present. No thyromegaly present.   Cardiovascular: Normal rate, regular rhythm, normal heart sounds and intact distal pulses.    No murmur heard.  Pulmonary/Chest: Effort normal and breath sounds normal. No respiratory distress. He has no wheezes.   Abdominal: Soft. Bowel sounds are normal. He exhibits no distension. There is no tenderness.   Genitourinary:   Genitourinary Comments: Excoriated and erythematous mildly blanching gluteal erythema   Musculoskeletal: Normal range of motion. He exhibits edema (bilateral 1+) and tenderness (right knee).   Left forefoot amputation with slight warmth and erythema, right BKA with scab over patella with surrounding erythema and warmth, tender on palp.   Lymphadenopathy:     He has no cervical adenopathy.        Right: No supraclavicular adenopathy present.        Left: No supraclavicular adenopathy present.   Neurological: He is alert. No cranial nerve deficit.   Skin: Skin is warm and dry.   Vitals reviewed.      Recent Labs      17   1036   WBC  13.5*   RBC  5.18   HEMOGLOBIN  12.3*    HEMATOCRIT  39.1*   MCV  75.5*   MCH  23.7*   MCHC  31.5*   RDW  41.0   PLATELETCT  528*   MPV  8.6*     Recent Labs      09/30/17   1036   SODIUM  114*   POTASSIUM  4.3   CHLORIDE  86*   CO2  20   GLUCOSE  79   BUN  8   CREATININE  0.44*   CALCIUM  8.5     Recent Labs      09/30/17   1036   ALTSGPT  18   ASTSGOT  53*   ALKPHOSPHAT  74   TBILIRUBIN  0.7   GLUCOSE  79                 Lab Results   Component Value Date    TROPONINI <0.01 10/01/2012     Urinalysis:    Lab Results  Component Value Date/Time   SPECGRAVITY 1.010 09/30/2017 1147   GLUCOSEUR Negative 09/30/2017 1147   KETONES 100 (A) 09/30/2017 1147   NITRITE Negative 09/30/2017 1147   WBCURINE 5-10 (A) 09/30/2017 1147   RBCURINE 10-20 (A) 09/30/2017 1147   BACTERIA Negative 09/30/2017 1147   EPITHELCELL Few 09/30/2017 1147        Imaging  CXR personally reviewed does not demonstrate any acute cardiopulmonary processes   Assessment/Plan     I anticipate this patient will require at least two midnights for appropriate medical management, necessitating inpatient admission.    * Cellulitis of right knee- (present on admission)   Assessment & Plan    Active Orders     Ordered     Ordering Provider       Sat Sep 30, 2017 12:30 PM    09/30/17 1230  ampicillin/sulbactam (UNASYN) 3 g in  mL IVPB  EVERY 6 HOURS      John Paul Mckeon M.D.      Suspect source of his acute ills        Hyponatremia- (present on admission)   Assessment & Plan    Hypovolemic hyponatremia due to dehydration  Infusing IVF: NS with 154 mEq/L  Will recheck  Goal > 135 mEq/L but only to correct ~10 mEq per day  Lab Results   Component Value Date/Time    SODIUM 114 (LL) 09/30/2017 10:36 AM             PAD (peripheral artery disease) (CMS-HCC)- (present on admission)   Assessment & Plan    Medical mgmt, 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  Will continue ASA and statin        Essential hypertension- (present on admission)   Assessment & Plan    SBP goal less than 140  mmHg  DBP goal less than 90 mmHg  PRN and antihypertensives to titrate by hospitalist towards goal  Most recent Blood Pressure : 140/59         Severe protein-calorie malnutrition (CMS-HCC)- (present on admission)   Assessment & Plan    Nutrition to see            VTE prophylaxis: heparin.

## 2017-09-30 NOTE — ED NOTES
St cath complete to obtain urine sample. Sent to lab. Pt incontinent of urine, linens changed. Pt has break down to buttocks and groin

## 2017-09-30 NOTE — PROGRESS NOTES
Pt admitted from ED. 2 RN skin check done upon admission to floor. Pt has old BKA surgical wound sit that is edematous and red; left foot is also edematous and red. Blanching redness also present on bilateral hip bones, as well as sacrum. Pt has rash to groin. Wound consult ordered by provider upon admission. Will continue to monitor. Danielle Osorio R.N.

## 2017-10-01 LAB
ANION GAP SERPL CALC-SCNC: 5 MMOL/L (ref 0–11.9)
BASOPHILS # BLD AUTO: 1.5 % (ref 0–1.8)
BASOPHILS # BLD: 0.18 K/UL (ref 0–0.12)
BUN SERPL-MCNC: 6 MG/DL (ref 8–22)
CALCIUM SERPL-MCNC: 7.8 MG/DL (ref 8.5–10.5)
CHLORIDE SERPL-SCNC: 97 MMOL/L (ref 96–112)
CO2 SERPL-SCNC: 21 MMOL/L (ref 20–33)
CREAT SERPL-MCNC: 0.51 MG/DL (ref 0.5–1.4)
EOSINOPHIL # BLD AUTO: 0.44 K/UL (ref 0–0.51)
EOSINOPHIL NFR BLD: 3.7 % (ref 0–6.9)
ERYTHROCYTE [DISTWIDTH] IN BLOOD BY AUTOMATED COUNT: 40.7 FL (ref 35.9–50)
GFR SERPL CREATININE-BSD FRML MDRD: >60 ML/MIN/1.73 M 2
GLUCOSE SERPL-MCNC: 95 MG/DL (ref 65–99)
HCT VFR BLD AUTO: 32.5 % (ref 42–52)
HGB BLD-MCNC: 10.4 G/DL (ref 14–18)
IMM GRANULOCYTES # BLD AUTO: 0.06 K/UL (ref 0–0.11)
IMM GRANULOCYTES NFR BLD AUTO: 0.5 % (ref 0–0.9)
LYMPHOCYTES # BLD AUTO: 0.88 K/UL (ref 1–4.8)
LYMPHOCYTES NFR BLD: 7.3 % (ref 22–41)
MCH RBC QN AUTO: 24.1 PG (ref 27–33)
MCHC RBC AUTO-ENTMCNC: 32 G/DL (ref 33.7–35.3)
MCV RBC AUTO: 75.4 FL (ref 81.4–97.8)
MONOCYTES # BLD AUTO: 0.71 K/UL (ref 0–0.85)
MONOCYTES NFR BLD AUTO: 5.9 % (ref 0–13.4)
NEUTROPHILS # BLD AUTO: 9.71 K/UL (ref 1.82–7.42)
NEUTROPHILS NFR BLD: 81.1 % (ref 44–72)
NRBC # BLD AUTO: 0 K/UL
NRBC BLD AUTO-RTO: 0 /100 WBC
PLATELET # BLD AUTO: 640 K/UL (ref 164–446)
PMV BLD AUTO: 8.2 FL (ref 9–12.9)
POTASSIUM SERPL-SCNC: 3.1 MMOL/L (ref 3.6–5.5)
RBC # BLD AUTO: 4.31 M/UL (ref 4.7–6.1)
SODIUM SERPL-SCNC: 123 MMOL/L (ref 135–145)
WBC # BLD AUTO: 12 K/UL (ref 4.8–10.8)

## 2017-10-01 PROCEDURE — 80048 BASIC METABOLIC PNL TOTAL CA: CPT

## 2017-10-01 PROCEDURE — 700111 HCHG RX REV CODE 636 W/ 250 OVERRIDE (IP): Performed by: HOSPITALIST

## 2017-10-01 PROCEDURE — 700105 HCHG RX REV CODE 258: Performed by: HOSPITALIST

## 2017-10-01 PROCEDURE — 99233 SBSQ HOSP IP/OBS HIGH 50: CPT | Performed by: HOSPITALIST

## 2017-10-01 PROCEDURE — 85025 COMPLETE CBC W/AUTO DIFF WBC: CPT

## 2017-10-01 PROCEDURE — 770006 HCHG ROOM/CARE - MED/SURG/GYN SEMI*

## 2017-10-01 PROCEDURE — 700102 HCHG RX REV CODE 250 W/ 637 OVERRIDE(OP): Performed by: HOSPITALIST

## 2017-10-01 PROCEDURE — A9270 NON-COVERED ITEM OR SERVICE: HCPCS | Performed by: HOSPITALIST

## 2017-10-01 RX ADMIN — THERA TABS 1 TABLET: TAB at 09:06

## 2017-10-01 RX ADMIN — HEPARIN SODIUM 5000 UNITS: 5000 INJECTION, SOLUTION INTRAVENOUS; SUBCUTANEOUS at 21:32

## 2017-10-01 RX ADMIN — GABAPENTIN 100 MG: 100 CAPSULE ORAL at 09:06

## 2017-10-01 RX ADMIN — METOPROLOL TARTRATE 50 MG: 50 TABLET, FILM COATED ORAL at 09:06

## 2017-10-01 RX ADMIN — SODIUM CHLORIDE: 9 INJECTION, SOLUTION INTRAVENOUS at 21:37

## 2017-10-01 RX ADMIN — STANDARDIZED SENNA CONCENTRATE AND DOCUSATE SODIUM 2 TABLET: 8.6; 5 TABLET, FILM COATED ORAL at 09:06

## 2017-10-01 RX ADMIN — GABAPENTIN 100 MG: 100 CAPSULE ORAL at 21:32

## 2017-10-01 RX ADMIN — ATORVASTATIN CALCIUM 40 MG: 40 TABLET, FILM COATED ORAL at 21:32

## 2017-10-01 RX ADMIN — AMPICILLIN SODIUM AND SULBACTAM SODIUM 3 G: 2; 1 INJECTION, POWDER, FOR SOLUTION INTRAMUSCULAR; INTRAVENOUS at 06:20

## 2017-10-01 RX ADMIN — METOPROLOL TARTRATE 50 MG: 50 TABLET, FILM COATED ORAL at 21:32

## 2017-10-01 RX ADMIN — SODIUM CHLORIDE: 9 INJECTION, SOLUTION INTRAVENOUS at 01:00

## 2017-10-01 RX ADMIN — HEPARIN SODIUM 5000 UNITS: 5000 INJECTION, SOLUTION INTRAVENOUS; SUBCUTANEOUS at 06:19

## 2017-10-01 RX ADMIN — AMPICILLIN SODIUM AND SULBACTAM SODIUM 3 G: 2; 1 INJECTION, POWDER, FOR SOLUTION INTRAMUSCULAR; INTRAVENOUS at 00:34

## 2017-10-01 RX ADMIN — FOLIC ACID 1 MG: 1 TABLET ORAL at 09:06

## 2017-10-01 RX ADMIN — AMPICILLIN SODIUM AND SULBACTAM SODIUM 3 G: 2; 1 INJECTION, POWDER, FOR SOLUTION INTRAMUSCULAR; INTRAVENOUS at 23:41

## 2017-10-01 RX ADMIN — GABAPENTIN 100 MG: 100 CAPSULE ORAL at 14:58

## 2017-10-01 RX ADMIN — ASPIRIN 81 MG: 81 TABLET, COATED ORAL at 09:05

## 2017-10-01 RX ADMIN — LEVOTHYROXINE SODIUM 75 MCG: 75 TABLET ORAL at 06:22

## 2017-10-01 RX ADMIN — AMPICILLIN SODIUM AND SULBACTAM SODIUM 3 G: 2; 1 INJECTION, POWDER, FOR SOLUTION INTRAMUSCULAR; INTRAVENOUS at 17:58

## 2017-10-01 RX ADMIN — HEPARIN SODIUM 5000 UNITS: 5000 INJECTION, SOLUTION INTRAVENOUS; SUBCUTANEOUS at 14:58

## 2017-10-01 ASSESSMENT — PAIN SCALES - GENERAL
PAINLEVEL_OUTOF10: 0

## 2017-10-01 ASSESSMENT — LIFESTYLE VARIABLES: DO YOU DRINK ALCOHOL: YES

## 2017-10-01 ASSESSMENT — PATIENT HEALTH QUESTIONNAIRE - PHQ9
SUM OF ALL RESPONSES TO PHQ QUESTIONS 1-9: 0
SUM OF ALL RESPONSES TO PHQ9 QUESTIONS 1 AND 2: 0
2. FEELING DOWN, DEPRESSED, IRRITABLE, OR HOPELESS: NOT AT ALL
1. LITTLE INTEREST OR PLEASURE IN DOING THINGS: NOT AT ALL

## 2017-10-01 NOTE — ASSESSMENT & PLAN NOTE
Significant PAD s/p left forefoot amputation and right BKA  Continue aspirin and statin  Not candidate for prosthesis, due to nonhealing wound  Continue wound care

## 2017-10-01 NOTE — PROGRESS NOTES
Josie Osuna Fall Risk Assessment:     Last Known Fall: No falls  Mobility: Immobilized/requires assist of one person  Medications: Cardiovascular and central nervous system meds  Mental Status/LOC/Awareness: Memory loss/confusion and requires reorienting  Toileting Needs: Incontinence  Volume/Electrolyte Status: Use of IV fluids/tube feeds  Communication/Sensory: No deficits  Behavior: Appropriate behavior  Josie Osuna Fall Risk Total: 15  Fall Risk Level: HIGH RISK    Universal Fall Precautions:  call light/belongings in reach, bed in low position and locked, siderails up x 2, adequate lighting, educate on level of risk, educate to call for assistance    Fall Risk Level Interventions:     TRIAL (TELE 8, NEURO, MED MARINA 5) High Fall Risk Interventions  Place yellow fall risk ID band on patient: verified  Provide patient/family education based on risk assessment: verified  Educate patient/family to call staff for assistance when getting out of bed: verified  Place fall precaution signage outside patient door: verified  Place patient in room close to nursing station: verified  Utilize bed/chair fall alarm: verified  Notify charge of high risk for huddle: verified    Patient Specific Interventions:     Medication: review medications with patient and family  Mental Status/LOC/Awareness: reorient patient, reinforce falls education, check on patient hourly, utilize bed/chair fall alarm and reinforce the use of call light  Toileting: monitor intake and output/use of appropriate interventions and instruct patient/family on the need to call for assistance when toileting  Volume/Electrolyte Status: ensure patient remains hydrated, monitor abnormal lab values and ensure IV fluids are appropriate  Communication/Sensory: for visually impaired patients orient to their room surrounding and do not change their surroundings  Behavioral: encourage patient to voice feelings and administer medication as ordered  Mobility: utilize  bed/chair fall alarm, ensure bed is locked and in lowest position and provide appropriate assistive device

## 2017-10-01 NOTE — CARE PLAN
Problem: Safety  Goal: Will remain free from falls  Outcome: PROGRESSING AS EXPECTED  Patient instructed to use the call bell for assistance, bed alarm activated, needs met    Problem: Knowledge Deficit  Goal: Knowledge of disease process/condition, treatment plan, diagnostic tests, and medications will improve  Outcome: PROGRESSING AS EXPECTED  Patient confused, unable to understand and remember medications and plan of care, cooperative and compliant with care

## 2017-10-01 NOTE — PROGRESS NOTES
[facility] Hospitalist Progress Note    Date of Service: 10/1/2017    Chief Complaint  72 y.o. male admitted 2017 with urinary tract infection and confusion    Interval Problem Update  No issues overnight  Remains confused  On IV fluids but not sure what his labs are as they were never drawn this morning and stat labs were never done  No complaints    Consultants/Specialty  None    Disposition  To be determined        Review of Systems   Unable to perform ROS: Medical condition      Physical Exam  Laboratory/Imaging   Hemodynamics  Temp (24hrs), Av.8 °C (98.3 °F), Min:36.4 °C (97.6 °F), Max:37.1 °C (98.8 °F)   Temperature: 36.6 °C (97.8 °F)  Pulse  Av.7  Min: 69  Max: 93 Heart Rate (Monitored): 90  Blood Pressure : 138/59, NIBP: 121/50      Respiratory      Respiration: 17, Pulse Oximetry: 96 %        RUL Breath Sounds: Clear, RML Breath Sounds: Diminished, RLL Breath Sounds: Diminished, RIGO Breath Sounds: Clear, LLL Breath Sounds: Diminished    Fluids  No intake or output data in the 24 hours ending 10/01/17 1236    Nutrition  Orders Placed This Encounter   Procedures   • Diet Order     Standing Status:   Standing     Number of Occurrences:   1     Order Specific Question:   Diet:     Answer:   Regular [1]     Physical Exam   Constitutional: No distress.   Chronically ill-appearing   HENT:   Head: Normocephalic and atraumatic.   Eyes: Conjunctivae are normal. Pupils are equal, round, and reactive to light.   Neck: Normal range of motion. Neck supple.   Cardiovascular: Normal rate and regular rhythm.    Pulmonary/Chest: Effort normal and breath sounds normal. No respiratory distress.   Abdominal: Soft. Bowel sounds are normal. He exhibits no distension.   Musculoskeletal: He exhibits no edema or tenderness.   Neurological: He is alert. He is disoriented.   Skin: Skin is warm and dry. He is not diaphoretic.   Nursing note and vitals reviewed.      Recent Labs      17   1036   WBC  13.5*   RBC  5.18    HEMOGLOBIN  12.3*   HEMATOCRIT  39.1*   MCV  75.5*   MCH  23.7*   MCHC  31.5*   RDW  41.0   PLATELETCT  528*   MPV  8.6*     Recent Labs      09/30/17   1036   SODIUM  114*   POTASSIUM  4.3   CHLORIDE  86*   CO2  20   GLUCOSE  79   BUN  8   CREATININE  0.44*   CALCIUM  8.5                      Assessment/Plan     * Cellulitis of right knee- (present on admission)   Assessment & Plan    Active Orders     Ordered     Ordering Provider       Sat Sep 30, 2017 12:30 PM    09/30/17 1230  ampicillin/sulbactam (UNASYN) 3 g in  mL IVPB  EVERY 6 HOURS      John Paul Mckeon M.D.      Suspect source of his acute ills        Hyponatremia- (present on admission)   Assessment & Plan    Etiology not clear  Stat labs have been ordered but 3 hours later still have not been drawn by lab  Continue with fluids  Check serum and urine osmolality  Consider nephrology consultation if labs ever get done        PAD (peripheral artery disease) (CMS-[facility])- (present on admission)   Assessment & Plan    Medical mgmt, 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  Will continue ASA and statin        Essential hypertension- (present on admission)   Assessment & Plan    SBP goal less than 140 mmHg  DBP goal less than 90 mmHg  PRN and antihypertensives to titrate by hospitalist towards goal  Continue with metoprolol 50 mg twice a day        Severe protein-calorie malnutrition (CMS-[facility])- (present on admission)   Assessment & Plan    Nutrition to see            Reviewed items::  Radiology images reviewed, Labs reviewed and Medications reviewed  DVT prophylaxis pharmacological::  Heparin  Antibiotics:  Treating active infection/contamination beyond 24 hours perioperative coverage

## 2017-10-01 NOTE — DIETARY
Nutrition Services - 74% of amputee IBW    71 YO M admitted r/t ALOC. 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 Pt drinks 2 quarts of beer per day and smokes daily. Dx: R-knee cellulitis, PAD, HTN and severe malnutrition.     Medication reviewed- folic acid and multivitamin in place. Labs reviewed - Na 114 - hypovolemic hyponatremia, Cl 86, Ast 53. No Mg or Phosphorus drawn. Skin: Wound care consult pending.   Edema: 2+ RLE and trace to LLE.   Last BM 10/1    Weight is 55.4kg, which is  74% of IBW 74.9% with amputations. Appearance is thin and frail. Pt can give no real account of PO intake PTA r/t poor historian. Per chart review, Pt was 124lbs one year ago prior to his amputations. Overall appearance is cachectic with orbital, clavicle wasting, as well as wasted BL UE. Total clinical picture likely indicative of severe chronic malnutrition.     PO intake on a regular diet is %. Pt is agreeable to supplements.     Plan/Recommendation    Consider replacing Na as needed if still low on re-draw.     MONITOR REFEEDING - please consider adding 100mg thiamine daily r/t risk of refeeding. - nursing aware    Encourage PO intake.     Will provide magic cup with lunch and dinner and boost plus TID between meals for additional Kcal/protien     Nutrition Rep to see patient daily for meal preferences. Please document PO intake as percentage of meals consumed.      Wound team consult pending, will await wound staging to make recommendations if appropriate. RD will monitor per dept policy.      RD following

## 2017-10-02 LAB
ANION GAP SERPL CALC-SCNC: 7 MMOL/L (ref 0–11.9)
BACTERIA UR CULT: NORMAL
BASOPHILS # BLD AUTO: 1.6 % (ref 0–1.8)
BASOPHILS # BLD: 0.23 K/UL (ref 0–0.12)
BUN SERPL-MCNC: 4 MG/DL (ref 8–22)
CALCIUM SERPL-MCNC: 8.1 MG/DL (ref 8.5–10.5)
CHLORIDE SERPL-SCNC: 97 MMOL/L (ref 96–112)
CO2 SERPL-SCNC: 21 MMOL/L (ref 20–33)
CREAT SERPL-MCNC: 0.37 MG/DL (ref 0.5–1.4)
EOSINOPHIL # BLD AUTO: 0.54 K/UL (ref 0–0.51)
EOSINOPHIL NFR BLD: 3.7 % (ref 0–6.9)
ERYTHROCYTE [DISTWIDTH] IN BLOOD BY AUTOMATED COUNT: 42 FL (ref 35.9–50)
GFR SERPL CREATININE-BSD FRML MDRD: >60 ML/MIN/1.73 M 2
GLUCOSE SERPL-MCNC: 124 MG/DL (ref 65–99)
HCT VFR BLD AUTO: 33.1 % (ref 42–52)
HGB BLD-MCNC: 10.6 G/DL (ref 14–18)
IMM GRANULOCYTES # BLD AUTO: 0.08 K/UL (ref 0–0.11)
IMM GRANULOCYTES NFR BLD AUTO: 0.5 % (ref 0–0.9)
LYMPHOCYTES # BLD AUTO: 1.07 K/UL (ref 1–4.8)
LYMPHOCYTES NFR BLD: 7.3 % (ref 22–41)
MCH RBC QN AUTO: 24.4 PG (ref 27–33)
MCHC RBC AUTO-ENTMCNC: 32 G/DL (ref 33.7–35.3)
MCV RBC AUTO: 76.1 FL (ref 81.4–97.8)
MONOCYTES # BLD AUTO: 0.8 K/UL (ref 0–0.85)
MONOCYTES NFR BLD AUTO: 5.5 % (ref 0–13.4)
NEUTROPHILS # BLD AUTO: 11.95 K/UL (ref 1.82–7.42)
NEUTROPHILS NFR BLD: 81.4 % (ref 44–72)
NRBC # BLD AUTO: 0 K/UL
NRBC BLD AUTO-RTO: 0 /100 WBC
OSMOLALITY UR: 503 MOSM/KG H2O (ref 300–900)
PLATELET # BLD AUTO: 572 K/UL (ref 164–446)
PMV BLD AUTO: 8.6 FL (ref 9–12.9)
POTASSIUM SERPL-SCNC: 3.2 MMOL/L (ref 3.6–5.5)
RBC # BLD AUTO: 4.35 M/UL (ref 4.7–6.1)
SIGNIFICANT IND 70042: NORMAL
SITE SITE: NORMAL
SODIUM SERPL-SCNC: 125 MMOL/L (ref 135–145)
SOURCE SOURCE: NORMAL
WBC # BLD AUTO: 14.7 K/UL (ref 4.8–10.8)

## 2017-10-02 PROCEDURE — 700105 HCHG RX REV CODE 258: Performed by: HOSPITALIST

## 2017-10-02 PROCEDURE — 700111 HCHG RX REV CODE 636 W/ 250 OVERRIDE (IP): Performed by: HOSPITALIST

## 2017-10-02 PROCEDURE — G8979 MOBILITY GOAL STATUS: HCPCS | Mod: CJ

## 2017-10-02 PROCEDURE — 99233 SBSQ HOSP IP/OBS HIGH 50: CPT | Performed by: HOSPITALIST

## 2017-10-02 PROCEDURE — 700101 HCHG RX REV CODE 250: Performed by: HOSPITALIST

## 2017-10-02 PROCEDURE — 302255 BARRIER CREAM MOISTURE BAZA PROTECT (ZINC) 5OZ: Performed by: HOSPITALIST

## 2017-10-02 PROCEDURE — 83935 ASSAY OF URINE OSMOLALITY: CPT

## 2017-10-02 PROCEDURE — G8978 MOBILITY CURRENT STATUS: HCPCS | Mod: CL

## 2017-10-02 PROCEDURE — 80048 BASIC METABOLIC PNL TOTAL CA: CPT

## 2017-10-02 PROCEDURE — 700102 HCHG RX REV CODE 250 W/ 637 OVERRIDE(OP): Performed by: HOSPITALIST

## 2017-10-02 PROCEDURE — A9270 NON-COVERED ITEM OR SERVICE: HCPCS | Performed by: HOSPITALIST

## 2017-10-02 PROCEDURE — 770006 HCHG ROOM/CARE - MED/SURG/GYN SEMI*

## 2017-10-02 PROCEDURE — 85025 COMPLETE CBC W/AUTO DIFF WBC: CPT

## 2017-10-02 PROCEDURE — 97162 PT EVAL MOD COMPLEX 30 MIN: CPT

## 2017-10-02 RX ORDER — POTASSIUM CHLORIDE 20 MEQ/1
40 TABLET, EXTENDED RELEASE ORAL ONCE
Status: COMPLETED | OUTPATIENT
Start: 2017-10-02 | End: 2017-10-02

## 2017-10-02 RX ADMIN — HEPARIN SODIUM 5000 UNITS: 5000 INJECTION, SOLUTION INTRAVENOUS; SUBCUTANEOUS at 05:24

## 2017-10-02 RX ADMIN — SODIUM CHLORIDE: 9 INJECTION, SOLUTION INTRAVENOUS at 17:19

## 2017-10-02 RX ADMIN — GABAPENTIN 100 MG: 100 CAPSULE ORAL at 21:01

## 2017-10-02 RX ADMIN — LABETALOL HYDROCHLORIDE 10 MG: 5 INJECTION, SOLUTION INTRAVENOUS at 05:31

## 2017-10-02 RX ADMIN — POTASSIUM CHLORIDE 40 MEQ: 1500 TABLET, EXTENDED RELEASE ORAL at 13:45

## 2017-10-02 RX ADMIN — GABAPENTIN 100 MG: 100 CAPSULE ORAL at 07:58

## 2017-10-02 RX ADMIN — FOLIC ACID 1 MG: 1 TABLET ORAL at 07:58

## 2017-10-02 RX ADMIN — HEPARIN SODIUM 5000 UNITS: 5000 INJECTION, SOLUTION INTRAVENOUS; SUBCUTANEOUS at 13:46

## 2017-10-02 RX ADMIN — ASPIRIN 81 MG: 81 TABLET, COATED ORAL at 07:58

## 2017-10-02 RX ADMIN — AMPICILLIN SODIUM AND SULBACTAM SODIUM 3 G: 2; 1 INJECTION, POWDER, FOR SOLUTION INTRAMUSCULAR; INTRAVENOUS at 13:39

## 2017-10-02 RX ADMIN — AMPICILLIN SODIUM AND SULBACTAM SODIUM 3 G: 2; 1 INJECTION, POWDER, FOR SOLUTION INTRAMUSCULAR; INTRAVENOUS at 17:17

## 2017-10-02 RX ADMIN — ATORVASTATIN CALCIUM 40 MG: 40 TABLET, FILM COATED ORAL at 21:01

## 2017-10-02 RX ADMIN — HEPARIN SODIUM 5000 UNITS: 5000 INJECTION, SOLUTION INTRAVENOUS; SUBCUTANEOUS at 21:01

## 2017-10-02 RX ADMIN — LEVOTHYROXINE SODIUM 75 MCG: 75 TABLET ORAL at 05:23

## 2017-10-02 RX ADMIN — METOPROLOL TARTRATE 50 MG: 50 TABLET, FILM COATED ORAL at 07:58

## 2017-10-02 RX ADMIN — SODIUM CHLORIDE: 9 INJECTION, SOLUTION INTRAVENOUS at 08:15

## 2017-10-02 RX ADMIN — AMPICILLIN SODIUM AND SULBACTAM SODIUM 3 G: 2; 1 INJECTION, POWDER, FOR SOLUTION INTRAMUSCULAR; INTRAVENOUS at 23:29

## 2017-10-02 RX ADMIN — METOPROLOL TARTRATE 50 MG: 50 TABLET, FILM COATED ORAL at 21:01

## 2017-10-02 RX ADMIN — THERA TABS 1 TABLET: TAB at 07:58

## 2017-10-02 RX ADMIN — LABETALOL HYDROCHLORIDE 10 MG: 5 INJECTION, SOLUTION INTRAVENOUS at 10:24

## 2017-10-02 RX ADMIN — AMPICILLIN SODIUM AND SULBACTAM SODIUM 3 G: 2; 1 INJECTION, POWDER, FOR SOLUTION INTRAMUSCULAR; INTRAVENOUS at 05:23

## 2017-10-02 RX ADMIN — GABAPENTIN 100 MG: 100 CAPSULE ORAL at 13:45

## 2017-10-02 ASSESSMENT — COGNITIVE AND FUNCTIONAL STATUS - GENERAL
SUGGESTED CMS G CODE MODIFIER MOBILITY: CN
MOVING TO AND FROM BED TO CHAIR: UNABLE
MOBILITY SCORE: 6
CLIMB 3 TO 5 STEPS WITH RAILING: TOTAL
WALKING IN HOSPITAL ROOM: TOTAL
MOVING FROM LYING ON BACK TO SITTING ON SIDE OF FLAT BED: UNABLE
TURNING FROM BACK TO SIDE WHILE IN FLAT BAD: UNABLE
STANDING UP FROM CHAIR USING ARMS: TOTAL

## 2017-10-02 ASSESSMENT — PAIN SCALES - GENERAL
PAINLEVEL_OUTOF10: 0

## 2017-10-02 ASSESSMENT — GAIT ASSESSMENTS: GAIT LEVEL OF ASSIST: UNABLE TO PARTICIPATE

## 2017-10-02 NOTE — PROGRESS NOTES
Visitor in to see pt. She states she is his sister in law but he says she is really his wife. Unsure what her status is. She is upset, stating he is suppose to be wearing a compression stocking on his bka in preparation for a prosthesis. She stated he had it on when aleja picked him up. Pt. Has not had the stocking on since arriving to the floor. Called wound care to see if we could get a compression stocking but this is not something they can supply. Called ortho tech and they also cannot supply this. Pt. Is confused and has pulled out two iv's. He does not remember if he had the compression stocking.

## 2017-10-02 NOTE — PROGRESS NOTES
[facility] Hospitalist Progress Note    Date of Service: 10/2/2017    Chief Complaint  72 y.o. male admitted 2017 with urinary tract infection and confusion    Interval Problem Update  No issues overnight  Remains confused  No complaints today    Consultants/Specialty  None    Disposition  To be determined        Review of Systems   Unable to perform ROS: Medical condition      Physical Exam  Laboratory/Imaging   Hemodynamics  Temp (24hrs), Av.7 °C (98 °F), Min:36.3 °C (97.3 °F), Max:36.9 °C (98.4 °F)   Temperature: 36.9 °C (98.4 °F)  Pulse  Av.2  Min: 69  Max: 104    Blood Pressure : 148/78      Respiratory      Respiration: 17, Pulse Oximetry: 100 %        RUL Breath Sounds: Inspiratory Wheezes, RML Breath Sounds: Diminished, RLL Breath Sounds: Diminished, RIGO Breath Sounds: Inspiratory Wheezes, LLL Breath Sounds: Diminished    Fluids    Intake/Output Summary (Last 24 hours) at 10/02/17 1329  Last data filed at 10/02/17 0900   Gross per 24 hour   Intake             4718 ml   Output              500 ml   Net             4218 ml       Nutrition  Orders Placed This Encounter   Procedures   • Diet Order     Standing Status:   Standing     Number of Occurrences:   1     Order Specific Question:   Diet:     Answer:   Regular [1]     Physical Exam   Constitutional: No distress.   Chronically ill-appearing   HENT:   Head: Normocephalic and atraumatic.   Mouth/Throat: No oropharyngeal exudate.   Eyes: Conjunctivae and EOM are normal. Pupils are equal, round, and reactive to light.   Neck: Normal range of motion. Neck supple.   Cardiovascular: Normal rate and regular rhythm.    Pulmonary/Chest: Effort normal and breath sounds normal. No respiratory distress.   Abdominal: Soft. Bowel sounds are normal. He exhibits no distension.   Musculoskeletal: He exhibits no edema or tenderness.   Neurological: He is alert. He is disoriented.   Skin: Skin is warm and dry. He is not diaphoretic.   Nursing note and vitals  reviewed.      Recent Labs      09/30/17   1036  10/01/17   1107  10/02/17   0933   WBC  13.5*  12.0*  14.7*   RBC  5.18  4.31*  4.35*   HEMOGLOBIN  12.3*  10.4*  10.6*   HEMATOCRIT  39.1*  32.5*  33.1*   MCV  75.5*  75.4*  76.1*   MCH  23.7*  24.1*  24.4*   MCHC  31.5*  32.0*  32.0*   RDW  41.0  40.7  42.0   PLATELETCT  528*  640*  572*   MPV  8.6*  8.2*  8.6*     Recent Labs      09/30/17   1036  10/01/17   1107  10/02/17   0933   SODIUM  114*  123*  125*   POTASSIUM  4.3  3.1*  3.2*   CHLORIDE  86*  97  97   CO2  20  21  21   GLUCOSE  79  95  124*   BUN  8  6*  4*   CREATININE  0.44*  0.51  0.37*   CALCIUM  8.5  7.8*  8.1*                      Assessment/Plan     * Cellulitis of right knee- (present on admission)   Assessment & Plan    Active Orders     Ordered     Ordering Provider       Sat Sep 30, 2017 12:30 PM    09/30/17 1230  ampicillin/sulbactam (UNASYN) 3 g in  mL IVPB  EVERY 6 HOURS      John Paul Mckeon M.D.      Suspect source of his acute ills        Hyponatremia- (present on admission)   Assessment & Plan    Etiology not clear  Continue with fluids  serum and urine osmolality never done  improving        PAD (peripheral artery disease) (CMS-[facility])- (present on admission)   Assessment & Plan    Medical mgmt, 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  Will continue ASA and statin        Essential hypertension- (present on admission)   Assessment & Plan    SBP goal less than 140 mmHg  DBP goal less than 90 mmHg  PRN and antihypertensives to titrate by hospitalist towards goal  Continue with metoprolol 50 mg twice a day        Severe protein-calorie malnutrition (CMS-[facility])- (present on admission)   Assessment & Plan    Nutrition to see            Reviewed items::  Radiology images reviewed, Labs reviewed and Medications reviewed  DVT prophylaxis pharmacological::  Heparin  Antibiotics:  Treating active infection/contamination beyond 24 hours perioperative coverage

## 2017-10-02 NOTE — CARE PLAN
Problem: Knowledge Deficit  Goal: Knowledge of disease process/condition, treatment plan, diagnostic tests, and medications will improve    Intervention: Assess knowledge level of disease process/condition, treatment plan, diagnostic tests, and medications  Reorientation needed, pleasantly confused and follows simple command.      Problem: Skin Integrity  Goal: Risk for impaired skin integrity will decrease    Intervention: Assess risk factors for impaired skin integrity and/or pressure ulcers  q 2 turn and applied barrier cream.

## 2017-10-02 NOTE — RESPIRATORY CARE
COPD EDUCATION by COPD CLINICAL EDUCATOR  10/2/2017 at 6:36 AM by Gwendolyn Higuera     Patient reviewed by COPD education team. Patient does not qualify for COPD program.

## 2017-10-02 NOTE — THERAPY
"Physical Therapy Evaluation completed.   Bed Mobility:  Supine to Sit: Maximal Assist  Transfers: Sit to Stand: Maximal Assist  Gait: Level Of Assist: Unable to Participate with Front-Wheel Walker       Plan of Care: Will benefit from Physical Therapy 3 times per week and Plan to complete next treatment by Wednesday 10/4  Discharge Recommendations: Equipment: Will Continue to Assess for Equipment Needs. Post-acute therapy Discharge to a transitional care facility for continued skilled therapy services.    Pt is a 72 year old male admitted to the hospital for ALOC, found to have UTI. Per chart, pt with history of R BKA and forefoot amputation in July of 2017. Pt also with PMH including CVA, MU, prostate CA. Pt was extremely confused at time of initial evaluation. Pt only oriented to self. Pt states he was ambulating independently at home but was unable to elaborate on how he was ambulating. Pt also stated that he has prosthetic limb at home but was not using. Of note, pt has knee flexion contracture on the R which would make it extremely difficult to get prosthetic limb on. Pt required moderate to maximal assist for all mobility at this time. Pt had a difficult time coming to full upright standing at EOB even with maximal assist and FWW. Pt would benefit from skilled PT intervention while in the acute care setting. Pt would also benefit from post acute transitional care upon DC given current deficits    See \"Rehab Therapy-Acute\" Patient Summary Report for complete documentation.     "

## 2017-10-02 NOTE — PROGRESS NOTES
Surgery    Readmitted for UTI, confusion, and he reports some shortness of breath.  Sores on the right knee and right BKA stump but no evidence of active infection.    Discussed right AKA with him, which he will need at some point if he recovers enough to be discharged.  Will monitor.    Frantz Rodriguez MD  General and Vascular Surgery  [facility]  Cell: [phone]

## 2017-10-02 NOTE — PROGRESS NOTES
Josie Osuna Fall Risk Assessment:     Last Known Fall: No falls  Mobility: Dizziness/generalized weakness, Use of assistive device/requires assist of two people  Medications: Cardiovascular or central nervous system meds  Mental Status/LOC/Awareness: Memory loss/confusion and requires reorienting  Toileting Needs: Incontinence  Volume/Electrolyte Status: Use of IV fluids/tube feeds  Communication/Sensory: No deficits  Behavior: Depression/anxiety  Josie Osuna Fall Risk Total: 17  Fall Risk Level: HIGH RISK    Universal Fall Precautions:  call light/belongings in reach, bed in low position and locked, wheelchairs and assistive devices out of sight, siderails up x 2, use non-slip footwear, clutter free and spill free environment, adequate lighting, educate on level of risk, educate to call for assistance    Fall Risk Level Interventions:     TRIAL (TELE 8, NEURO, MED MARINA 5) High Fall Risk Interventions  Place yellow fall risk ID band on patient: verified  Provide patient/family education based on risk assessment: completed  Educate patient/family to call staff for assistance when getting out of bed: completed  Place fall precaution signage outside patient door: verified  Place patient in room close to nursing station: verified  Utilize bed/chair fall alarm: verified  Notify charge of high risk for huddle: verified    Patient Specific Interventions:     Medication: review medications with patient and family  Mental Status/LOC/Awareness: reorient patient, reinforce falls education, check on patient hourly and reinforce the use of call light  Toileting: monitor intake and output/use of appropriate interventions and instruct patient/family on the need to call for assistance when toileting  Volume/Electrolyte Status: ensure patient remains hydrated, monitor abnormal lab values and ensure IV fluids are appropriate  Communication/Sensory: update plan of care on whiteboard  Behavioral: encourage patient to voice  feelings, engage patient in daily activities and instruct/reinforce fall program rationale  Mobility: utilize bed/chair fall alarm, ensure bed is locked and in lowest position, provide appropriate assistive device and instruct patient to exit bed on their strongest side

## 2017-10-02 NOTE — CARE PLAN
Problem: Safety  Goal: Will remain free from falls    Intervention: Implement fall precautions   10/01/17 2004   OTHER   Environmental Precautions Personal Belongings, Wastebasket, Call Bell etc. in Easy Reach;Report Given to Other Health Care Providers Regarding Fall Risk;Transferred to Stronger Side;Bed in Low Position;Communication Sign for Patients & Families;Mobility Assessed & Appropriate Sign Placed   Bedrails Bedrails Closest to Bathroom Down   Chair/Bed Strip Alarm Yes - Alarm On   Bed Alarm (Built in - for ICU ONLY) Yes - Alarm On   Safety measures in place. Call light in reach, skid proof socks on, bed and strip alarm activated and hourly rounding completed for pt. Needs and safety.      Problem: Knowledge Deficit  Goal: Knowledge of disease process/condition, treatment plan, diagnostic tests, and medications will improve  Outcome: PROGRESSING AS EXPECTED  Plan of care discussed and questions answered.

## 2017-10-02 NOTE — CARE PLAN
Problem: Knowledge Deficit  Goal: Knowledge of disease process/condition, treatment plan, diagnostic tests, and medications will improve    Intervention: Assess knowledge level of disease process/condition, treatment plan, diagnostic tests, and medications  POC discussed with pt, no learning evidenced. Pt is confused but is compliant.       Problem: Skin Integrity  Goal: Risk for impaired skin integrity will decrease    Intervention: Implement precautions to protect skin integrity in collaboration with the interdisciplinary team  Pt is incontinent, condom cath in use to protect skin and keep pt dry.

## 2017-10-02 NOTE — PROGRESS NOTES
Received alert and oriented x 2, pleasantly confused, sitting at the edge of the bed, call light within reach, bed alarm in placed, offered snack, due med given as ordered, able to swallow without any sign of aspiration, incontinence of b/b, needs attended, will continue to monitor.

## 2017-10-03 ENCOUNTER — APPOINTMENT (OUTPATIENT)
Dept: RADIOLOGY | Facility: MEDICAL CENTER | Age: 72
DRG: 640 | End: 2017-10-03
Attending: INTERNAL MEDICINE
Payer: MEDICARE

## 2017-10-03 LAB
ANION GAP SERPL CALC-SCNC: 8 MMOL/L (ref 0–11.9)
BASOPHILS # BLD AUTO: 1.4 % (ref 0–1.8)
BASOPHILS # BLD: 0.17 K/UL (ref 0–0.12)
BUN SERPL-MCNC: 5 MG/DL (ref 8–22)
CALCIUM SERPL-MCNC: 8.1 MG/DL (ref 8.5–10.5)
CHLORIDE SERPL-SCNC: 98 MMOL/L (ref 96–112)
CO2 SERPL-SCNC: 20 MMOL/L (ref 20–33)
CREAT SERPL-MCNC: 0.41 MG/DL (ref 0.5–1.4)
EOSINOPHIL # BLD AUTO: 0.29 K/UL (ref 0–0.51)
EOSINOPHIL NFR BLD: 2.3 % (ref 0–6.9)
ERYTHROCYTE [DISTWIDTH] IN BLOOD BY AUTOMATED COUNT: 41.8 FL (ref 35.9–50)
GFR SERPL CREATININE-BSD FRML MDRD: >60 ML/MIN/1.73 M 2
GLUCOSE SERPL-MCNC: 98 MG/DL (ref 65–99)
HCT VFR BLD AUTO: 32.3 % (ref 42–52)
HGB BLD-MCNC: 10.4 G/DL (ref 14–18)
IMM GRANULOCYTES # BLD AUTO: 0.11 K/UL (ref 0–0.11)
IMM GRANULOCYTES NFR BLD AUTO: 0.9 % (ref 0–0.9)
LYMPHOCYTES # BLD AUTO: 0.94 K/UL (ref 1–4.8)
LYMPHOCYTES NFR BLD: 7.6 % (ref 22–41)
MCH RBC QN AUTO: 24.4 PG (ref 27–33)
MCHC RBC AUTO-ENTMCNC: 32.2 G/DL (ref 33.7–35.3)
MCV RBC AUTO: 75.6 FL (ref 81.4–97.8)
MONOCYTES # BLD AUTO: 0.84 K/UL (ref 0–0.85)
MONOCYTES NFR BLD AUTO: 6.8 % (ref 0–13.4)
NEUTROPHILS # BLD AUTO: 10.02 K/UL (ref 1.82–7.42)
NEUTROPHILS NFR BLD: 81 % (ref 44–72)
NRBC # BLD AUTO: 0 K/UL
NRBC BLD AUTO-RTO: 0 /100 WBC
PLATELET # BLD AUTO: 510 K/UL (ref 164–446)
PMV BLD AUTO: 8.4 FL (ref 9–12.9)
POTASSIUM SERPL-SCNC: 3.3 MMOL/L (ref 3.6–5.5)
RBC # BLD AUTO: 4.27 M/UL (ref 4.7–6.1)
SODIUM SERPL-SCNC: 126 MMOL/L (ref 135–145)
WBC # BLD AUTO: 12.4 K/UL (ref 4.8–10.8)

## 2017-10-03 PROCEDURE — 36415 COLL VENOUS BLD VENIPUNCTURE: CPT

## 2017-10-03 PROCEDURE — 80048 BASIC METABOLIC PNL TOTAL CA: CPT

## 2017-10-03 PROCEDURE — 700111 HCHG RX REV CODE 636 W/ 250 OVERRIDE (IP): Performed by: HOSPITALIST

## 2017-10-03 PROCEDURE — 700105 HCHG RX REV CODE 258: Performed by: HOSPITALIST

## 2017-10-03 PROCEDURE — A9270 NON-COVERED ITEM OR SERVICE: HCPCS | Performed by: INTERNAL MEDICINE

## 2017-10-03 PROCEDURE — 700102 HCHG RX REV CODE 250 W/ 637 OVERRIDE(OP): Performed by: HOSPITALIST

## 2017-10-03 PROCEDURE — A9270 NON-COVERED ITEM OR SERVICE: HCPCS | Performed by: HOSPITALIST

## 2017-10-03 PROCEDURE — 85025 COMPLETE CBC W/AUTO DIFF WBC: CPT

## 2017-10-03 PROCEDURE — 71010 DX-CHEST-LIMITED (1 VIEW): CPT

## 2017-10-03 PROCEDURE — 99233 SBSQ HOSP IP/OBS HIGH 50: CPT | Performed by: INTERNAL MEDICINE

## 2017-10-03 PROCEDURE — 700102 HCHG RX REV CODE 250 W/ 637 OVERRIDE(OP): Performed by: INTERNAL MEDICINE

## 2017-10-03 PROCEDURE — 770006 HCHG ROOM/CARE - MED/SURG/GYN SEMI*

## 2017-10-03 RX ORDER — SULFAMETHOXAZOLE AND TRIMETHOPRIM 800; 160 MG/1; MG/1
1 TABLET ORAL EVERY 12 HOURS
Status: DISCONTINUED | OUTPATIENT
Start: 2017-10-03 | End: 2017-10-05

## 2017-10-03 RX ADMIN — SODIUM CHLORIDE: 9 INJECTION, SOLUTION INTRAVENOUS at 05:19

## 2017-10-03 RX ADMIN — LEVOTHYROXINE SODIUM 75 MCG: 75 TABLET ORAL at 05:17

## 2017-10-03 RX ADMIN — HEPARIN SODIUM 5000 UNITS: 5000 INJECTION, SOLUTION INTRAVENOUS; SUBCUTANEOUS at 05:17

## 2017-10-03 RX ADMIN — GABAPENTIN 100 MG: 100 CAPSULE ORAL at 13:47

## 2017-10-03 RX ADMIN — SODIUM CHLORIDE: 9 INJECTION, SOLUTION INTRAVENOUS at 16:54

## 2017-10-03 RX ADMIN — METOPROLOL TARTRATE 50 MG: 50 TABLET, FILM COATED ORAL at 09:20

## 2017-10-03 RX ADMIN — ATORVASTATIN CALCIUM 40 MG: 40 TABLET, FILM COATED ORAL at 22:03

## 2017-10-03 RX ADMIN — GABAPENTIN 100 MG: 100 CAPSULE ORAL at 22:03

## 2017-10-03 RX ADMIN — SULFAMETHOXAZOLE AND TRIMETHOPRIM 1 TABLET: 800; 160 TABLET ORAL at 16:54

## 2017-10-03 RX ADMIN — STANDARDIZED SENNA CONCENTRATE AND DOCUSATE SODIUM 2 TABLET: 8.6; 5 TABLET, FILM COATED ORAL at 22:03

## 2017-10-03 RX ADMIN — HEPARIN SODIUM 5000 UNITS: 5000 INJECTION, SOLUTION INTRAVENOUS; SUBCUTANEOUS at 22:03

## 2017-10-03 RX ADMIN — THERA TABS 1 TABLET: TAB at 09:20

## 2017-10-03 RX ADMIN — ASPIRIN 81 MG: 81 TABLET, COATED ORAL at 09:20

## 2017-10-03 RX ADMIN — FOLIC ACID 1 MG: 1 TABLET ORAL at 09:20

## 2017-10-03 RX ADMIN — AMPICILLIN SODIUM AND SULBACTAM SODIUM 3 G: 2; 1 INJECTION, POWDER, FOR SOLUTION INTRAMUSCULAR; INTRAVENOUS at 13:43

## 2017-10-03 RX ADMIN — METOPROLOL TARTRATE 50 MG: 50 TABLET, FILM COATED ORAL at 22:03

## 2017-10-03 RX ADMIN — GABAPENTIN 100 MG: 100 CAPSULE ORAL at 09:20

## 2017-10-03 RX ADMIN — HEPARIN SODIUM 5000 UNITS: 5000 INJECTION, SOLUTION INTRAVENOUS; SUBCUTANEOUS at 13:47

## 2017-10-03 RX ADMIN — AMPICILLIN SODIUM AND SULBACTAM SODIUM 3 G: 2; 1 INJECTION, POWDER, FOR SOLUTION INTRAMUSCULAR; INTRAVENOUS at 05:17

## 2017-10-03 ASSESSMENT — ENCOUNTER SYMPTOMS
LOSS OF CONSCIOUSNESS: 0
FEVER: 0
MEMORY LOSS: 1
CHILLS: 0
WEAKNESS: 1
HEADACHES: 0
SHORTNESS OF BREATH: 0
FLANK PAIN: 0
PALPITATIONS: 0
DEPRESSION: 0
VOMITING: 0
NERVOUS/ANXIOUS: 0
ABDOMINAL PAIN: 0
NAUSEA: 0
COUGH: 0
DIAPHORESIS: 0
FOCAL WEAKNESS: 0
DIZZINESS: 0
MYALGIAS: 1
BACK PAIN: 0

## 2017-10-03 ASSESSMENT — PAIN SCALES - GENERAL: PAINLEVEL_OUTOF10: 0

## 2017-10-03 NOTE — CARE PLAN
Problem: Nutritional:  Goal: Achieve adequate nutritional intake  Patient will consume 50% of meals   Outcome: PROGRESSING SLOWER THAN EXPECTED  Patient not yet taking 50% or more consistently.  Patient stated his appetite is good.  He did not know breakfast was at bedside.  Patient eager to eat once he knew breakfast was there.  He is receiving Boost and Magic cups.    Likely will eat better with assistance and cueing.  Nutrition Representative will continue to see him for ongoing meal and snack preferences.  RD following.

## 2017-10-03 NOTE — PROGRESS NOTES
Josie Osuna Fall Risk Assessment:     Last Known Fall: Within the last six months  Mobility: Dizziness/generalized weakness, Use of assistive device/requires assist of two people  Medications: Cardiovascular or central nervous system meds  Mental Status/LOC/Awareness: Memory loss/confusion and requires reorienting  Toileting Needs: Incontinence  Volume/Electrolyte Status: Use of IV fluids/tube feeds  Communication/Sensory: No deficits  Behavior: Appropriate behavior  Josie Osuna Fall Risk Total: 18  Fall Risk Level: HIGH RISK    Universal Fall Precautions:  call light/belongings in reach, bed in low position and locked, wheelchairs and assistive devices out of sight, siderails up x 2, use non-slip footwear, adequate lighting, clutter free and spill free environment, educate on level of risk, educate to call for assistance    Fall Risk Level Interventions:     TRIAL (TELE 8, NEURO, MED MARINA 5) High Fall Risk Interventions  Place yellow fall risk ID band on patient: verified  Provide patient/family education based on risk assessment: completed  Educate patient/family to call staff for assistance when getting out of bed: completed  Place fall precaution signage outside patient door: verified  Place patient in room close to nursing station: verified  Utilize bed/chair fall alarm: verified  Notify charge of high risk for huddle: verified    Patient Specific Interventions:     Medication: review medications with patient and family  Mental Status/LOC/Awareness: reorient patient, reinforce falls education, check on patient hourly and utilize bed/chair fall alarm  Toileting: provide frquent toileting  Volume/Electrolyte Status: ensure patient remains hydrated and ensure IV fluids are appropriate  Communication/Sensory: update plan of care on whiteboard and ensure proper positioning when transferrng/ambulating  Behavioral: engage patient in daily activities  Mobility: utilize bed/chair fall alarm and ensure bed is locked  and in lowest position

## 2017-10-03 NOTE — PROGRESS NOTES
[facility] Hospitalist Progress Note    Date of Service: 10/3/2017    Chief Complaint  72 y.o. male admitted 2017 with urinary tract infection and confusion    Interval Problem Update  Tired, no new complaints today  Arousable    Consultants/Specialty  None    Disposition  Needs skilled nursing placement  PT/OT        Review of Systems   Constitutional: Positive for malaise/fatigue. Negative for chills, diaphoresis and fever.   HENT: Negative for congestion and hearing loss.    Respiratory: Negative for cough and shortness of breath.    Cardiovascular: Negative for chest pain, palpitations and leg swelling.   Gastrointestinal: Negative for abdominal pain, nausea and vomiting.   Genitourinary: Negative for dysuria, flank pain and urgency.   Musculoskeletal: Positive for myalgias. Negative for back pain and joint pain.   Neurological: Positive for weakness. Negative for dizziness, focal weakness, loss of consciousness and headaches.   Psychiatric/Behavioral: Positive for memory loss. Negative for depression. The patient is not nervous/anxious.       Physical Exam  Laboratory/Imaging   Hemodynamics  Temp (24hrs), Av.1 °C (98.8 °F), Min:36.6 °C (97.8 °F), Max:37.4 °C (99.4 °F)   Temperature: 37.4 °C (99.4 °F)  Pulse  Av.4  Min: 69  Max: 104    Blood Pressure : 132/72      Respiratory      Respiration: (!) 21, Pulse Oximetry: 93 %        RUL Breath Sounds: Inspiratory Wheezes, RML Breath Sounds: Diminished, RLL Breath Sounds: Diminished, RIGO Breath Sounds: Inspiratory Wheezes, LLL Breath Sounds: Diminished    Fluids    Intake/Output Summary (Last 24 hours) at 10/03/17 1519  Last data filed at 10/03/17 0600   Gross per 24 hour   Intake              240 ml   Output              800 ml   Net             -560 ml       Nutrition  Orders Placed This Encounter   Procedures   • Diet Order     Standing Status:   Standing     Number of Occurrences:   1     Order Specific Question:   Diet:     Answer:   Regular [1]      Physical Exam   Constitutional: No distress.   Chronically ill-appearing  Frail, thin   HENT:   Head: Normocephalic and atraumatic.   Eyes: Conjunctivae and EOM are normal. Pupils are equal, round, and reactive to light.   Neck: Normal range of motion.   Cardiovascular: Normal rate and regular rhythm.    Pulmonary/Chest: Effort normal and breath sounds normal. No respiratory distress. He has no wheezes.   Abdominal: Soft. Bowel sounds are normal. He exhibits no distension. There is no tenderness.   Musculoskeletal: He exhibits no edema or tenderness.   Neurological: He is alert. He is disoriented. No cranial nerve deficit. Coordination normal.   Skin: Skin is warm and dry. No erythema.   Psychiatric: His behavior is normal.   Nursing note and vitals reviewed.      Recent Labs      10/01/17   1107  10/02/17   0933  10/03/17   0701   WBC  12.0*  14.7*  12.4*   RBC  4.31*  4.35*  4.27*   HEMOGLOBIN  10.4*  10.6*  10.4*   HEMATOCRIT  32.5*  33.1*  32.3*   MCV  75.4*  76.1*  75.6*   MCH  24.1*  24.4*  24.4*   MCHC  32.0*  32.0*  32.2*   RDW  40.7  42.0  41.8   PLATELETCT  640*  572*  510*   MPV  8.2*  8.6*  8.4*     Recent Labs      10/01/17   1107  10/02/17   0933  10/03/17   0701   SODIUM  123*  125*  126*   POTASSIUM  3.1*  3.2*  3.3*   CHLORIDE  97  97  98   CO2  21  21  20   GLUCOSE  95  124*  98   BUN  6*  4*  5*   CREATININE  0.51  0.37*  0.41*   CALCIUM  7.8*  8.1*  8.1*                      Assessment/Plan     * Cellulitis of right knee- (present on admission)   Assessment & Plan    Active Orders     Ordered     Ordering Provider       Sat Sep 30, 2017 12:30 PM    09/30/17 1230  ampicillin/sulbactam (UNASYN) 3 g in  mL IVPB  EVERY 6 HOURS      John Paul Mckeon M.D.      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  Will continue ASA and statin        UTI (urinary tract infection)- (present on admission)   Assessment & Plan    Sugar negative  On Bactrim        Essential hypertension- (present on admission)   Assessment & Plan    SBP goal less than 140 mmHg  DBP goal less than 90 mmHg  PRN and antihypertensives to titrate by hospitalist towards goal  Continue with metoprolol 50 mg twice a day        Severe protein-calorie malnutrition (CMS-HCC)- (present on admission)   Assessment & Plan    Nutrition to see            Reviewed items::  Radiology images reviewed, Labs reviewed and Medications reviewed  DVT prophylaxis pharmacological::  Heparin  Antibiotics:  Treating active infection/contamination beyond 24 hours perioperative coverage

## 2017-10-03 NOTE — PROGRESS NOTES
Patient oriented to self and place. Reorientation provided by staff.  Patient tolerated pills with dinner. O2 on via nasal cannula.  IV fluids and IV abx per orders. Incontinent of urine. Condom catheter replaced. Redness to skin, barrier cream in use. Waffle overlay to bed. Updated on POC, reinforcement needed at times. Call light in reach, rounding implemented.

## 2017-10-03 NOTE — CARE PLAN
Problem: Venous Thromboembolism (VTW)/Deep Vein Thrombosis (DVT) Prevention:  Goal: Patient will participate in Venous Thrombosis (VTE)/Deep Vein Thrombosis (DVT)Prevention Measures  Outcome: PROGRESSING AS EXPECTED  Patient on sq heparin for DVT prophylaxis. Non ambulatory at this time, ALEXANDRE BOWER.    Problem: Urinary Elimination:  Goal: Ability to reestablish a normal urinary elimination pattern will improve  Outcome: PROGRESSING SLOWER THAN EXPECTED  Treatment for UTI. Patient incontinent of urine. Condom catheter on.

## 2017-10-03 NOTE — PROGRESS NOTES
[]Gurwinder for attribution information  Assumed patient care at 0700. Received report from night shift. Assessment completed. A&Ox1, self only, pleasantly confused. Denies pain. No SOB or in any acute distress,  in use. Incontinent x2, condom cath in use. RONAN cream applied for IAD. Q2h turns in place, mepilex applied to sacrum. Bed alarm on, treaded socks on. Personal possessions and call light placed within reach.

## 2017-10-03 NOTE — WOUND TEAM
"[facility]  Inpatient Services  Initial Wound & Skin Care Evaluation    Admission Date: 09/30/2017          HPI, PMH, SH: Reviewed  Unit where seen by Wound Team: [facility] [phone]    WOUND CONSULT RELATED TO: evaluation of wounds to RLE, sacrum and left hip    SUBJECTIVE: \" I fell and hit my knee.\"      Self Report / Pain Level: \" Ow!\"  - pt is oriented to self only, does not rate pain appropriately    OBJECTIVE: Patient is lying on left side with condom catheter in place.  WOUND TYPE, LOCATION, CHARACTERISTICS (Pressure ulcers: location, stage, POA or date identified)    Wound Type/Location: full thickness surgical wound, right BKA incision site    Periwound: intact, dry, flaky     Drainage: scant serous      Tissue Type and %: 100% dried yellow slough and white tissue     Wound Edges: closed      Odor: none     Exposed structure(s): suture in situ    S&S of Infection: none     Measurements: taken 10/03/2017    Length: 0.3 cm   Width:  6.5 cm   Depth:  JORGE   Tracts/undermining: JORGE       Wound Type/Location: unstageable pressure ulcer, right knee    Periwound: intact, erythema     Drainage: scant serosanguinous       Tissue Type and %: 10% moist red tissue, 90% black/brown eschar    Wound Edges: attached     Odor: none       Exposed structure(s): unable to assess   S&S of Infection: erythema      Measurements: taken 10/03/2017    Length: 3 cm   Width:  1.5 cm   Depth:   JORGE   Tracts/undermining: JORGE      Wound Type/Location: partial thickness wound, left hip, not pressure related    Periwound: peeling      Drainage: scant serous     Tissue Type and %: 100% red tissue    Wound Edges: open    Odor: none     Exposed structure(s): none    S&S of Infection: none     Measurements: taken 10/03/2017    Length: 2 cm   Width:  0.9 cm   Depth:  0.1 cm   Tracts/undermining: none      Wound Type/Location:     Periwound: stage II pressure ulcer, sacrum      Drainage:scant serous       Tissue Type and %: 100% red " tissue     Wound Edges: open     Odor: none       Exposed structure(s): none    S&S of Infection: none     Measurements: taken 10/03/2017 ( two wounds measured as one)    Length: ~ 2 cm   Width:  0.5 cm   Depth:  0.1 cm   Tracts/undermining: none     INTERVENTIONS BY WOUND TEAM: Thorough head to toe assessment completed, patient has wounds to right knee and stump incision site as well as left hip and sacrum. RLE wounds had adhesive foams in place, which were removed and cleansed gently with moist washcloth, NO DEBRIDEMENT. Betadine swab to right knee and incision site, covered with adhesive foam. Left hip and sacrum cleansed with moist washcloth and dried. Mepilex foam to left hip and sacrum applied. Pt turned to right side, positioned with pillows.   Interdisciplinary Collaboration: patient (limited due to confusion), staff RN    EVALUATION: Patient has poor healing prognosis for RLE wounds due to inadequate blood flow. Per Dr. Rodriguez consult note on 10/2/17, pt will likely have an AKA if he is able to recover from current present admitting diagnosis of UTI.  Factors affecting wound healing: PAD, malnutrition, smoking and ETOH abuse  Goals: decrease pressure and shear, maintain stable eschar to act as biologic bandaid    NURSING PLAN OF CARE: (X)  Dressing changes: See Dressing Maintenance orders: X  Skin care: See Skin Care orders:   Rectal tube care: See Rectal Tube Care orders:   Other orders:    RSKIN: CURRENT (X) ORDERED (O)  Q shift Pool:  X  Q shift pressure point assessments:  X  Atmosair        SARAH      Bariatric SARAH      Bariatric foam        Heel float boots       Heels floated on pillows X (pt only has left heel)    Barrier wipes      Barrier Cream      Barrier paste      Sacral silicone dressing X     Silicone O2 tubing X    Anchorfast      Trach with Optifoam split foam       Waffle cushion      Rectal tube or BMS      Antifungal tx    Turn q 2 hours X  Up to chair    Ambulate   PT/OT     Dietician       PO  X   TF   TPN     PVN    NPO   # days   Other       WOUND TEAM PLAN OF CARE (X):   NPWT change 3 x week:        Dressing changes by wound team:       Follow up as needed: X       Other (explain):    Anticipated discharge plans (X):  SNF: X        Home Care:           Outpatient Wound Center:            Self Care:            Other:

## 2017-10-03 NOTE — CARE PLAN
Problem: Discharge Barriers/Planning  Goal: Patient's continuum of care needs will be met    Intervention: Collaborate with Transitional Care Team and Interdisciplinary Team to meet discharge needs  SW assisting with pt's discharge. PT recommending SNIF post DC.       Problem: Skin Integrity  Goal: Risk for impaired skin integrity will decrease    Intervention: Assess risk factors for impaired skin integrity and/or pressure ulcers  Redness to sacrum, Mepilex applied to area.

## 2017-10-03 NOTE — PROGRESS NOTES
Assumed patient care at 0700. Received report from night shift. Assessment completed. A&Ox1, self only, confused. Denies pain. No SOB or in any acute distress. Incontinent x2, condom cath in use. RONAN cream applied for IAD. Q2h turns in place. Bed alarm on, treaded socks on. Personal possessions and call light placed within reach.

## 2017-10-03 NOTE — DISCHARGE PLANNING
Karen from [facility] came to this SW office to check in on pt. Apparently, pt had previously been considering hospice but at this time would like to continue therapies. SW will discuss POC during rounds.

## 2017-10-04 ENCOUNTER — APPOINTMENT (OUTPATIENT)
Dept: RADIOLOGY | Facility: MEDICAL CENTER | Age: 72
DRG: 640 | End: 2017-10-04
Attending: INTERNAL MEDICINE
Payer: MEDICARE

## 2017-10-04 PROBLEM — J18.9 PNEUMONIA: Status: ACTIVE | Noted: 2017-10-04

## 2017-10-04 LAB
ANION GAP SERPL CALC-SCNC: 6 MMOL/L (ref 0–11.9)
BUN SERPL-MCNC: 8 MG/DL (ref 8–22)
CALCIUM SERPL-MCNC: 8.2 MG/DL (ref 8.5–10.5)
CHLORIDE SERPL-SCNC: 99 MMOL/L (ref 96–112)
CO2 SERPL-SCNC: 22 MMOL/L (ref 20–33)
CREAT SERPL-MCNC: 0.5 MG/DL (ref 0.5–1.4)
GFR SERPL CREATININE-BSD FRML MDRD: >60 ML/MIN/1.73 M 2
GLUCOSE SERPL-MCNC: 78 MG/DL (ref 65–99)
POTASSIUM SERPL-SCNC: 3.4 MMOL/L (ref 3.6–5.5)
SODIUM SERPL-SCNC: 127 MMOL/L (ref 135–145)

## 2017-10-04 PROCEDURE — G8987 SELF CARE CURRENT STATUS: HCPCS | Mod: CM

## 2017-10-04 PROCEDURE — 36415 COLL VENOUS BLD VENIPUNCTURE: CPT

## 2017-10-04 PROCEDURE — 700102 HCHG RX REV CODE 250 W/ 637 OVERRIDE(OP): Performed by: INTERNAL MEDICINE

## 2017-10-04 PROCEDURE — A9270 NON-COVERED ITEM OR SERVICE: HCPCS | Performed by: HOSPITALIST

## 2017-10-04 PROCEDURE — 700102 HCHG RX REV CODE 250 W/ 637 OVERRIDE(OP): Performed by: HOSPITALIST

## 2017-10-04 PROCEDURE — 94760 N-INVAS EAR/PLS OXIMETRY 1: CPT

## 2017-10-04 PROCEDURE — G8988 SELF CARE GOAL STATUS: HCPCS | Mod: CK

## 2017-10-04 PROCEDURE — 700111 HCHG RX REV CODE 636 W/ 250 OVERRIDE (IP): Performed by: HOSPITALIST

## 2017-10-04 PROCEDURE — 770006 HCHG ROOM/CARE - MED/SURG/GYN SEMI*

## 2017-10-04 PROCEDURE — 71010 DX-CHEST-PORTABLE (1 VIEW): CPT

## 2017-10-04 PROCEDURE — 99233 SBSQ HOSP IP/OBS HIGH 50: CPT | Performed by: INTERNAL MEDICINE

## 2017-10-04 PROCEDURE — 80048 BASIC METABOLIC PNL TOTAL CA: CPT

## 2017-10-04 PROCEDURE — 700105 HCHG RX REV CODE 258: Performed by: HOSPITALIST

## 2017-10-04 PROCEDURE — 97165 OT EVAL LOW COMPLEX 30 MIN: CPT

## 2017-10-04 PROCEDURE — A9270 NON-COVERED ITEM OR SERVICE: HCPCS | Performed by: INTERNAL MEDICINE

## 2017-10-04 RX ORDER — FUROSEMIDE 10 MG/ML
40 INJECTION INTRAMUSCULAR; INTRAVENOUS ONCE
Status: COMPLETED | OUTPATIENT
Start: 2017-10-05 | End: 2017-10-05

## 2017-10-04 RX ORDER — IPRATROPIUM BROMIDE AND ALBUTEROL SULFATE 2.5; .5 MG/3ML; MG/3ML
3 SOLUTION RESPIRATORY (INHALATION) PRN
Status: DISCONTINUED | OUTPATIENT
Start: 2017-10-04 | End: 2017-10-30

## 2017-10-04 RX ADMIN — METOPROLOL TARTRATE 50 MG: 50 TABLET, FILM COATED ORAL at 10:37

## 2017-10-04 RX ADMIN — LEVOTHYROXINE SODIUM 75 MCG: 75 TABLET ORAL at 05:00

## 2017-10-04 RX ADMIN — HEPARIN SODIUM 5000 UNITS: 5000 INJECTION, SOLUTION INTRAVENOUS; SUBCUTANEOUS at 21:16

## 2017-10-04 RX ADMIN — SULFAMETHOXAZOLE AND TRIMETHOPRIM 1 TABLET: 800; 160 TABLET ORAL at 10:36

## 2017-10-04 RX ADMIN — GABAPENTIN 100 MG: 100 CAPSULE ORAL at 10:36

## 2017-10-04 RX ADMIN — HEPARIN SODIUM 5000 UNITS: 5000 INJECTION, SOLUTION INTRAVENOUS; SUBCUTANEOUS at 05:00

## 2017-10-04 RX ADMIN — SODIUM CHLORIDE: 9 INJECTION, SOLUTION INTRAVENOUS at 03:30

## 2017-10-04 RX ADMIN — ONDANSETRON 4 MG: 2 INJECTION INTRAMUSCULAR; INTRAVENOUS at 16:23

## 2017-10-04 RX ADMIN — LABETALOL HYDROCHLORIDE 10 MG: 5 INJECTION, SOLUTION INTRAVENOUS at 23:42

## 2017-10-04 RX ADMIN — LABETALOL HYDROCHLORIDE 10 MG: 5 INJECTION, SOLUTION INTRAVENOUS at 15:28

## 2017-10-04 RX ADMIN — GABAPENTIN 100 MG: 100 CAPSULE ORAL at 15:28

## 2017-10-04 RX ADMIN — THERA TABS 1 TABLET: TAB at 10:36

## 2017-10-04 RX ADMIN — ASPIRIN 81 MG: 81 TABLET, COATED ORAL at 10:36

## 2017-10-04 RX ADMIN — HEPARIN SODIUM 5000 UNITS: 5000 INJECTION, SOLUTION INTRAVENOUS; SUBCUTANEOUS at 15:27

## 2017-10-04 RX ADMIN — FOLIC ACID 1 MG: 1 TABLET ORAL at 10:37

## 2017-10-04 ASSESSMENT — COPD QUESTIONNAIRES
COPD SCREENING SCORE: 7
DURING THE PAST 4 WEEKS HOW MUCH DID YOU FEEL SHORT OF BREATH: SOME OF THE TIME
HAVE YOU SMOKED AT LEAST 100 CIGARETTES IN YOUR ENTIRE LIFE: YES
DO YOU EVER COUGH UP ANY MUCUS OR PHLEGM?: YES, A FEW DAYS A WEEK OR MONTH

## 2017-10-04 ASSESSMENT — ENCOUNTER SYMPTOMS
BACK PAIN: 0
FEVER: 0
FLANK PAIN: 0
FOCAL WEAKNESS: 0
WHEEZING: 0
MEMORY LOSS: 1
LOSS OF CONSCIOUSNESS: 0
NERVOUS/ANXIOUS: 0
COUGH: 1
DIAPHORESIS: 0
SHORTNESS OF BREATH: 0
MYALGIAS: 1
NAUSEA: 0
SPUTUM PRODUCTION: 0
PALPITATIONS: 0
ABDOMINAL PAIN: 0
WEAKNESS: 1

## 2017-10-04 ASSESSMENT — PAIN SCALES - GENERAL
PAINLEVEL_OUTOF10: 0
PAINLEVEL_OUTOF10: 0

## 2017-10-04 ASSESSMENT — COGNITIVE AND FUNCTIONAL STATUS - GENERAL
PERSONAL GROOMING: A LITTLE
DAILY ACTIVITIY SCORE: 13
SUGGESTED CMS G CODE MODIFIER DAILY ACTIVITY: CL
TOILETING: A LOT
HELP NEEDED FOR BATHING: A LOT
DRESSING REGULAR UPPER BODY CLOTHING: A LOT
EATING MEALS: A LITTLE
DRESSING REGULAR LOWER BODY CLOTHING: TOTAL

## 2017-10-04 ASSESSMENT — LIFESTYLE VARIABLES: EVER_SMOKED: YES

## 2017-10-04 NOTE — PROGRESS NOTES
Received alert and oriented x 2, due med given as ordered, had 50% dinner, kept 02 in placed, ivf continued, denies any pain/discomfort, q 2 turn, incontinence of b/b, kept skin dry and clean, barrier applied, needs attended, will continue to monitor.

## 2017-10-04 NOTE — CARE PLAN
Problem: Urinary Elimination:  Goal: Ability to reestablish a normal urinary elimination pattern will improve    Intervention: Encourage scheduled voiding  On condom catheter change everyday per protocol.

## 2017-10-04 NOTE — PROGRESS NOTES
Assumed patient care at 0700. Received report from night shift. Assessment completed. POC discussed with patient and caregiver. A&Ox2, self and place, pleasantly confused. Denies pain. No SOB or in any acute distress, on 1L via nasal cannula,  in use. RONAN cream applied for IAD. Q2h turns in place, mepilex applied to sacrum. Bed alarm on, treaded socks on. Personal possessions and call light placed within reach.

## 2017-10-04 NOTE — PROGRESS NOTES
[facility] Hospitalist Progress Note    Date of Service: 10/4/2017    Chief Complaint  72 y.o. male admitted 2017 with urinary tract infection and confusion    Interval Problem Update  Improve strength  Report activity with transferring to his chair  Still requiring assistance  Patient's caregiver is at bedside plan of care reviewed  Patient's caregiver/family member is debilitated requiring use of a cane, is unable to care for patient at this time  As per caregiver patient has ran out of skilled days at [facility] and was discharged to home  Encourage activity, encourage incentive spirometer use  Patient does report minimal cough denies any shortness of breath    Consultants/Specialty  None    Disposition  Needs skilled nursing placement  PT/OT        Review of Systems   Constitutional: Negative for diaphoresis, fever and malaise/fatigue.   HENT: Negative for congestion and hearing loss.    Respiratory: Positive for cough. Negative for sputum production, shortness of breath and wheezing.    Cardiovascular: Negative for palpitations and leg swelling.   Gastrointestinal: Negative for abdominal pain and nausea.   Genitourinary: Negative for dysuria, flank pain and urgency.   Musculoskeletal: Positive for myalgias. Negative for back pain and joint pain.   Neurological: Positive for weakness. Negative for focal weakness and loss of consciousness.   Psychiatric/Behavioral: Positive for memory loss. The patient is not nervous/anxious.       Physical Exam  Laboratory/Imaging   Hemodynamics  Temp (24hrs), Av.2 °C (98.9 °F), Min:37 °C (98.6 °F), Max:37.3 °C (99.2 °F)   Temperature: 37.3 °C (99.2 °F)  Pulse  Av  Min: 69  Max: 104    Blood Pressure : 146/84      Respiratory      Respiration: 16, Pulse Oximetry: 94 %        RUL Breath Sounds: Clear, RML Breath Sounds: Diminished, RLL Breath Sounds: Diminished, RIGO Breath Sounds: Clear, LLL Breath Sounds: Diminished    Fluids    Intake/Output Summary (Last 24 hours)  at 10/04/17 1101  Last data filed at 10/04/17 0600   Gross per 24 hour   Intake             1100 ml   Output                0 ml   Net             1100 ml       Nutrition  Orders Placed This Encounter   Procedures   • Diet Order     Standing Status:   Standing     Number of Occurrences:   1     Order Specific Question:   Diet:     Answer:   Regular [1]     Physical Exam   Constitutional: No distress.   Chronically ill-appearing  Frail, thin   HENT:   Head: Normocephalic and atraumatic.   Eyes: Conjunctivae and EOM are normal. Pupils are equal, round, and reactive to light.   Neck: Normal range of motion.   Cardiovascular: Normal rate and regular rhythm.    Pulmonary/Chest: Effort normal. No respiratory distress. He has no wheezes. He has no rales.   Decreased bilaterally   Abdominal: Soft. Bowel sounds are normal. He exhibits no distension.   Musculoskeletal: He exhibits no edema or tenderness.   Neurological: He is alert. He is disoriented. No cranial nerve deficit. He exhibits normal muscle tone.   Skin: Skin is warm and dry. He is not diaphoretic. No erythema.   Psychiatric: His behavior is normal. Judgment normal.   Nursing note and vitals reviewed.      Recent Labs      10/01/17   1107  10/02/17   0933  10/03/17   0701   WBC  12.0*  14.7*  12.4*   RBC  4.31*  4.35*  4.27*   HEMOGLOBIN  10.4*  10.6*  10.4*   HEMATOCRIT  32.5*  33.1*  32.3*   MCV  75.4*  76.1*  75.6*   MCH  24.1*  24.4*  24.4*   MCHC  32.0*  32.0*  32.2*   RDW  40.7  42.0  41.8   PLATELETCT  640*  572*  510*   MPV  8.2*  8.6*  8.4*     Recent Labs      10/02/17   0933  10/03/17   0701  10/04/17   0243   SODIUM  125*  126*  127*   POTASSIUM  3.2*  3.3*  3.4*   CHLORIDE  97  98  99   CO2  21  20  22   GLUCOSE  124*  98  78   BUN  4*  5*  8   CREATININE  0.37*  0.41*  0.50   CALCIUM  8.1*  8.1*  8.2*                      Assessment/Plan     * Cellulitis of right knee- (present on admission)   Assessment & Plan    Active Orders     Ordered      Ordering Provider       Sat Sep 30, 2017 12:30 PM    09/30/17 1230  ampicillin/sulbactam (UNASYN) 3 g in  mL IVPB  EVERY 6 HOURS      John Paul Mckeon M.D.      Suspect source of his acute ills  DC IV antibiotics on October 3  Change to oral Bactrim        Pneumonia   Assessment & Plan    Early  On Bactrim  RT protocol  Oxygen supplementation as needed  Encourage incentive spirometer use  May need to add coverage for hospital-acquired pneumonia        Hyponatremia- (present on admission)   Assessment & Plan    Etiology not clear  Improving  Continue with fluids  serum  Osm never done  urine osmolality-within normal limits          PAD (peripheral artery disease) (CMS-[facility])- (present on admission)   Assessment & Plan    Medical mgmt, 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  Will continue ASA and statin        UTI (urinary tract infection)- (present on admission)   Assessment & Plan    Sugar negative  On Bactrim        Essential hypertension- (present on admission)   Assessment & Plan    SBP goal less than 140 mmHg  DBP goal less than 90 mmHg  PRN and antihypertensives to titrate by hospitalist towards goal  Continue with metoprolol 50 mg twice a day        Severe protein-calorie malnutrition (CMS-[facility])- (present on admission)   Assessment & Plan    Nutrition to see  Encourage intake            Reviewed items::  Radiology images reviewed, Labs reviewed and Medications reviewed  DVT prophylaxis pharmacological::  Heparin  Antibiotics:  Treating active infection/contamination beyond 24 hours perioperative coverage

## 2017-10-04 NOTE — RESPIRATORY CARE
Rapid response called on Patient. Pt vomited and possibly aspirated. Pt NT suctioned\. X-ray of chest performed. Pt placed on OXy mask at 6 lpm ans titrated to 1lpm NC.

## 2017-10-04 NOTE — PROGRESS NOTES
Josie Osuna Fall Risk Assessment:     Last Known Fall: Within the last six months  Mobility: Use of assistive device/requires assist of two people  Medications: Cardiovascular or central nervous system meds  Mental Status/LOC/Awareness: Oriented to person and place  Toileting Needs: Incontinence  Volume/Electrolyte Status: No problems  Communication/Sensory: Neuropathy  Behavior: Ethanol/substance abuse  Josie Osuna Fall Risk Total: 19  Fall Risk Level: HIGH RISK    Universal Fall Precautions:  call light/belongings in reach, bed in low position and locked, wheelchairs and assistive devices out of sight, siderails up x 2, use non-slip footwear, adequate lighting, clutter free and spill free environment, educate on level of risk, educate to call for assistance    Fall Risk Level Interventions:    TRIAL ([employer] 8, NEURO, MED MARINA 5) Moderate Fall Risk Interventions  Place yellow fall risk ID band on patient: verified  Provide patient/family education based on risk assessment : verified  Educate patient/family to call staff for assistance when getting out of bed: verified  Place fall precaution signage outside patient door: verified  Utilize bed/chair fall alarm: verifiedTRIAL ([employer] 8, NEURO, [employer] MARINA 5) High Fall Risk Interventions  Place yellow fall risk ID band on patient: verified  Provide patient/family education based on risk assessment: completed  Educate patient/family to call staff for assistance when getting out of bed: completed  Place fall precaution signage outside patient door: verified  Place patient in room close to nursing station: verified  Utilize bed/chair fall alarm: verified  Notify charge of high risk for huddle: completed    Patient Specific Interventions:     Medication: review medications with patient and family  Mental Status/LOC/Awareness: check on patient hourly, utilize bed/chair fall alarm and reinforce the use of call light  Toileting: provide frquent toileting  Volume/Electrolyte  Status: ensure patient remains hydrated and monitor abnormal lab values  Communication/Sensory: update plan of care on whiteboard  Behavioral: administer medication as ordered  Mobility: utilize bed/chair fall alarm, ensure bed is locked and in lowest position and instruct patient to exit bed on their strongest side

## 2017-10-04 NOTE — CARE PLAN
Problem: Discharge Barriers/Planning  Goal: Patient's continuum of care needs will be met    Intervention: Involve patient and significant other/support system in setting and prioritizing goals for hospital stay and discharge  Possible discharge home with caregiver when medically clear.

## 2017-10-04 NOTE — PROGRESS NOTES
"Change in patient condition due to: Respiratory  Rapid Response called at: 1555    Physician Crystal notified at 1600  See [employer] timeline for rapid response events. Patient Remained on Unit   Pt had episode of uncontrolled cough and vomited. O2 needs increased from 2L nasal cannula to 10L via mask. 50mL of contents suctioned from pt. Rapid response called, chest x-ray performed at bedside.  MD stat page placed. Pt placed on strict NPO pending swallow evaluation. Awaiting MD page back for follow up chest x-ray.   Pt states he \"feels better and can breathe a lot easier\".   "

## 2017-10-04 NOTE — DISCHARGE PLANNING
"SW met with pt at bedside, Laury was also present. SW asked Laury's relationshp to pt and she stated \"friend, family, caregiver\". SW is unsure if Laury is actually related to the patient. Much of this information came from Laury who often spoke over the patient. As SW was discussing pt's wishes, Laury asked that this SW \"Stop talking to him, he's not there\".   Norma explained that he receives HH through [facility] agency partner and they advised that the patient go on hospice so that he can have more caregivers. When SW inquired about pt's end of life planning, Norma stated \"We are not planning for end of life. Do not mention end of life around Isiah.\" SW explained that this was the role of hospice and if that is not what the pt wants, then we will not pursue hospice care.   SW inquired about the pt's ability to go to a SNF for long term placement through his medicaid benefit. Laury stated she does not want him in a \"body room\" and does not want to pursue long term care. SW inquired about pt's living situation. Laury lives with pt and pt helps pay the rent. Laury states that if Isiah moves out, she will be unable to afford the apartment and would have to leave the area. SW inquired about GH placement for his care needs and again mentioned LTC at SNF and Laury declined stating \"they take all his money\". SW explained that the state supplements the additional cost of the care he requires, which is a great benefit and allows him to get the care he needs. Norma declined. Norma is hoping to get extra caregivers at home through medicaid.   SW explained that a SNF level of care is recommended at this time for this pt's medical needs, however this option was declined. SW directed conversation to the pt and explained this recommendation. Pt understood. Pt would like to go home with HH for therapies. SW will discuss with care team.  [email or web address] search returned a relative/ neighbor of Rae Fortune " and a separate listing for Rae Lagos- unsure of nature of relationship.      Care Transition Team Assessment    Information Source  Orientation : Disoriented to Event, Disoriented to Time  Information Given By: Patient  Who is responsible for making decisions for patient? : Patient    Readmission Evaluation  Is this a readmission?: No    Elopement Risk  Legal Hold: No  Ambulatory or Self Mobile in Wheelchair: No-Not an Elopement Risk  Elopement Risk: Not at Risk for Elopement    Interdisciplinary Discharge Planning  Lives with - Patient's Self Care Capacity: Unable To Determine At This Time  Housing / Facility: [address] [place] [address]  Prior Services: None    Discharge Preparedness  What is your plan after discharge?: Home with help  What are your discharge supports?: Other (comment) (Friend)  Prior Functional Level: Needs Assist with Activities of Daily Living, Needs Assist with Medication Management, Uses Wheelchair, Uses Walker  Difficulity with ADLs: Bathing, Toileting, Walking  Difficulity with IADLs: Cooking, Driving, Managing medication, Shopping    Functional Assesment  Prior Functional Level: Needs Assist with Activities of Daily Living, Needs Assist with Medication Management, Uses Wheelchair, Uses Walker    Finances  Financial Barriers to Discharge: Yes  Average Monthly Income: 700 $  Source of Income: Social Security  Prescription Coverage: Yes              Advance Directive  Advance Directive?: None         Psychological Assessment  History of Substance Abuse: None  History of Psychiatric Problems: No  Newly Diagnosed Illness: No    Discharge Risks or Barriers  Discharge risks or barriers?: Post-acute placement / services  Patient risk factors: Lack of outside supports, Vulnerable adult    Anticipated Discharge Information  Anticipated discharge disposition: Home

## 2017-10-04 NOTE — CARE PLAN
Problem: Venous Thromboembolism (VTW)/Deep Vein Thrombosis (DVT) Prevention:  Goal: Patient will participate in Venous Thrombosis (VTE)/Deep Vein Thrombosis (DVT)Prevention Measures    Intervention: Assess and monitor for anticoagulation complications  Heparin subq administered. No s/s of bleeding. Will continue to monitor.      Problem: Respiratory:  Goal: Respiratory status will improve    Intervention: Educate and encourage incentive spirometry usage  Pt provided IS, needs consistent reminder and education on IS use.

## 2017-10-04 NOTE — THERAPY
"Occupational Therapy Evaluation completed.   Functional Status:  Max/Dependent with ADLs and txfs  Plan of Care: Will benefit from Occupational Therapy 3 times per week  Discharge Recommendations:  Equipment: Will Continue to Assess for Equipment Needs. Post-acute therapy Discharge to a transitional care facility for continued skilled therapy services.    See \"Rehab Therapy-Acute\" Patient Summary Report for complete documentation.    "

## 2017-10-04 NOTE — PROGRESS NOTES
oJsie Osuna Fall Risk Assessment:     Last Known Fall: No falls  Mobility: Dizziness/generalized weakness, Use of assistive device/requires assist of two people  Medications: Cardiovascular or central nervous system meds  Mental Status/LOC/Awareness: Memory loss/confusion and requires reorienting  Toileting Needs: Incontinence  Volume/Electrolyte Status: Use of IV fluids/tube feeds  Communication/Sensory: No deficits  Behavior: Depression/anxiety  Josie Osuna Fall Risk Total: 17  Fall Risk Level: HIGH RISK     Universal Fall Precautions:  call light/belongings in reach, bed in low position and locked, wheelchairs and assistive devices out of sight, siderails up x 2, use non-slip footwear, clutter free and spill free environment, adequate lighting, educate on level of risk, educate to call for assistance     Fall Risk Level Interventions:     TRIAL (TELE 8, NEURO, MED MARINA 5) High Fall Risk Interventions  Place yellow fall risk ID band on patient: verified  Provide patient/family education based on risk assessment: completed  Educate patient/family to call staff for assistance when getting out of bed: completed  Place fall precaution signage outside patient door: verified  Place patient in room close to nursing station: verified  Utilize bed/chair fall alarm: verified  Notify charge of high risk for huddle: verified     Patient Specific Interventions:      Medication: review medications with patient and family  Mental Status/LOC/Awareness: reorient patient, reinforce falls education, check on patient hourly and reinforce the use of call light  Toileting: monitor intake and output/use of appropriate interventions and instruct patient/family on the need to call for assistance when toileting  Volume/Electrolyte Status: ensure patient remains hydrated, monitor abnormal lab values and ensure IV fluids are appropriate  Communication/Sensory: update plan of care on whiteboard  Behavioral: encourage patient to voice  feelings, engage patient in daily activities and instruct/reinforce fall program rationale  Mobility: utilize bed/chair fall alarm, ensure bed is locked and in lowest position, provide appropriate assistive device and instruct patient to exit bed on their strongest side

## 2017-10-05 ENCOUNTER — APPOINTMENT (OUTPATIENT)
Dept: RADIOLOGY | Facility: MEDICAL CENTER | Age: 72
DRG: 640 | End: 2017-10-05
Attending: INTERNAL MEDICINE
Payer: MEDICARE

## 2017-10-05 LAB
ANION GAP SERPL CALC-SCNC: 12 MMOL/L (ref 0–11.9)
BACTERIA BLD CULT: NORMAL
BACTERIA BLD CULT: NORMAL
BUN SERPL-MCNC: 9 MG/DL (ref 8–22)
CALCIUM SERPL-MCNC: 9 MG/DL (ref 8.5–10.5)
CHLORIDE SERPL-SCNC: 95 MMOL/L (ref 96–112)
CO2 SERPL-SCNC: 22 MMOL/L (ref 20–33)
CREAT SERPL-MCNC: 0.59 MG/DL (ref 0.5–1.4)
ERYTHROCYTE [DISTWIDTH] IN BLOOD BY AUTOMATED COUNT: 45 FL (ref 35.9–50)
GFR SERPL CREATININE-BSD FRML MDRD: >60 ML/MIN/1.73 M 2
GLUCOSE SERPL-MCNC: 122 MG/DL (ref 65–99)
HCT VFR BLD AUTO: 38 % (ref 42–52)
HGB BLD-MCNC: 11.6 G/DL (ref 14–18)
MCH RBC QN AUTO: 24.2 PG (ref 27–33)
MCHC RBC AUTO-ENTMCNC: 30.5 G/DL (ref 33.7–35.3)
MCV RBC AUTO: 79.2 FL (ref 81.4–97.8)
PLATELET # BLD AUTO: 598 K/UL (ref 164–446)
PMV BLD AUTO: 9.3 FL (ref 9–12.9)
POTASSIUM SERPL-SCNC: 3.8 MMOL/L (ref 3.6–5.5)
RBC # BLD AUTO: 4.8 M/UL (ref 4.7–6.1)
SIGNIFICANT IND 70042: NORMAL
SIGNIFICANT IND 70042: NORMAL
SITE SITE: NORMAL
SITE SITE: NORMAL
SODIUM SERPL-SCNC: 129 MMOL/L (ref 135–145)
SOURCE SOURCE: NORMAL
SOURCE SOURCE: NORMAL
WBC # BLD AUTO: 19 K/UL (ref 4.8–10.8)

## 2017-10-05 PROCEDURE — G8997 SWALLOW GOAL STATUS: HCPCS | Mod: CK

## 2017-10-05 PROCEDURE — 700111 HCHG RX REV CODE 636 W/ 250 OVERRIDE (IP): Performed by: HOSPITALIST

## 2017-10-05 PROCEDURE — 700102 HCHG RX REV CODE 250 W/ 637 OVERRIDE(OP): Performed by: HOSPITALIST

## 2017-10-05 PROCEDURE — 700111 HCHG RX REV CODE 636 W/ 250 OVERRIDE (IP): Performed by: FAMILY MEDICINE

## 2017-10-05 PROCEDURE — 71010 DX-CHEST-PORTABLE (1 VIEW): CPT

## 2017-10-05 PROCEDURE — 770006 HCHG ROOM/CARE - MED/SURG/GYN SEMI*

## 2017-10-05 PROCEDURE — G8996 SWALLOW CURRENT STATUS: HCPCS | Mod: CL

## 2017-10-05 PROCEDURE — 85027 COMPLETE CBC AUTOMATED: CPT

## 2017-10-05 PROCEDURE — 92612 ENDOSCOPY SWALLOW (FEES) VID: CPT

## 2017-10-05 PROCEDURE — 36415 COLL VENOUS BLD VENIPUNCTURE: CPT

## 2017-10-05 PROCEDURE — 92610 EVALUATE SWALLOWING FUNCTION: CPT

## 2017-10-05 PROCEDURE — 700101 HCHG RX REV CODE 250: Performed by: INTERNAL MEDICINE

## 2017-10-05 PROCEDURE — 80048 BASIC METABOLIC PNL TOTAL CA: CPT

## 2017-10-05 PROCEDURE — A9270 NON-COVERED ITEM OR SERVICE: HCPCS | Performed by: HOSPITALIST

## 2017-10-05 PROCEDURE — 99233 SBSQ HOSP IP/OBS HIGH 50: CPT | Performed by: INTERNAL MEDICINE

## 2017-10-05 PROCEDURE — 700111 HCHG RX REV CODE 636 W/ 250 OVERRIDE (IP): Performed by: INTERNAL MEDICINE

## 2017-10-05 PROCEDURE — 97535 SELF CARE MNGMENT TRAINING: CPT

## 2017-10-05 RX ORDER — FUROSEMIDE 10 MG/ML
20 INJECTION INTRAMUSCULAR; INTRAVENOUS ONCE
Status: COMPLETED | OUTPATIENT
Start: 2017-10-05 | End: 2017-10-05

## 2017-10-05 RX ADMIN — GABAPENTIN 100 MG: 100 CAPSULE ORAL at 09:54

## 2017-10-05 RX ADMIN — HEPARIN SODIUM 5000 UNITS: 5000 INJECTION, SOLUTION INTRAVENOUS; SUBCUTANEOUS at 14:00

## 2017-10-05 RX ADMIN — METOPROLOL TARTRATE 50 MG: 50 TABLET, FILM COATED ORAL at 19:41

## 2017-10-05 RX ADMIN — HEPARIN SODIUM 5000 UNITS: 5000 INJECTION, SOLUTION INTRAVENOUS; SUBCUTANEOUS at 22:00

## 2017-10-05 RX ADMIN — METRONIDAZOLE 500 MG: 500 INJECTION, SOLUTION INTRAVENOUS at 22:00

## 2017-10-05 RX ADMIN — FUROSEMIDE 40 MG: 10 INJECTION, SOLUTION INTRAVENOUS at 00:08

## 2017-10-05 RX ADMIN — CEFTRIAXONE 2 G: 2 INJECTION, SOLUTION INTRAVENOUS at 11:26

## 2017-10-05 RX ADMIN — ATORVASTATIN CALCIUM 40 MG: 40 TABLET, FILM COATED ORAL at 19:41

## 2017-10-05 RX ADMIN — HEPARIN SODIUM 5000 UNITS: 5000 INJECTION, SOLUTION INTRAVENOUS; SUBCUTANEOUS at 05:38

## 2017-10-05 RX ADMIN — STANDARDIZED SENNA CONCENTRATE AND DOCUSATE SODIUM 2 TABLET: 8.6; 5 TABLET, FILM COATED ORAL at 09:54

## 2017-10-05 RX ADMIN — METRONIDAZOLE 500 MG: 500 INJECTION, SOLUTION INTRAVENOUS at 14:01

## 2017-10-05 RX ADMIN — ASPIRIN 81 MG: 81 TABLET, COATED ORAL at 09:54

## 2017-10-05 RX ADMIN — FUROSEMIDE 20 MG: 10 INJECTION, SOLUTION INTRAMUSCULAR; INTRAVENOUS at 11:26

## 2017-10-05 RX ADMIN — THERA TABS 1 TABLET: TAB at 09:54

## 2017-10-05 RX ADMIN — GABAPENTIN 100 MG: 100 CAPSULE ORAL at 19:41

## 2017-10-05 RX ADMIN — FOLIC ACID 1 MG: 1 TABLET ORAL at 09:54

## 2017-10-05 RX ADMIN — METOPROLOL TARTRATE 50 MG: 50 TABLET, FILM COATED ORAL at 09:54

## 2017-10-05 RX ADMIN — STANDARDIZED SENNA CONCENTRATE AND DOCUSATE SODIUM 2 TABLET: 8.6; 5 TABLET, FILM COATED ORAL at 19:41

## 2017-10-05 ASSESSMENT — ENCOUNTER SYMPTOMS
WEAKNESS: 1
DEPRESSION: 0
ABDOMINAL PAIN: 0
MEMORY LOSS: 1
HEARTBURN: 0
WHEEZING: 0
FLANK PAIN: 0
MYALGIAS: 1
DIZZINESS: 0
HEADACHES: 0
CHILLS: 0
NERVOUS/ANXIOUS: 0
BACK PAIN: 0
LOSS OF CONSCIOUSNESS: 0
FOCAL WEAKNESS: 0
SPUTUM PRODUCTION: 0
NAUSEA: 0
COUGH: 1
FEVER: 0
PALPITATIONS: 0
SHORTNESS OF BREATH: 1

## 2017-10-05 ASSESSMENT — PAIN SCALES - GENERAL
PAINLEVEL_OUTOF10: 0

## 2017-10-05 NOTE — CARE PLAN
Problem: Knowledge Deficit  Goal: Knowledge of disease process/condition, treatment plan, diagnostic tests, and medications will improve  POC: FEES ordered    Problem: Discharge Barriers/Planning  Goal: Patient's continuum of care needs will be met  Ethics consult placed to determine if pt has medical decision capacity.

## 2017-10-05 NOTE — THERAPY
"Speech Language Therapy FEES completed.  Functional Status: Pt with silent penetration before and during the swallow for pudding and thin liquids.  Pt with delayed cough post aspiration.  Modified diet and strategies are required for safety with risk of descending aspiration present during po intake.    Recommendations - OME for strengthening of swallow musculature.       Diet:  D2/NTL with double swallow and alternating liquids/soft solids to clear solids fully from pharynx.  NO PUDDING.                            Strategies: Direct supervision during meals, Assistance needed for meal tray set-up, No Straws and Head of Bed at 90 Degrees                          Medication Administration:  Crush in puree  Plan of Care: Will benefit from Speech Therapy 3 times per week  Post-Acute Therapy: Discharge to a transitional care facility for continued skilled therapy services.    See \"Rehab Therapy-Acute\" Patient Summary Report for complete documentation.   "

## 2017-10-05 NOTE — CONSULTS
Patient Name: Isiah Lagos  Patient Age, Gender: 72 y.o., male  Date of Consult:10/5/2017      Name of Requesting Consultant(s): Isha Rojas D.O.  Consultant: DAMIAN Arenas M.D.   Reason for Consult: Geriatrics assessment for Dementia and decision making capacity.     Chief Complaint:   Chief Complaint   Patient presents with   • ALOC     x 5 days per care giver. A&O x 2   • UTI     smeels of urine, denies urinary sympt. pt incont of urine. wearing breif.        History of Present Illness:  He was admitted to the hospital for UTI and multiple wounds; Right BKA wound, Right knee wound, Left hip and scrotal ulcer. He was found to have hyponatremia due to hypovolemia and protein caloric malnutrition.   He was treated for the UTI with antibiotics, reviewed by wound team and dietitian. He is at risk of aspiration with frequent choking and shortness of breath. Increased amount of phlegm and coughing.   After asking the patient about his caregiver he mention she is his wife, although it documented that she is sister-in-law.     ROS: a ten point ROS is negative other than as stated above in HPI.     Past Medical History:   Diagnosis Date   • Stroke (CMS-[facility]) 2015   • Myocardial infarct 2010   • Cancer (CMS-[facility]) 2001    prostate   • Arthritis     osteoarthritis   • Emphysema    • Hypothyroid        Current Facility-Administered Medications:   •  cefTRIAXone (ROCEPHIN) 2 g in 50 mL D5W IVPB premix, 2 g, Intravenous, Q24HRS, Isha Rojas D.O.  •  metronidazole (FLAGYL) IVPB 500 mg, 500 mg, Intravenous, Q8HRS, DIEGO Narayanan.CRISTOPHER.  •  furosemide (LASIX) injection 20 mg, 20 mg, Intravenous, Once, Isha Rojas D.O.  •  Respiratory Care per Protocol, , Nebulization, Continuous RT, Isha Rojas D.O.  •  ipratropium-albuterol (DUONEB) nebulizer solution 3 mL, 3 mL, Nebulization, PRN, Isha Rojas D.O.  •  aspirin EC (ECOTRIN) tablet 81 mg, 81 mg, Oral, DAILY, John Paul Mckeon M.D., 81 mg at 10/05/17  0954  •  atorvastatin (LIPITOR) tablet 40 mg, 40 mg, Oral, QHS, John Paul Mckeon M.D., Stopped at 10/04/17 2100  •  folic acid (FOLVITE) tablet 1 mg, 1 mg, Oral, DAILY, John Paul Mckeon M.D., 1 mg at 10/05/17 0954  •  gabapentin (NEURONTIN) capsule 100 mg, 100 mg, Oral, TID, John Paul Mckeon M.D., 100 mg at 10/05/17 0954  •  levothyroxine (SYNTHROID) tablet 75 mcg, 75 mcg, Oral, QAM AC, John Paul Mckeon M.D., Stopped at 10/05/17 0700  •  metoprolol (LOPRESSOR) tablet 50 mg, 50 mg, Oral, BID, John Paul Mckeon M.D., 50 mg at 10/05/17 0954  •  multivitamin (THERAGRAN) tablet 1 Tab, 1 Tab, Oral, DAILY, John Paul Mckeon M.D., 1 Tab at 10/05/17 0954  •  labetalol (NORMODYNE,TRANDATE) injection 10 mg, 10 mg, Intravenous, Q4HRS PRN, John Paul Mckeon M.D., 10 mg at 10/04/17 2342  •  ondansetron (ZOFRAN) syringe/vial injection 4 mg, 4 mg, Intravenous, Q4HRS PRN, John Paul Mckeon M.D., 4 mg at 10/04/17 1623  •  ondansetron (ZOFRAN ODT) dispertab 4 mg, 4 mg, Oral, Q4HRS PRN, John Paul Mckeon M.D.  •  senna-docusate (PERICOLACE or SENOKOT S) 8.6-50 MG per tablet 2 Tab, 2 Tab, Oral, BID, 2 Tab at 10/05/17 0954 **AND** polyethylene glycol/lytes (MIRALAX) PACKET 1 Packet, 1 Packet, Oral, QDAY PRN **AND** magnesium hydroxide (MILK OF MAGNESIA) suspension 30 mL, 30 mL, Oral, QDAY PRN **AND** bisacodyl (DULCOLAX) suppository 10 mg, 10 mg, Rectal, QDAY PRN, John Paul Mckeon M.D., Stopped at 09/30/17 2046  •  acetaminophen (TYLENOL) tablet 650 mg, 650 mg, Oral, Q6HRS PRN, John Paul Mckeon M.D.  •  heparin injection 5,000 Units, 5,000 Units, Subcutaneous, Q8HRS, John Paul Mckeon M.D., 5,000 Units at 10/05/17 0538  Social History     Social History Narrative   • No narrative on file     No family history on file.  Past Surgical History:   Procedure Laterality Date   • FEMORAL ENDARTERECTOMY Right 7/8/2017    Procedure: FEMORAL ENDARTERECTOMY;  Surgeon: Frantz Rodriguez M.D.;  Location: SURGERY [facility];   Service:    • ILIAC ANGIOPLASTY WITH STENT Right 7/8/2017    Procedure: ILIAC ANGIOPLASTY WITH STENT;  Surgeon: Frantz Rodriguez M.D.;  Location: [facility];  Service:    • KNEE AMPUTATION BELOW Right 7/8/2017    Procedure: KNEE AMPUTATION BELOW;  Surgeon: Frantz Rodriguez M.D.;  Location: [facility];  Service:    • FOOT AMPUTATION Left 5/26/2017    Procedure: FOOT AMPUTATION- TRANSMETATARSAL;  Surgeon: Joesph Monroy M.D.;  Location: [facility];  Service:    • FEMORAL POPLITEAL BYPASS Left 4/17/2017    Procedure: FEMORAL POPLITEAL BYPASS;  Surgeon: Joesph Monroy M.D.;  Location: [facility];  Service:    • URETHROTOMY INTERNAL N/A 3/28/2017    Procedure: URETHRAL DILATION;  Surgeon: Gus Borges M.D.;  Location: [facility];  Service:    • RETROGRADES N/A 3/28/2017    Procedure: RETROGRADE URETHRAGRAM;  Surgeon: Gus Borges M.D.;  Location: [facility];  Service:    • CYSTOSCOPY  3/28/2017    Procedure: CYSTOSCOPY;  Surgeon: Gus Borges M.D.;  Location: [facility];  Service:    • OTHER      Thyroid Brachi (Laser instead of cutting)   • OTHER      Vasectomy         Physical Exam:  /84   Pulse (!) 127   Temp 37.6 °C (99.6 °F)   Resp 20   Ht 1.829 m (6')   Wt 55.4 kg (122 lb 2.2 oz)   SpO2 96%   BMI 16.56 kg/m²       Alert: Yes   Orientation: oriented to city and hospital, but doesn't know the name of the hospital, disoriented to year/day but recalls it is October.   Attention: Yes   Vision Screen: he does not require eye glasses.   Hearing Screen: no hearing impairment.   Ambulation screen: transfers to the chair.     Constitutional: not in distress, not in pain.   Thin and look sick, with frequent coughing and choking signs.   H&N: temporal muscle wasting,   CVS: normal heart sound, no added sound MGR .   Chest: equal breath sound , there is coarse crepitation and frequent coughing,  No wheezing   Skin: Skin is warm and dry. He is not diaphoretic. No erythema.     Neurocognitive: He scores 2/5 in the mini cog exam, he recall 1 out of 3 words, and just draw the out line of the clock.   In the capacity assessment:   He fail in the assessment of the understanding of his medical needs and situation. He is just mentioning that he is sick but not any more, he didn't show understanding about his wounds and his lung problem.   He also didn't shows any expression of choices , he just keeps saying he want to go home, and there is no reasoning about the choices of other facilities.       Assessment: a 72 years old male with multiple comorbidities and decreased insight, appears to lack decision making capacity for the placement choices compatible for his needs.       Plan:  -advice for SW to contact the caregiver and ask her to provide proof of the relation with the patient.   -recommend ethics consultation   -Patient need SNF level of care due to the need for care of his wounds and to keep skin integrity intact.   -dependent for most ADLs needs assistance for transfers, toileting, bathing etc.   -PT/OT assessment and evaluation.   -full evaluation by Psychiatry for cognition for possible caregiver assignment in the future.       Thank you for this interesting follow up. We will continue to follow this patient along with you.     Esvin Sinclair M.D.  [facility]  Available Monday-Friday 8:00-5:00

## 2017-10-05 NOTE — PROGRESS NOTES
Called MD on call regarding pt respiratory condition, 02 sat between  at 4L/nc, deep shallow breathing RR 29/min, /107, HR 144b/min, both lung sounds wet. And crackles noted, suctioning done frequently yellowish thick secretion, high mendez position, with order made and carried out, will monitor breathing.

## 2017-10-05 NOTE — PALLIATIVE CARE
Palliative Care follow-up  Ethics referral received and forwarded to Dr. Prdao and Dr. Blackmon.      Updated: Rashida Valentin and Palliative Care team    Plan: Pending    Thank you for allowing Palliative Care to participate in this patient's care. Please feel free to call [phone] with any questions or concerns.

## 2017-10-05 NOTE — CARE PLAN
Problem: Respiratory:  Goal: Respiratory status will improve    Intervention: Administer and titrate oxygen therapy  Titrated 02 per patient needs, encouraged deep breathing and coughing effectively, suctioning done when needed.

## 2017-10-05 NOTE — PROGRESS NOTES
[facility] Hospitalist Progress Note    Date of Service: 10/5/2017    Chief Complaint  72 y.o. male admitted 2017 with urinary tract infection and confusion    Interval Problem Update  Increased shortness of breath  Intermittent cough  Oriented ×2, reports that his caregiver is actually his wife Norma, we will verify her relationship, as it is unclear if she can be his power of   There is concern for possible capacity for medical decision-making  Reports improved strength    Consultants/Specialty  None    Disposition  Needs skilled nursing placement  PT/OT        Review of Systems   Constitutional: Negative for chills and fever.   HENT: Negative for congestion.    Respiratory: Positive for cough and shortness of breath. Negative for sputum production and wheezing.    Cardiovascular: Negative for chest pain, palpitations and leg swelling.   Gastrointestinal: Negative for abdominal pain, heartburn and nausea.   Genitourinary: Negative for dysuria, flank pain and urgency.   Musculoskeletal: Positive for myalgias. Negative for back pain and joint pain.   Neurological: Positive for weakness. Negative for dizziness, focal weakness, loss of consciousness and headaches.   Psychiatric/Behavioral: Positive for memory loss. Negative for depression. The patient is not nervous/anxious.       Physical Exam  Laboratory/Imaging   Hemodynamics  Temp (24hrs), Av.9 °C (98.5 °F), Min:36.1 °C (97 °F), Max:37.6 °C (99.6 °F)   Temperature: 37.2 °C (99 °F)  Pulse  Av.9  Min: 69  Max: 144    Blood Pressure : 122/73      Respiratory      Respiration: 17, Pulse Oximetry: 96 %        RUL Breath Sounds: Crackles, RML Breath Sounds: Crackles, RLL Breath Sounds: Crackles, RIGO Breath Sounds: Crackles, LLL Breath Sounds: Crackles    Fluids    Intake/Output Summary (Last 24 hours) at 10/05/17 1628  Last data filed at 10/05/17 1623   Gross per 24 hour   Intake              575 ml   Output             2000 ml   Net            -1425  ml       Nutrition  Orders Placed This Encounter   Procedures   • DIET NPO     Standing Status:   Standing     Number of Occurrences:   1     Order Specific Question:   Restrict to:     Answer:   Strict [1]     Comments:   potential aspiration      Physical Exam   Constitutional: No distress.   Chronically ill-appearing  Frail, thin   HENT:   Head: Normocephalic and atraumatic.   Nose: Nose normal.   Eyes: Conjunctivae and EOM are normal. Pupils are equal, round, and reactive to light.   Neck: Normal range of motion. Neck supple.   Cardiovascular: Normal rate, regular rhythm and intact distal pulses.    Pulmonary/Chest: Effort normal. No respiratory distress. He has no wheezes.   Decreased bilaterally   Abdominal: Soft. Bowel sounds are normal. He exhibits no distension. There is no tenderness.   Musculoskeletal: He exhibits no edema or tenderness.   Neurological: He is alert. He is disoriented. No cranial nerve deficit. Coordination normal.   Skin: Skin is warm and dry. He is not diaphoretic. No erythema.   Psychiatric: His behavior is normal. Judgment and thought content normal.   Nursing note and vitals reviewed.      Recent Labs      10/03/17   0701  10/05/17   0313   WBC  12.4*  19.0*   RBC  4.27*  4.80   HEMOGLOBIN  10.4*  11.6*   HEMATOCRIT  32.3*  38.0*   MCV  75.6*  79.2*   MCH  24.4*  24.2*   MCHC  32.2*  30.5*   RDW  41.8  45.0   PLATELETCT  510*  598*   MPV  8.4*  9.3     Recent Labs      10/03/17   0701  10/04/17   0243  10/05/17   0313   SODIUM  126*  127*  129*   POTASSIUM  3.3*  3.4*  3.8   CHLORIDE  98  99  95*   CO2  20  22  22   GLUCOSE  98  78  122*   BUN  5*  8  9   CREATININE  0.41*  0.50  0.59   CALCIUM  8.1*  8.2*  9.0                      Assessment/Plan     * Cellulitis of right knee- (present on admission)   Assessment & Plan    Active Orders     Ordered     Ordering Provider       Sat Sep 30, 2017 12:30 PM    09/30/17 1230  ampicillin/sulbactam (UNASYN) 3 g in  mL IVPB  EVERY 6  HOURS      John Paul Mckeon M.D.      Suspect source of his acute ills  DC IV antibiotics on October 3  Change to oral Bactrim        Pneumonia   Assessment & Plan    Aspiration pneumonia  Feces today  Nothing by mouth  SLP evaluation  RT protocol  Oxygen supplementation as needed  Encourage incentive spirometer use  Had Rocephin and Flagyl IV  Follow-up respiratory culture        Hyponatremia- (present on admission)   Assessment & Plan    Etiology not clear  Improving  Continue with fluids  serum  Osm never done  urine osmolality-within normal limits          PAD (peripheral artery disease) (CMS-HCC)- (present on admission)   Assessment & Plan    Medical mgmt, 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  Will continue ASA and statin        UTI (urinary tract infection)- (present on admission)   Assessment & Plan    Sugar negative  On Bactrim        Essential hypertension- (present on admission)   Assessment & Plan    SBP goal less than 140 mmHg  DBP goal less than 90 mmHg  PRN and antihypertensives to titrate by hospitalist towards goal  Continue with metoprolol 50 mg twice a day        Severe protein-calorie malnutrition (CMS-HCC)- (present on admission)   Assessment & Plan    Nutrition to see  Encourage intake            Reviewed items::  Radiology images reviewed, Labs reviewed and Medications reviewed  DVT prophylaxis pharmacological::  Heparin  Antibiotics:  Treating active infection/contamination beyond 24 hours perioperative coverage

## 2017-10-05 NOTE — PROGRESS NOTES
Pt fall asleep, resting, HR 96-98b/min, RR 22/min, 02 sat at 4l/nc 100%, Urine output 450 ml condom catheter, will continue to monitor respiratory status.

## 2017-10-05 NOTE — PROGRESS NOTES
Received report, assumed pt care. Pt a&o 3, VSS, assessment completed. Resting comfortably in bed with call light, bedside table in reach. No c/o at this time. Side rails up 2. Instructed to use call light when needing to get OOB, verbalized understanding. Bed alarm on, bed in low position. Will continue to monitor.

## 2017-10-05 NOTE — THERAPY
PT contact note:  pt found in bed and reprtos he does not want to get up or do any activity at this time. Pt reprots he is going home, confirmed w/ SW that pt is not going home and dc is up in the air due to social issues. Pt again refused mobiltiy and wants to rest. educated pt on importance of OOB activity and function but still refused.

## 2017-10-05 NOTE — THERAPY
"Speech Language Therapy Clinical Swallow Evaluation completed.  Functional Status: S/s consistent with penetration or aspiration during the swallow for both thin and ntl.  Pt able to clear gurgly breath sounds but an addtl diagnostic is warranted.  Requested order for FEES.  Recommendations - Diet:  NPO pending FEES.                          Strategies: Head of Bed at 90 Degrees; to be assesssed                          Medication Administration: crush in puree pending FEES.   Plan of Care: Will benefit from Speech Therapy 3 times per week  Post-Acute Therapy: Discharge to a transitional care facility for continued skilled therapy services.    See \"Rehab Therapy-Acute\" Patient Summary Report for complete documentation.   "

## 2017-10-05 NOTE — CARE PLAN
Problem: Discharge Barriers/Planning  Goal: Patient's continuum of care needs will be met    Intervention: Collaborate with Transitional Care Team and Interdisciplinary Team to meet discharge needs  Possible discharge to home with HH per patient request when medically clear.

## 2017-10-05 NOTE — DOCUMENTATION QUERY
"DOCUMENTATION QUERY    PROVIDERS: Please select “Cosign w/ note”to reply to query.    To better represent the severity of illness of your patient, please review the following information and exercise your independent professional judgment in responding to this query.     \"Confused for a few days\" is documented in the History and Physical. Based upon the clinical findings, risk factors, and treatment, can this diagnosis be further specified?    • Acute encephalopathy (if so, please specify type [metabolic, hepatic, toxic, alcoholic, etc.])  • Psychosis (if so, please specify type [acute hysterical, alcoholic, etc.])  • Delirium (if so, please specify underlying cause [alcohol intoxication, alcohol withdrawal, multiple etiologies, unknown etiology])   • Other explanation of clinical findings  • Unable to determine    The medical record reflects the following:   Clinical Findings - WBC 13.5 on admission  - dx Cellulitis R Knee  - dx UTI   Treatment - Rocephin  - Flagyl  - Unasyn  - NS Bolus  - RT/O2 per Protocol  - CBC  - BMP  - Blood Cx  - Urine Cx   Risk Factors - dx UTI  - dx Cellulitis R Knee  - dx Hyponatremia  - dx Severe Protein-Calorie Malnutrition   Location within medical record  History and Physical, Progress Notes, Lab Results  and MAR.     Thank you,     Costa Viramontes  Clinical   P: [phone]  "

## 2017-10-05 NOTE — PROGRESS NOTES
Received awake and oriented x 2, follows simple command, deep shallow breathing, no fever,  b/min especially with movement, weak and congested cough noticed, crackle both lungs, kept on 02 at 3 l/min via 0xy mask 92-96%, yanker suction at the bed side, yellowish and thick secretion, position on high mendez, instructed deep breathing, coughing technique, proper use of suction and refrain from taking off 02 mask, rounding RRR team came and seen, bed alarm in placed, q 2 turn, kept skin dry and clean from incontinence, will continue to monitor breathing status.

## 2017-10-05 NOTE — DISCHARGE PLANNING
"In further review of pt chart, pt was on service with [facility]. In reviewing notes, in July 2017 note state pt's sister in law is primary caregiver and began feeling overwhelmed by pt's LOC. Pt was unable to ambulate stairs/ steps and requests assist for transfer. Pt dependent on assist with ADL's. Pt was found in soiled briefs.  Norma requested extra caregiving house as her own medical issues were worsening. They were interested in SNF placement at [facility] at this time. Pt was also A&Ox4.    Other notes from Home Care Visits state \"He and SO in discourse/ upset re situation, both upset with the other.\" It remains unclear what their relationship is.     "

## 2017-10-06 PROBLEM — F03.90 DEMENTIA (HCC): Status: ACTIVE | Noted: 2017-10-06

## 2017-10-06 LAB
ANION GAP SERPL CALC-SCNC: 9 MMOL/L (ref 0–11.9)
BASOPHILS # BLD AUTO: 1.5 % (ref 0–1.8)
BASOPHILS # BLD: 0.2 K/UL (ref 0–0.12)
BUN SERPL-MCNC: 10 MG/DL (ref 8–22)
CALCIUM SERPL-MCNC: 8.7 MG/DL (ref 8.5–10.5)
CHLORIDE SERPL-SCNC: 98 MMOL/L (ref 96–112)
CO2 SERPL-SCNC: 23 MMOL/L (ref 20–33)
CREAT SERPL-MCNC: 0.47 MG/DL (ref 0.5–1.4)
EOSINOPHIL # BLD AUTO: 0.36 K/UL (ref 0–0.51)
EOSINOPHIL NFR BLD: 2.7 % (ref 0–6.9)
ERYTHROCYTE [DISTWIDTH] IN BLOOD BY AUTOMATED COUNT: 44.2 FL (ref 35.9–50)
GFR SERPL CREATININE-BSD FRML MDRD: >60 ML/MIN/1.73 M 2
GLUCOSE SERPL-MCNC: 85 MG/DL (ref 65–99)
HCT VFR BLD AUTO: 33.7 % (ref 42–52)
HGB BLD-MCNC: 10.6 G/DL (ref 14–18)
IMM GRANULOCYTES # BLD AUTO: 0.2 K/UL (ref 0–0.11)
IMM GRANULOCYTES NFR BLD AUTO: 1.5 % (ref 0–0.9)
LYMPHOCYTES # BLD AUTO: 1.03 K/UL (ref 1–4.8)
LYMPHOCYTES NFR BLD: 7.6 % (ref 22–41)
MCH RBC QN AUTO: 24.4 PG (ref 27–33)
MCHC RBC AUTO-ENTMCNC: 31.5 G/DL (ref 33.7–35.3)
MCV RBC AUTO: 77.6 FL (ref 81.4–97.8)
MONOCYTES # BLD AUTO: 1.05 K/UL (ref 0–0.85)
MONOCYTES NFR BLD AUTO: 7.8 % (ref 0–13.4)
NEUTROPHILS # BLD AUTO: 10.65 K/UL (ref 1.82–7.42)
NEUTROPHILS NFR BLD: 78.9 % (ref 44–72)
NRBC # BLD AUTO: 0 K/UL
NRBC BLD AUTO-RTO: 0 /100 WBC
PLATELET # BLD AUTO: 535 K/UL (ref 164–446)
PMV BLD AUTO: 9.2 FL (ref 9–12.9)
POTASSIUM SERPL-SCNC: 3.6 MMOL/L (ref 3.6–5.5)
RBC # BLD AUTO: 4.34 M/UL (ref 4.7–6.1)
SODIUM SERPL-SCNC: 130 MMOL/L (ref 135–145)
WBC # BLD AUTO: 13.5 K/UL (ref 4.8–10.8)

## 2017-10-06 PROCEDURE — 770006 HCHG ROOM/CARE - MED/SURG/GYN SEMI*

## 2017-10-06 PROCEDURE — 80048 BASIC METABOLIC PNL TOTAL CA: CPT

## 2017-10-06 PROCEDURE — 700111 HCHG RX REV CODE 636 W/ 250 OVERRIDE (IP): Performed by: HOSPITALIST

## 2017-10-06 PROCEDURE — 700102 HCHG RX REV CODE 250 W/ 637 OVERRIDE(OP): Performed by: HOSPITALIST

## 2017-10-06 PROCEDURE — 94640 AIRWAY INHALATION TREATMENT: CPT

## 2017-10-06 PROCEDURE — 85025 COMPLETE CBC W/AUTO DIFF WBC: CPT

## 2017-10-06 PROCEDURE — A9270 NON-COVERED ITEM OR SERVICE: HCPCS

## 2017-10-06 PROCEDURE — 36415 COLL VENOUS BLD VENIPUNCTURE: CPT

## 2017-10-06 PROCEDURE — 700101 HCHG RX REV CODE 250: Performed by: INTERNAL MEDICINE

## 2017-10-06 PROCEDURE — 92526 ORAL FUNCTION THERAPY: CPT

## 2017-10-06 PROCEDURE — A9270 NON-COVERED ITEM OR SERVICE: HCPCS | Performed by: HOSPITALIST

## 2017-10-06 PROCEDURE — 99233 SBSQ HOSP IP/OBS HIGH 50: CPT | Performed by: INTERNAL MEDICINE

## 2017-10-06 PROCEDURE — 700111 HCHG RX REV CODE 636 W/ 250 OVERRIDE (IP): Performed by: INTERNAL MEDICINE

## 2017-10-06 PROCEDURE — 700102 HCHG RX REV CODE 250 W/ 637 OVERRIDE(OP)

## 2017-10-06 PROCEDURE — 94760 N-INVAS EAR/PLS OXIMETRY 1: CPT

## 2017-10-06 RX ORDER — METRONIDAZOLE 500 MG/1
500 TABLET ORAL EVERY 8 HOURS
Status: DISCONTINUED | OUTPATIENT
Start: 2017-10-06 | End: 2017-10-07

## 2017-10-06 RX ADMIN — THERA TABS 1 TABLET: TAB at 09:36

## 2017-10-06 RX ADMIN — CEFTRIAXONE 2 G: 2 INJECTION, SOLUTION INTRAVENOUS at 09:36

## 2017-10-06 RX ADMIN — ATORVASTATIN CALCIUM 40 MG: 40 TABLET, FILM COATED ORAL at 20:18

## 2017-10-06 RX ADMIN — LEVOTHYROXINE SODIUM 75 MCG: 75 TABLET ORAL at 06:25

## 2017-10-06 RX ADMIN — GABAPENTIN 100 MG: 100 CAPSULE ORAL at 14:27

## 2017-10-06 RX ADMIN — HEPARIN SODIUM 5000 UNITS: 5000 INJECTION, SOLUTION INTRAVENOUS; SUBCUTANEOUS at 21:00

## 2017-10-06 RX ADMIN — METOPROLOL TARTRATE 50 MG: 50 TABLET, FILM COATED ORAL at 09:36

## 2017-10-06 RX ADMIN — STANDARDIZED SENNA CONCENTRATE AND DOCUSATE SODIUM 2 TABLET: 8.6; 5 TABLET, FILM COATED ORAL at 20:19

## 2017-10-06 RX ADMIN — ASPIRIN 81 MG: 81 TABLET, COATED ORAL at 09:36

## 2017-10-06 RX ADMIN — METRONIDAZOLE 500 MG: 500 TABLET ORAL at 14:27

## 2017-10-06 RX ADMIN — GABAPENTIN 100 MG: 100 CAPSULE ORAL at 20:18

## 2017-10-06 RX ADMIN — METRONIDAZOLE 500 MG: 500 TABLET ORAL at 21:00

## 2017-10-06 RX ADMIN — METRONIDAZOLE 500 MG: 500 INJECTION, SOLUTION INTRAVENOUS at 06:30

## 2017-10-06 RX ADMIN — HEPARIN SODIUM 5000 UNITS: 5000 INJECTION, SOLUTION INTRAVENOUS; SUBCUTANEOUS at 14:27

## 2017-10-06 RX ADMIN — GABAPENTIN 100 MG: 100 CAPSULE ORAL at 09:36

## 2017-10-06 RX ADMIN — HEPARIN SODIUM 5000 UNITS: 5000 INJECTION, SOLUTION INTRAVENOUS; SUBCUTANEOUS at 06:25

## 2017-10-06 RX ADMIN — METOPROLOL TARTRATE 50 MG: 50 TABLET, FILM COATED ORAL at 20:18

## 2017-10-06 RX ADMIN — FOLIC ACID 1 MG: 1 TABLET ORAL at 09:36

## 2017-10-06 ASSESSMENT — ENCOUNTER SYMPTOMS
SHORTNESS OF BREATH: 1
MYALGIAS: 1
FOCAL WEAKNESS: 0
COUGH: 1
FLANK PAIN: 0
PALPITATIONS: 0
NERVOUS/ANXIOUS: 0
DIARRHEA: 0
NAUSEA: 0
MEMORY LOSS: 1
CONSTIPATION: 0
ABDOMINAL PAIN: 0
SPUTUM PRODUCTION: 0
WEAKNESS: 1
DIAPHORESIS: 0
DIZZINESS: 0

## 2017-10-06 ASSESSMENT — LIFESTYLE VARIABLES
DO YOU DRINK ALCOHOL: YES
TOTAL SCORE: 0
EVER HAD A DRINK FIRST THING IN THE MORNING TO STEADY YOUR NERVES TO GET RID OF A HANGOVER: NO
CONSUMPTION TOTAL: NEGATIVE
TOTAL SCORE: 0
AVERAGE NUMBER OF DAYS PER WEEK YOU HAVE A DRINK CONTAINING ALCOHOL: 7
HAVE YOU EVER FELT YOU SHOULD CUT DOWN ON YOUR DRINKING: NO
HAVE PEOPLE ANNOYED YOU BY CRITICIZING YOUR DRINKING: NO
HOW MANY TIMES IN THE PAST YEAR HAVE YOU HAD 5 OR MORE DRINKS IN A DAY: 0
TOTAL SCORE: 0
ON A TYPICAL DAY WHEN YOU DRINK ALCOHOL HOW MANY DRINKS DO YOU HAVE: 1
EVER FELT BAD OR GUILTY ABOUT YOUR DRINKING: NO

## 2017-10-06 ASSESSMENT — PAIN SCALES - GENERAL: PAINLEVEL_OUTOF10: 0

## 2017-10-06 NOTE — CARE PLAN
Problem: Nutritional:  Goal: Achieve adequate nutritional intake  Patient will consume 50% of meals   Outcome: PROGRESSING AS EXPECTED  See dietary note.

## 2017-10-06 NOTE — DISCHARGE PLANNING
"Please see ethics consult under author Juliann Prado for this pt's d/c plan.    Norma does not have decision making capacity for this pt and pt is not oriented to make decisions. Guardianship is being pursued at this time.   \"the patient is unable to exercise self direction re medical decisions. He is unrepresented. His former caregiver is acting inimically to his best interests and should not be allowed to take him home in his current condition.\"     This  has emailed Terrence Lutz with Digna's legal team to begin this process. Patient should not be able to leave the hospital at this time. This information was left on  Weekend Report.   "

## 2017-10-06 NOTE — THERAPY
"Speech Language Therapy dysphagia treatment completed.   Functional Status:  Dysphagia present; pt tolerating ntl sips with 1:1 supervision to verbally cue to clear intermittent gurgly voice.  Pt without recall for FEES eval yesterday although pt did recall SLP.  Strategies reviewed and discussed with RN; 1:1 assist and supervision is required during meals.  OME introduced with fair accuracy with visual model and 1:1 assist needed to complete.  NO PUDDING given asp during FEES.  Recommendations: Continue D2/NTL with strategies with strategies and close monitor due to risk of descending aspiration.   Plan of Care: Will benefit from Speech Therapy 3 times per week  Post-Acute Therapy: Discharge to a transitional care facility for continued skilled therapy services.    See \"Rehab Therapy-Acute\" Patient Summary Report for complete documentation.     "

## 2017-10-06 NOTE — CONSULTS
"ETHICS CONSULT  Entered for Eamon Blackmon MD by Juliann Prado MD    Patient Isiah Lagos  MRN [id number]    This is a 73 yr old male with severe and disabling peripheral vascular disease with resulting requirement for wheelchair care. He lives at \"a house\" with a woman (Norma) who has variously self-identitied herself as his wife or sister and he identifies? her as his wife, but she in fact is not related in any manner.    Nomra was not present for this consult, but has told the care team that she wants him to be discharged back into her care at her apartment because she can only afford it with his income.    The care team has identified? two issues of ethical import:  1. He does not have capacity to make decisions regarding his care or disposition.  2. Norma does not have the skills to care for him and is acting in her best interest rather than his.    The patient has also apparently exhausted his allowed SNF days.    Examination  The patient is not oriented x 3 (unable to give the date or reason he is here).   The patient is cachectic in appearance, has nasal oxygen around his neck but not in his nares, and has a productive cough. He is unable to push himself up in bed.    I asked him if he knew that his doctors wanted him to spend some time in the SNF to regain strength before discharge home and he told me that he is going home Sunday (his nurse says that he has been adamant all day that he was going home today). He told me that he can go home anytime and \"just use my wheelchair\".     Impression  This elderly patient does not currently possess capacity to make medical decisions and does not have sufficient strength to be discharged home.    By history, his caregiver is motivated financially and is not acting in his best interest in desiring to take him home. He would likely further decline medically in that setting and likely be readmitted in much worse physical condition than he is now.    Ethical " considerations  Autonomy - the patient is unable to exercise self direction re medical decisions. He is unrepresented. His former caregiver is acting inimically to his best interests and should not be allowed to take him home in his current condition.    Beneficence - the patient's best interest is to be placed in a location with 24 hour care by professionals and rehabilitated to optimal condition prior to consideration of placement at home.    Non-maleficence - it would be contrary to this principle to allow the patient to be discharged home.    Recommendation  I would engage Legal to engender a court appointed guardian to allow transfer to [facility]. I would also ask the court to appoint a guardian to manage the patient's legal and financial affairs and to redirect his benefits check to the guardian.    I discussed these findings with the care team.    I am happy to discuss this case or to return to reconsult if conditions should change.  Eamon Blackmon MD, MBA MA  [phone]

## 2017-10-06 NOTE — ASSESSMENT & PLAN NOTE
Admitted for encephalopathy and debility.   Has been evaluated by ethics committee, unable to make his own medical decisions.  Guardianship pending

## 2017-10-06 NOTE — PROGRESS NOTES
[facility]ist Progress Note    Date of Service: 10/6/2017    Chief Complaint  72 y.o. male admitted 2017 with urinary tract infection and confusion    Interval Problem Update  Shortness of breath improving  Still taking short, minimal breath  Decreasing cough    Consultants/Specialty  None    Disposition  Needs skilled nursing placement  PT/OT  Pending guardianship    assisting      Review of Systems   Constitutional: Negative for diaphoresis and malaise/fatigue.   HENT: Negative for congestion and hearing loss.    Respiratory: Positive for cough and shortness of breath. Negative for sputum production.    Cardiovascular: Negative for palpitations and leg swelling.   Gastrointestinal: Negative for abdominal pain, constipation, diarrhea and nausea.   Genitourinary: Negative for flank pain and urgency.   Musculoskeletal: Positive for myalgias. Negative for joint pain.   Neurological: Positive for weakness. Negative for dizziness and focal weakness.   Psychiatric/Behavioral: Positive for memory loss. The patient is not nervous/anxious.       Physical Exam  Laboratory/Imaging   Hemodynamics  Temp (24hrs), Av.3 °C (97.4 °F), Min:36.1 °C (97 °F), Max:36.8 °C (98.3 °F)   Temperature: 36.8 °C (98.3 °F)  Pulse  Av.2  Min: 69  Max: 144    Blood Pressure : 143/76      Respiratory      Respiration: 18, Pulse Oximetry: 91 %, O2 Daily Delivery Respiratory : Room Air with O2 Available     Given By:: Mouthpiece  RUL Breath Sounds: Diminished, RML Breath Sounds: Diminished, RLL Breath Sounds: Diminished, RIGO Breath Sounds: Diminished, LLL Breath Sounds: Diminished    Fluids  No intake or output data in the 24 hours ending 10/06/17 1628    Nutrition  Orders Placed This Encounter   Procedures   • DIET ORDER     Standing Status:   Standing     Number of Occurrences:   1     Order Specific Question:   Diet:     Answer:   Regular [1]     Order Specific Question:   Texture/Fiber modifications:     Answer:    Dysphagia 2(Pureed/Chopped)specify fluid consistency(question 6) [2]     Order Specific Question:   Consistency/Fluid modifications:     Answer:   Nectar Thick [2]     Physical Exam   Constitutional: No distress.   Chronically ill-appearing  Frail, thin   HENT:   Head: Normocephalic and atraumatic.   Nose: Nose normal.   Eyes: EOM are normal. Pupils are equal, round, and reactive to light.   Neck: Normal range of motion.   Cardiovascular: Normal rate and regular rhythm.    Pulmonary/Chest: Effort normal. No respiratory distress. He has no rales.   Decreased bilaterally   Abdominal: Soft. Bowel sounds are normal. He exhibits no distension. There is no tenderness.   Musculoskeletal: He exhibits no edema or tenderness.   Neurological: He is alert. He is disoriented. No cranial nerve deficit. He exhibits normal muscle tone. Coordination normal.   Skin: Skin is warm and dry. He is not diaphoretic. There is pallor.   Psychiatric: His behavior is normal. Judgment and thought content normal.   Nursing note and vitals reviewed.      Recent Labs      10/05/17   0313  10/06/17   0212   WBC  19.0*  13.5*   RBC  4.80  4.34*   HEMOGLOBIN  11.6*  10.6*   HEMATOCRIT  38.0*  33.7*   MCV  79.2*  77.6*   MCH  24.2*  24.4*   MCHC  30.5*  31.5*   RDW  45.0  44.2   PLATELETCT  598*  535*   MPV  9.3  9.2     Recent Labs      10/04/17   0243  10/05/17   0313  10/06/17   0212   SODIUM  127*  129*  130*   POTASSIUM  3.4*  3.8  3.6   CHLORIDE  99  95*  98   CO2  22  22  23   GLUCOSE  78  122*  85   BUN  8  9  10   CREATININE  0.50  0.59  0.47*   CALCIUM  8.2*  9.0  8.7                      Assessment/Plan     * Cellulitis of right knee- (present on admission)   Assessment & Plan    Active Orders     Ordered     Ordering Provider       Sat Sep 30, 2017 12:30 PM    09/30/17 1230  ampicillin/sulbactam (UNASYN) 3 g in  mL IVPB  EVERY 6 HOURS      John Paul Mckeon M.D.      Suspect source of his acute ills  DC IV antibiotics on October  3  Now on IV antibiotics to cover for UTI/aspiration pneumonia/cellulitis        Pneumonia   Assessment & Plan    Aspiration pneumonia    SLP evaluation  RT protocol  Oxygen supplementation as needed  Encourage incentive spirometer use  Continue Rocephin and Flagyl IV  Follow-up respiratory culture        Hyponatremia- (present on admission)   Assessment & Plan    Etiology not clear  Improving  Continue with fluids  serum  Osm never done  urine osmolality-within normal limits          PAD (peripheral artery disease) (CMS-HCC)- (present on admission)   Assessment & Plan    Medical mgmt, 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  Will continue ASA and statin        Dementia   Assessment & Plan    With encephalopathy and significant debility  Has been evaluated by ethics committee, unable to make his own medical decisions  Sent to legal for further guardianship pending   to assist  We'll need skilled nursing placement        UTI (urinary tract infection)- (present on admission)   Assessment & Plan      Antibiotics as above        Essential hypertension- (present on admission)   Assessment & Plan    SBP goal less than 140 mmHg  DBP goal less than 90 mmHg  PRN and antihypertensives to titrate by hospitalist towards goal  Continue with metoprolol 50 mg twice a day        Severe protein-calorie malnutrition (CMS-HCC)- (present on admission)   Assessment & Plan    Nutrition to see  Encourage intake            Reviewed items::  Radiology images reviewed, Labs reviewed and Medications reviewed  DVT prophylaxis pharmacological::  Heparin  Antibiotics:  Treating active infection/contamination beyond 24 hours perioperative coverage

## 2017-10-06 NOTE — DIETARY
Nutrition Services: Brief Update  Spoke with patient at bedside regarding po intake. Patient stated he believes he has been eating at least 50% of his meals since his diet was advanced to a dysphagia 2 nectar thick diet. Only 2 meals documented, but patient is eating 50-75% per chart. No new weight documented since 9/30, patient currently weighs 55.4 kg via bed scale. Patient seemed very eager to eat and was enjoying his chocolate magic cup during the time we spoke and wants to continue receiving those.     Pertinent Labs: Sodium: 130, Creatinine: 0.47  Pertinent Meds: rocephin, folic acid, gabapentin, synthroid, flagyl, theragran, pericolace   Fluids: NS infusion 75 ml/hr  Skin: unstageable pressure ulcer right knee anterior, left hip wound not pressure related, stage 2 pressure ulcer sacrum posterior - wound team will be following up as needed   GI: Last BM 10/4    PLAN/RECOMMEND -  1) Nutrition rep to see patient daily for meal and snack preferences.  2) Encourage PO  3) Weekly weights to monitor fluid and nutrition status  4) Vitamin therapy for wound healing: Multivitamin with minerals daily and 500 mg Vitamin C BID for 14 days     RD following

## 2017-10-07 PROCEDURE — 700102 HCHG RX REV CODE 250 W/ 637 OVERRIDE(OP): Performed by: INTERNAL MEDICINE

## 2017-10-07 PROCEDURE — 700102 HCHG RX REV CODE 250 W/ 637 OVERRIDE(OP)

## 2017-10-07 PROCEDURE — 700102 HCHG RX REV CODE 250 W/ 637 OVERRIDE(OP): Performed by: HOSPITALIST

## 2017-10-07 PROCEDURE — A9270 NON-COVERED ITEM OR SERVICE: HCPCS | Performed by: HOSPITALIST

## 2017-10-07 PROCEDURE — A9270 NON-COVERED ITEM OR SERVICE: HCPCS | Performed by: INTERNAL MEDICINE

## 2017-10-07 PROCEDURE — 770006 HCHG ROOM/CARE - MED/SURG/GYN SEMI*

## 2017-10-07 PROCEDURE — 700111 HCHG RX REV CODE 636 W/ 250 OVERRIDE (IP): Performed by: HOSPITALIST

## 2017-10-07 PROCEDURE — 99232 SBSQ HOSP IP/OBS MODERATE 35: CPT | Performed by: INTERNAL MEDICINE

## 2017-10-07 PROCEDURE — A9270 NON-COVERED ITEM OR SERVICE: HCPCS

## 2017-10-07 RX ORDER — AMOXICILLIN AND CLAVULANATE POTASSIUM 875; 125 MG/1; MG/1
1 TABLET, FILM COATED ORAL EVERY 12 HOURS
Status: COMPLETED | OUTPATIENT
Start: 2017-10-07 | End: 2017-10-14

## 2017-10-07 RX ADMIN — METOPROLOL TARTRATE 75 MG: 25 TABLET, FILM COATED ORAL at 22:11

## 2017-10-07 RX ADMIN — ATORVASTATIN CALCIUM 40 MG: 40 TABLET, FILM COATED ORAL at 22:34

## 2017-10-07 RX ADMIN — THERA TABS 1 TABLET: TAB at 09:48

## 2017-10-07 RX ADMIN — ASPIRIN 81 MG: 81 TABLET, COATED ORAL at 09:45

## 2017-10-07 RX ADMIN — AMOXICILLIN AND CLAVULANATE POTASSIUM 1 TABLET: 875; 125 TABLET, FILM COATED ORAL at 09:41

## 2017-10-07 RX ADMIN — HEPARIN SODIUM 5000 UNITS: 5000 INJECTION, SOLUTION INTRAVENOUS; SUBCUTANEOUS at 22:15

## 2017-10-07 RX ADMIN — GABAPENTIN 100 MG: 100 CAPSULE ORAL at 22:11

## 2017-10-07 RX ADMIN — METRONIDAZOLE 500 MG: 500 TABLET ORAL at 05:08

## 2017-10-07 RX ADMIN — HEPARIN SODIUM 5000 UNITS: 5000 INJECTION, SOLUTION INTRAVENOUS; SUBCUTANEOUS at 13:36

## 2017-10-07 RX ADMIN — GABAPENTIN 100 MG: 100 CAPSULE ORAL at 09:48

## 2017-10-07 RX ADMIN — GABAPENTIN 100 MG: 100 CAPSULE ORAL at 13:36

## 2017-10-07 RX ADMIN — METOPROLOL TARTRATE 75 MG: 25 TABLET, FILM COATED ORAL at 09:42

## 2017-10-07 RX ADMIN — LEVOTHYROXINE SODIUM 75 MCG: 75 TABLET ORAL at 05:08

## 2017-10-07 RX ADMIN — AMOXICILLIN AND CLAVULANATE POTASSIUM 1 TABLET: 875; 125 TABLET, FILM COATED ORAL at 22:11

## 2017-10-07 RX ADMIN — FOLIC ACID 1 MG: 1 TABLET ORAL at 09:45

## 2017-10-07 RX ADMIN — STANDARDIZED SENNA CONCENTRATE AND DOCUSATE SODIUM 2 TABLET: 8.6; 5 TABLET, FILM COATED ORAL at 22:11

## 2017-10-07 RX ADMIN — HEPARIN SODIUM 5000 UNITS: 5000 INJECTION, SOLUTION INTRAVENOUS; SUBCUTANEOUS at 05:08

## 2017-10-07 ASSESSMENT — ENCOUNTER SYMPTOMS
HEADACHES: 0
ABDOMINAL PAIN: 0
FLANK PAIN: 0
MYALGIAS: 1
SPUTUM PRODUCTION: 0
COUGH: 1
SHORTNESS OF BREATH: 1
FOCAL WEAKNESS: 0
NAUSEA: 0
WHEEZING: 0
FEVER: 0
MEMORY LOSS: 1
WEAKNESS: 1
NERVOUS/ANXIOUS: 0
PALPITATIONS: 0
DEPRESSION: 0

## 2017-10-07 ASSESSMENT — PAIN SCALES - GENERAL
PAINLEVEL_OUTOF10: 0

## 2017-10-07 ASSESSMENT — PATIENT HEALTH QUESTIONNAIRE - PHQ9
SUM OF ALL RESPONSES TO PHQ9 QUESTIONS 1 AND 2: 0
2. FEELING DOWN, DEPRESSED, IRRITABLE, OR HOPELESS: NOT AT ALL
SUM OF ALL RESPONSES TO PHQ QUESTIONS 1-9: 0
1. LITTLE INTEREST OR PLEASURE IN DOING THINGS: NOT AT ALL

## 2017-10-07 NOTE — PROGRESS NOTES
[facility]ist Progress Note    Date of Service: 10/7/2017    Chief Complaint  72 y.o. male admitted 2017 with urinary tract infection and confusion    Interval Problem Update  No new events  Shortness of breath improving  Encourage incentive spirometer use, patient with difficulty with comprehension of directions    Consultants/Specialty  None    Disposition  Needs skilled nursing placement  PT/OT  Pending guardianship    assisting      Review of Systems   Constitutional: Negative for fever.   HENT: Negative for hearing loss.    Respiratory: Positive for cough and shortness of breath. Negative for sputum production and wheezing.    Cardiovascular: Negative for palpitations and leg swelling.   Gastrointestinal: Negative for abdominal pain and nausea.   Genitourinary: Negative for flank pain and urgency.   Musculoskeletal: Positive for myalgias. Negative for joint pain.   Neurological: Positive for weakness. Negative for focal weakness and headaches.   Psychiatric/Behavioral: Positive for memory loss. Negative for depression. The patient is not nervous/anxious.       Physical Exam  Laboratory/Imaging   Hemodynamics  Temp (24hrs), Av.4 °C (97.5 °F), Min:36.1 °C (97 °F), Max:36.7 °C (98 °F)   Temperature: 36.7 °C (98 °F)  Pulse  Av.4  Min: 69  Max: 144    Blood Pressure : (!) 177/107      Respiratory      Respiration: 18, Pulse Oximetry: 96 %, O2 Daily Delivery Respiratory : Nasal Cannula     Given By:: Mouthpiece  RUL Breath Sounds: Diminished, RML Breath Sounds: Diminished, RLL Breath Sounds: Diminished, RIGO Breath Sounds: Diminished, LLL Breath Sounds: Diminished    Fluids    Intake/Output Summary (Last 24 hours) at 10/07/17 1243  Last data filed at 10/06/17 1815   Gross per 24 hour   Intake              600 ml   Output              200 ml   Net              400 ml       Nutrition  Orders Placed This Encounter   Procedures   • DIET ORDER     Standing Status:   Standing     Number of  Occurrences:   1     Order Specific Question:   Diet:     Answer:   Regular [1]     Order Specific Question:   Texture/Fiber modifications:     Answer:   Dysphagia 2(Pureed/Chopped)specify fluid consistency(question 6) [2]     Order Specific Question:   Consistency/Fluid modifications:     Answer:   Nectar Thick [2]     Physical Exam   Constitutional: No distress.   Chronically ill-appearing  Frail, thin   HENT:   Head: Normocephalic and atraumatic.   Nose: Nose normal.   Eyes: EOM are normal. Pupils are equal, round, and reactive to light. No scleral icterus.   Neck: Normal range of motion.   Cardiovascular: Normal rate and regular rhythm.    Pulmonary/Chest: Effort normal. He has no wheezes. He has no rales.   Decreased bilaterally   Abdominal: Soft. Bowel sounds are normal. He exhibits no distension and no mass. There is no tenderness.   Musculoskeletal: He exhibits no edema or tenderness.   Neurological: He is alert. He is disoriented. No cranial nerve deficit. Coordination normal.   Skin: Skin is warm and dry. No pallor.   Psychiatric: His behavior is normal. Judgment normal.   Nursing note and vitals reviewed.      Recent Labs      10/05/17   0313  10/06/17   0212   WBC  19.0*  13.5*   RBC  4.80  4.34*   HEMOGLOBIN  11.6*  10.6*   HEMATOCRIT  38.0*  33.7*   MCV  79.2*  77.6*   MCH  24.2*  24.4*   MCHC  30.5*  31.5*   RDW  45.0  44.2   PLATELETCT  598*  535*   MPV  9.3  9.2     Recent Labs      10/05/17   0313  10/06/17   0212   SODIUM  129*  130*   POTASSIUM  3.8  3.6   CHLORIDE  95*  98   CO2  22  23   GLUCOSE  122*  85   BUN  9  10   CREATININE  0.59  0.47*   CALCIUM  9.0  8.7                      Assessment/Plan     * Cellulitis of right knee- (present on admission)   Assessment & Plan    Active Orders     Ordered     Ordering Provider       Sat Sep 30, 2017 12:30 PM    09/30/17 1230  ampicillin/sulbactam (UNASYN) 3 g in  mL IVPB  EVERY 6 HOURS      John Paul Mckeon M.D.      Suspect source of his  acute ills  DC IV antibiotics on October 3  Now on IV antibiotics to cover for UTI/aspiration pneumonia/cellulitis        Pneumonia   Assessment & Plan    Aspiration pneumonia    SLP evaluation  RT protocol  Oxygen supplementation as needed  Encourage incentive spirometer use  DC Rocephin and Flagyl IV  Change to oral antibiotics  Follow-up respiratory culture        Hyponatremia- (present on admission)   Assessment & Plan    Etiology not clear  Improving  Continue with fluids  serum  Osm never done  urine osmolality-within normal limits          PAD (peripheral artery disease) (CMS-HCC)- (present on admission)   Assessment & Plan    Medical mgmt, 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  Will continue ASA and statin        Dementia   Assessment & Plan    With encephalopathy and significant debility  Has been evaluated by ethics committee, unable to make his own medical decisions  Sent to legal for further guardianship pending   to assist  We'll need skilled nursing placement        UTI (urinary tract infection)- (present on admission)   Assessment & Plan      Antibiotics as above        Essential hypertension- (present on admission)   Assessment & Plan    SBP goal less than 140 mmHg  DBP goal less than 90 mmHg  PRN and antihypertensives to titrate by hospitalist towards goal  Continue with metoprolol 50 mg twice a day        Severe protein-calorie malnutrition (CMS-HCC)- (present on admission)   Assessment & Plan    Nutrition to see  Encourage intake            Reviewed items::  Radiology images reviewed, Labs reviewed and Medications reviewed  DVT prophylaxis pharmacological::  Heparin  Antibiotics:  Treating active infection/contamination beyond 24 hours perioperative coverage

## 2017-10-07 NOTE — PROGRESS NOTES
Upon entering pt room, Pt had removed O2 NC. Sats 84%. Reapplied O2 Nc, sats elevated to 98%. Reinforced importance of keeping O2 on.

## 2017-10-07 NOTE — PROGRESS NOTES
Turned.  Up to 90 degree to eat. Crushed pills in applesauce.  No aspiration noted. Only drank thickened drink for breakfast.  Dressings intact ot r BKA. B/P up and reported to .  She changed metropolol dose and will recheck q 4 hrs.  Incontinent.

## 2017-10-07 NOTE — PROGRESS NOTES
Report received from chikis rn. Assumed care of pt at 0715. Pt awake, sitting up in bed, on O2 at 3L NC- sats 92-98%. Pt repeatedly removes NC, educated reason for needing O2 and importance of keeping NC in place. Pt morning BP was elevated, MD notified, adjusted metoprolol dose, will check vitals throughout shift Q 4hrs. Incontinent. Dysphagia diet. Meds crushed in applesauce. Q 2H turns. Skin clean and dry, barrier cream applied. Dressings on R BKA clean, dry and intact. A&O x 2. Pt needs attended, bed locked in lowest position, bedside table, belongings and call light within reach.

## 2017-10-07 NOTE — CARE PLAN
Problem: Discharge Barriers/Planning  Goal: Patient's continuum of care needs will be met    Intervention: Collaborate with Transitional Care Team and Interdisciplinary Team to meet discharge needs  Awaiting guardianship prior to placement.      Problem: Skin Integrity  Goal: Risk for impaired skin integrity will decrease    Intervention: Assess risk factors for impaired skin integrity and/or pressure ulcers  q 2 turn and kept skin dry and clean.

## 2017-10-07 NOTE — PROGRESS NOTES
Received awake and oriented x 2, eating his dinner sitting in bed, on 02 at 2l/nc at 93-97%, HR  b/min, call light within reach, bed alarm in placed, incontinence of b/b, q 2 turn, kept skin dry and clean, barrier cream applied, needs attended, will continue to monitor.

## 2017-10-08 PROBLEM — D75.839 THROMBOCYTOSIS: Status: ACTIVE | Noted: 2017-10-08

## 2017-10-08 LAB
ANION GAP SERPL CALC-SCNC: 9 MMOL/L (ref 0–11.9)
BASOPHILS # BLD AUTO: 1.5 % (ref 0–1.8)
BASOPHILS # BLD: 0.22 K/UL (ref 0–0.12)
BUN SERPL-MCNC: 14 MG/DL (ref 8–22)
CALCIUM SERPL-MCNC: 8.9 MG/DL (ref 8.5–10.5)
CHLORIDE SERPL-SCNC: 97 MMOL/L (ref 96–112)
CO2 SERPL-SCNC: 26 MMOL/L (ref 20–33)
CREAT SERPL-MCNC: 0.56 MG/DL (ref 0.5–1.4)
EKG IMPRESSION: NORMAL
EOSINOPHIL # BLD AUTO: 0.39 K/UL (ref 0–0.51)
EOSINOPHIL NFR BLD: 2.6 % (ref 0–6.9)
ERYTHROCYTE [DISTWIDTH] IN BLOOD BY AUTOMATED COUNT: 44.3 FL (ref 35.9–50)
GFR SERPL CREATININE-BSD FRML MDRD: >60 ML/MIN/1.73 M 2
GLUCOSE SERPL-MCNC: 113 MG/DL (ref 65–99)
HCT VFR BLD AUTO: 36 % (ref 42–52)
HGB BLD-MCNC: 11.2 G/DL (ref 14–18)
IMM GRANULOCYTES # BLD AUTO: 0.17 K/UL (ref 0–0.11)
IMM GRANULOCYTES NFR BLD AUTO: 1.1 % (ref 0–0.9)
LYMPHOCYTES # BLD AUTO: 1.26 K/UL (ref 1–4.8)
LYMPHOCYTES NFR BLD: 8.5 % (ref 22–41)
MCH RBC QN AUTO: 23.9 PG (ref 27–33)
MCHC RBC AUTO-ENTMCNC: 31.1 G/DL (ref 33.7–35.3)
MCV RBC AUTO: 76.9 FL (ref 81.4–97.8)
MONOCYTES # BLD AUTO: 0.96 K/UL (ref 0–0.85)
MONOCYTES NFR BLD AUTO: 6.5 % (ref 0–13.4)
NEUTROPHILS # BLD AUTO: 11.79 K/UL (ref 1.82–7.42)
NEUTROPHILS NFR BLD: 79.8 % (ref 44–72)
NRBC # BLD AUTO: 0 K/UL
NRBC BLD AUTO-RTO: 0 /100 WBC
PLATELET # BLD AUTO: 756 K/UL (ref 164–446)
PMV BLD AUTO: 9 FL (ref 9–12.9)
POTASSIUM SERPL-SCNC: 4.5 MMOL/L (ref 3.6–5.5)
RBC # BLD AUTO: 4.68 M/UL (ref 4.7–6.1)
SODIUM SERPL-SCNC: 132 MMOL/L (ref 135–145)
WBC # BLD AUTO: 14.8 K/UL (ref 4.8–10.8)

## 2017-10-08 PROCEDURE — 99232 SBSQ HOSP IP/OBS MODERATE 35: CPT | Performed by: INTERNAL MEDICINE

## 2017-10-08 PROCEDURE — A9270 NON-COVERED ITEM OR SERVICE: HCPCS | Performed by: HOSPITALIST

## 2017-10-08 PROCEDURE — A9270 NON-COVERED ITEM OR SERVICE: HCPCS | Performed by: INTERNAL MEDICINE

## 2017-10-08 PROCEDURE — 80048 BASIC METABOLIC PNL TOTAL CA: CPT

## 2017-10-08 PROCEDURE — 700102 HCHG RX REV CODE 250 W/ 637 OVERRIDE(OP): Performed by: INTERNAL MEDICINE

## 2017-10-08 PROCEDURE — 700102 HCHG RX REV CODE 250 W/ 637 OVERRIDE(OP): Performed by: HOSPITALIST

## 2017-10-08 PROCEDURE — 302255 BARRIER CREAM MOISTURE BAZA PROTECT (ZINC) 5OZ: Performed by: INTERNAL MEDICINE

## 2017-10-08 PROCEDURE — 770006 HCHG ROOM/CARE - MED/SURG/GYN SEMI*

## 2017-10-08 PROCEDURE — 93010 ELECTROCARDIOGRAM REPORT: CPT | Performed by: INTERNAL MEDICINE

## 2017-10-08 PROCEDURE — 700111 HCHG RX REV CODE 636 W/ 250 OVERRIDE (IP): Performed by: HOSPITALIST

## 2017-10-08 PROCEDURE — 36415 COLL VENOUS BLD VENIPUNCTURE: CPT

## 2017-10-08 PROCEDURE — 85025 COMPLETE CBC W/AUTO DIFF WBC: CPT

## 2017-10-08 PROCEDURE — 93005 ELECTROCARDIOGRAM TRACING: CPT | Performed by: FAMILY MEDICINE

## 2017-10-08 RX ADMIN — THERA TABS 1 TABLET: TAB at 08:10

## 2017-10-08 RX ADMIN — AMOXICILLIN AND CLAVULANATE POTASSIUM 1 TABLET: 875; 125 TABLET, FILM COATED ORAL at 08:11

## 2017-10-08 RX ADMIN — AMOXICILLIN AND CLAVULANATE POTASSIUM 1 TABLET: 875; 125 TABLET, FILM COATED ORAL at 21:59

## 2017-10-08 RX ADMIN — LEVOTHYROXINE SODIUM 75 MCG: 75 TABLET ORAL at 06:00

## 2017-10-08 RX ADMIN — GABAPENTIN 100 MG: 100 CAPSULE ORAL at 21:59

## 2017-10-08 RX ADMIN — GABAPENTIN 100 MG: 100 CAPSULE ORAL at 15:58

## 2017-10-08 RX ADMIN — HEPARIN SODIUM 5000 UNITS: 5000 INJECTION, SOLUTION INTRAVENOUS; SUBCUTANEOUS at 15:58

## 2017-10-08 RX ADMIN — METOPROLOL TARTRATE 75 MG: 25 TABLET, FILM COATED ORAL at 21:59

## 2017-10-08 RX ADMIN — FOLIC ACID 1 MG: 1 TABLET ORAL at 08:11

## 2017-10-08 RX ADMIN — STANDARDIZED SENNA CONCENTRATE AND DOCUSATE SODIUM 2 TABLET: 8.6; 5 TABLET, FILM COATED ORAL at 08:10

## 2017-10-08 RX ADMIN — HEPARIN SODIUM 5000 UNITS: 5000 INJECTION, SOLUTION INTRAVENOUS; SUBCUTANEOUS at 06:00

## 2017-10-08 RX ADMIN — HEPARIN SODIUM 5000 UNITS: 5000 INJECTION, SOLUTION INTRAVENOUS; SUBCUTANEOUS at 21:59

## 2017-10-08 RX ADMIN — METOPROLOL TARTRATE 75 MG: 25 TABLET, FILM COATED ORAL at 08:11

## 2017-10-08 RX ADMIN — ASPIRIN 81 MG: 81 TABLET, COATED ORAL at 08:11

## 2017-10-08 RX ADMIN — ATORVASTATIN CALCIUM 40 MG: 40 TABLET, FILM COATED ORAL at 21:59

## 2017-10-08 RX ADMIN — GABAPENTIN 100 MG: 100 CAPSULE ORAL at 08:10

## 2017-10-08 ASSESSMENT — ENCOUNTER SYMPTOMS
ABDOMINAL PAIN: 0
LOSS OF CONSCIOUSNESS: 0
MEMORY LOSS: 1
FLANK PAIN: 0
CHILLS: 0
FOCAL WEAKNESS: 0
DIZZINESS: 0
WEAKNESS: 1
PALPITATIONS: 0
FEVER: 0
SHORTNESS OF BREATH: 1
NERVOUS/ANXIOUS: 0
COUGH: 1
MYALGIAS: 0
WHEEZING: 0

## 2017-10-08 ASSESSMENT — PAIN SCALES - GENERAL
PAINLEVEL_OUTOF10: 0

## 2017-10-08 NOTE — PROGRESS NOTES
Rec'd report from day shift RN. Assumed pt care. Assessment completed. AA&OX2, reoriented to time and event. Denies pain at this time. No s/s of discomfort or distress. Q2 hour turns enforced. Right BKA noted. Pt is incontinent of urine, cleaned up and linens changed. Dressing noted to ALLYSON DAVIS. Bed in lowest position, bed locked, bed alarm on for safety, treaded sock in place, RN and CNA numbers provided, call light within reach.

## 2017-10-08 NOTE — PROGRESS NOTES
Josie Osuna Fall Risk Assessment:     Last Known Fall: Within the last month  Mobility: Use of assistive device/requires assist of two people  Medications: Cardiovascular or central nervous system meds  Mental Status/LOC/Awareness: Memory loss/confusion and requires reorienting  Toileting Needs: Incontinence  Volume/Electrolyte Status: No problems  Communication/Sensory: Visual (Glasses)/hearing deficit  Behavior: Behavioral noncompliance with instruction  Josie Osuna Fall Risk Total: 19  Fall Risk Level: HIGH RISK    Universal Fall Precautions:  call light/belongings in reach, bed in low position and locked, wheelchairs and assistive devices out of sight, siderails up x 2, use non-slip footwear, adequate lighting, clutter free and spill free environment, educate on level of risk, educate to call for assistance    Fall Risk Level Interventions:     TRIAL (TELE 8, NEURO, MED MARINA 5) High Fall Risk Interventions  Place yellow fall risk ID band on patient: verified  Provide patient/family education based on risk assessment: completed  Educate patient/family to call staff for assistance when getting out of bed: completed  Place fall precaution signage outside patient door: verified  Place patient in room close to nursing station: completed  Utilize bed/chair fall alarm: verified  Notify charge of high risk for huddle: verified    Patient Specific Interventions:     Medication: review medications with patient and family  Mental Status/LOC/Awareness: reorient patient, reinforce falls education, check on patient hourly, utilize bed/chair fall alarm and reinforce the use of call light  Toileting: toilet prior to giving pain medications  Volume/Electrolyte Status: monitor abnormal lab values  Communication/Sensory: update plan of care on whiteboard  Behavioral: administer medication as ordered  Mobility: utilize bed/chair fall alarm and ensure bed is locked and in lowest position

## 2017-10-08 NOTE — CARE PLAN
Problem: Bowel/Gastric:  Goal: Will not experience complications related to bowel motility    Intervention: Assess baseline bowel pattern  Pt agreed to take stool softener.       Problem: Respiratory:  Goal: Respiratory status will improve    Intervention: Administer and titrate oxygen therapy  O2 titrated from 3.5L to 2L NC.  in use.

## 2017-10-08 NOTE — CARE PLAN
Problem: Infection  Goal: Will remain free from infection  Outcome: PROGRESSING AS EXPECTED  Pt taking po antibiotics for infection

## 2017-10-08 NOTE — ASSESSMENT & PLAN NOTE
Possibly reactive?, Fluctuates on a daily basis continue monitor daily CBCs for any acute changes  Stable

## 2017-10-08 NOTE — PROGRESS NOTES
[facility] Hospitalist Progress Note    Date of Service: 10/8/2017    Chief Complaint  72 y.o. male admitted 2017 with urinary tract infection and confusion    Interval Problem Update  Improved respirations  No new complaints    Consultants/Specialty  None    Disposition  Needs skilled nursing placement  PT/OT  Pending guardianship    assisting      Review of Systems   Constitutional: Negative for chills and fever.   HENT: Negative for congestion and hearing loss.    Respiratory: Positive for cough and shortness of breath. Negative for wheezing.    Cardiovascular: Negative for chest pain, palpitations and leg swelling.   Gastrointestinal: Negative for abdominal pain.   Genitourinary: Negative for dysuria and flank pain.   Musculoskeletal: Negative for joint pain and myalgias.   Neurological: Positive for weakness. Negative for dizziness, focal weakness and loss of consciousness.   Psychiatric/Behavioral: Positive for memory loss. The patient is not nervous/anxious.       Physical Exam  Laboratory/Imaging   Hemodynamics  Temp (24hrs), Av.6 °C (97.9 °F), Min:36.3 °C (97.4 °F), Max:36.9 °C (98.4 °F)   Temperature: 36.6 °C (97.8 °F)  Pulse  Av.3  Min: 69  Max: 144    Blood Pressure : 143/77      Respiratory      Respiration: 18, Pulse Oximetry: 94 %        RML Breath Sounds: Diminished, RLL Breath Sounds: Diminished, RIGO Breath Sounds: Diminished, LLL Breath Sounds: Diminished    Fluids    Intake/Output Summary (Last 24 hours) at 10/08/17 1009  Last data filed at 10/07/17 1800   Gross per 24 hour   Intake              480 ml   Output              200 ml   Net              280 ml       Nutrition  Orders Placed This Encounter   Procedures   • DIET ORDER     Standing Status:   Standing     Number of Occurrences:   1     Order Specific Question:   Diet:     Answer:   Regular [1]     Order Specific Question:   Texture/Fiber modifications:     Answer:   Dysphagia 2(Pureed/Chopped)specify fluid  consistency(question 6) [2]     Order Specific Question:   Consistency/Fluid modifications:     Answer:   Nectar Thick [2]     Physical Exam   Constitutional: No distress.   Chronically ill-appearing  Frail, thin   HENT:   Head: Normocephalic and atraumatic.   Nose: Nose normal.   Eyes: EOM are normal. Pupils are equal, round, and reactive to light.   Cardiovascular: Normal rate and regular rhythm.    Pulmonary/Chest: Effort normal. He has no wheezes. He has no rales.   Decreased bilaterally   Abdominal: Soft. Bowel sounds are normal. He exhibits no distension.   Musculoskeletal: He exhibits no edema or tenderness.   Neurological: He is alert. He is disoriented. No cranial nerve deficit.   Skin: Skin is warm and dry. He is not diaphoretic.   Nursing note and vitals reviewed.      Recent Labs      10/06/17   0212  10/08/17   0219   WBC  13.5*  14.8*   RBC  4.34*  4.68*   HEMOGLOBIN  10.6*  11.2*   HEMATOCRIT  33.7*  36.0*   MCV  77.6*  76.9*   MCH  24.4*  23.9*   MCHC  31.5*  31.1*   RDW  44.2  44.3   PLATELETCT  535*  756*   MPV  9.2  9.0     Recent Labs      10/06/17   0212  10/08/17   0219   SODIUM  130*  132*   POTASSIUM  3.6  4.5   CHLORIDE  98  97   CO2  23  26   GLUCOSE  85  113*   BUN  10  14   CREATININE  0.47*  0.56   CALCIUM  8.7  8.9                      Assessment/Plan     * Cellulitis of right knee- (present on admission)   Assessment & Plan    Active Orders     Ordered     Ordering Provider       Sat Sep 30, 2017 12:30 PM    09/30/17 1230  ampicillin/sulbactam (UNASYN) 3 g in  mL IVPB  EVERY 6 HOURS      John Paul Mckeon M.D.      Suspect source of his acute ills  DC IV antibiotics on October 3  Now on IV antibiotics to cover for UTI/aspiration pneumonia/cellulitis        Pneumonia   Assessment & Plan    Aspiration pneumonia    SLP evaluation  RT protocol  Oxygen supplementation as needed  Encourage incentive spirometer use  DC Rocephin and Flagyl IV  Change to oral antibiotics  Follow-up  respiratory culture        Hyponatremia- (present on admission)   Assessment & Plan    Etiology not clear  Improving  Continue with fluids  serum  Osm never done  urine osmolality-within normal limits          PAD (peripheral artery disease) (CMS-HCC)- (present on admission)   Assessment & Plan    Medical mgmt, 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  Will continue ASA and statin        Thrombocytosis (CMS-HCC)   Assessment & Plan    Reactive,   Chronic infection, monitor        Dementia   Assessment & Plan    With encephalopathy and significant debility  Has been evaluated by ethics committee, unable to make his own medical decisions  Sent to legal for further guardianship pending   to assist  We'll need skilled nursing placement        UTI (urinary tract infection)- (present on admission)   Assessment & Plan      Antibiotics as above        Essential hypertension- (present on admission)   Assessment & Plan    SBP goal less than 140 mmHg  DBP goal less than 90 mmHg  PRN and antihypertensives to titrate by hospitalist towards goal  Continue with metoprolol 50 mg twice a day        Severe protein-calorie malnutrition (CMS-HCC)- (present on admission)   Assessment & Plan    Nutrition to see  Encourage intake            Reviewed items::  Radiology images reviewed, Labs reviewed and Medications reviewed  DVT prophylaxis pharmacological::  Heparin  Antibiotics:  Treating active infection/contamination beyond 24 hours perioperative coverage

## 2017-10-09 PROCEDURE — 770006 HCHG ROOM/CARE - MED/SURG/GYN SEMI*

## 2017-10-09 PROCEDURE — 700102 HCHG RX REV CODE 250 W/ 637 OVERRIDE(OP): Performed by: HOSPITALIST

## 2017-10-09 PROCEDURE — 700102 HCHG RX REV CODE 250 W/ 637 OVERRIDE(OP): Performed by: INTERNAL MEDICINE

## 2017-10-09 PROCEDURE — A9270 NON-COVERED ITEM OR SERVICE: HCPCS | Performed by: INTERNAL MEDICINE

## 2017-10-09 PROCEDURE — A9270 NON-COVERED ITEM OR SERVICE: HCPCS | Performed by: HOSPITALIST

## 2017-10-09 PROCEDURE — 99232 SBSQ HOSP IP/OBS MODERATE 35: CPT | Performed by: INTERNAL MEDICINE

## 2017-10-09 PROCEDURE — 700111 HCHG RX REV CODE 636 W/ 250 OVERRIDE (IP): Performed by: HOSPITALIST

## 2017-10-09 RX ADMIN — HEPARIN SODIUM 5000 UNITS: 5000 INJECTION, SOLUTION INTRAVENOUS; SUBCUTANEOUS at 14:30

## 2017-10-09 RX ADMIN — AMOXICILLIN AND CLAVULANATE POTASSIUM 1 TABLET: 875; 125 TABLET, FILM COATED ORAL at 20:52

## 2017-10-09 RX ADMIN — AMOXICILLIN AND CLAVULANATE POTASSIUM 1 TABLET: 875; 125 TABLET, FILM COATED ORAL at 09:17

## 2017-10-09 RX ADMIN — GABAPENTIN 100 MG: 100 CAPSULE ORAL at 14:30

## 2017-10-09 RX ADMIN — THERA TABS 1 TABLET: TAB at 09:17

## 2017-10-09 RX ADMIN — HEPARIN SODIUM 5000 UNITS: 5000 INJECTION, SOLUTION INTRAVENOUS; SUBCUTANEOUS at 20:53

## 2017-10-09 RX ADMIN — HEPARIN SODIUM 5000 UNITS: 5000 INJECTION, SOLUTION INTRAVENOUS; SUBCUTANEOUS at 06:01

## 2017-10-09 RX ADMIN — GABAPENTIN 100 MG: 100 CAPSULE ORAL at 20:52

## 2017-10-09 RX ADMIN — FOLIC ACID 1 MG: 1 TABLET ORAL at 09:17

## 2017-10-09 RX ADMIN — METOPROLOL TARTRATE 75 MG: 25 TABLET, FILM COATED ORAL at 09:17

## 2017-10-09 RX ADMIN — ATORVASTATIN CALCIUM 40 MG: 40 TABLET, FILM COATED ORAL at 20:52

## 2017-10-09 RX ADMIN — LEVOTHYROXINE SODIUM 75 MCG: 75 TABLET ORAL at 06:01

## 2017-10-09 RX ADMIN — METOPROLOL TARTRATE 75 MG: 25 TABLET, FILM COATED ORAL at 20:52

## 2017-10-09 RX ADMIN — ASPIRIN 81 MG: 81 TABLET, COATED ORAL at 09:17

## 2017-10-09 RX ADMIN — GABAPENTIN 100 MG: 100 CAPSULE ORAL at 09:17

## 2017-10-09 RX ADMIN — STANDARDIZED SENNA CONCENTRATE AND DOCUSATE SODIUM 2 TABLET: 8.6; 5 TABLET, FILM COATED ORAL at 09:17

## 2017-10-09 ASSESSMENT — ENCOUNTER SYMPTOMS
DEPRESSION: 0
SHORTNESS OF BREATH: 0
FLANK PAIN: 0
MEMORY LOSS: 1
COUGH: 1
LOSS OF CONSCIOUSNESS: 0
WHEEZING: 0
PALPITATIONS: 0
ABDOMINAL PAIN: 0
HEADACHES: 0
FOCAL WEAKNESS: 0
WEAKNESS: 1
NERVOUS/ANXIOUS: 0
MYALGIAS: 0

## 2017-10-09 ASSESSMENT — PAIN SCALES - GENERAL
PAINLEVEL_OUTOF10: 0

## 2017-10-09 NOTE — CARE PLAN
Problem: Bowel/Gastric:  Goal: Normal bowel function is maintained or improved  Pt had a large, soft, brown BM. Stool softener held for the evening.     Problem: Skin Integrity  Goal: Risk for impaired skin integrity will decrease    Intervention: Assess and monitor skin integrity, appearance and/or temperature  Dressings changed to right BKA site and right knee. Wound orders followed.

## 2017-10-09 NOTE — PROGRESS NOTES
Josie Osuna Fall Risk Assessment:     Last Known Fall: Within the last month  Mobility: Use of assistive device/requires assist of two people  Medications: Cardiovascular or central nervous system meds  Mental Status/LOC/Awareness: Memory loss/confusion and requires reorienting  Toileting Needs: Incontinence  Volume/Electrolyte Status: No problems  Communication/Sensory: Visual (Glasses)/hearing deficit  Behavior: Appropriate behavior  Josie Osuna Fall Risk Total: 17  Fall Risk Level: HIGH RISK    Universal Fall Precautions:  call light/belongings in reach, bed in low position and locked, wheelchairs and assistive devices out of sight, siderails up x 2, use non-slip footwear, adequate lighting, clutter free and spill free environment, educate on level of risk, educate to call for assistance    Fall Risk Level Interventions:     TRIAL (TELE 8, NEURO, MED MARINA 5) High Fall Risk Interventions  Place yellow fall risk ID band on patient: verified  Provide patient/family education based on risk assessment: completed  Educate patient/family to call staff for assistance when getting out of bed: completed  Place fall precaution signage outside patient door: verified  Place patient in room close to nursing station: verified  Utilize bed/chair fall alarm: completed  Notify charge of high risk for huddle: completed    Patient Specific Interventions:     Medication: review medications with patient and family and limit combination of prn medications  Mental Status/LOC/Awareness: reorient patient, reinforce falls education, check on patient hourly and reinforce the use of call light  Toileting: monitor intake and output/use of appropriate interventions  Volume/Electrolyte Status: ensure patient remains hydrated and ensure IV fluids are appropriate  Communication/Sensory: update plan of care on whiteboard  Behavioral: encourage patient to voice feelings and administer medication as ordered  Mobility: schedule physical activity  throughout the day and ensure bed is locked and in lowest position

## 2017-10-09 NOTE — PROGRESS NOTES
Josie Osuna Fall Risk Assessment:     Last Known Fall: Within the last month  Mobility: Use of assistive device/requires assist of two people  Medications: Cardiovascular or central nervous system meds  Mental Status/LOC/Awareness: Memory loss/confusion and requires reorienting  Toileting Needs: Incontinence  Volume/Electrolyte Status: No problems  Communication/Sensory: Visual (Glasses)/hearing deficit  Behavior: Behavioral noncompliance with instruction  Josie Osuna Fall Risk Total: 19  Fall Risk Level: HIGH RISK    Universal Fall Precautions:  bed in low position and locked, call light/belongings in reach, wheelchairs and assistive devices out of sight, siderails up x 2, use non-slip footwear, adequate lighting, clutter free and spill free environment, educate on level of risk, educate to call for assistance    Fall Risk Level Interventions:     TRIAL (TELE 8, NEURO, MED MARINA 5) High Fall Risk Interventions  Place yellow fall risk ID band on patient: verified  Provide patient/family education based on risk assessment: completed  Educate patient/family to call staff for assistance when getting out of bed: completed  Place fall precaution signage outside patient door: verified  Place patient in room close to nursing station: verified  Utilize bed/chair fall alarm: completed  Notify charge of high risk for huddle: completed    Patient Specific Interventions:     Medication: review medications with patient and family  Mental Status/LOC/Awareness: reorient patient, reinforce falls education, check on patient hourly, utilize bed/chair fall alarm and reinforce the use of call light  Toileting: provide frquent toileting and monitor intake and output/use of appropriate interventions  Volume/Electrolyte Status: ensure patient remains hydrated  Communication/Sensory: update plan of care on whiteboard and ensure proper positioning when transferrng/ambulating  Behavioral: encourage patient to voice feelings and administer  medication as ordered  Mobility: schedule physical activity throughout the day, utilize bed/chair fall alarm and ensure bed is locked and in lowest position

## 2017-10-09 NOTE — CARE PLAN
Problem: Respiratory:  Goal: Respiratory status will improve  Outcome: PROGRESSING AS EXPECTED  Pt on 1L Nc, desats to 85% if on room air.

## 2017-10-09 NOTE — PROGRESS NOTES
Josie Osuna Fall Risk Assessment:     Last Known Fall: Within the last month  Mobility: Use of assistive device/requires assist of two people  Medications: Cardiovascular or central nervous system meds  Mental Status/LOC/Awareness: Memory loss/confusion and requires reorienting  Toileting Needs: Incontinence  Volume/Electrolyte Status: No problems  Communication/Sensory: Visual (Glasses)/hearing deficit  Behavior: Behavioral noncompliance with instruction  Josie Osuna Fall Risk Total: 19  Fall Risk Level: HIGH RISK    Universal Fall Precautions:  call light/belongings in reach, bed in low position and locked, wheelchairs and assistive devices out of sight, siderails up x 2, use non-slip footwear, adequate lighting, clutter free and spill free environment, educate on level of risk, educate to call for assistance    Fall Risk Level Interventions:     TRIAL (TELE 8, NEURO, MED MARINA 5) High Fall Risk Interventions  Place yellow fall risk ID band on patient: verified  Provide patient/family education based on risk assessment: completed  Educate patient/family to call staff for assistance when getting out of bed: completed  Place fall precaution signage outside patient door: verified  Place patient in room close to nursing station: verified  Utilize bed/chair fall alarm: verified  Notify charge of high risk for huddle: verified    Patient Specific Interventions:     Medication: review medications with patient and family  Mental Status/LOC/Awareness: reorient patient, reinforce falls education, check on patient hourly, utilize bed/chair fall alarm and reinforce the use of call light  Toileting: provide frquent toileting  Volume/Electrolyte Status: monitor abnormal lab values  Communication/Sensory: update plan of care on whiteboard  Behavioral: administer medication as ordered  Mobility: utilize bed/chair fall alarm and ensure bed is locked and in lowest position

## 2017-10-09 NOTE — PROGRESS NOTES
Rec'd report from day shift RN. Assumed pt care. Assessment completed. AA&OX2, reoriented to time and event. Denies pain at this time. No s/s of discomfort or distress. Q2 hour turns enforced. Right BKA noted. Pt is incontinent of urine and stool, cleaned up and linens changed. Dressing changed to right knee and right BKA site. Bed in lowest position, bed locked, bed alarm on for safety, treaded sock in place, RN and CNA numbers provided, call light within reach.

## 2017-10-09 NOTE — DISCHARGE PLANNING
JEROD Green will be in to see pt today.   There will be a different SW assigned to the case, information still pending.

## 2017-10-09 NOTE — PROGRESS NOTES
[facility] Hospitalist Progress Note    Date of Service: 10/9/2017    Chief Complaint  72 y.o. male admitted 2017 with urinary tract infection and confusion    Interval Problem Update  Encourage oral intake  Minimal cough  Advise out of bed, increase activity  Patient requesting discharge to home    Consultants/Specialty  None    Disposition  Needs skilled nursing placement  PT/OT  Pending guardianship    assisting      Review of Systems   HENT: Negative for hearing loss.    Respiratory: Positive for cough. Negative for shortness of breath and wheezing.    Cardiovascular: Negative for chest pain and palpitations.   Gastrointestinal: Negative for abdominal pain.   Genitourinary: Negative for flank pain.   Musculoskeletal: Negative for joint pain and myalgias.   Neurological: Positive for weakness. Negative for focal weakness, loss of consciousness and headaches.   Psychiatric/Behavioral: Positive for memory loss. Negative for depression. The patient is not nervous/anxious.       Physical Exam  Laboratory/Imaging   Hemodynamics  Temp (24hrs), Av.7 °C (98 °F), Min:36.6 °C (97.8 °F), Max:36.8 °C (98.2 °F)   Temperature: 36.7 °C (98 °F)  Pulse  Av.1  Min: 69  Max: 144    Blood Pressure : 149/76      Respiratory      Respiration: 18, Pulse Oximetry: 95 %        RUL Breath Sounds: Diminished, RML Breath Sounds: Diminished, RLL Breath Sounds: Diminished, RIGO Breath Sounds: Diminished, LLL Breath Sounds: Diminished    Fluids    Intake/Output Summary (Last 24 hours) at 10/09/17 0751  Last data filed at 10/08/17 1800   Gross per 24 hour   Intake              268 ml   Output                0 ml   Net              268 ml       Nutrition  Orders Placed This Encounter   Procedures   • DIET ORDER     Standing Status:   Standing     Number of Occurrences:   1     Order Specific Question:   Diet:     Answer:   Regular [1]     Order Specific Question:   Texture/Fiber modifications:     Answer:   Dysphagia  2(Pureed/Chopped)specify fluid consistency(question 6) [2]     Order Specific Question:   Consistency/Fluid modifications:     Answer:   Nectar Thick [2]     Physical Exam   Constitutional: No distress.   Chronically ill-appearing  Frail, thin   HENT:   Head: Normocephalic and atraumatic.   Eyes: EOM are normal. Pupils are equal, round, and reactive to light.   Cardiovascular: Normal rate and regular rhythm.    Pulmonary/Chest: Effort normal.   Decreased bilaterally   Abdominal: Soft. Bowel sounds are normal. He exhibits no distension.   Musculoskeletal: He exhibits no edema or tenderness.   Neurological: He is alert. He is disoriented. No cranial nerve deficit. Coordination normal.   Skin: Skin is warm and dry. No erythema.   Psychiatric: Judgment normal.   Nursing note and vitals reviewed.      Recent Labs      10/08/17   0219   WBC  14.8*   RBC  4.68*   HEMOGLOBIN  11.2*   HEMATOCRIT  36.0*   MCV  76.9*   MCH  23.9*   MCHC  31.1*   RDW  44.3   PLATELETCT  756*   MPV  9.0     Recent Labs      10/08/17   0219   SODIUM  132*   POTASSIUM  4.5   CHLORIDE  97   CO2  26   GLUCOSE  113*   BUN  14   CREATININE  0.56   CALCIUM  8.9                      Assessment/Plan     * Cellulitis of right knee- (present on admission)   Assessment & Plan    Active Orders     Ordered     Ordering Provider       Sat Sep 30, 2017 12:30 PM    09/30/17 1230  ampicillin/sulbactam (UNASYN) 3 g in  mL IVPB  EVERY 6 HOURS      John Paul Mckeon M.D.      Suspect source of his acute ills  DC IV antibiotics on October 3  off IV antibiotics to cover for UTI/aspiration pneumonia/cellulitis        Pneumonia   Assessment & Plan    Aspiration pneumonia    SLP evaluation  RT protocol  Oxygen supplementation as needed  Encourage incentive spirometer use  OFF Rocephin and Flagyl IV   oral antibiotics to complete on October 14, Augmentin  -respiratory culture, never collected        Hyponatremia- (present on admission)   Assessment & Plan     Etiology not clear  Improving  Continue with fluids  serum  Osm never done  urine osmolality-within normal limits          PAD (peripheral artery disease) (CMS-HCC)- (present on admission)   Assessment & Plan    Medical mgmt, 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  Will continue ASA and statin        Thrombocytosis (CMS-HCC)   Assessment & Plan    Reactive,   Chronic infection, monitor        Dementia   Assessment & Plan    With encephalopathy and significant debility  Has been evaluated by ethics committee, unable to make his own medical decisions  Sent to legal for further guardianship pending   to assist  We'll need skilled nursing placement        UTI (urinary tract infection)- (present on admission)   Assessment & Plan      Antibiotics as above        Essential hypertension- (present on admission)   Assessment & Plan    SBP goal less than 140 mmHg  DBP goal less than 90 mmHg  PRN and antihypertensives to titrate by hospitalist towards goal  Continue with metoprolol 50 mg twice a day        Severe protein-calorie malnutrition (CMS-HCC)- (present on admission)   Assessment & Plan    Nutrition to see    Follow-up CMP  Encourage oral intake  At ensure milkshakes            Reviewed items::  Radiology images reviewed, Labs reviewed and Medications reviewed  DVT prophylaxis pharmacological::  Heparin  Antibiotics:  Treating active infection/contamination beyond 24 hours perioperative coverage

## 2017-10-09 NOTE — CONSULTS
Consults Follow up.    Patient Name: Isiah Lagos  Patient Age, Gender: 72 y.o., male  Date of Consult:10/9/2017      Name of Requesting Consultant(s): Isha Rojas D.O.  Consultant: DAMIAN Arenas MORDECHAI, M.D.   Reason for Consult: Follow up regarding Demenita and decision making capacity.     Chief Complaint:   Chief Complaint   Patient presents with   • ALOC     x 5 days per care giver. A&O x 2   • UTI     smeels of urine, denies urinary sympt. pt incont of urine. wearing breif.        ROS: a ten point ROS is negative other than as stated above in HPI.     Past Medical History:   Diagnosis Date   • Stroke (CMS-[facility]) 2015   • Myocardial infarct 2010   • Cancer (CMS-HCC) 2001    prostate   • Arthritis     osteoarthritis   • Emphysema    • Hypothyroid          Assessment: a 72 years old male with multiple comorbidities and decreased insight, appears to lack decision making capacity for the placement choices compatible for his needs. A guardian ship filed in his chart and waiting for the legal process, his previous caregiver was not acting for his best interest and the patient is unable to make decisions at this time. He is unrepresented and an Ethical Consult placed for him. For the time being he shall not leave the hospital till these process done.     Recommendations:  -Patient need SNF level of care due to the need for care of his wounds and to keep skin integrity intact.   -dependent for most ADLs needs assistance for transfers, toileting, bathing etc.   -PT/OT assessment and evaluation.   -do Vitamin D level and add vitamin D 800 IU/day        Thank you for this interesting follow up. We are signing off this patient in the time being and if there is any new issue or consideration please don't hesitate to contact us.    Esvin Sinclair M.D.  [facility]  Available Monday-Friday 8:00-5:00

## 2017-10-10 LAB
ALBUMIN SERPL BCP-MCNC: 2.8 G/DL (ref 3.2–4.9)
ALBUMIN/GLOB SERPL: 0.8 G/DL
ALP SERPL-CCNC: 70 U/L (ref 30–99)
ALT SERPL-CCNC: 36 U/L (ref 2–50)
ANION GAP SERPL CALC-SCNC: 8 MMOL/L (ref 0–11.9)
AST SERPL-CCNC: 31 U/L (ref 12–45)
BASOPHILS # BLD AUTO: 2.1 % (ref 0–1.8)
BASOPHILS # BLD: 0.31 K/UL (ref 0–0.12)
BILIRUB SERPL-MCNC: 0.4 MG/DL (ref 0.1–1.5)
BUN SERPL-MCNC: 16 MG/DL (ref 8–22)
CALCIUM SERPL-MCNC: 8.5 MG/DL (ref 8.5–10.5)
CHLORIDE SERPL-SCNC: 101 MMOL/L (ref 96–112)
CO2 SERPL-SCNC: 26 MMOL/L (ref 20–33)
CREAT SERPL-MCNC: 0.54 MG/DL (ref 0.5–1.4)
EOSINOPHIL # BLD AUTO: 0.71 K/UL (ref 0–0.51)
EOSINOPHIL NFR BLD: 4.7 % (ref 0–6.9)
ERYTHROCYTE [DISTWIDTH] IN BLOOD BY AUTOMATED COUNT: 45.1 FL (ref 35.9–50)
GFR SERPL CREATININE-BSD FRML MDRD: >60 ML/MIN/1.73 M 2
GLOBULIN SER CALC-MCNC: 3.4 G/DL (ref 1.9–3.5)
GLUCOSE SERPL-MCNC: 98 MG/DL (ref 65–99)
HCT VFR BLD AUTO: 36.5 % (ref 42–52)
HGB BLD-MCNC: 11.1 G/DL (ref 14–18)
IMM GRANULOCYTES # BLD AUTO: 0.08 K/UL (ref 0–0.11)
IMM GRANULOCYTES NFR BLD AUTO: 0.5 % (ref 0–0.9)
LYMPHOCYTES # BLD AUTO: 1.87 K/UL (ref 1–4.8)
LYMPHOCYTES NFR BLD: 12.4 % (ref 22–41)
MCH RBC QN AUTO: 23.3 PG (ref 27–33)
MCHC RBC AUTO-ENTMCNC: 30.4 G/DL (ref 33.7–35.3)
MCV RBC AUTO: 76.7 FL (ref 81.4–97.8)
MONOCYTES # BLD AUTO: 0.86 K/UL (ref 0–0.85)
MONOCYTES NFR BLD AUTO: 5.7 % (ref 0–13.4)
NEUTROPHILS # BLD AUTO: 11.23 K/UL (ref 1.82–7.42)
NEUTROPHILS NFR BLD: 74.6 % (ref 44–72)
NRBC # BLD AUTO: 0 K/UL
NRBC BLD AUTO-RTO: 0 /100 WBC
PLATELET # BLD AUTO: 828 K/UL (ref 164–446)
PMV BLD AUTO: 9.4 FL (ref 9–12.9)
POTASSIUM SERPL-SCNC: 3.9 MMOL/L (ref 3.6–5.5)
PROT SERPL-MCNC: 6.2 G/DL (ref 6–8.2)
RBC # BLD AUTO: 4.76 M/UL (ref 4.7–6.1)
SODIUM SERPL-SCNC: 135 MMOL/L (ref 135–145)
WBC # BLD AUTO: 15.1 K/UL (ref 4.8–10.8)

## 2017-10-10 PROCEDURE — 85025 COMPLETE CBC W/AUTO DIFF WBC: CPT

## 2017-10-10 PROCEDURE — 97530 THERAPEUTIC ACTIVITIES: CPT

## 2017-10-10 PROCEDURE — A9270 NON-COVERED ITEM OR SERVICE: HCPCS | Performed by: INTERNAL MEDICINE

## 2017-10-10 PROCEDURE — A9270 NON-COVERED ITEM OR SERVICE: HCPCS | Performed by: HOSPITALIST

## 2017-10-10 PROCEDURE — 99233 SBSQ HOSP IP/OBS HIGH 50: CPT | Performed by: INTERNAL MEDICINE

## 2017-10-10 PROCEDURE — 92526 ORAL FUNCTION THERAPY: CPT

## 2017-10-10 PROCEDURE — 36415 COLL VENOUS BLD VENIPUNCTURE: CPT

## 2017-10-10 PROCEDURE — 700102 HCHG RX REV CODE 250 W/ 637 OVERRIDE(OP): Performed by: INTERNAL MEDICINE

## 2017-10-10 PROCEDURE — 770006 HCHG ROOM/CARE - MED/SURG/GYN SEMI*

## 2017-10-10 PROCEDURE — 80053 COMPREHEN METABOLIC PANEL: CPT

## 2017-10-10 PROCEDURE — 700111 HCHG RX REV CODE 636 W/ 250 OVERRIDE (IP): Performed by: HOSPITALIST

## 2017-10-10 PROCEDURE — 700102 HCHG RX REV CODE 250 W/ 637 OVERRIDE(OP): Performed by: HOSPITALIST

## 2017-10-10 RX ADMIN — AMOXICILLIN AND CLAVULANATE POTASSIUM 1 TABLET: 875; 125 TABLET, FILM COATED ORAL at 09:43

## 2017-10-10 RX ADMIN — GABAPENTIN 100 MG: 100 CAPSULE ORAL at 15:16

## 2017-10-10 RX ADMIN — HEPARIN SODIUM 5000 UNITS: 5000 INJECTION, SOLUTION INTRAVENOUS; SUBCUTANEOUS at 05:49

## 2017-10-10 RX ADMIN — THERA TABS 1 TABLET: TAB at 09:43

## 2017-10-10 RX ADMIN — HEPARIN SODIUM 5000 UNITS: 5000 INJECTION, SOLUTION INTRAVENOUS; SUBCUTANEOUS at 21:49

## 2017-10-10 RX ADMIN — ATORVASTATIN CALCIUM 40 MG: 40 TABLET, FILM COATED ORAL at 21:49

## 2017-10-10 RX ADMIN — GABAPENTIN 100 MG: 100 CAPSULE ORAL at 21:50

## 2017-10-10 RX ADMIN — GABAPENTIN 100 MG: 100 CAPSULE ORAL at 09:43

## 2017-10-10 RX ADMIN — METOPROLOL TARTRATE 75 MG: 25 TABLET, FILM COATED ORAL at 09:43

## 2017-10-10 RX ADMIN — AMOXICILLIN AND CLAVULANATE POTASSIUM 1 TABLET: 875; 125 TABLET, FILM COATED ORAL at 21:49

## 2017-10-10 RX ADMIN — HEPARIN SODIUM 5000 UNITS: 5000 INJECTION, SOLUTION INTRAVENOUS; SUBCUTANEOUS at 15:16

## 2017-10-10 RX ADMIN — LEVOTHYROXINE SODIUM 75 MCG: 75 TABLET ORAL at 05:49

## 2017-10-10 RX ADMIN — ASPIRIN 81 MG: 81 TABLET, COATED ORAL at 09:43

## 2017-10-10 RX ADMIN — STANDARDIZED SENNA CONCENTRATE AND DOCUSATE SODIUM 2 TABLET: 8.6; 5 TABLET, FILM COATED ORAL at 21:49

## 2017-10-10 RX ADMIN — FOLIC ACID 1 MG: 1 TABLET ORAL at 09:43

## 2017-10-10 RX ADMIN — METOPROLOL TARTRATE 75 MG: 25 TABLET, FILM COATED ORAL at 21:49

## 2017-10-10 ASSESSMENT — ENCOUNTER SYMPTOMS
COUGH: 1
LOSS OF CONSCIOUSNESS: 0
CHILLS: 0
FLANK PAIN: 0
WEAKNESS: 1
MYALGIAS: 0
NAUSEA: 0
DIZZINESS: 0
SHORTNESS OF BREATH: 0
DIAPHORESIS: 0
FEVER: 0
ABDOMINAL PAIN: 0
SENSORY CHANGE: 0
PALPITATIONS: 0
NERVOUS/ANXIOUS: 0
DEPRESSION: 0
MEMORY LOSS: 1
WHEEZING: 0

## 2017-10-10 ASSESSMENT — COGNITIVE AND FUNCTIONAL STATUS - GENERAL
SUGGESTED CMS G CODE MODIFIER MOBILITY: CN
CLIMB 3 TO 5 STEPS WITH RAILING: TOTAL
STANDING UP FROM CHAIR USING ARMS: TOTAL
MOBILITY SCORE: 6
WALKING IN HOSPITAL ROOM: TOTAL
MOVING TO AND FROM BED TO CHAIR: UNABLE
TURNING FROM BACK TO SIDE WHILE IN FLAT BAD: UNABLE
MOVING FROM LYING ON BACK TO SITTING ON SIDE OF FLAT BED: UNABLE

## 2017-10-10 ASSESSMENT — PAIN SCALES - GENERAL
PAINLEVEL_OUTOF10: 0

## 2017-10-10 ASSESSMENT — GAIT ASSESSMENTS: GAIT LEVEL OF ASSIST: UNABLE TO PARTICIPATE

## 2017-10-10 NOTE — CARE PLAN
Problem: Respiratory:  Goal: Respiratory status will improve    Intervention: Administer and titrate oxygen therapy  Pt satts at 92% on 1L NC.  in place.       Problem: Skin Integrity  Goal: Risk for impaired skin integrity will decrease  Q2 hour turns enforced. Incontinent episodes cleaned up, pillows used for positioning.

## 2017-10-10 NOTE — PROGRESS NOTES
Rec'd report from day shift RN. Assumed pt care. Assessment completed. AA&OX2, reoriented to time and event. Denies pain at this time. No s/s of discomfort or distress. Q2 hour turns enforced. Dressing to right knee and right BKA site are CDI. Bed in lowest position, bed locked, bed alarm on for safety, treaded sock in place, RN and CNA numbers provided, call light within reach.

## 2017-10-10 NOTE — THERAPY
"Speech Language Therapy dysphagia treatment completed.   Functional Status:  Dysphagia therapy completed today. Patient awake and pleasant, but very confused. He had no memory of any previous speech therapy or anything that occured today for that matter. For example, when asked \"How was lunch?\", he replied \"Did I have lunch? I don't know.\" Patient presents with possible signs of aspiration with occasional sips of nectar thick liquid via cup. Specifically, patient became SOB, with increased WOB, and wet/gurgly vocal quality. Laryngeal elevation upon palpation slightly reduced, but swallow onset timing appeared WNL. Per nursing report and notes, patient's PO intake is poor, often 25% or less. Patient was able to complete compensatory swallow strategies (i.e. double swallow, effortful swallow, cough/throat clear after swallow, small sip/bite) with max verbal cues. He was unable to verbalize safe swallow strategies, however. Patient completed pharyngeal strengthening exercises ([place]) in 5/10 attempts with max visual/verbal cues. Patient was noted to have very poor labial and lingual ROM/strength as well.   Recommendations: Recommend diet downgraded to Dysphagia 1/nectar thick liquid diet with 1:1 supervision/assistance. Crushed meds in applesauce recommended. Continue ST services for oral motor, laryngeal, and pharyngeal exercises. Patient may benefit from repeat chest xray and FEES or MBS and/or in the next week.   Plan of Care: Will benefit from Speech Therapy 3 times per week  Post-Acute Therapy: Discharge to a transitional care facility for continued skilled therapy services.    See \"Rehab Therapy-Acute\" Patient Summary Report for complete documentation.     "

## 2017-10-10 NOTE — PROGRESS NOTES
Assumed care of pt at 0730am. Report received and bedside rounding completed with noc RN. Pt in bed resting comfortably denies any pain at this time and is calm. No SOB, or in any acute distress. Fall precautions in place, treaded non slip socks.  hourly rounding in place. See flowsheets for further assessment.

## 2017-10-10 NOTE — THERAPY
"Physical Therapy Treatment completed.   Bed Mobility:  Supine to Sit: Maximal Assist  Transfers: Sit to Stand: Total Assist  Gait: Level Of Assist: Unable to Participate     Plan of Care: Will benefit from Physical Therapy 2 times per week and Plan to complete next treatment by Friday 10/13  Discharge Recommendations: Equipment: Will Continue to Assess for Equipment Needs. Post-acute therapy Discharge to a transitional care facility for continued skilled therapy services.    Pt supine in room upon arrival with caregiver at bedside. Caregiver wants pt to mobilize to get better and come home. Pt is reluctant to participate with PT. Of note, caregiver ambulates with single point and significant limp and does not appear that she would be able to physically assist with transfers if pt DC home. Pt required maximal assist for supine to sit transition. Once seated EOB, posterior lean and poor balance with minimal efforts to maintain upright posture. Pt requesting back to bed almost immediately after sitting up. Pt educated that in order to DC safely, he needs to participate with therapy in order to improve strength and functional mobility. With assist from CNA, attempted sit to stand. Did complete sit to stand with total assist with little to no weight accepted through [facility]. Pt sat back down and attempting to lie down. Maximal assist for sit to supine. Pt found to have large BM so assisted CNA with rolling pt side to side for clean up and linen change. Pt is extremely self limiting and minimal motivation to participate. Will continue to follow. Pt will DC at WC level likely using slide board as he is not safe for stand pivot transfers    See \"Rehab Therapy-Acute\" Patient Summary Report for complete documentation.       "

## 2017-10-10 NOTE — ASSESSMENT & PLAN NOTE
In the setting of right knee cellulitis and mild osteomyelitis  Not surgical candidate at this time per   Cx revealed CAMRSA  ID recommended Bactrim x 6 weeks  If continues to worsen consider re consulting infectious disease  Monitor CBC

## 2017-10-10 NOTE — CARE PLAN
Problem: Safety  Goal: Will remain free from injury  Call light with in reach, hourly rounding in place. Strip bed alarm on. Pt's room near nurses station.     Problem: Urinary Elimination:  Goal: Ability to reestablish a normal urinary elimination pattern will improve  Incontinence, changing linens as needed. q2 turning.

## 2017-10-10 NOTE — PROGRESS NOTES
Josie Osuna Fall Risk Assessment:     Last Known Fall: Within the last month  Mobility: Use of assistive device/requires assist of two people  Medications: Cardiovascular or central nervous system meds  Mental Status/LOC/Awareness: Memory loss/confusion and requires reorienting  Toileting Needs: Incontinence  Volume/Electrolyte Status: No problems  Communication/Sensory: Visual (Glasses)/hearing deficit  Behavior: Appropriate behavior  Josie Osuna Fall Risk Total: 17  Fall Risk Level: HIGH RISK     Universal Fall Precautions:  call light/belongings in reach, bed in low position and locked, wheelchairs and assistive devices out of sight, siderails up x 2, use non-slip footwear, adequate lighting, clutter free and spill free environment, educate on level of risk, educate to call for assistance     Fall Risk Level Interventions:     TRIAL (TELE 8, NEURO, MED MARINA 5) High Fall Risk Interventions  Place yellow fall risk ID band on patient: verified  Provide patient/family education based on risk assessment: completed  Educate patient/family to call staff for assistance when getting out of bed: completed  Place fall precaution signage outside patient door: verified  Place patient in room close to nursing station: verified  Utilize bed/chair fall alarm: completed  Notify charge of high risk for huddle: completed     Patient Specific Interventions:      Medication: review medications with patient and family  Mental Status/LOC/Awareness: check on patient hourly, utilize bed/chair fall alarm and reinforce the use of call light  Toileting: monitor intake and output/use of appropriate interventions  Volume/Electrolyte Status: monitor abnormal lab values  Communication/Sensory: update plan of care on whiteboard  Behavioral: administer medication as ordered  Mobility: ensure bed is locked and in lowest position

## 2017-10-10 NOTE — CARE PLAN
Problem: Nutritional:  Goal: Achieve adequate nutritional intake  Patient will consume 50% of meals   Outcome: PROGRESSING SLOWER THAN EXPECTED  Multivitamin in place to aid in skin healing; poor PO continues, receiving Boost, he likes milkshakes per nursing.  Need new weight, asked CNA but pt refusing to get up and bed needs to be zeroed.

## 2017-10-10 NOTE — PROGRESS NOTES
[facility] Hospitalist Progress Note    Date of Service: 10/10/2017    Chief Complaint  72 y.o. male admitted 2017 with urinary tract infection and confusion    Interval Problem Update  Amputation working with PT  Requiring max assist  Denies shortness of breath or cough  Decreased breath sounds bilaterally  Denies any fevers chills diarrhea or dysuria  Afebrile  Caregiver/friend visiting, updated on plan of care    Consultants/Specialty  None    Disposition  Needs skilled nursing placement  PT/OT  Pending guardianship    assisting      Review of Systems   Constitutional: Negative for chills, diaphoresis, fever and malaise/fatigue.   HENT: Negative for congestion and hearing loss.    Respiratory: Positive for cough. Negative for shortness of breath and wheezing.    Cardiovascular: Negative for chest pain and palpitations.   Gastrointestinal: Negative for abdominal pain and nausea.   Genitourinary: Negative for flank pain.   Musculoskeletal: Negative for joint pain and myalgias.   Neurological: Positive for weakness. Negative for dizziness, sensory change and loss of consciousness.   Psychiatric/Behavioral: Positive for memory loss. Negative for depression. The patient is not nervous/anxious.       Physical Exam  Laboratory/Imaging   Hemodynamics  Temp (24hrs), Av.5 °C (97.7 °F), Min:36.4 °C (97.6 °F), Max:36.6 °C (97.9 °F)   Temperature: 36.6 °C (97.9 °F)  Pulse  Av.9  Min: 69  Max: 144    Blood Pressure : 151/85      Respiratory      Respiration: (!) 22, Pulse Oximetry: 98 %        RUL Breath Sounds: Diminished, RML Breath Sounds: Diminished, RLL Breath Sounds: Diminished, RIGO Breath Sounds: Diminished, LLL Breath Sounds: Diminished    Fluids    Intake/Output Summary (Last 24 hours) at 10/10/17 0815  Last data filed at 10/09/17 1200   Gross per 24 hour   Intake              120 ml   Output                0 ml   Net              120 ml       Nutrition  Orders Placed This Encounter   Procedures    • DIET ORDER     Standing Status:   Standing     Number of Occurrences:   1     Order Specific Question:   Diet:     Answer:   Regular [1]     Order Specific Question:   Texture/Fiber modifications:     Answer:   Dysphagia 2(Pureed/Chopped)specify fluid consistency(question 6) [2]     Order Specific Question:   Consistency/Fluid modifications:     Answer:   Nectar Thick [2]     Physical Exam   Constitutional: No distress.   Chronically ill-appearing  Frail, thin   HENT:   Head: Normocephalic and atraumatic.   Mouth/Throat: No oropharyngeal exudate.   Eyes: EOM are normal. Pupils are equal, round, and reactive to light. Right eye exhibits no discharge. Left eye exhibits discharge.   Cardiovascular: Normal rate and regular rhythm.    Pulmonary/Chest: Effort normal. No respiratory distress.   Decreased bilaterally   Abdominal: Soft. Bowel sounds are normal. He exhibits no distension.   Musculoskeletal: He exhibits no edema or tenderness.   Right BKA  Dressing clean and dry and intact  Nontender to palpation   Neurological: He is alert. He is disoriented. No cranial nerve deficit. He exhibits normal muscle tone.   Skin: Skin is warm and dry. He is not diaphoretic. No erythema.   Psychiatric: He has a normal mood and affect. His behavior is normal. Judgment normal.   Nursing note and vitals reviewed.      Recent Labs      10/08/17   0219  10/10/17   0239   WBC  14.8*  15.1*   RBC  4.68*  4.76   HEMOGLOBIN  11.2*  11.1*   HEMATOCRIT  36.0*  36.5*   MCV  76.9*  76.7*   MCH  23.9*  23.3*   MCHC  31.1*  30.4*   RDW  44.3  45.1   PLATELETCT  756*  828*   MPV  9.0  9.4     Recent Labs      10/08/17   0219  10/10/17   0239   SODIUM  132*  135   POTASSIUM  4.5  3.9   CHLORIDE  97  101   CO2  26  26   GLUCOSE  113*  98   BUN  14  16   CREATININE  0.56  0.54   CALCIUM  8.9  8.5                      Assessment/Plan     * Cellulitis of right knee- (present on admission)   Assessment & Plan    Active Orders     Ordered     Ordering  Provider       Sat Sep 30, 2017 12:30 PM    09/30/17 1230  ampicillin/sulbactam (UNASYN) 3 g in  mL IVPB  EVERY 6 HOURS      John Paul Mckeon M.D.      Suspect source of his acute ills  DC IV antibiotics on October 3  off IV antibiotics to cover for UTI/aspiration pneumonia/cellulitis        Pneumonia   Assessment & Plan    Aspiration pneumonia    SLP evaluation  RT protocol  Oxygen supplementation as needed  Encourage incentive spirometer use  OFF Rocephin and Flagyl IV   oral antibiotics to complete on October 14, Augmentin  -respiratory culture, never collected        Hyponatremia- (present on admission)   Assessment & Plan    Resolved  Continue with fluids  serum  Osm never done  urine osmolality-within normal limits          Leukocytosis- (present on admission)   Assessment & Plan    Ongoing  On antibiotics  No clear signs of worsening infection  Follow-up pro-calcitonin level  Possible chronic aspiration, respiratory status improving  Continue to monitor        PAD (peripheral artery disease) (CMS-HCC)- (present on admission)   Assessment & Plan    Medical mgmt, 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  Will continue ASA and statin  Wound care consult        Thrombocytosis (CMS-HCC)   Assessment & Plan    Reactive,   ?Chronic infection, monitor        Dementia   Assessment & Plan    With encephalopathy and significant debility  Has been evaluated by ethics committee, unable to make his own medical decisions  Sent to legal for further guardianship pending   to assist  We'll need skilled nursing placement        UTI (urinary tract infection)- (present on admission)   Assessment & Plan      Antibiotics as above        Essential hypertension- (present on admission)   Assessment & Plan    SBP goal less than 140 mmHg  DBP goal less than 90 mmHg  PRN and antihypertensives to titrate by hospitalist towards goal  Continue with metoprolol 50 mg twice a day        Severe protein-calorie  malnutrition (CMS-HCC)- (present on admission)   Assessment & Plan    Nutrition to see  Albumin 2.8      Encourage oral intake  ensure milkshakes 3 times a day            Reviewed items::  Radiology images reviewed, Labs reviewed and Medications reviewed  DVT prophylaxis pharmacological::  Heparin  Antibiotics:  Treating active infection/contamination beyond 24 hours perioperative coverage

## 2017-10-11 LAB
ALBUMIN SERPL BCP-MCNC: 3 G/DL (ref 3.2–4.9)
ALBUMIN/GLOB SERPL: 0.8 G/DL
ALP SERPL-CCNC: 73 U/L (ref 30–99)
ALT SERPL-CCNC: 32 U/L (ref 2–50)
ANION GAP SERPL CALC-SCNC: 8 MMOL/L (ref 0–11.9)
AST SERPL-CCNC: 28 U/L (ref 12–45)
BASOPHILS # BLD AUTO: 1.7 % (ref 0–1.8)
BASOPHILS # BLD: 0.27 K/UL (ref 0–0.12)
BILIRUB SERPL-MCNC: 0.4 MG/DL (ref 0.1–1.5)
BUN SERPL-MCNC: 13 MG/DL (ref 8–22)
CALCIUM SERPL-MCNC: 8.5 MG/DL (ref 8.5–10.5)
CHLORIDE SERPL-SCNC: 101 MMOL/L (ref 96–112)
CO2 SERPL-SCNC: 25 MMOL/L (ref 20–33)
CREAT SERPL-MCNC: 0.53 MG/DL (ref 0.5–1.4)
EOSINOPHIL # BLD AUTO: 0.49 K/UL (ref 0–0.51)
EOSINOPHIL NFR BLD: 3.1 % (ref 0–6.9)
ERYTHROCYTE [DISTWIDTH] IN BLOOD BY AUTOMATED COUNT: 45.6 FL (ref 35.9–50)
GFR SERPL CREATININE-BSD FRML MDRD: >60 ML/MIN/1.73 M 2
GLOBULIN SER CALC-MCNC: 3.6 G/DL (ref 1.9–3.5)
GLUCOSE SERPL-MCNC: 89 MG/DL (ref 65–99)
HCT VFR BLD AUTO: 36.8 % (ref 42–52)
HGB BLD-MCNC: 11.2 G/DL (ref 14–18)
IMM GRANULOCYTES # BLD AUTO: 0.12 K/UL (ref 0–0.11)
IMM GRANULOCYTES NFR BLD AUTO: 0.8 % (ref 0–0.9)
LYMPHOCYTES # BLD AUTO: 1.31 K/UL (ref 1–4.8)
LYMPHOCYTES NFR BLD: 8.3 % (ref 22–41)
MCH RBC QN AUTO: 23.7 PG (ref 27–33)
MCHC RBC AUTO-ENTMCNC: 30.4 G/DL (ref 33.7–35.3)
MCV RBC AUTO: 78 FL (ref 81.4–97.8)
MONOCYTES # BLD AUTO: 0.8 K/UL (ref 0–0.85)
MONOCYTES NFR BLD AUTO: 5.1 % (ref 0–13.4)
NEUTROPHILS # BLD AUTO: 12.75 K/UL (ref 1.82–7.42)
NEUTROPHILS NFR BLD: 81 % (ref 44–72)
NRBC # BLD AUTO: 0 K/UL
NRBC BLD AUTO-RTO: 0 /100 WBC
PLATELET # BLD AUTO: 861 K/UL (ref 164–446)
PMV BLD AUTO: 9.7 FL (ref 9–12.9)
POTASSIUM SERPL-SCNC: 3.9 MMOL/L (ref 3.6–5.5)
PROCALCITONIN SERPL-MCNC: 0.1 NG/ML
PROT SERPL-MCNC: 6.6 G/DL (ref 6–8.2)
RBC # BLD AUTO: 4.72 M/UL (ref 4.7–6.1)
SODIUM SERPL-SCNC: 134 MMOL/L (ref 135–145)
WBC # BLD AUTO: 15.7 K/UL (ref 4.8–10.8)

## 2017-10-11 PROCEDURE — 84145 PROCALCITONIN (PCT): CPT

## 2017-10-11 PROCEDURE — 700111 HCHG RX REV CODE 636 W/ 250 OVERRIDE (IP): Performed by: HOSPITALIST

## 2017-10-11 PROCEDURE — 36415 COLL VENOUS BLD VENIPUNCTURE: CPT

## 2017-10-11 PROCEDURE — 99233 SBSQ HOSP IP/OBS HIGH 50: CPT | Performed by: INTERNAL MEDICINE

## 2017-10-11 PROCEDURE — 770006 HCHG ROOM/CARE - MED/SURG/GYN SEMI*

## 2017-10-11 PROCEDURE — A9270 NON-COVERED ITEM OR SERVICE: HCPCS | Performed by: INTERNAL MEDICINE

## 2017-10-11 PROCEDURE — A9270 NON-COVERED ITEM OR SERVICE: HCPCS | Performed by: HOSPITALIST

## 2017-10-11 PROCEDURE — 85025 COMPLETE CBC W/AUTO DIFF WBC: CPT

## 2017-10-11 PROCEDURE — 700102 HCHG RX REV CODE 250 W/ 637 OVERRIDE(OP): Performed by: INTERNAL MEDICINE

## 2017-10-11 PROCEDURE — 700102 HCHG RX REV CODE 250 W/ 637 OVERRIDE(OP): Performed by: HOSPITALIST

## 2017-10-11 PROCEDURE — 80053 COMPREHEN METABOLIC PANEL: CPT

## 2017-10-11 RX ADMIN — GABAPENTIN 100 MG: 100 CAPSULE ORAL at 14:14

## 2017-10-11 RX ADMIN — GABAPENTIN 100 MG: 100 CAPSULE ORAL at 08:51

## 2017-10-11 RX ADMIN — HEPARIN SODIUM 5000 UNITS: 5000 INJECTION, SOLUTION INTRAVENOUS; SUBCUTANEOUS at 14:14

## 2017-10-11 RX ADMIN — METOPROLOL TARTRATE 75 MG: 25 TABLET, FILM COATED ORAL at 08:51

## 2017-10-11 RX ADMIN — AMOXICILLIN AND CLAVULANATE POTASSIUM 1 TABLET: 875; 125 TABLET, FILM COATED ORAL at 09:00

## 2017-10-11 RX ADMIN — ATORVASTATIN CALCIUM 40 MG: 40 TABLET, FILM COATED ORAL at 20:25

## 2017-10-11 RX ADMIN — METOPROLOL TARTRATE 75 MG: 25 TABLET, FILM COATED ORAL at 20:25

## 2017-10-11 RX ADMIN — ASPIRIN 81 MG: 81 TABLET, COATED ORAL at 08:51

## 2017-10-11 RX ADMIN — AMOXICILLIN AND CLAVULANATE POTASSIUM 1 TABLET: 875; 125 TABLET, FILM COATED ORAL at 20:25

## 2017-10-11 RX ADMIN — FOLIC ACID 1 MG: 1 TABLET ORAL at 08:52

## 2017-10-11 RX ADMIN — THERA TABS 1 TABLET: TAB at 08:52

## 2017-10-11 RX ADMIN — HEPARIN SODIUM 5000 UNITS: 5000 INJECTION, SOLUTION INTRAVENOUS; SUBCUTANEOUS at 05:55

## 2017-10-11 RX ADMIN — HEPARIN SODIUM 5000 UNITS: 5000 INJECTION, SOLUTION INTRAVENOUS; SUBCUTANEOUS at 20:26

## 2017-10-11 RX ADMIN — STANDARDIZED SENNA CONCENTRATE AND DOCUSATE SODIUM 2 TABLET: 8.6; 5 TABLET, FILM COATED ORAL at 08:51

## 2017-10-11 RX ADMIN — GABAPENTIN 100 MG: 100 CAPSULE ORAL at 20:25

## 2017-10-11 RX ADMIN — LEVOTHYROXINE SODIUM 75 MCG: 75 TABLET ORAL at 05:55

## 2017-10-11 ASSESSMENT — ENCOUNTER SYMPTOMS
WHEEZING: 0
ABDOMINAL PAIN: 0
MEMORY LOSS: 1
PALPITATIONS: 0
HEADACHES: 0
SENSORY CHANGE: 0
FLANK PAIN: 0
NERVOUS/ANXIOUS: 0
WEAKNESS: 1
NAUSEA: 0
SHORTNESS OF BREATH: 0
LOSS OF CONSCIOUSNESS: 0
COUGH: 1

## 2017-10-11 ASSESSMENT — LIFESTYLE VARIABLES: DO YOU DRINK ALCOHOL: YES

## 2017-10-11 ASSESSMENT — PAIN SCALES - GENERAL
PAINLEVEL_OUTOF10: 0
PAINLEVEL_OUTOF10: 0

## 2017-10-11 NOTE — PROGRESS NOTES
Report received. Assumed care of pt. A/O x2, disoriented to situation and time. VSS. Responds appropriately. Denies pain, SOB. Assessment complete, dressing to RLE cdi, dressing to sacral cdi. Q2 turns in place. Explained importance of calling before getting OOB. Call light and belongings within reach. Bed alarm on. Bed in the lowest position. Treaded socks in place. Will continue to monitor .

## 2017-10-11 NOTE — PROGRESS NOTES
Josie Osuna Fall Risk Assessment:     Last Known Fall: Within the last month  Mobility: Use of assistive device/requires assist of two people  Medications: Cardiovascular or central nervous system meds  Mental Status/LOC/Awareness: Memory loss/confusion and requires reorienting  Toileting Needs: Incontinence  Volume/Electrolyte Status: No problems  Communication/Sensory: Visual (Glasses)/hearing deficit  Behavior: Appropriate behavior  Josie Osuna Fall Risk Total: 17  Fall Risk Level: HIGH RISK    Universal Fall Precautions:  call light/belongings in reach, bed in low position and locked, wheelchairs and assistive devices out of sight, siderails up x 2, use non-slip footwear, adequate lighting, clutter free and spill free environment, educate on level of risk, educate to call for assistance    Fall Risk Level Interventions:     TRIAL (TELE 8, NEURO, MED MARINA 5) High Fall Risk Interventions  Place yellow fall risk ID band on patient: verified  Provide patient/family education based on risk assessment: completed  Educate patient/family to call staff for assistance when getting out of bed: completed  Place fall precaution signage outside patient door: verified  Place patient in room close to nursing station: verified  Utilize bed/chair fall alarm: completed  Notify charge of high risk for huddle: completed    Patient Specific Interventions:     Medication: review medications with patient and family  Mental Status/LOC/Awareness: reorient patient, reinforce falls education, utilize bed/chair fall alarm and reinforce the use of call light  Toileting: provide frquent toileting and instruct patient/family on the need to call for assistance when toileting  Volume/Electrolyte Status: not applicable  Communication/Sensory: update plan of care on whiteboard and ensure patient has glasses/contacts and hearing aids/dentures  Behavioral: not applicable  Mobility: provide comfort measures during transport, utilize bed/chair  fall alarm, ensure bed is locked and in lowest position and instruct patient to exit bed on their strongest side

## 2017-10-11 NOTE — DISCHARGE PLANNING
Guardianship packet completed and faxed to Olivia Carreon with supporting documentation.   Needs Assessment pending but will be completed.   [facility] referral form faxed to [facility].

## 2017-10-11 NOTE — PROGRESS NOTES
Assumed care of patient at 0700 . Received report from RN. Patient is AOX4 . Assessment complete. Labs reviewed.Patient and RN discussed plan of care. Patient questions answered. Patient needs are met at this time. Bed in lowest and locked position. Upper bed rails up.  Call light is within reach. Hourly rounding in place.  /86   Pulse 94   Temp 36.2 °C (97.2 °F)   Resp 18   Ht 1.829 m (6')   Wt 55.4 kg (122 lb 2.2 oz)   SpO2 95%   BMI 16.56 kg/m²

## 2017-10-11 NOTE — PROGRESS NOTES
Josie Osuna Fall Risk Assessment:     Last Known Fall: Within the last month  Mobility: Use of assistive device/requires assist of two people  Medications: Cardiovascular or central nervous system meds  Mental Status/LOC/Awareness: Memory loss/confusion and requires reorienting  Toileting Needs: Incontinence  Volume/Electrolyte Status: No problems  Communication/Sensory: Visual (Glasses)/hearing deficit  Behavior: Appropriate behavior  Josie Osuna Fall Risk Total: 17  Fall Risk Level: HIGH RISK    Universal Fall Precautions:  call light/belongings in reach, bed in low position and locked, siderails up x 2    Fall Risk Level Interventions:     TRIAL (TELE 8, NEURO, MED MARINA 5) High Fall Risk Interventions  Place yellow fall risk ID band on patient: verified  Provide patient/family education based on risk assessment: completed  Educate patient/family to call staff for assistance when getting out of bed: completed  Place fall precaution signage outside patient door: verified  Place patient in room close to nursing station: verified  Utilize bed/chair fall alarm: completed  Notify charge of high risk for huddle: completed    Patient Specific Interventions:     Medication: review medications with patient and family and limit combination of prn medications  Mental Status/LOC/Awareness: check on patient hourly, utilize bed/chair fall alarm and reinforce the use of call light  Toileting: instruct patient/family on the use of grab bars and instruct patient/family on the need to call for assistance when toileting  Volume/Electrolyte Status: ensure patient remains hydrated, monitor abnormal lab values and ensure IV fluids are appropriate  Communication/Sensory: update plan of care on whiteboard and ensure patient has glasses/contacts and hearing aids/dentures  Behavioral: encourage patient to voice feelings, engage patient in daily activities and administer medication as ordered  Mobility: dangle prior to standing, utilize  bed/chair fall alarm and ensure bed is locked and in lowest position

## 2017-10-11 NOTE — WOUND TEAM
"[facility]  Inpatient Services  Wound and Skin Care Progress Note    HPI, PMH, SH: Reviewed  Unit where seen by Wound Team: [id number]/[id number]    WOUND TEAM FOLLOW UP: R knee and stump, sacrococcygeal area    SUBJECTIVE:  \"Okay, go for it.\"    Self Report / Pain Level: c/o pain with moving  OBJECTIVE: on pressure redistribution mattress  WOUND TYPE, LOCATION, CHARACTERISTICS:  Wound POA Other (full thickness surgical) Leg Right Distal  Periwound Skin: Intact  Drainage : None  Tissue Type and %:    100% crust  Wound Edges:    attached    Odor:     None   Exposed structure(s):   No   Signs and Symptoms of Infection: none    Wound Not POA Unstageable Knee Right Anterior  Periwound Skin: Intact  Drainage : Scant, Serous     Tissue Type and %:    10% pink 90% yellow  Wound Edges:    attached    Odor:     None   Exposed structure(s):   No   Signs and Symptoms of Infection: none    Measurements : taken 10/10/17    Leg Right Distal R knee  Length (cm):  0.2   3  Width (cm):  2   1.5  Depth (cm):  (nm crust)   (nm)   Tracts/undermining:   None       INTERVENTIONS BY WOUND TEAM: removed drsgs, cleaned wounds with NS, dried. Removed eschar from R knee wound with scissors and forceps. Revealed more yellow tissue. No c/o pain or bleeding. Cleaned wound again and applied honey colloid and covered with adhesive foam. Small piece of crust from stump removed revaling intact skin. Painted with betadine, air dried. CNA in to help turn pt so sacrococcygeal could be viewed. This is intact and discolored, but balnching. Pt had BM, RN and CNA going to perform care.      Leg Right Distal-Dressing Options: Open to Air  Knee Right Anterior-Dressing Options:  (honey colloid, foam)    Interdisciplinary consultation:  RN, pt , CNA    EVALUATION AND PROGRESS OF WOUND(S): pt's knee wound moist and easily debrided. Still has dicolored wound bed so honey colloid for moist wound healing and change pH. Pt has poor perfusion per vascular note " from Dr Rodriguez note 10/2/17, maximiliano black AKVALARIE.     Factors affecting wound healing: PAD, decreased nutrition, smoker, alcohol abuse     Goals:maintain dry eschar to stump, decreased non-viable tissue to knee 1% each week.     NURSING PLAN OF CARE:    Dressing changes: Continue previous Dressing Maintenance orders:        See new Dressing Maintenance orders:   x    Skin care: See Skin Care orders:        Rectal tube care: See Rectal Tube Care orders:      Other orders:           WOUND TEAM PLAN OF CARE (X):   NPWT change 3 x week:        Dressing changes:       Follow up as needed:   x    Other:

## 2017-10-11 NOTE — CARE PLAN
Problem: Safety  Goal: Will remain free from injury  Outcome: PROGRESSING AS EXPECTED  Treaded socks in place, bed in the lowest position, bed alarm on, call light and belongings within reach, pt call for assistance appropriately    Problem: Skin Integrity  Goal: Risk for impaired skin integrity will decrease  Outcome: PROGRESSING AS EXPECTED  q2 turns, adrián risk assessment, applied barrier cream

## 2017-10-11 NOTE — PROGRESS NOTES
[facility]    Date of Service: 10/11/2017    Chief Complaint  72 y.o. male admitted 2017 with urinary tract infection and confusion    Interval Problem Update  Patient is forgetful  Oriented ×2  Denies shortness of breath  Improved respiration, poor inspiratory effort  Denies cough, dysuria or or diarrhea  Consultants/Specialty  None    Disposition  Needs skilled nursing placement  PT/OT  Pending guardianship    assisting      Review of Systems   Constitutional: Negative for malaise/fatigue.   HENT: Negative for hearing loss.    Respiratory: Positive for cough. Negative for shortness of breath and wheezing.    Cardiovascular: Negative for palpitations.   Gastrointestinal: Negative for abdominal pain and nausea.   Genitourinary: Negative for flank pain.   Musculoskeletal: Negative for joint pain.   Neurological: Positive for weakness. Negative for sensory change, loss of consciousness and headaches.   Psychiatric/Behavioral: Positive for memory loss. The patient is not nervous/anxious.       Physical Exam  Laboratory/Imaging   Hemodynamics  Temp (24hrs), Av.4 °C (97.5 °F), Min:36.2 °C (97.2 °F), Max:36.6 °C (97.8 °F)   Temperature: 36.2 °C (97.2 °F)  Pulse  Av.9  Min: 69  Max: 144    Blood Pressure : 136/86      Respiratory      Respiration: 18, Pulse Oximetry: 95 %        RUL Breath Sounds: Diminished, RML Breath Sounds: Diminished, RLL Breath Sounds: Diminished, RIGO Breath Sounds: Diminished, LLL Breath Sounds: Diminished    Fluids  No intake or output data in the 24 hours ending 10/11/17 0911    Nutrition  Orders Placed This Encounter   Procedures   • DIET ORDER     Standing Status:   Standing     Number of Occurrences:   1     Order Specific Question:   Diet:     Answer:   Regular [1]     Order Specific Question:   Texture/Fiber modifications:     Answer:   Dysphagia 1(Pureed)specify fluid consistency(question 6) [1]     Order Specific Question:   Consistency/Fluid  modifications:     Answer:   Nectar Thick [2]     Order Specific Question:   Miscellaneous modifications:     Answer:   SLP - 1:1 Supervision by Nursing [21]     Physical Exam   Constitutional: No distress.   Chronically ill-appearing  Frail, thin   HENT:   Head: Normocephalic and atraumatic.   Mouth/Throat: No oropharyngeal exudate.   Eyes: EOM are normal. Pupils are equal, round, and reactive to light. Right eye exhibits no discharge. Left eye exhibits discharge.   Cardiovascular: Normal rate and regular rhythm.    Pulmonary/Chest: Effort normal. No respiratory distress.   Decreased bilaterally   Abdominal: Soft. He exhibits no distension.   Musculoskeletal: He exhibits no edema.   Right BKA, open wound  Dressing clean and dry and intact  Nontender to palpation   Neurological: He is alert. He is disoriented. No cranial nerve deficit.   Skin: Skin is warm and dry.   Psychiatric: He has a normal mood and affect. His behavior is normal. Judgment normal.   Nursing note and vitals reviewed.      Recent Labs      10/10/17   0239  10/11/17   0235   WBC  15.1*  15.7*   RBC  4.76  4.72   HEMOGLOBIN  11.1*  11.2*   HEMATOCRIT  36.5*  36.8*   MCV  76.7*  78.0*   MCH  23.3*  23.7*   MCHC  30.4*  30.4*   RDW  45.1  45.6   PLATELETCT  828*  861*   MPV  9.4  9.7     Recent Labs      10/10/17   0239  10/11/17   0235   SODIUM  135  134*   POTASSIUM  3.9  3.9   CHLORIDE  101  101   CO2  26  25   GLUCOSE  98  89   BUN  16  13   CREATININE  0.54  0.53   CALCIUM  8.5  8.5                      Assessment/Plan     * Cellulitis of right knee- (present on admission)   Assessment & Plan    Active Orders     Ordered     Ordering Provider       Sat Sep 30, 2017 12:30 PM    09/30/17 1230  ampicillin/sulbactam (UNASYN) 3 g in  mL IVPB  EVERY 6 HOURS      John Paul Mckeon M.D.      Suspect source of his acute ills  DC IV antibiotics on October 3  off IV antibiotics to cover for UTI/aspiration pneumonia/cellulitis        Pneumonia    Assessment & Plan    Aspiration pneumonia    SLP evaluation  RT protocol  Oxygen supplementation as needed  Encourage incentive spirometer use  OFF Rocephin and Flagyl IV   oral antibiotics to complete on October 15, Augmentin  -respiratory culture, never collected        Hyponatremia- (present on admission)   Assessment & Plan    Resolved  Continue with fluids  serum  Osm never done  urine osmolality-within normal limits          Leukocytosis- (present on admission)   Assessment & Plan    Ongoing  May be secondary to APR, chronic inflammation  Follow-up CRP  No new signs of infection  On antibiotics, to complete on October 15    -pro-calcitonin level low at 0.10  Possible chronic aspiration, respiratory status improving  Continue to monitor        PAD (peripheral artery disease) (CMS-HCC)- (present on admission)   Assessment & Plan    Medical mgmt, 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  Will continue ASA and statin  Wound care consult        Thrombocytosis (CMS-HCC)   Assessment & Plan    Reactive,   ?Chronic infection, monitor        Dementia   Assessment & Plan    With encephalopathy and significant debility  Has been evaluated by ethics committee, unable to make his own medical decisions  Sent to legal for further guardianship pending   to assist  We'll need skilled nursing placement        UTI (urinary tract infection)- (present on admission)   Assessment & Plan      Antibiotics as above        Essential hypertension- (present on admission)   Assessment & Plan    SBP goal less than 140 mmHg  DBP goal less than 90 mmHg  PRN and antihypertensives to titrate by hospitalist towards goal  Continue with metoprolol 50 mg twice a day        Severe protein-calorie malnutrition (CMS-HCC)- (present on admission)   Assessment & Plan    Nutrition to see  Albumin 2.8, improving      Encourage oral intake  ensure milkshakes 3 times a day            Reviewed items::  Radiology images reviewed,  Labs reviewed and Medications reviewed  DVT prophylaxis pharmacological::  Heparin  Antibiotics:  Treating active infection/contamination beyond 24 hours perioperative coverage

## 2017-10-12 PROCEDURE — 770006 HCHG ROOM/CARE - MED/SURG/GYN SEMI*

## 2017-10-12 PROCEDURE — A9270 NON-COVERED ITEM OR SERVICE: HCPCS | Performed by: HOSPITALIST

## 2017-10-12 PROCEDURE — 700102 HCHG RX REV CODE 250 W/ 637 OVERRIDE(OP): Performed by: INTERNAL MEDICINE

## 2017-10-12 PROCEDURE — 99232 SBSQ HOSP IP/OBS MODERATE 35: CPT | Performed by: INTERNAL MEDICINE

## 2017-10-12 PROCEDURE — 700102 HCHG RX REV CODE 250 W/ 637 OVERRIDE(OP): Performed by: HOSPITALIST

## 2017-10-12 PROCEDURE — A9270 NON-COVERED ITEM OR SERVICE: HCPCS | Performed by: INTERNAL MEDICINE

## 2017-10-12 PROCEDURE — 700111 HCHG RX REV CODE 636 W/ 250 OVERRIDE (IP): Performed by: HOSPITALIST

## 2017-10-12 RX ADMIN — THERA TABS 1 TABLET: TAB at 09:33

## 2017-10-12 RX ADMIN — AMOXICILLIN AND CLAVULANATE POTASSIUM 1 TABLET: 875; 125 TABLET, FILM COATED ORAL at 20:28

## 2017-10-12 RX ADMIN — AMOXICILLIN AND CLAVULANATE POTASSIUM 1 TABLET: 875; 125 TABLET, FILM COATED ORAL at 09:35

## 2017-10-12 RX ADMIN — METOPROLOL TARTRATE 75 MG: 25 TABLET, FILM COATED ORAL at 09:35

## 2017-10-12 RX ADMIN — GABAPENTIN 100 MG: 100 CAPSULE ORAL at 15:06

## 2017-10-12 RX ADMIN — HEPARIN SODIUM 5000 UNITS: 5000 INJECTION, SOLUTION INTRAVENOUS; SUBCUTANEOUS at 05:29

## 2017-10-12 RX ADMIN — ASPIRIN 81 MG: 81 TABLET, COATED ORAL at 09:37

## 2017-10-12 RX ADMIN — LEVOTHYROXINE SODIUM 75 MCG: 75 TABLET ORAL at 05:29

## 2017-10-12 RX ADMIN — ATORVASTATIN CALCIUM 40 MG: 40 TABLET, FILM COATED ORAL at 20:28

## 2017-10-12 RX ADMIN — GABAPENTIN 100 MG: 100 CAPSULE ORAL at 09:35

## 2017-10-12 RX ADMIN — STANDARDIZED SENNA CONCENTRATE AND DOCUSATE SODIUM 2 TABLET: 8.6; 5 TABLET, FILM COATED ORAL at 09:37

## 2017-10-12 RX ADMIN — HEPARIN SODIUM 5000 UNITS: 5000 INJECTION, SOLUTION INTRAVENOUS; SUBCUTANEOUS at 20:28

## 2017-10-12 RX ADMIN — HEPARIN SODIUM 5000 UNITS: 5000 INJECTION, SOLUTION INTRAVENOUS; SUBCUTANEOUS at 15:06

## 2017-10-12 RX ADMIN — GABAPENTIN 100 MG: 100 CAPSULE ORAL at 20:28

## 2017-10-12 RX ADMIN — FOLIC ACID 1 MG: 1 TABLET ORAL at 09:36

## 2017-10-12 RX ADMIN — METOPROLOL TARTRATE 75 MG: 25 TABLET, FILM COATED ORAL at 20:28

## 2017-10-12 ASSESSMENT — ENCOUNTER SYMPTOMS
NERVOUS/ANXIOUS: 0
CHILLS: 0
FEVER: 0
FLANK PAIN: 0
COUGH: 1
DIZZINESS: 0
NAUSEA: 0
MYALGIAS: 0
VOMITING: 0
ABDOMINAL PAIN: 0
SENSORY CHANGE: 0
WEAKNESS: 1
PALPITATIONS: 0
MEMORY LOSS: 1
DEPRESSION: 0
SHORTNESS OF BREATH: 0

## 2017-10-12 ASSESSMENT — PAIN SCALES - GENERAL
PAINLEVEL_OUTOF10: 0
PAINLEVEL_OUTOF10: 0

## 2017-10-12 NOTE — CARE PLAN
Problem: Fluid Volume:  Goal: Will maintain balanced intake and output  Outcome: PROGRESSING SLOWER THAN EXPECTED  Pt has very poor oral intake; consumes <25% of meals. Pt encouraged to drink po fluids during q2T and rounding.    Problem: Pain Management  Goal: Pain level will decrease to patient's comfort goal  Outcome: PROGRESSING AS EXPECTED  No pain or discomfort noted or expressed from pt.

## 2017-10-12 NOTE — DIETARY
Nutrition Services Update: Following pt for adequate nutritional intake.     PO intake per ADLs remains consistently <25%  No new wt since admit.  Supplements in place: Magic Cups BID and Boost VHC once per day.     Plan/Recommend:  Continue encouraging PO intake  Nutrition Representative to see daily  Please record intake as percentage of meals consumed  Please obtain a new wt as able to better assess nutrition status  Continue supplements TID all together    RD following

## 2017-10-12 NOTE — CARE PLAN
Problem: Nutritional:  Goal: Achieve adequate nutritional intake  Patient will consume 50% of meals   Outcome: PROGRESSING SLOWER THAN EXPECTED  See dietary note.

## 2017-10-12 NOTE — PROGRESS NOTES
Assumed care of pt at 0730am. Report received and bedside rounding completed with noc RN. Pt in bed resting comfortably denies any pain at this time and is calm. No SOB, or in any acute distress. q2 turning pt, floating heels.  Fall precautions in place, treaded non slip socks.  hourly rounding in place. See flowsheets for further assessment.

## 2017-10-12 NOTE — PROGRESS NOTES
[facility] Progress [facility]    Date of Service: 10/12/2017    Chief Complaint  72 y.o. male admitted 2017 with urinary tract infection and confusion    Interval Problem Update  Pleasant, cooperative  No new complaints  wants to go home    Consultants/Specialty  None    Disposition  Needs skilled nursing placement  PT/OT  Pending guardianship    assisting      Review of Systems   Constitutional: Negative for chills and fever.   HENT: Negative for hearing loss.    Respiratory: Positive for cough. Negative for shortness of breath.    Cardiovascular: Negative for chest pain, palpitations and leg swelling.   Gastrointestinal: Negative for abdominal pain, nausea and vomiting.   Genitourinary: Negative for dysuria and flank pain.   Musculoskeletal: Negative for joint pain and myalgias.   Neurological: Positive for weakness. Negative for dizziness and sensory change.   Psychiatric/Behavioral: Positive for memory loss. Negative for depression. The patient is not nervous/anxious.       Physical Exam  Laboratory/Imaging   Hemodynamics  Temp (24hrs), Av.7 °C (98.1 °F), Min:36.4 °C (97.5 °F), Max:37.4 °C (99.3 °F)   Temperature: 36.6 °C (97.8 °F)  Pulse  Av.4  Min: 69  Max: 144    Blood Pressure : 125/77      Respiratory      Respiration: 18, Pulse Oximetry: 95 %, O2 Daily Delivery Respiratory : Silicone Nasal Cannula        RUL Breath Sounds: Diminished, RML Breath Sounds: Diminished, RLL Breath Sounds: Diminished, RIGO Breath Sounds: Diminished, LLL Breath Sounds: Diminished    Fluids    Intake/Output Summary (Last 24 hours) at 10/12/17 1420  Last data filed at 10/12/17 0200   Gross per 24 hour   Intake               60 ml   Output                0 ml   Net               60 ml       Nutrition  Orders Placed This Encounter   Procedures   • DIET ORDER     Standing Status:   Standing     Number of Occurrences:   1     Order Specific Question:   Diet:     Answer:   Regular [1]     Order Specific  Question:   Texture/Fiber modifications:     Answer:   Dysphagia 1(Pureed)specify fluid consistency(question 6) [1]     Order Specific Question:   Consistency/Fluid modifications:     Answer:   Nectar Thick [2]     Order Specific Question:   Miscellaneous modifications:     Answer:   SLP - 1:1 Supervision by Nursing [21]     Physical Exam   Constitutional: No distress.   Chronically ill-appearing  Frail, thin   HENT:   Head: Normocephalic and atraumatic.   Mouth/Throat: No oropharyngeal exudate.   Eyes: EOM are normal. Pupils are equal, round, and reactive to light. Left eye exhibits no discharge.   Neck: Normal range of motion. No JVD present.   Cardiovascular: Normal rate, regular rhythm and intact distal pulses.    Pulmonary/Chest: Effort normal. No respiratory distress.   Decreased bilaterally   Abdominal: Soft. He exhibits no distension. There is no tenderness.   Musculoskeletal: He exhibits no edema or tenderness.   Right BKA, open wound  Dressing clean and dry and intact  Nontender to palpation   Neurological: He is alert. He is disoriented. No cranial nerve deficit. Coordination normal.   Skin: Skin is warm and dry. He is not diaphoretic. No erythema.   Psychiatric: He has a normal mood and affect. His behavior is normal. Judgment and thought content normal.   Nursing note and vitals reviewed.      Recent Labs      10/10/17   0239  10/11/17   0235   WBC  15.1*  15.7*   RBC  4.76  4.72   HEMOGLOBIN  11.1*  11.2*   HEMATOCRIT  36.5*  36.8*   MCV  76.7*  78.0*   MCH  23.3*  23.7*   MCHC  30.4*  30.4*   RDW  45.1  45.6   PLATELETCT  828*  861*   MPV  9.4  9.7     Recent Labs      10/10/17   0239  10/11/17   0235   SODIUM  135  134*   POTASSIUM  3.9  3.9   CHLORIDE  101  101   CO2  26  25   GLUCOSE  98  89   BUN  16  13   CREATININE  0.54  0.53   CALCIUM  8.5  8.5                      Assessment/Plan     * Cellulitis of right knee- (present on admission)   Assessment & Plan    Active Orders     Ordered      Ordering Provider       Sat Sep 30, 2017 12:30 PM    09/30/17 1230  ampicillin/sulbactam (UNASYN) 3 g in  mL IVPB  EVERY 6 HOURS      John Paul Mckeon M.D.      Suspect source of his acute ills  DC IV antibiotics on October 3  off IV antibiotics to cover for UTI/aspiration pneumonia/cellulitis        Pneumonia   Assessment & Plan    Aspiration pneumonia    SLP evaluation  RT protocol  Oxygen supplementation as needed  Encourage incentive spirometer use  OFF Rocephin and Flagyl IV   oral antibiotics to complete on October 15, Augmentin  -respiratory culture, never collected        Hyponatremia- (present on admission)   Assessment & Plan    Resolved  Continue with fluids  serum  Osm never done  urine osmolality-within normal limits          Leukocytosis- (present on admission)   Assessment & Plan    Ongoing  May be secondary to APR, chronic inflammation  Follow-up CRP  No new signs of infection  On antibiotics, to complete on October 15    -pro-calcitonin level low at 0.10  Possible chronic aspiration, respiratory status improving  Continue to monitor        PAD (peripheral artery disease) (CMS-HCC)- (present on admission)   Assessment & Plan    Medical mgmt, 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  Will continue ASA and statin  Wound care consult        Thrombocytosis (CMS-HCC)   Assessment & Plan    Reactive,   ?Chronic infection, monitor        Dementia   Assessment & Plan    With encephalopathy and significant debility  Has been evaluated by ethics committee, unable to make his own medical decisions  Sent to legal for further guardianship pending   to assist  We'll need skilled nursing placement        UTI (urinary tract infection)- (present on admission)   Assessment & Plan      Antibiotics as above        Essential hypertension- (present on admission)   Assessment & Plan    SBP goal less than 140 mmHg  DBP goal less than 90 mmHg  PRN and antihypertensives to titrate by  hospitalist towards goal  Continue with metoprolol 50 mg twice a day        Severe protein-calorie malnutrition (CMS-HCC)- (present on admission)   Assessment & Plan    Nutrition to see  Albumin 2.8, improving      Encourage oral intake  ensure milkshakes 3 times a day            Reviewed items::  Radiology images reviewed, Labs reviewed and Medications reviewed  DVT prophylaxis pharmacological::  Heparin  Antibiotics:  Treating active infection/contamination beyond 24 hours perioperative coverage

## 2017-10-12 NOTE — PROGRESS NOTES
Received bedside report from day RN and assumed care of patient at 1900. Patient is alert and oriented x2 with no signs of labored breathing or distress. Safety precautions in place including patient call light within reach, bed alarm on, personal possessions nearby, bed in low position and locked, hourly rounding in practice, and non-skid socks in place.

## 2017-10-12 NOTE — CARE PLAN
Problem: Safety  Goal: Will remain free from injury  Bed locked and in lowest position. Hourly rounding in place. Call light with in reach,     Problem: Skin Integrity  Goal: Risk for impaired skin integrity will decrease  Pt on q2 hr turning. Pt on waffle mattress.

## 2017-10-12 NOTE — PROGRESS NOTES
Josie Osuna Fall Risk Assessment:     Last Known Fall: Within the last month  Mobility: Use of assistive device/requires assist of two people  Medications: Cardiovascular or central nervous system meds  Mental Status/LOC/Awareness: Oriented to person and place  Toileting Needs: Incontinence  Volume/Electrolyte Status: No problems  Communication/Sensory: Visual (Glasses)/hearing deficit  Behavior: Appropriate behavior  Josie Osuna Fall Risk Total: 15  Fall Risk Level: HIGH RISK    Universal Fall Precautions:  call light/belongings in reach, bed in low position and locked, siderails up x 2, use non-slip footwear, adequate lighting, clutter free and spill free environment, educate on level of risk, educate to call for assistance    Fall Risk Level Interventions:     TRIAL (TELE 8, NEURO, MED MARINA 5) High Fall Risk Interventions  Place yellow fall risk ID band on patient: verified  Provide patient/family education based on risk assessment: completed  Educate patient/family to call staff for assistance when getting out of bed: completed  Place fall precaution signage outside patient door: verified  Place patient in room close to nursing station: verified  Utilize bed/chair fall alarm: verified  Notify charge of high risk for huddle: verified    Patient Specific Interventions:     Medication: review medications with patient and family  Mental Status/LOC/Awareness: reorient patient, utilize bed/chair fall alarm and reinforce the use of call light  Toileting: provide frquent toileting  Volume/Electrolyte Status: ensure patient remains hydrated  Communication/Sensory: update plan of care on whiteboard and ensure patient has glasses/contacts and hearing aids/dentures  Behavioral: encourage patient to voice feelings  Mobility: utilize bed/chair fall alarm and ensure bed is locked and in lowest position

## 2017-10-12 NOTE — CARE PLAN
Problem: Safety  Goal: Will remain free from injury  Outcome: PROGRESSING AS EXPECTED  Bed in lowest position, call light within reach. Bed alarm on. Patient educated regarding safety precautions. Room free of clutter.     Problem: Skin Integrity  Goal: Risk for impaired skin integrity will decrease    Intervention: Assess risk factors for impaired skin integrity and/or pressure ulcers  Patient assessed for skin breakdown. Draw sheet used for repositioning. Patient encouraged to perform dorsi and plantar flexion every hour while in bed.

## 2017-10-13 LAB
ANISOCYTOSIS BLD QL SMEAR: ABNORMAL
BASOPHILS # BLD AUTO: 2.5 % (ref 0–1.8)
BASOPHILS # BLD: 0.36 K/UL (ref 0–0.12)
CRP SERPL HS-MCNC: 1.56 MG/DL (ref 0–0.75)
EOSINOPHIL # BLD AUTO: 0.37 K/UL (ref 0–0.51)
EOSINOPHIL NFR BLD: 2.6 % (ref 0–6.9)
ERYTHROCYTE [DISTWIDTH] IN BLOOD BY AUTOMATED COUNT: 45.1 FL (ref 35.9–50)
HCT VFR BLD AUTO: 37.6 % (ref 42–52)
HGB BLD-MCNC: 11.1 G/DL (ref 14–18)
HYPOCHROMIA BLD QL SMEAR: ABNORMAL
LG PLATELETS BLD QL SMEAR: NORMAL
LYMPHOCYTES # BLD AUTO: 0.24 K/UL (ref 1–4.8)
LYMPHOCYTES NFR BLD: 1.7 % (ref 22–41)
MANUAL DIFF BLD: NORMAL
MCH RBC QN AUTO: 22.9 PG (ref 27–33)
MCHC RBC AUTO-ENTMCNC: 29.5 G/DL (ref 33.7–35.3)
MCV RBC AUTO: 77.5 FL (ref 81.4–97.8)
MICROCYTES BLD QL SMEAR: ABNORMAL
MONOCYTES # BLD AUTO: 0.24 K/UL (ref 0–0.85)
MONOCYTES NFR BLD AUTO: 1.7 % (ref 0–13.4)
MORPHOLOGY BLD-IMP: NORMAL
NEUTROPHILS # BLD AUTO: 13.18 K/UL (ref 1.82–7.42)
NEUTROPHILS NFR BLD: 89 % (ref 44–72)
NEUTS BAND NFR BLD MANUAL: 2.5 % (ref 0–10)
NRBC # BLD AUTO: 0 K/UL
NRBC BLD AUTO-RTO: 0 /100 WBC
OVALOCYTES BLD QL SMEAR: NORMAL
PLATELET # BLD AUTO: 873 K/UL (ref 164–446)
PLATELET BLD QL SMEAR: NORMAL
PMV BLD AUTO: 9.3 FL (ref 9–12.9)
POIKILOCYTOSIS BLD QL SMEAR: NORMAL
RBC # BLD AUTO: 4.85 M/UL (ref 4.7–6.1)
RBC BLD AUTO: PRESENT
WBC # BLD AUTO: 14.4 K/UL (ref 4.8–10.8)

## 2017-10-13 PROCEDURE — 86140 C-REACTIVE PROTEIN: CPT

## 2017-10-13 PROCEDURE — A9270 NON-COVERED ITEM OR SERVICE: HCPCS | Performed by: HOSPITALIST

## 2017-10-13 PROCEDURE — 85007 BL SMEAR W/DIFF WBC COUNT: CPT

## 2017-10-13 PROCEDURE — 700102 HCHG RX REV CODE 250 W/ 637 OVERRIDE(OP): Performed by: HOSPITALIST

## 2017-10-13 PROCEDURE — 99232 SBSQ HOSP IP/OBS MODERATE 35: CPT | Performed by: INTERNAL MEDICINE

## 2017-10-13 PROCEDURE — 700102 HCHG RX REV CODE 250 W/ 637 OVERRIDE(OP): Performed by: INTERNAL MEDICINE

## 2017-10-13 PROCEDURE — 36415 COLL VENOUS BLD VENIPUNCTURE: CPT

## 2017-10-13 PROCEDURE — 770006 HCHG ROOM/CARE - MED/SURG/GYN SEMI*

## 2017-10-13 PROCEDURE — A9270 NON-COVERED ITEM OR SERVICE: HCPCS | Performed by: INTERNAL MEDICINE

## 2017-10-13 PROCEDURE — 85027 COMPLETE CBC AUTOMATED: CPT

## 2017-10-13 PROCEDURE — 97535 SELF CARE MNGMENT TRAINING: CPT

## 2017-10-13 PROCEDURE — 700111 HCHG RX REV CODE 636 W/ 250 OVERRIDE (IP): Performed by: HOSPITALIST

## 2017-10-13 RX ADMIN — METOPROLOL TARTRATE 75 MG: 25 TABLET, FILM COATED ORAL at 21:15

## 2017-10-13 RX ADMIN — HEPARIN SODIUM 5000 UNITS: 5000 INJECTION, SOLUTION INTRAVENOUS; SUBCUTANEOUS at 15:15

## 2017-10-13 RX ADMIN — THERA TABS 1 TABLET: TAB at 10:26

## 2017-10-13 RX ADMIN — HEPARIN SODIUM 5000 UNITS: 5000 INJECTION, SOLUTION INTRAVENOUS; SUBCUTANEOUS at 21:16

## 2017-10-13 RX ADMIN — METOPROLOL TARTRATE 75 MG: 25 TABLET, FILM COATED ORAL at 10:26

## 2017-10-13 RX ADMIN — AMOXICILLIN AND CLAVULANATE POTASSIUM 1 TABLET: 875; 125 TABLET, FILM COATED ORAL at 21:15

## 2017-10-13 RX ADMIN — AMOXICILLIN AND CLAVULANATE POTASSIUM 1 TABLET: 875; 125 TABLET, FILM COATED ORAL at 10:26

## 2017-10-13 RX ADMIN — FOLIC ACID 1 MG: 1 TABLET ORAL at 10:26

## 2017-10-13 RX ADMIN — ATORVASTATIN CALCIUM 40 MG: 40 TABLET, FILM COATED ORAL at 21:15

## 2017-10-13 RX ADMIN — GABAPENTIN 100 MG: 100 CAPSULE ORAL at 10:26

## 2017-10-13 RX ADMIN — GABAPENTIN 100 MG: 100 CAPSULE ORAL at 21:15

## 2017-10-13 RX ADMIN — LEVOTHYROXINE SODIUM 75 MCG: 75 TABLET ORAL at 05:55

## 2017-10-13 RX ADMIN — GABAPENTIN 100 MG: 100 CAPSULE ORAL at 15:15

## 2017-10-13 RX ADMIN — ASPIRIN 81 MG: 81 TABLET, COATED ORAL at 10:26

## 2017-10-13 RX ADMIN — HEPARIN SODIUM 5000 UNITS: 5000 INJECTION, SOLUTION INTRAVENOUS; SUBCUTANEOUS at 05:55

## 2017-10-13 ASSESSMENT — ENCOUNTER SYMPTOMS
COUGH: 1
SENSORY CHANGE: 0
HEADACHES: 0
FEVER: 0
WEAKNESS: 1
NERVOUS/ANXIOUS: 0
NAUSEA: 0
SHORTNESS OF BREATH: 0
ABDOMINAL PAIN: 0
PALPITATIONS: 0
MEMORY LOSS: 1
FLANK PAIN: 0
CHILLS: 0
MYALGIAS: 0
FOCAL WEAKNESS: 0

## 2017-10-13 ASSESSMENT — COGNITIVE AND FUNCTIONAL STATUS - GENERAL
PERSONAL GROOMING: A LOT
SUGGESTED CMS G CODE MODIFIER DAILY ACTIVITY: CL
DRESSING REGULAR LOWER BODY CLOTHING: A LOT
EATING MEALS: A LOT
DRESSING REGULAR UPPER BODY CLOTHING: A LOT
DAILY ACTIVITIY SCORE: 12
HELP NEEDED FOR BATHING: A LOT
TOILETING: A LOT

## 2017-10-13 ASSESSMENT — PAIN SCALES - GENERAL
PAINLEVEL_OUTOF10: 0

## 2017-10-13 NOTE — THERAPY
"Occupational Therapy Treatment completed with focus on ADLs and ADL transfers.  Functional Status:  Pt seen for OT tx. Pt up in chair at beginning of session. Max A x2 squat pivot transfer, during standing pt unable to WB through L LE to assist in stand pivot transfer. Pt required encouragement and max A to complete light grooming tasks. Pt educated on role of OT in setting, pt not accepting to education and required reinforcement and encouragement to participate.   Plan of Care: Will benefit from Occupational Therapy 3 times per week  Discharge Recommendations:  Equipment Will Continue to Assess for Equipment Needs.     See \"Rehab Therapy-Acute\" Patient Summary Report for complete documentation.   "

## 2017-10-13 NOTE — PROGRESS NOTES
[facility]    Date of Service: 10/13/2017    Chief Complaint  72 y.o. male admitted 2017 with urinary tract infection and confusion    Interval Problem Update  Pleasant, cooperative  Appears to have improved strength  No new complaints    Consultants/Specialty  None    Disposition  Needs skilled nursing placement  PT/OT  Pending guardianship    assisting      Review of Systems   Constitutional: Negative for chills, fever and malaise/fatigue.   HENT: Negative for hearing loss.    Respiratory: Positive for cough. Negative for shortness of breath.    Cardiovascular: Negative for palpitations and leg swelling.   Gastrointestinal: Negative for abdominal pain and nausea.   Genitourinary: Negative for flank pain.   Musculoskeletal: Negative for myalgias.   Neurological: Positive for weakness. Negative for sensory change, focal weakness and headaches.   Psychiatric/Behavioral: Positive for memory loss. The patient is not nervous/anxious.       Physical Exam  Laboratory/Imaging   Hemodynamics  Temp (24hrs), Av.3 °C (97.4 °F), Min:36.2 °C (97.1 °F), Max:36.4 °C (97.6 °F)   Temperature: 36.3 °C (97.4 °F)  Pulse  Av.9  Min: 69  Max: 144    Blood Pressure : 131/88      Respiratory      Respiration: 17, Pulse Oximetry: 94 %        RUL Breath Sounds: Diminished, RML Breath Sounds: Diminished, RLL Breath Sounds: Diminished, RIGO Breath Sounds: Diminished, LLL Breath Sounds: Diminished    Fluids  No intake or output data in the 24 hours ending 10/13/17 0852    Nutrition  Orders Placed This Encounter   Procedures   • DIET ORDER     Standing Status:   Standing     Number of Occurrences:   1     Order Specific Question:   Diet:     Answer:   Regular [1]     Order Specific Question:   Texture/Fiber modifications:     Answer:   Dysphagia 1(Pureed)specify fluid consistency(question 6) [1]     Order Specific Question:   Consistency/Fluid modifications:     Answer:   Nectar Thick [2]     Order  Specific Question:   Miscellaneous modifications:     Answer:   SLP - 1:1 Supervision by Nursing [21]     Physical Exam   Constitutional: No distress.   Chronically ill-appearing  Frail, thin   HENT:   Head: Normocephalic and atraumatic.   Mouth/Throat: No oropharyngeal exudate.   Eyes: EOM are normal. Pupils are equal, round, and reactive to light. Left eye exhibits no discharge.   Neck: Normal range of motion. Neck supple.   Cardiovascular: Normal rate and regular rhythm.    Pulmonary/Chest: Effort normal. No respiratory distress.   Decreased bilaterally   Abdominal: Soft. He exhibits no distension. There is no tenderness.   Musculoskeletal: He exhibits no edema.   Right BKA, open wound  Dressing clean and dry and intact  Nontender to palpation   Neurological: He is alert. He is disoriented. No cranial nerve deficit. Coordination normal.   Skin: Skin is warm and dry. No erythema.   Psychiatric: He has a normal mood and affect. His behavior is normal. Judgment and thought content normal.   Nursing note and vitals reviewed.      Recent Labs      10/11/17   0235  10/13/17   0233   WBC  15.7*  14.4*   RBC  4.72  4.85   HEMOGLOBIN  11.2*  11.1*   HEMATOCRIT  36.8*  37.6*   MCV  78.0*  77.5*   MCH  23.7*  22.9*   MCHC  30.4*  29.5*   RDW  45.6  45.1   PLATELETCT  861*  873*   MPV  9.7  9.3     Recent Labs      10/11/17   0235   SODIUM  134*   POTASSIUM  3.9   CHLORIDE  101   CO2  25   GLUCOSE  89   BUN  13   CREATININE  0.53   CALCIUM  8.5                      Assessment/Plan     * Cellulitis of right knee- (present on admission)   Assessment & Plan    Active Orders     Ordered     Ordering Provider       Sat Sep 30, 2017 12:30 PM    09/30/17 1230  ampicillin/sulbactam (UNASYN) 3 g in  mL IVPB  EVERY 6 HOURS      John Paul Mckeon M.D.      Suspect source of his acute ills  DC IV antibiotics on October 3  off IV antibiotics to cover for UTI/aspiration pneumonia/cellulitis        Pneumonia   Assessment & Plan     Aspiration pneumonia    SLP evaluation  RT protocol  Oxygen supplementation as needed  Encourage incentive spirometer use  OFF Rocephin and Flagyl IV   oral antibiotics to complete on October 15, Augmentin  -respiratory culture, never collected        Hyponatremia- (present on admission)   Assessment & Plan    Resolved  Continue with fluids  serum  Osm never done  urine osmolality-within normal limits          Leukocytosis- (present on admission)   Assessment & Plan    Ongoing, labile-improving  May be secondary to APR, chronic inflammation  - CRP elevated at 1.56  No new signs of infection  On antibiotics, to complete on October 15    -pro-calcitonin level low at 0.10  Possible chronic aspiration, respiratory status improving  Continue to monitor        PAD (peripheral artery disease) (CMS-HCC)- (present on admission)   Assessment & Plan    Medical mgmt, 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  Will continue ASA and statin  Wound care         Thrombocytosis (CMS-HCC)   Assessment & Plan    Reactive,   ?Chronic infection, monitor        Dementia   Assessment & Plan    With encephalopathy and significant debility  Has been evaluated by ethics committee, unable to make his own medical decisions  Sent to legal for further guardianship pending   to assist  We'll need skilled nursing placement        UTI (urinary tract infection)- (present on admission)   Assessment & Plan      Antibiotics as above        Essential hypertension- (present on admission)   Assessment & Plan    SBP goal less than 140 mmHg  DBP goal less than 90 mmHg  PRN and antihypertensives to titrate by hospitalist towards goal  Continue with metoprolol 50 mg twice a day        Severe protein-calorie malnutrition (CMS-HCC)- (present on admission)   Assessment & Plan    Nutrition to see  Albumin 2.8, improving      Encourage oral intake  ensure milkshakes 3 times a day            Reviewed items::  Radiology images reviewed, Labs  reviewed and Medications reviewed  DVT prophylaxis pharmacological::  Heparin  Antibiotics:  Treating active infection/contamination beyond 24 hours perioperative coverage

## 2017-10-13 NOTE — CARE PLAN
Problem: Safety  Goal: Will remain free from injury    Intervention: Provide assistance with mobility  Call light with in reach, hourly rounding in place. Bed locked and in lowest position.       Problem: Venous Thromboembolism (VTW)/Deep Vein Thrombosis (DVT) Prevention:  Goal: Patient will participate in Venous Thrombosis (VTE)/Deep Vein Thrombosis (DVT)Prevention Measures  Pt on heparin injections per prophylaxis.

## 2017-10-13 NOTE — PROGRESS NOTES
Assumed care of pt at 0730am. Report received and bedside rounding completed with noc RN. Pt in bed resting comfortably denies any pain at this time and is calm. No SOB, or in any acute distress. q2 turning pt, floating heels. Pt able to sit up for lunch, approx 2 hrs o the chair. Requiring max assist to get to and from chair.  Fall precautions in place, treaded non slip socks.  hourly rounding in place. See flowsheets for further assessment.

## 2017-10-13 NOTE — PROGRESS NOTES
Josie Osuna Fall Risk Assessment:     Last Known Fall: Within the last month  Mobility: Use of assistive device/requires assist of two people  Medications: Cardiovascular or central nervous system meds  Mental Status/LOC/Awareness: Oriented to person and place  Toileting Needs: Incontinence  Volume/Electrolyte Status: No problems  Communication/Sensory: Visual (Glasses)/hearing deficit  Behavior: Appropriate behavior  Josie Osuna Fall Risk Total: 15  Fall Risk Level: HIGH RISK    Universal Fall Precautions:  call light/belongings in reach, bed in low position and locked, wheelchairs and assistive devices out of sight, siderails up x 2, use non-slip footwear, adequate lighting, clutter free and spill free environment, educate on level of risk, educate to call for assistance    Fall Risk Level Interventions:     TRIAL (TELE 8, NEURO, MED MARINA 5) High Fall Risk Interventions  Place yellow fall risk ID band on patient: verified  Provide patient/family education based on risk assessment: completed  Educate patient/family to call staff for assistance when getting out of bed: completed  Place fall precaution signage outside patient door: verified  Place patient in room close to nursing station: verified  Utilize bed/chair fall alarm: verified  Notify charge of high risk for huddle: verified    Patient Specific Interventions:     Medication: review medications with patient and family  Mental Status/LOC/Awareness: reorient patient, reinforce falls education, check on patient hourly, utilize bed/chair fall alarm and reinforce the use of call light  Toileting: monitor intake and output/use of appropriate interventions  Volume/Electrolyte Status: ensure patient remains hydrated and monitor abnormal lab values  Communication/Sensory: update plan of care on whiteboard  Behavioral: administer medication as ordered  Mobility: utilize bed/chair fall alarm and ensure bed is locked and in lowest position

## 2017-10-13 NOTE — PROGRESS NOTES
Rec'd report from day shift RN. Assumed pt care. Assessment completed. AA&OX2, reoriented to time and event. Denies pain at this time. No s/s of discomfort or distress. Q2 hour turns enforced. Dressing to right knee is CDI.  in use. Bed in lowest position, bed locked, bed alarm on for safety, treaded sock in place, RN and CNA numbers provided, call light within reach.

## 2017-10-13 NOTE — CARE PLAN
Problem: Communication  Goal: The ability to communicate needs accurately and effectively will improve    Intervention: [place] patient and significant other/support system to call light to alert staff of needs  Oriented pt to time and situation. Instructed pt on how to use call light. Pt does not use the call light appropriately. Will continue to reorient pt.       Problem: Respiratory:  Goal: Respiratory status will improve    Intervention: Assess and monitor pulmonary status   in place, pt is on 1L NC. Pt occasionally removes NC.

## 2017-10-14 PROCEDURE — A9270 NON-COVERED ITEM OR SERVICE: HCPCS | Performed by: HOSPITALIST

## 2017-10-14 PROCEDURE — 700102 HCHG RX REV CODE 250 W/ 637 OVERRIDE(OP): Performed by: HOSPITALIST

## 2017-10-14 PROCEDURE — 700102 HCHG RX REV CODE 250 W/ 637 OVERRIDE(OP): Performed by: INTERNAL MEDICINE

## 2017-10-14 PROCEDURE — 700111 HCHG RX REV CODE 636 W/ 250 OVERRIDE (IP): Performed by: HOSPITALIST

## 2017-10-14 PROCEDURE — 770006 HCHG ROOM/CARE - MED/SURG/GYN SEMI*

## 2017-10-14 PROCEDURE — A9270 NON-COVERED ITEM OR SERVICE: HCPCS | Performed by: INTERNAL MEDICINE

## 2017-10-14 PROCEDURE — 99232 SBSQ HOSP IP/OBS MODERATE 35: CPT | Performed by: HOSPITALIST

## 2017-10-14 RX ADMIN — STANDARDIZED SENNA CONCENTRATE AND DOCUSATE SODIUM 2 TABLET: 8.6; 5 TABLET, FILM COATED ORAL at 09:00

## 2017-10-14 RX ADMIN — GABAPENTIN 100 MG: 100 CAPSULE ORAL at 10:24

## 2017-10-14 RX ADMIN — ATORVASTATIN CALCIUM 40 MG: 40 TABLET, FILM COATED ORAL at 20:36

## 2017-10-14 RX ADMIN — FOLIC ACID 1 MG: 1 TABLET ORAL at 10:24

## 2017-10-14 RX ADMIN — THERA TABS 1 TABLET: TAB at 09:00

## 2017-10-14 RX ADMIN — HEPARIN SODIUM 5000 UNITS: 5000 INJECTION, SOLUTION INTRAVENOUS; SUBCUTANEOUS at 15:13

## 2017-10-14 RX ADMIN — ASPIRIN 81 MG: 81 TABLET, COATED ORAL at 10:24

## 2017-10-14 RX ADMIN — HEPARIN SODIUM 5000 UNITS: 5000 INJECTION, SOLUTION INTRAVENOUS; SUBCUTANEOUS at 05:50

## 2017-10-14 RX ADMIN — GABAPENTIN 100 MG: 100 CAPSULE ORAL at 20:36

## 2017-10-14 RX ADMIN — HEPARIN SODIUM 5000 UNITS: 5000 INJECTION, SOLUTION INTRAVENOUS; SUBCUTANEOUS at 20:36

## 2017-10-14 RX ADMIN — AMOXICILLIN AND CLAVULANATE POTASSIUM 1 TABLET: 875; 125 TABLET, FILM COATED ORAL at 09:00

## 2017-10-14 RX ADMIN — METOPROLOL TARTRATE 75 MG: 25 TABLET, FILM COATED ORAL at 10:24

## 2017-10-14 RX ADMIN — STANDARDIZED SENNA CONCENTRATE AND DOCUSATE SODIUM 2 TABLET: 8.6; 5 TABLET, FILM COATED ORAL at 20:36

## 2017-10-14 RX ADMIN — METOPROLOL TARTRATE 75 MG: 25 TABLET, FILM COATED ORAL at 20:36

## 2017-10-14 RX ADMIN — AMOXICILLIN AND CLAVULANATE POTASSIUM 1 TABLET: 875; 125 TABLET, FILM COATED ORAL at 20:36

## 2017-10-14 RX ADMIN — LEVOTHYROXINE SODIUM 75 MCG: 75 TABLET ORAL at 05:50

## 2017-10-14 RX ADMIN — GABAPENTIN 100 MG: 100 CAPSULE ORAL at 15:13

## 2017-10-14 ASSESSMENT — ENCOUNTER SYMPTOMS
NECK PAIN: 0
TINGLING: 0
MEMORY LOSS: 1
PND: 0
TREMORS: 0
BLOOD IN STOOL: 0
NERVOUS/ANXIOUS: 0
PHOTOPHOBIA: 0
DIZZINESS: 0
FOCAL WEAKNESS: 0
WEAKNESS: 1
HEMOPTYSIS: 0
MYALGIAS: 0
EYE PAIN: 0
STRIDOR: 0
HEARTBURN: 0
PALPITATIONS: 0
COUGH: 0
SPUTUM PRODUCTION: 0
CLAUDICATION: 0
FLANK PAIN: 0
DEPRESSION: 0
DOUBLE VISION: 0
NAUSEA: 0
FEVER: 0
ABDOMINAL PAIN: 0
SPEECH CHANGE: 0
CONSTIPATION: 0
ORTHOPNEA: 0
VOMITING: 0
SENSORY CHANGE: 0
BLURRED VISION: 0
CHILLS: 0
SHORTNESS OF BREATH: 0
BACK PAIN: 0
HEADACHES: 0
SORE THROAT: 0

## 2017-10-14 ASSESSMENT — PAIN SCALES - GENERAL
PAINLEVEL_OUTOF10: 0

## 2017-10-14 ASSESSMENT — LIFESTYLE VARIABLES: DO YOU DRINK ALCOHOL: YES

## 2017-10-14 NOTE — PROGRESS NOTES
Josie Osuna Fall Risk Assessment:     Last Known Fall: Within the last month  Mobility: Use of assistive device/requires assist of two people  Medications: Cardiovascular or central nervous system meds  Mental Status/LOC/Awareness: Oriented to person and place  Toileting Needs: Incontinence  Volume/Electrolyte Status: No problems  Communication/Sensory: Visual (Glasses)/hearing deficit  Behavior: Appropriate behavior  Josie Osuna Fall Risk Total: 15  Fall Risk Level: HIGH RISK    Universal Fall Precautions:  call light/belongings in reach, bed in low position and locked, wheelchairs and assistive devices out of sight, siderails up x 2, use non-slip footwear, adequate lighting, clutter free and spill free environment, educate on level of risk, educate to call for assistance    Fall Risk Level Interventions:     TRIAL (TELE 8, NEURO, MED MARINA 5) High Fall Risk Interventions  Place yellow fall risk ID band on patient: verified  Provide patient/family education based on risk assessment: completed  Educate patient/family to call staff for assistance when getting out of bed: completed  Place fall precaution signage outside patient door: verified  Place patient in room close to nursing station: verified  Utilize bed/chair fall alarm: verified  Notify charge of high risk for huddle: completed    Patient Specific Interventions:     Medication: review medications with patient and family  Mental Status/LOC/Awareness: reorient patient, reinforce falls education, check on patient hourly, utilize bed/chair fall alarm and reinforce the use of call light  Toileting: provide frquent toileting  Volume/Electrolyte Status: monitor abnormal lab values  Communication/Sensory: update plan of care on whiteboard  Behavioral: encourage patient to voice feelings  Mobility: utilize bed/chair fall alarm and ensure bed is locked and in lowest position

## 2017-10-14 NOTE — PROGRESS NOTES
Assumed care of patient @ 0700, report received at bedside,  assessment done, labs and orders noted.  Pt A & Ox3, PIV saline locked and patent.  Pt is resting comfortably in bed, no signs or symptoms of distress.  Pt reports pain is a 0/10.  Pt has been updated on the plan of care.    Bed is in lowest position, fall risk socks in place, call light within reach. Pt verbalized all needs are met at this time.

## 2017-10-14 NOTE — CARE PLAN
Problem: Discharge Barriers/Planning  Goal: Patient's continuum of care needs will be met    Intervention: Assess potential discharge barriers on admission and throughout hospital stay  Pending guardianship per  note.       Problem: Respiratory:  Goal: Respiratory status will improve    Intervention: Assess and monitor pulmonary status   in use. Pt has not removed NC, maintains satts at 96% on 1L NC.       Problem: Skin Integrity  Goal: Risk for impaired skin integrity will decrease  Q2 hour turns enforced, pillows used for positioning. Waffle mattress overlay utilized.

## 2017-10-14 NOTE — PROGRESS NOTES
[facility] Hospitalist Progress Note    Date of Service: 10/14/2017    Chief Complaint  72 y.o. male admitted 2017 with urinary tract infection and confusion    Interval Problem Update  No acute issues overnight patient is pleasant.  Pending guardenship    Consultants/Specialty  None    Disposition  Pending placement  Pending guardianship       Review of Systems   Constitutional: Positive for malaise/fatigue. Negative for chills and fever.   HENT: Negative for congestion, hearing loss, sore throat and tinnitus.    Eyes: Negative for blurred vision, double vision, photophobia and pain.   Respiratory: Negative for cough, hemoptysis, sputum production, shortness of breath and stridor.    Cardiovascular: Negative for chest pain, palpitations, orthopnea, claudication, leg swelling and PND.   Gastrointestinal: Negative for abdominal pain, blood in stool, constipation, heartburn, melena, nausea and vomiting.   Genitourinary: Negative for dysuria, flank pain, frequency and urgency.   Musculoskeletal: Negative for back pain, myalgias and neck pain.   Neurological: Positive for weakness. Negative for dizziness, tingling, tremors, sensory change, speech change, focal weakness and headaches.   Psychiatric/Behavioral: Positive for memory loss. Negative for depression and suicidal ideas. The patient is not nervous/anxious.       Physical Exam  Laboratory/Imaging   Hemodynamics  Temp (24hrs), Av.6 °C (97.8 °F), Min:36.3 °C (97.3 °F), Max:36.8 °C (98.2 °F)   Temperature: 36.3 °C (97.3 °F)  Pulse  Av.7  Min: 69  Max: 144    Blood Pressure : 147/91      Respiratory      Respiration: 18, Pulse Oximetry: 97 %        RUL Breath Sounds: Diminished, RML Breath Sounds: Diminished, RLL Breath Sounds: Diminished, RIGO Breath Sounds: Diminished, LLL Breath Sounds: Diminished    Fluids    Intake/Output Summary (Last 24 hours) at 10/14/17 1022  Last data filed at 10/13/17 1600   Gross per 24 hour   Intake              240 ml   Output                 1 ml   Net              239 ml       Nutrition  Orders Placed This Encounter   Procedures   • DIET ORDER     Standing Status:   Standing     Number of Occurrences:   1     Order Specific Question:   Diet:     Answer:   Regular [1]     Order Specific Question:   Texture/Fiber modifications:     Answer:   Dysphagia 1(Pureed)specify fluid consistency(question 6) [1]     Order Specific Question:   Consistency/Fluid modifications:     Answer:   Nectar Thick [2]     Order Specific Question:   Miscellaneous modifications:     Answer:   SLP - 1:1 Supervision by Nursing [21]     Physical Exam   Constitutional: He appears well-developed and well-nourished. No distress.   Chronically ill-appearing  Frail, thin   HENT:   Head: Normocephalic and atraumatic.   Mouth/Throat: No oropharyngeal exudate.   Eyes: Conjunctivae and EOM are normal. Pupils are equal, round, and reactive to light. Right eye exhibits no discharge. Left eye exhibits no discharge. No scleral icterus.   Neck: Normal range of motion. Neck supple. No JVD present. No thyromegaly present.   Cardiovascular: Normal rate, regular rhythm and intact distal pulses.    No murmur heard.  Pulmonary/Chest: Effort normal and breath sounds normal. No stridor. No respiratory distress. He has no wheezes. He has no rales.   Decreased bilaterally   Abdominal: Soft. Bowel sounds are normal. He exhibits no distension. There is no tenderness. There is no rebound.   Musculoskeletal: Normal range of motion. He exhibits no edema.   Right BKA, open wound  Dressing clean and dry and intact  Nontender to palpation   Neurological: He is alert. He is disoriented. No cranial nerve deficit. Coordination normal.   Skin: Skin is warm and dry. No erythema.   Psychiatric: His behavior is normal. Thought content normal.   Nursing note and vitals reviewed.      Recent Labs      10/13/17   0233   WBC  14.4*   RBC  4.85   HEMOGLOBIN  11.1*   HEMATOCRIT  37.6*   MCV  77.5*   MCH  22.9*    MCHC  29.5*   RDW  45.1   PLATELETCT  873*   MPV  9.3                          Assessment/Plan     * Cellulitis of right knee- (present on admission)   Assessment & Plan    Continue PO augmentin.           Pneumonia   Assessment & Plan    Possibly 2/2 to Aspiration pneumonia  Continue PO Augmentin.  Continue RT protocol, duo nebs, Pep therapy if warranted, and incentive spirometry.           Hyponatremia- (present on admission)   Assessment & Plan    Resolved.  Continue to monitor bmp.          Leukocytosis- (present on admission)   Assessment & Plan    Improving, consider chronic reactive?  No signs of infection.  Continue Augmentin till 10/15  CTM        PAD (peripheral artery disease) (CMS-HCC)- (present on admission)   Assessment & Plan    s/p left forefoot amputation and right BKA  Continue aspirin and statin.          Thrombocytosis (CMS-HCC)   Assessment & Plan    Possibly reactive?, stable from previous labs.  Continue to monitor cbc.          Dementia   Assessment & Plan    Admitted for encephalopathy and debility  Has been evaluated by ethics committee, unable to make his own medical decisions  Pending for guardianship pending   to assist          UTI (urinary tract infection)- (present on admission)   Assessment & Plan    As above.        Essential hypertension- (present on admission)   Assessment & Plan    SBP goal less than 140 mmHg  DBP goal less than 90 mmHg  Continue with metoprolol 50 mg twice a day        Severe protein-calorie malnutrition (CMS-HCC)- (present on admission)   Assessment & Plan    Nutrition following  Cont boost TID          Patient plan of care discussed at multidisplinary team rounds and with patient and R.N at beside.      Reviewed items::  Radiology images reviewed, Labs reviewed and Medications reviewed  Barrera catheter::  No Barrera  DVT prophylaxis pharmacological::  Heparin  Antibiotics:  Treating active infection/contamination beyond 24 hours perioperative  coverage

## 2017-10-14 NOTE — PROGRESS NOTES
Josie Osuna Fall Risk Assessment:     Last Known Fall: Within the last month  Mobility: Use of assistive device/requires assist of two people  Medications: Cardiovascular or central nervous system meds  Mental Status/LOC/Awareness: Oriented to person and place  Toileting Needs: Incontinence  Volume/Electrolyte Status: No problems  Communication/Sensory: Visual (Glasses)/hearing deficit  Behavior: Appropriate behavior  Josie Osuna Fall Risk Total: 15  Fall Risk Level: HIGH RISK    Universal Fall Precautions:  call light/belongings in reach, bed in low position and locked, wheelchairs and assistive devices out of sight, siderails up x 2, use non-slip footwear, adequate lighting, educate on level of risk, clutter free and spill free environment, educate to call for assistance    Fall Risk Level Interventions:     TRIAL (TELE 8, NEURO, MED MARINA 5) High Fall Risk Interventions  Place yellow fall risk ID band on patient: verified  Provide patient/family education based on risk assessment: completed  Educate patient/family to call staff for assistance when getting out of bed: completed  Place fall precaution signage outside patient door: verified  Place patient in room close to nursing station: verified  Utilize bed/chair fall alarm: verified  Notify charge of high risk for huddle: completed    Patient Specific Interventions:     Medication: review medications with patient and family, assess for medications that can be discontinued or dosage decreased and limit combination of prn medications  Mental Status/LOC/Awareness: reinforce falls education, utilize bed/chair fall alarm, reinforce the use of call light and provide activity  Toileting: provide frquent toileting  Volume/Electrolyte Status: monitor abnormal lab values and teach patients to dangle before rising if hypotensive  Communication/Sensory: update plan of care on whiteboard and ensure proper positioning when transferrng/ambulating  Behavioral: encourage  patient to voice feelings, engage patient in daily activities, administer medication as ordered and instruct/reinforce fall program rationale  Mobility: schedule physical activity throughout the day, dangle prior to standing, utilize bed/chair fall alarm, ensure bed is locked and in lowest position, provide appropriate assistive device, instruct patient to exit bed on their strongest side and collaborate with doctor for possible PT/OT consult

## 2017-10-14 NOTE — CARE PLAN
Problem: Pain Management  Goal: Pain level will decrease to patient's comfort goal    Intervention: Educate and implement non-pharmacologic comfort measures. Examples: relaxation, distration, play therapy, activity therapy, massage, etc.  Pt has no complaints of pain      Problem: Mobility  Goal: Risk for activity intolerance will decrease  Outcome: PROGRESSING AS EXPECTED  PT notes reviewed.  Pt able to transfer to chair, per note, but max assist.  Attempted to transfer pt to chair for lunch.  Pt sat at EOB for approx 2 minutes. Pt refused transfer to chair, sat up in bed for meal.  Will revisit this goal at dinner time.

## 2017-10-15 LAB
ALBUMIN SERPL BCP-MCNC: 3 G/DL (ref 3.2–4.9)
ALBUMIN/GLOB SERPL: 0.8 G/DL
ALP SERPL-CCNC: 67 U/L (ref 30–99)
ALT SERPL-CCNC: 20 U/L (ref 2–50)
ANION GAP SERPL CALC-SCNC: 9 MMOL/L (ref 0–11.9)
AST SERPL-CCNC: 25 U/L (ref 12–45)
BASOPHILS # BLD AUTO: 1.6 % (ref 0–1.8)
BASOPHILS # BLD: 0.24 K/UL (ref 0–0.12)
BILIRUB SERPL-MCNC: 0.4 MG/DL (ref 0.1–1.5)
BUN SERPL-MCNC: 17 MG/DL (ref 8–22)
CALCIUM SERPL-MCNC: 8.6 MG/DL (ref 8.5–10.5)
CHLORIDE SERPL-SCNC: 101 MMOL/L (ref 96–112)
CO2 SERPL-SCNC: 25 MMOL/L (ref 20–33)
CREAT SERPL-MCNC: 0.63 MG/DL (ref 0.5–1.4)
EOSINOPHIL # BLD AUTO: 0.51 K/UL (ref 0–0.51)
EOSINOPHIL NFR BLD: 3.4 % (ref 0–6.9)
ERYTHROCYTE [DISTWIDTH] IN BLOOD BY AUTOMATED COUNT: 45.4 FL (ref 35.9–50)
GFR SERPL CREATININE-BSD FRML MDRD: >60 ML/MIN/1.73 M 2
GLOBULIN SER CALC-MCNC: 3.7 G/DL (ref 1.9–3.5)
GLUCOSE SERPL-MCNC: 86 MG/DL (ref 65–99)
HCT VFR BLD AUTO: 37.4 % (ref 42–52)
HGB BLD-MCNC: 11.1 G/DL (ref 14–18)
IMM GRANULOCYTES # BLD AUTO: 0.1 K/UL (ref 0–0.11)
IMM GRANULOCYTES NFR BLD AUTO: 0.7 % (ref 0–0.9)
LYMPHOCYTES # BLD AUTO: 1.53 K/UL (ref 1–4.8)
LYMPHOCYTES NFR BLD: 10.2 % (ref 22–41)
MAGNESIUM SERPL-MCNC: 1.9 MG/DL (ref 1.5–2.5)
MCH RBC QN AUTO: 23.3 PG (ref 27–33)
MCHC RBC AUTO-ENTMCNC: 29.7 G/DL (ref 33.7–35.3)
MCV RBC AUTO: 78.6 FL (ref 81.4–97.8)
MONOCYTES # BLD AUTO: 0.95 K/UL (ref 0–0.85)
MONOCYTES NFR BLD AUTO: 6.4 % (ref 0–13.4)
NEUTROPHILS # BLD AUTO: 11.63 K/UL (ref 1.82–7.42)
NEUTROPHILS NFR BLD: 77.7 % (ref 44–72)
NRBC # BLD AUTO: 0 K/UL
NRBC BLD AUTO-RTO: 0 /100 WBC
PLATELET # BLD AUTO: 858 K/UL (ref 164–446)
PMV BLD AUTO: 9.7 FL (ref 9–12.9)
POTASSIUM SERPL-SCNC: 4.2 MMOL/L (ref 3.6–5.5)
PROT SERPL-MCNC: 6.7 G/DL (ref 6–8.2)
RBC # BLD AUTO: 4.76 M/UL (ref 4.7–6.1)
SODIUM SERPL-SCNC: 135 MMOL/L (ref 135–145)
WBC # BLD AUTO: 15 K/UL (ref 4.8–10.8)

## 2017-10-15 PROCEDURE — 83735 ASSAY OF MAGNESIUM: CPT

## 2017-10-15 PROCEDURE — 85025 COMPLETE CBC W/AUTO DIFF WBC: CPT

## 2017-10-15 PROCEDURE — A9270 NON-COVERED ITEM OR SERVICE: HCPCS | Performed by: HOSPITALIST

## 2017-10-15 PROCEDURE — 99232 SBSQ HOSP IP/OBS MODERATE 35: CPT | Performed by: HOSPITALIST

## 2017-10-15 PROCEDURE — 36415 COLL VENOUS BLD VENIPUNCTURE: CPT

## 2017-10-15 PROCEDURE — 80053 COMPREHEN METABOLIC PANEL: CPT

## 2017-10-15 PROCEDURE — 700102 HCHG RX REV CODE 250 W/ 637 OVERRIDE(OP): Performed by: INTERNAL MEDICINE

## 2017-10-15 PROCEDURE — A9270 NON-COVERED ITEM OR SERVICE: HCPCS | Performed by: INTERNAL MEDICINE

## 2017-10-15 PROCEDURE — 770006 HCHG ROOM/CARE - MED/SURG/GYN SEMI*

## 2017-10-15 PROCEDURE — 700102 HCHG RX REV CODE 250 W/ 637 OVERRIDE(OP): Performed by: HOSPITALIST

## 2017-10-15 PROCEDURE — 700111 HCHG RX REV CODE 636 W/ 250 OVERRIDE (IP): Performed by: HOSPITALIST

## 2017-10-15 RX ADMIN — HEPARIN SODIUM 5000 UNITS: 5000 INJECTION, SOLUTION INTRAVENOUS; SUBCUTANEOUS at 20:52

## 2017-10-15 RX ADMIN — STANDARDIZED SENNA CONCENTRATE AND DOCUSATE SODIUM 2 TABLET: 8.6; 5 TABLET, FILM COATED ORAL at 09:12

## 2017-10-15 RX ADMIN — THERA TABS 1 TABLET: TAB at 09:18

## 2017-10-15 RX ADMIN — GABAPENTIN 100 MG: 100 CAPSULE ORAL at 09:12

## 2017-10-15 RX ADMIN — METOPROLOL TARTRATE 75 MG: 25 TABLET, FILM COATED ORAL at 09:12

## 2017-10-15 RX ADMIN — LEVOTHYROXINE SODIUM 75 MCG: 75 TABLET ORAL at 06:24

## 2017-10-15 RX ADMIN — ASPIRIN 81 MG: 81 TABLET, COATED ORAL at 09:13

## 2017-10-15 RX ADMIN — METOPROLOL TARTRATE 75 MG: 25 TABLET, FILM COATED ORAL at 20:52

## 2017-10-15 RX ADMIN — GABAPENTIN 100 MG: 100 CAPSULE ORAL at 20:52

## 2017-10-15 RX ADMIN — HEPARIN SODIUM 5000 UNITS: 5000 INJECTION, SOLUTION INTRAVENOUS; SUBCUTANEOUS at 06:23

## 2017-10-15 RX ADMIN — ATORVASTATIN CALCIUM 40 MG: 40 TABLET, FILM COATED ORAL at 20:52

## 2017-10-15 RX ADMIN — HEPARIN SODIUM 5000 UNITS: 5000 INJECTION, SOLUTION INTRAVENOUS; SUBCUTANEOUS at 13:58

## 2017-10-15 RX ADMIN — GABAPENTIN 100 MG: 100 CAPSULE ORAL at 13:58

## 2017-10-15 RX ADMIN — FOLIC ACID 1 MG: 1 TABLET ORAL at 09:13

## 2017-10-15 ASSESSMENT — ENCOUNTER SYMPTOMS
SPEECH CHANGE: 0
EYE PAIN: 0
CLAUDICATION: 0
SPUTUM PRODUCTION: 0
NECK PAIN: 0
DIZZINESS: 0
BACK PAIN: 0
BLURRED VISION: 0
CONSTIPATION: 0
FOCAL WEAKNESS: 0
PHOTOPHOBIA: 0
COUGH: 0
FEVER: 0
NERVOUS/ANXIOUS: 0
SORE THROAT: 0
TINGLING: 0
ABDOMINAL PAIN: 0
MYALGIAS: 0
DEPRESSION: 0
MEMORY LOSS: 1
FLANK PAIN: 0
HEADACHES: 0
SHORTNESS OF BREATH: 0
PND: 0
STRIDOR: 0
SENSORY CHANGE: 0
TREMORS: 0
CHILLS: 0
HEMOPTYSIS: 0
PALPITATIONS: 0
NAUSEA: 0
BLOOD IN STOOL: 0
HEARTBURN: 0
WEAKNESS: 1
ORTHOPNEA: 0
VOMITING: 0
DOUBLE VISION: 0

## 2017-10-15 ASSESSMENT — PAIN SCALES - GENERAL
PAINLEVEL_OUTOF10: 0

## 2017-10-15 NOTE — PROGRESS NOTES
Last Known Fall: Within the last month  Mobility: Use of assistive device/requires assist of two people  Medications: Cardiovascular or central nervous system meds  Mental Status/LOC/Awareness: Oriented to person and place  Toileting Needs: Incontinence  Volume/Electrolyte Status: No problems  Communication/Sensory: Visual (Glasses)/hearing deficit  Behavior: Appropriate behavior  Galindo Enrrique Fall Risk Total: 15  Fall Risk Level: HIGH RISK     Universal Fall Precautions:  call light/belongings in reach, bed in low position and locked, wheelchairs and assistive devices out of sight, siderails up x 2, use non-slip footwear, adequate lighting, educate on level of risk, clutter free and spill free environment, educate to call for assistance     Fall Risk Level Interventions:     TRIAL (TELE 8, NEURO, MED MARINA 5) High Fall Risk Interventions  Place yellow fall risk ID band on patient: verified  Provide patient/family education based on risk assessment: completed  Educate patient/family to call staff for assistance when getting out of bed: completed  Place fall precaution signage outside patient door: verified  Place patient in room close to nursing station: verified  Utilize bed/chair fall alarm: verified  Notify charge of high risk for huddle: completed     Patient Specific Interventions:      Medication: review medications with patient and family, assess for medications that can be discontinued or dosage decreased and limit combination of prn medications  Mental Status/LOC/Awareness: reinforce falls education, utilize bed/chair fall alarm, reinforce the use of call light and provide activity  Toileting: provide frquent toileting  Volume/Electrolyte Status: monitor abnormal lab values and teach patients to dangle before rising if hypotensive  Communication/Sensory: update plan of care on whiteboard and ensure proper positioning when transferrng/ambulating  Behavioral: encourage patient to voice feelings, engage  patient in daily activities, administer medication as ordered and instruct/reinforce fall program rationale  Mobility: schedule physical activity throughout the day, dangle prior to standing, utilize bed/chair fall alarm, ensure bed is locked and in lowest position, provide appropriate assistive device, instruct patient to exit bed on their strongest side and collaborate with doctor for possible PT/OT consult

## 2017-10-15 NOTE — PROGRESS NOTES
[facility] Hospitalist Progress Note    Date of Service: 10/15/2017    Chief Complaint  72 y.o. male admitted 2017 with urinary tract infection and confusion    Interval Problem Update  Stable no acute issues  Pending guardenship    Consultants/Specialty  None    Disposition  Pending placement  Pending guardianship     grossly unchanged   Review of Systems   Constitutional: Positive for malaise/fatigue. Negative for chills and fever.   HENT: Negative for congestion, hearing loss, sore throat and tinnitus.    Eyes: Negative for blurred vision, double vision, photophobia and pain.   Respiratory: Negative for cough, hemoptysis, sputum production, shortness of breath and stridor.    Cardiovascular: Negative for chest pain, palpitations, orthopnea, claudication, leg swelling and PND.   Gastrointestinal: Negative for abdominal pain, blood in stool, constipation, heartburn, melena, nausea and vomiting.   Genitourinary: Negative for dysuria, flank pain, frequency and urgency.   Musculoskeletal: Negative for back pain, myalgias and neck pain.   Neurological: Positive for weakness. Negative for dizziness, tingling, tremors, sensory change, speech change, focal weakness and headaches.   Psychiatric/Behavioral: Positive for memory loss. Negative for depression and suicidal ideas. The patient is not nervous/anxious.       Physical Exam  Laboratory/Imaging   Hemodynamics  Temp (24hrs), Av.5 °C (97.7 °F), Min:36.2 °C (97.2 °F), Max:36.8 °C (98.3 °F)   Temperature: 36.2 °C (97.2 °F)  Pulse  Av.2  Min: 69  Max: 144    Blood Pressure : 142/74      Respiratory      Respiration: 18, Pulse Oximetry: 92 %        RUL Breath Sounds: Diminished, RML Breath Sounds: Diminished, RLL Breath Sounds: Diminished, RIGO Breath Sounds: Diminished, LLL Breath Sounds: Diminished    Fluids    Intake/Output Summary (Last 24 hours) at 10/15/17 1012  Last data filed at 10/14/17 1700   Gross per 24 hour   Intake              480 ml   Output                 0 ml   Net              480 ml       Nutrition  Orders Placed This Encounter   Procedures   • DIET ORDER     Standing Status:   Standing     Number of Occurrences:   1     Order Specific Question:   Diet:     Answer:   Regular [1]     Order Specific Question:   Texture/Fiber modifications:     Answer:   Dysphagia 1(Pureed)specify fluid consistency(question 6) [1]     Order Specific Question:   Consistency/Fluid modifications:     Answer:   Nectar Thick [2]     Order Specific Question:   Miscellaneous modifications:     Answer:   SLP - 1:1 Supervision by Nursing [21]   grossly unchanged  Physical Exam   Constitutional: He appears well-developed and well-nourished. No distress.   Chronically ill-appearing  Frail, thin   HENT:   Head: Normocephalic and atraumatic.   Mouth/Throat: No oropharyngeal exudate.   Eyes: Conjunctivae and EOM are normal. Pupils are equal, round, and reactive to light. Right eye exhibits no discharge. Left eye exhibits no discharge. No scleral icterus.   Neck: Normal range of motion. Neck supple. No JVD present. No thyromegaly present.   Cardiovascular: Normal rate, regular rhythm and intact distal pulses.    No murmur heard.  Pulmonary/Chest: Effort normal and breath sounds normal. No stridor. No respiratory distress. He has no wheezes. He has no rales.   Decreased bilaterally   Abdominal: Soft. Bowel sounds are normal. He exhibits no distension. There is no tenderness. There is no rebound.   Musculoskeletal: Normal range of motion. He exhibits no edema.   Right BKA, open wound  Dressing clean and dry and intact  Nontender to palpation   Neurological: He is alert. He is disoriented. No cranial nerve deficit. Coordination normal.   Skin: Skin is warm and dry. No erythema.   Psychiatric: His behavior is normal. Thought content normal.   Nursing note and vitals reviewed.      Recent Labs      10/13/17   0233  10/15/17   0241   WBC  14.4*  15.0*   RBC  4.85  4.76   HEMOGLOBIN  11.1*  11.1*    HEMATOCRIT  37.6*  37.4*   MCV  77.5*  78.6*   MCH  22.9*  23.3*   MCHC  29.5*  29.7*   RDW  45.1  45.4   PLATELETCT  873*  858*   MPV  9.3  9.7     Recent Labs      10/15/17   0241   SODIUM  135   POTASSIUM  4.2   CHLORIDE  101   CO2  25   GLUCOSE  86   BUN  17   CREATININE  0.63   CALCIUM  8.6                      Assessment/Plan     * Cellulitis of right knee- (present on admission)   Assessment & Plan    Completed antibiotics. augmentin.           Pneumonia   Assessment & Plan    Possibly 2/2 to Aspiration pneumonia  Finished course of  Augmentin.  Continue RT protocol, duo nebs, Pep therapy if warranted, and incentive spirometry.           Hyponatremia- (present on admission)   Assessment & Plan    Resolved.  Continue to monitor bmp.          Leukocytosis- (present on admission)   Assessment & Plan    Improving, consider chronic reactive?  No signs of infection.  Last day of Augmentin today.  CTM        PAD (peripheral artery disease) (CMS-HCC)- (present on admission)   Assessment & Plan    s/p left forefoot amputation and right BKA  Continue aspirin and statin.          Thrombocytosis (CMS-HCC)   Assessment & Plan    Possibly reactive?, stable from previous labs.  Continue to monitor cbc.          Dementia   Assessment & Plan    Admitted for encephalopathy and debility  Has been evaluated by ethics committee, unable to make his own medical decisions  Pending for guardianship pending   to assist          UTI (urinary tract infection)- (present on admission)   Assessment & Plan    As above.        Essential hypertension- (present on admission)   Assessment & Plan    SBP goal less than 140 mmHg  DBP goal less than 90 mmHg  Continue with metoprolol 50 mg twice a day        Severe protein-calorie malnutrition (CMS-HCC)- (present on admission)   Assessment & Plan    Nutrition following  Cont boost TID          Patient plan of care discussed at multidisplinary team rounds and with patient and R.N at  beside.      Reviewed items::  Radiology images reviewed, Labs reviewed and Medications reviewed  Barrera catheter::  No Barrera  DVT prophylaxis pharmacological::  Heparin  Antibiotics:  Treating active infection/contamination beyond 24 hours perioperative coverage

## 2017-10-15 NOTE — CARE PLAN
Problem: Discharge Barriers/Planning  Goal: Patient's continuum of care needs will be met    Intervention: Assess potential discharge barriers on admission and throughout hospital stay  Pt awaiting guardianship, SW involved.      Problem: Respiratory:  Goal: Respiratory status will improve    Intervention: Educate and encourage incentive spirometry usage  Pt encouraged to use IS, understands importance of use, needs reinforcement.

## 2017-10-15 NOTE — PROGRESS NOTES
Received report from [facility] nurse. Assessment complete. Patient is A&Ox3, reoriented to time, denies pain at this time, pt on 2L of oxygen via NC, medications crushed and administered in applesauce, PIV patent and SL. Pt educated on POC and verbalized understanding. Patient is resting now. Call light within reach, bed in lowest position, treaded socks in place, and bed alarm on.

## 2017-10-16 LAB
ALBUMIN SERPL BCP-MCNC: 2.9 G/DL (ref 3.2–4.9)
ALBUMIN/GLOB SERPL: 0.7 G/DL
ALP SERPL-CCNC: 72 U/L (ref 30–99)
ALT SERPL-CCNC: 20 U/L (ref 2–50)
ANION GAP SERPL CALC-SCNC: 12 MMOL/L (ref 0–11.9)
AST SERPL-CCNC: 27 U/L (ref 12–45)
BASOPHILS # BLD AUTO: 1.8 % (ref 0–1.8)
BASOPHILS # BLD: 0.26 K/UL (ref 0–0.12)
BILIRUB SERPL-MCNC: 0.4 MG/DL (ref 0.1–1.5)
BUN SERPL-MCNC: 14 MG/DL (ref 8–22)
CALCIUM SERPL-MCNC: 8.9 MG/DL (ref 8.5–10.5)
CHLORIDE SERPL-SCNC: 100 MMOL/L (ref 96–112)
CO2 SERPL-SCNC: 23 MMOL/L (ref 20–33)
CREAT SERPL-MCNC: 0.52 MG/DL (ref 0.5–1.4)
EOSINOPHIL # BLD AUTO: 0.48 K/UL (ref 0–0.51)
EOSINOPHIL NFR BLD: 3.4 % (ref 0–6.9)
ERYTHROCYTE [DISTWIDTH] IN BLOOD BY AUTOMATED COUNT: 44.7 FL (ref 35.9–50)
GFR SERPL CREATININE-BSD FRML MDRD: >60 ML/MIN/1.73 M 2
GLOBULIN SER CALC-MCNC: 4.1 G/DL (ref 1.9–3.5)
GLUCOSE SERPL-MCNC: 88 MG/DL (ref 65–99)
HCT VFR BLD AUTO: 41.5 % (ref 42–52)
HGB BLD-MCNC: 12.5 G/DL (ref 14–18)
IMM GRANULOCYTES # BLD AUTO: 0.12 K/UL (ref 0–0.11)
IMM GRANULOCYTES NFR BLD AUTO: 0.8 % (ref 0–0.9)
LYMPHOCYTES # BLD AUTO: 1.42 K/UL (ref 1–4.8)
LYMPHOCYTES NFR BLD: 10 % (ref 22–41)
MCH RBC QN AUTO: 22.9 PG (ref 27–33)
MCHC RBC AUTO-ENTMCNC: 30.1 G/DL (ref 33.7–35.3)
MCV RBC AUTO: 75.9 FL (ref 81.4–97.8)
MONOCYTES # BLD AUTO: 0.72 K/UL (ref 0–0.85)
MONOCYTES NFR BLD AUTO: 5.1 % (ref 0–13.4)
NEUTROPHILS # BLD AUTO: 11.2 K/UL (ref 1.82–7.42)
NEUTROPHILS NFR BLD: 78.9 % (ref 44–72)
NRBC # BLD AUTO: 0 K/UL
NRBC BLD AUTO-RTO: 0 /100 WBC
PLATELET # BLD AUTO: 674 K/UL (ref 164–446)
PMV BLD AUTO: 9.8 FL (ref 9–12.9)
POTASSIUM SERPL-SCNC: 4.1 MMOL/L (ref 3.6–5.5)
PROT SERPL-MCNC: 7 G/DL (ref 6–8.2)
RBC # BLD AUTO: 5.47 M/UL (ref 4.7–6.1)
SODIUM SERPL-SCNC: 135 MMOL/L (ref 135–145)
WBC # BLD AUTO: 14.2 K/UL (ref 4.8–10.8)

## 2017-10-16 PROCEDURE — 700102 HCHG RX REV CODE 250 W/ 637 OVERRIDE(OP): Performed by: HOSPITALIST

## 2017-10-16 PROCEDURE — 36415 COLL VENOUS BLD VENIPUNCTURE: CPT

## 2017-10-16 PROCEDURE — A9270 NON-COVERED ITEM OR SERVICE: HCPCS | Performed by: HOSPITALIST

## 2017-10-16 PROCEDURE — 700111 HCHG RX REV CODE 636 W/ 250 OVERRIDE (IP): Performed by: HOSPITALIST

## 2017-10-16 PROCEDURE — 770006 HCHG ROOM/CARE - MED/SURG/GYN SEMI*

## 2017-10-16 PROCEDURE — 700102 HCHG RX REV CODE 250 W/ 637 OVERRIDE(OP): Performed by: INTERNAL MEDICINE

## 2017-10-16 PROCEDURE — 85025 COMPLETE CBC W/AUTO DIFF WBC: CPT

## 2017-10-16 PROCEDURE — 80053 COMPREHEN METABOLIC PANEL: CPT

## 2017-10-16 PROCEDURE — 99231 SBSQ HOSP IP/OBS SF/LOW 25: CPT | Performed by: HOSPITALIST

## 2017-10-16 PROCEDURE — A9270 NON-COVERED ITEM OR SERVICE: HCPCS | Performed by: INTERNAL MEDICINE

## 2017-10-16 RX ADMIN — HEPARIN SODIUM 5000 UNITS: 5000 INJECTION, SOLUTION INTRAVENOUS; SUBCUTANEOUS at 14:19

## 2017-10-16 RX ADMIN — METOPROLOL TARTRATE 75 MG: 25 TABLET, FILM COATED ORAL at 21:08

## 2017-10-16 RX ADMIN — GABAPENTIN 100 MG: 100 CAPSULE ORAL at 21:08

## 2017-10-16 RX ADMIN — HEPARIN SODIUM 5000 UNITS: 5000 INJECTION, SOLUTION INTRAVENOUS; SUBCUTANEOUS at 05:08

## 2017-10-16 RX ADMIN — ATORVASTATIN CALCIUM 40 MG: 40 TABLET, FILM COATED ORAL at 21:08

## 2017-10-16 RX ADMIN — LEVOTHYROXINE SODIUM 75 MCG: 75 TABLET ORAL at 05:09

## 2017-10-16 RX ADMIN — FOLIC ACID 1 MG: 1 TABLET ORAL at 08:02

## 2017-10-16 RX ADMIN — ASPIRIN 81 MG: 81 TABLET, COATED ORAL at 08:02

## 2017-10-16 RX ADMIN — THERA TABS 1 TABLET: TAB at 08:01

## 2017-10-16 RX ADMIN — METOPROLOL TARTRATE 75 MG: 25 TABLET, FILM COATED ORAL at 08:01

## 2017-10-16 RX ADMIN — GABAPENTIN 100 MG: 100 CAPSULE ORAL at 08:01

## 2017-10-16 RX ADMIN — HEPARIN SODIUM 5000 UNITS: 5000 INJECTION, SOLUTION INTRAVENOUS; SUBCUTANEOUS at 21:09

## 2017-10-16 RX ADMIN — GABAPENTIN 100 MG: 100 CAPSULE ORAL at 14:19

## 2017-10-16 ASSESSMENT — PAIN SCALES - GENERAL
PAINLEVEL_OUTOF10: 0
PAINLEVEL_OUTOF10: 0

## 2017-10-16 ASSESSMENT — ENCOUNTER SYMPTOMS
NERVOUS/ANXIOUS: 0
FEVER: 0
HEMOPTYSIS: 0
FOCAL WEAKNESS: 0
PALPITATIONS: 0
PHOTOPHOBIA: 0
HEARTBURN: 0
HEADACHES: 0
MEMORY LOSS: 1
NECK PAIN: 0
FLANK PAIN: 0
TREMORS: 0
SORE THROAT: 0
SENSORY CHANGE: 0
BLOOD IN STOOL: 0
BACK PAIN: 0
NAUSEA: 0
STRIDOR: 0
DEPRESSION: 0
BLURRED VISION: 0
DOUBLE VISION: 0
DIZZINESS: 0
CLAUDICATION: 0
SPUTUM PRODUCTION: 0
CHILLS: 0
TINGLING: 0
MYALGIAS: 0
CONSTIPATION: 0
WEAKNESS: 1
ORTHOPNEA: 0
SPEECH CHANGE: 0
ABDOMINAL PAIN: 0
VOMITING: 0
COUGH: 0
PND: 0
EYE PAIN: 0
SHORTNESS OF BREATH: 0

## 2017-10-16 NOTE — PROGRESS NOTES
Report received from night RN. Assumed patient care at 0700. Patient is AOx3, reoriented to time. Labs and orders reviewed. Assessment completed. Patient denies any pain at this time. Patient provided with scheduled medication administered with applesause, refer to MAR. Plan of care discussed with patient; questions have been answered at this time. Patient needs have been met at this time. Patient encouraged to call when needing assistance. Call light within reach. Bed alarm on. Bed locked and in the lowest position. Hourly rounding in place.

## 2017-10-16 NOTE — DISCHARGE PLANNING
"SW met with pt at bedside with Dr. Rodas to determine if pt's mentation has changed.     Pt was able to state where he was however was unable to state what led to his hospitalization. Pt was unable to identify his medical needs. Unsure if this is lack of education but appears to be due to his cognition.    SW spoke with Laury Lagos, pt's sister in law/ caregiver. SW asked if she would be coming in today and Laury stated she would not as she \"can't get up\". She became very emotional over the phone when LTC was brought up stating we would be \"forcing her out of her home\". SW offered resources for low income housing for Laury as she seems to be concerned with the affordability of her apartment. Laury adamantly declined stating those resources don't exist. SW assured that resources do exist and again offered low income and senior housing options and Laury declined. Laury remained persistent on pt coming home. She went on to say that it is ridiculous that we are helping this pt feed himself and the diet we have him on. Pt is an aspiration risk. TRUONG explained that pt needs 24/7 care and Laury stated she is there 24/7. SW referred to the beginning of the conversation when Laury stated she wasn't able to move herself. SW explained that pt needs medical staff. Laury remained emotional and stated \"long term care is not going to happen.\" Patient added to difficult discharge list to assess d/c options or continue with guardianship. Pt was A&Ox4 according to notes in July.   "

## 2017-10-16 NOTE — PROGRESS NOTES
Patient noted to be bradycardiac in the 30s-40s range lasting a minute long, while the patient is asleep. Patient stable at this time, heart rate in the 70s. MD notified, per MD no new orders received. Patient Vital signs as follow: Temperature-97.4, BP-116/67, Pulse-73, Respiration- 17,Oxygen saturation-96% via 2L nasal cannula. Will continue to monitor Patient vital signs.

## 2017-10-16 NOTE — CARE PLAN
Problem: Safety  Goal: Will remain free from falls    Intervention: Assess risk factors for falls   10/16/17 0800   OTHER   Fall Risk High Risk to Fall - 2 or more points    Risk for Injury-Any positive answers results in the pt being at high risk for fall related injury Not Applicable   Mobility Status Assessment 2-2 Healthcare Providers Required for Assistance with Ambulation & Transfer   History of fall 2   Date of Last Fall 09/02/17   Pt Calls for Assistance Yes   Patient environment is clutter free. Bedside table and personal possession near patient. Treaded sock in place. Bed locked and in the lowest position. Patient encouraged to call when needing assistance. Call light within reach.       Problem: Skin Integrity  Goal: Risk for impaired skin integrity will decrease    Intervention: Assess risk factors for impaired skin integrity and/or pressure ulcers  Assessing and monitoring patient skin integrity. Q2 turns provided to patient. Waffle cushion in place. Moisturizer and barrier cream provided.

## 2017-10-16 NOTE — PROGRESS NOTES
Josie Osuna Fall Risk Assessment:     Last Known Fall: Within the last six months  Mobility: Use of assistive device/requires assist of two people  Medications: Cardiovascular and central nervous system meds  Mental Status/LOC/Awareness: Oriented to person and place  Toileting Needs: Incontinence  Volume/Electrolyte Status: No problems  Communication/Sensory: Visual (Glasses)/hearing deficit  Behavior: Appropriate behavior  Josie Osuna Fall Risk Total: 15  Fall Risk Level: HIGH RISK    Universal Fall Precautions:  call light/belongings in reach, bed in low position and locked, wheelchairs and assistive devices out of sight, siderails up x 2, use non-slip footwear, adequate lighting, clutter free and spill free environment, educate on level of risk, educate to call for assistance    Fall Risk Level Interventions:    TRIAL (TELE 8, NEURO, MED MARINA 5) Moderate Fall Risk Interventions  Place yellow fall risk ID band on patient: completed  Provide patient/family education based on risk assessment : completed  Educate patient/family to call staff for assistance when getting out of bed: completed  Place fall precaution signage outside patient door: completed  Utilize bed/chair fall alarm: completedTRIAL (TELE 8, NEURO, MED MARINA 5) High Fall Risk Interventions  Place yellow fall risk ID band on patient: verified  Provide patient/family education based on risk assessment: completed  Educate patient/family to call staff for assistance when getting out of bed: completed  Place fall precaution signage outside patient door: completed  Place patient in room close to nursing station: completed  Utilize bed/chair fall alarm: completed  Notify charge of high risk for huddle: completed    Patient Specific Interventions:     Medication: review medications with patient and family  Mental Status/LOC/Awareness: reinforce falls education and utilize bed/chair fall alarm  Toileting: provide frquent toileting  Volume/Electrolyte  Status: ensure patient remains hydrated  Communication/Sensory: update plan of care on whiteboard  Behavioral: administer medication as ordered  Mobility: utilize bed/chair fall alarm

## 2017-10-16 NOTE — PROGRESS NOTES
Josie Osuna Fall Risk Assessment:     Last Known Fall: Within the last six months  Mobility: Use of assistive device/requires assist of two people  Medications: Cardiovascular and central nervous system meds  Mental Status/LOC/Awareness: Oriented to person and place  Toileting Needs: Incontinence  Volume/Electrolyte Status: No problems  Communication/Sensory: Visual (Glasses)/hearing deficit  Behavior: Appropriate behavior  Josie Osuna Fall Risk Total: 15  Fall Risk Level: HIGH RISK    Universal Fall Precautions:  call light/belongings in reach, bed in low position and locked, siderails up x 2, use non-slip footwear, adequate lighting, clutter free and spill free environment, educate on level of risk, educate to call for assistance    Fall Risk Level Interventions:    TRIAL (TELE 8, NEURO, MED MARINA 5) Moderate Fall Risk Interventions  Place yellow fall risk ID band on patient: completed  Provide patient/family education based on risk assessment : completed  Educate patient/family to call staff for assistance when getting out of bed: completed  Place fall precaution signage outside patient door: completed  Utilize bed/chair fall alarm: completedTRIAL (TELE 8, NEURO, P[employer] MARINA 5) High Fall Risk Interventions  Place yellow fall risk ID band on patient: verified  Provide patient/family education based on risk assessment: completed  Educate patient/family to call staff for assistance when getting out of bed: completed  Place fall precaution signage outside patient door: verified  Place patient in room close to nursing station: verified  Utilize bed/chair fall alarm: verified  Notify charge of high risk for huddle: completed    Patient Specific Interventions:     Medication: review medications with patient and family  Mental Status/LOC/Awareness: reorient patient, reinforce falls education, check on patient hourly, utilize bed/chair fall alarm and reinforce the use of call light  Toileting: provide frquent toileting  and instruct patient/family on the need to call for assistance when toileting  Volume/Electrolyte Status: ensure patient remains hydrated and monitor abnormal lab values  Communication/Sensory: update plan of care on whiteboard and ensure patient has glasses/contacts and hearing aids/dentures  Behavioral: encourage patient to voice feelings, administer medication as ordered and instruct/reinforce fall program rationale  Mobility: provide comfort measures during transport, utilize bed/chair fall alarm, ensure bed is locked and in lowest position and provide appropriate assistive device

## 2017-10-16 NOTE — PROGRESS NOTES
[facility] Hospitalist Progress Note    Date of Service: 10/16/2017    Chief Complaint  72 y.o. male admitted 2017 with urinary tract infection and confusion    Interval Problem Update  No acute issues, patient resting comfortable.  Pending guardenship    Consultants/Specialty  None    Disposition  Pending placement  Pending guardianship     grossly unchanged   Review of Systems   Constitutional: Positive for malaise/fatigue. Negative for chills and fever.   HENT: Negative for congestion, hearing loss, sore throat and tinnitus.    Eyes: Negative for blurred vision, double vision, photophobia and pain.   Respiratory: Negative for cough, hemoptysis, sputum production, shortness of breath and stridor.    Cardiovascular: Negative for chest pain, palpitations, orthopnea, claudication, leg swelling and PND.   Gastrointestinal: Negative for abdominal pain, blood in stool, constipation, heartburn, melena, nausea and vomiting.   Genitourinary: Negative for dysuria, flank pain, frequency and urgency.   Musculoskeletal: Negative for back pain, myalgias and neck pain.   Neurological: Positive for weakness. Negative for dizziness, tingling, tremors, sensory change, speech change, focal weakness and headaches.   Psychiatric/Behavioral: Positive for memory loss. Negative for depression and suicidal ideas. The patient is not nervous/anxious.       Physical Exam  Laboratory/Imaging   Hemodynamics  Temp (24hrs), Av.3 °C (97.4 °F), Min:36.2 °C (97.1 °F), Max:36.4 °C (97.6 °F)   Temperature: 36.4 °C (97.6 °F)  Pulse  Av.4  Min: 69  Max: 144    Blood Pressure : 125/70      Respiratory      Respiration: 16, Pulse Oximetry: 100 %        RUL Breath Sounds: Clear, RML Breath Sounds: Diminished, RLL Breath Sounds: Diminished, RIGO Breath Sounds: Clear, LLL Breath Sounds: Diminished    Fluids  No intake or output data in the 24 hours ending 10/16/17 1250    Nutrition  Orders Placed This Encounter   Procedures   • DIET ORDER      Standing Status:   Standing     Number of Occurrences:   1     Order Specific Question:   Diet:     Answer:   Regular [1]     Order Specific Question:   Texture/Fiber modifications:     Answer:   Dysphagia 1(Pureed)specify fluid consistency(question 6) [1]     Order Specific Question:   Consistency/Fluid modifications:     Answer:   Nectar Thick [2]     Order Specific Question:   Miscellaneous modifications:     Answer:   SLP - 1:1 Supervision by Nursing [21]   grossly unchanged  Physical Exam   Constitutional: He appears well-developed and well-nourished. No distress.   Chronically ill-appearing  Frail, thin   HENT:   Head: Normocephalic and atraumatic.   Mouth/Throat: No oropharyngeal exudate.   Eyes: Conjunctivae and EOM are normal. Pupils are equal, round, and reactive to light. Right eye exhibits no discharge. Left eye exhibits no discharge. No scleral icterus.   Neck: Normal range of motion. Neck supple. No JVD present. No thyromegaly present.   Cardiovascular: Normal rate, regular rhythm and intact distal pulses.    No murmur heard.  Pulmonary/Chest: Effort normal and breath sounds normal. No stridor. No respiratory distress. He has no wheezes. He has no rales.   Decreased breath sounds bilaterally,    Abdominal: Soft. Bowel sounds are normal. He exhibits no distension. There is no tenderness. There is no rebound.   Musculoskeletal: Normal range of motion. He exhibits no edema.   Right BKA, open wound  Dressing clean and dry and intact  Nontender to palpation   Neurological: He is alert. He is disoriented. No cranial nerve deficit. Coordination normal.   Skin: Skin is warm and dry. No erythema.   Psychiatric: His behavior is normal. Thought content normal.   Nursing note and vitals reviewed.      Recent Labs      10/15/17   0241  10/16/17   0810   WBC  15.0*  14.2*   RBC  4.76  5.47   HEMOGLOBIN  11.1*  12.5*   HEMATOCRIT  37.4*  41.5*   MCV  78.6*  75.9*   MCH  23.3*  22.9*   MCHC  29.7*  30.1*   RDW  45.4   44.7   PLATELETCT  858*  674*   MPV  9.7  9.8     Recent Labs      10/15/17   0241  10/16/17   0836   SODIUM  135  135   POTASSIUM  4.2  4.1   CHLORIDE  101  100   CO2  25  23   GLUCOSE  86  88   BUN  17  14   CREATININE  0.63  0.52   CALCIUM  8.6  8.9                      Assessment/Plan     * Cellulitis of right knee- (present on admission)   Assessment & Plan    Completed antibiotics        Pneumonia   Assessment & Plan    Possibly 2/2 to Aspiration pneumonia  Finished course of  Augmentin.  Continue RT protocol, duo nebs, Pep therapy if warranted, and incentive spirometry.           Hyponatremia- (present on admission)   Assessment & Plan    Resolved.  Continue to monitor bmp           Leukocytosis- (present on admission)   Assessment & Plan    Improving, consider chronic reactive?  No signs of infection.  Completed antibiotics  CTM        PAD (peripheral artery disease) (CMS-HCC)- (present on admission)   Assessment & Plan    s/p left forefoot amputation and right BKA  Continue aspirin and statin.          Thrombocytosis (CMS-HCC)   Assessment & Plan    Possibly reactive?, stable from previous labs.  Continue to monitor cbc.          Dementia   Assessment & Plan    Admitted for encephalopathy and debility  Has been evaluated by ethics committee, unable to make his own medical decisions  Pending for guardianship pending   following          UTI (urinary tract infection)- (present on admission)   Assessment & Plan    As above.        Essential hypertension- (present on admission)   Assessment & Plan    SBP goal less than 140 mmHg  DBP goal less than 90 mmHg  Continue with metoprolol 50 mg BID        Severe protein-calorie malnutrition (CMS-HCC)- (present on admission)   Assessment & Plan    Nutrition following  Cont boost TID          Patient plan of care discussed at multidisplinary team rounds and with patient and R.N at beside.      Reviewed items::  Radiology images reviewed, Labs reviewed and  Medications reviewed  Barrera catheter::  No Barrera  DVT prophylaxis pharmacological::  Heparin  Antibiotics:  Treating active infection/contamination beyond 24 hours perioperative coverage

## 2017-10-17 PROCEDURE — 700102 HCHG RX REV CODE 250 W/ 637 OVERRIDE(OP): Performed by: HOSPITALIST

## 2017-10-17 PROCEDURE — 97110 THERAPEUTIC EXERCISES: CPT

## 2017-10-17 PROCEDURE — 99231 SBSQ HOSP IP/OBS SF/LOW 25: CPT | Performed by: HOSPITALIST

## 2017-10-17 PROCEDURE — A9270 NON-COVERED ITEM OR SERVICE: HCPCS | Performed by: HOSPITALIST

## 2017-10-17 PROCEDURE — 700102 HCHG RX REV CODE 250 W/ 637 OVERRIDE(OP): Performed by: INTERNAL MEDICINE

## 2017-10-17 PROCEDURE — 700111 HCHG RX REV CODE 636 W/ 250 OVERRIDE (IP): Performed by: HOSPITALIST

## 2017-10-17 PROCEDURE — 97530 THERAPEUTIC ACTIVITIES: CPT

## 2017-10-17 PROCEDURE — 770006 HCHG ROOM/CARE - MED/SURG/GYN SEMI*

## 2017-10-17 PROCEDURE — 92526 ORAL FUNCTION THERAPY: CPT

## 2017-10-17 PROCEDURE — A9270 NON-COVERED ITEM OR SERVICE: HCPCS | Performed by: INTERNAL MEDICINE

## 2017-10-17 RX ADMIN — LEVOTHYROXINE SODIUM 75 MCG: 75 TABLET ORAL at 05:57

## 2017-10-17 RX ADMIN — GABAPENTIN 100 MG: 100 CAPSULE ORAL at 08:02

## 2017-10-17 RX ADMIN — THERA TABS 1 TABLET: TAB at 08:02

## 2017-10-17 RX ADMIN — GABAPENTIN 100 MG: 100 CAPSULE ORAL at 14:44

## 2017-10-17 RX ADMIN — METOPROLOL TARTRATE 75 MG: 25 TABLET, FILM COATED ORAL at 08:01

## 2017-10-17 RX ADMIN — HEPARIN SODIUM 5000 UNITS: 5000 INJECTION, SOLUTION INTRAVENOUS; SUBCUTANEOUS at 20:20

## 2017-10-17 RX ADMIN — HEPARIN SODIUM 5000 UNITS: 5000 INJECTION, SOLUTION INTRAVENOUS; SUBCUTANEOUS at 14:44

## 2017-10-17 RX ADMIN — HEPARIN SODIUM 5000 UNITS: 5000 INJECTION, SOLUTION INTRAVENOUS; SUBCUTANEOUS at 05:56

## 2017-10-17 RX ADMIN — FOLIC ACID 1 MG: 1 TABLET ORAL at 08:02

## 2017-10-17 RX ADMIN — ASPIRIN 81 MG: 81 TABLET, COATED ORAL at 08:02

## 2017-10-17 RX ADMIN — GABAPENTIN 100 MG: 100 CAPSULE ORAL at 20:21

## 2017-10-17 RX ADMIN — ATORVASTATIN CALCIUM 40 MG: 40 TABLET, FILM COATED ORAL at 20:20

## 2017-10-17 RX ADMIN — METOPROLOL TARTRATE 75 MG: 25 TABLET, FILM COATED ORAL at 20:20

## 2017-10-17 ASSESSMENT — ENCOUNTER SYMPTOMS
MEMORY LOSS: 1
CLAUDICATION: 0
STRIDOR: 0
SORE THROAT: 0
CHILLS: 0
NECK PAIN: 0
FOCAL WEAKNESS: 0
BACK PAIN: 0
ORTHOPNEA: 0
NERVOUS/ANXIOUS: 0
PALPITATIONS: 0
WEAKNESS: 1
HEARTBURN: 0
PHOTOPHOBIA: 0
TINGLING: 0
FEVER: 0
NAUSEA: 0
SHORTNESS OF BREATH: 0
SPUTUM PRODUCTION: 0
TREMORS: 0
SENSORY CHANGE: 0
PND: 0
EYE PAIN: 0
BLURRED VISION: 0
MYALGIAS: 0
SPEECH CHANGE: 0
FLANK PAIN: 0
HEMOPTYSIS: 0
COUGH: 0
ABDOMINAL PAIN: 0
BLOOD IN STOOL: 0
DEPRESSION: 0
HEADACHES: 0
CONSTIPATION: 0
VOMITING: 0
DOUBLE VISION: 0
DIZZINESS: 0

## 2017-10-17 ASSESSMENT — PAIN SCALES - GENERAL
PAINLEVEL_OUTOF10: 0

## 2017-10-17 ASSESSMENT — COGNITIVE AND FUNCTIONAL STATUS - GENERAL
MOVING FROM LYING ON BACK TO SITTING ON SIDE OF FLAT BED: UNABLE
MOVING TO AND FROM BED TO CHAIR: UNABLE
STANDING UP FROM CHAIR USING ARMS: TOTAL
MOBILITY SCORE: 6
WALKING IN HOSPITAL ROOM: TOTAL
CLIMB 3 TO 5 STEPS WITH RAILING: TOTAL
SUGGESTED CMS G CODE MODIFIER MOBILITY: CN
TURNING FROM BACK TO SIDE WHILE IN FLAT BAD: UNABLE

## 2017-10-17 ASSESSMENT — GAIT ASSESSMENTS: GAIT LEVEL OF ASSIST: UNABLE TO PARTICIPATE

## 2017-10-17 NOTE — PROGRESS NOTES
[facility] Hospitalist Progress Note    Date of Service: 10/17/2017    Chief Complaint  72 y.o. male admitted 2017 with urinary tract infection and confusion    Interval Problem Update  No acute issues, in bed, no acute complaints.  Pending guardenship    Consultants/Specialty  None    Disposition  Pending placement  Pending guardianship     grossly unchanged   Review of Systems   Constitutional: Positive for malaise/fatigue. Negative for chills and fever.   HENT: Negative for congestion, hearing loss, sore throat and tinnitus.    Eyes: Negative for blurred vision, double vision, photophobia and pain.   Respiratory: Negative for cough, hemoptysis, sputum production, shortness of breath and stridor.    Cardiovascular: Negative for chest pain, palpitations, orthopnea, claudication, leg swelling and PND.   Gastrointestinal: Negative for abdominal pain, blood in stool, constipation, heartburn, melena, nausea and vomiting.   Genitourinary: Negative for dysuria, flank pain, frequency and urgency.   Musculoskeletal: Negative for back pain, myalgias and neck pain.   Neurological: Positive for weakness. Negative for dizziness, tingling, tremors, sensory change, speech change, focal weakness and headaches.   Psychiatric/Behavioral: Positive for memory loss. Negative for depression and suicidal ideas. The patient is not nervous/anxious.       Physical Exam  Laboratory/Imaging   Hemodynamics  Temp (24hrs), Av.4 °C (97.6 °F), Min:36.1 °C (97 °F), Max:36.8 °C (98.3 °F)   Temperature: 36.8 °C (98.3 °F)  Pulse  Av  Min: 69  Max: 144    Blood Pressure : 144/79      Respiratory      Respiration: 18, Pulse Oximetry: 95 %        RUL Breath Sounds: Clear, RML Breath Sounds: Clear, RLL Breath Sounds: Diminished, RIGO Breath Sounds: Clear, LLL Breath Sounds: Diminished    Fluids    Intake/Output Summary (Last 24 hours) at 10/17/17 1251  Last data filed at 10/16/17 1700   Gross per 24 hour   Intake              268 ml   Output                 0 ml   Net              268 ml       Nutrition  Orders Placed This Encounter   Procedures   • DIET ORDER     Standing Status:   Standing     Number of Occurrences:   1     Order Specific Question:   Diet:     Answer:   Regular [1]     Order Specific Question:   Texture/Fiber modifications:     Answer:   Dysphagia 2(Pureed/Chopped)specify fluid consistency(question 6) [2]     Order Specific Question:   Consistency/Fluid modifications:     Answer:   Nectar Thick [2]     Order Specific Question:   Miscellaneous modifications:     Answer:   SLP - Deliver to Nursing Station [22]   grossly unchanged  Physical Exam   Constitutional: He appears well-developed and well-nourished. No distress.   Chronically ill-appearing  Frail, thin   HENT:   Head: Normocephalic and atraumatic.   Mouth/Throat: No oropharyngeal exudate.   Eyes: Conjunctivae and EOM are normal. Pupils are equal, round, and reactive to light. Right eye exhibits no discharge. Left eye exhibits no discharge. No scleral icterus.   Neck: Normal range of motion. Neck supple. No JVD present. No thyromegaly present.   Cardiovascular: Normal rate, regular rhythm and intact distal pulses.    No murmur heard.  Pulmonary/Chest: Effort normal and breath sounds normal. No stridor. No respiratory distress. He has no wheezes. He has no rales.   Decreased breath sounds bilaterally,    Abdominal: Soft. Bowel sounds are normal. He exhibits no distension. There is no tenderness. There is no rebound.   Musculoskeletal: Normal range of motion. He exhibits no edema.   Right BKA, open wound  Dressing clean and dry and intact  Nontender to palpation   Neurological: He is alert. He is disoriented. No cranial nerve deficit. Coordination normal.   Skin: Skin is warm and dry. No erythema.   Psychiatric: His behavior is normal. Thought content normal.   Nursing note and vitals reviewed.      Recent Labs      10/15/17   0241  10/16/17   0810   WBC  15.0*  14.2*   RBC  4.76  5.47    HEMOGLOBIN  11.1*  12.5*   HEMATOCRIT  37.4*  41.5*   MCV  78.6*  75.9*   MCH  23.3*  22.9*   MCHC  29.7*  30.1*   RDW  45.4  44.7   PLATELETCT  858*  674*   MPV  9.7  9.8     Recent Labs      10/15/17   0241  10/16/17   0836   SODIUM  135  135   POTASSIUM  4.2  4.1   CHLORIDE  101  100   CO2  25  23   GLUCOSE  86  88   BUN  17  14   CREATININE  0.63  0.52   CALCIUM  8.6  8.9                      Assessment/Plan     * Cellulitis of right knee- (present on admission)   Assessment & Plan    Completed antibiotics        Pneumonia   Assessment & Plan    Possibly 2/2 to Aspiration pneumonia  Finished course of  Augmentin.  Continue RT protocol, duo nebs, Pep therapy if warranted, and incentive spirometry.           Hyponatremia- (present on admission)   Assessment & Plan    Resolved.  Continue to monitor bmp           Leukocytosis- (present on admission)   Assessment & Plan    Most likely chronic, with daily fluctuations.   No signs of infection.  Completed antibiotics  CTM        PAD (peripheral artery disease) (CMS-HCC)- (present on admission)   Assessment & Plan    s/p left forefoot amputation and right BKA  Continue aspirin and statin.          Thrombocytosis (CMS-HCC)   Assessment & Plan    Possibly reactive?, stable from previous labs.  Continue to monitor cbc.          Dementia   Assessment & Plan    Admitted for encephalopathy and debility  Has been evaluated by ethics committee, unable to make his own medical decisions  Pending for guardianship pending   following          UTI (urinary tract infection)- (present on admission)   Assessment & Plan    As above.        Essential hypertension- (present on admission)   Assessment & Plan    SBP goal less than 140 mmHg  DBP goal less than 90 mmHg  Continue with metoprolol 50 mg BID        Severe protein-calorie malnutrition (CMS-[facility])- (present on admission)   Assessment & Plan    Nutrition following  Cont boost TID          Patient plan of care discussed  at multidisplinary team rounds and with patient and R.N at [facility].      Reviewed items::  Radiology images reviewed, Labs reviewed and Medications reviewed  Barrera catheter::  No Barrera  DVT prophylaxis pharmacological::  Heparin  Antibiotics:  Treating active infection/contamination beyond 24 hours perioperative coverage

## 2017-10-17 NOTE — THERAPY
"Physical Therapy Treatment completed.   Bed Mobility:  Supine to Sit: Maximal Assist  Transfers: Sit to Stand: Maximal Assist (X2)  Gait: Level Of Assist: Unable to Participate    Plan of Care: Will benefit from Physical Therapy 3 times per week and Plan to complete next treatment by Thursday 10/19  Discharge Recommendations: Equipment: Will Continue to Assess for Equipment Needs. Post-acute therapy Discharge to a transitional care facility for continued skilled therapy services.    Pt more willing to work with PT today, however, gives minimal effort when completing supine therapeutic exercises and while mobilizing. Began session with patellar mobilizations to help improve knee extension. Patellar mobility limited in all direction and pt states \"That hurts.\" Explained rationale of performing patellar mobility in order to improve knee extension. Completed supine therapeutic exercises with minimal effort given during exercises. Continues to require maximal assist for all mobility. Maximal assist X 2 for stand pivot to chair. Pt places minimal weight through L LE during transfer. Next session, will attempt Slide board and WC transfer as pt minimally participates in stand pivot.      See \"Rehab Therapy-Acute\" Patient Summary Report for complete documentation.       "

## 2017-10-17 NOTE — PROGRESS NOTES
Josie Osuna Fall Risk Assessment:     Last Known Fall: Within the last six months  Mobility: Use of assistive device/requires assist of two people  Medications: Cardiovascular or central nervous system meds  Mental Status/LOC/Awareness: Oriented to person and place  Toileting Needs: Incontinence, Use of assistive device (Bedside commode, bedpan, urinal)  Volume/Electrolyte Status: No problems  Communication/Sensory: Visual (Glasses)/hearing deficit  Behavior: Appropriate behavior  Josie Osuna Fall Risk Total: 16  Fall Risk Level: HIGH RISK    Universal Fall Precautions:  call light/belongings in reach, bed in low position and locked, siderails up x 2, use non-slip footwear, adequate lighting, clutter free and spill free environment, educate on level of risk, educate to call for assistance    Fall Risk Level Interventions:    TRIAL ([employer] 8, NEURO, MED MARINA 5) Moderate Fall Risk Interventions  Place yellow fall risk ID band on patient: completed  Provide patient/family education based on risk assessment : completed  Educate patient/family to call staff for assistance when getting out of bed: completed  Place fall precaution signage outside patient door: completed  Utilize bed/chair fall alarm: completedTRIAL (TELE 8, NEURO, [employer] MARINA 5) High Fall Risk Interventions  Place yellow fall risk ID band on patient: verified  Provide patient/family education based on risk assessment: completed  Educate patient/family to call staff for assistance when getting out of bed: completed  Place fall precaution signage outside patient door: verified  Place patient in room close to nursing station: verified  Utilize bed/chair fall alarm: verified  Notify charge of high risk for huddle: completed    Patient Specific Interventions:     Medication: review medications with patient and family  Mental Status/LOC/Awareness: reorient patient, reinforce falls education, check on patient hourly, utilize bed/chair fall alarm and reinforce  the use of call light  Toileting: provide frquent toileting and instruct patient/family on the need to call for assistance when toileting  Volume/Electrolyte Status: ensure patient remains hydrated and monitor abnormal lab values  Communication/Sensory: update plan of care on whiteboard and ensure proper positioning when transferrng/ambulating  Behavioral: encourage patient to voice feelings, administer medication as ordered and instruct/reinforce fall program rationale  Mobility: provide comfort measures during transport, utilize bed/chair fall alarm, ensure bed is locked and in lowest position and provide appropriate assistive device

## 2017-10-17 NOTE — CARE PLAN
Problem: Safety  Goal: Will remain free from injury  Outcome: PROGRESSING AS EXPECTED  Fall precautions maintained. Call light and bedside table within reach of patient. Will continue to monitor.

## 2017-10-17 NOTE — CARE PLAN
Problem: Safety  Goal: Will remain free from falls    Intervention: Assess risk factors for falls   10/17/17 0800   OTHER   Fall Risk High Risk to Fall - 2 or more points    Risk for Injury-Any positive answers results in the pt being at high risk for fall related injury Not Applicable   Mobility Status Assessment 2-2 Healthcare Providers Required for Assistance with Ambulation & Transfer   History of fall 2   Date of Last Fall 09/02/17   Pt Calls for Assistance No   Personal possession and bedside table near patient. Bed locked and in the lowest position. Bed alarm on. Patient encouraged to call when needing assistance. Call light within reach.       Problem: Skin Integrity  Goal: Risk for impaired skin integrity will decrease    Intervention: Assess risk factors for impaired skin integrity and/or pressure ulcers  Assessing and monitoring patient skin integrity. Q2 turns provided to patient. Barrier Cream and Moisturizer provided. Waffle cushion in place.

## 2017-10-17 NOTE — CARE PLAN
Problem: Skin Integrity  Goal: Risk for impaired skin integrity will decrease  Outcome: PROGRESSING AS EXPECTED  Tania cream applied to perineum since blanchable redness present and patient incontinent. Sacral mepilex applied to sacrum per wound team orders. Patient on waffle overlay mattress and turn Q2. R knee dressing c/d/i and due to be changed 10/17.

## 2017-10-17 NOTE — PROGRESS NOTES
Patient educated on importance of repositioning Q2 to prevent skin breakdown.  Patient refused to be turned. Tania barrier applied perineum and mepilex applied to sacrum. Waffle overlay mattress in place.

## 2017-10-17 NOTE — THERAPY
"Speech Language Therapy dysphagia treatment completed.   Functional Status:  The patient was seen for dysphagia therapy this date. The patient was awake, alert and sitting in bedside chair. Patient's recall appeared improved from previous SLP session stating \"I'm tried of the purees and ready to do what I need to get off of them.\" The patient was noted to follow swallow strategies following single reminder at beginning of session. The patient consumed purees, NTL and PO trials of soft solids with no overt s/s of aspiration and consumed small bites, sips and cleared oral cavity between bites without prompting or cues.     Recommendations: 1)  upgrade to D2/NTL with swallow strategies and monitoring during meals to ensure continued follow through with swallow stratgies.     Plan of Care: Will benefit from Speech Therapy 3 times per week.    Post-Acute Therapy: Discharge to a transitional care facility for continued skilled therapy services.    See \"Rehab Therapy-Acute\" Patient Summary Report for complete documentation.     "

## 2017-10-18 LAB
ALBUMIN SERPL BCP-MCNC: 2.9 G/DL (ref 3.2–4.9)
ALBUMIN/GLOB SERPL: 0.8 G/DL
ALP SERPL-CCNC: 60 U/L (ref 30–99)
ALT SERPL-CCNC: 14 U/L (ref 2–50)
ANION GAP SERPL CALC-SCNC: 10 MMOL/L (ref 0–11.9)
AST SERPL-CCNC: 21 U/L (ref 12–45)
BASOPHILS # BLD AUTO: 2.1 % (ref 0–1.8)
BASOPHILS # BLD: 0.25 K/UL (ref 0–0.12)
BILIRUB SERPL-MCNC: 0.4 MG/DL (ref 0.1–1.5)
BUN SERPL-MCNC: 14 MG/DL (ref 8–22)
CALCIUM SERPL-MCNC: 8.6 MG/DL (ref 8.5–10.5)
CHLORIDE SERPL-SCNC: 99 MMOL/L (ref 96–112)
CO2 SERPL-SCNC: 22 MMOL/L (ref 20–33)
CREAT SERPL-MCNC: 0.52 MG/DL (ref 0.5–1.4)
EOSINOPHIL # BLD AUTO: 0.42 K/UL (ref 0–0.51)
EOSINOPHIL NFR BLD: 3.5 % (ref 0–6.9)
ERYTHROCYTE [DISTWIDTH] IN BLOOD BY AUTOMATED COUNT: 44 FL (ref 35.9–50)
GFR SERPL CREATININE-BSD FRML MDRD: >60 ML/MIN/1.73 M 2
GLOBULIN SER CALC-MCNC: 3.7 G/DL (ref 1.9–3.5)
GLUCOSE SERPL-MCNC: 80 MG/DL (ref 65–99)
HCT VFR BLD AUTO: 36.5 % (ref 42–52)
HGB BLD-MCNC: 11 G/DL (ref 14–18)
IMM GRANULOCYTES # BLD AUTO: 0.05 K/UL (ref 0–0.11)
IMM GRANULOCYTES NFR BLD AUTO: 0.4 % (ref 0–0.9)
LYMPHOCYTES # BLD AUTO: 1.61 K/UL (ref 1–4.8)
LYMPHOCYTES NFR BLD: 13.3 % (ref 22–41)
MAGNESIUM SERPL-MCNC: 1.7 MG/DL (ref 1.5–2.5)
MCH RBC QN AUTO: 22.8 PG (ref 27–33)
MCHC RBC AUTO-ENTMCNC: 30.1 G/DL (ref 33.7–35.3)
MCV RBC AUTO: 75.7 FL (ref 81.4–97.8)
MONOCYTES # BLD AUTO: 0.61 K/UL (ref 0–0.85)
MONOCYTES NFR BLD AUTO: 5 % (ref 0–13.4)
NEUTROPHILS # BLD AUTO: 9.17 K/UL (ref 1.82–7.42)
NEUTROPHILS NFR BLD: 75.7 % (ref 44–72)
NRBC # BLD AUTO: 0 K/UL
NRBC BLD AUTO-RTO: 0 /100 WBC
PLATELET # BLD AUTO: 848 K/UL (ref 164–446)
PMV BLD AUTO: 9.8 FL (ref 9–12.9)
POTASSIUM SERPL-SCNC: 4.1 MMOL/L (ref 3.6–5.5)
PROT SERPL-MCNC: 6.6 G/DL (ref 6–8.2)
RBC # BLD AUTO: 4.82 M/UL (ref 4.7–6.1)
SODIUM SERPL-SCNC: 131 MMOL/L (ref 135–145)
WBC # BLD AUTO: 12.1 K/UL (ref 4.8–10.8)

## 2017-10-18 PROCEDURE — 99231 SBSQ HOSP IP/OBS SF/LOW 25: CPT | Performed by: HOSPITALIST

## 2017-10-18 PROCEDURE — 80053 COMPREHEN METABOLIC PANEL: CPT

## 2017-10-18 PROCEDURE — 83735 ASSAY OF MAGNESIUM: CPT

## 2017-10-18 PROCEDURE — A9270 NON-COVERED ITEM OR SERVICE: HCPCS | Performed by: INTERNAL MEDICINE

## 2017-10-18 PROCEDURE — 770006 HCHG ROOM/CARE - MED/SURG/GYN SEMI*

## 2017-10-18 PROCEDURE — 85025 COMPLETE CBC W/AUTO DIFF WBC: CPT

## 2017-10-18 PROCEDURE — 36415 COLL VENOUS BLD VENIPUNCTURE: CPT

## 2017-10-18 PROCEDURE — 700111 HCHG RX REV CODE 636 W/ 250 OVERRIDE (IP): Performed by: HOSPITALIST

## 2017-10-18 PROCEDURE — 700102 HCHG RX REV CODE 250 W/ 637 OVERRIDE(OP): Performed by: HOSPITALIST

## 2017-10-18 PROCEDURE — 700102 HCHG RX REV CODE 250 W/ 637 OVERRIDE(OP): Performed by: INTERNAL MEDICINE

## 2017-10-18 PROCEDURE — A9270 NON-COVERED ITEM OR SERVICE: HCPCS | Performed by: HOSPITALIST

## 2017-10-18 RX ADMIN — FOLIC ACID 1 MG: 1 TABLET ORAL at 08:47

## 2017-10-18 RX ADMIN — METOPROLOL TARTRATE 75 MG: 25 TABLET, FILM COATED ORAL at 08:47

## 2017-10-18 RX ADMIN — GABAPENTIN 100 MG: 100 CAPSULE ORAL at 15:31

## 2017-10-18 RX ADMIN — METOPROLOL TARTRATE 75 MG: 25 TABLET, FILM COATED ORAL at 20:36

## 2017-10-18 RX ADMIN — HEPARIN SODIUM 5000 UNITS: 5000 INJECTION, SOLUTION INTRAVENOUS; SUBCUTANEOUS at 06:03

## 2017-10-18 RX ADMIN — ATORVASTATIN CALCIUM 40 MG: 40 TABLET, FILM COATED ORAL at 20:36

## 2017-10-18 RX ADMIN — THERA TABS 1 TABLET: TAB at 08:47

## 2017-10-18 RX ADMIN — GABAPENTIN 100 MG: 100 CAPSULE ORAL at 20:36

## 2017-10-18 RX ADMIN — GABAPENTIN 100 MG: 100 CAPSULE ORAL at 08:47

## 2017-10-18 RX ADMIN — LEVOTHYROXINE SODIUM 75 MCG: 75 TABLET ORAL at 06:03

## 2017-10-18 RX ADMIN — STANDARDIZED SENNA CONCENTRATE AND DOCUSATE SODIUM 2 TABLET: 8.6; 5 TABLET, FILM COATED ORAL at 08:47

## 2017-10-18 RX ADMIN — ASPIRIN 81 MG: 81 TABLET, COATED ORAL at 08:47

## 2017-10-18 RX ADMIN — HEPARIN SODIUM 5000 UNITS: 5000 INJECTION, SOLUTION INTRAVENOUS; SUBCUTANEOUS at 15:31

## 2017-10-18 RX ADMIN — HEPARIN SODIUM 5000 UNITS: 5000 INJECTION, SOLUTION INTRAVENOUS; SUBCUTANEOUS at 20:36

## 2017-10-18 ASSESSMENT — ENCOUNTER SYMPTOMS
HEADACHES: 0
CLAUDICATION: 0
ABDOMINAL PAIN: 0
HEARTBURN: 0
NECK PAIN: 0
NAUSEA: 0
PND: 0
SENSORY CHANGE: 0
BLOOD IN STOOL: 0
TINGLING: 0
BLURRED VISION: 0
WEAKNESS: 1
STRIDOR: 0
FEVER: 0
PALPITATIONS: 0
HEMOPTYSIS: 0
EYE PAIN: 0
SPUTUM PRODUCTION: 0
CONSTIPATION: 0
DEPRESSION: 0
VOMITING: 0
COUGH: 0
SORE THROAT: 0
NERVOUS/ANXIOUS: 0
SPEECH CHANGE: 0
PHOTOPHOBIA: 0
MYALGIAS: 0
DIZZINESS: 0
CHILLS: 0
BACK PAIN: 0
ORTHOPNEA: 0
FOCAL WEAKNESS: 0
MEMORY LOSS: 1
FLANK PAIN: 0
TREMORS: 0
DOUBLE VISION: 0
SHORTNESS OF BREATH: 0

## 2017-10-18 ASSESSMENT — COGNITIVE AND FUNCTIONAL STATUS - GENERAL
WALKING IN HOSPITAL ROOM: TOTAL
DRESSING REGULAR LOWER BODY CLOTHING: A LOT
PERSONAL GROOMING: A LOT
TURNING FROM BACK TO SIDE WHILE IN FLAT BAD: UNABLE
HELP NEEDED FOR BATHING: A LOT
CLIMB 3 TO 5 STEPS WITH RAILING: TOTAL
TOILETING: A LOT
SUGGESTED CMS G CODE MODIFIER DAILY ACTIVITY: CL
DAILY ACTIVITIY SCORE: 12
EATING MEALS: A LOT
DRESSING REGULAR UPPER BODY CLOTHING: A LOT
MOVING FROM LYING ON BACK TO SITTING ON SIDE OF FLAT BED: UNABLE
MOBILITY SCORE: 6
SUGGESTED CMS G CODE MODIFIER MOBILITY: CN
MOVING TO AND FROM BED TO CHAIR: UNABLE
STANDING UP FROM CHAIR USING ARMS: TOTAL

## 2017-10-18 ASSESSMENT — PAIN SCALES - GENERAL
PAINLEVEL_OUTOF10: 0

## 2017-10-18 NOTE — WOUND TEAM
"[facility]  Inpatient Services  Wound and Skin Care Progress Note    HPI, PMH, SH: Reviewed  Unit where seen by Wound Team: [id number]/[id number]    WOUND TEAM FOLLOW UP: re-evaluation of R knee and stump, sacrococcygeal area    SUBJECTIVE:  \"I can't turn very well.\"    Self Report / Pain Level: c/o tenderness with cleansing of wound  OBJECTIVE: on pressure redistribution mattress, lying on right side, incontinent of urine, dressing in place to right knee, mepilex to sacrum, saturated with urine   WOUND TYPE, LOCATION, CHARACTERISTICS:  Wound POA Other (full thickness surgical) Leg Right Distal  Periwound Skin: Intact  Drainage : None  Tissue Type and %:    100%  Yellow crust  Wound Edges:    attached    Odor:     None   Exposed structure(s):   1 small suture at lateral end of incision line   Signs and Symptoms of Infection: none    Wound Not POA Unstageable Knee Right Anterior  Periwound Skin: Intact  Drainage : minimal Serous     Tissue Type and %:    30% pink 70% white cartilage  Wound Edges:    attached    Odor:     None   Exposed structure(s):   caritlage  Signs and Symptoms of Infection: none    Measurements : taken 10/18/17    Leg Right Distal  Entire incision line measured           R knee  Length (cm):  0.4   3.2  Width (cm):  3.3   2.5  Depth (cm):  (JORGE)              (JORGE)   Tracts/undermining:   None       INTERVENTIONS BY WOUND TEAM: Removed dressing to knee, moist washcloth to ritu wound to remove honey gel, cleansed wound with NS, dried. Stump cleansed gently with normal saline and gauze, dried, painted with betadine. Photo and measurements taken. Applied honey colloid, cut to fit wound, and covered with adhesive foam. Patient incontinent of large amount of urine, mepilex saturated. Removed and cleansed with moist washcloth and no wounds present to sacracoccygeal area. Mepilex not replaced as it is not appropriate when patient is actively incontinent. Zinc barrier paste applied and bed linens " changed. Patient positioned supine.   Leg Right Distal-Dressing Options: Open to Air  Knee Right Anterior-Dressing Options:  (honey colloid, foam)    Interdisciplinary consultation: staff RN, patient; Discussed plan of care with Dr. Rodriguez, per MD patient is too frail for AKA at this time and likely risks outweigh benefit. Knee wound does not appear to be infected and will continue to manage topically at this time    EVALUATION AND PROGRESS OF WOUND(S):  Right knee with visible cartilage, incision line with one suture present. Sacrum has no wounds present. Patient has poor perfusion per vascular note from Dr. Rodriugez note 10/2, not a surgical candidate at this time.    Factors affecting wound healing: PAD, decreased nutrition, smoker, alcohol abuse     Goals:maintain dry eschar to stump, decrease size of knee wound by 1% each week     NURSING PLAN OF CARE:    Dressing changes: Continue previous Dressing Maintenance orders: X      See new Dressing Maintenance orders:       Skin care: See Skin Care orders:        Rectal tube care: See Rectal Tube Care orders:      Other orders:           WOUND TEAM PLAN OF CARE (X):   NPWT change 3 x week:        Dressing changes:       Follow up as needed:   x wound team to see once weekly   Other:

## 2017-10-18 NOTE — PROGRESS NOTES
[facility] Hospitalist Progress Note    Date of Service: 10/18/2017    Chief Complaint  72 y.o. male admitted 2017 with urinary tract infection and confusion    Interval Problem Update  Patient calm, has no acute complaints at this time.   Pending guardenship      Consultants/Specialty  None    Disposition  Pending placement  Pending guardianship     grossly unchanged   Review of Systems   Constitutional: Positive for malaise/fatigue. Negative for chills and fever.   HENT: Negative for congestion, hearing loss, sore throat and tinnitus.    Eyes: Negative for blurred vision, double vision, photophobia and pain.   Respiratory: Negative for cough, hemoptysis, sputum production, shortness of breath and stridor.    Cardiovascular: Negative for chest pain, palpitations, orthopnea, claudication, leg swelling and PND.   Gastrointestinal: Negative for abdominal pain, blood in stool, constipation, heartburn, melena, nausea and vomiting.   Genitourinary: Negative for dysuria, flank pain, frequency and urgency.   Musculoskeletal: Negative for back pain, myalgias and neck pain.   Neurological: Positive for weakness. Negative for dizziness, tingling, tremors, sensory change, speech change, focal weakness and headaches.   Psychiatric/Behavioral: Positive for memory loss. Negative for depression and suicidal ideas. The patient is not nervous/anxious.       Physical Exam  Laboratory/Imaging   Hemodynamics  Temp (24hrs), Av.3 °C (97.4 °F), Min:36.2 °C (97.2 °F), Max:36.4 °C (97.6 °F)   Temperature: 36.4 °C (97.6 °F)  Pulse  Av.8  Min: 69  Max: 144    Blood Pressure : 143/82      Respiratory      Respiration: 16, Pulse Oximetry: 95 %        RUL Breath Sounds: Diminished;Expiratory Wheezes, RML Breath Sounds: Diminished, RLL Breath Sounds: Diminished, RIGO Breath Sounds: Diminished;Expiratory Wheezes, LLL Breath Sounds: Diminished    Fluids  No intake or output data in the 24 hours ending 10/18/17 1638    Nutrition  Orders  Placed This Encounter   Procedures   • DIET ORDER     Standing Status:   Standing     Number of Occurrences:   1     Order Specific Question:   Diet:     Answer:   Regular [1]     Order Specific Question:   Texture/Fiber modifications:     Answer:   Dysphagia 2(Pureed/Chopped)specify fluid consistency(question 6) [2]     Order Specific Question:   Consistency/Fluid modifications:     Answer:   Nectar Thick [2]     Order Specific Question:   Miscellaneous modifications:     Answer:   SLP - Deliver to Nursing Station [22]   grossly unchanged  Physical Exam   Constitutional: He appears well-developed and well-nourished. No distress.   Chronically ill-appearing  Frail, thin   HENT:   Head: Normocephalic and atraumatic.   Mouth/Throat: No oropharyngeal exudate.   Eyes: Conjunctivae and EOM are normal. Pupils are equal, round, and reactive to light. Right eye exhibits no discharge. Left eye exhibits no discharge. No scleral icterus.   Neck: Normal range of motion. Neck supple. No JVD present. No thyromegaly present.   Cardiovascular: Normal rate, regular rhythm and intact distal pulses.    No murmur heard.  Pulmonary/Chest: Effort normal and breath sounds normal. No stridor. No respiratory distress. He has no wheezes. He has no rales (unchanged).   Decreased breath sounds bilaterally,    Abdominal: Soft. Bowel sounds are normal. He exhibits no distension. There is no tenderness. There is no rebound.   Musculoskeletal: Normal range of motion. He exhibits no edema.   Right BKA, open wound  Dressing clean and dry and intact  Nontender to palpation   Neurological: He is alert. He is disoriented. No cranial nerve deficit. Coordination normal.   Skin: Skin is warm and dry. No erythema.   Psychiatric: His behavior is normal. Thought content normal.   Nursing note and vitals reviewed.      Recent Labs      10/16/17   0810  10/18/17   0351   WBC  14.2*  12.1*   RBC  5.47  4.82   HEMOGLOBIN  12.5*  11.0*   HEMATOCRIT  41.5*  36.5*    MCV  75.9*  75.7*   MCH  22.9*  22.8*   MCHC  30.1*  30.1*   RDW  44.7  44.0   PLATELETCT  674*  848*   MPV  9.8  9.8     Recent Labs      10/16/17   0836  10/18/17   0351   SODIUM  135  131*   POTASSIUM  4.1  4.1   CHLORIDE  100  99   CO2  23  22   GLUCOSE  88  80   BUN  14  14   CREATININE  0.52  0.52   CALCIUM  8.9  8.6                      Assessment/Plan     * Cellulitis of right knee- (present on admission)   Assessment & Plan    Completed antibiotics        Pneumonia   Assessment & Plan    Possibly 2/2 to Aspiration pneumonia  Finished course of  Augmentin.  Continue RT protocol, duo nebs, Pep therapy if warranted, and incentive spirometry.           Hyponatremia- (present on admission)   Assessment & Plan    Resolved.  Continue to monitor bmp           Leukocytosis- (present on admission)   Assessment & Plan    Most likely chronic, with daily fluctuations.   No signs of infection.  Completed antibiotics  CTM        PAD (peripheral artery disease) (CMS-HCC)- (present on admission)   Assessment & Plan    s/p left forefoot amputation and right BKA  Continue aspirin and statin.          Thrombocytosis (CMS-HCC)   Assessment & Plan    Possibly reactive?, stable from previous labs.  Continue to monitor cbc.          Dementia   Assessment & Plan    Admitted for encephalopathy and debility  Has been evaluated by ethics committee, unable to make his own medical decisions  Pending for guardianship pending   following          UTI (urinary tract infection)- (present on admission)   Assessment & Plan    As above.        Essential hypertension- (present on admission)   Assessment & Plan    SBP goal less than 140 mmHg  DBP goal less than 90 mmHg  Continue with metoprolol 50 mg BID        Severe protein-calorie malnutrition (CMS-HCC)- (present on admission)   Assessment & Plan    Nutrition following  Cont boost TID          Patient plan of care discussed at multidisplinary team rounds and with patient and  JILLIAN at [facility].      Reviewed items::  Radiology images reviewed, Labs reviewed and Medications reviewed  Barrera catheter::  No Barrera  DVT prophylaxis pharmacological::  Heparin  Antibiotics:  Treating active infection/contamination beyond 24 hours perioperative coverage

## 2017-10-18 NOTE — PROGRESS NOTES
Josie Osuna Fall Risk Assessment:     Last Known Fall: Within the last six months  Mobility: Use of assistive device/requires assist of two people  Medications: Cardiovascular or central nervous system meds  Mental Status/LOC/Awareness: Oriented to person and place  Toileting Needs: Incontinence, Use of assistive device (Bedside commode, bedpan, urinal)  Volume/Electrolyte Status: No problems  Communication/Sensory: Visual (Glasses)/hearing deficit  Behavior: Appropriate behavior  Josie Osuna Fall Risk Total: 16  Fall Risk Level: HIGH RISK     Universal Fall Precautions:  call light/belongings in reach, bed in low position and locked, siderails up x 2, use non-slip footwear, adequate lighting, clutter free and spill free environment, educate on level of risk, educate to call for assistance     Fall Risk Level Interventions:    TRIAL ([employer] 8, NEURO, MED MARINA 5) Moderate Fall Risk Interventions  Place yellow fall risk ID band on patient: completed  Provide patient/family education based on risk assessment : completed  Educate patient/family to call staff for assistance when getting out of bed: completed  Place fall precaution signage outside patient door: completed  Utilize bed/chair fall alarm: completedTRIAL (TELE 8, NEURO, [employer] MARINA 5) High Fall Risk Interventions  Place yellow fall risk ID band on patient: verified  Provide patient/family education based on risk assessment: completed  Educate patient/family to call staff for assistance when getting out of bed: completed  Place fall precaution signage outside patient door: verified  Place patient in room close to nursing station: verified  Utilize bed/chair fall alarm: verified  Notify charge of high risk for huddle: completed     Patient Specific Interventions:      Medication: review medications with patient and family  Mental Status/LOC/Awareness: reorient patient, reinforce falls education, check on patient hourly, utilize bed/chair fall alarm and  reinforce the use of call light  Toileting: provide frquent toileting and instruct patient/family on the need to call for assistance when toileting  Volume/Electrolyte Status: ensure patient remains hydrated and monitor abnormal lab values  Communication/Sensory: update plan of care on whiteboard and ensure proper positioning when transferrng/ambulating  Behavioral: encourage patient to voice feelings, administer medication as ordered and instruct/reinforce fall program rationale  Mobility: provide comfort measures during transport, utilize bed/chair fall alarm, ensure bed is locked and in lowest position and provide appropriate assistive device

## 2017-10-19 PROCEDURE — 99231 SBSQ HOSP IP/OBS SF/LOW 25: CPT | Performed by: HOSPITALIST

## 2017-10-19 PROCEDURE — A9270 NON-COVERED ITEM OR SERVICE: HCPCS | Performed by: INTERNAL MEDICINE

## 2017-10-19 PROCEDURE — 700111 HCHG RX REV CODE 636 W/ 250 OVERRIDE (IP): Performed by: HOSPITALIST

## 2017-10-19 PROCEDURE — A9270 NON-COVERED ITEM OR SERVICE: HCPCS | Performed by: HOSPITALIST

## 2017-10-19 PROCEDURE — 770006 HCHG ROOM/CARE - MED/SURG/GYN SEMI*

## 2017-10-19 PROCEDURE — 700102 HCHG RX REV CODE 250 W/ 637 OVERRIDE(OP): Performed by: INTERNAL MEDICINE

## 2017-10-19 PROCEDURE — 700102 HCHG RX REV CODE 250 W/ 637 OVERRIDE(OP): Performed by: HOSPITALIST

## 2017-10-19 RX ADMIN — GABAPENTIN 100 MG: 100 CAPSULE ORAL at 20:29

## 2017-10-19 RX ADMIN — GABAPENTIN 100 MG: 100 CAPSULE ORAL at 11:18

## 2017-10-19 RX ADMIN — ASPIRIN 81 MG: 81 TABLET, COATED ORAL at 11:18

## 2017-10-19 RX ADMIN — FOLIC ACID 1 MG: 1 TABLET ORAL at 11:17

## 2017-10-19 RX ADMIN — METOPROLOL TARTRATE 75 MG: 25 TABLET, FILM COATED ORAL at 11:16

## 2017-10-19 RX ADMIN — ATORVASTATIN CALCIUM 40 MG: 40 TABLET, FILM COATED ORAL at 20:30

## 2017-10-19 RX ADMIN — METOPROLOL TARTRATE 75 MG: 25 TABLET, FILM COATED ORAL at 20:29

## 2017-10-19 RX ADMIN — LEVOTHYROXINE SODIUM 75 MCG: 75 TABLET ORAL at 06:19

## 2017-10-19 RX ADMIN — STANDARDIZED SENNA CONCENTRATE AND DOCUSATE SODIUM 2 TABLET: 8.6; 5 TABLET, FILM COATED ORAL at 20:30

## 2017-10-19 RX ADMIN — THERA TABS 1 TABLET: TAB at 11:18

## 2017-10-19 RX ADMIN — HEPARIN SODIUM 5000 UNITS: 5000 INJECTION, SOLUTION INTRAVENOUS; SUBCUTANEOUS at 06:19

## 2017-10-19 RX ADMIN — GABAPENTIN 100 MG: 100 CAPSULE ORAL at 15:00

## 2017-10-19 RX ADMIN — HEPARIN SODIUM 5000 UNITS: 5000 INJECTION, SOLUTION INTRAVENOUS; SUBCUTANEOUS at 20:29

## 2017-10-19 ASSESSMENT — ENCOUNTER SYMPTOMS
MEMORY LOSS: 1
CLAUDICATION: 0
VOMITING: 0
STRIDOR: 0
WEAKNESS: 1
DIZZINESS: 0
SPEECH CHANGE: 0
FEVER: 0
CONSTIPATION: 0
HEADACHES: 0
HEARTBURN: 0
SENSORY CHANGE: 0
PALPITATIONS: 0
TINGLING: 0
BACK PAIN: 0
FLANK PAIN: 0
PHOTOPHOBIA: 0
BLOOD IN STOOL: 0
NECK PAIN: 0
SHORTNESS OF BREATH: 0
FOCAL WEAKNESS: 0
NERVOUS/ANXIOUS: 0
BLURRED VISION: 0
TREMORS: 0
PND: 0
CHILLS: 0
ABDOMINAL PAIN: 0
HEMOPTYSIS: 0
DEPRESSION: 0
MYALGIAS: 0
ORTHOPNEA: 0
NAUSEA: 0
EYE PAIN: 0
DOUBLE VISION: 0
SORE THROAT: 0
COUGH: 0
SPUTUM PRODUCTION: 0

## 2017-10-19 ASSESSMENT — COGNITIVE AND FUNCTIONAL STATUS - GENERAL
DRESSING REGULAR UPPER BODY CLOTHING: A LOT
SUGGESTED CMS G CODE MODIFIER MOBILITY: CN
STANDING UP FROM CHAIR USING ARMS: TOTAL
MOVING TO AND FROM BED TO CHAIR: UNABLE
PERSONAL GROOMING: A LOT
MOVING FROM LYING ON BACK TO SITTING ON SIDE OF FLAT BED: UNABLE
HELP NEEDED FOR BATHING: A LOT
TURNING FROM BACK TO SIDE WHILE IN FLAT BAD: UNABLE
SUGGESTED CMS G CODE MODIFIER DAILY ACTIVITY: CL
DAILY ACTIVITIY SCORE: 12
DRESSING REGULAR LOWER BODY CLOTHING: A LOT
MOBILITY SCORE: 6
TOILETING: A LOT
CLIMB 3 TO 5 STEPS WITH RAILING: TOTAL
EATING MEALS: A LOT
WALKING IN HOSPITAL ROOM: TOTAL

## 2017-10-19 ASSESSMENT — PAIN SCALES - GENERAL
PAINLEVEL_OUTOF10: 0
PAINLEVEL_OUTOF10: 0

## 2017-10-19 ASSESSMENT — LIFESTYLE VARIABLES: DO YOU DRINK ALCOHOL: YES

## 2017-10-19 NOTE — PROGRESS NOTES
Josie Osuna Fall Risk Assessment:     Last Known Fall: Within the last six months  Mobility: Use of assistive device/requires assist of two people  Medications: Cardiovascular or central nervous system meds  Mental Status/LOC/Awareness: Oriented to person and place  Toileting Needs: Use of assistive device (Bedside commode, bedpan, urinal), Incontinence  Volume/Electrolyte Status: No problems  Communication/Sensory: Visual (Glasses)/hearing deficit  Behavior: Appropriate behavior  Josie Osuna Fall Risk Total: 16  Fall Risk Level: HIGH RISK    Universal Fall Precautions:  call light/belongings in reach, bed in low position and locked, wheelchairs and assistive devices out of sight, siderails up x 2, use non-slip footwear, adequate lighting, clutter free and spill free environment, educate to call for assistance, educate on level of risk    Fall Risk Level Interventions:    TRIAL (TELE 8, NEURO, MED MARINA 5) Moderate Fall Risk Interventions  Place yellow fall risk ID band on patient: completed  Provide patient/family education based on risk assessment : completed  Educate patient/family to call staff for assistance when getting out of bed: completed  Place fall precaution signage outside patient door: completed  Utilize bed/chair fall alarm: completedTRIAL (TELE 8, NEURO, [employer] MARINA 5) High Fall Risk Interventions  Place yellow fall risk ID band on patient: verified  Provide patient/family education based on risk assessment: verified  Educate patient/family to call staff for assistance when getting out of bed: verified  Place fall precaution signage outside patient door: verified  Place patient in room close to nursing station: verified  Utilize bed/chair fall alarm: verified  Notify charge of high risk for huddle: completed    Patient Specific Interventions:     Medication: limit combination of prn medications  Mental Status/LOC/Awareness: reorient patient, reinforce falls education, check on patient hourly,  utilize bed/chair fall alarm, reinforce the use of call light and provide activity  Toileting: provide frquent toileting  Volume/Electrolyte Status: ensure patient remains hydrated  Communication/Sensory: update plan of care on whiteboard and ensure proper positioning when transferrng/ambulating  Behavioral: engage patient in daily activities, administer medication as ordered and instruct/reinforce fall program rationale  Mobility: schedule physical activity throughout the day, utilize bed/chair fall alarm, ensure bed is locked and in lowest position, provide appropriate assistive device, instruct patient to exit bed on their strongest side and collaborate with doctor for possible PT/OT consult

## 2017-10-19 NOTE — PROGRESS NOTES
Assumed care of patient following shift report. Patient is A&Ox2-3. He is pleasant, calm and in no acute distress. He states he has no needs or concerns at this time. Plan of care reviewed. Call device within reach. Will continue to monitor.

## 2017-10-19 NOTE — CARE PLAN
Problem: Urinary Elimination:  Goal: Ability to reestablish a normal urinary elimination pattern will improve  Outcome: PROGRESSING AS EXPECTED      Problem: Skin Integrity  Goal: Risk for impaired skin integrity will decrease  Outcome: PROGRESSING AS EXPECTED  Waffle overlay in use. Barrier cream in use. Staff-assisted turns every 2 hours.

## 2017-10-19 NOTE — PROGRESS NOTES
[facility] Hospitalist Progress Note    Date of Service: 10/19/2017    Chief Complaint  72 y.o. male admitted 2017 with urinary tract infection and confusion    Interval Problem Update  No issues, resting comfortable, no acute needs   Pending guardenship      Consultants/Specialty  None    Disposition  Pending placement  Pending guardianship     grossly unchanged   Review of Systems   Constitutional: Positive for malaise/fatigue. Negative for chills and fever.   HENT: Negative for congestion, hearing loss, sore throat and tinnitus.    Eyes: Negative for blurred vision, double vision, photophobia and pain.   Respiratory: Negative for cough, hemoptysis, sputum production, shortness of breath and stridor.    Cardiovascular: Negative for chest pain, palpitations, orthopnea, claudication, leg swelling and PND.   Gastrointestinal: Negative for abdominal pain, blood in stool, constipation, heartburn, melena, nausea and vomiting.   Genitourinary: Negative for dysuria, flank pain, frequency and urgency.   Musculoskeletal: Negative for back pain, myalgias and neck pain.   Neurological: Positive for weakness. Negative for dizziness, tingling, tremors, sensory change, speech change, focal weakness and headaches.   Psychiatric/Behavioral: Positive for memory loss. Negative for depression and suicidal ideas. The patient is not nervous/anxious.       Physical Exam  Laboratory/Imaging   Hemodynamics  Temp (24hrs), Av.6 °C (97.8 °F), Min:36.3 °C (97.4 °F), Max:36.7 °C (98.1 °F)   Temperature: 36.6 °C (97.8 °F)  Pulse  Av.6  Min: 69  Max: 144    Blood Pressure : 137/82      Respiratory      Respiration: 17, Pulse Oximetry: 98 %        RUL Breath Sounds: Expiratory Wheezes, RML Breath Sounds: Diminished, RLL Breath Sounds: Diminished, RIGO Breath Sounds: Expiratory Wheezes, LLL Breath Sounds: Diminished    Fluids    Intake/Output Summary (Last 24 hours) at 10/19/17 1100  Last data filed at 10/19/17 1000   Gross per 24 hour    Intake              318 ml   Output              950 ml   Net             -632 ml       Nutrition  Orders Placed This Encounter   Procedures   • DIET ORDER     Standing Status:   Standing     Number of Occurrences:   1     Order Specific Question:   Diet:     Answer:   Regular [1]     Order Specific Question:   Texture/Fiber modifications:     Answer:   Dysphagia 2(Pureed/Chopped)specify fluid consistency(question 6) [2]     Order Specific Question:   Consistency/Fluid modifications:     Answer:   Nectar Thick [2]     Order Specific Question:   Miscellaneous modifications:     Answer:   SLP - Deliver to Nursing Station [22]   grossly unchanged  Physical Exam   Constitutional: He appears well-developed and well-nourished. No distress.   Chronically ill-appearing  Frail, thin   HENT:   Head: Normocephalic and atraumatic.   Mouth/Throat: No oropharyngeal exudate.   Eyes: Conjunctivae and EOM are normal. Pupils are equal, round, and reactive to light. Right eye exhibits no discharge. Left eye exhibits no discharge. No scleral icterus.   Neck: Normal range of motion. Neck supple. No JVD present. No thyromegaly present.   Cardiovascular: Normal rate, regular rhythm and intact distal pulses.    No murmur heard.  Pulmonary/Chest: Effort normal and breath sounds normal. No stridor. No respiratory distress. He has no wheezes. He has no rales (unchanged).   Abdominal: Soft. Bowel sounds are normal. He exhibits no distension. There is no tenderness. There is no rebound.   Musculoskeletal: Normal range of motion. He exhibits no edema.   Right BKA,  Dressing clean and dry and intact     Neurological: He is alert. He is disoriented. No cranial nerve deficit. Coordination normal.   Skin: Skin is warm and dry. No erythema.   Psychiatric: His behavior is normal. Thought content normal.   Nursing note and vitals reviewed.      Recent Labs      10/18/17   0351   WBC  12.1*   RBC  4.82   HEMOGLOBIN  11.0*   HEMATOCRIT  36.5*   MCV   75.7*   MCH  22.8*   MCHC  30.1*   RDW  44.0   PLATELETCT  848*   MPV  9.8     Recent Labs      10/18/17   0351   SODIUM  131*   POTASSIUM  4.1   CHLORIDE  99   CO2  22   GLUCOSE  80   BUN  14   CREATININE  0.52   CALCIUM  8.6                      Assessment/Plan     * Cellulitis of right knee- (present on admission)   Assessment & Plan    Completed antibiotics        Pneumonia   Assessment & Plan    Possibly 2/2 to Aspiration pneumonia  Finished course of  Augmentin.  Continue RT protocol, duo nebs, Pep therapy if warranted, and incentive spirometry.           Hyponatremia- (present on admission)   Assessment & Plan    Resolved.  Continue to monitor bmp           Leukocytosis- (present on admission)   Assessment & Plan    Most likely chronic, with daily fluctuations.   No signs of infection.  Completed antibiotics  CTM        PAD (peripheral artery disease) (CMS-HCC)- (present on admission)   Assessment & Plan    s/p left forefoot amputation and right BKA  Continue aspirin and statin.          Thrombocytosis (CMS-HCC)   Assessment & Plan    Possibly reactive?, Fluctuates on a daily basis continue monitor daily CBCs for any acute changes        Dementia   Assessment & Plan    Admitted for encephalopathy and debility  Has been evaluated by ethics committee, unable to make his own medical decisions  Pending for guardianship pending          UTI (urinary tract infection)- (present on admission)   Assessment & Plan    As above.        Essential hypertension- (present on admission)   Assessment & Plan    SBP goal less than 140 mmHg  DBP goal less than 90 mmHg  Continue with metoprolol 50 mg BID        Severe protein-calorie malnutrition (CMS-HCC)- (present on admission)   Assessment & Plan    Nutrition following  Cont boost TID          Assessment and plan thoroughly reviewed no gross changes from yesterday we'll continue same management.    Patient plan of care discussed at multidisplinary team rounds and with patient  and JILLIAN at [facility].      Reviewed items::  Radiology images reviewed, Labs reviewed and Medications reviewed  Barrera catheter::  No Barrera  DVT prophylaxis pharmacological::  Heparin  Antibiotics:  Treating active infection/contamination beyond 24 hours perioperative coverage

## 2017-10-19 NOTE — PROGRESS NOTES
Assumed care of patient @ 0700, report received at bedside,  assessment done, labs and orders noted.  Pt A & Ox2, disoriented to time and situation, PIV saline locked and patent.  Pt is resting comfortably in bed, no signs or symptoms of distress.  Pt reports pain is a 0/10.  Pt has been updated on the plan of care.  Pt encouraged to get out of bed for meals, declined at breakfast.    Bed is in lowest position, fall risk socks in place, call light within reach. Pt verbalized all needs are met at this time.

## 2017-10-19 NOTE — PROGRESS NOTES
Josie Osuna Fall Risk Assessment:     Last Known Fall: Within the last six months  Mobility: Use of assistive device/requires assist of two people  Medications: Cardiovascular or central nervous system meds  Mental Status/LOC/Awareness: Oriented to person and place  Toileting Needs: Incontinence, Use of assistive device (Bedside commode, bedpan, urinal)  Volume/Electrolyte Status: No problems  Communication/Sensory: Visual (Glasses)/hearing deficit  Behavior: Appropriate behavior  Josie Osuna Fall Risk Total: 16  Fall Risk Level: HIGH RISK    Universal Fall Precautions:  call light/belongings in reach, bed in low position and locked, siderails up x 2, use non-slip footwear, adequate lighting, clutter free and spill free environment, educate on level of risk, educate to call for assistance    Fall Risk Level Interventions:    TRIAL ([employer] 8, NEURO, MED MARINA 5) Moderate Fall Risk Interventions  Place yellow fall risk ID band on patient: completed  Provide patient/family education based on risk assessment : completed  Educate patient/family to call staff for assistance when getting out of bed: completed  Place fall precaution signage outside patient door: completed  Utilize bed/chair fall alarm: completedTRIAL ([employer] 8, NEURO, [place] MARINA 5) High Fall Risk Interventions  Place yellow fall risk ID band on patient: verified  Provide patient/family education based on risk assessment: completed  Educate patient/family to call staff for assistance when getting out of bed: completed  Place fall precaution signage outside patient door: verified  Place patient in room close to nursing station: verified  Utilize bed/chair fall alarm: completed  Notify charge of high risk for huddle: completed    Patient Specific Interventions:     Medication: limit combination of prn medications  Mental Status/LOC/Awareness: utilize bed/chair fall alarm, reinforce the use of call light and provide activity  Toileting: provide frquent toileting  and monitor intake and output/use of appropriate interventions  Volume/Electrolyte Status: ensure patient remains hydrated and monitor abnormal lab values  Communication/Sensory: update plan of care on whiteboard  Behavioral: engage patient in daily activities and administer medication as ordered  Mobility: utilize bed/chair fall alarm, ensure bed is locked and in lowest position, provide appropriate assistive device, instruct patient to exit bed on their strongest side and collaborate with doctor for possible PT/OT consult

## 2017-10-20 PROCEDURE — 700102 HCHG RX REV CODE 250 W/ 637 OVERRIDE(OP): Performed by: HOSPITALIST

## 2017-10-20 PROCEDURE — 700102 HCHG RX REV CODE 250 W/ 637 OVERRIDE(OP): Performed by: INTERNAL MEDICINE

## 2017-10-20 PROCEDURE — A9270 NON-COVERED ITEM OR SERVICE: HCPCS | Performed by: INTERNAL MEDICINE

## 2017-10-20 PROCEDURE — 770006 HCHG ROOM/CARE - MED/SURG/GYN SEMI*

## 2017-10-20 PROCEDURE — 302255 BARRIER CREAM MOISTURE BAZA PROTECT (ZINC) 5OZ: Performed by: HOSPITALIST

## 2017-10-20 PROCEDURE — A9270 NON-COVERED ITEM OR SERVICE: HCPCS | Performed by: HOSPITALIST

## 2017-10-20 PROCEDURE — 99231 SBSQ HOSP IP/OBS SF/LOW 25: CPT | Performed by: HOSPITALIST

## 2017-10-20 PROCEDURE — 700111 HCHG RX REV CODE 636 W/ 250 OVERRIDE (IP): Performed by: HOSPITALIST

## 2017-10-20 RX ADMIN — FOLIC ACID 1 MG: 1 TABLET ORAL at 10:22

## 2017-10-20 RX ADMIN — HEPARIN SODIUM 5000 UNITS: 5000 INJECTION, SOLUTION INTRAVENOUS; SUBCUTANEOUS at 20:17

## 2017-10-20 RX ADMIN — STANDARDIZED SENNA CONCENTRATE AND DOCUSATE SODIUM 2 TABLET: 8.6; 5 TABLET, FILM COATED ORAL at 10:22

## 2017-10-20 RX ADMIN — ASPIRIN 81 MG: 81 TABLET, COATED ORAL at 10:22

## 2017-10-20 RX ADMIN — METOPROLOL TARTRATE 75 MG: 25 TABLET, FILM COATED ORAL at 20:17

## 2017-10-20 RX ADMIN — GABAPENTIN 100 MG: 100 CAPSULE ORAL at 10:22

## 2017-10-20 RX ADMIN — THERA TABS 1 TABLET: TAB at 10:22

## 2017-10-20 RX ADMIN — STANDARDIZED SENNA CONCENTRATE AND DOCUSATE SODIUM 2 TABLET: 8.6; 5 TABLET, FILM COATED ORAL at 20:17

## 2017-10-20 RX ADMIN — ATORVASTATIN CALCIUM 40 MG: 40 TABLET, FILM COATED ORAL at 20:17

## 2017-10-20 RX ADMIN — GABAPENTIN 100 MG: 100 CAPSULE ORAL at 15:00

## 2017-10-20 RX ADMIN — METOPROLOL TARTRATE 75 MG: 25 TABLET, FILM COATED ORAL at 10:22

## 2017-10-20 RX ADMIN — LEVOTHYROXINE SODIUM 75 MCG: 75 TABLET ORAL at 05:41

## 2017-10-20 RX ADMIN — HEPARIN SODIUM 5000 UNITS: 5000 INJECTION, SOLUTION INTRAVENOUS; SUBCUTANEOUS at 15:00

## 2017-10-20 RX ADMIN — HEPARIN SODIUM 5000 UNITS: 5000 INJECTION, SOLUTION INTRAVENOUS; SUBCUTANEOUS at 05:41

## 2017-10-20 RX ADMIN — GABAPENTIN 100 MG: 100 CAPSULE ORAL at 20:17

## 2017-10-20 ASSESSMENT — ENCOUNTER SYMPTOMS
BLURRED VISION: 0
CONSTIPATION: 0
TINGLING: 0
FLANK PAIN: 0
FEVER: 0
BACK PAIN: 0
NERVOUS/ANXIOUS: 0
HEARTBURN: 0
SPUTUM PRODUCTION: 0
EYE PAIN: 0
MEMORY LOSS: 1
VOMITING: 0
CLAUDICATION: 0
TREMORS: 0
CHILLS: 0
COUGH: 0
NAUSEA: 0
PALPITATIONS: 0
SENSORY CHANGE: 0
MYALGIAS: 0
BLOOD IN STOOL: 0
SORE THROAT: 0
HEMOPTYSIS: 0
STRIDOR: 0
FOCAL WEAKNESS: 0
HEADACHES: 0
DOUBLE VISION: 0
NECK PAIN: 0
ABDOMINAL PAIN: 0
PHOTOPHOBIA: 0
DEPRESSION: 0
WEAKNESS: 1
SPEECH CHANGE: 0
PND: 0
SHORTNESS OF BREATH: 0
DIZZINESS: 0
ORTHOPNEA: 0

## 2017-10-20 ASSESSMENT — PAIN SCALES - GENERAL
PAINLEVEL_OUTOF10: 5
PAINLEVEL_OUTOF10: 0

## 2017-10-20 NOTE — PROGRESS NOTES
Josie Osuna Fall Risk Assessment:     Last Known Fall: Within the last six months  Mobility: Use of assistive device/requires assist of two people  Medications: Cardiovascular or central nervous system meds  Mental Status/LOC/Awareness: Oriented to person and place  Toileting Needs: Use of assistive device (Bedside commode, bedpan, urinal), Incontinence  Volume/Electrolyte Status: No problems  Communication/Sensory: Visual (Glasses)/hearing deficit  Behavior: Appropriate behavior  Josie Osuna Fall Risk Total: 16  Fall Risk Level: HIGH RISK    Universal Fall Precautions:  call light/belongings in reach, bed in low position and locked, wheelchairs and assistive devices out of sight, siderails up x 2, use non-slip footwear, adequate lighting, clutter free and spill free environment, educate on level of risk, educate to call for assistance    Fall Risk Level Interventions:    TRIAL (TELE 8, NEURO, MED MARINA 5) Moderate Fall Risk Interventions  Place yellow fall risk ID band on patient: completed  Provide patient/family education based on risk assessment : completed  Educate patient/family to call staff for assistance when getting out of bed: completed  Place fall precaution signage outside patient door: completed  Utilize bed/chair fall alarm: completedTRIAL (TELE 8, NEURO, [employer] MARINA 5) High Fall Risk Interventions  Place yellow fall risk ID band on patient: verified  Provide patient/family education based on risk assessment: completed  Educate patient/family to call staff for assistance when getting out of bed: completed  Place fall precaution signage outside patient door: verified  Place patient in room close to nursing station: verified  Utilize bed/chair fall alarm: verified  Notify charge of high risk for huddle: verified    Patient Specific Interventions:     Medication: assess for medications that can be discontinued or dosage decreased and limit combination of prn medications  Mental Status/LOC/Awareness:  reinforce falls education, check on patient hourly, utilize bed/chair fall alarm, reinforce the use of call light and provide activity  Toileting: provide frquent toileting  Volume/Electrolyte Status: ensure patient remains hydrated and monitor abnormal lab values  Communication/Sensory: update plan of care on whiteboard and ensure proper positioning when transferrng/ambulating  Behavioral: engage patient in daily activities, administer medication as ordered and instruct/reinforce fall program rationale  Mobility: utilize bed/chair fall alarm, ensure bed is locked and in lowest position, instruct patient to exit bed on their strongest side and collaborate with doctor for possible PT/OT consult

## 2017-10-20 NOTE — PROGRESS NOTES
[facility] Hospitalist Progress Note    Date of Service: 10/20/2017    Chief Complaint  72 y.o. male admitted 2017 with urinary tract infection and confusion    Interval Problem Update  Clinically stable, no acute needs.  Pending guardenship      Consultants/Specialty  None    Disposition  Pending placement  Pending guardianship     grossly unchanged   Review of Systems   Constitutional: Positive for malaise/fatigue. Negative for chills and fever.   HENT: Negative for congestion, hearing loss, sore throat and tinnitus.    Eyes: Negative for blurred vision, double vision, photophobia and pain.   Respiratory: Negative for cough, hemoptysis, sputum production, shortness of breath and stridor.    Cardiovascular: Negative for chest pain, palpitations, orthopnea, claudication, leg swelling and PND.   Gastrointestinal: Negative for abdominal pain, blood in stool, constipation, heartburn, melena, nausea and vomiting.   Genitourinary: Negative for dysuria, flank pain, frequency and urgency.   Musculoskeletal: Negative for back pain, myalgias and neck pain.   Neurological: Positive for weakness. Negative for dizziness, tingling, tremors, sensory change, speech change, focal weakness and headaches.   Psychiatric/Behavioral: Positive for memory loss. Negative for depression and suicidal ideas. The patient is not nervous/anxious.       Physical Exam  Laboratory/Imaging   Hemodynamics  Temp (24hrs), Av.2 °C (97.1 °F), Min:36.1 °C (97 °F), Max:36.3 °C (97.4 °F)   Temperature: 36.1 °C (97 °F)  Pulse  Av.2  Min: 69  Max: 144    Blood Pressure : 141/77      Respiratory      Respiration: 18, Pulse Oximetry: 92 %        RUL Breath Sounds: Diminished, RML Breath Sounds: Diminished, RLL Breath Sounds: Diminished, RIGO Breath Sounds: Diminished, LLL Breath Sounds: Diminished    Fluids    Intake/Output Summary (Last 24 hours) at 10/20/17 1540  Last data filed at 10/20/17 0600   Gross per 24 hour   Intake                0 ml    Output             1200 ml   Net            -1200 ml       Nutrition  Orders Placed This Encounter   Procedures   • DIET ORDER     Standing Status:   Standing     Number of Occurrences:   1     Order Specific Question:   Diet:     Answer:   Regular [1]     Order Specific Question:   Texture/Fiber modifications:     Answer:   Dysphagia 2(Pureed/Chopped)specify fluid consistency(question 6) [2]     Order Specific Question:   Consistency/Fluid modifications:     Answer:   Nectar Thick [2]     Order Specific Question:   Miscellaneous modifications:     Answer:   SLP - Deliver to Nursing Station [22]   grossly unchanged  Physical Exam   Constitutional: He appears well-developed and well-nourished. No distress.   Chronically ill-appearing  Frail, thin   HENT:   Head: Normocephalic and atraumatic.   Mouth/Throat: No oropharyngeal exudate.   Eyes: Conjunctivae and EOM are normal. Pupils are equal, round, and reactive to light. Right eye exhibits no discharge. Left eye exhibits no discharge. No scleral icterus.   Neck: Normal range of motion. Neck supple. No JVD present. No thyromegaly present.   Cardiovascular: Normal rate, regular rhythm and intact distal pulses.    No murmur heard.  Pulmonary/Chest: Effort normal and breath sounds normal. No stridor. No respiratory distress. He has no wheezes. He has no rales (unchanged).   Abdominal: Soft. Bowel sounds are normal. He exhibits no distension. There is no tenderness. There is no rebound.   Musculoskeletal: Normal range of motion. He exhibits no edema.   Right BKA,  Dressing clean and dry and intact     Neurological: He is alert. He is disoriented. No cranial nerve deficit. Coordination normal.   Skin: Skin is warm and dry. No erythema.   Psychiatric: His behavior is normal. Thought content normal.   Nursing note and vitals reviewed.      Recent Labs      10/18/17   0351   WBC  12.1*   RBC  4.82   HEMOGLOBIN  11.0*   HEMATOCRIT  36.5*   MCV  75.7*   MCH  22.8*   MCHC   30.1*   RDW  44.0   PLATELETCT  848*   MPV  9.8     Recent Labs      10/18/17   0351   SODIUM  131*   POTASSIUM  4.1   CHLORIDE  99   CO2  22   GLUCOSE  80   BUN  14   CREATININE  0.52   CALCIUM  8.6                      Assessment/Plan     * Cellulitis of right knee- (present on admission)   Assessment & Plan    Completed antibiotics, no acute issues        Pneumonia   Assessment & Plan    Possibly 2/2 to Aspiration pneumonia  Finished course of  Augmentin.  Continue RT protocol, duo nebs, Pep therapy if warranted, and incentive spirometry.           Hyponatremia- (present on admission)   Assessment & Plan    Low, continue to follow,           Leukocytosis- (present on admission)   Assessment & Plan    Most likely chronic, with daily fluctuations.   No signs of infection.  Completed antibiotics  CTM        PAD (peripheral artery disease) (CMS-HCC)- (present on admission)   Assessment & Plan    s/p left forefoot amputation and right BKA  Continue aspirin and statin.          Thrombocytosis (CMS-HCC)   Assessment & Plan    Possibly reactive?, Fluctuates on a daily basis continue monitor daily CBCs for any acute changes        Dementia   Assessment & Plan    Admitted for encephalopathy and debility  Has been evaluated by ethics committee, unable to make his own medical decisions  Pending for guardianship pending          UTI (urinary tract infection)- (present on admission)   Assessment & Plan    As above.        Essential hypertension- (present on admission)   Assessment & Plan    SBP goal less than 140 mmHg  DBP goal less than 90 mmHg  Continue with metoprolol 50 mg BID        Severe protein-calorie malnutrition (CMS-HCC)- (present on admission)   Assessment & Plan    Nutrition following  Cont boost TID          Assessment and plan thoroughly reviewed no gross changes from yesterday we'll continue same management.    Patient plan of care discussed at multidisplinary team rounds and with patient and R.N at beside.       Reviewed items::  Radiology images reviewed, Labs reviewed and Medications reviewed  Barrera catheter::  No Barrera  DVT prophylaxis pharmacological::  Heparin  Antibiotics:  Treating active infection/contamination beyond 24 hours perioperative coverage

## 2017-10-20 NOTE — PROGRESS NOTES
Pt was transferred to chair for dinner.  Pt is max assist and gave minimal effort.   After 30 minutes in chair, pt was found to have slumped in chair.  Pt was then transferred with the assistance of 2 CNAs back to bed.  Pt was again, max assist, however tolerated well.  At some point in the transfer, pt obtained a skin tear on LUE.  Skin placed back over tear and foam lite applied.  Pictures to be uploaded by NOC RN.

## 2017-10-20 NOTE — DISCHARGE PLANNING
SW received call back from EPS Katie Medrano. Katie stated she would call SW back. Have not received return call.
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
## 2017-10-20 NOTE — PROGRESS NOTES
Assumed care of patient @ 0700, report received at bedside,  assessment done, labs and orders noted.  Pt A & Ox2, still disoriented to time and situation (thinks he came here for MI), PIV saline locked and patent.  Pt is resting comfortably in bed, no signs or symptoms of distress.  Pt reports pain is a 0/10.  Pt has been updated on the plan of care, Q2 turns in place, EPS case pursued by SW(pt was not updated regarding this aspect), pending guardianship and placement.    Bed is in lowest position, fall risk socks in place, call light within reach. Pt verbalized all needs are met at this time.

## 2017-10-21 LAB
ALBUMIN SERPL BCP-MCNC: 3.1 G/DL (ref 3.2–4.9)
ALBUMIN/GLOB SERPL: 0.8 G/DL
ALP SERPL-CCNC: 69 U/L (ref 30–99)
ALT SERPL-CCNC: 14 U/L (ref 2–50)
ANION GAP SERPL CALC-SCNC: 10 MMOL/L (ref 0–11.9)
AST SERPL-CCNC: 25 U/L (ref 12–45)
BASOPHILS # BLD AUTO: 1.1 % (ref 0–1.8)
BASOPHILS # BLD: 0.17 K/UL (ref 0–0.12)
BILIRUB SERPL-MCNC: 0.5 MG/DL (ref 0.1–1.5)
BUN SERPL-MCNC: 12 MG/DL (ref 8–22)
CALCIUM SERPL-MCNC: 8.7 MG/DL (ref 8.5–10.5)
CHLORIDE SERPL-SCNC: 97 MMOL/L (ref 96–112)
CO2 SERPL-SCNC: 24 MMOL/L (ref 20–33)
CREAT SERPL-MCNC: 0.59 MG/DL (ref 0.5–1.4)
EOSINOPHIL # BLD AUTO: 0.4 K/UL (ref 0–0.51)
EOSINOPHIL NFR BLD: 2.6 % (ref 0–6.9)
ERYTHROCYTE [DISTWIDTH] IN BLOOD BY AUTOMATED COUNT: 43.5 FL (ref 35.9–50)
GFR SERPL CREATININE-BSD FRML MDRD: >60 ML/MIN/1.73 M 2
GLOBULIN SER CALC-MCNC: 3.8 G/DL (ref 1.9–3.5)
GLUCOSE SERPL-MCNC: 68 MG/DL (ref 65–99)
HCT VFR BLD AUTO: 38.9 % (ref 42–52)
HGB BLD-MCNC: 11.9 G/DL (ref 14–18)
IMM GRANULOCYTES # BLD AUTO: 0.1 K/UL (ref 0–0.11)
IMM GRANULOCYTES NFR BLD AUTO: 0.7 % (ref 0–0.9)
LYMPHOCYTES # BLD AUTO: 1.35 K/UL (ref 1–4.8)
LYMPHOCYTES NFR BLD: 8.9 % (ref 22–41)
MAGNESIUM SERPL-MCNC: 1.7 MG/DL (ref 1.5–2.5)
MCH RBC QN AUTO: 23.4 PG (ref 27–33)
MCHC RBC AUTO-ENTMCNC: 30.6 G/DL (ref 33.7–35.3)
MCV RBC AUTO: 76.4 FL (ref 81.4–97.8)
MONOCYTES # BLD AUTO: 0.83 K/UL (ref 0–0.85)
MONOCYTES NFR BLD AUTO: 5.4 % (ref 0–13.4)
NEUTROPHILS # BLD AUTO: 12.38 K/UL (ref 1.82–7.42)
NEUTROPHILS NFR BLD: 81.3 % (ref 44–72)
NRBC # BLD AUTO: 0 K/UL
NRBC BLD AUTO-RTO: 0 /100 WBC
PLATELET # BLD AUTO: 799 K/UL (ref 164–446)
PMV BLD AUTO: 10.1 FL (ref 9–12.9)
POTASSIUM SERPL-SCNC: 4 MMOL/L (ref 3.6–5.5)
PROT SERPL-MCNC: 6.9 G/DL (ref 6–8.2)
RBC # BLD AUTO: 5.09 M/UL (ref 4.7–6.1)
SODIUM SERPL-SCNC: 131 MMOL/L (ref 135–145)
WBC # BLD AUTO: 15.2 K/UL (ref 4.8–10.8)

## 2017-10-21 PROCEDURE — 36415 COLL VENOUS BLD VENIPUNCTURE: CPT

## 2017-10-21 PROCEDURE — A9270 NON-COVERED ITEM OR SERVICE: HCPCS | Performed by: INTERNAL MEDICINE

## 2017-10-21 PROCEDURE — A9270 NON-COVERED ITEM OR SERVICE: HCPCS | Performed by: HOSPITALIST

## 2017-10-21 PROCEDURE — 700102 HCHG RX REV CODE 250 W/ 637 OVERRIDE(OP): Performed by: INTERNAL MEDICINE

## 2017-10-21 PROCEDURE — 85025 COMPLETE CBC W/AUTO DIFF WBC: CPT

## 2017-10-21 PROCEDURE — 700111 HCHG RX REV CODE 636 W/ 250 OVERRIDE (IP): Performed by: HOSPITALIST

## 2017-10-21 PROCEDURE — 700102 HCHG RX REV CODE 250 W/ 637 OVERRIDE(OP): Performed by: HOSPITALIST

## 2017-10-21 PROCEDURE — 770006 HCHG ROOM/CARE - MED/SURG/GYN SEMI*

## 2017-10-21 PROCEDURE — 83735 ASSAY OF MAGNESIUM: CPT

## 2017-10-21 PROCEDURE — 99231 SBSQ HOSP IP/OBS SF/LOW 25: CPT | Performed by: HOSPITALIST

## 2017-10-21 PROCEDURE — 80053 COMPREHEN METABOLIC PANEL: CPT

## 2017-10-21 RX ADMIN — HEPARIN SODIUM 5000 UNITS: 5000 INJECTION, SOLUTION INTRAVENOUS; SUBCUTANEOUS at 15:36

## 2017-10-21 RX ADMIN — METOPROLOL TARTRATE 75 MG: 25 TABLET, FILM COATED ORAL at 10:18

## 2017-10-21 RX ADMIN — THERA TABS 1 TABLET: TAB at 10:20

## 2017-10-21 RX ADMIN — GABAPENTIN 100 MG: 100 CAPSULE ORAL at 10:20

## 2017-10-21 RX ADMIN — ASPIRIN 81 MG: 81 TABLET, COATED ORAL at 10:18

## 2017-10-21 RX ADMIN — GABAPENTIN 100 MG: 100 CAPSULE ORAL at 21:30

## 2017-10-21 RX ADMIN — GABAPENTIN 100 MG: 100 CAPSULE ORAL at 15:36

## 2017-10-21 RX ADMIN — FOLIC ACID 1 MG: 1 TABLET ORAL at 10:20

## 2017-10-21 RX ADMIN — HEPARIN SODIUM 5000 UNITS: 5000 INJECTION, SOLUTION INTRAVENOUS; SUBCUTANEOUS at 05:10

## 2017-10-21 RX ADMIN — HEPARIN SODIUM 5000 UNITS: 5000 INJECTION, SOLUTION INTRAVENOUS; SUBCUTANEOUS at 21:30

## 2017-10-21 RX ADMIN — METOPROLOL TARTRATE 75 MG: 25 TABLET, FILM COATED ORAL at 21:30

## 2017-10-21 RX ADMIN — ATORVASTATIN CALCIUM 40 MG: 40 TABLET, FILM COATED ORAL at 21:30

## 2017-10-21 RX ADMIN — LEVOTHYROXINE SODIUM 75 MCG: 75 TABLET ORAL at 05:10

## 2017-10-21 ASSESSMENT — ENCOUNTER SYMPTOMS
CLAUDICATION: 0
CHILLS: 0
MEMORY LOSS: 1
HEARTBURN: 0
PALPITATIONS: 0
SPUTUM PRODUCTION: 0
MYALGIAS: 0
VOMITING: 0
FOCAL WEAKNESS: 0
SPEECH CHANGE: 0
FEVER: 0
STRIDOR: 0
NECK PAIN: 0
SENSORY CHANGE: 0
BACK PAIN: 0
PHOTOPHOBIA: 0
FLANK PAIN: 0
HEADACHES: 0
TINGLING: 0
NAUSEA: 0
NERVOUS/ANXIOUS: 0
DIZZINESS: 0
BLOOD IN STOOL: 0
BLURRED VISION: 0
ORTHOPNEA: 0
WEAKNESS: 1
CONSTIPATION: 0
TREMORS: 0
HEMOPTYSIS: 0
ABDOMINAL PAIN: 0
EYE PAIN: 0
DEPRESSION: 0
PND: 0
SORE THROAT: 0
SHORTNESS OF BREATH: 0
DOUBLE VISION: 0
COUGH: 0

## 2017-10-21 ASSESSMENT — PAIN SCALES - GENERAL
PAINLEVEL_OUTOF10: 0

## 2017-10-21 ASSESSMENT — PATIENT HEALTH QUESTIONNAIRE - PHQ9
1. LITTLE INTEREST OR PLEASURE IN DOING THINGS: NOT AT ALL
SUM OF ALL RESPONSES TO PHQ9 QUESTIONS 1 AND 2: 0
2. FEELING DOWN, DEPRESSED, IRRITABLE, OR HOPELESS: NOT AT ALL
SUM OF ALL RESPONSES TO PHQ QUESTIONS 1-9: 0

## 2017-10-21 NOTE — PROGRESS NOTES
[facility] Hospitalist Progress Note    Date of Service: 10/21/2017    Chief Complaint  72 y.o. male admitted 2017 with urinary tract infection and confusion    Interval Problem Update  Patient stable, no acute needs, watching tv.  Pending guardenship      Consultants/Specialty  None    Disposition  Pending placement  Pending guardianship     grossly unchanged   Review of Systems   Constitutional: Positive for malaise/fatigue. Negative for chills and fever.   HENT: Negative for congestion, hearing loss, sore throat and tinnitus.    Eyes: Negative for blurred vision, double vision, photophobia and pain.   Respiratory: Negative for cough, hemoptysis, sputum production, shortness of breath and stridor.    Cardiovascular: Negative for chest pain, palpitations, orthopnea, claudication, leg swelling and PND.   Gastrointestinal: Negative for abdominal pain, blood in stool, constipation, heartburn, melena, nausea and vomiting.   Genitourinary: Negative for dysuria, flank pain, frequency and urgency.   Musculoskeletal: Negative for back pain, myalgias and neck pain.   Neurological: Positive for weakness. Negative for dizziness, tingling, tremors, sensory change, speech change, focal weakness and headaches.   Psychiatric/Behavioral: Positive for memory loss. Negative for depression and suicidal ideas. The patient is not nervous/anxious.       Physical Exam  Laboratory/Imaging   Hemodynamics  Temp (24hrs), Av.4 °C (97.5 °F), Min:36.1 °C (97 °F), Max:36.9 °C (98.4 °F)   Temperature: 36.9 °C (98.4 °F)  Pulse  Av  Min: 69  Max: 144    Blood Pressure : 140/76      Respiratory      Respiration: 16, Pulse Oximetry: 95 %        RUL Breath Sounds: Diminished, RML Breath Sounds: Diminished, RLL Breath Sounds: Diminished, RIGO Breath Sounds: Diminished, LLL Breath Sounds: Diminished    Fluids    Intake/Output Summary (Last 24 hours) at 10/21/17 0808  Last data filed at 10/20/17 1800   Gross per 24 hour   Intake                0  ml   Output             1000 ml   Net            -1000 ml       Nutrition  Orders Placed This Encounter   Procedures   • DIET ORDER     Standing Status:   Standing     Number of Occurrences:   1     Order Specific Question:   Diet:     Answer:   Regular [1]     Order Specific Question:   Texture/Fiber modifications:     Answer:   Dysphagia 2(Pureed/Chopped)specify fluid consistency(question 6) [2]     Order Specific Question:   Consistency/Fluid modifications:     Answer:   Nectar Thick [2]     Order Specific Question:   Miscellaneous modifications:     Answer:   SLP - Deliver to Nursing Station [22]   grossly unchanged  Physical Exam   Constitutional: He appears well-developed and well-nourished. No distress.   Chronically ill-appearing  Frail, thin   HENT:   Head: Normocephalic and atraumatic.   Mouth/Throat: No oropharyngeal exudate.   Eyes: Conjunctivae and EOM are normal. Pupils are equal, round, and reactive to light. Right eye exhibits no discharge. Left eye exhibits no discharge. No scleral icterus.   Neck: Normal range of motion. Neck supple. No JVD present. No thyromegaly present.   Cardiovascular: Normal rate, regular rhythm and intact distal pulses.    No murmur heard.  Pulmonary/Chest: Effort normal and breath sounds normal. No stridor. No respiratory distress. He has no wheezes. He has no rales (unchanged).   Abdominal: Soft. Bowel sounds are normal. He exhibits no distension. There is no tenderness. There is no rebound.   Musculoskeletal: Normal range of motion. He exhibits no edema.   Right BKA,  Dressing clean and dry and intact     Neurological: He is alert. He is disoriented. No cranial nerve deficit. Coordination normal.   Skin: Skin is warm and dry. No erythema.   Psychiatric: His behavior is normal. Thought content normal.   Nursing note and vitals reviewed.      Recent Labs      10/21/17   0231   WBC  15.2*   RBC  5.09   HEMOGLOBIN  11.9*   HEMATOCRIT  38.9*   MCV  76.4*   MCH  23.4*   MCHC   30.6*   RDW  43.5   PLATELETCT  799*   MPV  10.1     Recent Labs      10/21/17   0231   SODIUM  131*   POTASSIUM  4.0   CHLORIDE  97   CO2  24   GLUCOSE  68   BUN  12   CREATININE  0.59   CALCIUM  8.7                      Assessment/Plan     * Cellulitis of right knee- (present on admission)   Assessment & Plan    Completed antibiotics, no acute issues        Pneumonia   Assessment & Plan    Possibly 2/2 to Aspiration pneumonia  Finished course of  Augmentin.  Continue RT protocol, duo nebs, Pep therapy if warranted, and incentive spirometry.           Hyponatremia- (present on admission)   Assessment & Plan    Na:131, no acute changes from prior, continue PO intake.           Leukocytosis- (present on admission)   Assessment & Plan    Most likely chronic, with fluctuations.   No signs of infection. No fevers or chills.  Completed antibiotics  CTM        PAD (peripheral artery disease) (CMS-HCC)- (present on admission)   Assessment & Plan    s/p left forefoot amputation and right BKA  Continue aspirin and statin.          Thrombocytosis (CMS-HCC)   Assessment & Plan    Possibly reactive?, Fluctuates on a daily basis continue monitor daily CBCs for any acute changes        Dementia   Assessment & Plan    Admitted for encephalopathy and debility  Has been evaluated by ethics committee, unable to make his own medical decisions  Pending for guardianship pending          UTI (urinary tract infection)- (present on admission)   Assessment & Plan    As above.        Essential hypertension- (present on admission)   Assessment & Plan    SBP goal less than 140 mmHg  DBP goal less than 90 mmHg  Continue with metoprolol 50 mg BID        Severe protein-calorie malnutrition (CMS-HCC)- (present on admission)   Assessment & Plan    Nutrition following  Cont boost TID          Assessment and plan thoroughly reviewed no gross changes from yesterday we'll continue same management.    Patient plan of care discussed at multidisplinary  team rounds and with patient and R.N at [facility].      Reviewed items::  Radiology images reviewed, Labs reviewed and Medications reviewed  Barrera catheter::  No Barrera  DVT prophylaxis pharmacological::  Heparin  Antibiotics:  Treating active infection/contamination beyond 24 hours perioperative coverage

## 2017-10-22 PROCEDURE — 99231 SBSQ HOSP IP/OBS SF/LOW 25: CPT | Performed by: HOSPITALIST

## 2017-10-22 PROCEDURE — 700111 HCHG RX REV CODE 636 W/ 250 OVERRIDE (IP): Performed by: HOSPITALIST

## 2017-10-22 PROCEDURE — A9270 NON-COVERED ITEM OR SERVICE: HCPCS | Performed by: HOSPITALIST

## 2017-10-22 PROCEDURE — A9270 NON-COVERED ITEM OR SERVICE: HCPCS | Performed by: INTERNAL MEDICINE

## 2017-10-22 PROCEDURE — 700102 HCHG RX REV CODE 250 W/ 637 OVERRIDE(OP): Performed by: INTERNAL MEDICINE

## 2017-10-22 PROCEDURE — 770006 HCHG ROOM/CARE - MED/SURG/GYN SEMI*

## 2017-10-22 PROCEDURE — 700102 HCHG RX REV CODE 250 W/ 637 OVERRIDE(OP): Performed by: HOSPITALIST

## 2017-10-22 RX ADMIN — METOPROLOL TARTRATE 75 MG: 25 TABLET, FILM COATED ORAL at 09:09

## 2017-10-22 RX ADMIN — HEPARIN SODIUM 5000 UNITS: 5000 INJECTION, SOLUTION INTRAVENOUS; SUBCUTANEOUS at 05:56

## 2017-10-22 RX ADMIN — GABAPENTIN 100 MG: 100 CAPSULE ORAL at 09:08

## 2017-10-22 RX ADMIN — STANDARDIZED SENNA CONCENTRATE AND DOCUSATE SODIUM 2 TABLET: 8.6; 5 TABLET, FILM COATED ORAL at 21:56

## 2017-10-22 RX ADMIN — METOPROLOL TARTRATE 75 MG: 25 TABLET, FILM COATED ORAL at 21:56

## 2017-10-22 RX ADMIN — LEVOTHYROXINE SODIUM 75 MCG: 75 TABLET ORAL at 05:57

## 2017-10-22 RX ADMIN — THERA TABS 1 TABLET: TAB at 09:09

## 2017-10-22 RX ADMIN — HEPARIN SODIUM 5000 UNITS: 5000 INJECTION, SOLUTION INTRAVENOUS; SUBCUTANEOUS at 14:15

## 2017-10-22 RX ADMIN — GABAPENTIN 100 MG: 100 CAPSULE ORAL at 14:15

## 2017-10-22 RX ADMIN — HEPARIN SODIUM 5000 UNITS: 5000 INJECTION, SOLUTION INTRAVENOUS; SUBCUTANEOUS at 21:57

## 2017-10-22 RX ADMIN — ASPIRIN 81 MG: 81 TABLET, COATED ORAL at 09:09

## 2017-10-22 RX ADMIN — ATORVASTATIN CALCIUM 40 MG: 40 TABLET, FILM COATED ORAL at 21:56

## 2017-10-22 RX ADMIN — GABAPENTIN 100 MG: 100 CAPSULE ORAL at 21:57

## 2017-10-22 RX ADMIN — FOLIC ACID 1 MG: 1 TABLET ORAL at 09:09

## 2017-10-22 ASSESSMENT — ENCOUNTER SYMPTOMS
STRIDOR: 0
VOMITING: 0
NERVOUS/ANXIOUS: 0
MEMORY LOSS: 1
DOUBLE VISION: 0
PND: 0
SORE THROAT: 0
SHORTNESS OF BREATH: 0
NAUSEA: 0
PHOTOPHOBIA: 0
DEPRESSION: 0
CLAUDICATION: 0
SPEECH CHANGE: 0
FEVER: 0
SPUTUM PRODUCTION: 0
CONSTIPATION: 0
HEMOPTYSIS: 0
BLOOD IN STOOL: 0
ORTHOPNEA: 0
WEAKNESS: 1
SENSORY CHANGE: 0
HEARTBURN: 0
BACK PAIN: 0
DIZZINESS: 0
HEADACHES: 0
CHILLS: 0
BLURRED VISION: 0
FOCAL WEAKNESS: 0
MYALGIAS: 0
TREMORS: 0
FLANK PAIN: 0
PALPITATIONS: 0
NECK PAIN: 0
ABDOMINAL PAIN: 0
EYE PAIN: 0
TINGLING: 0
COUGH: 0

## 2017-10-22 ASSESSMENT — PAIN SCALES - GENERAL
PAINLEVEL_OUTOF10: 0
PAINLEVEL_OUTOF10: 0

## 2017-10-22 NOTE — CARE PLAN
Problem: Safety  Goal: Will remain free from falls    Intervention: Implement fall precautions   10/21/17 1728   OTHER   Environmental Precautions Treaded Slipper Socks on Patient;Personal Belongings, Wastebasket, Call Bell etc. in Easy Reach;Transferred to Stronger Side;Communication Sign for Patients & Families;Bed in Low Position;Mobility Assessed & Appropriate Sign Placed;Report Given to Other Health Care Providers Regarding Fall Risk   Bedrails Bedrails Closest to Bathroom Down   Chair/Bed Strip Alarm Yes - Alarm On         Problem: Skin Integrity  Goal: Risk for impaired skin integrity will decrease  Outcome: PROGRESSING AS EXPECTED  Incontinence associated dermatitis. Barrier paste applied to buttocks. q 2 hour turns. Condom cath applied to minimize urine on skin.

## 2017-10-22 NOTE — PROGRESS NOTES
Bedside report received.  Assessment complete.  A&O x 3. Disorient to time. Patient calls appropriately.  Denies numbness and tingling. Moves extremities.   Bed alarm in place.   Patient denies pain.  Denies N&V. Tolerating diet.  + void condom cath in place  Patient denies SOB.  Review plan with of care with patient. Call light and personal belongings with in reach. Hourly rounding in place. All needs met at this time.

## 2017-10-22 NOTE — PROGRESS NOTES
Josie Osuna Fall Risk Assessment:     Last Known Fall: Within the last six months  Mobility: Use of assistive device/requires assist of two people  Medications: Cardiovascular or central nervous system meds  Mental Status/LOC/Awareness: Oriented to person and place  Toileting Needs: Incontinence  Volume/Electrolyte Status: No problems  Communication/Sensory: Visual (Glasses)/hearing deficit  Behavior: Appropriate behavior  Josie Osuna Fall Risk Total: 14  Fall Risk Level: MODERATE RISK    Universal Fall Precautions:  call light/belongings in reach, bed in low position and locked, siderails up x 2, use non-slip footwear, adequate lighting, clutter free and spill free environment, educate on level of risk, educate to call for assistance    Fall Risk Level Interventions:    TRIAL (TELE 8, NEURO, MED MARINA 5) Moderate Fall Risk Interventions  Place yellow fall risk ID band on patient: verified  Provide patient/family education based on risk assessment : completed  Educate patient/family to call staff for assistance when getting out of bed: completed  Place fall precaution signage outside patient door: verified  Utilize bed/chair fall alarm: verifiedTRIAL (TELE 8, NEURO, [employer] MARINA 5) High Fall Risk Interventions  Place yellow fall risk ID band on patient: verified  Provide patient/family education based on risk assessment: completed  Educate patient/family to call staff for assistance when getting out of bed: completed  Place fall precaution signage outside patient door: verified  Place patient in room close to nursing station: verified  Utilize bed/chair fall alarm: verified  Notify charge of high risk for huddle: verified    Patient Specific Interventions:     Medication: review medications with patient and family  Mental Status/LOC/Awareness: reorient patient, reinforce falls education, check on patient hourly, utilize bed/chair fall alarm and reinforce the use of call light  Toileting: monitor intake and  output/use of appropriate interventions and instruct patient/family on the need to call for assistance when toileting  Volume/Electrolyte Status: ensure patient remains hydrated and monitor abnormal lab values  Communication/Sensory: update plan of care on whiteboard  Behavioral: administer medication as ordered  Mobility: utilize bed/chair fall alarm and ensure bed is locked and in lowest position

## 2017-10-22 NOTE — PROGRESS NOTES
Josie Osuna Fall Risk Assessment:     Last Known Fall: Within the last six months  Mobility: Use of assistive device/requires assist of two people  Medications: Cardiovascular or central nervous system meds  Mental Status/LOC/Awareness: Oriented to person and place  Toileting Needs: Use of assistive device (Bedside commode, bedpan, urinal), Incontinence  Volume/Electrolyte Status: No problems  Communication/Sensory: Visual (Glasses)/hearing deficit  Behavior: Appropriate behavior  Josie Osuna Fall Risk Total: 16  Fall Risk Level: HIGH RISK    Universal Fall Precautions:  call light/belongings in reach, bed in low position and locked, wheelchairs and assistive devices out of sight, siderails up x 2, use non-slip footwear, adequate lighting, clutter free and spill free environment, educate on level of risk, educate to call for assistance    Fall Risk Level Interventions:    TRIAL (TELE 8, NEURO, MED MARINA 5) Moderate Fall Risk Interventions  Place yellow fall risk ID band on patient: completed  Provide patient/family education based on risk assessment : completed  Educate patient/family to call staff for assistance when getting out of bed: completed  Place fall precaution signage outside patient door: completed  Utilize bed/chair fall alarm: completedTRIAL (TELE 8, NEURO, [employer] MARINA 5) High Fall Risk Interventions  Place yellow fall risk ID band on patient: verified  Provide patient/family education based on risk assessment: completed  Educate patient/family to call staff for assistance when getting out of bed: completed  Place fall precaution signage outside patient door: verified  Place patient in room close to nursing station: verified  Utilize bed/chair fall alarm: verified  Notify charge of high risk for huddle: verified    Patient Specific Interventions:     Medication: review medications with patient and family  Mental Status/LOC/Awareness: utilize bed/chair fall alarm  Toileting: instruct patient/family on  the need to call for assistance when toileting  Volume/Electrolyte Status: ensure IV fluids are appropriate  Communication/Sensory: update plan of care on whiteboard  Behavioral: administer medication as ordered  Mobility: utilize bed/chair fall alarm

## 2017-10-22 NOTE — PROGRESS NOTES
[facility] Hospitalist Progress Note    Date of Service: 10/22/2017    Chief Complaint  72 y.o. male admitted 2017 with urinary tract infection and confusion    Interval Problem Update  No acute issues, very pleasant, comfortable, encouarged to move around which he is refusing.  Pending guardenship      Consultants/Specialty  None    Disposition  Pending placement  Pending guardianship     grossly unchanged   Review of Systems   Constitutional: Positive for malaise/fatigue. Negative for chills and fever.   HENT: Negative for congestion, hearing loss, sore throat and tinnitus.    Eyes: Negative for blurred vision, double vision, photophobia and pain.   Respiratory: Negative for cough, hemoptysis, sputum production, shortness of breath and stridor.    Cardiovascular: Negative for chest pain, palpitations, orthopnea, claudication, leg swelling and PND.   Gastrointestinal: Negative for abdominal pain, blood in stool, constipation, heartburn, melena, nausea and vomiting.   Genitourinary: Negative for dysuria, flank pain, frequency and urgency.   Musculoskeletal: Negative for back pain, myalgias and neck pain.   Neurological: Positive for weakness. Negative for dizziness, tingling, tremors, sensory change, speech change, focal weakness and headaches.   Psychiatric/Behavioral: Positive for memory loss. Negative for depression and suicidal ideas. The patient is not nervous/anxious.       Physical Exam  Laboratory/Imaging   Hemodynamics  Temp (24hrs), Av °C (98.6 °F), Min:36.6 °C (97.9 °F), Max:37.3 °C (99.2 °F)   Temperature: 36.6 °C (97.9 °F)  Pulse  Av.6  Min: 69  Max: 144    Blood Pressure : 128/66      Respiratory      Respiration: 17, Pulse Oximetry: 94 %        RUL Breath Sounds: Diminished, RML Breath Sounds: Diminished, RLL Breath Sounds: Diminished, RIGO Breath Sounds: Diminished, LLL Breath Sounds: Diminished    Fluids    Intake/Output Summary (Last 24 hours) at 10/22/17 0732  Last data filed at 10/22/17  0600   Gross per 24 hour   Intake              120 ml   Output             3350 ml   Net            -3230 ml       Nutrition  Orders Placed This Encounter   Procedures   • DIET ORDER     Standing Status:   Standing     Number of Occurrences:   1     Order Specific Question:   Diet:     Answer:   Regular [1]     Order Specific Question:   Texture/Fiber modifications:     Answer:   Dysphagia 2(Pureed/Chopped)specify fluid consistency(question 6) [2]     Order Specific Question:   Consistency/Fluid modifications:     Answer:   Nectar Thick [2]     Order Specific Question:   Miscellaneous modifications:     Answer:   SLP - Deliver to Nursing Station [22]   grossly unchanged  Physical Exam   Constitutional: He appears well-developed and well-nourished. No distress.   Chronically ill-appearing  Frail, thin   HENT:   Head: Normocephalic and atraumatic.   Mouth/Throat: No oropharyngeal exudate.   Eyes: Conjunctivae and EOM are normal. Pupils are equal, round, and reactive to light. Right eye exhibits no discharge. Left eye exhibits no discharge. No scleral icterus.   Neck: Normal range of motion. Neck supple. No JVD present. No thyromegaly present.   Cardiovascular: Normal rate, regular rhythm and intact distal pulses.    No murmur heard.  Pulmonary/Chest: Effort normal and breath sounds normal. No stridor. No respiratory distress. He has no wheezes. He has no rales (unchanged).   Abdominal: Soft. Bowel sounds are normal. He exhibits no distension. There is no tenderness. There is no rebound.   Musculoskeletal: Normal range of motion. He exhibits no edema.   Right BKA,  Dressing clean and dry and intact     Neurological: He is alert. He is disoriented. No cranial nerve deficit. Coordination normal.   Skin: Skin is warm and dry. No erythema.   Psychiatric: His behavior is normal. Thought content normal.   Nursing note and vitals reviewed.      Recent Labs      10/21/17   0231   WBC  15.2*   RBC  5.09   HEMOGLOBIN  11.9*    HEMATOCRIT  38.9*   MCV  76.4*   MCH  23.4*   MCHC  30.6*   RDW  43.5   PLATELETCT  799*   MPV  10.1     Recent Labs      10/21/17   0231   SODIUM  131*   POTASSIUM  4.0   CHLORIDE  97   CO2  24   GLUCOSE  68   BUN  12   CREATININE  0.59   CALCIUM  8.7                      Assessment/Plan     * Cellulitis of right knee- (present on admission)   Assessment & Plan    Completed antibiotics, no acute issues        Pneumonia   Assessment & Plan    Possibly 2/2 to Aspiration pneumonia  Finished course of  Augmentin.  Continue RT protocol, duo nebs, Pep therapy if warranted, and incentive spirometry.           Hyponatremia- (present on admission)   Assessment & Plan    Na:131, no acute changes from prior, continue PO intake.           Leukocytosis- (present on admission)   Assessment & Plan    Most likely chronic, with fluctuations.   No signs of infection. No fevers or chills.  Completed antibiotics  CTM        PAD (peripheral artery disease) (CMS-HCC)- (present on admission)   Assessment & Plan    s/p left forefoot amputation and right BKA  Continue aspirin and statin.          Thrombocytosis (CMS-[facility])   Assessment & Plan    Possibly reactive?, Fluctuates on a daily basis continue monitor daily CBCs for any acute changes        Dementia   Assessment & Plan    Admitted for encephalopathy and debility  Has been evaluated by ethics committee, unable to make his own medical decisions  Pending for guardianship pending          UTI (urinary tract infection)- (present on admission)   Assessment & Plan    As above.        Essential hypertension- (present on admission)   Assessment & Plan      Continue with metoprolol 50 mg BID        Severe protein-calorie malnutrition (CMS-[facility])- (present on admission)   Assessment & Plan    Nutrition following  Cont boost TID          Assessment and plan thoroughly reviewed no gross changes from yesterday we'll continue same management. 10/22    Patient plan of care discussed at  multidisplinary team rounds and with patient and R.N at beside.      Reviewed items::  Radiology images reviewed, Labs reviewed and Medications reviewed  Barrera catheter::  No Barrera  DVT prophylaxis pharmacological::  Heparin

## 2017-10-22 NOTE — CARE PLAN
Problem: Safety  Goal: Will remain free from injury  Outcome: PROGRESSING AS EXPECTED  Bed in low position, bed alarm on, non skid socks on, hourly rounds performed. No fall since admission.

## 2017-10-23 PROCEDURE — A9270 NON-COVERED ITEM OR SERVICE: HCPCS | Performed by: INTERNAL MEDICINE

## 2017-10-23 PROCEDURE — 700111 HCHG RX REV CODE 636 W/ 250 OVERRIDE (IP): Performed by: HOSPITALIST

## 2017-10-23 PROCEDURE — 700102 HCHG RX REV CODE 250 W/ 637 OVERRIDE(OP): Performed by: HOSPITALIST

## 2017-10-23 PROCEDURE — 700102 HCHG RX REV CODE 250 W/ 637 OVERRIDE(OP): Performed by: INTERNAL MEDICINE

## 2017-10-23 PROCEDURE — 770006 HCHG ROOM/CARE - MED/SURG/GYN SEMI*

## 2017-10-23 PROCEDURE — 99231 SBSQ HOSP IP/OBS SF/LOW 25: CPT | Performed by: HOSPITALIST

## 2017-10-23 PROCEDURE — A9270 NON-COVERED ITEM OR SERVICE: HCPCS | Performed by: HOSPITALIST

## 2017-10-23 RX ADMIN — METOPROLOL TARTRATE 75 MG: 25 TABLET, FILM COATED ORAL at 08:12

## 2017-10-23 RX ADMIN — METOPROLOL TARTRATE 75 MG: 25 TABLET, FILM COATED ORAL at 21:43

## 2017-10-23 RX ADMIN — GABAPENTIN 100 MG: 100 CAPSULE ORAL at 15:54

## 2017-10-23 RX ADMIN — HEPARIN SODIUM 5000 UNITS: 5000 INJECTION, SOLUTION INTRAVENOUS; SUBCUTANEOUS at 15:53

## 2017-10-23 RX ADMIN — ASPIRIN 81 MG: 81 TABLET, COATED ORAL at 08:12

## 2017-10-23 RX ADMIN — FOLIC ACID 1 MG: 1 TABLET ORAL at 08:13

## 2017-10-23 RX ADMIN — GABAPENTIN 100 MG: 100 CAPSULE ORAL at 21:43

## 2017-10-23 RX ADMIN — HEPARIN SODIUM 5000 UNITS: 5000 INJECTION, SOLUTION INTRAVENOUS; SUBCUTANEOUS at 05:53

## 2017-10-23 RX ADMIN — THERA TABS 1 TABLET: TAB at 08:13

## 2017-10-23 RX ADMIN — LEVOTHYROXINE SODIUM 75 MCG: 75 TABLET ORAL at 05:53

## 2017-10-23 RX ADMIN — ATORVASTATIN CALCIUM 40 MG: 40 TABLET, FILM COATED ORAL at 21:44

## 2017-10-23 RX ADMIN — GABAPENTIN 100 MG: 100 CAPSULE ORAL at 08:12

## 2017-10-23 ASSESSMENT — PAIN SCALES - GENERAL
PAINLEVEL_OUTOF10: 0
PAINLEVEL_OUTOF10: 0

## 2017-10-23 NOTE — CARE PLAN
Problem: Communication  Goal: The ability to communicate needs accurately and effectively will improve  Outcome: PROGRESSING AS EXPECTED  Pt capable of verbalizing all needs and utilizes call light system approprietly.    Problem: Safety  Goal: Will remain free from falls  Outcome: PROGRESSING AS EXPECTED  Pt moderate fall risk, pt wearing treaded socks, bed locked and in lowest position, bed alarm is on.  Pt call light and phone within reach.

## 2017-10-23 NOTE — PROGRESS NOTES
Assumed care of pt, received report from day shift RN, pt assessed.  Pt has no complaints of pain at this time.  Pt is A&O x2, disoriented to time and events.  Pt moderate fall risk, wearing treaded socks, bed locked and in lowest position, bed alarm is on.  Pt instructed to call for assistance prior to getting OOB, pt verbalized understanding.  Call light, phone, and personal belongings within reach.

## 2017-10-23 NOTE — DISCHARGE PLANNING
Friend Norma Lagos called and will be in tomorrow to meet with the SW and the SW supervisor about discharge planning.

## 2017-10-23 NOTE — PROGRESS NOTES
Josie Osuna Fall Risk Assessment:     Last Known Fall: Within the last six months  Mobility: Use of assistive device/requires assist of two people  Medications: Cardiovascular or central nervous system meds  Mental Status/LOC/Awareness: Oriented to person and place  Toileting Needs: Incontinence  Volume/Electrolyte Status: No problems  Communication/Sensory: Visual (Glasses)/hearing deficit  Behavior: Appropriate behavior  Josie Osuna Fall Risk Total: 14  Fall Risk Level: MODERATE RISK    Universal Fall Precautions:  call light/belongings in reach, bed in low position and locked, siderails up x 2, use non-slip footwear, adequate lighting, clutter free and spill free environment, educate on level of risk, educate to call for assistance    Fall Risk Level Interventions:    TRIAL (TELE 8, NEURO, MED MARINA 5) Moderate Fall Risk Interventions  Place yellow fall risk ID band on patient: verified  Provide patient/family education based on risk assessment : completed  Educate patient/family to call staff for assistance when getting out of bed: completed  Place fall precaution signage outside patient door: verified  Utilize bed/chair fall alarm: verifiedTRIAL (TELE 8, NEURO, [place] MARINA 5) High Fall Risk Interventions  Place yellow fall risk ID band on patient: verified  Provide patient/family education based on risk assessment: completed  Educate patient/family to call staff for assistance when getting out of bed: completed  Place fall precaution signage outside patient door: verified  Place patient in room close to nursing station: verified  Utilize bed/chair fall alarm: verified  Notify charge of high risk for huddle: verified    Patient Specific Interventions:     Medication: review medications with patient and family  Mental Status/LOC/Awareness: reorient patient, reinforce falls education, check on patient hourly, utilize bed/chair fall alarm and reinforce the use of call light  Toileting: monitor intake and  output/use of appropriate interventions and instruct patient/family on the need to call for assistance when toileting  Volume/Electrolyte Status: ensure patient remains hydrated and monitor abnormal lab values  Communication/Sensory: update plan of care on whiteboard  Behavioral: administer medication as ordered  Mobility: utilize bed/chair fall alarm and ensure bed is locked and in lowest position

## 2017-10-23 NOTE — DISCHARGE PLANNING
Medical Social Work    TRUONG received VM from May from [facility] requesting that SW give her a call back.     TRUONG called May at [phone]. May is wishing for TRUONG to speak with their director of nursing. Laury is request that pt discharge home with her. However, [facility] not feel that pt would be safe to discharge home.     May to contact floor SW tomorrow (10/24/17).

## 2017-10-24 PROCEDURE — A9270 NON-COVERED ITEM OR SERVICE: HCPCS | Performed by: INTERNAL MEDICINE

## 2017-10-24 PROCEDURE — 92526 ORAL FUNCTION THERAPY: CPT

## 2017-10-24 PROCEDURE — 770006 HCHG ROOM/CARE - MED/SURG/GYN SEMI*

## 2017-10-24 PROCEDURE — A9270 NON-COVERED ITEM OR SERVICE: HCPCS | Performed by: HOSPITALIST

## 2017-10-24 PROCEDURE — 99232 SBSQ HOSP IP/OBS MODERATE 35: CPT | Performed by: HOSPITALIST

## 2017-10-24 PROCEDURE — 700102 HCHG RX REV CODE 250 W/ 637 OVERRIDE(OP): Performed by: INTERNAL MEDICINE

## 2017-10-24 PROCEDURE — 700102 HCHG RX REV CODE 250 W/ 637 OVERRIDE(OP): Performed by: HOSPITALIST

## 2017-10-24 PROCEDURE — 97530 THERAPEUTIC ACTIVITIES: CPT

## 2017-10-24 PROCEDURE — 700111 HCHG RX REV CODE 636 W/ 250 OVERRIDE (IP): Performed by: HOSPITALIST

## 2017-10-24 RX ADMIN — GABAPENTIN 100 MG: 100 CAPSULE ORAL at 22:02

## 2017-10-24 RX ADMIN — STANDARDIZED SENNA CONCENTRATE AND DOCUSATE SODIUM 2 TABLET: 8.6; 5 TABLET, FILM COATED ORAL at 09:03

## 2017-10-24 RX ADMIN — METOPROLOL TARTRATE 75 MG: 25 TABLET, FILM COATED ORAL at 09:03

## 2017-10-24 RX ADMIN — THERA TABS 1 TABLET: TAB at 09:03

## 2017-10-24 RX ADMIN — STANDARDIZED SENNA CONCENTRATE AND DOCUSATE SODIUM 2 TABLET: 8.6; 5 TABLET, FILM COATED ORAL at 22:02

## 2017-10-24 RX ADMIN — HEPARIN SODIUM 5000 UNITS: 5000 INJECTION, SOLUTION INTRAVENOUS; SUBCUTANEOUS at 22:02

## 2017-10-24 RX ADMIN — ATORVASTATIN CALCIUM 40 MG: 40 TABLET, FILM COATED ORAL at 22:02

## 2017-10-24 RX ADMIN — ASPIRIN 81 MG: 81 TABLET, COATED ORAL at 09:03

## 2017-10-24 RX ADMIN — LEVOTHYROXINE SODIUM 75 MCG: 75 TABLET ORAL at 05:57

## 2017-10-24 RX ADMIN — METOPROLOL TARTRATE 75 MG: 25 TABLET, FILM COATED ORAL at 22:02

## 2017-10-24 RX ADMIN — GABAPENTIN 100 MG: 100 CAPSULE ORAL at 09:03

## 2017-10-24 RX ADMIN — GABAPENTIN 100 MG: 100 CAPSULE ORAL at 14:31

## 2017-10-24 RX ADMIN — HEPARIN SODIUM 5000 UNITS: 5000 INJECTION, SOLUTION INTRAVENOUS; SUBCUTANEOUS at 14:31

## 2017-10-24 RX ADMIN — HEPARIN SODIUM 5000 UNITS: 5000 INJECTION, SOLUTION INTRAVENOUS; SUBCUTANEOUS at 05:56

## 2017-10-24 RX ADMIN — FOLIC ACID 1 MG: 1 TABLET ORAL at 09:03

## 2017-10-24 ASSESSMENT — ENCOUNTER SYMPTOMS
DIZZINESS: 0
SHORTNESS OF BREATH: 0
CHILLS: 0
VOMITING: 0
HEADACHES: 0
WEAKNESS: 1
FEVER: 0
PSYCHIATRIC NEGATIVE: 1
ABDOMINAL PAIN: 0
LOSS OF CONSCIOUSNESS: 0
NAUSEA: 0
PALPITATIONS: 0
FALLS: 0

## 2017-10-24 ASSESSMENT — COGNITIVE AND FUNCTIONAL STATUS - GENERAL
MOBILITY SCORE: 6
SUGGESTED CMS G CODE MODIFIER MOBILITY: CN
MOVING FROM LYING ON BACK TO SITTING ON SIDE OF FLAT BED: UNABLE
CLIMB 3 TO 5 STEPS WITH RAILING: TOTAL
TURNING FROM BACK TO SIDE WHILE IN FLAT BAD: UNABLE
MOVING TO AND FROM BED TO CHAIR: UNABLE
WALKING IN HOSPITAL ROOM: TOTAL
STANDING UP FROM CHAIR USING ARMS: TOTAL

## 2017-10-24 ASSESSMENT — PAIN SCALES - GENERAL
PAINLEVEL_OUTOF10: 0
PAINLEVEL_OUTOF10: 0

## 2017-10-24 ASSESSMENT — GAIT ASSESSMENTS: GAIT LEVEL OF ASSIST: UNABLE TO PARTICIPATE

## 2017-10-24 NOTE — CARE PLAN
Problem: Infection  Goal: Will remain free from infection  Outcome: PROGRESSING AS EXPECTED  No s/s infection at this time. No fevers noted.     Pt educated on hand hygiene. Needs reinforcement.

## 2017-10-24 NOTE — THERAPY
"Speech Language Therapy dysphagia treatment completed.   Functional Status:  The patient was seen for dysphagia therapy this date. The patient was awake, alert and repositioned upright in bed for session. The patient was noted to follow swallow strategies following single reminder at beginning of session. The patient consumed D2 solids and NTL  with no overt s/s of aspiration when cued to consume small bites, sips and complete double swallow. At this time, recommend continue D2/NTL with swallow strategies and monitoring during meals to ensure continued follow through with swallow stratgies.     Recommendations: 1) continue D2/NTL with swallow strategies 2) Patient requires 1;1 feeding assistance for meals.     Plan of Care: Will benefit from Speech Therapy 3 times per week.    Post-Acute Therapy: Discharge to a transitional care facility for continued skilled therapy services.    See \"Rehab Therapy-Acute\" Patient Summary Report for complete documentation.     "

## 2017-10-24 NOTE — DISCHARGE PLANNING
TRUONG received call from Jeri from [facility] ([phone]). Jeri states that it would be extremely unsafe for pt to return home as Norma is not an appropriate caregiver as she has complex medical issues of her own and is unable to provide the support and care this pt needs. Jeri stated they would often find the pt is urine and feces because Norma was unable to change his diapers or move him. Jeri feels pt would be best served in [facility].

## 2017-10-24 NOTE — DISCHARGE PLANNING
Unit SW and  Supervisor Paz Rasmussen participated in care conference per request of Rae. Rae's friend was also present.    Supervisor Paz explained that pt has been deemed incapacitated and therefore cannot make medical decisions at this time. Paz explained that guardianship is being pursued as there is no legal NOK for decision making capacities for this patient. Paz provided clarification into exactly what this meant. Paz further explained that guardianship is being pursued as a result of the lack of capacity and fully explained the guardianship process. Rae had several questions as to how this process developed to which Supervisor Paz answered fully. Norma became very emotional and agitated during this conference. Norma is aware of the open EPS case. Norma remains adamant about not pursuing long term care for this pt. Rae remains very upset about guardianship being pursued and blames [facility] for direction of pt's care.     Plan: Guardianship will continue to be pursued for this pt as pt lacks capacity for medical decision making.

## 2017-10-24 NOTE — THERAPY
"Physical Therapy Treatment completed.   Bed Mobility:  Supine to Sit: Maximal Assist  Transfers: Sit to Stand: Maximal Assist (X2)  Gait: Level Of Assist: Unable to Participate        Plan of Care: Will benefit from Physical Therapy 2 times per week and Plan to complete next treatment by Friday 10/27   Discharge Recommendations: Equipment: Will Continue to Assess for Equipment Needs. Post-acute therapy Discharge to a transitional care facility for continued skilled therapy services.     Pt willing to work with PT today, however, pt is extremely self limiting and minimally participates in session. Pt with poor carry over between sessions. Re-educated pt that L LE should remain extended and in neutral rotation to help prevent knee flexion contractures. Pt's excuse for no maintaining LE straight is \"it hurts.\" Completed supine ther ex, only able to complete 3-5 reps of each exercise prior to fatigue. Maximal assist for supine to sit, then maximal assist X 2 for slide board transfer from EOB to WC. Once up in WC, pt able to propel self about 40 feet total with moderate assist. Pt with very short strokes so minimal distance gained with each stroke. Pt also required assist for steer WC. Pt left up in WC at end of session, RN and CNA aware    See \"Rehab Therapy-Acute\" Patient Summary Report for complete documentation.       "

## 2017-10-24 NOTE — CARE PLAN
Problem: Venous Thromboembolism (VTW)/Deep Vein Thrombosis (DVT) Prevention:  Goal: Patient will participate in Venous Thrombosis (VTE)/Deep Vein Thrombosis (DVT)Prevention Measures  Outcome: PROGRESSING AS EXPECTED  Pt on heparin for pharmacologic prophylaxis.    Problem: Respiratory:  Goal: Respiratory status will improve  Outcome: PROGRESSING AS EXPECTED  Pt SPO2 95% on room air and has no complaints of shortness of breath or difficulty breathing.

## 2017-10-24 NOTE — PROGRESS NOTES
Vascular    Seen and examined    Recommend continuing local care for the right knee wound.    Since the knee is contracted he cannot get a prosthetic and he will be wheel-chair bound.  We discussed possible right above-knee amputation but I think his chances of walking with an AKA prosthetic limb are very low given his overall poor health and strength.    Will discuss further with prosthetics technician.  At this time continue local care, and can follow-up with me outpatient.    Frantz Rodriguez MD  General&Vascular Surgery  [facility]  Cell: [phone]  Office: [phone]

## 2017-10-24 NOTE — CARE PLAN
Problem: Urinary Elimination:  Goal: Ability to reestablish a normal urinary elimination pattern will improve  Outcome: PROGRESSING SLOWER THAN EXPECTED  Pt continues to be incontinent; condom catheter in place.

## 2017-10-24 NOTE — PROGRESS NOTES
Assumed pt care after report received from night Rn. Pt is resting comfortably at this time. No c/o pain or distress noted. Call light and belongings in reach. Bed in low position. Bed alarm on. Treaded socks on. Reoriented to room. Will continue to monitor.

## 2017-10-24 NOTE — DISCHARGE PLANNING
Received call from Rae Lagos who states she will be at the hospital around 10:30 for a care conference with Unit SW and SW Supervisor.

## 2017-10-24 NOTE — PROGRESS NOTES
Josie Osuna Fall Risk Assessment:     Last Known Fall: Within the last six months  Mobility: Use of assistive device/requires assist of two people  Medications: Cardiovascular or central nervous system meds  Mental Status/LOC/Awareness: Oriented to person and place  Toileting Needs: Incontinence  Volume/Electrolyte Status: No problems  Communication/Sensory: Visual (Glasses)/hearing deficit  Behavior: Appropriate behavior  Josie Osuna Fall Risk Total: 14  Fall Risk Level: MODERATE RISK    Universal Fall Precautions:  call light/belongings in reach, bed in low position and locked, siderails up x 2, use non-slip footwear, adequate lighting, clutter free and spill free environment, educate on level of risk, educate to call for assistance    Fall Risk Level Interventions:    TRIAL (TELE 8, NEURO, MED MARINA 5) Moderate Fall Risk Interventions  Place yellow fall risk ID band on patient: verified  Provide patient/family education based on risk assessment : completed  Educate patient/family to call staff for assistance when getting out of bed: completed  Place fall precaution signage outside patient door: verified  Utilize bed/chair fall alarm: verifiedTRIAL (TELE 8, NEURO, [place] MARINA 5) High Fall Risk Interventions  Place yellow fall risk ID band on patient: verified  Provide patient/family education based on risk assessment: completed  Educate patient/family to call staff for assistance when getting out of bed: completed  Place fall precaution signage outside patient door: verified  Place patient in room close to nursing station: verified  Utilize bed/chair fall alarm: verified  Notify charge of high risk for huddle: verified    Patient Specific Interventions:     Medication: review medications with patient and family  Mental Status/LOC/Awareness: reorient patient, reinforce falls education, check on patient hourly, utilize bed/chair fall alarm and reinforce the use of call light  Toileting: monitor intake and  output/use of appropriate interventions and instruct patient/family on the need to call for assistance when toileting  Volume/Electrolyte Status: ensure patient remains hydrated and monitor abnormal lab values  Communication/Sensory: update plan of care on whiteboard  Behavioral: administer medication as ordered  Mobility: utilize bed/chair fall alarm and ensure bed is locked and in lowest position

## 2017-10-25 LAB
ANION GAP SERPL CALC-SCNC: 8 MMOL/L (ref 0–11.9)
BUN SERPL-MCNC: 16 MG/DL (ref 8–22)
CALCIUM SERPL-MCNC: 9.3 MG/DL (ref 8.5–10.5)
CHLORIDE SERPL-SCNC: 94 MMOL/L (ref 96–112)
CO2 SERPL-SCNC: 26 MMOL/L (ref 20–33)
CREAT SERPL-MCNC: 0.6 MG/DL (ref 0.5–1.4)
ERYTHROCYTE [DISTWIDTH] IN BLOOD BY AUTOMATED COUNT: 42.5 FL (ref 35.9–50)
GFR SERPL CREATININE-BSD FRML MDRD: >60 ML/MIN/1.73 M 2
GLUCOSE SERPL-MCNC: 88 MG/DL (ref 65–99)
HCT VFR BLD AUTO: 40.2 % (ref 42–52)
HGB BLD-MCNC: 11.9 G/DL (ref 14–18)
MAGNESIUM SERPL-MCNC: 1.7 MG/DL (ref 1.5–2.5)
MCH RBC QN AUTO: 22.2 PG (ref 27–33)
MCHC RBC AUTO-ENTMCNC: 29.6 G/DL (ref 33.7–35.3)
MCV RBC AUTO: 74.9 FL (ref 81.4–97.8)
PLATELET # BLD AUTO: 749 K/UL (ref 164–446)
PMV BLD AUTO: 9.1 FL (ref 9–12.9)
POTASSIUM SERPL-SCNC: 4.2 MMOL/L (ref 3.6–5.5)
RBC # BLD AUTO: 5.37 M/UL (ref 4.7–6.1)
SODIUM SERPL-SCNC: 128 MMOL/L (ref 135–145)
WBC # BLD AUTO: 13.2 K/UL (ref 4.8–10.8)

## 2017-10-25 PROCEDURE — 700102 HCHG RX REV CODE 250 W/ 637 OVERRIDE(OP): Performed by: HOSPITALIST

## 2017-10-25 PROCEDURE — 36415 COLL VENOUS BLD VENIPUNCTURE: CPT

## 2017-10-25 PROCEDURE — 83735 ASSAY OF MAGNESIUM: CPT | Mod: 91

## 2017-10-25 PROCEDURE — 85027 COMPLETE CBC AUTOMATED: CPT | Mod: 91

## 2017-10-25 PROCEDURE — 700105 HCHG RX REV CODE 258: Performed by: HOSPITALIST

## 2017-10-25 PROCEDURE — 80048 BASIC METABOLIC PNL TOTAL CA: CPT

## 2017-10-25 PROCEDURE — A9270 NON-COVERED ITEM OR SERVICE: HCPCS | Performed by: HOSPITALIST

## 2017-10-25 PROCEDURE — 700111 HCHG RX REV CODE 636 W/ 250 OVERRIDE (IP): Performed by: HOSPITALIST

## 2017-10-25 PROCEDURE — 700102 HCHG RX REV CODE 250 W/ 637 OVERRIDE(OP): Performed by: INTERNAL MEDICINE

## 2017-10-25 PROCEDURE — 770006 HCHG ROOM/CARE - MED/SURG/GYN SEMI*

## 2017-10-25 PROCEDURE — A9270 NON-COVERED ITEM OR SERVICE: HCPCS | Performed by: INTERNAL MEDICINE

## 2017-10-25 PROCEDURE — 99232 SBSQ HOSP IP/OBS MODERATE 35: CPT | Performed by: HOSPITALIST

## 2017-10-25 RX ORDER — HEPARIN SODIUM 5000 [USP'U]/ML
5000 INJECTION, SOLUTION INTRAVENOUS; SUBCUTANEOUS EVERY 12 HOURS
Status: DISCONTINUED | OUTPATIENT
Start: 2017-10-25 | End: 2017-11-30 | Stop reason: HOSPADM

## 2017-10-25 RX ORDER — SODIUM CHLORIDE 9 MG/ML
1000 INJECTION, SOLUTION INTRAVENOUS ONCE
Status: COMPLETED | OUTPATIENT
Start: 2017-10-25 | End: 2017-10-25

## 2017-10-25 RX ADMIN — METOPROLOL TARTRATE 75 MG: 25 TABLET, FILM COATED ORAL at 20:18

## 2017-10-25 RX ADMIN — HEPARIN SODIUM 5000 UNITS: 5000 INJECTION, SOLUTION INTRAVENOUS; SUBCUTANEOUS at 20:18

## 2017-10-25 RX ADMIN — LEVOTHYROXINE SODIUM 75 MCG: 75 TABLET ORAL at 06:13

## 2017-10-25 RX ADMIN — FOLIC ACID 1 MG: 1 TABLET ORAL at 09:23

## 2017-10-25 RX ADMIN — STANDARDIZED SENNA CONCENTRATE AND DOCUSATE SODIUM 2 TABLET: 8.6; 5 TABLET, FILM COATED ORAL at 20:18

## 2017-10-25 RX ADMIN — HEPARIN SODIUM 5000 UNITS: 5000 INJECTION, SOLUTION INTRAVENOUS; SUBCUTANEOUS at 06:12

## 2017-10-25 RX ADMIN — SODIUM CHLORIDE 1000 ML: 9 INJECTION, SOLUTION INTRAVENOUS at 16:08

## 2017-10-25 RX ADMIN — GABAPENTIN 100 MG: 100 CAPSULE ORAL at 15:58

## 2017-10-25 RX ADMIN — GABAPENTIN 100 MG: 100 CAPSULE ORAL at 20:18

## 2017-10-25 RX ADMIN — ATORVASTATIN CALCIUM 40 MG: 40 TABLET, FILM COATED ORAL at 20:18

## 2017-10-25 RX ADMIN — ASPIRIN 81 MG: 81 TABLET, COATED ORAL at 09:23

## 2017-10-25 RX ADMIN — THERA TABS 1 TABLET: TAB at 09:23

## 2017-10-25 RX ADMIN — MAGNESIUM GLUCONATE 500 MG ORAL TABLET 400 MG: 500 TABLET ORAL at 15:59

## 2017-10-25 RX ADMIN — GABAPENTIN 100 MG: 100 CAPSULE ORAL at 09:23

## 2017-10-25 RX ADMIN — METOPROLOL TARTRATE 75 MG: 25 TABLET, FILM COATED ORAL at 09:23

## 2017-10-25 ASSESSMENT — ENCOUNTER SYMPTOMS
FALLS: 0
VOMITING: 0
PSYCHIATRIC NEGATIVE: 1
ABDOMINAL PAIN: 0
SHORTNESS OF BREATH: 0
LOSS OF CONSCIOUSNESS: 0
NAUSEA: 0
HEADACHES: 0
PALPITATIONS: 0
WEAKNESS: 1
FEVER: 0
DIZZINESS: 0
CHILLS: 0

## 2017-10-25 ASSESSMENT — PAIN SCALES - GENERAL
PAINLEVEL_OUTOF10: 0
PAINLEVEL_OUTOF10: 0

## 2017-10-25 NOTE — PROGRESS NOTES
[facility]    Date of Service: 10/25/2017    Chief Complaint  72 y.o. male admitted 2017 with urinary tract infection and confusion.    Interval Problem Update  Sleeping, no acute distress. Wakes to verbal stimuli. No complaints of pain. No acute overnight events.    Consultants/Specialty  Geriatrics  Palliative Care    Disposition  Pending guardianship and placement.        Review of Systems   Constitutional: Negative for chills, fever and malaise/fatigue.   Respiratory: Negative for shortness of breath.    Cardiovascular: Negative for chest pain, palpitations and leg swelling.   Gastrointestinal: Negative for abdominal pain, nausea and vomiting.   Genitourinary: Negative for dysuria.   Musculoskeletal: Negative for falls.   Neurological: Positive for weakness. Negative for dizziness, loss of consciousness and headaches.   Psychiatric/Behavioral: Negative.    All other systems reviewed and are negative.     Physical Exam  Laboratory/Imaging   Hemodynamics  Temp (24hrs), Av.5 °C (97.7 °F), Min:36.1 °C (97 °F), Max:37.2 °C (99 °F)   Temperature: 36.4 °C (97.5 °F)  Pulse  Av.2  Min: 60  Max: 144    Blood Pressure : 141/69      Respiratory      Respiration: 18, Pulse Oximetry: 92 %        RUL Breath Sounds: Diminished, RML Breath Sounds: Diminished, RLL Breath Sounds: Diminished, RIGO Breath Sounds: Diminished, LLL Breath Sounds: Diminished    Fluids    Intake/Output Summary (Last 24 hours) at 10/25/17 0958  Last data filed at 10/25/17 0600   Gross per 24 hour   Intake              400 ml   Output             1250 ml   Net             -850 ml       Nutrition  Orders Placed This Encounter   Procedures   • DIET ORDER     Standing Status:   Standing     Number of Occurrences:   1     Order Specific Question:   Diet:     Answer:   Regular [1]     Order Specific Question:   Texture/Fiber modifications:     Answer:   Dysphagia 2(Pureed/Chopped)specify fluid consistency(question 6) [2]      Order Specific Question:   Consistency/Fluid modifications:     Answer:   Nectar Thick [2]     Order Specific Question:   Miscellaneous modifications:     Answer:   SLP - Deliver to Nursing Station [22]     Physical Exam   Constitutional: He appears well-developed. No distress.   Thin, frail   HENT:   Mouth/Throat: Oropharynx is clear and moist.   Eyes: Conjunctivae are normal. Pupils are equal, round, and reactive to light.   Neck: Normal range of motion. Neck supple. No tracheal deviation present.   Cardiovascular: Normal rate and regular rhythm.    No murmur heard.  Pulmonary/Chest: Breath sounds normal. No respiratory distress.   Abdominal: Soft. Bowel sounds are normal. He exhibits no distension. There is no tenderness.   Musculoskeletal: He exhibits no edema.   Neurological: He is alert.   AOx2   Skin: Skin is warm and dry.   Psychiatric: His affect is blunt. He is withdrawn.   Vitals reviewed.      Recent Labs      10/25/17   0043   WBC  13.2*   RBC  5.37   HEMOGLOBIN  11.9*   HEMATOCRIT  40.2*   MCV  74.9*   MCH  22.2*   MCHC  29.6*   RDW  42.5   PLATELETCT  749*   MPV  9.1     Recent Labs      10/25/17   0043   SODIUM  128*   POTASSIUM  4.2   CHLORIDE  94*   CO2  26   GLUCOSE  88   BUN  16   CREATININE  0.60   CALCIUM  9.3                      Assessment/Plan     * Leukocytosis- (present on admission)   Assessment & Plan    Unclear etiology with no clear source. Daily fluctuations. Has already been treated for aspiration pneumonia, cellulitis, and UTI. VSS  - s/p 14 days of abx  - blood and urine cultures negative  - follow clinically        Hyponatremia- (present on admission)   Assessment & Plan    Low, fluctuates with PO fluid intake. Not symptomatic and actually more interactive today. Na 128 on 10/25  - continue to follow clinically  - repeat BMP in AM  - NS at 75cc x1L, given poor PO intake        PAD (peripheral artery disease) (CMS-[facility])- (present on admission)   Assessment & Plan    Significant PAD  s/p left forefoot amputation and right BKA  - continue aspirin and statin  - not a candidate for prosthesis  - followed by vascular surgery        Dementia- (present on admission)   Assessment & Plan    Admitted for encephalopathy and debility.Has been evaluated by ethics committee, unable to make his own medical decisions.  - Pending for guardianship pending  - no court date as of yet        Essential hypertension- (present on admission)   Assessment & Plan    Currently normotensive.  - continue with metoprolol 75 mg BID        Severe protein-calorie malnutrition (CMS-HCC)- (present on admission)   Assessment & Plan    Poor PO intake.  - Nutrition following  - Cont boost TID            Reviewed items::  Medications reviewed and Labs reviewed  Barrera catheter::  No Barrera  DVT prophylaxis pharmacological::  Heparin

## 2017-10-25 NOTE — CARE PLAN
Problem: Safety  Goal: Will remain free from injury  Bed locked and in lowest position. Call light with in reach, hourly rounding in place.     Problem: Skin Integrity  Goal: Risk for impaired skin integrity will decrease    Intervention: Assess risk factors for impaired skin integrity and/or pressure ulcers  Pt on a q2 turning regimen. Floating all heels.

## 2017-10-25 NOTE — PROGRESS NOTES
Josie Osuna Fall Risk Assessment:     Last Known Fall: Within the last six months  Mobility: Use of assistive device/requires assist of two people  Medications: Cardiovascular or central nervous system meds  Mental Status/LOC/Awareness: Oriented to person and place  Toileting Needs: Incontinence  Volume/Electrolyte Status: No problems  Communication/Sensory: Visual (Glasses)/hearing deficit  Behavior: Appropriate behavior  Josie Osuna Fall Risk Total: 14  Fall Risk Level: MODERATE RISK    Universal Fall Precautions:  call light/belongings in reach, adequate lighting, siderails up x 2, use non-slip footwear, bed in low position and locked, clutter free and spill free environment    Fall Risk Level Interventions:    TRIAL (TELE 8, NEURO, MED MARINA 5) Moderate Fall Risk Interventions  Place yellow fall risk ID band on patient: verified  Provide patient/family education based on risk assessment : completed  Educate patient/family to call staff for assistance when getting out of bed: completed  Place fall precaution signage outside patient door: verified  Utilize bed/chair fall alarm: verifiedTRIAL (TELE 8, NEURO, MED MARINA 5) High Fall Risk Interventions  Place yellow fall risk ID band on patient: verified  Provide patient/family education based on risk assessment: completed  Educate patient/family to call staff for assistance when getting out of bed: completed  Place fall precaution signage outside patient door: verified  Place patient in room close to nursing station: verified  Utilize bed/chair fall alarm: verified  Notify charge of high risk for huddle: verified    Patient Specific Interventions:     Medication: review medications with patient and family  Mental Status/LOC/Awareness: reorient patient, reinforce falls education, check on patient hourly, utilize bed/chair fall alarm and reinforce the use of call light  Toileting: provide frquent toileting, monitor intake and output/use of appropriate interventions  and instruct patient/family on the need to call for assistance when toileting  Volume/Electrolyte Status: ensure patient remains hydrated  Communication/Sensory: update plan of care on whiteboard and ensure proper positioning when transferrng/ambulating  Behavioral: administer medication as ordered  Mobility: schedule physical activity throughout the day, utilize bed/chair fall alarm and ensure bed is locked and in lowest position

## 2017-10-25 NOTE — WOUND TEAM
"[facility]  Inpatient Services  Wound and Skin Care Progress Note    HPI, PMH, SH: Reviewed  Unit where seen by Wound Team: S523/02    WOUND TEAM FOLLOW UP: re-evaluation of R knee and stump, sacrococcygeal area    SUBJECTIVE:  \" That hurts.\"    Self Report / Pain Level: c/o tenderness with cleansing of knee wound  OBJECTIVE: on pressure redistribution mattress, with waffle overlay,lying on left side, dressing in place to right knee, no dressing to buttocks/sacrum  WOUND TYPE, LOCATION, CHARACTERISTICS:  Wound POA Other (full thickness surgical) Leg Right Distal  Periwound Skin: Intact  Drainage : None  Tissue Type and %:  Closed approximated incision line, except for small opening around lateral suture which is visible  Wound Edges:    closed    Odor:     None   Exposed structure(s):   1 small suture at lateral end of incision line   Signs and Symptoms of Infection: none    Wound Not POA Unstageable Knee Right Anterior  Periwound Skin: Intact  Drainage : minimal Serous     Tissue Type and %:    70% pink/red hypergranular tissue, 30% white cartilage  Wound Edges:    attached    Odor:     None   Exposed structure(s):   caritlage  Signs and Symptoms of Infection: none    Measurements : taken 10/25/17    Leg Right Distal  Incision line not measured           R knee                                                        Length (cm): 3.2                                                        Width (cm): 2.4                                                        Depth (cm): + 0.1   Tracts/undermining:   None       INTERVENTIONS BY WOUND TEAM: Removed dressing to knee, moist washcloth to ritu wound to remove honey gel, cleansed wound with NS, dried. Stump cleansed gently with normal saline and gauze, dried, painted with betadine and left open to area. Photo and measurements taken. Applied Aquacel Ag cut to fit knee wound, and covered with adhesive foam. No wounds present to sacracoccygeal area. Patient " positioned to left side with pillows. Left heel floated on pillow and pillow beneath right stump to alleviate pressure.  Leg Right Distal-Dressing Options: Open to Air  Knee Right Anterior-Dressing Options:  (Aquacel Ag, foam)    Interdisciplinary consultation: staff RN, patient  EVALUATION AND PROGRESS OF WOUND(S):  Right knee with visible cartilage, incision line distal stump approximated except for one suture present. Sacrum has no wounds present. Patient may benefit from vac placement to knee. Will reassess wound on 10/30 to see how Aquacel Ag has improved wound bed quality and/or granulation tissue. Will make decision about vac appropriateness at that time. Patient has poor perfusion per vascular note from Dr. Rodriguez note 10/2, not a surgical candidate at this time.    Factors affecting wound healing: PAD, decreased nutrition, smoker, alcohol abuse     Goals: decrease size of knee wound by 1% each week     NURSING PLAN OF CARE:    Dressing changes:     See new Dressing Maintenance orders:  X     Skin care: See Skin Care orders:        Rectal tube care: See Rectal Tube Care orders:      Other orders:           WOUND TEAM PLAN OF CARE (X):   NPWT change 3 x week:        Dressing changes:       Follow up as needed:   X wound team to see on 10/30 to reassess knee wound  Other:

## 2017-10-25 NOTE — DIETARY
Nutrition Update:  Patient is also being followed by wound team.  Multivitamin in place.   Patient is receiving Boost VHC once daily along with other supplements.  Patient likes the Boost, added another today.  Two VHC provides 1060 kcal/d and 44 gm protein per day.  Current weight 54.3 kg. Down 2.7 kg since 10/14.  Some weight loss likely related to extended hospital stay.  Rec;  Continue to encourage PO intake and provide high calorie/high protein snacks and supplements in addition to meals.  Nutrition Representative will continue to see him for ongoing meal and snack preferences.  RD following.

## 2017-10-25 NOTE — PROGRESS NOTES
[facility] Hospitalist Progress Note    Date of Service: 10/24/2017    Chief Complaint  72 y.o. male admitted 2017 with urinary tract infection and confusion.    Interval Problem Update  No questions or concerns at this time. Has some right leg pain. No acute overnight events.    Consultants/Specialty  Geriatrics  Palliative Care    Disposition  Pending guardianship and placement.        Review of Systems   Constitutional: Positive for malaise/fatigue. Negative for chills and fever.   Respiratory: Negative for shortness of breath.    Cardiovascular: Negative for chest pain, palpitations and leg swelling.   Gastrointestinal: Negative for abdominal pain, nausea and vomiting.   Genitourinary: Negative for dysuria.   Musculoskeletal: Negative for falls.   Neurological: Positive for weakness. Negative for dizziness, loss of consciousness and headaches.   Psychiatric/Behavioral: Negative.    All other systems reviewed and are negative.     Physical Exam  Laboratory/Imaging   Hemodynamics  Temp (24hrs), Av.4 °C (97.5 °F), Min:36.1 °C (97 °F), Max:36.9 °C (98.4 °F)   Temperature: 36.2 °C (97.2 °F)  Pulse  Av.4  Min: 60  Max: 144    Blood Pressure : 139/66      Respiratory      Respiration: 19, Pulse Oximetry: 97 %        RUL Breath Sounds: Diminished, RML Breath Sounds: Diminished, RLL Breath Sounds: Diminished, RIGO Breath Sounds: Diminished, LLL Breath Sounds: Diminished    Fluids    Intake/Output Summary (Last 24 hours) at 10/24/17 1953  Last data filed at 10/24/17 1800   Gross per 24 hour   Intake              400 ml   Output              450 ml   Net              -50 ml       Nutrition  Orders Placed This Encounter   Procedures   • DIET ORDER     Standing Status:   Standing     Number of Occurrences:   1     Order Specific Question:   Diet:     Answer:   Regular [1]     Order Specific Question:   Texture/Fiber modifications:     Answer:   Dysphagia 2(Pureed/Chopped)specify fluid consistency(question 6) [2]      Order Specific Question:   Consistency/Fluid modifications:     Answer:   Nectar Thick [2]     Order Specific Question:   Miscellaneous modifications:     Answer:   SLP - Deliver to Nursing Station [22]     Physical Exam   Constitutional: He appears well-developed. No distress.   Thin, frail   HENT:   Mouth/Throat: Oropharynx is clear and moist.   Eyes: Conjunctivae are normal. Pupils are equal, round, and reactive to light.   Neck: Normal range of motion. Neck supple. No tracheal deviation present.   Cardiovascular: Normal rate and regular rhythm.    No murmur heard.  Pulmonary/Chest: Breath sounds normal. No respiratory distress.   Abdominal: Soft. Bowel sounds are normal. He exhibits no distension. There is no tenderness.   Musculoskeletal: He exhibits no edema.   Neurological: He is alert.   Skin: Skin is warm and dry.   Psychiatric: He has a normal mood and affect. His behavior is normal.   Vitals reviewed.                               Assessment/Plan     * Leukocytosis- (present on admission)   Assessment & Plan    Unclear etiology with no clear source. Daily fluctuations. Has already been treated for aspiration pneumonia, cellulitis, and UTI  - s/p 14 days of abx  - blood and urine cultures negative  - follow clinically        Hyponatremia- (present on admission)   Assessment & Plan    Low, fluctuates with PO fluid intake. Not symptomatic  - continue to follow          PAD (peripheral artery disease) (CMS-HCC)- (present on admission)   Assessment & Plan    s/p left forefoot amputation and right BKA  - continue aspirin and statin          Dementia- (present on admission)   Assessment & Plan    Admitted for encephalopathy and debility  Has been evaluated by ethics committee, unable to make his own medical decisions  Pending for guardianship pending          Essential hypertension- (present on admission)   Assessment & Plan    Currently normotensive.  - continue with metoprolol 75 mg BID        Severe  protein-calorie malnutrition (CMS-HCC)- (present on admission)   Assessment & Plan    Poor PO intake.  - Nutrition following  - Cont boost TID            Reviewed items::  Medications reviewed, Radiology images reviewed and Labs reviewed  DVT prophylaxis pharmacological::  Enoxaparin (Lovenox)

## 2017-10-25 NOTE — PROGRESS NOTES
Josie Osuna Fall Risk Assessment:     Last Known Fall: Within the last six months  Mobility: Use of assistive device/requires assist of two people  Medications: Cardiovascular or central nervous system meds  Mental Status/LOC/Awareness: Oriented to person and place  Toileting Needs: Incontinence  Volume/Electrolyte Status: No problems  Communication/Sensory: Visual (Glasses)/hearing deficit  Behavior: Appropriate behavior  Josie Osuna Fall Risk Total: 14  Fall Risk Level: MODERATE RISK    Universal Fall Precautions:  call light/belongings in reach, bed in low position and locked, siderails up x 2, use non-slip footwear, adequate lighting, clutter free and spill free environment, educate on level of risk, educate to call for assistance    Fall Risk Level Interventions:    TRIAL (TELE 8, NEURO, MED MARINA 5) Moderate Fall Risk Interventions  Place yellow fall risk ID band on patient: verified  Provide patient/family education based on risk assessment : completed  Educate patient/family to call staff for assistance when getting out of bed: completed  Place fall precaution signage outside patient door: verified  Utilize bed/chair fall alarm: verifiedTRIAL (TELE 8, NEURO, [employer] MARINA 5) High Fall Risk Interventions  Place yellow fall risk ID band on patient: verified  Provide patient/family education based on risk assessment: completed  Educate patient/family to call staff for assistance when getting out of bed: completed  Place fall precaution signage outside patient door: verified  Place patient in room close to nursing station: verified  Utilize bed/chair fall alarm: verified  Notify charge of high risk for huddle: verified    Patient Specific Interventions:     Medication: review medications with patient and family  Mental Status/LOC/Awareness: reorient patient, reinforce falls education, check on patient hourly, utilize bed/chair fall alarm and reinforce the use of call light  Toileting: monitor intake and  output/use of appropriate interventions and instruct patient/family on the need to call for assistance when toileting  Volume/Electrolyte Status: ensure patient remains hydrated and monitor abnormal lab values  Communication/Sensory: update plan of care on whiteboard  Behavioral: administer medication as ordered  Mobility: utilize bed/chair fall alarm and ensure bed is locked and in lowest position

## 2017-10-25 NOTE — CARE PLAN
Problem: Bowel/Gastric:  Goal: Normal bowel function is maintained or improved  Outcome: PROGRESSING AS EXPECTED  LBM 10/23/2017.  Pt taking evening stool softener and has no complaints of loose stools or constipation.    Problem: Skin Integrity  Goal: Risk for impaired skin integrity will decrease  Outcome: PROGRESSING AS EXPECTED  Pt has wound to left upper arm and right knee.  Dressings changed on 10/24/2017 per wound care orders

## 2017-10-25 NOTE — PROGRESS NOTES
Assumed care of pt at 0730am. Report received and bedside rounding completed with noc RN. Pt in bed resting comfortably denies any pain at this time and is calm. No SOB, or in any acute distress. q2 turning pt, floating heels. Pt able to sit up for lunch, approx 2 hrs o the chair. Requiring max assist   Fall precautions in place, treaded non slip socks.  hourly rounding in place. See flowsheets for further assessment.

## 2017-10-25 NOTE — CARE PLAN
Problem: Nutritional:  Goal: Achieve adequate nutritional intake  Patient will consume 50% of meals   Outcome: PROGRESSING AS EXPECTED  Patient eating better overall.  Ate 50% of breakfast which includes all of one Boost VHC.    Drank another Boost at lunch per CNA

## 2017-10-26 LAB
ANION GAP SERPL CALC-SCNC: 6 MMOL/L (ref 0–11.9)
ANION GAP SERPL CALC-SCNC: 8 MMOL/L (ref 0–11.9)
BUN SERPL-MCNC: 15 MG/DL (ref 8–22)
BUN SERPL-MCNC: 16 MG/DL (ref 8–22)
CALCIUM SERPL-MCNC: 9 MG/DL (ref 8.5–10.5)
CALCIUM SERPL-MCNC: 9 MG/DL (ref 8.5–10.5)
CHLORIDE SERPL-SCNC: 97 MMOL/L (ref 96–112)
CHLORIDE SERPL-SCNC: 98 MMOL/L (ref 96–112)
CO2 SERPL-SCNC: 21 MMOL/L (ref 20–33)
CO2 SERPL-SCNC: 23 MMOL/L (ref 20–33)
CREAT SERPL-MCNC: 0.45 MG/DL (ref 0.5–1.4)
CREAT SERPL-MCNC: 0.62 MG/DL (ref 0.5–1.4)
ERYTHROCYTE [DISTWIDTH] IN BLOOD BY AUTOMATED COUNT: 42.2 FL (ref 35.9–50)
GFR SERPL CREATININE-BSD FRML MDRD: >60 ML/MIN/1.73 M 2
GFR SERPL CREATININE-BSD FRML MDRD: >60 ML/MIN/1.73 M 2
GLUCOSE SERPL-MCNC: 79 MG/DL (ref 65–99)
GLUCOSE SERPL-MCNC: 87 MG/DL (ref 65–99)
HCT VFR BLD AUTO: 37.3 % (ref 42–52)
HGB BLD-MCNC: 11.4 G/DL (ref 14–18)
MAGNESIUM SERPL-MCNC: 1.6 MG/DL (ref 1.5–2.5)
MCH RBC QN AUTO: 22.5 PG (ref 27–33)
MCHC RBC AUTO-ENTMCNC: 30.6 G/DL (ref 33.7–35.3)
MCV RBC AUTO: 73.7 FL (ref 81.4–97.8)
PLATELET # BLD AUTO: 690 K/UL (ref 164–446)
PMV BLD AUTO: 9.2 FL (ref 9–12.9)
POTASSIUM SERPL-SCNC: 4.3 MMOL/L (ref 3.6–5.5)
POTASSIUM SERPL-SCNC: 4.4 MMOL/L (ref 3.6–5.5)
RBC # BLD AUTO: 5.06 M/UL (ref 4.7–6.1)
SODIUM SERPL-SCNC: 126 MMOL/L (ref 135–145)
SODIUM SERPL-SCNC: 127 MMOL/L (ref 135–145)
WBC # BLD AUTO: 13 K/UL (ref 4.8–10.8)

## 2017-10-26 PROCEDURE — A9270 NON-COVERED ITEM OR SERVICE: HCPCS | Performed by: HOSPITALIST

## 2017-10-26 PROCEDURE — 700105 HCHG RX REV CODE 258: Performed by: HOSPITALIST

## 2017-10-26 PROCEDURE — 700102 HCHG RX REV CODE 250 W/ 637 OVERRIDE(OP): Performed by: HOSPITALIST

## 2017-10-26 PROCEDURE — 700102 HCHG RX REV CODE 250 W/ 637 OVERRIDE(OP): Performed by: INTERNAL MEDICINE

## 2017-10-26 PROCEDURE — A9270 NON-COVERED ITEM OR SERVICE: HCPCS | Performed by: INTERNAL MEDICINE

## 2017-10-26 PROCEDURE — 80048 BASIC METABOLIC PNL TOTAL CA: CPT

## 2017-10-26 PROCEDURE — 99232 SBSQ HOSP IP/OBS MODERATE 35: CPT | Performed by: HOSPITALIST

## 2017-10-26 PROCEDURE — 700111 HCHG RX REV CODE 636 W/ 250 OVERRIDE (IP): Performed by: HOSPITALIST

## 2017-10-26 PROCEDURE — 770006 HCHG ROOM/CARE - MED/SURG/GYN SEMI*

## 2017-10-26 PROCEDURE — 36415 COLL VENOUS BLD VENIPUNCTURE: CPT

## 2017-10-26 RX ORDER — SODIUM CHLORIDE 9 MG/ML
INJECTION, SOLUTION INTRAVENOUS CONTINUOUS
Status: DISCONTINUED | OUTPATIENT
Start: 2017-10-26 | End: 2017-10-29

## 2017-10-26 RX ADMIN — GABAPENTIN 100 MG: 100 CAPSULE ORAL at 20:09

## 2017-10-26 RX ADMIN — MAGNESIUM GLUCONATE 500 MG ORAL TABLET 400 MG: 500 TABLET ORAL at 09:10

## 2017-10-26 RX ADMIN — ACETAMINOPHEN 650 MG: 325 TABLET, FILM COATED ORAL at 20:09

## 2017-10-26 RX ADMIN — ASPIRIN 81 MG: 81 TABLET, COATED ORAL at 09:11

## 2017-10-26 RX ADMIN — LEVOTHYROXINE SODIUM 75 MCG: 75 TABLET ORAL at 05:45

## 2017-10-26 RX ADMIN — MAGNESIUM SULFATE HEPTAHYDRATE 3 G: 500 INJECTION, SOLUTION INTRAMUSCULAR; INTRAVENOUS at 14:38

## 2017-10-26 RX ADMIN — HEPARIN SODIUM 5000 UNITS: 5000 INJECTION, SOLUTION INTRAVENOUS; SUBCUTANEOUS at 09:10

## 2017-10-26 RX ADMIN — THERA TABS 1 TABLET: TAB at 09:10

## 2017-10-26 RX ADMIN — ATORVASTATIN CALCIUM 40 MG: 40 TABLET, FILM COATED ORAL at 20:09

## 2017-10-26 RX ADMIN — GABAPENTIN 100 MG: 100 CAPSULE ORAL at 09:10

## 2017-10-26 RX ADMIN — FOLIC ACID 1 MG: 1 TABLET ORAL at 09:00

## 2017-10-26 RX ADMIN — ACETAMINOPHEN 650 MG: 325 TABLET, FILM COATED ORAL at 12:10

## 2017-10-26 RX ADMIN — METOPROLOL TARTRATE 75 MG: 25 TABLET, FILM COATED ORAL at 20:09

## 2017-10-26 RX ADMIN — METOPROLOL TARTRATE 75 MG: 25 TABLET, FILM COATED ORAL at 09:10

## 2017-10-26 RX ADMIN — SODIUM CHLORIDE 450 ML: 9 INJECTION, SOLUTION INTRAVENOUS at 18:31

## 2017-10-26 RX ADMIN — GABAPENTIN 100 MG: 100 CAPSULE ORAL at 14:37

## 2017-10-26 ASSESSMENT — PAIN SCALES - GENERAL
PAINLEVEL_OUTOF10: 3
PAINLEVEL_OUTOF10: 0

## 2017-10-26 ASSESSMENT — ENCOUNTER SYMPTOMS
PSYCHIATRIC NEGATIVE: 1
HEADACHES: 0
VOMITING: 0
FALLS: 0
FEVER: 0
CHILLS: 0
WEAKNESS: 1
LOSS OF CONSCIOUSNESS: 0
DIZZINESS: 0
PALPITATIONS: 0
SHORTNESS OF BREATH: 0
ABDOMINAL PAIN: 0
NAUSEA: 0

## 2017-10-26 ASSESSMENT — PATIENT HEALTH QUESTIONNAIRE - PHQ9
1. LITTLE INTEREST OR PLEASURE IN DOING THINGS: NOT AT ALL
SUM OF ALL RESPONSES TO PHQ QUESTIONS 1-9: 0
2. FEELING DOWN, DEPRESSED, IRRITABLE, OR HOPELESS: NOT AT ALL
SUM OF ALL RESPONSES TO PHQ9 QUESTIONS 1 AND 2: 0

## 2017-10-26 NOTE — PROGRESS NOTES
Josie Osuna Fall Risk Assessment:     Last Known Fall: Within the last six months  Mobility: Use of assistive device/requires assist of two people  Medications: Cardiovascular or central nervous system meds  Mental Status/LOC/Awareness: Oriented to person and place  Toileting Needs: Incontinence  Volume/Electrolyte Status: No problems  Communication/Sensory: Visual (Glasses)/hearing deficit  Behavior: Appropriate behavior  Josie Osuna Fall Risk Total: 14  Fall Risk Level: MODERATE RISK    Universal Fall Precautions:  call light/belongings in reach, bed in low position and locked, siderails up x 2, use non-slip footwear, adequate lighting, clutter free and spill free environment, educate on level of risk, educate to call for assistance    Fall Risk Level Interventions:    TRIAL (TELE 8, NEURO, MED MARINA 5) Moderate Fall Risk Interventions  Place yellow fall risk ID band on patient: verified  Provide patient/family education based on risk assessment : completed  Educate patient/family to call staff for assistance when getting out of bed: completed  Place fall precaution signage outside patient door: verified  Utilize bed/chair fall alarm: verifiedTRIAL (TELE 8, NEURO, [employer] MARINA 5) High Fall Risk Interventions  Place yellow fall risk ID band on patient: verified  Provide patient/family education based on risk assessment: completed  Educate patient/family to call staff for assistance when getting out of bed: completed  Place fall precaution signage outside patient door: verified  Place patient in room close to nursing station: verified  Utilize bed/chair fall alarm: verified  Notify charge of high risk for huddle: verified    Patient Specific Interventions:     Medication: review medications with patient and family  Mental Status/LOC/Awareness: reinforce falls education, check on patient hourly and utilize bed/chair fall alarm  Toileting: provide frquent toileting  Volume/Electrolyte Status: ensure patient remains  hydrated and monitor abnormal lab values  Communication/Sensory: update plan of care on whiteboard  Behavioral: encourage patient to voice feelings  Mobility: utilize bed/chair fall alarm and ensure bed is locked and in lowest position

## 2017-10-26 NOTE — PROGRESS NOTES
Report received. Assumed care at 0700, assessment complete. Pt is resting. Patient instructed on the plan of care for the day. Bed in lowest position. Call light within reach. Patient provided RN extension.

## 2017-10-26 NOTE — CARE PLAN
Problem: Safety  Goal: Will remain free from injury  Outcome: PROGRESSING AS EXPECTED  Bed on lowest position, call light within reach, patient educated about use of call light and safety precautions.     Problem: Bowel/Gastric:  Goal: Normal bowel function is maintained or improved  Outcome: PROGRESSING AS EXPECTED  Bmx1 incontinent.

## 2017-10-26 NOTE — PROGRESS NOTES
"Assumed care of patient at 0700 . Received report from RN. Patient is AOX2 . Assessment complete. Labs reviewed.Patient and RN discussed plan of care. Patient questions answered. Patient needs are met at this time. Bed in lowest and locked position. Upper bed rails up.  Call light is within reach. Hourly rounding in place.  /71   Pulse 76   Temp 36.1 °C (97 °F)   Resp 16   Ht 1.829 m (6' 0.01\")   Wt 54.3 kg (119 lb 11.4 oz)   SpO2 95%   BMI 16.23 kg/m²     "

## 2017-10-26 NOTE — PROGRESS NOTES
"[facility]ist Progress Note    Date of Service: 10/26/2017    Chief Complaint  72 y.o. male admitted 2017 with urinary tract infection and confusion.    Interval Problem Update  Sitting up in bed. States he's not doing well and hurts from \"head to toe\" but when asked about pain at specific locations he denies it. No acute overnight events.    Consultants/Specialty  Geriatrics  Palliative Care    Disposition  Pending guardianship and placement.        Review of Systems   Constitutional: Positive for malaise/fatigue. Negative for chills and fever.   Respiratory: Negative for shortness of breath.    Cardiovascular: Negative for chest pain, palpitations and leg swelling.   Gastrointestinal: Negative for abdominal pain, nausea and vomiting.   Genitourinary: Negative for dysuria.   Musculoskeletal: Negative for falls.   Neurological: Positive for weakness. Negative for dizziness, loss of consciousness and headaches.   Psychiatric/Behavioral: Negative.    All other systems reviewed and are negative.     Physical Exam  Laboratory/Imaging   Hemodynamics  Temp (24hrs), Av.3 °C (97.3 °F), Min:36.1 °C (97 °F), Max:36.6 °C (97.9 °F)   Temperature: 36.1 °C (97 °F)  Pulse  Av  Min: 60  Max: 144    Blood Pressure : 136/71      Respiratory      Respiration: 16, Pulse Oximetry: 95 %        RUL Breath Sounds: Diminished, RML Breath Sounds: Diminished, RLL Breath Sounds: Diminished, RIGO Breath Sounds: Diminished, LLL Breath Sounds: Diminished    Fluids    Intake/Output Summary (Last 24 hours) at 10/26/17 1245  Last data filed at 10/26/17 0900   Gross per 24 hour   Intake              360 ml   Output             1800 ml   Net            -1440 ml       Nutrition  Orders Placed This Encounter   Procedures   • DIET ORDER     Standing Status:   Standing     Number of Occurrences:   1     Order Specific Question:   Diet:     Answer:   Regular [1]     Order Specific Question:   Texture/Fiber modifications:     Answer:   " Dysphagia 2(Pureed/Chopped)specify fluid consistency(question 6) [2]     Order Specific Question:   Consistency/Fluid modifications:     Answer:   Nectar Thick [2]     Order Specific Question:   Miscellaneous modifications:     Answer:   SLP - Deliver to Nursing Station [22]     Physical Exam   Constitutional: He appears well-developed. No distress.   Thin, frail   HENT:   Mouth/Throat: Oropharynx is clear and moist.   Eyes: Conjunctivae are normal. Pupils are equal, round, and reactive to light.   Neck: Normal range of motion. Neck supple. No tracheal deviation present.   Cardiovascular: Normal rate and regular rhythm.    No murmur heard.  Pulmonary/Chest: Breath sounds normal. No respiratory distress. He has no rales.   Abdominal: Soft. Bowel sounds are normal. He exhibits no distension. There is no tenderness.   Musculoskeletal: He exhibits no edema.   Right BKA with well healed incision, left forefoot amputation   Neurological: He is alert.   AOx2   Skin: Skin is warm and dry.   Psychiatric: He has a normal mood and affect. His speech is delayed. He is withdrawn.   Vitals reviewed.      Recent Labs      10/25/17   0043  10/25/17   2349   WBC  13.2*  13.0*   RBC  5.37  5.06   HEMOGLOBIN  11.9*  11.4*   HEMATOCRIT  40.2*  37.3*   MCV  74.9*  73.7*   MCH  22.2*  22.5*   MCHC  29.6*  30.6*   RDW  42.5  42.2   PLATELETCT  749*  690*   MPV  9.1  9.2     Recent Labs      10/25/17   0043  10/25/17   2349   SODIUM  128*  127*   POTASSIUM  4.2  4.4   CHLORIDE  94*  98   CO2  26  21   GLUCOSE  88  79   BUN  16  16   CREATININE  0.60  0.45*   CALCIUM  9.3  9.0                      Assessment/Plan     * Leukocytosis- (present on admission)   Assessment & Plan    Unclear etiology with no clear source. Daily fluctuations. Has already been treated for aspiration pneumonia, cellulitis, and UTI. VSS, afebrile.  - s/p 14 days of abx  - blood and urine cultures negative  - follow clinically        Hyponatremia- (present on  admission)   Assessment & Plan    Low, fluctuates with PO fluid intake. Not symptomatic and actually more interactive today. Na 128 on 10/25  - continue to follow clinically  - repeat BMP this afternoon pending  - s/p NS at 75cc x1L, given poor PO intake        PAD (peripheral artery disease) (CMS-HCC)- (present on admission)   Assessment & Plan    Significant PAD s/p left forefoot amputation and right BKA  - continue aspirin and statin  - not a candidate for prosthesis  - followed by vascular surgery        Dementia- (present on admission)   Assessment & Plan    Admitted for encephalopathy and debility.Has been evaluated by ethics committee, unable to make his own medical decisions.  - Pending for guardianship pending  - no court date as of yet        Hypomagnesemia- (present on admission)   Assessment & Plan    Mg 1.6, h/o alcohol dependence.  - oral mag daily and monitor  - IV mag PRN repletion        Essential hypertension- (present on admission)   Assessment & Plan    Currently normotensive.  - continue with metoprolol 75 mg BID        Severe protein-calorie malnutrition (CMS-HCC)- (present on admission)   Assessment & Plan    Poor PO intake.  - Nutrition following  - Cont boost TID            Reviewed items::  Medications reviewed and Labs reviewed  Barrera catheter::  No Barrera  DVT prophylaxis pharmacological::  Heparin

## 2017-10-26 NOTE — PROGRESS NOTES
"Assumed care at 1900. Received report from RN. Patient is Aox2 self and place. Assessment complete. Labs reviewed. Patient and RN discussed plan of care. Patient questions answered. Patient needs are met at this time. Bed in lowest and locked position. Call light is within reach. Hourly rounding in place. /59   Pulse 89   Temp 36.2 °C (97.1 °F)   Resp 17   Ht 1.829 m (6' 0.01\")   Wt 54.3 kg (119 lb 11.4 oz)   SpO2 93%   BMI 16.23 kg/m²       "

## 2017-10-26 NOTE — PROGRESS NOTES
Josie Osuna Fall Risk Assessment:     Last Known Fall: Within the last six months  Mobility: Immobilized/requires assist of one person  Medications: Cardiovascular or central nervous system meds  Mental Status/LOC/Awareness: Awake, alert, and oriented to date, place, and person  Toileting Needs: Incontinence  Volume/Electrolyte Status: No problems  Communication/Sensory: No deficits  Behavior: Appropriate behavior  Josie Osuna Fall Risk Total: 11  Fall Risk Level: MODERATE RISK    Universal Fall Precautions:  call light/belongings in reach, bed in low position and locked, adequate lighting, educate on level of risk, educate to call for assistance    Fall Risk Level Interventions:    TRIAL (TELE 8, NEURO, MED MARINA 5) Moderate Fall Risk Interventions  Place yellow fall risk ID band on patient: verified  Provide patient/family education based on risk assessment : verified  Educate patient/family to call staff for assistance when getting out of bed: verified  Place fall precaution signage outside patient door: verified  Utilize bed/chair fall alarm: verifiedTRIAL (TELE 8, NEURO, [employer] MARINA 5) High Fall Risk Interventions  Place yellow fall risk ID band on patient: verified  Provide patient/family education based on risk assessment: completed  Educate patient/family to call staff for assistance when getting out of bed: completed  Place fall precaution signage outside patient door: verified  Place patient in room close to nursing station: verified  Utilize bed/chair fall alarm: verified  Notify charge of high risk for huddle: verified    Patient Specific Interventions:     Medication: review medications with patient and family, assess for medications that can be discontinued or dosage decreased and limit combination of prn medications  Mental Status/LOC/Awareness: reorient patient, reinforce falls education, encourage family to stay with patient, check on patient hourly, utilize bed/chair fall alarm, reinforce the use  of call light and provide activity  Toileting: provide frquent toileting, monitor intake and output/use of appropriate interventions and instruct patient/family on the need to call for assistance when toileting  Volume/Electrolyte Status: ensure patient remains hydrated, monitor abnormal lab values and ensure IV fluids are appropriate  Communication/Sensory: update plan of care on whiteboard  Behavioral: encourage patient to voice feelings, engage patient in daily activities, administer medication as ordered and instruct/reinforce fall program rationale  Mobility: schedule physical activity throughout the day, provide comfort measures during transport, utilize bed/chair fall alarm and ensure bed is locked and in lowest position

## 2017-10-26 NOTE — CARE PLAN
Problem: Safety  Goal: Will remain free from injury  Outcome: PROGRESSING AS EXPECTED  Bed in lowest position, call light within reach. Bed alarm on. Patient educated regarding safety precautions. Room free of clutter.     Problem: Skin Integrity  Goal: Risk for impaired skin integrity will decrease  Outcome: PROGRESSING AS EXPECTED  Patient assessed for skin breakdown. Draw sheet used for repositioning. Patient encouraged to perform dorsi and plantar flexion every hour while in bed.

## 2017-10-27 LAB
ANION GAP SERPL CALC-SCNC: 7 MMOL/L (ref 0–11.9)
BUN SERPL-MCNC: 15 MG/DL (ref 8–22)
CALCIUM SERPL-MCNC: 9 MG/DL (ref 8.5–10.5)
CHLORIDE SERPL-SCNC: 100 MMOL/L (ref 96–112)
CO2 SERPL-SCNC: 22 MMOL/L (ref 20–33)
CREAT SERPL-MCNC: 0.53 MG/DL (ref 0.5–1.4)
ERYTHROCYTE [DISTWIDTH] IN BLOOD BY AUTOMATED COUNT: 43.6 FL (ref 35.9–50)
GFR SERPL CREATININE-BSD FRML MDRD: >60 ML/MIN/1.73 M 2
GLUCOSE SERPL-MCNC: 84 MG/DL (ref 65–99)
HCT VFR BLD AUTO: 37.6 % (ref 42–52)
HGB BLD-MCNC: 11.3 G/DL (ref 14–18)
MCH RBC QN AUTO: 22.6 PG (ref 27–33)
MCHC RBC AUTO-ENTMCNC: 30.1 G/DL (ref 33.7–35.3)
MCV RBC AUTO: 75.2 FL (ref 81.4–97.8)
PLATELET # BLD AUTO: 662 K/UL (ref 164–446)
PMV BLD AUTO: 9.2 FL (ref 9–12.9)
POTASSIUM SERPL-SCNC: 4.6 MMOL/L (ref 3.6–5.5)
RBC # BLD AUTO: 5 M/UL (ref 4.7–6.1)
SODIUM SERPL-SCNC: 129 MMOL/L (ref 135–145)
WBC # BLD AUTO: 12 K/UL (ref 4.8–10.8)

## 2017-10-27 PROCEDURE — A9270 NON-COVERED ITEM OR SERVICE: HCPCS | Performed by: HOSPITALIST

## 2017-10-27 PROCEDURE — 99231 SBSQ HOSP IP/OBS SF/LOW 25: CPT | Performed by: HOSPITALIST

## 2017-10-27 PROCEDURE — 700102 HCHG RX REV CODE 250 W/ 637 OVERRIDE(OP): Performed by: INTERNAL MEDICINE

## 2017-10-27 PROCEDURE — 700102 HCHG RX REV CODE 250 W/ 637 OVERRIDE(OP): Performed by: HOSPITALIST

## 2017-10-27 PROCEDURE — 770006 HCHG ROOM/CARE - MED/SURG/GYN SEMI*

## 2017-10-27 PROCEDURE — 85027 COMPLETE CBC AUTOMATED: CPT

## 2017-10-27 PROCEDURE — 36415 COLL VENOUS BLD VENIPUNCTURE: CPT

## 2017-10-27 PROCEDURE — 80048 BASIC METABOLIC PNL TOTAL CA: CPT

## 2017-10-27 PROCEDURE — 700111 HCHG RX REV CODE 636 W/ 250 OVERRIDE (IP): Performed by: HOSPITALIST

## 2017-10-27 PROCEDURE — A9270 NON-COVERED ITEM OR SERVICE: HCPCS | Performed by: INTERNAL MEDICINE

## 2017-10-27 RX ADMIN — ACETAMINOPHEN 650 MG: 325 TABLET, FILM COATED ORAL at 20:03

## 2017-10-27 RX ADMIN — GABAPENTIN 100 MG: 100 CAPSULE ORAL at 16:17

## 2017-10-27 RX ADMIN — ASPIRIN 81 MG: 81 TABLET, COATED ORAL at 10:12

## 2017-10-27 RX ADMIN — THERA TABS 1 TABLET: TAB at 10:12

## 2017-10-27 RX ADMIN — ACETAMINOPHEN 650 MG: 325 TABLET, FILM COATED ORAL at 10:12

## 2017-10-27 RX ADMIN — GABAPENTIN 100 MG: 100 CAPSULE ORAL at 10:12

## 2017-10-27 RX ADMIN — FOLIC ACID 1 MG: 1 TABLET ORAL at 10:12

## 2017-10-27 RX ADMIN — GABAPENTIN 100 MG: 100 CAPSULE ORAL at 20:03

## 2017-10-27 RX ADMIN — MAGNESIUM GLUCONATE 500 MG ORAL TABLET 400 MG: 500 TABLET ORAL at 10:12

## 2017-10-27 RX ADMIN — METOPROLOL TARTRATE 75 MG: 25 TABLET, FILM COATED ORAL at 10:12

## 2017-10-27 RX ADMIN — METOPROLOL TARTRATE 75 MG: 25 TABLET, FILM COATED ORAL at 20:03

## 2017-10-27 RX ADMIN — ATORVASTATIN CALCIUM 40 MG: 40 TABLET, FILM COATED ORAL at 20:03

## 2017-10-27 ASSESSMENT — ENCOUNTER SYMPTOMS
SHORTNESS OF BREATH: 0
FEVER: 0
VOMITING: 0
CHILLS: 0
LOSS OF CONSCIOUSNESS: 0
ABDOMINAL PAIN: 0
PALPITATIONS: 0
WEAKNESS: 1
DIZZINESS: 0
NAUSEA: 0
FALLS: 0
PSYCHIATRIC NEGATIVE: 1
HEADACHES: 0
MYALGIAS: 1

## 2017-10-27 ASSESSMENT — PAIN SCALES - GENERAL
PAINLEVEL_OUTOF10: 4
PAINLEVEL_OUTOF10: 3

## 2017-10-27 NOTE — PROGRESS NOTES
Patient was turned to observe sacrum. There is visible redness that appears to be incontinence related. Barrier cream was applied.

## 2017-10-27 NOTE — CARE PLAN
Problem: Discharge Barriers/Planning  Goal: Patient's continuum of care needs will be met  Awaiting guardianship court date.     Problem: Pain Management  Goal: Pain level will decrease to patient's comfort goal  Will medicate for pain PRN see MAR

## 2017-10-27 NOTE — PROGRESS NOTES
Assumed care of patient at 1915, received report from day RN at that time. Communication board updated and reviewed with pt. Assessment complete. Bed alarm on. No other needs at this time.

## 2017-10-27 NOTE — THERAPY
"Attempted PT treatment session this am. Upon arrival and requesting to assist pt up to WC, pt adamantly refused. When asked \"why\" pt replied, \"I don't want to.\" Pt did state WC was not comfortable but that he was up in the chair yesterday. Pt re-educated on importance of daily mobility and being up in chair multiple times daily to improve functional mobility and endurance. Pt continues to decline but then stated \"maybe later.\" Will attempt PT treatment session as able  "

## 2017-10-27 NOTE — PROGRESS NOTES
[facility]ist Progress Note    Date of Service: 10/27/2017    Chief Complaint  72 y.o. male admitted 2017 with urinary tract infection and confusion.    Interval Problem Update  Sleeping, no acute distress. Doesn't verbalize much today but shakes head and answers questions appropriately. Has pain on his right knee. No fever, chills. No acute overnight events.    Consultants/Specialty  Geriatrics  Palliative Care    Disposition  Pending guardianship and placement.        Review of Systems   Constitutional: Negative for chills, fever and malaise/fatigue.   Respiratory: Negative for shortness of breath.    Cardiovascular: Negative for chest pain, palpitations and leg swelling.   Gastrointestinal: Negative for abdominal pain, nausea and vomiting.   Genitourinary: Negative for dysuria.   Musculoskeletal: Positive for joint pain and myalgias. Negative for falls.   Neurological: Positive for weakness. Negative for dizziness, loss of consciousness and headaches.   Psychiatric/Behavioral: Negative.    All other systems reviewed and are negative.     Physical Exam  Laboratory/Imaging   Hemodynamics  Temp (24hrs), Av.6 °C (97.9 °F), Min:36.2 °C (97.2 °F), Max:36.9 °C (98.4 °F)   Temperature: 36.2 °C (97.2 °F)  Pulse  Av.6  Min: 60  Max: 144    Blood Pressure : 142/87      Respiratory      Respiration: 18, Pulse Oximetry: 96 %        RUL Breath Sounds: Diminished, RML Breath Sounds: Diminished, RLL Breath Sounds: Diminished, RIGO Breath Sounds: Diminished, LLL Breath Sounds: Diminished    Fluids    Intake/Output Summary (Last 24 hours) at 10/27/17 1454  Last data filed at 10/27/17 1100   Gross per 24 hour   Intake              610 ml   Output              600 ml   Net               10 ml       Nutrition  Orders Placed This Encounter   Procedures   • DIET ORDER     Standing Status:   Standing     Number of Occurrences:   1     Order Specific Question:   Diet:     Answer:   Regular [1]     Order Specific  Question:   Texture/Fiber modifications:     Answer:   Dysphagia 2(Pureed/Chopped)specify fluid consistency(question 6) [2]     Order Specific Question:   Consistency/Fluid modifications:     Answer:   Nectar Thick [2]     Order Specific Question:   Miscellaneous modifications:     Answer:   SLP - Deliver to Nursing Station [22]     Physical Exam   Constitutional: He appears well-developed. No distress.   Thin, frail   HENT:   Mouth/Throat: Oropharynx is clear and moist.   Eyes: Conjunctivae are normal. Pupils are equal, round, and reactive to light.   Neck: Normal range of motion. Neck supple. No tracheal deviation present.   Cardiovascular: Normal rate and regular rhythm.    No murmur heard.  Pulmonary/Chest: Breath sounds normal. No respiratory distress. He has no rales.   Abdominal: Soft. Bowel sounds are normal. He exhibits no distension. There is no tenderness.   Musculoskeletal: He exhibits no edema.   Right BKA with well healed incision, left forefoot amputation. Right knee with circular wound with good granulation tissue   Neurological: He is alert.   AOx2   Skin: Skin is warm and dry.   Psychiatric: He has a normal mood and affect. His speech is delayed. He is withdrawn.   Vitals reviewed.      Recent Labs      10/25/17   0043  10/25/17   2349  10/27/17   0210   WBC  13.2*  13.0*  12.0*   RBC  5.37  5.06  5.00   HEMOGLOBIN  11.9*  11.4*  11.3*   HEMATOCRIT  40.2*  37.3*  37.6*   MCV  74.9*  73.7*  75.2*   MCH  22.2*  22.5*  22.6*   MCHC  29.6*  30.6*  30.1*   RDW  42.5  42.2  43.6   PLATELETCT  749*  690*  662*   MPV  9.1  9.2  9.2     Recent Labs      10/25/17   2349  10/26/17   1308  10/27/17   0210   SODIUM  127*  126*  129*   POTASSIUM  4.4  4.3  4.6   CHLORIDE  98  97  100   CO2  21  23  22   GLUCOSE  79  87  84   BUN  16  15  15   CREATININE  0.45*  0.62  0.53   CALCIUM  9.0  9.0  9.0                      Assessment/Plan     * Leukocytosis- (present on admission)   Assessment & Plan    Unclear  etiology with no clear source. Daily fluctuations. Has already been treated for aspiration pneumonia, cellulitis, and UTI. VSS, afebrile. WBC down to 12 on 10/27  - s/p 14 days of abx  - blood and urine cultures negative  - follow clinically        Hyponatremia- (present on admission)   Assessment & Plan    Low, fluctuates with PO fluid intake. Not symptomatic and actually more interactive today. Na 126 on 10/26 and didn't respond well to NS x1 L, responded well to continued IVF with Na 129 today  - continue to follow clinically  - repeat BMP this afternoon pending  - continue IVF given poor PO intake        PAD (peripheral artery disease) (CMS-HCC)- (present on admission)   Assessment & Plan    Significant PAD s/p left forefoot amputation and right BKA  - continue aspirin and statin  - not a candidate for prosthesis  - followed by vascular surgery and wound care        Dementia- (present on admission)   Assessment & Plan    Admitted for encephalopathy and debility.Has been evaluated by ethics committee, unable to make his own medical decisions.  - Pending for guardianship pending  - no court date as of yet        Hypomagnesemia- (present on admission)   Assessment & Plan    Mg 1.6, h/o alcohol dependence.  - oral mag daily and monitor  - IV mag PRN repletion        Essential hypertension- (present on admission)   Assessment & Plan    Currently normotensive.  - continue with metoprolol 75 mg BID        Severe protein-calorie malnutrition (CMS-HCC)- (present on admission)   Assessment & Plan    Poor PO intake.  - Nutrition following  - Cont boost TID            Reviewed items::  Medications reviewed and Labs reviewed  Barrera catheter::  No Barrera  DVT prophylaxis pharmacological::  Heparin

## 2017-10-27 NOTE — PROGRESS NOTES
Josie Osuna Fall Risk Assessment:     Last Known Fall: Within the last six months  Mobility: Use of assistive device/requires assist of two people  Medications: Cardiovascular or central nervous system meds  Mental Status/LOC/Awareness: Awake, alert, and oriented to date, place, and person  Toileting Needs: Incontinence  Volume/Electrolyte Status: Use of IV fluids/tube feeds  Communication/Sensory: No deficits  Behavior: Appropriate behavior  Josie Osuna Fall Risk Total: 14  Fall Risk Level: MODERATE RISK    Universal Fall Precautions:  call light/belongings in reach, bed in low position and locked, siderails up x 2, use non-slip footwear, adequate lighting, clutter free and spill free environment, educate to call for assistance, educate on level of risk    Fall Risk Level Interventions:    TRIAL ([employer], NEURO, MED MARINA 5) Moderate Fall Risk Interventions  Place yellow fall risk ID band on patient: verified  Provide patient/family education based on risk assessment : verified  Educate patient/family to call staff for assistance when getting out of bed: verified  Place fall precaution signage outside patient door: verified  Utilize bed/chair fall alarm: verifiedTRIAL ([employer] 8, NEURO, MED MARINA 5) High Fall Risk Interventions  Place yellow fall risk ID band on patient: verified  Provide patient/family education based on risk assessment: completed  Educate patient/family to call staff for assistance when getting out of bed: completed  Place fall precaution signage outside patient door: verified  Place patient in room close to nursing station: verified  Utilize bed/chair fall alarm: verified  Notify charge of high risk for huddle: verified    Patient Specific Interventions:     Medication: review medications with patient and family and limit combination of prn medications  Mental Status/LOC/Awareness: reinforce falls education, utilize bed/chair fall alarm and reinforce the use of call light  Toileting: provide  frquent toileting, instruct male patients prone to dizziness to void while sitting and toilet prior to giving pain medications  Volume/Electrolyte Status: ensure patient remains hydrated, monitor abnormal lab values and ensure IV fluids are appropriate  Communication/Sensory: update plan of care on whiteboard and ensure proper positioning when transferrng/ambulating  Behavioral: encourage patient to voice feelings, administer medication as ordered and instruct/reinforce fall program rationale  Mobility: dangle prior to standing, utilize bed/chair fall alarm, ensure bed is locked and in lowest position and provide appropriate assistive device

## 2017-10-28 LAB
ANION GAP SERPL CALC-SCNC: 9 MMOL/L (ref 0–11.9)
BUN SERPL-MCNC: 14 MG/DL (ref 8–22)
CALCIUM SERPL-MCNC: 8.9 MG/DL (ref 8.5–10.5)
CHLORIDE SERPL-SCNC: 101 MMOL/L (ref 96–112)
CO2 SERPL-SCNC: 21 MMOL/L (ref 20–33)
CREAT SERPL-MCNC: 0.47 MG/DL (ref 0.5–1.4)
ERYTHROCYTE [DISTWIDTH] IN BLOOD BY AUTOMATED COUNT: 44.3 FL (ref 35.9–50)
GFR SERPL CREATININE-BSD FRML MDRD: >60 ML/MIN/1.73 M 2
GLUCOSE SERPL-MCNC: 79 MG/DL (ref 65–99)
HCT VFR BLD AUTO: 38.4 % (ref 42–52)
HGB BLD-MCNC: 11.4 G/DL (ref 14–18)
MAGNESIUM SERPL-MCNC: 1.8 MG/DL (ref 1.5–2.5)
MCH RBC QN AUTO: 22.6 PG (ref 27–33)
MCHC RBC AUTO-ENTMCNC: 29.7 G/DL (ref 33.7–35.3)
MCV RBC AUTO: 76 FL (ref 81.4–97.8)
PLATELET # BLD AUTO: 662 K/UL (ref 164–446)
PMV BLD AUTO: 9 FL (ref 9–12.9)
POTASSIUM SERPL-SCNC: 4.8 MMOL/L (ref 3.6–5.5)
RBC # BLD AUTO: 5.05 M/UL (ref 4.7–6.1)
SODIUM SERPL-SCNC: 131 MMOL/L (ref 135–145)
WBC # BLD AUTO: 11.7 K/UL (ref 4.8–10.8)

## 2017-10-28 PROCEDURE — 83735 ASSAY OF MAGNESIUM: CPT

## 2017-10-28 PROCEDURE — 700111 HCHG RX REV CODE 636 W/ 250 OVERRIDE (IP): Performed by: HOSPITALIST

## 2017-10-28 PROCEDURE — 700102 HCHG RX REV CODE 250 W/ 637 OVERRIDE(OP): Performed by: INTERNAL MEDICINE

## 2017-10-28 PROCEDURE — 80048 BASIC METABOLIC PNL TOTAL CA: CPT

## 2017-10-28 PROCEDURE — A9270 NON-COVERED ITEM OR SERVICE: HCPCS | Performed by: HOSPITALIST

## 2017-10-28 PROCEDURE — 700102 HCHG RX REV CODE 250 W/ 637 OVERRIDE(OP): Performed by: HOSPITALIST

## 2017-10-28 PROCEDURE — 36415 COLL VENOUS BLD VENIPUNCTURE: CPT

## 2017-10-28 PROCEDURE — 99231 SBSQ HOSP IP/OBS SF/LOW 25: CPT | Performed by: HOSPITALIST

## 2017-10-28 PROCEDURE — 770006 HCHG ROOM/CARE - MED/SURG/GYN SEMI*

## 2017-10-28 PROCEDURE — A9270 NON-COVERED ITEM OR SERVICE: HCPCS | Performed by: INTERNAL MEDICINE

## 2017-10-28 PROCEDURE — 85027 COMPLETE CBC AUTOMATED: CPT

## 2017-10-28 RX ORDER — MAGNESIUM SULFATE HEPTAHYDRATE 40 MG/ML
2 INJECTION, SOLUTION INTRAVENOUS ONCE
Status: COMPLETED | OUTPATIENT
Start: 2017-10-28 | End: 2017-10-28

## 2017-10-28 RX ADMIN — ASPIRIN 81 MG: 81 TABLET, COATED ORAL at 08:07

## 2017-10-28 RX ADMIN — STANDARDIZED SENNA CONCENTRATE AND DOCUSATE SODIUM 2 TABLET: 8.6; 5 TABLET, FILM COATED ORAL at 20:02

## 2017-10-28 RX ADMIN — GABAPENTIN 100 MG: 100 CAPSULE ORAL at 15:05

## 2017-10-28 RX ADMIN — THERA TABS 1 TABLET: TAB at 08:07

## 2017-10-28 RX ADMIN — MAGNESIUM GLUCONATE 500 MG ORAL TABLET 400 MG: 500 TABLET ORAL at 08:07

## 2017-10-28 RX ADMIN — GABAPENTIN 100 MG: 100 CAPSULE ORAL at 08:07

## 2017-10-28 RX ADMIN — LEVOTHYROXINE SODIUM 75 MCG: 75 TABLET ORAL at 05:51

## 2017-10-28 RX ADMIN — MAGNESIUM SULFATE IN WATER 2 G: 40 INJECTION, SOLUTION INTRAVENOUS at 11:33

## 2017-10-28 RX ADMIN — ATORVASTATIN CALCIUM 40 MG: 40 TABLET, FILM COATED ORAL at 20:02

## 2017-10-28 RX ADMIN — HEPARIN SODIUM 5000 UNITS: 5000 INJECTION, SOLUTION INTRAVENOUS; SUBCUTANEOUS at 20:03

## 2017-10-28 RX ADMIN — METOPROLOL TARTRATE 75 MG: 25 TABLET, FILM COATED ORAL at 08:09

## 2017-10-28 RX ADMIN — HEPARIN SODIUM 5000 UNITS: 5000 INJECTION, SOLUTION INTRAVENOUS; SUBCUTANEOUS at 08:09

## 2017-10-28 RX ADMIN — GABAPENTIN 100 MG: 100 CAPSULE ORAL at 20:02

## 2017-10-28 RX ADMIN — FOLIC ACID 1 MG: 1 TABLET ORAL at 08:07

## 2017-10-28 RX ADMIN — METOPROLOL TARTRATE 75 MG: 25 TABLET, FILM COATED ORAL at 20:03

## 2017-10-28 ASSESSMENT — PAIN SCALES - GENERAL
PAINLEVEL_OUTOF10: 3
PAINLEVEL_OUTOF10: 0

## 2017-10-28 ASSESSMENT — ENCOUNTER SYMPTOMS
NAUSEA: 0
HEADACHES: 0
LOSS OF CONSCIOUSNESS: 0
CHILLS: 0
PSYCHIATRIC NEGATIVE: 1
MYALGIAS: 1
VOMITING: 0
WEAKNESS: 1
PALPITATIONS: 0
FALLS: 0
FEVER: 0
SHORTNESS OF BREATH: 0
ABDOMINAL PAIN: 0
DIZZINESS: 0

## 2017-10-28 NOTE — PROGRESS NOTES
"Assumed care of patient at 0700 . Received report from RN. Patient is AOX2 . Assessment complete. Labs reviewed.Patient and RN discussed plan of care. Patient questions answered. Patient needs are met at this time. Bed in lowest and locked position. Upper bed rails up.  Call light is within reach. Hourly rounding in place.  /74   Pulse 71   Temp 36.7 °C (98.1 °F)   Resp 18   Ht 1.829 m (6' 0.01\")   Wt 55.1 kg (121 lb 7.6 oz)   SpO2 92%   BMI 16.47 kg/m²     "

## 2017-10-28 NOTE — PROGRESS NOTES
Assumed care of patient at 1915, received report from day RN at that time. Communication board updated and reviewed with pt. Dressing changes completed. No other needs at this time. Call light and personal belongings within reach.

## 2017-10-28 NOTE — PROGRESS NOTES
Josie Osuna Fall Risk Assessment:     Last Known Fall: Within the last six months  Mobility: Use of assistive device/requires assist of two people  Medications: Cardiovascular or central nervous system meds  Mental Status/LOC/Awareness: Awake, alert, and oriented to date, place, and person  Toileting Needs: Incontinence  Volume/Electrolyte Status: Use of IV fluids/tube feeds  Communication/Sensory: No deficits  Behavior: Appropriate behavior  Josie Osuna Fall Risk Total: 14  Fall Risk Level: MODERATE RISK     Universal Fall Precautions:  call light/belongings in reach, bed in low position and locked, siderails up x 2, use non-slip footwear, adequate lighting, clutter free and spill free environment, educate to call for assistance, educate on level of risk     Fall Risk Level Interventions:    TRIAL ([employer], NEURO, MED MARINA 5) Moderate Fall Risk Interventions  Place yellow fall risk ID band on patient: verified  Provide patient/family education based on risk assessment : verified  Educate patient/family to call staff for assistance when getting out of bed: verified  Place fall precaution signage outside patient door: verified  Utilize bed/chair fall alarm: verifiedTRIAL ([employer] 8, NEURO, MED MARINA 5) High Fall Risk Interventions  Place yellow fall risk ID band on patient: verified  Provide patient/family education based on risk assessment: completed  Educate patient/family to call staff for assistance when getting out of bed: completed  Place fall precaution signage outside patient door: verified  Place patient in room close to nursing station: verified  Utilize bed/chair fall alarm: verified  Notify charge of high risk for huddle: verified     Patient Specific Interventions:      Medication: review medications with patient and family and limit combination of prn medications  Mental Status/LOC/Awareness: reinforce falls education, utilize bed/chair fall alarm and reinforce the use of call light  Toileting: provide  frquent toileting, instruct male patients prone to dizziness to void while sitting and toilet prior to giving pain medications  Volume/Electrolyte Status: ensure patient remains hydrated, monitor abnormal lab values and ensure IV fluids are appropriate  Communication/Sensory: update plan of care on whiteboard and ensure proper positioning when transferrng/ambulating  Behavioral: encourage patient to voice feelings, administer medication as ordered and instruct/reinforce fall program rationale  Mobility: dangle prior to standing, utilize bed/chair fall alarm, ensure bed is locked and in lowest position and provide appropriate assistive device

## 2017-10-28 NOTE — CARE PLAN
Problem: Bowel/Gastric:  Goal: Normal bowel function is maintained or improved  Outcome: PROGRESSING AS EXPECTED  Bowel movement yesterday per patient. Refused todays Senna. Educated on constipation risks    Problem: Discharge Barriers/Planning  Goal: Patient's continuum of care needs will be met  Outcome: PROGRESSING SLOWER THAN EXPECTED  Awaiting guardianship

## 2017-10-28 NOTE — CARE PLAN
Problem: Safety  Goal: Will remain free from injury  Outcome: PROGRESSING AS EXPECTED  Bed in lowest position, call light within reach. Bed alarm on. Patient educated regarding safety precautions. Room free of clutter.     Problem: Infection  Goal: Will remain free from infection  Outcome: PROGRESSING AS EXPECTED  Educated patient on proper handwashing techniques with soap and water. Explained the importance of washing hands after using the restroom, before and after eating. Hand  is available on the walls of the rooms for use when hands are not visibly soiled.

## 2017-10-28 NOTE — PROGRESS NOTES
[facility]    Date of Service: 10/28/2017    Chief Complaint  72 y.o. male admitted 2017 with urinary tract infection and confusion.    Interval Problem Update  Sitting up in bed, having breakfast. Much more alert today. Right knee still with pain but no worse. No acute overnight events.    Consultants/Specialty  Geriatrics  Palliative Care    Disposition  Pending guardianship and placement.        Review of Systems   Constitutional: Negative for chills, fever and malaise/fatigue.   Respiratory: Negative for shortness of breath.    Cardiovascular: Negative for chest pain, palpitations and leg swelling.   Gastrointestinal: Negative for abdominal pain, nausea and vomiting.   Genitourinary: Negative for dysuria.   Musculoskeletal: Positive for joint pain and myalgias. Negative for falls.   Neurological: Positive for weakness. Negative for dizziness, loss of consciousness and headaches.   Psychiatric/Behavioral: Negative.    All other systems reviewed and are negative.     Physical Exam  Laboratory/Imaging   Hemodynamics  Temp (24hrs), Av.8 °C (98.3 °F), Min:36.4 °C (97.6 °F), Max:37.2 °C (98.9 °F)   Temperature: 37.2 °C (98.9 °F)  Pulse  Av.3  Min: 60  Max: 144    Blood Pressure : 151/87      Respiratory      Respiration: 20, Pulse Oximetry: 97 %        RUL Breath Sounds: Clear, RML Breath Sounds: Diminished, RLL Breath Sounds: Diminished, RIGO Breath Sounds: Clear, LLL Breath Sounds: Diminished    Fluids    Intake/Output Summary (Last 24 hours) at 10/28/17 1046  Last data filed at 10/28/17 0800   Gross per 24 hour   Intake              890 ml   Output                0 ml   Net              890 ml       Nutrition  Orders Placed This Encounter   Procedures   • DIET ORDER     Standing Status:   Standing     Number of Occurrences:   1     Order Specific Question:   Diet:     Answer:   Regular [1]     Order Specific Question:   Texture/Fiber modifications:     Answer:   Dysphagia  2(Pureed/Chopped)specify fluid consistency(question 6) [2]     Order Specific Question:   Consistency/Fluid modifications:     Answer:   Nectar Thick [2]     Order Specific Question:   Miscellaneous modifications:     Answer:   SLP - Deliver to Nursing Station [22]     Physical Exam   Constitutional: He appears well-developed. No distress.   Thin, frail   HENT:   Mouth/Throat: Oropharynx is clear and moist.   Eyes: Conjunctivae are normal. Pupils are equal, round, and reactive to light.   Neck: Normal range of motion. Neck supple. No tracheal deviation present.   Cardiovascular: Normal rate and regular rhythm.    No murmur heard.  Pulmonary/Chest: Breath sounds normal. No respiratory distress. He has no rales.   Abdominal: Soft. Bowel sounds are normal. He exhibits no distension. There is no tenderness.   Musculoskeletal: He exhibits no edema.   Right BKA with well healed incision, left forefoot amputation. Right knee with circular wound with good granulation tissue   Neurological: He is alert.   AOx2   Skin: Skin is warm and dry.   Psychiatric: He has a normal mood and affect. His behavior is normal. His speech is delayed.   Vitals reviewed.      Recent Labs      10/25/17   2349  10/27/17   0210  10/28/17   0311   WBC  13.0*  12.0*  11.7*   RBC  5.06  5.00  5.05   HEMOGLOBIN  11.4*  11.3*  11.4*   HEMATOCRIT  37.3*  37.6*  38.4*   MCV  73.7*  75.2*  76.0*   MCH  22.5*  22.6*  22.6*   MCHC  30.6*  30.1*  29.7*   RDW  42.2  43.6  44.3   PLATELETCT  690*  662*  662*   MPV  9.2  9.2  9.0     Recent Labs      10/26/17   1308  10/27/17   0210  10/28/17   0311   SODIUM  126*  129*  131*   POTASSIUM  4.3  4.6  4.8   CHLORIDE  97  100  101   CO2  23  22  21   GLUCOSE  87  84  79   BUN  15  15  14   CREATININE  0.62  0.53  0.47*   CALCIUM  9.0  9.0  8.9                      Assessment/Plan     * Leukocytosis- (present on admission)   Assessment & Plan    Unclear etiology with no clear source. Daily fluctuations. Has already  been treated for aspiration pneumonia, cellulitis, and UTI. VSS, afebrile. WBC down to 11.7 on 10/28  - s/p 14 days of abx  - blood and urine cultures negative  - follow clinically        Hyponatremia- (present on admission)   Assessment & Plan    Low, fluctuates with PO fluid intake. Not symptomatic and actually more interactive today. Na 126 on 10/26 and didn't respond well to NS x1 L, responded well to continued IVF with Na 131 today  - continue to follow clinically  - continue IVF given poor PO intake, encourage PO intake        PAD (peripheral artery disease) (CMS-HCC)- (present on admission)   Assessment & Plan    Significant PAD s/p left forefoot amputation and right BKA  - continue aspirin and statin  - not a candidate for prosthesis  - followed by vascular surgery and wound care        Dementia- (present on admission)   Assessment & Plan    Admitted for encephalopathy and debility. Has been evaluated by ethics committee, unable to make his own medical decisions.  - guardianship pending        Hypomagnesemia- (present on admission)   Assessment & Plan    Mg 1.8 today, h/o alcohol dependence.  - oral mag daily and monitor  - IV mag PRN repletion        Essential hypertension- (present on admission)   Assessment & Plan    Currently normo- to hypertensive.  - continue with metoprolol 75 mg BID  - PRN normodyne        Severe protein-calorie malnutrition (CMS-HCC)- (present on admission)   Assessment & Plan    Poor PO intake.  - Nutrition following  - Cont boost TID            Reviewed items::  Medications reviewed and Labs reviewed  Barrera catheter::  No Barrera  DVT prophylaxis pharmacological::  Heparin

## 2017-10-28 NOTE — CARE PLAN
Problem: Skin Integrity  Goal: Risk for impaired skin integrity will decrease  Pt is on a waffle overlay, dressings changed this shift. Pt tolerated dressing changes.      Problem: Pain Management  Goal: Pain level will decrease to patient's comfort goal  Will medicate for pain PRN see MAR

## 2017-10-28 NOTE — PROGRESS NOTES
Patient alert oriented to self. Complains of pain to amputation site. Site elevated and medicated with PRN pain medications with good result. Patient educated on POC. Refusing transferring to chair this shift. Refusing repositioning at times. Mepilex in place. Incontinent at times. No complaints at this time.

## 2017-10-29 LAB
ANION GAP SERPL CALC-SCNC: 6 MMOL/L (ref 0–11.9)
BUN SERPL-MCNC: 12 MG/DL (ref 8–22)
CALCIUM SERPL-MCNC: 9.2 MG/DL (ref 8.5–10.5)
CHLORIDE SERPL-SCNC: 97 MMOL/L (ref 96–112)
CO2 SERPL-SCNC: 23 MMOL/L (ref 20–33)
CREAT SERPL-MCNC: 0.44 MG/DL (ref 0.5–1.4)
ERYTHROCYTE [DISTWIDTH] IN BLOOD BY AUTOMATED COUNT: 42.7 FL (ref 35.9–50)
GFR SERPL CREATININE-BSD FRML MDRD: >60 ML/MIN/1.73 M 2
GLUCOSE SERPL-MCNC: 86 MG/DL (ref 65–99)
HCT VFR BLD AUTO: 37.7 % (ref 42–52)
HGB BLD-MCNC: 11.5 G/DL (ref 14–18)
MCH RBC QN AUTO: 22.9 PG (ref 27–33)
MCHC RBC AUTO-ENTMCNC: 30.5 G/DL (ref 33.7–35.3)
MCV RBC AUTO: 75.1 FL (ref 81.4–97.8)
PLATELET # BLD AUTO: 621 K/UL (ref 164–446)
PMV BLD AUTO: 9.3 FL (ref 9–12.9)
POTASSIUM SERPL-SCNC: 4.6 MMOL/L (ref 3.6–5.5)
RBC # BLD AUTO: 5.02 M/UL (ref 4.7–6.1)
SODIUM SERPL-SCNC: 126 MMOL/L (ref 135–145)
WBC # BLD AUTO: 12.3 K/UL (ref 4.8–10.8)

## 2017-10-29 PROCEDURE — A9270 NON-COVERED ITEM OR SERVICE: HCPCS | Performed by: HOSPITALIST

## 2017-10-29 PROCEDURE — 700105 HCHG RX REV CODE 258: Performed by: HOSPITALIST

## 2017-10-29 PROCEDURE — 36415 COLL VENOUS BLD VENIPUNCTURE: CPT

## 2017-10-29 PROCEDURE — 302255 BARRIER CREAM MOISTURE BAZA PROTECT (ZINC) 5OZ: Performed by: HOSPITALIST

## 2017-10-29 PROCEDURE — 700102 HCHG RX REV CODE 250 W/ 637 OVERRIDE(OP): Performed by: HOSPITALIST

## 2017-10-29 PROCEDURE — 770021 HCHG ROOM/CARE - ISO PRIVATE

## 2017-10-29 PROCEDURE — 700111 HCHG RX REV CODE 636 W/ 250 OVERRIDE (IP): Performed by: HOSPITALIST

## 2017-10-29 PROCEDURE — 99231 SBSQ HOSP IP/OBS SF/LOW 25: CPT | Performed by: HOSPITALIST

## 2017-10-29 PROCEDURE — 85027 COMPLETE CBC AUTOMATED: CPT

## 2017-10-29 PROCEDURE — A9270 NON-COVERED ITEM OR SERVICE: HCPCS | Performed by: INTERNAL MEDICINE

## 2017-10-29 PROCEDURE — 770006 HCHG ROOM/CARE - MED/SURG/GYN SEMI*

## 2017-10-29 PROCEDURE — 80048 BASIC METABOLIC PNL TOTAL CA: CPT

## 2017-10-29 PROCEDURE — 700102 HCHG RX REV CODE 250 W/ 637 OVERRIDE(OP): Performed by: INTERNAL MEDICINE

## 2017-10-29 RX ORDER — SODIUM CHLORIDE 1 G/1
1 TABLET ORAL 2 TIMES DAILY WITH MEALS
Status: DISCONTINUED | OUTPATIENT
Start: 2017-10-29 | End: 2017-11-15

## 2017-10-29 RX ORDER — SODIUM CHLORIDE 9 MG/ML
INJECTION, SOLUTION INTRAVENOUS CONTINUOUS
Status: DISCONTINUED | OUTPATIENT
Start: 2017-10-29 | End: 2017-10-31

## 2017-10-29 RX ADMIN — GABAPENTIN 100 MG: 100 CAPSULE ORAL at 08:51

## 2017-10-29 RX ADMIN — HEPARIN SODIUM 5000 UNITS: 5000 INJECTION, SOLUTION INTRAVENOUS; SUBCUTANEOUS at 21:57

## 2017-10-29 RX ADMIN — SODIUM CHLORIDE: 9 INJECTION, SOLUTION INTRAVENOUS at 17:07

## 2017-10-29 RX ADMIN — THERA TABS 1 TABLET: TAB at 08:51

## 2017-10-29 RX ADMIN — HEPARIN SODIUM 5000 UNITS: 5000 INJECTION, SOLUTION INTRAVENOUS; SUBCUTANEOUS at 08:51

## 2017-10-29 RX ADMIN — LEVOTHYROXINE SODIUM 75 MCG: 75 TABLET ORAL at 05:37

## 2017-10-29 RX ADMIN — MAGNESIUM GLUCONATE 500 MG ORAL TABLET 400 MG: 500 TABLET ORAL at 08:51

## 2017-10-29 RX ADMIN — SODIUM CHLORIDE TAB 1 GM 1 G: 1 TAB at 17:06

## 2017-10-29 RX ADMIN — METOPROLOL TARTRATE 75 MG: 25 TABLET, FILM COATED ORAL at 21:57

## 2017-10-29 RX ADMIN — METOPROLOL TARTRATE 75 MG: 25 TABLET, FILM COATED ORAL at 08:51

## 2017-10-29 RX ADMIN — STANDARDIZED SENNA CONCENTRATE AND DOCUSATE SODIUM 2 TABLET: 8.6; 5 TABLET, FILM COATED ORAL at 08:51

## 2017-10-29 RX ADMIN — STANDARDIZED SENNA CONCENTRATE AND DOCUSATE SODIUM 2 TABLET: 8.6; 5 TABLET, FILM COATED ORAL at 21:57

## 2017-10-29 RX ADMIN — FOLIC ACID 1 MG: 1 TABLET ORAL at 08:51

## 2017-10-29 RX ADMIN — ATORVASTATIN CALCIUM 40 MG: 40 TABLET, FILM COATED ORAL at 21:57

## 2017-10-29 RX ADMIN — GABAPENTIN 100 MG: 100 CAPSULE ORAL at 15:39

## 2017-10-29 RX ADMIN — GABAPENTIN 100 MG: 100 CAPSULE ORAL at 21:57

## 2017-10-29 RX ADMIN — ASPIRIN 81 MG: 81 TABLET, COATED ORAL at 08:51

## 2017-10-29 ASSESSMENT — ENCOUNTER SYMPTOMS
NAUSEA: 0
DIZZINESS: 0
WEAKNESS: 1
PSYCHIATRIC NEGATIVE: 1
LOSS OF CONSCIOUSNESS: 0
ABDOMINAL PAIN: 0
FALLS: 0
MYALGIAS: 0
CHILLS: 0
PALPITATIONS: 0
VOMITING: 0
FEVER: 0
HEADACHES: 0
SHORTNESS OF BREATH: 0

## 2017-10-29 ASSESSMENT — PAIN SCALES - GENERAL
PAINLEVEL_OUTOF10: 0
PAINLEVEL_OUTOF10: 0

## 2017-10-29 NOTE — CARE PLAN
Problem: Safety  Goal: Will remain free from injury  Outcome: PROGRESSING AS EXPECTED  All fall precautions in place, bed alarm in place, hourly rounding complete, call light and personal belongings kept within reach at all times. Education done with pt on importance of calling before getting OOB, pt verbalizes an understanding. Will continue to monitor.     Problem: Discharge Barriers/Planning  Goal: Patient's continuum of care needs will be met  Outcome: PROGRESSING AS EXPECTED  SW working on setting up a court date for guardianship, education done with pt, all questions and concerns were addressed.     Problem: Skin Integrity  Goal: Risk for impaired skin integrity will decrease    Intervention: Assess risk factors for impaired skin integrity and/or pressure ulcers  Waffle mattress in place, q 2 hr turns in effect. Education done on the importance of frequent repositioning, pt verbalizes an understanding.

## 2017-10-29 NOTE — PROGRESS NOTES
Received report on pt, room air, IV fluids, RLE BKA, LLE toe amputation, mepilex to skin tear on RLE/ LUE, turn q2h, pt has no complaints of pain at this time.

## 2017-10-29 NOTE — CARE PLAN
Problem: Safety  Goal: Will remain free from injury  Outcome: PROGRESSING AS EXPECTED  Pt reoriented to call light, bed locked and in low position, bed alarm on and intact.    Problem: Respiratory:  Goal: Respiratory status will improve  Outcome: PROGRESSING AS EXPECTED  Pt O2 sats greater than 90% on room air, inhaler given as ordered,  in use.

## 2017-10-29 NOTE — PROGRESS NOTES
Remains A&Ox2, disoriented to time.  Serum sodium this a.m. 126,  notified early this a.m. Fluids discontinued.   Denies pain. Remains on dysphagia 2, nectar thick diet, tolerating well.   POC discussed, including SW trying to set up court date for guardianship, all questions and concerns were addressed. R BKA assessed, surgical incision appears to be healing. Buttocks red, but blanching. Mepilex in place, waffle mattress in place and q 2hr turns in effect.

## 2017-10-29 NOTE — PROGRESS NOTES
[facility]    Date of Service: 10/29/2017    Chief Complaint  72 y.o. male admitted 2017 with urinary tract infection and confusion.    Interval Problem Update  \"Doing good!\" Sitting up in bed, having breakfast. Continues to be alert. Right knee still with pain but no worse. No acute overnight events.    Consultants/Specialty  Geriatrics  Palliative Care    Disposition  Pending guardianship and placement.        Review of Systems   Constitutional: Negative for chills, fever and malaise/fatigue.   Respiratory: Negative for shortness of breath.    Cardiovascular: Negative for chest pain, palpitations and leg swelling.   Gastrointestinal: Negative for abdominal pain, nausea and vomiting.   Genitourinary: Negative for dysuria.   Musculoskeletal: Positive for joint pain. Negative for falls and myalgias.   Neurological: Positive for weakness. Negative for dizziness, loss of consciousness and headaches.   Psychiatric/Behavioral: Negative.    All other systems reviewed and are negative.     Physical Exam  Laboratory/Imaging   Hemodynamics  Temp (24hrs), Av.8 °C (98.3 °F), Min:36.6 °C (97.8 °F), Max:37.7 °C (99.8 °F)   Temperature: 36.7 °C (98 °F)  Pulse  Av.8  Min: 58  Max: 144    Blood Pressure : 140/66      Respiratory      Respiration: 16, Pulse Oximetry: 93 %        RUL Breath Sounds: Clear, RML Breath Sounds: Diminished, RLL Breath Sounds: Diminished, RIGO Breath Sounds: Clear, LLL Breath Sounds: Diminished    Fluids    Intake/Output Summary (Last 24 hours) at 10/29/17 1551  Last data filed at 10/29/17 1300   Gross per 24 hour   Intake             2170 ml   Output             1700 ml   Net              470 ml       Nutrition  Orders Placed This Encounter   Procedures   • DIET ORDER     Standing Status:   Standing     Number of Occurrences:   1     Order Specific Question:   Diet:     Answer:   Regular [1]     Order Specific Question:   Texture/Fiber modifications:     Answer:   " Dysphagia 2(Pureed/Chopped)specify fluid consistency(question 6) [2]     Order Specific Question:   Consistency/Fluid modifications:     Answer:   Nectar Thick [2]     Order Specific Question:   Miscellaneous modifications:     Answer:   SLP - Deliver to Nursing Station [22]     Physical Exam   Constitutional: He appears well-developed. No distress.   Thin, frail   HENT:   Mouth/Throat: Oropharynx is clear and moist.   Eyes: Conjunctivae are normal. Pupils are equal, round, and reactive to light.   Neck: Normal range of motion. Neck supple. No tracheal deviation present.   Cardiovascular: Normal rate and regular rhythm.    No murmur heard.  Pulmonary/Chest: Breath sounds normal. No respiratory distress. He has no rales.   Abdominal: Soft. Bowel sounds are normal. He exhibits no distension. There is no tenderness.   Musculoskeletal: He exhibits no edema.   Right BKA with well healed incision, left forefoot amputation. Right knee with circular wound with good granulation tissue   Neurological: He is alert.   AOx2   Skin: Skin is warm and dry.   Psychiatric: He has a normal mood and affect. His behavior is normal. His speech is delayed.   Vitals reviewed.      Recent Labs      10/27/17   0210  10/28/17   0311  10/29/17   0342   WBC  12.0*  11.7*  12.3*   RBC  5.00  5.05  5.02   HEMOGLOBIN  11.3*  11.4*  11.5*   HEMATOCRIT  37.6*  38.4*  37.7*   MCV  75.2*  76.0*  75.1*   MCH  22.6*  22.6*  22.9*   MCHC  30.1*  29.7*  30.5*   RDW  43.6  44.3  42.7   PLATELETCT  662*  662*  621*   MPV  9.2  9.0  9.3     Recent Labs      10/27/17   0210  10/28/17   0311  10/29/17   0342   SODIUM  129*  131*  126*   POTASSIUM  4.6  4.8  4.6   CHLORIDE  100  101  97   CO2  22  21  23   GLUCOSE  84  79  86   BUN  15  14  12   CREATININE  0.53  0.47*  0.44*   CALCIUM  9.0  8.9  9.2                      Assessment/Plan     * Leukocytosis- (present on admission)   Assessment & Plan    Unclear etiology with no clear source. Daily fluctuations.  Has already been treated for aspiration pneumonia, cellulitis, and UTI. VSS, afebrile. Stable in low 12's, high 11's  - s/p 14 days of abx  - blood and urine cultures negative  - follow clinically        Hyponatremia- (present on admission)   Assessment & Plan    Hypovolemic hyponatremia. Fluctuates with PO fluid intake. Not symptomatic and continues to be more interactive today. Continuing with daily fluctuations, 126 today despite IVF  - continue to follow clinically  - continue IVF given poor PO intake, encourage PO intake  - start salt tabs, 1gm BID        PAD (peripheral artery disease) (CMS-HCC)- (present on admission)   Assessment & Plan    Significant PAD s/p left forefoot amputation and right BKA  - continue aspirin and statin  - not a candidate for prosthesis  - followed by vascular surgery and wound care        Dementia- (present on admission)   Assessment & Plan    Admitted for encephalopathy and debility. Has been evaluated by ethics committee, unable to make his own medical decisions.  - guardianship pending, no court date yet        Hypomagnesemia- (present on admission)   Assessment & Plan    Mg 1.8 10/28, h/o alcohol dependence.  - oral mag daily and monitor  - IV mag PRN repletion        Essential hypertension- (present on admission)   Assessment & Plan    Currently normotensive.  - continue with metoprolol 75 mg BID  - PRN normodyne        Severe protein-calorie malnutrition (CMS-[facility])- (present on admission)   Assessment & Plan    Poor PO intake.  - Nutrition following  - Cont boost TID            Reviewed items::  Medications reviewed and Labs reviewed  Barrera catheter::  No Barrera  DVT prophylaxis pharmacological::  Heparin

## 2017-10-29 NOTE — PROGRESS NOTES
Josie Osuna Fall Risk Assessment:     Last Known Fall: Within the last six months  Mobility: Use of assistive device/requires assist of two people  Medications: Cardiovascular or central nervous system meds  Mental Status/LOC/Awareness: Awake, alert, and oriented to date, place, and person  Toileting Needs: Incontinence  Volume/Electrolyte Status: Use of IV fluids/tube feeds  Communication/Sensory: No deficits  Behavior: Appropriate behavior  Josie Osuna Fall Risk Total: 14  Fall Risk Level: MODERATE RISK     [place] Fall Precautions:  call light/belongings in reach, bed in low position and locked, siderails up x 2, use non-slip footwear, adequate lighting, clutter free and spill free environment, educate to call for assistance, educate on level of risk     Fall Risk Level Interventions:    TRIAL ([employer], NEURO, MED MARINA 5) Moderate Fall Risk Interventions  Place yellow fall risk ID band on patient: verified  Provide patient/family education based on risk assessment : verified  Educate patient/family to call staff for assistance when getting out of bed: verified  Place fall precaution signage outside patient door: verified  Utilize bed/chair fall alarm: verifiedTRIAL ([employer], NEURO, MED MARINA 5) High Fall Risk Interventions  Place yellow fall risk ID band on patient: verified  Provide patient/family education based on risk assessment: completed  Educate patient/family to call staff for assistance when getting out of bed: completed  Place fall precaution signage outside patient door: verified  Place patient in room close to nursing station: verified  Utilize bed/chair fall alarm: verified  Notify charge of high risk for huddle: verified     Patient Specific Interventions:      Medication: review medications with patient and family and limit combination of prn medications  Mental Status/LOC/Awareness: reinforce falls education, utilize bed/chair fall alarm and reinforce the use of call light  Toileting: provide  frquent toileting, instruct male patients prone to dizziness to void while sitting and toilet prior to giving pain medications  Volume/Electrolyte Status: ensure patient remains hydrated, monitor abnormal lab values and ensure IV fluids are appropriate  Communication/Sensory: update plan of care on whiteboard and ensure proper positioning when transferrng/ambulating  Behavioral: encourage patient to voice feelings, administer medication as ordered and instruct/reinforce fall program rationale  Mobility: dangle prior to standing, utilize bed/chair fall alarm, ensure bed is locked and in lowest position and provide appropriate assistive

## 2017-10-30 ENCOUNTER — APPOINTMENT (OUTPATIENT)
Dept: RADIOLOGY | Facility: MEDICAL CENTER | Age: 72
DRG: 640 | End: 2017-10-30
Attending: HOSPITALIST
Payer: MEDICARE

## 2017-10-30 LAB
ANION GAP SERPL CALC-SCNC: 7 MMOL/L (ref 0–11.9)
BUN SERPL-MCNC: 12 MG/DL (ref 8–22)
CALCIUM SERPL-MCNC: 9.1 MG/DL (ref 8.5–10.5)
CHLORIDE SERPL-SCNC: 97 MMOL/L (ref 96–112)
CO2 SERPL-SCNC: 22 MMOL/L (ref 20–33)
CREAT SERPL-MCNC: 0.51 MG/DL (ref 0.5–1.4)
ERYTHROCYTE [DISTWIDTH] IN BLOOD BY AUTOMATED COUNT: 42.5 FL (ref 35.9–50)
GFR SERPL CREATININE-BSD FRML MDRD: >60 ML/MIN/1.73 M 2
GLUCOSE SERPL-MCNC: 90 MG/DL (ref 65–99)
HCT VFR BLD AUTO: 38.1 % (ref 42–52)
HGB BLD-MCNC: 11.7 G/DL (ref 14–18)
MCH RBC QN AUTO: 22.9 PG (ref 27–33)
MCHC RBC AUTO-ENTMCNC: 30.7 G/DL (ref 33.7–35.3)
MCV RBC AUTO: 74.4 FL (ref 81.4–97.8)
PLATELET # BLD AUTO: 626 K/UL (ref 164–446)
PMV BLD AUTO: 8.9 FL (ref 9–12.9)
POTASSIUM SERPL-SCNC: 4.6 MMOL/L (ref 3.6–5.5)
RBC # BLD AUTO: 5.12 M/UL (ref 4.7–6.1)
SODIUM SERPL-SCNC: 126 MMOL/L (ref 135–145)
WBC # BLD AUTO: 12.8 K/UL (ref 4.8–10.8)

## 2017-10-30 PROCEDURE — 700102 HCHG RX REV CODE 250 W/ 637 OVERRIDE(OP): Performed by: HOSPITALIST

## 2017-10-30 PROCEDURE — A9270 NON-COVERED ITEM OR SERVICE: HCPCS | Performed by: HOSPITALIST

## 2017-10-30 PROCEDURE — 87070 CULTURE OTHR SPECIMN AEROBIC: CPT

## 2017-10-30 PROCEDURE — 92526 ORAL FUNCTION THERAPY: CPT

## 2017-10-30 PROCEDURE — 73564 X-RAY EXAM KNEE 4 OR MORE: CPT | Mod: RT

## 2017-10-30 PROCEDURE — 99231 SBSQ HOSP IP/OBS SF/LOW 25: CPT | Performed by: HOSPITALIST

## 2017-10-30 PROCEDURE — 80048 BASIC METABOLIC PNL TOTAL CA: CPT

## 2017-10-30 PROCEDURE — 700102 HCHG RX REV CODE 250 W/ 637 OVERRIDE(OP): Performed by: INTERNAL MEDICINE

## 2017-10-30 PROCEDURE — 87205 SMEAR GRAM STAIN: CPT

## 2017-10-30 PROCEDURE — 97530 THERAPEUTIC ACTIVITIES: CPT

## 2017-10-30 PROCEDURE — 87077 CULTURE AEROBIC IDENTIFY: CPT

## 2017-10-30 PROCEDURE — A9270 NON-COVERED ITEM OR SERVICE: HCPCS | Performed by: INTERNAL MEDICINE

## 2017-10-30 PROCEDURE — 770021 HCHG ROOM/CARE - ISO PRIVATE

## 2017-10-30 PROCEDURE — 700105 HCHG RX REV CODE 258: Performed by: HOSPITALIST

## 2017-10-30 PROCEDURE — 36415 COLL VENOUS BLD VENIPUNCTURE: CPT

## 2017-10-30 PROCEDURE — 97535 SELF CARE MNGMENT TRAINING: CPT

## 2017-10-30 PROCEDURE — 770006 HCHG ROOM/CARE - MED/SURG/GYN SEMI*

## 2017-10-30 PROCEDURE — 97110 THERAPEUTIC EXERCISES: CPT

## 2017-10-30 PROCEDURE — 85027 COMPLETE CBC AUTOMATED: CPT

## 2017-10-30 PROCEDURE — 87186 SC STD MICRODIL/AGAR DIL: CPT

## 2017-10-30 PROCEDURE — 700111 HCHG RX REV CODE 636 W/ 250 OVERRIDE (IP): Performed by: HOSPITALIST

## 2017-10-30 RX ADMIN — SODIUM CHLORIDE: 9 INJECTION, SOLUTION INTRAVENOUS at 06:00

## 2017-10-30 RX ADMIN — GABAPENTIN 100 MG: 100 CAPSULE ORAL at 16:34

## 2017-10-30 RX ADMIN — GABAPENTIN 100 MG: 100 CAPSULE ORAL at 21:08

## 2017-10-30 RX ADMIN — FOLIC ACID 1 MG: 1 TABLET ORAL at 10:32

## 2017-10-30 RX ADMIN — HEPARIN SODIUM 5000 UNITS: 5000 INJECTION, SOLUTION INTRAVENOUS; SUBCUTANEOUS at 21:09

## 2017-10-30 RX ADMIN — SODIUM CHLORIDE TAB 1 GM 1 G: 1 TAB at 10:31

## 2017-10-30 RX ADMIN — METOPROLOL TARTRATE 75 MG: 25 TABLET, FILM COATED ORAL at 21:08

## 2017-10-30 RX ADMIN — ASPIRIN 81 MG: 81 TABLET, COATED ORAL at 10:32

## 2017-10-30 RX ADMIN — LEVOTHYROXINE SODIUM 75 MCG: 75 TABLET ORAL at 06:14

## 2017-10-30 RX ADMIN — SODIUM CHLORIDE TAB 1 GM 1 G: 1 TAB at 18:47

## 2017-10-30 RX ADMIN — ATORVASTATIN CALCIUM 40 MG: 40 TABLET, FILM COATED ORAL at 21:08

## 2017-10-30 RX ADMIN — METOPROLOL TARTRATE 75 MG: 25 TABLET, FILM COATED ORAL at 10:33

## 2017-10-30 RX ADMIN — HEPARIN SODIUM 5000 UNITS: 5000 INJECTION, SOLUTION INTRAVENOUS; SUBCUTANEOUS at 10:31

## 2017-10-30 RX ADMIN — MAGNESIUM GLUCONATE 500 MG ORAL TABLET 400 MG: 500 TABLET ORAL at 10:32

## 2017-10-30 RX ADMIN — GABAPENTIN 100 MG: 100 CAPSULE ORAL at 10:32

## 2017-10-30 RX ADMIN — THERA TABS 1 TABLET: TAB at 10:32

## 2017-10-30 ASSESSMENT — ENCOUNTER SYMPTOMS
DIZZINESS: 0
LOSS OF CONSCIOUSNESS: 0
HEADACHES: 0
FALLS: 0
PALPITATIONS: 0
CHILLS: 0
NAUSEA: 0
VOMITING: 0
FEVER: 0
SHORTNESS OF BREATH: 0
PSYCHIATRIC NEGATIVE: 1
MYALGIAS: 0
WEAKNESS: 1
ABDOMINAL PAIN: 0

## 2017-10-30 ASSESSMENT — COGNITIVE AND FUNCTIONAL STATUS - GENERAL
DRESSING REGULAR LOWER BODY CLOTHING: A LOT
TOILETING: A LOT
SUGGESTED CMS G CODE MODIFIER MOBILITY: CN
WALKING IN HOSPITAL ROOM: TOTAL
STANDING UP FROM CHAIR USING ARMS: TOTAL
MOVING TO AND FROM BED TO CHAIR: UNABLE
DRESSING REGULAR UPPER BODY CLOTHING: A LOT
SUGGESTED CMS G CODE MODIFIER DAILY ACTIVITY: CL
CLIMB 3 TO 5 STEPS WITH RAILING: TOTAL
MOVING FROM LYING ON BACK TO SITTING ON SIDE OF FLAT BED: UNABLE
MOBILITY SCORE: 6
PERSONAL GROOMING: A LOT
HELP NEEDED FOR BATHING: A LOT
DAILY ACTIVITIY SCORE: 13
TURNING FROM BACK TO SIDE WHILE IN FLAT BAD: UNABLE
EATING MEALS: A LITTLE

## 2017-10-30 ASSESSMENT — PAIN SCALES - GENERAL
PAINLEVEL_OUTOF10: 0

## 2017-10-30 ASSESSMENT — GAIT ASSESSMENTS: GAIT LEVEL OF ASSIST: UNABLE TO PARTICIPATE

## 2017-10-30 NOTE — WOUND TEAM
"[facility]  Inpatient Services  Wound and Skin Care Progress Note    HPI, PMH, SH: Reviewed  Unit where seen by Wound Team: S [id number]    WOUND TEAM FOLLOW UP: re-evaluation of R knee and stump, sacrococcygeal area    SUBJECTIVE:  \" Oh boy!.\"    Self Report / Pain Level: c/o tenderness with cleansing of knee wound  OBJECTIVE: on pressure redistribution mattress, with waffle overlay,lying on left side, dressing in place to right knee, no dressing to buttocks/sacrum  WOUND TYPE, LOCATION, CHARACTERISTICS:  Wound POA Other (full thickness surgical) Leg Right Distal  Periwound Skin: Intact  Drainage : None  Tissue Type and %:  Closed approximated incision line, except for small opening around lateral suture which is visible  Wound Edges:    closed    Odor:     None   Exposed structure(s):   1 small suture at lateral end of incision line   Signs and Symptoms of Infection: none    Wound Not POA Unstageable Knee Right Anterior  Periwound Skin: erythema  Drainage : minimal tan/purulent      Tissue Type and %:    90% pink/red moist tissue, 10% white cartilage  Wound Edges:    attached    Odor:     None   Exposed structure(s):   cartilage  Signs and Symptoms of Infection: erythema, drainage    Measurements : taken 10/30/17    Leg Right Distal  Incision line not measured           R knee                                                        Length (cm): 2.7                                                        Width (cm): 2                                                        Depth (cm):  0.1   Tracts/undermining:   None       INTERVENTIONS BY WOUND TEAM: Removed dressing to knee, moist washcloth to ritu wound, cleansed wound with NS. Wound culture obtained. Photo and measurements taken. Stump cleansed gently with normal saline and gauze, dried, photo taken, painted with betadine and left open to area. Applied Aquacel Ag cut to fit knee wound, and covered with adhesive foam. No wounds present to " sacracoccygeal area. Patient positioned to left side with pillows. Left heel floated on pillow and pillow beneath right stump to alleviate pressure.  Leg Right Distal-Dressing Options: Open to Air  Knee Right Anterior-Dressing Options:  (Aquacel Ag, foam)    Interdisciplinary consultation: staff RN, patient, Dr. Gamble  EVALUATION AND PROGRESS OF WOUND(S):  Right knee with less visible cartilage this visit, incision line distal stump approximated except for one suture present. Sacrum has no wounds present. Knee wound appears infected, but has decreased in size since last visit. Aquacel appears to be beneficial. Discussed need for wound culture and x ray to r/o osteo with Dr. Gamble. Wound culture sent and x ray ordered. Will reassess patient on Wednesday 11/1/17 for wound culture results and possible vac placement.  Patient has poor perfusion per vascular note from Dr. Rodriguez note 10/2, not a surgical candidate at this time.    Factors affecting wound healing: PAD, decreased nutrition, smoker, alcohol abuse     Goals: decrease size of knee wound by 1% each week      NURSING PLAN OF CARE:    Dressing changes:     See new Dressing Maintenance orders:  X     Skin care: See Skin Care orders:        Rectal tube care: See Rectal Tube Care orders:      Other orders:           WOUND TEAM PLAN OF CARE (X):   NPWT change 3 x week:        Dressing changes:       Follow up as needed:   X wound team to see on 11/01 to reassess knee wound, check on culture result  Other:

## 2017-10-30 NOTE — THERAPY
"Physical Therapy Treatment completed.   Bed Mobility:  Supine to Sit: Minimal Assist  Transfers: Sit to Stand: Maximal Assist (X2)  Gait: Level Of Assist: Unable to Participate     Plan of Care: Will benefit from Physical Therapy 2 times per week and Plan to complete next treatment by Friday 11/3  Discharge Recommendations: Equipment: Will Continue to Assess for Equipment Needs. Post-acute therapy Discharge to a transitional care facility for continued skilled therapy services.    Pt at first agreeable to work with PT today, then frequency asking questions like \"Why are we doing that\" When asked to come to EOB and work on sitting balance. Pt educated on purpose of PT intervention and pt will need to get stronger and improve endurance ion order to return to PLOF. PT came to EOB with CGA the first trial. Sat EOB 10 minutes total but required breaks by leaning back on elbows to rest every 2-3 minutes. Pt then returned to supine for supine therapeutic exercises. OT then present to assist with slide board transfer to WC. Maximal assist X 2 for slide board transfer to WC. Pt seems very fearful of falling. Left up in WC with OT    See \"Rehab Therapy-Acute\" Patient Summary Report for complete documentation.       "

## 2017-10-30 NOTE — CARE PLAN
Problem: Discharge Barriers/Planning  Goal: Patient's continuum of care needs will be met    Intervention: Assess potential discharge barriers on admission and throughout hospital stay  Pending guardianship court date.       Problem: Urinary Elimination:  Goal: Ability to reestablish a normal urinary elimination pattern will improve  Condom cath utilized.

## 2017-10-30 NOTE — PROGRESS NOTES
Rec'd report from day shift RN. Assumed pt care. Assessment completed. AA&OX3, reoriented to time, pt is forgetful at times. No s/s of discomfort or distress. Q2 hour turns enforced. Dressing to right knee is CDI. Condom cath in use. Bed in lowest position, bed locked, bed alarm on for safety, treaded sock in place, RN and CNA numbers provided, call light within reach.

## 2017-10-30 NOTE — PROGRESS NOTES
Pharmacy Pharmacotherapy Consult for LOS >30 days    Admit Date: 9/30/2017      Medications were reviewed for appropriateness and ongoing need.     Current Facility-Administered Medications   Medication Dose Route Frequency Provider Last Rate Last Dose   • NS infusion   Intravenous Continuous Nikkie Gamble M.D. 50 mL/hr at 10/30/17 0600     • sodium chloride (SALT) tablet 1 g  1 g Oral BID WITH MEALS Nikkie Gamble M.D.   1 g at 10/30/17 1031   • heparin injection 5,000 Units  5,000 Units Subcutaneous Q12HRS Nikkie Gamble M.D.   5,000 Units at 10/30/17 1031   • magnesium oxide (MAG-OX) tablet 400 mg  400 mg Oral DAILY Nikkie Gamble M.D.   400 mg at 10/30/17 1032   • metoprolol (LOPRESSOR) tablet 75 mg  75 mg Oral BID Isha Rojas D.OMayte   75 mg at 10/30/17 1033   • Respiratory Care per Protocol   Nebulization Continuous RT Isha Rojas D.O.       • aspirin EC (ECOTRIN) tablet 81 mg  81 mg Oral DAILY John Paul Mckeon M.D.   81 mg at 10/30/17 1032   • atorvastatin (LIPITOR) tablet 40 mg  40 mg Oral QHS John Paul Mckeon M.D.   40 mg at 10/29/17 2157   • folic acid (FOLVITE) tablet 1 mg  1 mg Oral DAILY John Paul Mckeon M.D.   1 mg at 10/30/17 1032   • gabapentin (NEURONTIN) capsule 100 mg  100 mg Oral TID John Paul Mckeon M.D.   100 mg at 10/30/17 1032   • levothyroxine (SYNTHROID) tablet 75 mcg  75 mcg Oral QAM AC John Paul Mckeon M.D.   75 mcg at 10/30/17 0614   • multivitamin (THERAGRAN) tablet 1 Tab  1 Tab Oral DAILY John Paul Mckeon M.D.   1 Tab at 10/30/17 1032   • labetalol (NORMODYNE,TRANDATE) injection 10 mg  10 mg Intravenous Q4HRS PRN John Paul Mckeon M.D.   10 mg at 10/04/17 2342   • ondansetron (ZOFRAN) syringe/vial injection 4 mg  4 mg Intravenous Q4HRS PRN John Paul Mckeon M.D.   4 mg at 10/04/17 1623   • ondansetron (ZOFRAN ODT) dispertab 4 mg  4 mg Oral Q4HRS PRN John Paul Mckeon M.D.       • senna-docusate (PERICOLACE or SENOKOT S) 8.6-50 MG per tablet 2 Tab  2 Tab  Oral BID John Paul Mckeon M.D.   Stopped at 10/30/17 0900    And   • polyethylene glycol/lytes (MIRALAX) PACKET 1 Packet  1 Packet Oral QDAY PRN John Paul Mckeon M.D.        And   • magnesium hydroxide (MILK OF MAGNESIA) suspension 30 mL  30 mL Oral QDAY PRN John Paul Mckeon M.D.        And   • bisacodyl (DULCOLAX) suppository 10 mg  10 mg Rectal QDAY PRN John Paul Mckeon M.D.   Stopped at 09/30/17 2046   • acetaminophen (TYLENOL) tablet 650 mg  650 mg Oral Q6HRS PRN John Paul Mckeon M.D.   650 mg at 10/27/17 2003       Recommendations:    Patient actively using all PRN medications. No changes.     Justa Johnson, PharmD

## 2017-10-30 NOTE — THERAPY
"Speech Language Therapy dysphagia treatment completed.   Functional Status:  The patient was seen for dysphagia therapy this date during his D2/NTL meal tray. The patient was awake, alert and sitting in bed. Patient repositioned to 90* in bed. Due to positioning, patient may do best upright in chair for meal. The patient was able to self feed small bites of D2 solids and NTL via cup sip. Patient required min cues to complete double swallow (extra swallow) during PO intake. Following cues the patient was able to follow strategies independently ~80% of the time. Patient complete simple dysphagia exercises with \"poor\" accuracy despite max cues.    Recommendations: 1) current diet of D2/NTL. 2) Supervision during PO.     Plan of Care: Will benefit from Speech Therapy 3 times per week.    Post-Acute Therapy: Discharge to a transitional care facility for continued skilled therapy services.    See \"Rehab Therapy-Acute\" Patient Summary Report for complete documentation.     "

## 2017-10-30 NOTE — PROGRESS NOTES
Pt incontinent of stool. Cleaned up and linens changed. Pt has red, blanchable areas to sacrum and perineum, ivan cream applied. Reordered more ivan cream.

## 2017-10-30 NOTE — THERAPY
"Occupational Therapy Treatment completed with focus on ADLs, ADL transfers and patient education.  Functional Status:  Pt seen for OT tx. Pt required education about role of OT and importance of getting up OOB to WC. Pt required mod A to don pants, min A rolling to Rt and Lt to pull up over hips. Min A supine to sit, max A transfer from EOB to WC. Pt very fearful of falling and required reassurance for safety. Pt required frequent reorientation and goals of tx session. Pt continuously asking \"what are we doing?\" Pt requested to finish eating lunch. Min A for seated ADLs.  Plan of Care: Will benefit from Occupational Therapy 2 times per week  Discharge Recommendations:  Equipment Will Continue to Assess for Equipment Needs.     See \"Rehab Therapy-Acute\" Patient Summary Report for complete documentation.   "

## 2017-10-30 NOTE — PROGRESS NOTES
Josie Osuna Fall Risk Assessment:     Last Known Fall: Within the last six months  Mobility: Use of assistive device/requires assist of two people  Medications: Cardiovascular or central nervous system meds  Mental Status/LOC/Awareness: Oriented to person and place  Toileting Needs: Incontinence  Volume/Electrolyte Status: Use of IV fluids/tube feeds  Communication/Sensory: No deficits  Behavior: Appropriate behavior  Josie Osuna Fall Risk Total: 15  Fall Risk Level: HIGH RISK    [place] Fall Precautions:  call light/belongings in reach, bed in low position and locked, wheelchairs and assistive devices out of sight, siderails up x 2, use non-slip footwear, adequate lighting, clutter free and spill free environment, educate on level of risk, educate to call for assistance    Fall Risk Level Interventions:    TRIAL ([employer], NEURO, MED MARINA 5) Moderate Fall Risk Interventions  Place yellow fall risk ID band on patient: verified  Provide patient/family education based on risk assessment : verified  Educate patient/family to call staff for assistance when getting out of bed: verified  Place fall precaution signage outside patient door: verified  Utilize bed/chair fall alarm: verifiedTRIAL ([employer], NEURO, [employer] MARINA 5) High Fall Risk Interventions  Place yellow fall risk ID band on patient: verified  Provide patient/family education based on risk assessment: completed  Educate patient/family to call staff for assistance when getting out of bed: completed  Place fall precaution signage outside patient door: verified  Place patient in room close to nursing station: currently not available/charge notified  Utilize bed/chair fall alarm: verified  Notify charge of high risk for huddle: completed    Patient Specific Interventions:     Medication: review medications with patient and family  Mental Status/LOC/Awareness: reinforce falls education, check on patient hourly, utilize bed/chair fall alarm and reinforce the use of  call light  Toileting: instruct patient/family on the need to call for assistance when toileting  Volume/Electrolyte Status: ensure IV fluids are appropriate  Communication/Sensory: update plan of care on whiteboard  Behavioral: administer medication as ordered  Mobility: ensure bed is locked and in lowest position and provide appropriate assistive device

## 2017-10-30 NOTE — PROGRESS NOTES
[facility]    Date of Service: 10/30/2017    Chief Complaint  72 y.o. male admitted 2017 with urinary tract infection and confusion.    Interval Problem Update  Sitting up in bed, wound care nurse at bedside. Continues to be alert and interactive. Right knee still with pain but no worse. No acute overnight events.    Consultants/Specialty  Geriatrics  Palliative Care    Disposition  Pending guardianship and placement.        Review of Systems   Constitutional: Negative for chills, fever and malaise/fatigue.   Respiratory: Negative for shortness of breath.    Cardiovascular: Negative for chest pain, palpitations and leg swelling.   Gastrointestinal: Negative for abdominal pain, nausea and vomiting.   Genitourinary: Negative for dysuria.   Musculoskeletal: Positive for joint pain. Negative for falls and myalgias.   Neurological: Positive for weakness. Negative for dizziness, loss of consciousness and headaches.   Psychiatric/Behavioral: Negative.    All other systems reviewed and are negative.     Physical Exam  Laboratory/Imaging   Hemodynamics  Temp (24hrs), Av.7 °C (98.1 °F), Min:36.6 °C (97.8 °F), Max:36.9 °C (98.4 °F)   Temperature: 36.6 °C (97.8 °F)  Pulse  Av.7  Min: 58  Max: 144    Blood Pressure : 122/78      Respiratory      Respiration: 16, Pulse Oximetry: 96 %        RUL Breath Sounds: Clear, RML Breath Sounds: Diminished, RLL Breath Sounds: Diminished, RIGO Breath Sounds: Clear, LLL Breath Sounds: Expiratory Wheezes    Fluids    Intake/Output Summary (Last 24 hours) at 10/30/17 1035  Last data filed at 10/30/17 1000   Gross per 24 hour   Intake              840 ml   Output             2700 ml   Net            -1860 ml       Nutrition  Orders Placed This Encounter   Procedures   • DIET ORDER     Standing Status:   Standing     Number of Occurrences:   1     Order Specific Question:   Diet:     Answer:   Regular [1]     Order Specific Question:   Texture/Fiber  modifications:     Answer:   Dysphagia 2(Pureed/Chopped)specify fluid consistency(question 6) [2]     Order Specific Question:   Consistency/Fluid modifications:     Answer:   Nectar Thick [2]     Order Specific Question:   Miscellaneous modifications:     Answer:   SLP - Deliver to Nursing Station [22]     Physical Exam   Constitutional: He appears well-developed. No distress.   Thin, frail   HENT:   Mouth/Throat: Oropharynx is clear and moist.   Eyes: Conjunctivae are normal. Pupils are equal, round, and reactive to light.   Neck: Normal range of motion. Neck supple. No tracheal deviation present.   Cardiovascular: Normal rate and regular rhythm.    No murmur heard.  Pulmonary/Chest: Breath sounds normal. No respiratory distress. He has no rales.   Abdominal: Soft. Bowel sounds are normal. He exhibits no distension. There is no tenderness.   Musculoskeletal: He exhibits no edema.   Right BKA with well healed incision, left forefoot amputation. Right knee with circular wound now with worsening surrounding erythema. Wound is smaller but still to the tendon.   Neurological: He is alert.   AOx2   Skin: Skin is warm and dry.   Psychiatric: He has a normal mood and affect. His behavior is normal. His speech is delayed.   Vitals reviewed.      Recent Labs      10/28/17   0311  10/29/17   0342  10/30/17   0234   WBC  11.7*  12.3*  12.8*   RBC  5.05  5.02  5.12   HEMOGLOBIN  11.4*  11.5*  11.7*   HEMATOCRIT  38.4*  37.7*  38.1*   MCV  76.0*  75.1*  74.4*   MCH  22.6*  22.9*  22.9*   MCHC  29.7*  30.5*  30.7*   RDW  44.3  42.7  42.5   PLATELETCT  662*  621*  626*   MPV  9.0  9.3  8.9*     Recent Labs      10/28/17   0311  10/29/17   0342  10/30/17   0234   SODIUM  131*  126*  126*   POTASSIUM  4.8  4.6  4.6   CHLORIDE  101  97  97   CO2  21  23  22   GLUCOSE  79  86  90   BUN  14  12  12   CREATININE  0.47*  0.44*  0.51   CALCIUM  8.9  9.2  9.1                      Assessment/Plan     * Leukocytosis- (present on admission)    Assessment & Plan    Unclear etiology with no clear source. Also with thrombocytopenia up to 600's. Daily fluctuations. Has already been treated for aspiration pneumonia, cellulitis, and UTI. VSS, afebrile. Stable in low 12's, high 11's. Possible myelodysplastic disorder but has not been evaluated.  - s/p 14 days of abx  - blood and urine cultures negative  - follow clinically  - XR right knee and wound culture 10/30        Hyponatremia- (present on admission)   Assessment & Plan    Hypovolemic hyponatremia. Fluctuates with PO fluid intake. Not symptomatic and continues to be more interactive today. Continuing with daily fluctuations, initial improvement to 131 with IVF but 126 today.  - continue to follow clinically  - continue IVF given poor PO intake, encourage PO intake  - start salt tabs, 1gm BID        PAD (peripheral artery disease) (CMS-[facility])- (present on admission)   Assessment & Plan    Significant PAD s/p left forefoot amputation and right BKA  - continue aspirin and statin  - not a candidate for prosthesis  - followed by vascular surgery and wound care        Dementia- (present on admission)   Assessment & Plan    Admitted for encephalopathy and debility. Has been evaluated by ethics committee, unable to make his own medical decisions.  - guardianship pending, no court date yet        Hypomagnesemia- (present on admission)   Assessment & Plan    Mg 1.8 10/28, h/o alcohol dependence.  - oral mag daily and monitor  - IV mag PRN repletion        Essential hypertension- (present on admission)   Assessment & Plan    Currently normotensive.  - continue with metoprolol 75 mg BID  - PRN normodyne        Severe protein-calorie malnutrition (CMS-HCC)- (present on admission)   Assessment & Plan    Poor PO intake.  - Nutrition following  - Cont boost TID            Reviewed items::  Medications reviewed, Labs reviewed and Radiology images reviewed  Barrera catheter::  No Barrera  DVT prophylaxis pharmacological::   Heparin

## 2017-10-31 LAB
ANION GAP SERPL CALC-SCNC: 9 MMOL/L (ref 0–11.9)
BUN SERPL-MCNC: 15 MG/DL (ref 8–22)
CALCIUM SERPL-MCNC: 9.3 MG/DL (ref 8.5–10.5)
CHLORIDE SERPL-SCNC: 97 MMOL/L (ref 96–112)
CO2 SERPL-SCNC: 24 MMOL/L (ref 20–33)
CREAT SERPL-MCNC: 0.47 MG/DL (ref 0.5–1.4)
GFR SERPL CREATININE-BSD FRML MDRD: >60 ML/MIN/1.73 M 2
GLUCOSE SERPL-MCNC: 86 MG/DL (ref 65–99)
GRAM STN SPEC: NORMAL
POTASSIUM SERPL-SCNC: 4.4 MMOL/L (ref 3.6–5.5)
SIGNIFICANT IND 70042: NORMAL
SITE SITE: NORMAL
SODIUM SERPL-SCNC: 130 MMOL/L (ref 135–145)
SOURCE SOURCE: NORMAL

## 2017-10-31 PROCEDURE — 700102 HCHG RX REV CODE 250 W/ 637 OVERRIDE(OP): Performed by: HOSPITALIST

## 2017-10-31 PROCEDURE — 80048 BASIC METABOLIC PNL TOTAL CA: CPT

## 2017-10-31 PROCEDURE — 36415 COLL VENOUS BLD VENIPUNCTURE: CPT

## 2017-10-31 PROCEDURE — 770021 HCHG ROOM/CARE - ISO PRIVATE

## 2017-10-31 PROCEDURE — A9270 NON-COVERED ITEM OR SERVICE: HCPCS | Performed by: HOSPITALIST

## 2017-10-31 PROCEDURE — 99233 SBSQ HOSP IP/OBS HIGH 50: CPT | Performed by: INTERNAL MEDICINE

## 2017-10-31 PROCEDURE — 700105 HCHG RX REV CODE 258

## 2017-10-31 PROCEDURE — 700111 HCHG RX REV CODE 636 W/ 250 OVERRIDE (IP): Performed by: HOSPITALIST

## 2017-10-31 PROCEDURE — 700111 HCHG RX REV CODE 636 W/ 250 OVERRIDE (IP)

## 2017-10-31 PROCEDURE — 700105 HCHG RX REV CODE 258: Performed by: HOSPITALIST

## 2017-10-31 RX ADMIN — SODIUM CHLORIDE TAB 1 GM 1 G: 1 TAB at 17:33

## 2017-10-31 RX ADMIN — GABAPENTIN 100 MG: 100 CAPSULE ORAL at 10:40

## 2017-10-31 RX ADMIN — GABAPENTIN 100 MG: 100 CAPSULE ORAL at 17:33

## 2017-10-31 RX ADMIN — HEPARIN SODIUM 5000 UNITS: 5000 INJECTION, SOLUTION INTRAVENOUS; SUBCUTANEOUS at 10:41

## 2017-10-31 RX ADMIN — SODIUM CHLORIDE TAB 1 GM 1 G: 1 TAB at 10:40

## 2017-10-31 RX ADMIN — GABAPENTIN 100 MG: 100 CAPSULE ORAL at 21:44

## 2017-10-31 RX ADMIN — THERA TABS 1 TABLET: TAB at 10:40

## 2017-10-31 RX ADMIN — FOLIC ACID 1 MG: 1 TABLET ORAL at 10:40

## 2017-10-31 RX ADMIN — SODIUM CHLORIDE: 9 INJECTION, SOLUTION INTRAVENOUS at 09:22

## 2017-10-31 RX ADMIN — MAGNESIUM GLUCONATE 500 MG ORAL TABLET 400 MG: 500 TABLET ORAL at 10:40

## 2017-10-31 RX ADMIN — HEPARIN SODIUM 5000 UNITS: 5000 INJECTION, SOLUTION INTRAVENOUS; SUBCUTANEOUS at 21:44

## 2017-10-31 RX ADMIN — LEVOTHYROXINE SODIUM 75 MCG: 75 TABLET ORAL at 05:55

## 2017-10-31 RX ADMIN — ATORVASTATIN CALCIUM 40 MG: 40 TABLET, FILM COATED ORAL at 21:44

## 2017-10-31 RX ADMIN — ASPIRIN 81 MG: 81 TABLET, COATED ORAL at 10:40

## 2017-10-31 RX ADMIN — METOPROLOL TARTRATE 75 MG: 25 TABLET, FILM COATED ORAL at 10:40

## 2017-10-31 RX ADMIN — STANDARDIZED SENNA CONCENTRATE AND DOCUSATE SODIUM 2 TABLET: 8.6; 5 TABLET, FILM COATED ORAL at 10:40

## 2017-10-31 RX ADMIN — VANCOMYCIN HYDROCHLORIDE 1400 MG: 100 INJECTION, POWDER, LYOPHILIZED, FOR SOLUTION INTRAVENOUS at 17:34

## 2017-10-31 RX ADMIN — METOPROLOL TARTRATE 75 MG: 25 TABLET, FILM COATED ORAL at 21:44

## 2017-10-31 ASSESSMENT — ENCOUNTER SYMPTOMS
ABDOMINAL PAIN: 0
MYALGIAS: 0
COUGH: 0
LOSS OF CONSCIOUSNESS: 0
NERVOUS/ANXIOUS: 0
SHORTNESS OF BREATH: 0
PALPITATIONS: 0
NAUSEA: 0
WEAKNESS: 1

## 2017-10-31 ASSESSMENT — PAIN SCALES - GENERAL
PAINLEVEL_OUTOF10: 0
PAINLEVEL_OUTOF10: 0

## 2017-10-31 NOTE — PROGRESS NOTES
[facility]    Date of Service: 10/31/2017    Chief Complaint  72 y.o. male admitted 2017 with urinary tract infection and confusion.    Interval Problem Update  Appears more awake and responsive today  No new complaints  Tolerating oral diet    Consultants/Specialty  Geriatrics  Palliative Care    Disposition  Pending guardianship and placement.   assisting  No Court date yet      Review of Systems   Constitutional: Negative for malaise/fatigue.   HENT: Negative for congestion.    Respiratory: Negative for cough and shortness of breath.    Cardiovascular: Negative for palpitations and leg swelling.   Gastrointestinal: Negative for abdominal pain and nausea.   Genitourinary: Negative for dysuria.   Musculoskeletal: Negative for joint pain and myalgias.   Neurological: Positive for weakness. Negative for loss of consciousness.   Psychiatric/Behavioral: The patient is not nervous/anxious.       Physical Exam  Laboratory/Imaging   Hemodynamics  Temp (24hrs), Av.6 °C (97.9 °F), Min:36.1 °C (97 °F), Max:37.1 °C (98.8 °F)   Temperature: 36.7 °C (98 °F)  Pulse  Av.5  Min: 58  Max: 144    Blood Pressure : 135/75      Respiratory      Respiration: 19, Pulse Oximetry: 95 %        RUL Breath Sounds: Clear, RML Breath Sounds: Diminished, RLL Breath Sounds: Diminished, RIGO Breath Sounds: Clear, LLL Breath Sounds: Diminished    Fluids    Intake/Output Summary (Last 24 hours) at 10/31/17 1612  Last data filed at 10/31/17 0610   Gross per 24 hour   Intake                0 ml   Output              500 ml   Net             -500 ml       Nutrition  Orders Placed This Encounter   Procedures   • DIET ORDER     Standing Status:   Standing     Number of Occurrences:   1     Order Specific Question:   Diet:     Answer:   Regular [1]     Order Specific Question:   Texture/Fiber modifications:     Answer:   Dysphagia 2(Pureed/Chopped)specify fluid consistency(question 6) [2]     Order Specific  Question:   Consistency/Fluid modifications:     Answer:   Nectar Thick [2]     Order Specific Question:   Miscellaneous modifications:     Answer:   SLP - Deliver to Nursing Station [22]     Physical Exam   Constitutional: No distress.   Thin, frail   HENT:   Head: Normocephalic.   Mouth/Throat: Oropharynx is clear and moist.   Eyes: Conjunctivae are normal. Pupils are equal, round, and reactive to light.   Neck: Normal range of motion. Neck supple. No tracheal deviation present.   Cardiovascular: Normal rate and regular rhythm.    No murmur heard.  Pulmonary/Chest: Effort normal and breath sounds normal. He has no rales.   Abdominal: Soft. Bowel sounds are normal. He exhibits no distension.   Musculoskeletal: He exhibits no edema.   Right BKA with minimal drainage  , left forefoot amputation. Right knee with circular wound now with worsening surrounding erythema. Wound is smaller but still to the tendon.   Neurological: He is alert. No cranial nerve deficit.   AOx2   Skin: Skin is warm. He is not diaphoretic.   Psychiatric: He has a normal mood and affect. His behavior is normal. Judgment normal. His speech is delayed.   Vitals reviewed.      Recent Labs      10/29/17   0342  10/30/17   0234   WBC  12.3*  12.8*   RBC  5.02  5.12   HEMOGLOBIN  11.5*  11.7*   HEMATOCRIT  37.7*  38.1*   MCV  75.1*  74.4*   MCH  22.9*  22.9*   MCHC  30.5*  30.7*   RDW  42.7  42.5   PLATELETCT  621*  626*   MPV  9.3  8.9*     Recent Labs      10/29/17   0342  10/30/17   0234  10/31/17   0323   SODIUM  126*  126*  130*   POTASSIUM  4.6  4.6  4.4   CHLORIDE  97  97  97   CO2  23  22  24   GLUCOSE  86  90  86   BUN  12  12  15   CREATININE  0.44*  0.51  0.47*   CALCIUM  9.2  9.1  9.3                      Assessment/Plan     * Leukocytosis- (present on admission)   Assessment & Plan    Unclear etiology with no clear source. Also with thrombocytopenia up to 600's. Daily fluctuations. Has already been treated for aspiration pneumonia,  cellulitis, and UTI. VSS, afebrile. Stable in low 12's, high 11's. Possible myelodysplastic disorder but has not been evaluated.  - s/p 14 days of abx  - blood and urine cultures negative  - follow clinically  - XR right knee and wound culture 10/30        Cellulitis of right knee- (present on admission)   Assessment & Plan    With minimally draining wound  Status post wound culture on October 30 with likely MRSA, will follow-up sensitivity  We'll restart vancomycin per pharmacy  We will consider need for repeat imaging to rule out associated osteomyelitis  Follow-up a.m. labs        Hyponatremia- (present on admission)   Assessment & Plan    Hypovolemic hyponatremia. Fluctuates with PO fluid intake. Not symptomatic and continues to be more interactive today. Continuing with daily fluctuations,  - Improving  - continue to follow clinically  encourage PO intake  - salt tabs, 1gm BID        PAD (peripheral artery disease) (CMS-[facility])- (present on admission)   Assessment & Plan    Significant PAD s/p left forefoot amputation and right BKA  - continue aspirin and statin  - not a candidate for prosthesis  - followed by vascular surgery and wound care        Dementia- (present on admission)   Assessment & Plan    Admitted for encephalopathy and debility. Has been evaluated by ethics committee, unable to make his own medical decisions.  - guardianship pending, no court date yet        Hypomagnesemia- (present on admission)   Assessment & Plan    Mg 1.8 10/28, h/o alcohol dependence.  - oral mag daily and monitor  - IV mag PRN repletion        Essential hypertension- (present on admission)   Assessment & Plan    Currently normotensive.  - continue with metoprolol 75 mg BID  - PRN normodyne        Severe protein-calorie malnutrition (CMS-HCC)- (present on admission)   Assessment & Plan    Poor PO intake.  - Nutrition following  - Cont boost TID            Reviewed items::  Medications reviewed, Labs reviewed and Radiology  images reviewed  Barrera catheter::  No Barrera  DVT prophylaxis pharmacological::  Heparin

## 2017-10-31 NOTE — PROGRESS NOTES
Josie Osuna Fall Risk Assessment:     Last Known Fall: Within the last six months  Mobility: Use of assistive device/requires assist of two people  Medications: Cardiovascular or central nervous system meds  Mental Status/LOC/Awareness: Oriented to person and place  Toileting Needs: Incontinence  Volume/Electrolyte Status: Use of IV fluids/tube feeds  Communication/Sensory: No deficits  Behavior: Appropriate behavior  Josie Osuna Fall Risk Total: 15  Fall Risk Level: HIGH RISK    Universal Fall Precautions:  call light/belongings in reach, bed in low position and locked, wheelchairs and assistive devices out of sight, siderails up x 2, use non-slip footwear, adequate lighting, clutter free and spill free environment, educate on level of risk, educate to call for assistance    Fall Risk Level Interventions:    TRIAL (Catmoji 8, NEURO, MED MARINA 5) Moderate Fall Risk Interventions  Place yellow fall risk ID band on patient: verified  Provide patient/family education based on risk assessment : verified  Educate patient/family to call staff for assistance when getting out of bed: verified  Place fall precaution signage outside patient door: verified  Utilize bed/chair fall alarm: verifiedTRIAL (Catmoji 8, NEURO, [employer] MARINA 5) High Fall Risk Interventions  Place yellow fall risk ID band on patient: verified  Provide patient/family education based on risk assessment: completed  Educate patient/family to call staff for assistance when getting out of bed: completed  Place fall precaution signage outside patient door: verified  Place patient in room close to nursing station: currently not available/charge notified  Utilize bed/chair fall alarm: verified  Notify charge of high risk for huddle: completed    Patient Specific Interventions:     Medication: review medications with patient and family  Mental Status/LOC/Awareness: reinforce falls education, check on patient hourly, utilize bed/chair fall alarm and reinforce the use of  call light  Toileting: instruct patient/family on the need to call for assistance when toileting  Volume/Electrolyte Status: ensure IV fluids are appropriate  Communication/Sensory: update plan of care on whiteboard  Behavioral: administer medication as ordered  Mobility: ensure bed is locked and in lowest position and provide appropriate assistive device

## 2017-10-31 NOTE — ASSESSMENT & PLAN NOTE
Possible mild right knee osteomyelitis as seen on MRI  ID, Dr. Juarez recommended  PO Septra for CAMRSA. Switched from IV vancomycin to PO Bactrim  Surgical evaluation by , no surgical intervention recommended at this time  Continue PO bactrim for total of 6 weeks and wound care  If worsening infection may consider IV Abx, debridement and right AKA

## 2017-10-31 NOTE — DIETARY
Nutrition Services Update: Following pt for adequate nutritional intake.    ADL documentation shows 25-50% consumed of majority of meals.  Pt is receiving Boost VHC BID and Magic Cups BID, which in total provide 1640 kcals and 62 gm of protein in addition to dysphagia 2, nectar thick meal trays.  Estimated needs per MSJ x 1.2 = 1608 kcals/day, therefore supplements alone are providing estimated needs.    Spoke w/ pt at bedside and pt states that he consumes all the supplements that are sent to him.  Although documentation shows 25-50% intake of meals, pt is likely meeting his estimated needs through supplement intake in addition to meals.  Wt has remained stable over the 31 days of admit, with recent wt from 10/28 = 55.1 kg, indicating PO intake adequate.  Please re-consult RD if PO intake of meals/supplements decrease or for further nutrition needs.    RD to monitor per dept policy

## 2017-10-31 NOTE — CARE PLAN
Problem: Nutritional:  Goal: Achieve adequate nutritional intake  Patient will consume 50% of meals   Outcome: MET Date Met: 10/31/17

## 2017-10-31 NOTE — CARE PLAN
Problem: Communication  Goal: The ability to communicate needs accurately and effectively will improve  Outcome: PROGRESSING AS EXPECTED  Pt capable of verbalizing all needs and utilizes call light system approprietly.    Problem: Safety  Goal: Will remain free from falls  Outcome: PROGRESSING AS EXPECTED  Pt high fall risk, pt wearing treaded socks, bed locked and in lowest position, bed alarm on.  Pt call light and phone within reach.

## 2017-10-31 NOTE — PROGRESS NOTES
Assumed care of pt, received report from day shift RN, pt assessed.  Pt has no complaints of pain at this time.  Pt is A&O x3, disoriented to time.  Pt high fall risk, wearing treaded socks, bed locked and in lowest position, bed alarm is on.  Pt instructed to call for assistance prior to getting OOB, pt verbalized understanding.  Call light, phone, and personal belongings within reach.

## 2017-10-31 NOTE — PROGRESS NOTES
"Pharmacy Kinetics 72 y.o. male on vancomycin day #1  10/31/2017    Currently loaded with vancomycin 1400mg once (time TBD)    Indication for Treatment: SSTI  ID consult: No    Pertinent history per medical record: Admitted on 2017 for UTI symptoms and cellulitis of the right knee. In the ED, he stated he generally felt weak and per his caregiver, he had been confused for a few days. Has already been treated for aspiration pneumonia, cellulitis, and UTI. Vancomycin restarted as wound growing MRSA.    Allergies: Review of patient's allergies indicates no known allergies.     List concerns for renal function: age    Pertinent cultures to date:   (10/30) wound right leg - Staphylococcus aureus heavy growth    Recent Labs      10/29/17   0342  10/30/17   0234   WBC  12.3*  12.8*     Recent Labs      10/29/17   0342  10/30/17   0234  10/31/17   0323   BUN  12  12  15   CREATININE  0.44*  0.51  0.47*     Intake/Output Summary (Last 24 hours) at 10/31/17 1628  Last data filed at 10/31/17 0610   Gross per 24 hour   Intake                0 ml   Output              500 ml   Net             -500 ml      Blood pressure 135/75, pulse 77, temperature 36.7 °C (98 °F), resp. rate 19, height 1.829 m (6' 0.01\"), weight 55.1 kg (121 lb 7.6 oz), SpO2 95 %. Temp (24hrs), Av.6 °C (97.9 °F), Min:36.1 °C (97 °F), Max:37.1 °C (98.8 °F)    A/P   1. Vancomycin dose: start 1100 mg q24h (@ ~1800)  2. Next vancomycin level: 2 days  3. Goal trough: 12-16 mcg/mL  4. Comments: Vancomycin started as patient's lab corrected and he is growing staph. Sensitivities still in process to assist with narrowing therapy. Will initiate on 20 mg/kg q24h per protocol and check a level prior to the 3rd total dose. Pharmacy will continue to monitor and adjust dosing as clinically appropriate.    Justa Johnson, PharmD  "

## 2017-11-01 LAB
ANION GAP SERPL CALC-SCNC: 7 MMOL/L (ref 0–11.9)
BACTERIA WND AEROBE CULT: ABNORMAL
BACTERIA WND AEROBE CULT: ABNORMAL
BASOPHILS # BLD AUTO: 1.9 % (ref 0–1.8)
BASOPHILS # BLD: 0.25 K/UL (ref 0–0.12)
BUN SERPL-MCNC: 13 MG/DL (ref 8–22)
CALCIUM SERPL-MCNC: 8.9 MG/DL (ref 8.5–10.5)
CHLORIDE SERPL-SCNC: 97 MMOL/L (ref 96–112)
CO2 SERPL-SCNC: 24 MMOL/L (ref 20–33)
CREAT SERPL-MCNC: 0.56 MG/DL (ref 0.5–1.4)
EOSINOPHIL # BLD AUTO: 0.61 K/UL (ref 0–0.51)
EOSINOPHIL NFR BLD: 4.6 % (ref 0–6.9)
ERYTHROCYTE [DISTWIDTH] IN BLOOD BY AUTOMATED COUNT: 43.8 FL (ref 35.9–50)
GFR SERPL CREATININE-BSD FRML MDRD: >60 ML/MIN/1.73 M 2
GLUCOSE SERPL-MCNC: 81 MG/DL (ref 65–99)
GRAM STN SPEC: ABNORMAL
HCT VFR BLD AUTO: 38.3 % (ref 42–52)
HGB BLD-MCNC: 11.5 G/DL (ref 14–18)
IMM GRANULOCYTES # BLD AUTO: 0.06 K/UL (ref 0–0.11)
IMM GRANULOCYTES NFR BLD AUTO: 0.5 % (ref 0–0.9)
LYMPHOCYTES # BLD AUTO: 1.77 K/UL (ref 1–4.8)
LYMPHOCYTES NFR BLD: 13.3 % (ref 22–41)
MCH RBC QN AUTO: 22.4 PG (ref 27–33)
MCHC RBC AUTO-ENTMCNC: 30 G/DL (ref 33.7–35.3)
MCV RBC AUTO: 74.7 FL (ref 81.4–97.8)
MONOCYTES # BLD AUTO: 0.87 K/UL (ref 0–0.85)
MONOCYTES NFR BLD AUTO: 6.5 % (ref 0–13.4)
NEUTROPHILS # BLD AUTO: 9.77 K/UL (ref 1.82–7.42)
NEUTROPHILS NFR BLD: 73.2 % (ref 44–72)
NRBC # BLD AUTO: 0 K/UL
NRBC BLD AUTO-RTO: 0 /100 WBC
PLATELET # BLD AUTO: 644 K/UL (ref 164–446)
PMV BLD AUTO: 9.1 FL (ref 9–12.9)
POTASSIUM SERPL-SCNC: 4.4 MMOL/L (ref 3.6–5.5)
RBC # BLD AUTO: 5.13 M/UL (ref 4.7–6.1)
SIGNIFICANT IND 70042: ABNORMAL
SITE SITE: ABNORMAL
SODIUM SERPL-SCNC: 128 MMOL/L (ref 135–145)
SOURCE SOURCE: ABNORMAL
WBC # BLD AUTO: 13.3 K/UL (ref 4.8–10.8)

## 2017-11-01 PROCEDURE — 97530 THERAPEUTIC ACTIVITIES: CPT

## 2017-11-01 PROCEDURE — A9270 NON-COVERED ITEM OR SERVICE: HCPCS | Performed by: HOSPITALIST

## 2017-11-01 PROCEDURE — 700102 HCHG RX REV CODE 250 W/ 637 OVERRIDE(OP): Performed by: HOSPITALIST

## 2017-11-01 PROCEDURE — 700111 HCHG RX REV CODE 636 W/ 250 OVERRIDE (IP): Performed by: HOSPITALIST

## 2017-11-01 PROCEDURE — 85025 COMPLETE CBC W/AUTO DIFF WBC: CPT

## 2017-11-01 PROCEDURE — 700105 HCHG RX REV CODE 258

## 2017-11-01 PROCEDURE — 97535 SELF CARE MNGMENT TRAINING: CPT

## 2017-11-01 PROCEDURE — 665999 HH PPS REVENUE DEBIT

## 2017-11-01 PROCEDURE — 700111 HCHG RX REV CODE 636 W/ 250 OVERRIDE (IP)

## 2017-11-01 PROCEDURE — 36415 COLL VENOUS BLD VENIPUNCTURE: CPT

## 2017-11-01 PROCEDURE — 665998 HH PPS REVENUE CREDIT

## 2017-11-01 PROCEDURE — 99233 SBSQ HOSP IP/OBS HIGH 50: CPT | Performed by: INTERNAL MEDICINE

## 2017-11-01 PROCEDURE — 80048 BASIC METABOLIC PNL TOTAL CA: CPT

## 2017-11-01 PROCEDURE — 770021 HCHG ROOM/CARE - ISO PRIVATE

## 2017-11-01 RX ADMIN — VANCOMYCIN HYDROCHLORIDE 1100 MG: 100 INJECTION, POWDER, LYOPHILIZED, FOR SOLUTION INTRAVENOUS at 17:41

## 2017-11-01 RX ADMIN — HEPARIN SODIUM 5000 UNITS: 5000 INJECTION, SOLUTION INTRAVENOUS; SUBCUTANEOUS at 22:06

## 2017-11-01 RX ADMIN — SODIUM CHLORIDE TAB 1 GM 1 G: 1 TAB at 09:10

## 2017-11-01 RX ADMIN — GABAPENTIN 100 MG: 100 CAPSULE ORAL at 15:04

## 2017-11-01 RX ADMIN — METOPROLOL TARTRATE 75 MG: 25 TABLET, FILM COATED ORAL at 09:11

## 2017-11-01 RX ADMIN — METOPROLOL TARTRATE 75 MG: 25 TABLET, FILM COATED ORAL at 22:05

## 2017-11-01 RX ADMIN — MAGNESIUM GLUCONATE 500 MG ORAL TABLET 400 MG: 500 TABLET ORAL at 09:11

## 2017-11-01 RX ADMIN — GABAPENTIN 100 MG: 100 CAPSULE ORAL at 22:05

## 2017-11-01 RX ADMIN — THERA TABS 1 TABLET: TAB at 09:12

## 2017-11-01 RX ADMIN — ASPIRIN 81 MG: 81 TABLET, COATED ORAL at 09:12

## 2017-11-01 RX ADMIN — ATORVASTATIN CALCIUM 40 MG: 40 TABLET, FILM COATED ORAL at 22:05

## 2017-11-01 RX ADMIN — HEPARIN SODIUM 5000 UNITS: 5000 INJECTION, SOLUTION INTRAVENOUS; SUBCUTANEOUS at 09:13

## 2017-11-01 RX ADMIN — SODIUM CHLORIDE TAB 1 GM 1 G: 1 TAB at 17:40

## 2017-11-01 RX ADMIN — GABAPENTIN 100 MG: 100 CAPSULE ORAL at 09:11

## 2017-11-01 RX ADMIN — FOLIC ACID 1 MG: 1 TABLET ORAL at 09:12

## 2017-11-01 RX ADMIN — LEVOTHYROXINE SODIUM 75 MCG: 75 TABLET ORAL at 05:48

## 2017-11-01 ASSESSMENT — COGNITIVE AND FUNCTIONAL STATUS - GENERAL
EATING MEALS: A LITTLE
TOILETING: A LOT
WALKING IN HOSPITAL ROOM: TOTAL
TURNING FROM BACK TO SIDE WHILE IN FLAT BAD: UNABLE
STANDING UP FROM CHAIR USING ARMS: TOTAL
MOVING FROM LYING ON BACK TO SITTING ON SIDE OF FLAT BED: UNABLE
SUGGESTED CMS G CODE MODIFIER MOBILITY: CN
MOVING TO AND FROM BED TO CHAIR: UNABLE
HELP NEEDED FOR BATHING: A LOT
SUGGESTED CMS G CODE MODIFIER DAILY ACTIVITY: CL
PERSONAL GROOMING: A LOT
CLIMB 3 TO 5 STEPS WITH RAILING: TOTAL
DAILY ACTIVITIY SCORE: 13
DRESSING REGULAR UPPER BODY CLOTHING: A LOT
DRESSING REGULAR LOWER BODY CLOTHING: A LOT
MOBILITY SCORE: 6

## 2017-11-01 ASSESSMENT — ENCOUNTER SYMPTOMS
DIZZINESS: 0
MYALGIAS: 0
DEPRESSION: 0
ABDOMINAL PAIN: 0
SHORTNESS OF BREATH: 0
FEVER: 0
NERVOUS/ANXIOUS: 0
MEMORY LOSS: 1
WEAKNESS: 1
FLANK PAIN: 0
LOSS OF CONSCIOUSNESS: 0

## 2017-11-01 ASSESSMENT — PAIN SCALES - GENERAL
PAINLEVEL_OUTOF10: 0

## 2017-11-01 NOTE — THERAPY
"Physical Therapy Treatment completed.   Bed Mobility:  Supine to Sit: Minimal Assist  Transfers: Sit to Stand: Maximal Assist (x2)  Gait: Level Of Assist: Unable to Participate       Plan of Care: Will benefit from Physical Therapy 3 times per week and Plan to complete next treatment by Friday 11/3  Discharge Recommendations: Equipment: Will Continue to Assess for Equipment Needs. Post-acute therapy Discharge to a transitional care facility for continued skilled therapy services.    Pt more agreeable to work with PT today. Caregiver in room and caregiver perseverating on pt \"coming home\" rather than going to long term care. Pt with improved efforts during therapy today. Minimal assist for supine to sit. Able to sit at EOB 8 minutes total with fair- balance, Cues for upright posture. Maximal assist X 2 for slide board transfer to . Pt did initiate scooting to the  to assist with transfer. Pt would benefit from ongoing PT intervention while in the acute care setting. Pt will need extensive post acute therapy upon DC in SNF     See \"Rehab Therapy-Acute\" Patient Summary Report for complete documentation.       "

## 2017-11-01 NOTE — THERAPY
"Occupational Therapy Treatment completed with focus on ADLs and ADL transfers.  Functional Status:  Pt seen for OT tx. Pt up in WC at beginning of session. Max A transfer from WC to EOB. Pt required max A for pericare and incontinent while sitting up in WC. Pt required cueing for sequencing and initiation of task. Pt pleasant during session.   Plan of Care: Will benefit from Occupational Therapy 3 times per week  Discharge Recommendations:  Equipment Will Continue to Assess for Equipment Needs.     See \"Rehab Therapy-Acute\" Patient Summary Report for complete documentation.   "

## 2017-11-01 NOTE — WOUND TEAM
"[facility]  Inpatient Services  Wound and Skin Care Progress Note    HPI, PMH, SH: Reviewed  Unit where seen by Wound Team: S [id number]    WOUND TEAM FOLLOW UP: re-evaluation of R knee and stump wound    SUBJECTIVE:  \" Let her finish first.\"    Self Report / Pain Level: c/o tenderness with cleansing of knee wound  OBJECTIVE: on pressure redistribution mattress, with waffle overlay, lying supine, dressing in place to right knee, adhesive foam to stump  WOUND TYPE, LOCATION, CHARACTERISTICS:  Wound POA Other (full thickness surgical) Leg Right Distal  Periwound Skin: Intact  Drainage : None  Tissue Type and %:  Closed approximated incision line, except for small opening around lateral suture which is visible  Wound Edges:    closed    Odor:     None   Exposed structure(s):   1 small suture at lateral end of incision line   Signs and Symptoms of Infection: none    Wound Not POA Unstageable Knee Right Anterior  Periwound Skin: erythema  Drainage : minimal tan/purulent      Tissue Type and %:    90% pink/red moist tissue, 10% white cartilage  Wound Edges:    attached    Odor:     None   Exposed structure(s):   cartilage  Signs and Symptoms of Infection: erythema, drainage, positive culture    Measurements : taken 10/30/17    Leg Right Distal  Incision line not measured           R knee                                                        Length (cm): 2.7                                                        Width (cm): 2                                                        Depth (cm):  0.1   Tracts/undermining:   None       INTERVENTIONS BY WOUND TEAM: Removed dressing to knee, moist washcloth to ritu wound, cleansed wound with NS. Dressing removed from stump, cleanse with normal saline, painted with betadine and open to air.  Applied Aquacel Ag cut to fit knee wound, and covered with adhesive foam. No wounds present to sacracoccygeal area. Patient positioned supine for breakfast, stump on pillow.  Leg " Right Distal-Dressing Options: Open to Air  Knee Right Anterior-Dressing Options:  (Aquacel Ag, foam)    Interdisciplinary consultation: staff RN, patient  EVALUATION AND PROGRESS OF WOUND(S):  Right knee wound is stable, patient started on IV abx on 10/30,  incision line distal stump approximated except for one suture present. Sacrum has no wounds present. NPWT not appropriated at this time. Patient will be seen again on November 6th for re-evaluation.  Patient has poor perfusion per vascular note from Dr. Rodriguez note 10/2, not a surgical candidate at this time.    Factors affecting wound healing: PAD, decreased nutrition, smoker, alcohol abuse     Goals: decrease size of knee wound by 1% each week      NURSING PLAN OF CARE:    Dressing changes:     See new Dressing Maintenance orders:  X     Skin care: See Skin Care orders:        Rectal tube care: See Rectal Tube Care orders:      Other orders:           WOUND TEAM PLAN OF CARE (X):   NPWT change 3 x week:        Dressing changes:       Follow up as needed:     Other: X once weekly

## 2017-11-01 NOTE — PROGRESS NOTES
Assumed care of pt, received report from day shift RN, pt assessed.  Pt has no complaints of pain at this time.  Pt is A&O x3, disoriented to time.  Pt on isolation precautions for MRSA in right knee wound.  Pt high fall risk, wearing treaded socks, bed locked and in lowest position, bed alarm is on.  Pt instructed to call for assistance prior to getting OOB, pt verbalized understanding.  Call light, phone, and personal belongings within reach.

## 2017-11-01 NOTE — PROGRESS NOTES
[facility] Hospitalist Progress Note    Date of Service: 2017    Chief Complaint  72 y.o. male admitted 2017 with urinary tract infection and confusion.    Interval Problem Update  Patient has no new complaints today  Remained afebrile  Minimal drainage from right knee wound    Patient's friend is at bedside expressing concerns with guardianship hearing, plan of care has been updated with patient and friend    Consultants/Specialty  Geriatrics  Palliative Care  Ethics committee/ Dr. Eamon Blackmon    Disposition  Pending guardianship and placement.   assisting  No Court date yet  - Dr. Blackmon to be updated regarding family's concern, RN advised    The total time spent face to face with this patient was 55 mins of which 60% of time was spent on counseling and coordinating care.  Specifically speaking w/ rn, team and pt / family at length re plan of care    Review of Systems   Constitutional: Negative for fever.   HENT: Negative for congestion.    Respiratory: Negative for shortness of breath.    Cardiovascular: Negative for leg swelling.   Gastrointestinal: Negative for abdominal pain.   Genitourinary: Negative for flank pain.   Musculoskeletal: Negative for joint pain and myalgias.   Neurological: Positive for weakness. Negative for dizziness and loss of consciousness.   Psychiatric/Behavioral: Positive for memory loss. Negative for depression. The patient is not nervous/anxious.       Physical Exam  Laboratory/Imaging   Hemodynamics  Temp (24hrs), Av.7 °C (98.1 °F), Min:36.4 °C (97.5 °F), Max:37 °C (98.6 °F)   Temperature: 37 °C (98.6 °F)  Pulse  Av.3  Min: 58  Max: 144    Blood Pressure : 139/76      Respiratory      Respiration: 18, Pulse Oximetry: 97 %        RUL Breath Sounds: Clear, RML Breath Sounds: Diminished, RLL Breath Sounds: Diminished, RIGO Breath Sounds: Clear, LLL Breath Sounds: Diminished    Fluids    Intake/Output Summary (Last 24 hours) at 17 1342  Last data filed  at 11/01/17 0500   Gross per 24 hour   Intake                0 ml   Output             1900 ml   Net            -1900 ml       Nutrition  Orders Placed This Encounter   Procedures   • DIET ORDER     Standing Status:   Standing     Number of Occurrences:   1     Order Specific Question:   Diet:     Answer:   Regular [1]     Order Specific Question:   Texture/Fiber modifications:     Answer:   Dysphagia 2(Pureed/Chopped)specify fluid consistency(question 6) [2]     Order Specific Question:   Consistency/Fluid modifications:     Answer:   Nectar Thick [2]     Order Specific Question:   Miscellaneous modifications:     Answer:   SLP - Deliver to Nursing Station [22]     Physical Exam   Constitutional: No distress.   Thin, frail   HENT:   Head: Normocephalic and atraumatic.   Mouth/Throat: Oropharynx is clear and moist.   Eyes: EOM are normal. Pupils are equal, round, and reactive to light.   Neck: Normal range of motion. Neck supple.   Cardiovascular: Normal rate and regular rhythm.    Pulmonary/Chest: Effort normal and breath sounds normal. No respiratory distress.   Abdominal: Soft. He exhibits no distension.   Musculoskeletal: He exhibits no edema or tenderness.   Right BKA with minimal drainage  , left forefoot amputation. Right knee with circular wound, minimal drainage,. Wound is smaller but still to the tendon.   Neurological: He is alert. No cranial nerve deficit. Coordination normal.   AOx2   Skin: Skin is warm.   Psychiatric: He has a normal mood and affect. His behavior is normal. His speech is delayed.   Vitals reviewed.      Recent Labs      10/30/17   0234  11/01/17   0329   WBC  12.8*  13.3*   RBC  5.12  5.13   HEMOGLOBIN  11.7*  11.5*   HEMATOCRIT  38.1*  38.3*   MCV  74.4*  74.7*   MCH  22.9*  22.4*   MCHC  30.7*  30.0*   RDW  42.5  43.8   PLATELETCT  626*  644*   MPV  8.9*  9.1     Recent Labs      10/30/17   0234  10/31/17   0323  11/01/17   0329   SODIUM  126*  130*  128*   POTASSIUM  4.6  4.4  4.4    CHLORIDE  97  97  97   CO2  22  24  24   GLUCOSE  90  86  81   BUN  12  15  13   CREATININE  0.51  0.47*  0.56   CALCIUM  9.1  9.3  8.9                      Assessment/Plan     * Leukocytosis- (present on admission)   Assessment & Plan    Unclear etiology with no clear source. Also with thrombocytopenia up to 600's. Daily fluctuations. Has already been treated for aspiration pneumonia, cellulitis, and UTI. VSS, afebrile. Stable in low 12's, high 11's. Possible myelodysplastic disorder but has not been evaluated.  - s/p 14 days of abx  - blood and urine cultures negative  - follow clinically  - XR right knee and wound culture 10/30        Cellulitis of right knee- (present on admission)   Assessment & Plan    With minimally draining wound  Status post wound culture on October 30 with MRSA, few colonies  Status post 1 day of vancomycin, clinically improving with minimal drainage  We'll DC vancomycin, continue wound care, monitor for improvement    -We'll consider ID consult as needed  Discussed with pharmacy  We will consider need for repeat imaging to rule out associated osteomyelitis, if clinically deteriorating   Follow-up a.m. labs        Hyponatremia- (present on admission)   Assessment & Plan    Hypovolemic hyponatremia. Fluctuates with PO fluid intake. Not symptomatic and continues to be more interactive today. Continuing with daily fluctuations,  - Improving  - continue to follow clinically  encourage PO intake  - salt tabs, 1gm BID        PAD (peripheral artery disease) (CMS-HCC)- (present on admission)   Assessment & Plan    Significant PAD s/p left forefoot amputation and right BKA  - continue aspirin and statin  - not a candidate for prosthesis  - followed by vascular surgery and wound care        Dementia- (present on admission)   Assessment & Plan    Admitted for encephalopathy and debility. Has been evaluated by ethics committee, unable to make his own medical decisions.  - guardianship pending, no  "court date yet  - Patient's friend,\"wife\"Norma expressing concerns with guardianship hearing, would like to discuss recommendations for needs for guardianship, explained to patient and patient's friend regarding ethics committee meeting and per Dr. Eamon Blackmon, patient lacks capacity for medical decision making and is requiring guardianship at this time.    - Patient's friend has been in contact with our  regarding her concerns, and multiple conversations regarding guardianship status   is aware, and well follow-up with patient and friend as needed  - 11/1 Discussed with RN, to follow up with Dr. Blackmon for any further recommendations regarding guardianship hearing        Hypomagnesemia- (present on admission)   Assessment & Plan    Mg 1.8 10/28, h/o alcohol dependence.  - oral mag daily and monitor  - IV mag PRN repletion        Essential hypertension- (present on admission)   Assessment & Plan    Currently normotensive.  - continue with metoprolol 75 mg BID  - PRN normodyne        Severe protein-calorie malnutrition (CMS-HCC)- (present on admission)   Assessment & Plan    Poor PO intake.  - Nutrition following  - Cont boost TID            Reviewed items::  Medications reviewed, Labs reviewed and Radiology images reviewed  Barrera catheter::  No Barrera  DVT prophylaxis pharmacological::  Heparin        "

## 2017-11-01 NOTE — PROGRESS NOTES
"Pharmacy Kinetics 72 y.o. male on vancomycin day # 2 2017    Currently on Vancomycin 1400 mg iv q24hr    Indication for Treatment: SSTI    Pertinent history per medical record: Admitted on 2017 for UTI symptoms and cellulitis of the right knee. In the ED, he stated he generally felt weak and per his caregiver, he had been confused for a few days. Has already been treated for aspiration pneumonia, cellulitis, and UTI. Vancomycin startedas wound growing MRSA..    Other antibiotics: none    Allergies: Review of patient's allergies indicates no known allergies.     List concerns for renal function): Age, low albumin    Pertinent cultures to date:   10/30- wound right leg - MRSA heavy growth - vanc LOTTIE 2    Recent Labs      10/30/17   0234  17   032   WBC  12.8*  13.3*   NEUTSPOLYS   --   73.20*     Recent Labs      10/30/17   0234  10/31/17   0323  17   032   BUN  12  15  13   CREATININE  0.51  0.47*  0.56     No results for input(s): VANCOTROUGH, VANCOPEAK, VANCORANDOM in the last 72 hours.  Intake/Output Summary (Last 24 hours) at 17 1633  Last data filed at 17 1456   Gross per 24 hour   Intake              240 ml   Output             1900 ml   Net            -1660 ml      Blood pressure 127/59, pulse 79, temperature 36.9 °C (98.4 °F), resp. rate 18, height 1.829 m (6' 0.01\"), weight 55.1 kg (121 lb 7.6 oz), SpO2 96 %. Temp (24hrs), Av.8 °C (98.2 °F), Min:36.4 °C (97.5 °F), Max:37 °C (98.6 °F)      A/P   1. Vancomycin dose change: continue vancomycin 1100 mg IV q24h   2. Next vancomycin level: tomorrow prior dose  3. Goal trough: 12-16  4. Comments: confirmed with micro that wound culture heavy MRSA growth. Wound team re-evaluated r knee stump and wound, moist tissue cartilage exposed. Restart vancomycin therapy. Received loading dose yesterday. Pharmacy will monitor and adjust dosing per protocol    Mary Hoover, Pharm.D, BCPS      "

## 2017-11-01 NOTE — PROGRESS NOTES
Assumed care of pt at 0700. Received report from RN. Pt A&Ox4. Assessment complete. Labs reviewed. Pt and RN discussed plan of care. Pt questions answered. Pt needs are met at this time. Pt denies pain at this time. Bed in lowest and locked position. Call light within reach. Upper bed rails up. Hourly rounding in place.

## 2017-11-01 NOTE — PROGRESS NOTES
Josie Osuna Fall Risk Assessment:     Last Known Fall: Within the last six months  Mobility: Use of assistive device/requires assist of two people  Medications: Cardiovascular or central nervous system meds  Mental Status/LOC/Awareness: Oriented to person and place  Toileting Needs: Incontinence  Volume/Electrolyte Status: Use of IV fluids/tube feeds  Communication/Sensory: No deficits  Behavior: Appropriate behavior  Josie Osuna Fall Risk Total: 15  Fall Risk Level: HIGH RISK    Universal Fall Precautions:  call light/belongings in reach, bed in low position and locked, wheelchairs and assistive devices out of sight, siderails up x 2, use non-slip footwear, adequate lighting, clutter free and spill free environment, educate on level of risk, educate to call for assistance    Fall Risk Level Interventions:    TRIAL ([employer] 8, NEURO, MED MARINA 5) Moderate Fall Risk Interventions  Place yellow fall risk ID band on patient: verified  Provide patient/family education based on risk assessment : verified  Educate patient/family to call staff for assistance when getting out of bed: verified  Place fall precaution signage outside patient door: verified  Utilize bed/chair fall alarm: verifiedTRIAL ([employer] 8, NEURO, [employer] MARINA 5) High Fall Risk Interventions  Place yellow fall risk ID band on patient: verified  Provide patient/family education based on risk assessment: completed  Educate patient/family to call staff for assistance when getting out of bed: completed  Place fall precaution signage outside patient door: verified  Place patient in room close to nursing station: currently not available/charge notified  Utilize bed/chair fall alarm: verified  Notify charge of high risk for huddle: completed    Patient Specific Interventions:     Medication: review medications with patient and family  Mental Status/LOC/Awareness: reinforce falls education, check on patient hourly, utilize bed/chair fall alarm and reinforce the use of  call light  Toileting: instruct patient/family on the need to call for assistance when toileting  Volume/Electrolyte Status: ensure IV fluids are appropriate  Communication/Sensory: update plan of care on whiteboard  Behavioral: administer medication as ordered  Mobility: ensure bed is locked and in lowest position and provide appropriate assistive device

## 2017-11-01 NOTE — CARE PLAN
Problem: Venous Thromboembolism (VTW)/Deep Vein Thrombosis (DVT) Prevention:  Goal: Patient will participate in Venous Thrombosis (VTE)/Deep Vein Thrombosis (DVT)Prevention Measures  Outcome: PROGRESSING AS EXPECTED  Pt on heparin for pharmacologic prophylaxis.    Problem: Bowel/Gastric:  Goal: Will not experience complications related to bowel motility  Outcome: PROGRESSING AS EXPECTED  [facility] 10/31/2017.  Pt refusing stool softeners, has no complaints of loose stools or constipation.

## 2017-11-02 ENCOUNTER — APPOINTMENT (OUTPATIENT)
Dept: RADIOLOGY | Facility: MEDICAL CENTER | Age: 72
DRG: 640 | End: 2017-11-02
Attending: INTERNAL MEDICINE
Payer: MEDICARE

## 2017-11-02 LAB
BASOPHILS # BLD AUTO: 1.8 % (ref 0–1.8)
BASOPHILS # BLD: 0.24 K/UL (ref 0–0.12)
EOSINOPHIL # BLD AUTO: 0.64 K/UL (ref 0–0.51)
EOSINOPHIL NFR BLD: 4.9 % (ref 0–6.9)
ERYTHROCYTE [DISTWIDTH] IN BLOOD BY AUTOMATED COUNT: 44.3 FL (ref 35.9–50)
HCT VFR BLD AUTO: 36.6 % (ref 42–52)
HGB BLD-MCNC: 11 G/DL (ref 14–18)
IMM GRANULOCYTES # BLD AUTO: 0.09 K/UL (ref 0–0.11)
IMM GRANULOCYTES NFR BLD AUTO: 0.7 % (ref 0–0.9)
LYMPHOCYTES # BLD AUTO: 1.85 K/UL (ref 1–4.8)
LYMPHOCYTES NFR BLD: 14.2 % (ref 22–41)
MCH RBC QN AUTO: 22.7 PG (ref 27–33)
MCHC RBC AUTO-ENTMCNC: 30.1 G/DL (ref 33.7–35.3)
MCV RBC AUTO: 75.5 FL (ref 81.4–97.8)
MONOCYTES # BLD AUTO: 1.01 K/UL (ref 0–0.85)
MONOCYTES NFR BLD AUTO: 7.7 % (ref 0–13.4)
NEUTROPHILS # BLD AUTO: 9.24 K/UL (ref 1.82–7.42)
NEUTROPHILS NFR BLD: 70.7 % (ref 44–72)
NRBC # BLD AUTO: 0 K/UL
NRBC BLD AUTO-RTO: 0 /100 WBC
PLATELET # BLD AUTO: 668 K/UL (ref 164–446)
PMV BLD AUTO: 9.2 FL (ref 9–12.9)
RBC # BLD AUTO: 4.85 M/UL (ref 4.7–6.1)
VANCOMYCIN TROUGH SERPL-MCNC: 10.4 UG/ML (ref 10–20)
WBC # BLD AUTO: 13.1 K/UL (ref 4.8–10.8)

## 2017-11-02 PROCEDURE — 700102 HCHG RX REV CODE 250 W/ 637 OVERRIDE(OP): Performed by: HOSPITALIST

## 2017-11-02 PROCEDURE — 700111 HCHG RX REV CODE 636 W/ 250 OVERRIDE (IP)

## 2017-11-02 PROCEDURE — A9270 NON-COVERED ITEM OR SERVICE: HCPCS | Performed by: HOSPITALIST

## 2017-11-02 PROCEDURE — 36415 COLL VENOUS BLD VENIPUNCTURE: CPT

## 2017-11-02 PROCEDURE — 665999 HH PPS REVENUE DEBIT

## 2017-11-02 PROCEDURE — A9579 GAD-BASE MR CONTRAST NOS,1ML: HCPCS | Performed by: INTERNAL MEDICINE

## 2017-11-02 PROCEDURE — 770021 HCHG ROOM/CARE - ISO PRIVATE

## 2017-11-02 PROCEDURE — 80202 ASSAY OF VANCOMYCIN: CPT

## 2017-11-02 PROCEDURE — 85025 COMPLETE CBC W/AUTO DIFF WBC: CPT

## 2017-11-02 PROCEDURE — 665998 HH PPS REVENUE CREDIT

## 2017-11-02 PROCEDURE — 700105 HCHG RX REV CODE 258

## 2017-11-02 PROCEDURE — 99233 SBSQ HOSP IP/OBS HIGH 50: CPT | Performed by: INTERNAL MEDICINE

## 2017-11-02 PROCEDURE — 700117 HCHG RX CONTRAST REV CODE 255: Performed by: INTERNAL MEDICINE

## 2017-11-02 PROCEDURE — 73723 MRI JOINT LWR EXTR W/O&W/DYE: CPT | Mod: RT

## 2017-11-02 PROCEDURE — 700111 HCHG RX REV CODE 636 W/ 250 OVERRIDE (IP): Performed by: HOSPITALIST

## 2017-11-02 RX ORDER — LORAZEPAM 2 MG/ML
1 INJECTION INTRAMUSCULAR EVERY 12 HOURS PRN
Status: DISCONTINUED | OUTPATIENT
Start: 2017-11-02 | End: 2017-11-29

## 2017-11-02 RX ADMIN — FOLIC ACID 1 MG: 1 TABLET ORAL at 08:12

## 2017-11-02 RX ADMIN — METOPROLOL TARTRATE 75 MG: 25 TABLET, FILM COATED ORAL at 08:13

## 2017-11-02 RX ADMIN — THERA TABS 1 TABLET: TAB at 08:13

## 2017-11-02 RX ADMIN — GADODIAMIDE 10 ML: 287 INJECTION INTRAVENOUS at 18:59

## 2017-11-02 RX ADMIN — GABAPENTIN 100 MG: 100 CAPSULE ORAL at 21:53

## 2017-11-02 RX ADMIN — ASPIRIN 81 MG: 81 TABLET, COATED ORAL at 08:12

## 2017-11-02 RX ADMIN — ATORVASTATIN CALCIUM 40 MG: 40 TABLET, FILM COATED ORAL at 21:53

## 2017-11-02 RX ADMIN — HEPARIN SODIUM 5000 UNITS: 5000 INJECTION, SOLUTION INTRAVENOUS; SUBCUTANEOUS at 08:13

## 2017-11-02 RX ADMIN — VANCOMYCIN HYDROCHLORIDE 1100 MG: 100 INJECTION, POWDER, LYOPHILIZED, FOR SOLUTION INTRAVENOUS at 21:52

## 2017-11-02 RX ADMIN — GABAPENTIN 100 MG: 100 CAPSULE ORAL at 08:12

## 2017-11-02 RX ADMIN — MAGNESIUM GLUCONATE 500 MG ORAL TABLET 400 MG: 500 TABLET ORAL at 08:13

## 2017-11-02 RX ADMIN — LEVOTHYROXINE SODIUM 75 MCG: 75 TABLET ORAL at 05:48

## 2017-11-02 RX ADMIN — SODIUM CHLORIDE TAB 1 GM 1 G: 1 TAB at 08:12

## 2017-11-02 RX ADMIN — GABAPENTIN 100 MG: 100 CAPSULE ORAL at 14:32

## 2017-11-02 RX ADMIN — SODIUM CHLORIDE TAB 1 GM 1 G: 1 TAB at 17:33

## 2017-11-02 RX ADMIN — METOPROLOL TARTRATE 75 MG: 25 TABLET, FILM COATED ORAL at 21:53

## 2017-11-02 RX ADMIN — HEPARIN SODIUM 5000 UNITS: 5000 INJECTION, SOLUTION INTRAVENOUS; SUBCUTANEOUS at 21:53

## 2017-11-02 ASSESSMENT — ENCOUNTER SYMPTOMS
MEMORY LOSS: 1
WEAKNESS: 1
NAUSEA: 0
SHORTNESS OF BREATH: 0
TREMORS: 0
HEADACHES: 0
COUGH: 0
ABDOMINAL PAIN: 0
NERVOUS/ANXIOUS: 0
DEPRESSION: 0
LOSS OF CONSCIOUSNESS: 0
MYALGIAS: 1
FLANK PAIN: 0

## 2017-11-02 ASSESSMENT — PAIN SCALES - GENERAL
PAINLEVEL_OUTOF10: 0

## 2017-11-02 NOTE — PROGRESS NOTES
[facility] Hospitalist Progress Note    Date of Service: 2017    Chief Complaint  72 y.o. male admitted 2017 with urinary tract infection and confusion.    Interval Problem Update  Reports min R knee pain  Improving RLE wound  Afebrile    Consultants/Specialty  Geriatrics  Palliative Care  Ethics committee/ Dr. Eamon Blackmon    Disposition  Pending guardianship and placement.   assisting  No Court date yet  - Dr. Blackmon to be updated regarding family's concern, RN advised        Review of Systems   Constitutional: Negative for malaise/fatigue.   Respiratory: Negative for cough and shortness of breath.    Cardiovascular: Negative for chest pain and leg swelling.   Gastrointestinal: Negative for abdominal pain and nausea.   Genitourinary: Negative for dysuria and flank pain.   Musculoskeletal: Positive for myalgias. Negative for joint pain.   Neurological: Positive for weakness. Negative for tremors, loss of consciousness and headaches.   Psychiatric/Behavioral: Positive for memory loss. Negative for depression. The patient is not nervous/anxious.       Physical Exam  Laboratory/Imaging   Hemodynamics  Temp (24hrs), Av.2 °C (98.9 °F), Min:36.9 °C (98.4 °F), Max:37.5 °C (99.5 °F)   Temperature: 37.1 °C (98.8 °F)  Pulse  Av.1  Min: 58  Max: 144    Blood Pressure : 132/66      Respiratory      Respiration: 18, Pulse Oximetry: 97 %        RUL Breath Sounds: Clear, RML Breath Sounds: Diminished, RLL Breath Sounds: Diminished, RIGO Breath Sounds: Clear, LLL Breath Sounds: Diminished    Fluids    Intake/Output Summary (Last 24 hours) at 17 1356  Last data filed at 17 1300   Gross per 24 hour   Intake              440 ml   Output             1200 ml   Net             -760 ml       Nutrition  Orders Placed This Encounter   Procedures   • DIET ORDER     Standing Status:   Standing     Number of Occurrences:   1     Order Specific Question:   Diet:     Answer:   Regular [1]     Order  Specific Question:   Texture/Fiber modifications:     Answer:   Dysphagia 2(Pureed/Chopped)specify fluid consistency(question 6) [2]     Order Specific Question:   Consistency/Fluid modifications:     Answer:   Nectar Thick [2]     Order Specific Question:   Miscellaneous modifications:     Answer:   SLP - Deliver to Nursing Station [22]     Physical Exam   Constitutional: No distress.   Thin, frail   HENT:   Head: Normocephalic and atraumatic.   Mouth/Throat: No oropharyngeal exudate.   Eyes: EOM are normal. Pupils are equal, round, and reactive to light. No scleral icterus.   Neck: Normal range of motion.   Cardiovascular: Normal rate, regular rhythm and intact distal pulses.    Pulmonary/Chest: Effort normal. No respiratory distress.   Abdominal: Soft. He exhibits no distension.   Musculoskeletal: He exhibits no edema or tenderness.   Right BKA with minimal drainage  , left forefoot amputation. Right knee with circular wound, minimal drainage,. Wound is smaller but still to the tendon.   Neurological: He is alert. No cranial nerve deficit. Coordination normal.   AOx2   Skin: Skin is warm and dry. He is not diaphoretic. There is erythema.   Psychiatric: He has a normal mood and affect. His behavior is normal. His speech is delayed.   Nursing note and vitals reviewed.      Recent Labs      11/01/17   0329 11/02/17   0149   WBC  13.3*  13.1*   RBC  5.13  4.85   HEMOGLOBIN  11.5*  11.0*   HEMATOCRIT  38.3*  36.6*   MCV  74.7*  75.5*   MCH  22.4*  22.7*   MCHC  30.0*  30.1*   RDW  43.8  44.3   PLATELETCT  644*  668*   MPV  9.1  9.2     Recent Labs      10/31/17   0323  11/01/17   0329   SODIUM  130*  128*   POTASSIUM  4.4  4.4   CHLORIDE  97  97   CO2  24  24   GLUCOSE  86  81   BUN  15  13   CREATININE  0.47*  0.56   CALCIUM  9.3  8.9                      Assessment/Plan     * Leukocytosis- (present on admission)   Assessment & Plan    - 2nd to cellulitis R LE, r/o osteo  - s/p 14 days of abx  - blood and urine  "cultures negative  - follow clinically  - XR right knee and wound culture 10/30        Cellulitis of right knee- (present on admission)   Assessment & Plan    With minimally draining wound  Status post wound culture on October 30 with MRSA, per lab, now heavy growth with ongoing leukocytosis  - resume on vanco per pharmacy    -We'll consider ID consult as needed  Discussed with pharmacy    - MRI R LE- r/o osteomyelitis  Follow-up a.m. labs        Hyponatremia- (present on admission)   Assessment & Plan    Hypovolemic hyponatremia. Fluctuates with PO fluid intake. Not symptomatic and continues to be more interactive today. Continuing with daily fluctuations,  - fluctuating, asx  - continue to follow clinically  encourage PO intake  - salt tabs, 1gm BID        PAD (peripheral artery disease) (CMS-HCC)- (present on admission)   Assessment & Plan    Significant PAD s/p left forefoot amputation and right BKA  - continue aspirin and statin  - not a candidate for prosthesis  - followed by vascular surgery and wound care        Dementia- (present on admission)   Assessment & Plan    Admitted for encephalopathy and debility. Has been evaluated by ethics committee, unable to make his own medical decisions.  - guardianship pending, no court date yet  - Patient's friend,\"wife\"Norma expressing concerns with guardianship hearing, would like to discuss recommendations for needs for guardianship, explained to patient and patient's friend regarding ethics committee meeting and per Dr. Eamon Blackmon, patient lacks capacity for medical decision making and is requiring guardianship at this time.    - Patient's friend has been in contact with our  regarding her concerns, and multiple conversations regarding guardianship status   is aware, and well follow-up with patient and friend as needed  - 11/1 Discussed with RN, to follow up with Dr. Blackmon for any further recommendations regarding guardianship hearing   "      Hypomagnesemia- (present on admission)   Assessment & Plan    Mg 1.8 10/28, h/o alcohol dependence.  - oral mag daily and monitor  - IV mag PRN repletion        Essential hypertension- (present on admission)   Assessment & Plan    Currently normotensive.  - continue with metoprolol 75 mg BID  - PRN normodyne        Severe protein-calorie malnutrition (CMS-HCC)- (present on admission)   Assessment & Plan    Poor PO intake.  - Nutrition following  - Cont boost TID            Reviewed items::  Medications reviewed, Labs reviewed and Radiology images reviewed  Barrera catheter::  No Barrera  DVT prophylaxis pharmacological::  Heparin

## 2017-11-02 NOTE — PROGRESS NOTES
Assumed care of pt at 0700. Received report from RN. Pt A&Ox3 (doesn't know day). Assessment complete. Labs reviewed. Pt and RN discussed plan of care. Pt questions answered. Pt needs are met at this time. Bed in lowest and locked position. Call light within reach. Upper bed rails up. Hourly rounding in place.

## 2017-11-02 NOTE — PROGRESS NOTES
"Pharmacy Kinetics 72 y.o. male on vancomycin day # 3 2017    Currently on Vancomycin 1100 mg iv q24hr    Indication for Treatment: SSTI    Pertinent history per medical record:  Admitted on 2017 for UTI symptoms and cellulitis of the right knee. In the ED, he stated he generally felt weak and per his caregiver, he had been confused for a few days. Has already been treated for aspiration pneumonia, cellulitis, and UTI. Vancomycin started as wound growing MRSA..     Other antibiotics: None     Allergies: Review of patient's allergies indicates no known allergies.      List concerns for renal function): Age, albumin 3.1, BUN:SCr>20:1     Pertinent cultures to date:   10/30/17 - wound (R leg): MRSA heavy growth - vanc LOTTIE 2    Recent Labs      17   0329  17   0149   WBC  13.3*  13.1*   NEUTSPOLYS  73.20*  70.70     Recent Labs      10/31/17   0323  17   0329   BUN  15  13   CREATININE  0.47*  0.56     Intake/Output Summary (Last 24 hours) at 17 1201  Last data filed at 17 0600   Gross per 24 hour   Intake              240 ml   Output             1200 ml   Net             -960 ml      Blood pressure 132/66, pulse 79, temperature 37.1 °C (98.8 °F), resp. rate 18, height 1.829 m (6' 0.01\"), weight 55.1 kg (121 lb 7.6 oz), SpO2 97 %. Temp (24hrs), Av.2 °C (98.9 °F), Min:36.9 °C (98.4 °F), Max:37.5 °C (99.5 °F)    A/P   1. Vancomycin dose change: Continue vancomycin 1100 mg IV Q24h (~20 mg/kg; due @ 1800)  2. Next vancomycin level:  @ 1730  3. Goal trough: 12-16 mcg/mL  4. Comments: Patient afebrile overnight with persistent leukocytosis, vital signs relatively stable. Renal indices appear to be near baseline compared to patient controls. Concerns for accumulation/clearance noted above. Wound images reviewed from 10/30. Per MD note, minimal drainage noted from R BKA stump. Plan for MRI to r/o osteo. Will continue with vancomycin ~20 mg/kg IV Q24h as outlined above and check a " level tonight prior to the 3rd total dose. Pharmacy to monitor and adjust as needed.     Pooja Cisneros, MushtaqD

## 2017-11-02 NOTE — CARE PLAN
Problem: Infection  Goal: Will remain free from infection    Intervention: Assess signs and symptoms of infection  No S/S of infection at this time. Pt educated on washing hands.      Problem: Skin Integrity  Goal: Risk for impaired skin integrity will decrease  Outcome: PROGRESSING AS EXPECTED  Pt on Q2 turns and ivan cream is being applied to the sacral area.

## 2017-11-02 NOTE — PROGRESS NOTES
Josie Osuna Fall Risk Assessment:     Last Known Fall: Within the last six months  Mobility: Use of assistive device/requires assist of two people  Medications: Cardiovascular or central nervous system meds  Mental Status/LOC/Awareness: Oriented to person and place  Toileting Needs: Incontinence  Volume/Electrolyte Status: Use of IV fluids/tube feeds  Communication/Sensory: No deficits  Behavior: Appropriate behavior  Josie Osuna Fall Risk Total: 15  Fall Risk Level: HIGH RISK    Universal Fall Precautions:  call light/belongings in reach, bed in low position and locked, wheelchairs and assistive devices out of sight, siderails up x 2, use non-slip footwear, adequate lighting, clutter free and spill free environment, educate on level of risk, educate to call for assistance    Fall Risk Level Interventions:    TRIAL ([employer] 8, NEURO, MED MARINA 5) Moderate Fall Risk Interventions  Place yellow fall risk ID band on patient: verified  Provide patient/family education based on risk assessment : verified  Educate patient/family to call staff for assistance when getting out of bed: verified  Place fall precaution signage outside patient door: verified  Utilize bed/chair fall alarm: verifiedTRIAL ([employer] 8, NEURO, [employer] MARINA 5) High Fall Risk Interventions  Place yellow fall risk ID band on patient: verified  Provide patient/family education based on risk assessment: completed  Educate patient/family to call staff for assistance when getting out of bed: completed  Place fall precaution signage outside patient door: verified  Place patient in room close to nursing station: currently not available/charge notified  Utilize bed/chair fall alarm: verified  Notify charge of high risk for huddle: completed    Patient Specific Interventions:     Medication: review medications with patient and family, assess for medications that can be discontinued or dosage decreased and limit combination of prn medications  Mental  Status/LOC/Awareness: reorient patient, reinforce falls education, check on patient hourly, utilize bed/chair fall alarm and reinforce the use of call light  Toileting: provide frquent toileting, monitor intake and output/use of appropriate interventions, instruct patient/family on the use of grab bars, instruct patient/family on the need to call for assistance when toileting and do not leave patient unattended in bathroom/refer to toileting scripting  Volume/Electrolyte Status: ensure patient remains hydrated, advance diet as tolerated, administer medications as ordered for nausea and vomiting, monitor abnormal lab values and ensure IV fluids are appropriate  Communication/Sensory: update plan of care on whiteboard, ensure proper positioning when transferrng/ambulating, ensure patient has glasses/contacts and hearing aids/dentures and for visually impaired patients orient to their room surrounding and do not change their surroundings  Behavioral: encourage patient to voice feelings, administer medication as ordered, instruct/reinforce fall program rationale and encourage family to stay with impulsive patients  Mobility: provide comfort measures during transport, dangle prior to standing, utilize bed/chair fall alarm, ensure bed is locked and in lowest position, provide appropriate assistive device, instruct patient to exit bed on their strongest side and collaborate with doctor for possible PT/OT consult

## 2017-11-02 NOTE — PROGRESS NOTES
Spoke with Dr. Rojas, she changed order for MRI for pt to with contrast. Spoke with Nestor in MRI to notify him of the change.

## 2017-11-02 NOTE — PROGRESS NOTES
Josie Osuna Fall Risk Assessment:     Last Known Fall: Within the last six months  Mobility: Use of assistive device/requires assist of two people  Medications: Cardiovascular or central nervous system meds  Mental Status/LOC/Awareness: Oriented to person and place  Toileting Needs: Incontinence  Volume/Electrolyte Status: Use of IV fluids/tube feeds  Communication/Sensory: No deficits  Behavior: Appropriate behavior  Josie Osuna Fall Risk Total: 15  Fall Risk Level: HIGH RISK    Universal Fall Precautions:  call light/belongings in reach, bed in low position and locked, wheelchairs and assistive devices out of sight, siderails up x 2, use non-slip footwear, adequate lighting, clutter free and spill free environment, educate on level of risk, educate to call for assistance    Fall Risk Level Interventions:    TRIAL ([employer] 8, NEURO, MED MARINA 5) Moderate Fall Risk Interventions  Place yellow fall risk ID band on patient: verified  Provide patient/family education based on risk assessment : verified  Educate patient/family to call staff for assistance when getting out of bed: verified  Place fall precaution signage outside patient door: verified  Utilize bed/chair fall alarm: verifiedTRIAL ([employer] 8, NEURO, [employer] MARINA 5) High Fall Risk Interventions  Place yellow fall risk ID band on patient: verified  Provide patient/family education based on risk assessment: completed  Educate patient/family to call staff for assistance when getting out of bed: completed  Place fall precaution signage outside patient door: verified  Place patient in room close to nursing station: currently not available/charge notified  Utilize bed/chair fall alarm: verified  Notify charge of high risk for huddle: completed    Patient Specific Interventions:     Medication: review medications with patient and family  Mental Status/LOC/Awareness: reinforce falls education, check on patient hourly, utilize bed/chair fall alarm and reinforce the use of  call light  Toileting: instruct patient/family on the need to call for assistance when toileting  Volume/Electrolyte Status: ensure IV fluids are appropriate  Communication/Sensory: update plan of care on whiteboard  Behavioral: administer medication as ordered  Mobility: ensure bed is locked and in lowest position and provide appropriate assistive device

## 2017-11-02 NOTE — CARE PLAN
Problem: Respiratory:  Goal: Respiratory status will improve  Outcome: PROGRESSING AS EXPECTED  Pt SPO2 94% on room air and has no complaints of shortness of breath or difficulty breathing.    Problem: Urinary Elimination:  Goal: Ability to reestablish a normal urinary elimination pattern will improve  Outcome: PROGRESSING AS EXPECTED  Pt incontinent of urine with condom cath in place.

## 2017-11-02 NOTE — PROGRESS NOTES
Josie Osuna Fall Risk Assessment:     Last Known Fall: Within the last six months  Mobility: Use of assistive device/requires assist of two people  Medications: Cardiovascular or central nervous system meds  Mental Status/LOC/Awareness: Oriented to person and place  Toileting Needs: Incontinence  Volume/Electrolyte Status: Use of IV fluids/tube feeds  Communication/Sensory: No deficits  Behavior: Appropriate behavior  Josie Osuna Fall Risk Total: 15  Fall Risk Level: HIGH RISK    Universal Fall Precautions:  call light/belongings in reach, bed in low position and locked, wheelchairs and assistive devices out of sight, siderails up x 2, use non-slip footwear, adequate lighting, educate on level of risk, educate to call for assistance, clutter free and spill free environment    Fall Risk Level Interventions:    TRIAL ([employer] 8, NEURO, MED MARINA 5) Moderate Fall Risk Interventions  Place yellow fall risk ID band on patient: verified  Provide patient/family education based on risk assessment : verified  Educate patient/family to call staff for assistance when getting out of bed: verified  Place fall precaution signage outside patient door: verified  Utilize bed/chair fall alarm: verifiedTRIAL ([employer] 8, NEURO, [employer] MARINA 5) High Fall Risk Interventions  Place yellow fall risk ID band on patient: verified  Provide patient/family education based on risk assessment: completed  Educate patient/family to call staff for assistance when getting out of bed: completed  Place fall precaution signage outside patient door: verified  Place patient in room close to nursing station: currently not available/charge notified  Utilize bed/chair fall alarm: verified  Notify charge of high risk for huddle: completed    Patient Specific Interventions:     Medication: review medications with patient and family, assess for medications that can be discontinued or dosage decreased and limit combination of prn medications  Mental  Status/LOC/Awareness: reorient patient, encourage family to stay with patient, check on patient hourly, utilize bed/chair fall alarm and reinforce the use of call light  Toileting: consider obtaining elevated toilet seat or bedside commode (BSC), monitor intake and output/use of appropriate interventions, instruct patient/family on the use of grab bars, instruct patient/family on the need to call for assistance when toileting and do not leave patient unattended in bathroom/refer to toileting scripting  Volume/Electrolyte Status: ensure patient remains hydrated, advance diet as tolerated, administer medications as ordered for nausea and vomiting, monitor abnormal lab values and ensure IV fluids are appropriate  Communication/Sensory: update plan of care on whiteboard, ensure proper positioning when transferrng/ambulating, ensure patient has glasses/contacts and hearing aids/dentures and for visually impaired patients orient to their room surrounding and do not change their surroundings  Behavioral: collaborate with doctor for possible psych consult, administer medication as ordered, instruct/reinforce fall program rationale and encourage family to stay with impulsive patients  Mobility: schedule physical activity throughout the day, provide comfort measures during transport, dangle prior to standing, utilize bed/chair fall alarm, ensure bed is locked and in lowest position, provide appropriate assistive device, instruct patient to exit bed on their strongest side and collaborate with doctor for possible PT/OT consult

## 2017-11-02 NOTE — CARE PLAN
Problem: Safety  Goal: Will remain free from falls  Outcome: PROGRESSING AS EXPECTED  Pt educated on use of call light. Pt currently unable to ambulate.     Problem: Infection  Goal: Will remain free from infection  Outcome: PROGRESSING AS EXPECTED  No apparent S/S of infection at this time. Will continue to monitor.

## 2017-11-02 NOTE — DISCHARGE PLANNING
I called Olivia Carreon's office to see if the patient has a court date yet for guardianship.  I was only able to leave a VM message for Opal in his office.  I requested a call back.    Opal at Olivia Carreon's office called me back and said that the guardianship petition is still being reviewed by the public guardian, so there is no court date yet.  However, Opal said that there are many cases pending right now and to plan on a court date toward the end of December or the first part of January.

## 2017-11-03 LAB
ANION GAP SERPL CALC-SCNC: 10 MMOL/L (ref 0–11.9)
BASOPHILS # BLD AUTO: 2 % (ref 0–1.8)
BASOPHILS # BLD: 0.24 K/UL (ref 0–0.12)
BUN SERPL-MCNC: 15 MG/DL (ref 8–22)
CALCIUM SERPL-MCNC: 9.2 MG/DL (ref 8.5–10.5)
CHLORIDE SERPL-SCNC: 94 MMOL/L (ref 96–112)
CO2 SERPL-SCNC: 22 MMOL/L (ref 20–33)
CREAT SERPL-MCNC: 0.47 MG/DL (ref 0.5–1.4)
EOSINOPHIL # BLD AUTO: 0.64 K/UL (ref 0–0.51)
EOSINOPHIL NFR BLD: 5.2 % (ref 0–6.9)
ERYTHROCYTE [DISTWIDTH] IN BLOOD BY AUTOMATED COUNT: 42.7 FL (ref 35.9–50)
GFR SERPL CREATININE-BSD FRML MDRD: >60 ML/MIN/1.73 M 2
GLUCOSE SERPL-MCNC: 102 MG/DL (ref 65–99)
HCT VFR BLD AUTO: 36 % (ref 42–52)
HGB BLD-MCNC: 10.8 G/DL (ref 14–18)
IMM GRANULOCYTES # BLD AUTO: 0.05 K/UL (ref 0–0.11)
IMM GRANULOCYTES NFR BLD AUTO: 0.4 % (ref 0–0.9)
LYMPHOCYTES # BLD AUTO: 2.05 K/UL (ref 1–4.8)
LYMPHOCYTES NFR BLD: 16.8 % (ref 22–41)
MCH RBC QN AUTO: 22.1 PG (ref 27–33)
MCHC RBC AUTO-ENTMCNC: 30 G/DL (ref 33.7–35.3)
MCV RBC AUTO: 73.6 FL (ref 81.4–97.8)
MONOCYTES # BLD AUTO: 0.78 K/UL (ref 0–0.85)
MONOCYTES NFR BLD AUTO: 6.4 % (ref 0–13.4)
NEUTROPHILS # BLD AUTO: 8.45 K/UL (ref 1.82–7.42)
NEUTROPHILS NFR BLD: 69.2 % (ref 44–72)
NRBC # BLD AUTO: 0 K/UL
NRBC BLD AUTO-RTO: 0 /100 WBC
PLATELET # BLD AUTO: 677 K/UL (ref 164–446)
PMV BLD AUTO: 9.2 FL (ref 9–12.9)
POTASSIUM SERPL-SCNC: 4.1 MMOL/L (ref 3.6–5.5)
RBC # BLD AUTO: 4.89 M/UL (ref 4.7–6.1)
SODIUM SERPL-SCNC: 126 MMOL/L (ref 135–145)
WBC # BLD AUTO: 12.2 K/UL (ref 4.8–10.8)

## 2017-11-03 PROCEDURE — A9270 NON-COVERED ITEM OR SERVICE: HCPCS | Performed by: HOSPITALIST

## 2017-11-03 PROCEDURE — 36415 COLL VENOUS BLD VENIPUNCTURE: CPT

## 2017-11-03 PROCEDURE — 770021 HCHG ROOM/CARE - ISO PRIVATE

## 2017-11-03 PROCEDURE — 85025 COMPLETE CBC W/AUTO DIFF WBC: CPT

## 2017-11-03 PROCEDURE — 99233 SBSQ HOSP IP/OBS HIGH 50: CPT | Performed by: INTERNAL MEDICINE

## 2017-11-03 PROCEDURE — 700105 HCHG RX REV CODE 258

## 2017-11-03 PROCEDURE — 700111 HCHG RX REV CODE 636 W/ 250 OVERRIDE (IP)

## 2017-11-03 PROCEDURE — 700102 HCHG RX REV CODE 250 W/ 637 OVERRIDE(OP): Performed by: HOSPITALIST

## 2017-11-03 PROCEDURE — 92526 ORAL FUNCTION THERAPY: CPT

## 2017-11-03 PROCEDURE — 700111 HCHG RX REV CODE 636 W/ 250 OVERRIDE (IP): Performed by: HOSPITALIST

## 2017-11-03 PROCEDURE — 80048 BASIC METABOLIC PNL TOTAL CA: CPT

## 2017-11-03 RX ADMIN — SODIUM CHLORIDE TAB 1 GM 1 G: 1 TAB at 18:14

## 2017-11-03 RX ADMIN — VANCOMYCIN HYDROCHLORIDE 1100 MG: 100 INJECTION, POWDER, LYOPHILIZED, FOR SOLUTION INTRAVENOUS at 18:14

## 2017-11-03 RX ADMIN — LEVOTHYROXINE SODIUM 75 MCG: 75 TABLET ORAL at 06:06

## 2017-11-03 RX ADMIN — ATORVASTATIN CALCIUM 40 MG: 40 TABLET, FILM COATED ORAL at 20:53

## 2017-11-03 RX ADMIN — MAGNESIUM GLUCONATE 500 MG ORAL TABLET 400 MG: 500 TABLET ORAL at 08:01

## 2017-11-03 RX ADMIN — THERA TABS 1 TABLET: TAB at 08:01

## 2017-11-03 RX ADMIN — METOPROLOL TARTRATE 75 MG: 25 TABLET, FILM COATED ORAL at 08:01

## 2017-11-03 RX ADMIN — HEPARIN SODIUM 5000 UNITS: 5000 INJECTION, SOLUTION INTRAVENOUS; SUBCUTANEOUS at 08:01

## 2017-11-03 RX ADMIN — ASPIRIN 81 MG: 81 TABLET, COATED ORAL at 08:01

## 2017-11-03 RX ADMIN — GABAPENTIN 100 MG: 100 CAPSULE ORAL at 08:02

## 2017-11-03 RX ADMIN — FOLIC ACID 1 MG: 1 TABLET ORAL at 08:02

## 2017-11-03 RX ADMIN — SODIUM CHLORIDE TAB 1 GM 1 G: 1 TAB at 08:01

## 2017-11-03 RX ADMIN — METOPROLOL TARTRATE 75 MG: 25 TABLET, FILM COATED ORAL at 20:53

## 2017-11-03 RX ADMIN — HEPARIN SODIUM 5000 UNITS: 5000 INJECTION, SOLUTION INTRAVENOUS; SUBCUTANEOUS at 20:54

## 2017-11-03 RX ADMIN — GABAPENTIN 100 MG: 100 CAPSULE ORAL at 16:11

## 2017-11-03 RX ADMIN — GABAPENTIN 100 MG: 100 CAPSULE ORAL at 20:54

## 2017-11-03 ASSESSMENT — ENCOUNTER SYMPTOMS
HEADACHES: 0
FLANK PAIN: 0
FEVER: 0
MEMORY LOSS: 1
DIZZINESS: 0
NERVOUS/ANXIOUS: 0
COUGH: 0
WEAKNESS: 1
LOSS OF CONSCIOUSNESS: 0
ABDOMINAL PAIN: 0
MYALGIAS: 1
SHORTNESS OF BREATH: 0
CHILLS: 0

## 2017-11-03 ASSESSMENT — PAIN SCALES - GENERAL
PAINLEVEL_OUTOF10: 0

## 2017-11-03 NOTE — THERAPY
"Speech Language Therapy dysphagia treatment completed.   Functional Status:  Pt sitting up in the chair and feeding himself. Per CNA, pt does not eat the D2 entree but has good intake of the NTL drinks and the soup. Pt offered mashed potatoes and gravy and pureed chicken that SLP requested from the kitchen. Pt triggering 1-2 swallows with the mashed potatoes and gravy with slow rate. Pt declined to try the puree chicken. When asked if he would like this type of food, pt stated \"if you puree it, I won't eat it.\" When asked if he would prefer mashed potatoes and gravy, pt stated \"yes.\" SLP collaborated with the CNA regarding adding mashed potatoes and gravy versus changing texture to dysphagia 1NTL  Recommendations: D2 NTL with mashed potatoes and gravy at lunch and dinner.   Plan of Care: Will benefit from Speech Therapy 3 times per week  Post-Acute Therapy: Discharge to a transitional care facility for continued skilled therapy services.Thanks, Jeff    See \"Rehab Therapy-Acute\" Patient Summary Report for complete documentation.     "

## 2017-11-03 NOTE — PROGRESS NOTES
Received report from [facility] nurse. Assessment complete. Patient is A&Ox3, reoriented to time, denies pain at this time, pills administered in applesauce and pt tolerated well, dressing to R knee CDI, PIV patent. Pt educated on POC and verbalized understanding. Patient is resting now. Call light within reach, bed in lowest position, treaded socks in place, and bed alarm on.

## 2017-11-03 NOTE — PROGRESS NOTES
Josie Osuna Fall Risk Assessment:     Last Known Fall: Within the last six months  Mobility: Use of assistive device/requires assist of two people  Medications: Cardiovascular or central nervous system meds  Mental Status/LOC/Awareness: Oriented to person and place  Toileting Needs: Incontinence  Volume/Electrolyte Status: No problems  Communication/Sensory: Neuropathy  Behavior: Appropriate behavior  Josie Osuna Fall Risk Total: 16  Fall Risk Level: HIGH RISK    Universal Fall Precautions:  call light/belongings in reach, wheelchairs and assistive devices out of sight, bed in low position and locked, siderails up x 2, use non-slip footwear, adequate lighting, clutter free and spill free environment, educate on level of risk, educate to call for assistance    Fall Risk Level Interventions:    TRIAL ([employer] 8, NEURO, MED MARINA 5) Moderate Fall Risk Interventions  Place yellow fall risk ID band on patient: verified  Provide patient/family education based on risk assessment : verified  Educate patient/family to call staff for assistance when getting out of bed: verified  Place fall precaution signage outside patient door: verified  Utilize bed/chair fall alarm: verifiedTRIAL ([employer] 8, NEURO, [employer] MARINA 5) High Fall Risk Interventions  Place yellow fall risk ID band on patient: verified  Provide patient/family education based on risk assessment: verified  Educate patient/family to call staff for assistance when getting out of bed: verified  Place fall precaution signage outside patient door: verified  Place patient in room close to nursing station: currently not available/charge notified  Utilize bed/chair fall alarm: verified  Notify charge of high risk for huddle: verified    Patient Specific Interventions:     Medication: review medications with patient and family  Mental Status/LOC/Awareness: utilize bed/chair fall alarm  Toileting: provide frquent toileting and bedpan offered and condom cath in use    Volume/Electrolyte Status: ensure patient remains hydrated and monitor abnormal lab values  Communication/Sensory: update plan of care on whiteboard  Behavioral: encourage patient to voice feelings  Mobility: utilize bed/chair fall alarm and ensure bed is locked and in lowest position

## 2017-11-03 NOTE — CARE PLAN
Problem: Venous Thromboembolism (VTW)/Deep Vein Thrombosis (DVT) Prevention:  Goal: Patient will participate in Venous Thrombosis (VTE)/Deep Vein Thrombosis (DVT)Prevention Measures    Intervention: Assess and monitor for anticoagulation complications  Subcutaneous heparin administered per MAR.      Problem: Discharge Barriers/Planning  Goal: Patient's continuum of care needs will be met    Intervention: Assess potential discharge barriers on admission and throughout hospital stay  Pt continues to await guardianship, SW involved.

## 2017-11-03 NOTE — PROGRESS NOTES
Received bedside report from day RN and assumed care of patient at 1900. Patient is alert and oriented x3 with no signs of labored breathing or distress. Patient updated on plan of care; verbalizes understanding. Safety precautions in place including patient call light within reach, bed alarm on, personal possessions nearby, bed in low position and locked, hourly rounding in practice, and L non-skid sock in place.

## 2017-11-03 NOTE — PROGRESS NOTES
[facility]    Date of Service: 11/3/2017    Chief Complaint  72 y.o. male admitted 2017 with urinary tract infection and confusion.    Interval Problem Update  Patient is pleasant  Denies any pain    Consultants/Specialty  Geriatrics  Palliative Care  Ethics committee/ Dr. Eamon Blackmon    Disposition  Pending guardianship and placement   assisting  No Court date yet          Review of Systems   Constitutional: Negative for chills and fever.   HENT: Negative for hearing loss.    Respiratory: Negative for cough and shortness of breath.    Cardiovascular: Negative for leg swelling.   Gastrointestinal: Negative for abdominal pain.   Genitourinary: Negative for dysuria and flank pain.   Musculoskeletal: Positive for myalgias. Negative for joint pain.   Neurological: Positive for weakness. Negative for dizziness, loss of consciousness and headaches.   Psychiatric/Behavioral: Positive for memory loss. The patient is not nervous/anxious.       Physical Exam  Laboratory/Imaging   Hemodynamics  Temp (24hrs), Av.7 °C (98.1 °F), Min:36.4 °C (97.6 °F), Max:37.1 °C (98.8 °F)   Temperature: 36.4 °C (97.6 °F)  Pulse  Av.9  Min: 58  Max: 144    Blood Pressure : 126/74      Respiratory      Respiration: 18, Pulse Oximetry: 93 %        RUL Breath Sounds: Clear, RML Breath Sounds: Clear, RLL Breath Sounds: Diminished, RIGO Breath Sounds: Clear, LLL Breath Sounds: Diminished    Fluids    Intake/Output Summary (Last 24 hours) at 17 1506  Last data filed at 17 0600   Gross per 24 hour   Intake                0 ml   Output             1600 ml   Net            -1600 ml       Nutrition  Orders Placed This Encounter   Procedures   • DIET ORDER     Standing Status:   Standing     Number of Occurrences:   1     Order Specific Question:   Diet:     Answer:   Regular [1]     Comments:   please send mashed potatoes with lunch and dinner.     Order Specific Question:   Texture/Fiber  modifications:     Answer:   Dysphagia 2(Pureed/Chopped)specify fluid consistency(question 6) [2]     Order Specific Question:   Consistency/Fluid modifications:     Answer:   Nectar Thick [2]     Order Specific Question:   Miscellaneous modifications:     Answer:   SLP - Deliver to Nursing Station [22]     Physical Exam   Constitutional: No distress.   Thin, frail   HENT:   Head: Normocephalic and atraumatic.   Mouth/Throat: No oropharyngeal exudate.   Eyes: EOM are normal. Pupils are equal, round, and reactive to light.   Neck: Normal range of motion.   Cardiovascular: Normal rate and regular rhythm.    Pulmonary/Chest: Effort normal. No respiratory distress.   Abdominal: Soft. He exhibits no distension. There is no tenderness.   Musculoskeletal: He exhibits no edema or tenderness.   Right BKA with minimal drainage  , left forefoot amputation. Right knee with circular wound, minimal drainage,. Wound is smaller but still to the tendon.   Neurological: He is alert. No cranial nerve deficit. Coordination normal.   AOx2   Skin: Skin is warm and dry. There is erythema.   Psychiatric: He has a normal mood and affect. His behavior is normal. Judgment normal. His speech is delayed.   Nursing note and vitals reviewed.      Recent Labs      11/01/17 0329 11/02/17   0149  11/03/17   0139   WBC  13.3*  13.1*  12.2*   RBC  5.13  4.85  4.89   HEMOGLOBIN  11.5*  11.0*  10.8*   HEMATOCRIT  38.3*  36.6*  36.0*   MCV  74.7*  75.5*  73.6*   MCH  22.4*  22.7*  22.1*   MCHC  30.0*  30.1*  30.0*   RDW  43.8  44.3  42.7   PLATELETCT  644*  668*  677*   MPV  9.1  9.2  9.2     Recent Labs      11/01/17   0329  11/03/17   0139   SODIUM  128*  126*   POTASSIUM  4.4  4.1   CHLORIDE  97  94*   CO2  24  22   GLUCOSE  81  102*   BUN  13  15   CREATININE  0.56  0.47*   CALCIUM  8.9  9.2                      Assessment/Plan     * Leukocytosis- (present on admission)   Assessment & Plan    - 2nd to cellulitis R LE, r/o osteo  - s/p 14 days of  "abx  - blood and urine cultures negative  - follow clinically  - XR right knee and wound culture 10/30        Cellulitis of right knee- (present on admission)   Assessment & Plan    With minimally draining wound  Status post wound culture on October 30 with MRSA, per lab, now heavy growth with ongoing leukocytosis  - resume on vanco per pharmacy    -We'll consider ID consult as needed  Discussed with pharmacy    - MRI R LE- r/o osteomyelitis- positive mild osteomyelitis and cellulitis at the patella  Discussed with Dr. Rodriguez, will be evaluated although is a poor candidate for AKA, continue antibiotics with vancomycin        Hyponatremia- (present on admission)   Assessment & Plan    Hypovolemic hyponatremia. Fluctuates with PO fluid intake. Not symptomatic and continues to be more interactive today. Continuing with daily fluctuations,  - fluctuating, asx  - continue to follow clinically  encourage PO intake  - salt tabs, 1gm BID        PAD (peripheral artery disease) (CMS-[facility])- (present on admission)   Assessment & Plan    Significant PAD s/p left forefoot amputation and right BKA  - continue aspirin and statin  - not a candidate for prosthesis  - followed by vascular surgery and wound care        Dementia- (present on admission)   Assessment & Plan    Admitted for encephalopathy and debility. Has been evaluated by ethics committee, unable to make his own medical decisions.  - guardianship pending, no court date yet  - Patient's friend,\"wife\"Norma expressing concerns with guardianship hearing, would like to discuss recommendations for needs for guardianship, explained to patient and patient's friend regarding ethics committee meeting and per Dr. Eamon Blackmon, patient lacks capacity for medical decision making and is requiring guardianship at this time.    - Patient's friend has been in contact with our  regarding her concerns, and multiple conversations regarding guardianship status   " is aware, and well follow-up with patient and friend as needed  - 11/1 Discussed with RN, to follow up with Dr. Blackmon for any further recommendations regarding guardianship hearing        Hypomagnesemia- (present on admission)   Assessment & Plan    Mg 1.8 10/28, h/o alcohol dependence.  - oral mag daily and monitor  - IV mag PRN repletion        Essential hypertension- (present on admission)   Assessment & Plan    Currently normotensive.  - continue with metoprolol 75 mg BID  - PRN normodyne        Severe protein-calorie malnutrition (CMS-HCC)- (present on admission)   Assessment & Plan    Poor PO intake.  - Nutrition following  - Cont boost TID            Reviewed items::  Medications reviewed, Labs reviewed and Radiology images reviewed  Barrera catheter::  No Barrera  DVT prophylaxis pharmacological::  Heparin

## 2017-11-03 NOTE — PROGRESS NOTES
"Pharmacy Kinetics 72 y.o. male on vancomycin day # 4 11/3/2017    Currently on Vancomycin 1100 mg iv q24hr    Indication for Treatment: SSTI    Pertinent history per medical record: Admitted on 2017 for UTI symptoms and cellulitis of the right knee. In the ED, he stated he generally felt weak and per his caregiver, he had been confused for a few days. Has already been treated for aspiration pneumonia, cellulitis, and UTI. Vancomycin started as wound growing MRSA.     Other antibiotics: None     Allergies: Review of patient's allergies indicates no known allergies.      List concerns for renal function): Age, albumin 3.1, BUN:SCr>20:1     Pertinent cultures to date:   10/30/17 - wound (R leg): MRSA heavy growth - vanc LOTTIE 2    Recent Labs      17   0149  17   0139   WBC  13.3*  13.1*  12.2*   NEUTSPOLYS  73.20*  70.70  69.20     Recent Labs      17   013   BUN  13  15   CREATININE  0.56  0.47*     Recent Labs      17   1919   VANCOTROUGH  10.4     Intake/Output Summary (Last 24 hours) at 17 0906  Last data filed at 17 0600   Gross per 24 hour   Intake              200 ml   Output             1600 ml   Net            -1400 ml      Blood pressure 126/74, pulse 78, temperature 36.4 °C (97.6 °F), resp. rate 18, height 1.829 m (6' 0.01\"), weight 55.1 kg (121 lb 7.6 oz), SpO2 93 %. Temp (24hrs), Av.7 °C (98.1 °F), Min:36.4 °C (97.6 °F), Max:37.1 °C (98.8 °F)    A/P   1. Vancomycin dose change: Continue vancomycin 1100 mg IV Q24h (~20 mg/kg; due @ 1800)  2. Next vancomycin level: 2-3 days  3. Goal trough: 12-16 mcg/mL  4. A/P: Patient afebrile overnight with persistent leukocytosis. Vital signs and renal indices stable. Wound cultures growing MRSA (vanco LOTTIE 2). MRI performed yesterday pending dictation to rule out osteo. Vanco level prior to the 3rd total dose yesterday resulted subtherapeutic at 10.4 mcg/mL, however this will likely trend to " goal range once steady state is achieved. Will continue with vancomycin ~20 mg/kg IV Q24h as outlined above and repeat a vanco level in 2-3 days (or sooner if renal function worsens). Pharmacy to monitor and adjust as needed.     Pooja Cisneros, PharmD

## 2017-11-03 NOTE — PROGRESS NOTES
Josie Osuna Fall Risk Assessment:     Last Known Fall: Within the last six months  Mobility: Use of assistive device/requires assist of two people  Medications: Cardiovascular or central nervous system meds  Mental Status/LOC/Awareness: Oriented to person and place  Toileting Needs: Incontinence  Volume/Electrolyte Status: No problems  Communication/Sensory: Neuropathy  Behavior: Appropriate behavior  Josie Osuna Fall Risk Total: 16  Fall Risk Level: HIGH RISK    Universal Fall Precautions:  call light/belongings in reach, bed in low position and locked, wheelchairs and assistive devices out of sight, siderails up x 2, use non-slip footwear, adequate lighting, clutter free and spill free environment, educate on level of risk, educate to call for assistance    Fall Risk Level Interventions:    TRIAL ([place] 8, NEURO, MED MARINA 5) Moderate Fall Risk Interventions  Place yellow fall risk ID band on patient: verified  Provide patient/family education based on risk assessment : verified  Educate patient/family to call staff for assistance when getting out of bed: verified  Place fall precaution signage outside patient door: verified  Utilize bed/chair fall alarm: verifiedTRIAL (TELE 8, NEURO, "[employer] MARINA 5) High Fall Risk Interventions  Place yellow fall risk ID band on patient: verified  Provide patient/family education based on risk assessment: verified  Educate patient/family to call staff for assistance when getting out of bed: verified  Place fall precaution signage outside patient door: verified  Place patient in room close to nursing station: currently not available/charge notified  Utilize bed/chair fall alarm: verified  Notify charge of high risk for huddle: verified    Patient Specific Interventions:     Medication: review medications with patient and family  Mental Status/LOC/Awareness: utilize bed/chair fall alarm  Toileting: provide frquent toileting  Volume/Electrolyte Status: ensure patient remains  hydrated  Communication/Sensory: update plan of care on whiteboard  Behavioral: encourage patient to voice feelings  Mobility: utilize bed/chair fall alarm and ensure bed is locked and in lowest position

## 2017-11-04 LAB
ANION GAP SERPL CALC-SCNC: 10 MMOL/L (ref 0–11.9)
BASOPHILS # BLD AUTO: 1.8 % (ref 0–1.8)
BASOPHILS # BLD: 0.23 K/UL (ref 0–0.12)
BUN SERPL-MCNC: 16 MG/DL (ref 8–22)
CALCIUM SERPL-MCNC: 9.2 MG/DL (ref 8.5–10.5)
CHLORIDE SERPL-SCNC: 95 MMOL/L (ref 96–112)
CO2 SERPL-SCNC: 22 MMOL/L (ref 20–33)
CREAT SERPL-MCNC: 0.47 MG/DL (ref 0.5–1.4)
EOSINOPHIL # BLD AUTO: 0.58 K/UL (ref 0–0.51)
EOSINOPHIL NFR BLD: 4.6 % (ref 0–6.9)
ERYTHROCYTE [DISTWIDTH] IN BLOOD BY AUTOMATED COUNT: 42.8 FL (ref 35.9–50)
GFR SERPL CREATININE-BSD FRML MDRD: >60 ML/MIN/1.73 M 2
GLUCOSE SERPL-MCNC: 91 MG/DL (ref 65–99)
HCT VFR BLD AUTO: 37.6 % (ref 42–52)
HGB BLD-MCNC: 11.6 G/DL (ref 14–18)
IMM GRANULOCYTES # BLD AUTO: 0.06 K/UL (ref 0–0.11)
IMM GRANULOCYTES NFR BLD AUTO: 0.5 % (ref 0–0.9)
LYMPHOCYTES # BLD AUTO: 1.53 K/UL (ref 1–4.8)
LYMPHOCYTES NFR BLD: 12.1 % (ref 22–41)
MCH RBC QN AUTO: 22.8 PG (ref 27–33)
MCHC RBC AUTO-ENTMCNC: 30.9 G/DL (ref 33.7–35.3)
MCV RBC AUTO: 74 FL (ref 81.4–97.8)
MONOCYTES # BLD AUTO: 0.81 K/UL (ref 0–0.85)
MONOCYTES NFR BLD AUTO: 6.4 % (ref 0–13.4)
NEUTROPHILS # BLD AUTO: 9.45 K/UL (ref 1.82–7.42)
NEUTROPHILS NFR BLD: 74.6 % (ref 44–72)
NRBC # BLD AUTO: 0 K/UL
NRBC BLD AUTO-RTO: 0 /100 WBC
PLATELET # BLD AUTO: 628 K/UL (ref 164–446)
PMV BLD AUTO: 8.9 FL (ref 9–12.9)
POTASSIUM SERPL-SCNC: 4.3 MMOL/L (ref 3.6–5.5)
RBC # BLD AUTO: 5.08 M/UL (ref 4.7–6.1)
SODIUM SERPL-SCNC: 127 MMOL/L (ref 135–145)
WBC # BLD AUTO: 12.7 K/UL (ref 4.8–10.8)

## 2017-11-04 PROCEDURE — 770021 HCHG ROOM/CARE - ISO PRIVATE

## 2017-11-04 PROCEDURE — 80048 BASIC METABOLIC PNL TOTAL CA: CPT

## 2017-11-04 PROCEDURE — 99232 SBSQ HOSP IP/OBS MODERATE 35: CPT | Performed by: INTERNAL MEDICINE

## 2017-11-04 PROCEDURE — A9270 NON-COVERED ITEM OR SERVICE: HCPCS | Performed by: HOSPITALIST

## 2017-11-04 PROCEDURE — 700102 HCHG RX REV CODE 250 W/ 637 OVERRIDE(OP): Performed by: HOSPITALIST

## 2017-11-04 PROCEDURE — 36415 COLL VENOUS BLD VENIPUNCTURE: CPT

## 2017-11-04 PROCEDURE — 85025 COMPLETE CBC W/AUTO DIFF WBC: CPT

## 2017-11-04 PROCEDURE — 700111 HCHG RX REV CODE 636 W/ 250 OVERRIDE (IP): Performed by: HOSPITALIST

## 2017-11-04 PROCEDURE — 700111 HCHG RX REV CODE 636 W/ 250 OVERRIDE (IP)

## 2017-11-04 PROCEDURE — 700105 HCHG RX REV CODE 258

## 2017-11-04 RX ADMIN — VANCOMYCIN HYDROCHLORIDE 1100 MG: 100 INJECTION, POWDER, LYOPHILIZED, FOR SOLUTION INTRAVENOUS at 17:46

## 2017-11-04 RX ADMIN — ASPIRIN 81 MG: 81 TABLET, COATED ORAL at 10:24

## 2017-11-04 RX ADMIN — METOPROLOL TARTRATE 75 MG: 25 TABLET, FILM COATED ORAL at 21:44

## 2017-11-04 RX ADMIN — HEPARIN SODIUM 5000 UNITS: 5000 INJECTION, SOLUTION INTRAVENOUS; SUBCUTANEOUS at 10:24

## 2017-11-04 RX ADMIN — ACETAMINOPHEN 650 MG: 325 TABLET, FILM COATED ORAL at 11:16

## 2017-11-04 RX ADMIN — GABAPENTIN 100 MG: 100 CAPSULE ORAL at 21:44

## 2017-11-04 RX ADMIN — MAGNESIUM GLUCONATE 500 MG ORAL TABLET 400 MG: 500 TABLET ORAL at 10:24

## 2017-11-04 RX ADMIN — FOLIC ACID 1 MG: 1 TABLET ORAL at 10:24

## 2017-11-04 RX ADMIN — THERA TABS 1 TABLET: TAB at 10:24

## 2017-11-04 RX ADMIN — METOPROLOL TARTRATE 75 MG: 25 TABLET, FILM COATED ORAL at 10:24

## 2017-11-04 RX ADMIN — SODIUM CHLORIDE TAB 1 GM 1 G: 1 TAB at 10:23

## 2017-11-04 RX ADMIN — GABAPENTIN 100 MG: 100 CAPSULE ORAL at 10:24

## 2017-11-04 RX ADMIN — LEVOTHYROXINE SODIUM 75 MCG: 75 TABLET ORAL at 06:35

## 2017-11-04 RX ADMIN — HEPARIN SODIUM 5000 UNITS: 5000 INJECTION, SOLUTION INTRAVENOUS; SUBCUTANEOUS at 21:44

## 2017-11-04 RX ADMIN — ATORVASTATIN CALCIUM 40 MG: 40 TABLET, FILM COATED ORAL at 21:44

## 2017-11-04 RX ADMIN — SODIUM CHLORIDE TAB 1 GM 1 G: 1 TAB at 17:45

## 2017-11-04 RX ADMIN — GABAPENTIN 100 MG: 100 CAPSULE ORAL at 15:50

## 2017-11-04 ASSESSMENT — ENCOUNTER SYMPTOMS
COUGH: 0
FEVER: 0
MYALGIAS: 0
PALPITATIONS: 0
DEPRESSION: 0
WEAKNESS: 1
NERVOUS/ANXIOUS: 0
HEADACHES: 0
NAUSEA: 0
LOSS OF CONSCIOUSNESS: 0
ABDOMINAL PAIN: 0
SHORTNESS OF BREATH: 0
MEMORY LOSS: 1
FLANK PAIN: 0
TREMORS: 0

## 2017-11-04 ASSESSMENT — PAIN SCALES - GENERAL
PAINLEVEL_OUTOF10: 0

## 2017-11-04 NOTE — PROGRESS NOTES
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  The only additional intervention that can be offered is above-knee amputation however he will almost certainly never be able to get a prosthesis or walk again.  He is also high-risk for surgical and postsurgical complications and even mortality.    At this point I am not recommending AKA.  Patient can follow-up with me outpatient to discuss further if desired    Frantz Rodriguez MD  General&Vascular Surgery  [facility]  Cell: [phone]  Office: [phone]

## 2017-11-04 NOTE — PROGRESS NOTES
Report received from day RN. Assumed patient care at 1900. Patient is AAOx3, patient reoriented to time. Patient appears calm and in no acute distress. Labs and orders reviewed. Assessment completed. Patient denies any pain at this time. Patient provided with scheduled medication crushed and administered in applesause, refer to MAR. Plan of care discussed with patient; questions have been answered at this time. Patient needs have been met at this time. Patient encouraged to call when needing assistance. Call light within reach. Bed alarm on. L non-skid sock in place. Bed locked and in the lowest position. Hourly rounding in place.

## 2017-11-04 NOTE — PROGRESS NOTES
"Pharmacy Kinetics 72 y.o. male on vancomycin day # 5 2017    Currently on Vancomycin 1100 mg iv q24hr    Indication for Treatment: SSTI and Right patellar osteomyelitis     Pertinent history per medical record: Admitted on 2017 for UTI symptoms and cellulitis of the right knee. In the ED, he stated he generally felt weak and per his caregiver, he had been confused for a few days. Has already been treated for aspiration pneumonia, cellulitis, and UTI. Vancomycin started as wound growing MRSA. MRI revealed mild right patellar osteomyelitis.     Other antibiotics: None     Allergies: Review of patient's allergies indicates no known allergies.      List concerns for renal function:   Age, BUN:SCr>20:1     Pertinent cultures to date:   10/30/17 - wound (R leg): MRSA heavy growth - vanc LOTTIE 2    Recent Labs      17   0149  17   01317   0316   WBC  13.1*  12.2*  12.7*   NEUTSPOLYS  70.70  69.20  74.60*     Recent Labs      17   031   BUN  15  16   CREATININE  0.47*  0.47*     Recent Labs      17   1919   VANCOTROUGH  10.4   No intake or output data in the 24 hours ending 17 1139   Blood pressure 124/55, pulse 74, temperature 36.4 °C (97.5 °F), resp. rate 16, height 1.829 m (6' 0.01\"), weight 58.4 kg (128 lb 12 oz), SpO2 96 %. Temp (24hrs), Av.6 °C (97.9 °F), Min:36.4 °C (97.5 °F), Max:36.9 °C (98.4 °F)      A/P   1. Vancomycin dose change: No changes  2. Next vancomycin level: 2-3 days  3. Goal trough: 12-16 mcg/mL  4. Comments: Renal indices stable. MRI revealed right patellar osteomyelitis. Vascular surgery consulted, no recommendations for surgical intervention at this time. Given mild osteo and plans to manage solely with antibiotics, recommend ID consult as patient will likely need 6 weeks of IV therapy. Will continue to follow.    Oswald Mcgee, PharmD  PGY2 Pharmacy Resident - Infectious Diseases  [phone]    "

## 2017-11-04 NOTE — PROGRESS NOTES
[facility] Hospitalist Progress Note    Date of Service: 2017    Chief Complaint  72 y.o. male admitted 2017 with urinary tract infection and confusion.    Interval Problem Update  Patient's friend is visiting  Patient with no new events  Comfortable    Consultants/Specialty  Geriatrics  Palliative Care  Ethics committee/ Dr. Eamon Blackmon    Disposition  Pending guardianship and placement   assisting  No Court date yet          Review of Systems   Constitutional: Negative for fever and malaise/fatigue.   HENT: Negative for hearing loss.    Respiratory: Negative for cough and shortness of breath.    Cardiovascular: Negative for chest pain, palpitations and leg swelling.   Gastrointestinal: Negative for abdominal pain and nausea.   Genitourinary: Negative for flank pain.   Musculoskeletal: Negative for joint pain and myalgias.   Neurological: Positive for weakness. Negative for tremors, loss of consciousness and headaches.   Psychiatric/Behavioral: Positive for memory loss. Negative for depression. The patient is not nervous/anxious.       Physical Exam  Laboratory/Imaging   Hemodynamics  Temp (24hrs), Av.6 °C (97.9 °F), Min:36.4 °C (97.5 °F), Max:36.9 °C (98.4 °F)   Temperature: 36.6 °C (97.9 °F)  Pulse  Av.7  Min: 58  Max: 144    Blood Pressure : 116/77      Respiratory      Respiration: 18, Pulse Oximetry: 98 %        RUL Breath Sounds: Clear, RML Breath Sounds: Clear, RLL Breath Sounds: Diminished, RIGO Breath Sounds: Clear, LLL Breath Sounds: Diminished    Fluids    Intake/Output Summary (Last 24 hours) at 17 1617  Last data filed at 17 1553   Gross per 24 hour   Intake                0 ml   Output                1 ml   Net               -1 ml       Nutrition  Orders Placed This Encounter   Procedures   • DIET ORDER     Standing Status:   Standing     Number of Occurrences:   1     Order Specific Question:   Diet:     Answer:   Regular [1]     Comments:   please send mashed  potatoes with lunch and dinner.     Order Specific Question:   Texture/Fiber modifications:     Answer:   Dysphagia 2(Pureed/Chopped)specify fluid consistency(question 6) [2]     Order Specific Question:   Consistency/Fluid modifications:     Answer:   Nectar Thick [2]     Order Specific Question:   Miscellaneous modifications:     Answer:   SLP - Deliver to Nursing Station [22]     Physical Exam   Constitutional: No distress.   Thin, frail   HENT:   Head: Normocephalic and atraumatic.   Eyes: EOM are normal. Pupils are equal, round, and reactive to light.   Neck: Normal range of motion.   Cardiovascular: Normal rate and regular rhythm.    Pulmonary/Chest: Effort normal. No respiratory distress.   Abdominal: Soft. He exhibits no distension. There is no tenderness. There is no guarding.   Musculoskeletal: He exhibits no edema or tenderness.   Right BKA with minimal drainage  , left forefoot amputation. Right knee with circular wound, minimal drainage,. Wound is smaller but still to the tendon.   Neurological: He is alert. No cranial nerve deficit. He exhibits normal muscle tone.   AOx2   Skin: Skin is warm and dry. He is not diaphoretic. There is erythema.   Psychiatric: He has a normal mood and affect. His behavior is normal. Judgment and thought content normal. His speech is delayed.   Nursing note and vitals reviewed.      Recent Labs      11/02/17   0149 11/03/17 0139 11/04/17 0316   WBC  13.1*  12.2*  12.7*   RBC  4.85  4.89  5.08   HEMOGLOBIN  11.0*  10.8*  11.6*   HEMATOCRIT  36.6*  36.0*  37.6*   MCV  75.5*  73.6*  74.0*   MCH  22.7*  22.1*  22.8*   MCHC  30.1*  30.0*  30.9*   RDW  44.3  42.7  42.8   PLATELETCT  668*  677*  628*   MPV  9.2  9.2  8.9*     Recent Labs      11/03/17 0139 11/04/17 0316   SODIUM  126*  127*   POTASSIUM  4.1  4.3   CHLORIDE  94*  95*   CO2  22  22   GLUCOSE  102*  91   BUN  15  16   CREATININE  0.47*  0.47*   CALCIUM  9.2  9.2                      Assessment/Plan     *  "Leukocytosis- (present on admission)   Assessment & Plan    - 2nd to cellulitis R LE, r/o osteo  - s/p 14 days of abx  - blood and urine cultures negative  - follow clinically  - XR right knee and wound culture 10/30        Cellulitis of right knee- (present on admission)   Assessment & Plan    With minimally draining wound  Status post wound culture on October 30 with MRSA, per lab, now heavy growth with ongoing leukocytosis  - resume on vanco per pharmacy    -We'll consider ID consult as needed  Discussed with pharmacy    - MRI R LE- r/o osteomyelitis- positive mild osteomyelitis and cellulitis at the patella  Discussed with Dr. Rodriguez, will be evaluated although is a poor candidate for AKA, continue antibiotics with vancomycin        Hyponatremia- (present on admission)   Assessment & Plan    Hypovolemic hyponatremia. Fluctuates with PO fluid intake. Not symptomatic and continues to be more interactive today. Continuing with daily fluctuations,  - fluctuating, asx  - continue to follow clinically  encourage PO intake  - salt tabs, 1gm BID        PAD (peripheral artery disease) (CMS-[facility])- (present on admission)   Assessment & Plan    Significant PAD s/p left forefoot amputation and right BKA  - continue aspirin and statin  - not a candidate for prosthesis  - followed by vascular surgery and wound care        Dementia- (present on admission)   Assessment & Plan    Admitted for encephalopathy and debility. Has been evaluated by ethics committee, unable to make his own medical decisions.  - guardianship pending, no court date yet  - Patient's friend,\"wife\"Norma expressing concerns with guardianship hearing, would like to discuss recommendations for needs for guardianship, explained to patient and patient's friend regarding ethics committee meeting and per Dr. Eamon Blackmon, patient lacks capacity for medical decision making and is requiring guardianship at this time.    - Patient's friend has been in contact with " our  regarding her concerns, and multiple conversations regarding guardianship status   is aware, and well follow-up with patient and friend as needed  - 11/1 Discussed with RN, to follow up with Dr. Blackmon for any further recommendations regarding guardianship hearing  11/3- scars  regarding patient's wife/friend concerns,  to follow up, as per prior , patient didn't friend is not an appropriate guardian and does not have power of , [facility] staff aware        Hypomagnesemia- (present on admission)   Assessment & Plan    Mg 1.8 10/28, h/o alcohol dependence.  - oral mag daily and monitor  - IV mag PRN repletion        Essential hypertension- (present on admission)   Assessment & Plan    Currently normotensive.  - continue with metoprolol 75 mg BID  - PRN normodyne        Severe protein-calorie malnutrition (CMS-HCC)- (present on admission)   Assessment & Plan    Poor PO intake.  - Nutrition following  - Cont boost TID            Reviewed items::  Medications reviewed, Labs reviewed and Radiology images reviewed  Barrera catheter::  No Barrera  DVT prophylaxis pharmacological::  Heparin

## 2017-11-04 NOTE — PROGRESS NOTES
Josie Osuna Fall Risk Assessment:     Last Known Fall: Within the last six months  Mobility: Use of assistive device/requires assist of two people  Medications: Cardiovascular or central nervous system meds  Mental Status/LOC/Awareness: Oriented to person and place  Toileting Needs: Incontinence, Use of assistive device (Bedside commode, bedpan, urinal)  Volume/Electrolyte Status: No problems  Communication/Sensory: Neuropathy  Behavior: Appropriate behavior  Josie Osuna Fall Risk Total: 18  Fall Risk Level: HIGH RISK    Universal Fall Precautions:  call light/belongings in reach, bed in low position and locked, siderails up x 2, use non-slip footwear, adequate lighting, clutter free and spill free environment, educate on level of risk, educate to call for assistance    Fall Risk Level Interventions:    TRIAL ([place] 8, NEURO, MED MARINA 5) Moderate Fall Risk Interventions  Place yellow fall risk ID band on patient: verified  Provide patient/family education based on risk assessment : verified  Educate patient/family to call staff for assistance when getting out of bed: verified  Place fall precaution signage outside patient door: verified  Utilize bed/chair fall alarm: verifiedTRIAL ([place] 8, NEURO, [employer] MARINA 5) High Fall Risk Interventions  Place yellow fall risk ID band on patient: verified  Provide patient/family education based on risk assessment: completed  Educate patient/family to call staff for assistance when getting out of bed: completed  Place fall precaution signage outside patient door: verified  Place patient in room close to nursing station: currently not available/charge notified  Utilize bed/chair fall alarm: verified  Notify charge of high risk for huddle: verified    Patient Specific Interventions:     Medication: review medications with patient and family  Mental Status/LOC/Awareness: check on patient hourly, utilize bed/chair fall alarm and reinforce the use of call light  Toileting: provide  donnant toileting and instruct patient/family on the need to call for assistance when toileting  Volume/Electrolyte Status: ensure patient remains hydrated and monitor abnormal lab values  Communication/Sensory: update plan of care on whiteboard, ensure proper positioning when transferrng/ambulating and ensure patient has glasses/contacts and hearing aids/dentures  Behavioral: encourage patient to voice feelings and administer medication as ordered  Mobility: utilize bed/chair fall alarm, ensure bed is locked and in lowest position and provide appropriate assistive device

## 2017-11-04 NOTE — PROGRESS NOTES
Rounding completed on pt. Report given at bedside between night shift RN and day shift RN. Pt resting quietly, denies needs at this time, call light in reach. Assumed care of pt.

## 2017-11-04 NOTE — CARE PLAN
Problem: Safety  Goal: Will remain free from falls    Intervention: Assess risk factors for falls   11/03/17 2000   OTHER   Fall Risk High Risk to Fall - 2 or more points    Risk for Injury-Any positive answers results in the pt being at high risk for fall related injury Not Applicable   Mobility Status Assessment 2-2 Healthcare Providers Required for Assistance with Ambulation & Transfer   History of fall 2   Date of Last Fall 09/02/17   Pt Calls for Assistance Yes   Environment is clutter free. Personal possession and bedside table near patient. Patient encouraged to call when needing assistance. Call light within reach. Bed locked and in the lowest position. L non-skid sock in place. Hourly rounding in place.       Problem: Infection  Goal: Will remain free from infection    Intervention: Assess signs and symptoms of infection  Monitoring patient vital signs and lab values. Patient provided with scheduled antibiotic. Washing hands before and after patient contact. Using contact precaution while providing care to patient.

## 2017-11-05 PROCEDURE — 700102 HCHG RX REV CODE 250 W/ 637 OVERRIDE(OP): Performed by: HOSPITALIST

## 2017-11-05 PROCEDURE — A9270 NON-COVERED ITEM OR SERVICE: HCPCS | Performed by: HOSPITALIST

## 2017-11-05 PROCEDURE — 700105 HCHG RX REV CODE 258

## 2017-11-05 PROCEDURE — 99232 SBSQ HOSP IP/OBS MODERATE 35: CPT | Performed by: INTERNAL MEDICINE

## 2017-11-05 PROCEDURE — 302146: Performed by: INTERNAL MEDICINE

## 2017-11-05 PROCEDURE — 700111 HCHG RX REV CODE 636 W/ 250 OVERRIDE (IP)

## 2017-11-05 PROCEDURE — 770021 HCHG ROOM/CARE - ISO PRIVATE

## 2017-11-05 PROCEDURE — 700111 HCHG RX REV CODE 636 W/ 250 OVERRIDE (IP): Performed by: HOSPITALIST

## 2017-11-05 RX ADMIN — THERA TABS 1 TABLET: TAB at 09:23

## 2017-11-05 RX ADMIN — METOPROLOL TARTRATE 75 MG: 25 TABLET, FILM COATED ORAL at 21:29

## 2017-11-05 RX ADMIN — ATORVASTATIN CALCIUM 40 MG: 40 TABLET, FILM COATED ORAL at 21:29

## 2017-11-05 RX ADMIN — FOLIC ACID 1 MG: 1 TABLET ORAL at 09:23

## 2017-11-05 RX ADMIN — METOPROLOL TARTRATE 75 MG: 25 TABLET, FILM COATED ORAL at 09:23

## 2017-11-05 RX ADMIN — HEPARIN SODIUM 5000 UNITS: 5000 INJECTION, SOLUTION INTRAVENOUS; SUBCUTANEOUS at 09:23

## 2017-11-05 RX ADMIN — GABAPENTIN 100 MG: 100 CAPSULE ORAL at 21:30

## 2017-11-05 RX ADMIN — SODIUM CHLORIDE TAB 1 GM 1 G: 1 TAB at 16:20

## 2017-11-05 RX ADMIN — GABAPENTIN 100 MG: 100 CAPSULE ORAL at 16:20

## 2017-11-05 RX ADMIN — HEPARIN SODIUM 5000 UNITS: 5000 INJECTION, SOLUTION INTRAVENOUS; SUBCUTANEOUS at 21:29

## 2017-11-05 RX ADMIN — SODIUM CHLORIDE TAB 1 GM 1 G: 1 TAB at 09:23

## 2017-11-05 RX ADMIN — MAGNESIUM GLUCONATE 500 MG ORAL TABLET 400 MG: 500 TABLET ORAL at 09:23

## 2017-11-05 RX ADMIN — VANCOMYCIN HYDROCHLORIDE 1100 MG: 100 INJECTION, POWDER, LYOPHILIZED, FOR SOLUTION INTRAVENOUS at 18:50

## 2017-11-05 RX ADMIN — GABAPENTIN 100 MG: 100 CAPSULE ORAL at 09:23

## 2017-11-05 RX ADMIN — ASPIRIN 81 MG: 81 TABLET, COATED ORAL at 09:23

## 2017-11-05 RX ADMIN — LEVOTHYROXINE SODIUM 75 MCG: 75 TABLET ORAL at 06:41

## 2017-11-05 ASSESSMENT — ENCOUNTER SYMPTOMS
MYALGIAS: 0
WEAKNESS: 1
ABDOMINAL PAIN: 0
NERVOUS/ANXIOUS: 0
SHORTNESS OF BREATH: 0
PALPITATIONS: 0
MEMORY LOSS: 1
DIZZINESS: 0
FEVER: 0
TREMORS: 0

## 2017-11-05 ASSESSMENT — PAIN SCALES - GENERAL
PAINLEVEL_OUTOF10: 0

## 2017-11-05 NOTE — CARE PLAN
Problem: Infection  Goal: Will remain free from infection    Intervention: Assess signs and symptoms of infection  Monitoring patient vital signs and lab values. Patient right knee dressing changed. Using contact precautions while providing care to patient. Washing hand before and after patient contact.       Problem: Skin Integrity  Goal: Risk for impaired skin integrity will decrease    Intervention: Assess risk factors for impaired skin integrity and/or pressure ulcers  Assessing and monitoring patient skin integrity. Waffle cushion overlay in bed. Moisturizer and barrier cream provided. Q2 turns provided.

## 2017-11-05 NOTE — PROGRESS NOTES
[facility] Hospitalist Progress Note    Date of Service: 2017    Chief Complaint  72 y.o. male admitted 2017 with urinary tract infection and confusion.    Interval Problem Update  Appears comfortable  No new complaints  Pleasant  Was out of bed and participating yesterday      Consultants/Specialty  Geriatrics  Palliative Care  Ethics committee/ Dr. Eamon Blackmon    Disposition  Pending guardianship and placement   assisting  No Court date yet          Review of Systems   Constitutional: Negative for fever.   HENT: Negative for congestion and hearing loss.    Respiratory: Negative for shortness of breath.    Cardiovascular: Negative for palpitations and leg swelling.   Gastrointestinal: Negative for abdominal pain.   Musculoskeletal: Negative for myalgias.   Neurological: Positive for weakness. Negative for dizziness and tremors.   Psychiatric/Behavioral: Positive for memory loss. The patient is not nervous/anxious.       Physical Exam  Laboratory/Imaging   Hemodynamics  Temp (24hrs), Av.4 °C (97.6 °F), Min:36.3 °C (97.4 °F), Max:36.6 °C (97.9 °F)   Temperature: 36.3 °C (97.4 °F)  Pulse  Av.6  Min: 58  Max: 144    Blood Pressure : 145/77      Respiratory      Respiration: 20, Pulse Oximetry: 99 %        RUL Breath Sounds: Clear;Diminished, RML Breath Sounds: Clear;Diminished, RLL Breath Sounds: Diminished, RIGO Breath Sounds: Clear;Diminished, LLL Breath Sounds: Diminished    Fluids    Intake/Output Summary (Last 24 hours) at 17 1504  Last data filed at 17 1553   Gross per 24 hour   Intake                0 ml   Output                1 ml   Net               -1 ml       Nutrition  Orders Placed This Encounter   Procedures   • DIET ORDER     Standing Status:   Standing     Number of Occurrences:   1     Order Specific Question:   Diet:     Answer:   Regular [1]     Comments:   please send mashed potatoes with lunch and dinner.     Order Specific Question:   Texture/Fiber  modifications:     Answer:   Dysphagia 2(Pureed/Chopped)specify fluid consistency(question 6) [2]     Order Specific Question:   Consistency/Fluid modifications:     Answer:   Nectar Thick [2]     Order Specific Question:   Miscellaneous modifications:     Answer:   SLP - Deliver to Nursing Station [22]     Physical Exam   Constitutional: No distress.   Thin, frail   HENT:   Head: Normocephalic and atraumatic.   Mouth/Throat: No oropharyngeal exudate.   Eyes: EOM are normal. Pupils are equal, round, and reactive to light.   Neck: Normal range of motion.   Cardiovascular: Normal rate and regular rhythm.    Pulmonary/Chest: Effort normal.   Abdominal: Soft. There is no tenderness.   Musculoskeletal: He exhibits no edema.   Right BKA with minimal drainage  , left forefoot amputation. Right knee with circular wound, minimal drainage,. Wound is smaller but still to the tendon.   Neurological: He is alert. No cranial nerve deficit.   AOx2   Skin: Skin is warm. There is erythema.   Psychiatric: He has a normal mood and affect. His behavior is normal. Judgment and thought content normal. His speech is delayed.   Nursing note and vitals reviewed.      Recent Labs      11/03/17 0139 11/04/17 0316   WBC  12.2*  12.7*   RBC  4.89  5.08   HEMOGLOBIN  10.8*  11.6*   HEMATOCRIT  36.0*  37.6*   MCV  73.6*  74.0*   MCH  22.1*  22.8*   MCHC  30.0*  30.9*   RDW  42.7  42.8   PLATELETCT  677*  628*   MPV  9.2  8.9*     Recent Labs      11/03/17 0139 11/04/17 0316   SODIUM  126*  127*   POTASSIUM  4.1  4.3   CHLORIDE  94*  95*   CO2  22  22   GLUCOSE  102*  91   BUN  15  16   CREATININE  0.47*  0.47*   CALCIUM  9.2  9.2                      Assessment/Plan     * Leukocytosis- (present on admission)   Assessment & Plan    - 2nd to cellulitis R LE, r/o osteo  - s/p 14 days of abx  - blood and urine cultures negative  - follow clinically  - XR right knee and wound culture 10/30        Cellulitis of right knee- (present on  "admission)   Assessment & Plan    With minimally draining wound  Status post wound culture on October 30 with MRSA, per lab, now heavy growth with ongoing leukocytosis  - resume on vanco per pharmacy     -We'll consider ID consult as needed  Discussed with pharmacy    - MRI R LE- r/o osteomyelitis- positive mild osteomyelitis and cellulitis at the patella  Discussed with Dr. Rodriguez, will be evaluated although is a poor candidate for AKA, continue antibiotics with vancomycin        Hyponatremia- (present on admission)   Assessment & Plan    Hypovolemic hyponatremia. Fluctuates with PO fluid intake. Not symptomatic and continues to be more interactive today. Continuing with daily fluctuations,  - fluctuating, asx  - continue to follow clinically  encourage PO intake  - salt tabs, 1gm BID        PAD (peripheral artery disease) (CMS-[facility])- (present on admission)   Assessment & Plan    Significant PAD s/p left forefoot amputation and right BKA  - continue aspirin and statin  - not a candidate for prosthesis  - followed by vascular surgery and wound care        Dementia- (present on admission)   Assessment & Plan    Admitted for encephalopathy and debility. Has been evaluated by ethics committee, unable to make his own medical decisions.  - guardianship pending, no court date yet  - Patient's friend,\"wife\"Norma expressing concerns with guardianship hearing, would like to discuss recommendations for needs for guardianship, explained to patient and patient's friend regarding ethics committee meeting and per Dr. Eamon Blackmon, patient lacks capacity for medical decision making and is requiring guardianship at this time.    - Patient's friend has been in contact with our  regarding her concerns, and multiple conversations regarding guardianship status   is aware, and well follow-up with patient and friend as needed  - 11/1 Discussed with RN, to follow up with Dr. Blackmon for any further " recommendations regarding guardianship hearing  11/3- gwyn  regarding patient's wife/friend concerns,  to follow up, as per prior , patient didn't friend is not an appropriate guardian and does not have power of , [facility] staff aware        Hypomagnesemia- (present on admission)   Assessment & Plan    Mg 1.8 10/28, h/o alcohol dependence.  - oral mag daily and monitor  - IV mag PRN repletion        Essential hypertension- (present on admission)   Assessment & Plan    Currently normotensive.  - continue with metoprolol 75 mg BID  - PRN normodyne        Severe protein-calorie malnutrition (CMS-HCC)- (present on admission)   Assessment & Plan    Poor PO intake.  - Nutrition following  - Cont boost TID            Reviewed items::  Medications reviewed, Labs reviewed and Radiology images reviewed  Barrera catheter::  No Barrera  DVT prophylaxis pharmacological::  Heparin

## 2017-11-05 NOTE — PROGRESS NOTES
Josie Osuna Fall Risk Assessment:     Last Known Fall: Within the last six months  Mobility: Use of assistive device/requires assist of two people  Medications: Cardiovascular or central nervous system meds  Mental Status/LOC/Awareness: Oriented to person and place  Toileting Needs: Incontinence, Use of assistive device (Bedside commode, bedpan, urinal)  Volume/Electrolyte Status: No problems  Communication/Sensory: Neuropathy  Behavior: Appropriate behavior  Josie Osuna Fall Risk Total: 19  Fall Risk Level: HIGH RISK     Universal Fall Precautions:  call light/belongings in reach, bed in low position and locked, siderails up x 2, use non-slip footwear, adequate lighting, clutter free and spill free environment, educate on level of risk, educate to call for assistance     Fall Risk Level Interventions:    TRIAL ([employer] 8, NEURO, MED MARINA 5) Moderate Fall Risk Interventions  Place yellow fall risk ID band on patient: verified  Provide patient/family education based on risk assessment : verified  Educate patient/family to call staff for assistance when getting out of bed: verified  Place fall precaution signage outside patient door: verified  Utilize bed/chair fall alarm: verifiedTRIAL ([employer] 8, NEURO, [employer] MARINA 5) High Fall Risk Interventions  Place yellow fall risk ID band on patient: verified  Provide patient/family education based on risk assessment: completed  Educate patient/family to call staff for assistance when getting out of bed: completed  Place fall precaution signage outside patient door: verified  Place patient in room close to nursing station: currently not available/charge notified  Utilize bed/chair fall alarm: verified  Notify charge of high risk for huddle: verified     Patient Specific Interventions:      Medication: review medications with patient and family  Mental Status/LOC/Awareness: check on patient hourly, utilize bed/chair fall alarm and reinforce the use of call light  Toileting:  provide frquent toileting and instruct patient/family on the need to call for assistance when toileting  Volume/Electrolyte Status: ensure patient remains hydrated and monitor abnormal lab values  Communication/Sensory: update plan of care on whiteboard, ensure proper positioning when transferrng/ambulating and ensure patient has glasses/contacts and hearing aids/dentures  Behavioral: encourage patient to voice feelings and administer medication as ordered  Mobility: utilize bed/chair fall alarm, ensure bed is locked and in lowest position and provide appropriate assistive device

## 2017-11-05 NOTE — PROGRESS NOTES
Report received from day RN. Assumed patient care at 1900. Patient is AAOx3, patient reoriented to time. Patient appears calm and in no acute distress. Labs and orders reviewed. Assessment completed. Patient denies any pain at this time. Patient provided with scheduled medication crushed and administered in applesause, refer to MAR. Patient Right Knee dressing changed.  Plan of care discussed with patient; questions have been answered at this time. Patient needs have been met at this time. Patient encouraged to call when needing assistance. Call light within reach. Bed alarm on. L non-skid sock in place. Bed locked and in the lowest position. Hourly rounding in place.

## 2017-11-05 NOTE — PROGRESS NOTES
"Pharmacy Kinetics 72 y.o. male on vancomycin day # 6 2017    Currently on Vancomycin 1100 mg iv q24hr    Indication for Treatment: SSTI and Right patellar osteomyelitis     Pertinent history per medical record: Admitted on 2017 for UTI symptoms and cellulitis of the right knee. In the ED, he stated he generally felt weak and per his caregiver, he had been confused for a few days. Has already been treated for aspiration pneumonia, cellulitis, and UTI. Vancomycin started as wound growing MRSA. MRI revealed mild right patellar osteomyelitis.     Other antibiotics: None     Allergies: Review of patient's allergies indicates no known allergies.      List concerns for renal function:   Age, BUN:SCr>20:1     Pertinent cultures to date:   10/30/17 - wound (R leg): MRSA heavy growth - vanc LOTTIE 2    Recent Labs      17   0316   WBC  12.2*  12.7*   NEUTSPOLYS  69.20  74.60*     Recent Labs      17   031   BUN  15  16   CREATININE  0.47*  0.47*     Recent Labs      17   1919   VANCOTROUGH  10.4     Intake/Output Summary (Last 24 hours) at 17 1135  Last data filed at 17 1553   Gross per 24 hour   Intake                0 ml   Output                1 ml   Net               -1 ml      Blood pressure 145/77, pulse 80, temperature 36.3 °C (97.4 °F), resp. rate 20, height 1.829 m (6' 0.01\"), weight 58.4 kg (128 lb 12 oz), SpO2 99 %. Temp (24hrs), Av.4 °C (97.6 °F), Min:36.3 °C (97.4 °F), Max:36.6 °C (97.9 °F)      A/P   1. Vancomycin dose change: No changes  2. Next vancomycin level: Prior to dose tomorrow (ordered)  3. Goal trough: 12-16 mcg/mL  4. Comments: Renal indices stable. MRI on 11/3 revealed right patellar osteomyelitis. Vascular surgery consulted, no plans or surgical intervention at this time. Given mild osteo and plans to manage solely with antibiotics, recommend ID consult as patient will likely need 6 weeks of IV therapy. Checking level prior " to dose tomorrow. Will continue to follow.    Oswald Mcgee, PharmD  PGY2 Pharmacy Resident - Infectious Diseases  [phone]

## 2017-11-05 NOTE — PROGRESS NOTES
Josie Osuna Fall Risk Assessment:     Last Known Fall: Within the last six months  Mobility: Use of assistive device/requires assist of two people  Medications: Cardiovascular or central nervous system meds  Mental Status/LOC/Awareness: Oriented to person and place  Toileting Needs: Incontinence  Volume/Electrolyte Status: No problems  Communication/Sensory: Neuropathy  Behavior: Ethanol/substance abuse  Josie Osuna Fall Risk Total: 19  Fall Risk Level: HIGH RISK    Universal Fall Precautions:  call light/belongings in reach, bed in low position and locked, siderails up x 2, use non-slip footwear, adequate lighting, clutter free and spill free environment, educate on level of risk, educate to call for assistance    Fall Risk Level Interventions:    TRIAL ([employer] 8, NEURO, MED MARINA 5) Moderate Fall Risk Interventions  Place yellow fall risk ID band on patient: verified  Provide patient/family education based on risk assessment : verified  Educate patient/family to call staff for assistance when getting out of bed: verified  Place fall precaution signage outside patient door: verified  Utilize bed/chair fall alarm: verifiedTRIAL ([employer] 8, NEURO, [employer] MARINA 5) High Fall Risk Interventions  Place yellow fall risk ID band on patient: verified  Provide patient/family education based on risk assessment: completed  Educate patient/family to call staff for assistance when getting out of bed: completed  Place fall precaution signage outside patient door: verified  Place patient in room close to nursing station: currently not available/charge notified  Utilize bed/chair fall alarm: verified  Notify charge of high risk for huddle: verified    Patient Specific Interventions:     Medication: review medications with patient and family and assess for medications that can be discontinued or dosage decreased  Mental Status/LOC/Awareness: check on patient hourly, utilize bed/chair fall alarm and reinforce the use of call  light  Toileting: provide frquent toileting and instruct patient/family on the need to call for assistance when toileting  Volume/Electrolyte Status: ensure patient remains hydrated and monitor abnormal lab values  Communication/Sensory: update plan of care on whiteboard, ensure proper positioning when transferrng/ambulating and ensure patient has glasses/contacts and hearing aids/dentures  Behavioral: encourage patient to voice feelings and administer medication as ordered  Mobility: utilize bed/chair fall alarm, ensure bed is locked and in lowest position, provide appropriate assistive device and instruct patient to exit bed on their strongest side

## 2017-11-06 LAB — VANCOMYCIN TROUGH SERPL-MCNC: 12.4 UG/ML (ref 10–20)

## 2017-11-06 PROCEDURE — 97530 THERAPEUTIC ACTIVITIES: CPT

## 2017-11-06 PROCEDURE — A9270 NON-COVERED ITEM OR SERVICE: HCPCS | Performed by: HOSPITALIST

## 2017-11-06 PROCEDURE — 97110 THERAPEUTIC EXERCISES: CPT

## 2017-11-06 PROCEDURE — 99232 SBSQ HOSP IP/OBS MODERATE 35: CPT | Performed by: INTERNAL MEDICINE

## 2017-11-06 PROCEDURE — 700102 HCHG RX REV CODE 250 W/ 637 OVERRIDE(OP): Performed by: HOSPITALIST

## 2017-11-06 PROCEDURE — 700111 HCHG RX REV CODE 636 W/ 250 OVERRIDE (IP)

## 2017-11-06 PROCEDURE — 700105 HCHG RX REV CODE 258

## 2017-11-06 PROCEDURE — 92526 ORAL FUNCTION THERAPY: CPT

## 2017-11-06 PROCEDURE — 700111 HCHG RX REV CODE 636 W/ 250 OVERRIDE (IP): Performed by: HOSPITALIST

## 2017-11-06 PROCEDURE — 80202 ASSAY OF VANCOMYCIN: CPT

## 2017-11-06 PROCEDURE — 770021 HCHG ROOM/CARE - ISO PRIVATE

## 2017-11-06 PROCEDURE — 36415 COLL VENOUS BLD VENIPUNCTURE: CPT

## 2017-11-06 RX ADMIN — THERA TABS 1 TABLET: TAB at 09:11

## 2017-11-06 RX ADMIN — SODIUM CHLORIDE TAB 1 GM 1 G: 1 TAB at 09:10

## 2017-11-06 RX ADMIN — LEVOTHYROXINE SODIUM 75 MCG: 75 TABLET ORAL at 06:16

## 2017-11-06 RX ADMIN — GABAPENTIN 100 MG: 100 CAPSULE ORAL at 21:43

## 2017-11-06 RX ADMIN — METOPROLOL TARTRATE 75 MG: 25 TABLET, FILM COATED ORAL at 21:43

## 2017-11-06 RX ADMIN — ASPIRIN 81 MG: 81 TABLET, COATED ORAL at 09:10

## 2017-11-06 RX ADMIN — VANCOMYCIN HYDROCHLORIDE 1100 MG: 100 INJECTION, POWDER, LYOPHILIZED, FOR SOLUTION INTRAVENOUS at 17:29

## 2017-11-06 RX ADMIN — FOLIC ACID 1 MG: 1 TABLET ORAL at 09:10

## 2017-11-06 RX ADMIN — ATORVASTATIN CALCIUM 40 MG: 40 TABLET, FILM COATED ORAL at 21:43

## 2017-11-06 RX ADMIN — HEPARIN SODIUM 5000 UNITS: 5000 INJECTION, SOLUTION INTRAVENOUS; SUBCUTANEOUS at 09:10

## 2017-11-06 RX ADMIN — MAGNESIUM GLUCONATE 500 MG ORAL TABLET 400 MG: 500 TABLET ORAL at 09:10

## 2017-11-06 RX ADMIN — HEPARIN SODIUM 5000 UNITS: 5000 INJECTION, SOLUTION INTRAVENOUS; SUBCUTANEOUS at 21:43

## 2017-11-06 RX ADMIN — SODIUM CHLORIDE TAB 1 GM 1 G: 1 TAB at 17:29

## 2017-11-06 RX ADMIN — GABAPENTIN 100 MG: 100 CAPSULE ORAL at 15:44

## 2017-11-06 RX ADMIN — METOPROLOL TARTRATE 75 MG: 25 TABLET, FILM COATED ORAL at 09:10

## 2017-11-06 RX ADMIN — GABAPENTIN 100 MG: 100 CAPSULE ORAL at 09:11

## 2017-11-06 ASSESSMENT — GAIT ASSESSMENTS: GAIT LEVEL OF ASSIST: UNABLE TO PARTICIPATE

## 2017-11-06 ASSESSMENT — ENCOUNTER SYMPTOMS
SHORTNESS OF BREATH: 0
WEAKNESS: 1
MEMORY LOSS: 1
HEADACHES: 0
FEVER: 0
DEPRESSION: 0
TREMORS: 0
COUGH: 0
ABDOMINAL PAIN: 0
CHILLS: 0
MYALGIAS: 0
NAUSEA: 0

## 2017-11-06 ASSESSMENT — COGNITIVE AND FUNCTIONAL STATUS - GENERAL
SUGGESTED CMS G CODE MODIFIER MOBILITY: CM
STANDING UP FROM CHAIR USING ARMS: TOTAL
WALKING IN HOSPITAL ROOM: TOTAL
TURNING FROM BACK TO SIDE WHILE IN FLAT BAD: A LOT
MOVING TO AND FROM BED TO CHAIR: UNABLE
MOBILITY SCORE: 7
MOVING FROM LYING ON BACK TO SITTING ON SIDE OF FLAT BED: UNABLE
CLIMB 3 TO 5 STEPS WITH RAILING: TOTAL

## 2017-11-06 ASSESSMENT — PAIN SCALES - GENERAL
PAINLEVEL_OUTOF10: 0

## 2017-11-06 NOTE — PROGRESS NOTES
Rec'd report from day shift RN. Assumed pt care. Assessment completed. AA&OX3, reoriented to time. Denies pain at this time. No s/s of discomfort or distress. Q2 turns enforced. Pt is incontinent of urine and stool. Linens changed and pt cleaned up, condom cath applied. Tania cream applied to sacrum and perineal area. Dressing to right knee is CDI. Right BKA stump is pink colored, CONNOR. Bed in lowest position, bed locked, bed alarm on for safety, treaded socks in place, RN and CNA numbers provided, call light within reach.

## 2017-11-06 NOTE — PROGRESS NOTES
Josie Osuna Fall Risk Assessment:     Last Known Fall: Within the last six months  Mobility: Use of assistive device/requires assist of two people  Medications: Cardiovascular or central nervous system meds  Mental Status/LOC/Awareness: Oriented to person and place  Toileting Needs: Incontinence  Volume/Electrolyte Status: No problems  Communication/Sensory: Neuropathy  Behavior: Ethanol/substance abuse  Josie Osuna Fall Risk Total: 19  Fall Risk Level: HIGH RISK    Universal Fall Precautions:  call light/belongings in reach, bed in low position and locked, wheelchairs and assistive devices out of sight, siderails up x 2, use non-slip footwear, adequate lighting, clutter free and spill free environment, educate on level of risk, educate to call for assistance    Fall Risk Level Interventions:    TRIAL ([employer] 8, NEURO, MED MARINA 5) Moderate Fall Risk Interventions  Place yellow fall risk ID band on patient: verified  Provide patient/family education based on risk assessment : verified  Educate patient/family to call staff for assistance when getting out of bed: verified  Place fall precaution signage outside patient door: verified  Utilize bed/chair fall alarm: verifiedTRIAL ([employer] 8, NEURO, [employer] MARINA 5) High Fall Risk Interventions  Place yellow fall risk ID band on patient: verified  Provide patient/family education based on risk assessment: completed  Educate patient/family to call staff for assistance when getting out of bed: completed  Place fall precaution signage outside patient door: verified  Place patient in room close to nursing station: verified  Utilize bed/chair fall alarm: verified  Notify charge of high risk for huddle: completed    Patient Specific Interventions:     Medication: review medications with patient and family  Mental Status/LOC/Awareness: check on patient hourly, utilize bed/chair fall alarm and reinforce the use of call light  Toileting: provide frquent toileting  Volume/Electrolyte  Status: monitor abnormal lab values  Communication/Sensory: update plan of care on whiteboard  Behavioral: administer medication as ordered  Mobility: utilize bed/chair fall alarm and ensure bed is locked and in lowest position

## 2017-11-06 NOTE — WOUND TEAM
"[facility]  Inpatient Services  Wound and Skin Care Progress Note    HPI, PMH, SH: Reviewed  Unit where seen by Wound Team: S [id number]    WOUND TEAM FOLLOW UP: re-evaluation of R knee and stump wound    SUBJECTIVE:  \" I can turn a little bit.\"    Self Report / Pain Level: denies today  OBJECTIVE: on pressure redistribution mattress, with waffle overlay, lying supine, dressing in place to right knee, stump open to air  WOUND TYPE, LOCATION, CHARACTERISTICS:  Wound POA Other (full thickness surgical) Leg Right Distal  Periwound Skin: Intact  Drainage : None  Tissue Type and %:  Closed approximated incision line, except for small opening around lateral suture which is visible  Wound Edges:    closed    Odor:     None   Exposed structure(s):   1 small suture at lateral end of incision line   Signs and Symptoms of Infection: none    Wound Not POA Unstageable Knee Right Anterior  Periwound Skin: erythema improving  Drainage : minimal serosanguinous      Tissue Type and %:    95% pink/red moist tissue, 5% white cartilage  Wound Edges:    attached    Odor:     None   Exposed structure(s):   cartilage  Signs and Symptoms of Infection: erythema, drainage, positive culture    Measurements : taken 11/06/17    Leg Right Distal  Incision line not measured           R knee                                                        Length (cm): 2.6                                                        Width (cm): 2                                                        Depth (cm):  0.1   Tracts/undermining:   Skin around wounds slides slightly side to side       INTERVENTIONS BY WOUND TEAM: Removed dressing to knee, moist washcloth to ritu wound, cleansed wound with NS. Stump cleansed with normal saline, painted with betadine and open to air.  Applied Aquacel Ag cut to fit knee wound, and covered with adhesive foam. Patient turned to right side, incontinent of stool and urine, condom cath dislodged. Stool cleaned up with " no rinse foam cleanser and moist washcloth. No wounds present to sacracoccygeal area. Zinc barrier paste applied. Patient positioned to right side, positioned with pillows, stump on pillow and left heel on pillow.  Leg Right Distal-Dressing Options: Open to Air  Knee Right Anterior-Dressing Options:  (Aquacel Ag, foam)    Interdisciplinary consultation: staff RN, patient  EVALUATION AND PROGRESS OF WOUND(S):  Right knee erythema improved,  incision line distal stump approximated except for one suture present. Sacrum has no wounds present. Will continue to evaluate knee wound once weekly, nursing to change dressing to knee every 48 hours.  Patient has poor perfusion per vascular note from Dr. Rodriguez note 10/2, not a surgical candidate at this time.    Factors affecting wound healing: PAD, decreased nutrition, smoker, alcohol abuse     Goals: decrease size of knee wound by 1% each week      NURSING PLAN OF CARE:    Dressing changes:     See new Dressing Maintenance orders:  X     Skin care: See Skin Care orders:        Rectal tube care: See Rectal Tube Care orders:      Other orders:           WOUND TEAM PLAN OF CARE (X):   NPWT change 3 x week:        Dressing changes:       Follow up as needed:     Other: X once weekly

## 2017-11-06 NOTE — THERAPY
"Physical Therapy Treatment completed.   Bed Mobility:  Supine to Sit:  (Up in chair)  Transfers: Sit to Stand: Maximal Assist (x2)  Gait: Level Of Assist: Unable to Participate     Plan of Care: Will benefit from Physical Therapy 3 times per week and Plan to complete next treatment by Wednesday 11/8  Discharge Recommendations: Equipment: Will Continue to Assess for Equipment Needs. Post-acute therapy Discharge to a transitional care facility for continued skilled therapy services.    Pt seems more agreeable to participate with therapy today. Pt does frequently ask \"Why\" when explained what we are working on but then agreeable to participate. Pt up in bedside chair for lunch. Pt requesting back to bed. Worked on sit to stand X 2 from chair. Pt with improving effort to participate with sit to stand, however, extremely weak and L LE turns out which makes it difficult to support weight through L LE. Only able to come to partial stand, will not bear full weight through L LE and maintains pelvis in posterior tilt. Completed stand pivot to bed with maximal assist X 2. Completed static/dynamic sitting balance at EOB X 8 minutes. Pt begins to fatigue quickly with unsupported sitting. Once back in bed, performed supine ther ex. Pt typically will only perform 5 reps then requests to be done. With encouragement, will perform up to 10 reps. Will continue to progress towards slide board transfers    See \"Rehab Therapy-Acute\" Patient Summary Report for complete documentation.       "

## 2017-11-06 NOTE — CARE PLAN
Problem: Urinary Elimination:  Goal: Ability to reestablish a normal urinary elimination pattern will improve  Condom cath applied to help maintain pt dry of urine.     Problem: Skin Integrity  Goal: Risk for impaired skin integrity will decrease  Tania cream applied to sacral and perineal area, Q2 turns enforced, waffle mattress overlay utilized, pillows used for positioning.

## 2017-11-06 NOTE — PROGRESS NOTES
"Pharmacy Kinetics 72 y.o. male on vancomycin day # 7 2017    Currently on Vancomycin 1100 mg iv q24hr     Indication for Treatment: SSTI and Right patellar osteomyelitis     Pertinent history per medical record: Admitted on 2017 for UTI symptoms and cellulitis of the right knee. In the ED, he stated he generally felt weak and per his caregiver, he had been confused for a few days. Has already been treated for aspiration pneumonia, cellulitis, and UTI. Vancomycin started as wound growing MRSA. MRI revealed mild right patellar osteomyelitis.     Other antibiotics: None     Allergies: Review of patient's allergies indicates no known allergies.      List concerns for renal function:   Age, BUN:SCr>20:1     Pertinent cultures to date:   10/30/17 - wound (R leg): MRSA heavy growth - vanc LOTTIE 2    Recent Labs      17   0316   WBC  12.7*   NEUTSPOLYS  74.60*     Recent Labs      17   0316   BUN  16   CREATININE  0.47*      Ref. Range 2017 19:19   Vancomycin Trough Latest Ref Range: 10.0 - 20.0 ug/mL 10.4       No intake or output data in the 24 hours ending 17 1410   Blood pressure 134/64, pulse 86, temperature 37 °C (98.6 °F), resp. rate 18, height 1.829 m (6' 0.01\"), weight 58.4 kg (128 lb 12 oz), SpO2 94 %. Temp (24hrs), Av.9 °C (98.4 °F), Min:36.1 °C (97 °F), Max:37.5 °C (99.5 °F)      A/P   1. Vancomycin dose change: not indicated at this time  2. Next vancomycin level: 17 @ 1700 (ordered)  3. Goal trough: 12-16 mcg/mL  4. Comments: Vancomycin trough due prior to tonight's dose. Mild osteomyelitis of right patellar seen on MRI.  No surgical intervention planned at this time, vascular recommended antibiotic management. Please consider ID consult for outpatient antibiotic management.     Aileen Edwards, PharmD       "

## 2017-11-06 NOTE — PROGRESS NOTES
Assumed patient care at 0700. Received report from night shift. Assessment completed. POC discussed with pt, verbalizes understanding. A&Ox3, reoriented to time. Denies pain at this time. Q2h turns in place. Condom cath in use, RONAN cram applied to sacrum. Right BKA,  dressing CDI, stump elevated. Bed alarm on. Non skid sock to LLE in place. Personal possessions and call light placed within reach. Pt denies any additional needs at this time. Pt up to chair for meal.

## 2017-11-06 NOTE — PROGRESS NOTES
[facility] Hospitalist Progress Note    Date of Service: 2017    Chief Complaint  72 y.o. male admitted 2017 with urinary tract infection and confusion.    Interval Problem Update  Appears comfortable  Friend visiting at bedside  Pleasant, cooperative  No complaints    Patient expressing wishes to go home with his friend  Discussed plan of care with patient's friend,Norma, patient will need ongoing antibiotics for at least a 6 week course for his osteomyelitis. Patient is significantly debilitated requiring assistance with transfer to chair. Patient appears to be very motivated for ongoing therapy. We did discuss with patient and patient's friend regarding further need for therapy and assistance. Patient's friend is discussing goals of ambulating with crutches. At this time patient is far from these goals of further therapy with transfer with crutches.    I have discussed with patient and patient's friend regarding the goals of care. Patient is still requiring max assistance for transfers at this time.      Consultants/Specialty  Geriatrics  Palliative Care  Ethics committee/ Dr. Eamon Blackmon    Disposition  Pending guardianship and placement   assisting  No Court date yet    Encourage out of bed, nursing staff to assist          Review of Systems   Constitutional: Negative for chills and fever.   HENT: Negative for hearing loss.    Respiratory: Negative for cough and shortness of breath.    Cardiovascular: Negative for leg swelling.   Gastrointestinal: Negative for abdominal pain and nausea.   Musculoskeletal: Negative for myalgias.   Neurological: Positive for weakness. Negative for tremors and headaches.   Psychiatric/Behavioral: Positive for memory loss. Negative for depression.      Physical Exam  Laboratory/Imaging   Hemodynamics  Temp (24hrs), Av.9 °C (98.4 °F), Min:36.1 °C (97 °F), Max:37.5 °C (99.5 °F)   Temperature: 37 °C (98.6 °F)  Pulse  Av.6  Min: 58  Max: 144    Blood  Pressure : 134/64      Respiratory      Respiration: 18, Pulse Oximetry: 94 %        RUL Breath Sounds: Clear, RML Breath Sounds: Diminished, RLL Breath Sounds: Diminished, RIGO Breath Sounds: Clear, LLL Breath Sounds: Diminished    Fluids  No intake or output data in the 24 hours ending 11/06/17 1406    Nutrition  Orders Placed This Encounter   Procedures   • DIET ORDER     Standing Status:   Standing     Number of Occurrences:   1     Order Specific Question:   Diet:     Answer:   Regular [1]     Comments:   please send mashed potatoes with lunch and dinner.     Order Specific Question:   Texture/Fiber modifications:     Answer:   Dysphagia 2(Pureed/Chopped)specify fluid consistency(question 6) [2]     Order Specific Question:   Consistency/Fluid modifications:     Answer:   Nectar Thick [2]     Order Specific Question:   Miscellaneous modifications:     Answer:   SLP - Deliver to Nursing Station [22]     Physical Exam   Constitutional: No distress.   Thin, frail   HENT:   Head: Normocephalic and atraumatic.   Mouth/Throat: No oropharyngeal exudate.   Eyes: EOM are normal. Pupils are equal, round, and reactive to light.   Neck: Normal range of motion. No JVD present.   Cardiovascular: Normal rate and regular rhythm.    Pulmonary/Chest: Effort normal.   Abdominal: Soft. There is no tenderness.   Musculoskeletal:   Right BKA with minimal drainage  , left forefoot amputation. Right knee with circular wound, minimal drainage,. Wound is smaller but still to the tendon.   Neurological: He is alert. No cranial nerve deficit. Coordination normal.   AOx2   Skin: Skin is warm. He is not diaphoretic. There is erythema.   Psychiatric: He has a normal mood and affect. His behavior is normal. His speech is delayed.   Nursing note and vitals reviewed.      Recent Labs      11/04/17   0316   WBC  12.7*   RBC  5.08   HEMOGLOBIN  11.6*   HEMATOCRIT  37.6*   MCV  74.0*   MCH  22.8*   MCHC  30.9*   RDW  42.8   PLATELETCT  628*   MPV   "8.9*     Recent Labs      11/04/17   0316   SODIUM  127*   POTASSIUM  4.3   CHLORIDE  95*   CO2  22   GLUCOSE  91   BUN  16   CREATININE  0.47*   CALCIUM  9.2                      Assessment/Plan     * Leukocytosis- (present on admission)   Assessment & Plan    - 2nd to cellulitis R LE, r/o osteo  - s/p 14 days of abx  - blood and urine cultures negative  - follow clinically  - XR right knee and wound culture 10/30        Cellulitis of right knee- (present on admission)   Assessment & Plan    With minimally draining wound  Status post wound culture on October 30 with MRSA, per lab, now heavy growth with ongoing leukocytosis  - resume on vanco per pharmacy     -We'll consider ID consult as needed  Discussed with pharmacy    - MRI R LE- r/o osteomyelitis- positive mild osteomyelitis and cellulitis at the patella  Discussed with Dr. Rodriguez, will be evaluated although is a poor candidate for AKA, continue antibiotics with vancomycin x 6 weeks        Hyponatremia- (present on admission)   Assessment & Plan    Hypovolemic hyponatremia. Fluctuates with PO fluid intake. Not symptomatic and continues to be more interactive today. Continuing with daily fluctuations,  - fluctuating, asx  - continue to follow clinically  encourage PO intake  - salt tabs, 1gm BID        PAD (peripheral artery disease) (CMS-HCC)- (present on admission)   Assessment & Plan    Significant PAD s/p left forefoot amputation and right BKA  - continue aspirin and statin  - not a candidate for prosthesis  - followed by vascular surgery and wound care        Dementia- (present on admission)   Assessment & Plan    Admitted for encephalopathy and debility. Has been evaluated by ethics committee, unable to make his own medical decisions.  - guardianship pending, no court date yet  - Patient's friend,\"wife\"Norma expressing concerns with guardianship hearing, would like to discuss recommendations for needs for guardianship, explained to patient and " patient's friend regarding ethics committee meeting and per Dr. Eamon Blackmon, patient lacks capacity for medical decision making and is requiring guardianship at this time.    - Patient's friend has been in contact with our  regarding her concerns, and multiple conversations regarding guardianship status   is aware, and well follow-up with patient and friend as needed  - 11/1 Discussed with RN, to follow up with Dr. Blackmon for any further recommendations regarding guardianship hearing  11/3- scars  regarding patient's wife/friend concerns,  to follow up, as per prior , patient didn't friend is not an appropriate guardian and does not have power of , [facility] staff aware        Hypomagnesemia- (present on admission)   Assessment & Plan    Mg 1.8 10/28, h/o alcohol dependence.  - oral mag daily and monitor  - IV mag PRN repletion        Essential hypertension- (present on admission)   Assessment & Plan    Currently normotensive.  - continue with metoprolol 75 mg BID  - PRN normodyne        Severe protein-calorie malnutrition (CMS-HCC)- (present on admission)   Assessment & Plan    Poor PO intake.  - Nutrition following  - Cont boost TID            Reviewed items::  Medications reviewed, Labs reviewed and Radiology images reviewed  Barrera catheter::  No Barrera  DVT prophylaxis pharmacological::  Heparin

## 2017-11-07 LAB — GLUCOSE BLD-MCNC: 86 MG/DL (ref 65–99)

## 2017-11-07 PROCEDURE — 700111 HCHG RX REV CODE 636 W/ 250 OVERRIDE (IP): Performed by: HOSPITALIST

## 2017-11-07 PROCEDURE — A9270 NON-COVERED ITEM OR SERVICE: HCPCS | Performed by: HOSPITALIST

## 2017-11-07 PROCEDURE — 700105 HCHG RX REV CODE 258

## 2017-11-07 PROCEDURE — 700102 HCHG RX REV CODE 250 W/ 637 OVERRIDE(OP): Performed by: HOSPITALIST

## 2017-11-07 PROCEDURE — 700111 HCHG RX REV CODE 636 W/ 250 OVERRIDE (IP)

## 2017-11-07 PROCEDURE — 99232 SBSQ HOSP IP/OBS MODERATE 35: CPT | Performed by: INTERNAL MEDICINE

## 2017-11-07 PROCEDURE — 770021 HCHG ROOM/CARE - ISO PRIVATE

## 2017-11-07 PROCEDURE — 82962 GLUCOSE BLOOD TEST: CPT

## 2017-11-07 RX ADMIN — VANCOMYCIN HYDROCHLORIDE 1100 MG: 100 INJECTION, POWDER, LYOPHILIZED, FOR SOLUTION INTRAVENOUS at 18:00

## 2017-11-07 RX ADMIN — LEVOTHYROXINE SODIUM 75 MCG: 75 TABLET ORAL at 05:56

## 2017-11-07 RX ADMIN — GABAPENTIN 100 MG: 100 CAPSULE ORAL at 14:55

## 2017-11-07 RX ADMIN — ATORVASTATIN CALCIUM 40 MG: 40 TABLET, FILM COATED ORAL at 20:42

## 2017-11-07 RX ADMIN — ACETAMINOPHEN 650 MG: 325 TABLET, FILM COATED ORAL at 20:47

## 2017-11-07 RX ADMIN — HEPARIN SODIUM 5000 UNITS: 5000 INJECTION, SOLUTION INTRAVENOUS; SUBCUTANEOUS at 20:42

## 2017-11-07 RX ADMIN — GABAPENTIN 100 MG: 100 CAPSULE ORAL at 09:01

## 2017-11-07 RX ADMIN — METOPROLOL TARTRATE 75 MG: 25 TABLET, FILM COATED ORAL at 20:43

## 2017-11-07 RX ADMIN — STANDARDIZED SENNA CONCENTRATE AND DOCUSATE SODIUM 2 TABLET: 8.6; 5 TABLET, FILM COATED ORAL at 20:43

## 2017-11-07 RX ADMIN — THERA TABS 1 TABLET: TAB at 09:01

## 2017-11-07 RX ADMIN — HEPARIN SODIUM 5000 UNITS: 5000 INJECTION, SOLUTION INTRAVENOUS; SUBCUTANEOUS at 09:07

## 2017-11-07 RX ADMIN — SODIUM CHLORIDE TAB 1 GM 1 G: 1 TAB at 09:01

## 2017-11-07 RX ADMIN — SODIUM CHLORIDE TAB 1 GM 1 G: 1 TAB at 18:00

## 2017-11-07 RX ADMIN — MAGNESIUM GLUCONATE 500 MG ORAL TABLET 400 MG: 500 TABLET ORAL at 09:01

## 2017-11-07 RX ADMIN — FOLIC ACID 1 MG: 1 TABLET ORAL at 09:01

## 2017-11-07 RX ADMIN — METOPROLOL TARTRATE 75 MG: 25 TABLET, FILM COATED ORAL at 09:01

## 2017-11-07 RX ADMIN — GABAPENTIN 100 MG: 100 CAPSULE ORAL at 20:42

## 2017-11-07 RX ADMIN — ASPIRIN 81 MG: 81 TABLET, COATED ORAL at 09:01

## 2017-11-07 ASSESSMENT — PAIN SCALES - GENERAL
PAINLEVEL_OUTOF10: 0
PAINLEVEL_OUTOF10: 0
PAINLEVEL_OUTOF10: 6
PAINLEVEL_OUTOF10: 0

## 2017-11-07 ASSESSMENT — ENCOUNTER SYMPTOMS
HEADACHES: 0
MEMORY LOSS: 1
CHILLS: 0
WEAKNESS: 1
SHORTNESS OF BREATH: 0
DEPRESSION: 0
MYALGIAS: 0
COUGH: 0
NAUSEA: 0
ABDOMINAL PAIN: 0
FEVER: 0
TREMORS: 0

## 2017-11-07 NOTE — PROGRESS NOTES
Josie Osuna Fall Risk Assessment:     Last Known Fall: Within the last six months  Mobility: Use of assistive device/requires assist of two people  Medications: Cardiovascular or central nervous system meds  Mental Status/LOC/Awareness: Oriented to person and place  Toileting Needs: Incontinence  Volume/Electrolyte Status: No problems  Communication/Sensory: Neuropathy  Behavior: Depression/anxiety  Josie Osuna Fall Risk Total: 17  Fall Risk Level: HIGH RISK    Universal Fall Precautions:  call light/belongings in reach, bed in low position and locked, wheelchairs and assistive devices out of sight, siderails up x 2, use non-slip footwear, adequate lighting, clutter free and spill free environment, educate on level of risk, educate to call for assistance    Fall Risk Level Interventions:    TRIAL (Twitch 8, NEURO, MED MARINA 5) Moderate Fall Risk Interventions  Place yellow fall risk ID band on patient: verified  Provide patient/family education based on risk assessment : verified  Educate patient/family to call staff for assistance when getting out of bed: verified  Place fall precaution signage outside patient door: verified  Utilize bed/chair fall alarm: verifiedTRIAL (TELE 8, NEURO, [employer] MARINA 5) High Fall Risk Interventions  Place yellow fall risk ID band on patient: verified  Provide patient/family education based on risk assessment: completed  Educate patient/family to call staff for assistance when getting out of bed: completed  Place fall precaution signage outside patient door: verified  Place patient in room close to nursing station: currently not available/charge notified  Utilize bed/chair fall alarm: verified  Notify charge of high risk for huddle: completed    Patient Specific Interventions:     Medication: review medications with patient and family  Mental Status/LOC/Awareness: check on patient hourly, utilize bed/chair fall alarm and reinforce the use of call light  Toileting: provide frquent  toileting  Volume/Electrolyte Status: monitor abnormal lab values  Communication/Sensory: update plan of care on whiteboard  Behavioral: administer medication as ordered  Mobility: utilize bed/chair fall alarm and ensure bed is locked and in lowest position

## 2017-11-07 NOTE — CARE PLAN
Problem: Infection  Goal: Will remain free from infection    Intervention: Give CDC handouts for infection prevention (infection prevention/hand washing, disease specific, and device specific) and document in Education  Family/visitors educated on importance of hand hygiene before and after patient interaction.       Problem: Mobility  Goal: Risk for activity intolerance will decrease    Intervention: Encourage patient to increase activity level in collaboration with Interdisciplinary Team  Pt up to chair for meals. Pivots LLE with assistance of two.

## 2017-11-07 NOTE — PROGRESS NOTES
"Pharmacy Kinetics 72 y.o. male on vancomycin day # 8 2017    Currently on Vancomycin 1100 mg iv q24hr    Indication for Treatment: SSTI and Right patellar osteomyelitis     Pertinent history per medical record: Admitted on 2017 for UTI symptoms and cellulitis of the right knee. In the ED, he stated he generally felt weak and per his caregiver, he had been confused for a few days. Has already been treated for aspiration pneumonia, cellulitis, and UTI. Vancomycin started as wound growing MRSA. MRI revealed mild right patellar osteomyelitis.     Other antibiotics: None     Allergies: Review of patient's allergies indicates no known allergies.      List concerns for renal function: Age, BUN:SCr>20:1     Pertinent cultures to date:   10/30/17 - wound (R leg): MRSA heavy growth - vanc LOTTIE 2    Recent Labs      17   1645   VANCOTROUGH  12.4     Blood pressure 136/65, pulse 77, temperature 36.9 °C (98.4 °F), resp. rate 18, height 1.829 m (6' 0.01\"), weight 58.4 kg (128 lb 12 oz), SpO2 96 %. Temp (24hrs), Av.6 °C (97.9 °F), Min:36.2 °C (97.1 °F), Max:36.9 °C (98.4 °F)      A/P   1. Vancomycin dose change: not indicated at this time  2. Next vancomycin level: ~4 days (not yet ordered)  3. Goal trough: 12-16 mcg/mL  4. Comments: level drawn yesterday was therapeutic. Will plan for continued current dose. Vascular does not recommend surgery, but instead 6 weeks of antibiotics. Given elevated vancomycin LOTTIE, would highly recommend ID consult for antibiotic recommendations/facilitation of outpatient management. Will continue to follow.    Adrianna Waldrop, PharmD    "

## 2017-11-07 NOTE — THERAPY
"Speech Language Therapy dysphagia treatment completed.   Functional Status:  Pt continues to present with poor completion of ome, monitor needed with assisted set-up for modified diet, and with infrequent cues to clear sl change in vocal quality mid meal with D2/NTL and trial upgraded texture/liquids.  Query need to repeat fees vs trial upgraded texture to improve intake of solid foods; current meal 100% of ntl, 50% intake of D2 solids.    Recommendations: Continue current diet.  SLP to follow for D3 texture upgrade (with NTL) vs repeat diagnostic.   Plan of Care: Will benefit from Speech Therapy 3 times per week  Post-Acute Therapy: Discharge to a transitional care facility for continued skilled therapy services.    See \"Rehab Therapy-Acute\" Patient Summary Report for complete documentation.     "

## 2017-11-08 LAB
ERYTHROCYTE [DISTWIDTH] IN BLOOD BY AUTOMATED COUNT: 42.9 FL (ref 35.9–50)
HCT VFR BLD AUTO: 35 % (ref 42–52)
HGB BLD-MCNC: 10.8 G/DL (ref 14–18)
MCH RBC QN AUTO: 22.7 PG (ref 27–33)
MCHC RBC AUTO-ENTMCNC: 30.9 G/DL (ref 33.7–35.3)
MCV RBC AUTO: 73.7 FL (ref 81.4–97.8)
PLATELET # BLD AUTO: 678 K/UL (ref 164–446)
PMV BLD AUTO: 9.5 FL (ref 9–12.9)
RBC # BLD AUTO: 4.75 M/UL (ref 4.7–6.1)
WBC # BLD AUTO: 12.4 K/UL (ref 4.8–10.8)

## 2017-11-08 PROCEDURE — 700111 HCHG RX REV CODE 636 W/ 250 OVERRIDE (IP): Performed by: HOSPITALIST

## 2017-11-08 PROCEDURE — 99232 SBSQ HOSP IP/OBS MODERATE 35: CPT | Performed by: INTERNAL MEDICINE

## 2017-11-08 PROCEDURE — 700102 HCHG RX REV CODE 250 W/ 637 OVERRIDE(OP): Performed by: HOSPITALIST

## 2017-11-08 PROCEDURE — 770021 HCHG ROOM/CARE - ISO PRIVATE

## 2017-11-08 PROCEDURE — 85027 COMPLETE CBC AUTOMATED: CPT

## 2017-11-08 PROCEDURE — A9270 NON-COVERED ITEM OR SERVICE: HCPCS | Performed by: HOSPITALIST

## 2017-11-08 PROCEDURE — 97530 THERAPEUTIC ACTIVITIES: CPT

## 2017-11-08 PROCEDURE — 700102 HCHG RX REV CODE 250 W/ 637 OVERRIDE(OP): Performed by: INTERNAL MEDICINE

## 2017-11-08 PROCEDURE — A9270 NON-COVERED ITEM OR SERVICE: HCPCS | Performed by: INTERNAL MEDICINE

## 2017-11-08 PROCEDURE — 92526 ORAL FUNCTION THERAPY: CPT

## 2017-11-08 PROCEDURE — 36415 COLL VENOUS BLD VENIPUNCTURE: CPT

## 2017-11-08 RX ORDER — SULFAMETHOXAZOLE AND TRIMETHOPRIM 800; 160 MG/1; MG/1
2 TABLET ORAL EVERY 12 HOURS
Status: DISCONTINUED | OUTPATIENT
Start: 2017-11-08 | End: 2017-11-13

## 2017-11-08 RX ADMIN — GABAPENTIN 100 MG: 100 CAPSULE ORAL at 20:51

## 2017-11-08 RX ADMIN — SODIUM CHLORIDE TAB 1 GM 1 G: 1 TAB at 17:42

## 2017-11-08 RX ADMIN — METOPROLOL TARTRATE 75 MG: 25 TABLET, FILM COATED ORAL at 09:35

## 2017-11-08 RX ADMIN — STANDARDIZED SENNA CONCENTRATE AND DOCUSATE SODIUM 2 TABLET: 8.6; 5 TABLET, FILM COATED ORAL at 20:52

## 2017-11-08 RX ADMIN — MAGNESIUM GLUCONATE 500 MG ORAL TABLET 400 MG: 500 TABLET ORAL at 09:35

## 2017-11-08 RX ADMIN — ATORVASTATIN CALCIUM 40 MG: 40 TABLET, FILM COATED ORAL at 20:51

## 2017-11-08 RX ADMIN — HEPARIN SODIUM 5000 UNITS: 5000 INJECTION, SOLUTION INTRAVENOUS; SUBCUTANEOUS at 20:52

## 2017-11-08 RX ADMIN — HEPARIN SODIUM 5000 UNITS: 5000 INJECTION, SOLUTION INTRAVENOUS; SUBCUTANEOUS at 09:40

## 2017-11-08 RX ADMIN — LEVOTHYROXINE SODIUM 75 MCG: 75 TABLET ORAL at 06:20

## 2017-11-08 RX ADMIN — METOPROLOL TARTRATE 75 MG: 25 TABLET, FILM COATED ORAL at 21:00

## 2017-11-08 RX ADMIN — THERA TABS 1 TABLET: TAB at 09:35

## 2017-11-08 RX ADMIN — SODIUM CHLORIDE TAB 1 GM 1 G: 1 TAB at 06:20

## 2017-11-08 RX ADMIN — ASPIRIN 81 MG: 81 TABLET, COATED ORAL at 09:35

## 2017-11-08 RX ADMIN — SULFAMETHOXAZOLE AND TRIMETHOPRIM 2 TABLET: 800; 160 TABLET ORAL at 17:42

## 2017-11-08 RX ADMIN — FOLIC ACID 1 MG: 1 TABLET ORAL at 09:35

## 2017-11-08 RX ADMIN — GABAPENTIN 100 MG: 100 CAPSULE ORAL at 14:31

## 2017-11-08 RX ADMIN — GABAPENTIN 100 MG: 100 CAPSULE ORAL at 09:35

## 2017-11-08 ASSESSMENT — ENCOUNTER SYMPTOMS
CHILLS: 0
MEMORY LOSS: 1
COUGH: 0
HEADACHES: 0
WEAKNESS: 1
NAUSEA: 0
FEVER: 0
SHORTNESS OF BREATH: 0
MYALGIAS: 0
DEPRESSION: 0
TREMORS: 0
ABDOMINAL PAIN: 0

## 2017-11-08 ASSESSMENT — COGNITIVE AND FUNCTIONAL STATUS - GENERAL
WALKING IN HOSPITAL ROOM: TOTAL
SUGGESTED CMS G CODE MODIFIER MOBILITY: CM
MOVING TO AND FROM BED TO CHAIR: UNABLE
MOBILITY SCORE: 7
CLIMB 3 TO 5 STEPS WITH RAILING: TOTAL
TURNING FROM BACK TO SIDE WHILE IN FLAT BAD: A LOT
MOVING FROM LYING ON BACK TO SITTING ON SIDE OF FLAT BED: UNABLE
STANDING UP FROM CHAIR USING ARMS: TOTAL

## 2017-11-08 ASSESSMENT — GAIT ASSESSMENTS: GAIT LEVEL OF ASSIST: UNABLE TO PARTICIPATE

## 2017-11-08 ASSESSMENT — PAIN SCALES - GENERAL
PAINLEVEL_OUTOF10: 2
PAINLEVEL_OUTOF10: 0
PAINLEVEL_OUTOF10: 2

## 2017-11-08 NOTE — PROGRESS NOTES
Josie Osuna Fall Risk Assessment:     Last Known Fall: Within the last six months  Mobility: Use of assistive device/requires assist of two people  Medications: Cardiovascular or central nervous system meds  Mental Status/LOC/Awareness: Oriented to person and place  Toileting Needs: Incontinence  Volume/Electrolyte Status: No problems  Communication/Sensory: Neuropathy  Behavior: Depression/anxiety  Josie Osuna Fall Risk Total: 17  Fall Risk Level: HIGH RISK     Universal Fall Precautions:  call light/belongings in reach, bed in low position and locked, wheelchairs and assistive devices out of sight, siderails up x 2, use non-slip footwear, adequate lighting, clutter free and spill free environment, educate on level of risk, educate to call for assistance     Fall Risk Level Interventions:    TRIAL (Merkle 8, NEURO, MED MARINA 5) Moderate Fall Risk Interventions  Place yellow fall risk ID band on patient: verified  Provide patient/family education based on risk assessment : verified  Educate patient/family to call staff for assistance when getting out of bed: verified  Place fall precaution signage outside patient door: verified  Utilize bed/chair fall alarm: verifiedTRIAL (TELE 8, NEURO, [employer] MARINA 5) High Fall Risk Interventions  Place yellow fall risk ID band on patient: verified  Provide patient/family education based on risk assessment: completed  Educate patient/family to call staff for assistance when getting out of bed: completed  Place fall precaution signage outside patient door: verified  Place patient in room close to nursing station: currently not available/charge notified  Utilize bed/chair fall alarm: verified  Notify charge of high risk for huddle: completed     Patient Specific Interventions:      Medication: review medications with patient and family  Mental Status/LOC/Awareness: check on patient hourly, utilize bed/chair fall alarm and reinforce the use of call light  Toileting: provide frquent  toileting  Volume/Electrolyte Status: monitor abnormal lab values  Communication/Sensory: update plan of care on whiteboard  Behavioral: administer medication as ordered  Mobility: utilize bed/chair fall alarm and ensure bed is locked and in lowest position

## 2017-11-08 NOTE — CARE PLAN
Problem: Safety  Goal: Will remain free from injury  Outcome: PROGRESSING AS EXPECTED  Pt oriented to call light and calls appropriately, bed locked in low position, bed alarm on and intact.    Problem: Bowel/Gastric:  Goal: Normal bowel function is maintained or improved  Outcome: PROGRESSING AS EXPECTED  Pt states having a BM earlier today, pt takes stool softeners as prescribed.

## 2017-11-08 NOTE — CARE PLAN
Problem: Knowledge Deficit  Goal: Knowledge of the prescribed therapeutic regimen will improve  Pt educated and updated on poc, meds, treatments, repositioning. Pt encouraged to ask questions/concerns about care to staff.

## 2017-11-08 NOTE — THERAPY
"Speech Language Therapy dysphagia treatment completed.   Functional Status:  Pt tolerating a D2/NTL diet with intermittent cues to clear gurgly vocal quality.  Trial of D3 w/ subtle vocal quality change; cleared with cue.  Thins better tolerated without overt s/s of aspiration.  Repeat FEES indicated prior to diet upgrade.  Suboptimal intake for eating to meet nutritional needs and for healing of wounds; rec supplements and caloric-dense snacks btwn meals; pt agrees to try.  Recommendations: 1) request FEES order to repeat diagnostic, 2) consider chest xray if not completed recently, 3) add supplements and btwn meal milkshakes/smoothies and/or magic cups to boost intake after discussing taste preferences with pt.   Plan of Care: Will benefit from Speech Therapy 3 times per week  Post-Acute Therapy: Discharge to a transitional care facility for continued skilled therapy services.    See \"Rehab Therapy-Acute\" Patient Summary Report for complete documentation.     "

## 2017-11-08 NOTE — PROGRESS NOTES
[facility] Hospitalist Progress Note    Date of Service: 2017    Chief Complaint  72 y.o. male admitted 2017 with urinary tract infection and confusion.    Interval Problem Update  Awaiting guardianship and placement. Currently still requiring assistance.      Consultants/Specialty  Geriatrics  Palliative Care  Ethics committee/ Dr. Eamon Blackmon    Disposition  Pending guardianship and placement   assisting  No Court date yet    Encourage out of bed, nursing staff to assist          Review of Systems   Constitutional: Negative for chills and fever.   HENT: Negative for hearing loss.    Respiratory: Negative for cough and shortness of breath.    Cardiovascular: Negative for leg swelling.   Gastrointestinal: Negative for abdominal pain and nausea.   Musculoskeletal: Negative for myalgias.   Neurological: Positive for weakness. Negative for tremors and headaches.   Psychiatric/Behavioral: Positive for memory loss. Negative for depression.      Physical Exam  Laboratory/Imaging   Hemodynamics  Temp (24hrs), Av.8 °C (98.3 °F), Min:36.7 °C (98.1 °F), Max:36.9 °C (98.4 °F)   Temperature: 36.9 °C (98.4 °F)  Pulse  Av.4  Min: 58  Max: 144    Blood Pressure : 136/65      Respiratory      Respiration: 18, Pulse Oximetry: 96 %        RUL Breath Sounds: Clear, RML Breath Sounds: Diminished, RLL Breath Sounds: Diminished, RIGO Breath Sounds: Clear, LLL Breath Sounds: Diminished    Fluids  No intake or output data in the 24 hours ending 17 1748    Nutrition  Orders Placed This Encounter   Procedures   • DIET ORDER     Standing Status:   Standing     Number of Occurrences:   1     Order Specific Question:   Diet:     Answer:   Regular [1]     Comments:   please send mashed potatoes with lunch and dinner.     Order Specific Question:   Texture/Fiber modifications:     Answer:   Dysphagia 2(Pureed/Chopped)specify fluid consistency(question 6) [2]     Order Specific Question:   Consistency/Fluid  modifications:     Answer:   Nectar Thick [2]     Order Specific Question:   Miscellaneous modifications:     Answer:   SLP - Deliver to Nursing Station [22]     Physical Exam   Constitutional: No distress.   Thin, frail   HENT:   Head: Normocephalic and atraumatic.   Mouth/Throat: No oropharyngeal exudate.   Eyes: EOM are normal. Pupils are equal, round, and reactive to light.   Neck: Normal range of motion. No JVD present.   Cardiovascular: Normal rate and regular rhythm.    Pulmonary/Chest: Effort normal.   Abdominal: Soft. There is no tenderness.   Musculoskeletal:   Right BKA with no drainage, left forefoot amputation. Right knee with circular wound, minimal drainage, wound is smaller but still to the tendon.   Neurological: He is alert. No cranial nerve deficit. Coordination normal.   AOx2   Skin: Skin is warm. He is not diaphoretic. There is erythema.   Psychiatric: He has a normal mood and affect. His behavior is normal. His speech is delayed.   Nursing note and vitals reviewed.                               Assessment/Plan     * Cellulitis of right knee- (present on admission)   Assessment & Plan    P{resented with malaise and confusion  Has R knee cellulitis and UTI on admission  With minimally draining wound  Status post wound culture on October 30 with MRSA, per lab, now heavy growth with ongoing leukocytosis  - resume on vanco per pharmacy     -We'll consider ID consult as needed  Discussed with pharmacy    - MRI R LE- r/o osteomyelitis- positive mild osteomyelitis and cellulitis at the patella  Discussed with Dr. Rodriguez, will be evaluated although is a poor candidate for AKA, continue antibiotics with vancomycin x 6 weeks        Hyponatremia- (present on admission)   Assessment & Plan    Hypovolemic hyponatremia. Fluctuates with PO fluid intake. Not symptomatic and continues to be more interactive today. Continuing with daily fluctuations,  - fluctuating, asx  - continue to follow  "clinically  encourage PO intake  - salt tabs, 1gm BID        Leukocytosis- (present on admission)   Assessment & Plan    - 2nd to cellulitis R LE, r/o osteo  - s/p 14 days of abx  - blood and urine cultures negative  - follow clinically  - XR right knee and wound culture 10/30        PAD (peripheral artery disease) (CMS-[facility])- (present on admission)   Assessment & Plan    Significant PAD s/p left forefoot amputation and right BKA  - continue aspirin and statin  - not a candidate for prosthesis  - followed by vascular surgery and wound care        Dementia- (present on admission)   Assessment & Plan    Admitted for encephalopathy and debility. Has been evaluated by ethics committee, unable to make his own medical decisions.  - guardianship pending, no court date yet  - Patient's friend,\"wife\"Norma expressing concerns with guardianship hearing, would like to discuss recommendations for needs for guardianship, explained to patient and patient's friend regarding ethics committee meeting and per Dr. Eamon Blackmon, patient lacks capacity for medical decision making and is requiring guardianship at this time.    - Patient's friend has been in contact with our  regarding her concerns, and multiple conversations regarding guardianship status   is aware, and well follow-up with patient and friend as needed  - 11/1 Discussed with RN, to follow up with Dr. Blackmon for any further recommendations regarding guardianship hearing  11/3- scars  regarding patient's wife/friend concerns,  to follow up, as per prior , patient didn't friend is not an appropriate guardian and does not have power of , [facility] staff aware        Hypomagnesemia- (present on admission)   Assessment & Plan    Mg 1.8 10/28, h/o alcohol dependence.  - oral mag daily and monitor  - IV mag PRN repletion        Essential hypertension- (present on admission)   Assessment & Plan    Currently " normotensive.  - continue with metoprolol 75 mg BID  - PRN normodyne        Severe protein-calorie malnutrition (CMS-HCC)- (present on admission)   Assessment & Plan    Poor PO intake.  - Nutrition following  - Cont boost TID            Reviewed items::  Medications reviewed, Labs reviewed and Radiology images reviewed  Barrera catheter::  No Barrera  DVT prophylaxis pharmacological::  Heparin

## 2017-11-08 NOTE — PROGRESS NOTES
Received report on pt, pt AAO, IV abx, room air, RLE BKA, dressing in place, condom catheter intact, bed locked and in low position, bed alarm on and intact.

## 2017-11-08 NOTE — THERAPY
"Physical Therapy Treatment completed.   Bed Mobility:  Supine to Sit: Minimal Assist  Transfers: Sit to Stand: Maximal Assist (x2)  Gait: Level Of Assist: Unable to Participate      Plan of Care: Will benefit from Physical Therapy 3 times per week and Plan to complete next treatment by Friday 11/10  Discharge Recommendations: Equipment: Will Continue to Assess for Equipment Needs. Post-acute therapy Discharge to a transitional care facility for continued skilled therapy services.     Pt starting to demonstrate improvements with functional mobility, strength and balance. Pt is now trying to assist with scooting along EOB And slide board transfers. LE strength and endurance improving with therapeutic exercises. Pt would benefit from ongoing PT intervention while in the acute care setting to address the listed deficits and improve mobility prior to DC. Pt will need ongoing PT intervention in the SNF setting given functional limitations    See \"Rehab Therapy-Acute\" Patient Summary Report for complete documentation.       "

## 2017-11-09 LAB
ANION GAP SERPL CALC-SCNC: 7 MMOL/L (ref 0–11.9)
BUN SERPL-MCNC: 14 MG/DL (ref 8–22)
CALCIUM SERPL-MCNC: 9 MG/DL (ref 8.5–10.5)
CHLORIDE SERPL-SCNC: 95 MMOL/L (ref 96–112)
CO2 SERPL-SCNC: 25 MMOL/L (ref 20–33)
CREAT SERPL-MCNC: 0.63 MG/DL (ref 0.5–1.4)
CRP SERPL HS-MCNC: 0.3 MG/DL (ref 0–0.75)
ERYTHROCYTE [DISTWIDTH] IN BLOOD BY AUTOMATED COUNT: 44.2 FL (ref 35.9–50)
GFR SERPL CREATININE-BSD FRML MDRD: >60 ML/MIN/1.73 M 2
GLUCOSE SERPL-MCNC: 95 MG/DL (ref 65–99)
HCT VFR BLD AUTO: 34.7 % (ref 42–52)
HGB BLD-MCNC: 10.7 G/DL (ref 14–18)
MCH RBC QN AUTO: 23.2 PG (ref 27–33)
MCHC RBC AUTO-ENTMCNC: 30.8 G/DL (ref 33.7–35.3)
MCV RBC AUTO: 75.1 FL (ref 81.4–97.8)
PLATELET # BLD AUTO: 651 K/UL (ref 164–446)
PMV BLD AUTO: 9 FL (ref 9–12.9)
POTASSIUM SERPL-SCNC: 4.1 MMOL/L (ref 3.6–5.5)
RBC # BLD AUTO: 4.62 M/UL (ref 4.7–6.1)
SODIUM SERPL-SCNC: 127 MMOL/L (ref 135–145)
WBC # BLD AUTO: 12.2 K/UL (ref 4.8–10.8)

## 2017-11-09 PROCEDURE — A9270 NON-COVERED ITEM OR SERVICE: HCPCS | Performed by: HOSPITALIST

## 2017-11-09 PROCEDURE — 700102 HCHG RX REV CODE 250 W/ 637 OVERRIDE(OP): Performed by: HOSPITALIST

## 2017-11-09 PROCEDURE — 770021 HCHG ROOM/CARE - ISO PRIVATE

## 2017-11-09 PROCEDURE — 80048 BASIC METABOLIC PNL TOTAL CA: CPT

## 2017-11-09 PROCEDURE — 36415 COLL VENOUS BLD VENIPUNCTURE: CPT

## 2017-11-09 PROCEDURE — 86140 C-REACTIVE PROTEIN: CPT

## 2017-11-09 PROCEDURE — 700111 HCHG RX REV CODE 636 W/ 250 OVERRIDE (IP): Performed by: HOSPITALIST

## 2017-11-09 PROCEDURE — A9270 NON-COVERED ITEM OR SERVICE: HCPCS | Performed by: INTERNAL MEDICINE

## 2017-11-09 PROCEDURE — 700102 HCHG RX REV CODE 250 W/ 637 OVERRIDE(OP): Performed by: INTERNAL MEDICINE

## 2017-11-09 PROCEDURE — 85027 COMPLETE CBC AUTOMATED: CPT

## 2017-11-09 PROCEDURE — 99231 SBSQ HOSP IP/OBS SF/LOW 25: CPT | Performed by: INTERNAL MEDICINE

## 2017-11-09 RX ADMIN — FOLIC ACID 1 MG: 1 TABLET ORAL at 10:18

## 2017-11-09 RX ADMIN — LEVOTHYROXINE SODIUM 75 MCG: 75 TABLET ORAL at 05:29

## 2017-11-09 RX ADMIN — MAGNESIUM GLUCONATE 500 MG ORAL TABLET 400 MG: 500 TABLET ORAL at 10:18

## 2017-11-09 RX ADMIN — GABAPENTIN 100 MG: 100 CAPSULE ORAL at 10:17

## 2017-11-09 RX ADMIN — STANDARDIZED SENNA CONCENTRATE AND DOCUSATE SODIUM 2 TABLET: 8.6; 5 TABLET, FILM COATED ORAL at 21:32

## 2017-11-09 RX ADMIN — HEPARIN SODIUM 5000 UNITS: 5000 INJECTION, SOLUTION INTRAVENOUS; SUBCUTANEOUS at 21:31

## 2017-11-09 RX ADMIN — ATORVASTATIN CALCIUM 40 MG: 40 TABLET, FILM COATED ORAL at 21:32

## 2017-11-09 RX ADMIN — GABAPENTIN 100 MG: 100 CAPSULE ORAL at 21:32

## 2017-11-09 RX ADMIN — GABAPENTIN 100 MG: 100 CAPSULE ORAL at 15:00

## 2017-11-09 RX ADMIN — SULFAMETHOXAZOLE AND TRIMETHOPRIM 2 TABLET: 800; 160 TABLET ORAL at 10:18

## 2017-11-09 RX ADMIN — SULFAMETHOXAZOLE AND TRIMETHOPRIM 2 TABLET: 800; 160 TABLET ORAL at 21:32

## 2017-11-09 RX ADMIN — SODIUM CHLORIDE TAB 1 GM 1 G: 1 TAB at 10:17

## 2017-11-09 RX ADMIN — THERA TABS 1 TABLET: TAB at 10:18

## 2017-11-09 RX ADMIN — HEPARIN SODIUM 5000 UNITS: 5000 INJECTION, SOLUTION INTRAVENOUS; SUBCUTANEOUS at 10:18

## 2017-11-09 RX ADMIN — METOPROLOL TARTRATE 75 MG: 25 TABLET, FILM COATED ORAL at 10:18

## 2017-11-09 RX ADMIN — ASPIRIN 81 MG: 81 TABLET, COATED ORAL at 10:18

## 2017-11-09 RX ADMIN — METOPROLOL TARTRATE 75 MG: 25 TABLET, FILM COATED ORAL at 21:31

## 2017-11-09 RX ADMIN — SODIUM CHLORIDE TAB 1 GM 1 G: 1 TAB at 17:20

## 2017-11-09 ASSESSMENT — ENCOUNTER SYMPTOMS
CHILLS: 0
TREMORS: 0
HEADACHES: 0
FEVER: 0
MEMORY LOSS: 1
DEPRESSION: 0
NAUSEA: 0
WEAKNESS: 1
SHORTNESS OF BREATH: 0
COUGH: 0
ABDOMINAL PAIN: 0
MYALGIAS: 0

## 2017-11-09 NOTE — PROGRESS NOTES
[facility] Hospitalist Progress Note    Date of Service: 2017    Chief Complaint  72 y.o. male admitted 2017 with urinary tract infection and confusion.    Interval Problem Update  Awaiting guardianship and placement. Currently still requiring assistance. Poor insight, he thinks he can go home but his cargivers may not handle his care and IVs. Also spoke with ID>      Consultants/Specialty  Geriatrics  Palliative Care  Ethics committee/ Dr. Eamon Blackmon    Disposition  Pending guardianship and placement   assisting  No Court date yet    Encourage out of bed, nursing staff to assist          Review of Systems   Constitutional: Negative for chills and fever.   HENT: Negative for hearing loss.    Respiratory: Negative for cough and shortness of breath.    Cardiovascular: Negative for leg swelling.   Gastrointestinal: Negative for abdominal pain and nausea.   Musculoskeletal: Negative for myalgias.   Neurological: Positive for weakness. Negative for tremors and headaches.   Psychiatric/Behavioral: Positive for memory loss. Negative for depression.      Physical Exam  Laboratory/Imaging   Hemodynamics  Temp (24hrs), Av.6 °C (97.9 °F), Min:36.6 °C (97.8 °F), Max:36.8 °C (98.3 °F)   Temperature: 36.6 °C (97.8 °F)  Pulse  Av.3  Min: 58  Max: 144    Blood Pressure : 127/67      Respiratory      Respiration: 16, Pulse Oximetry: 99 %        RUL Breath Sounds: Clear, RML Breath Sounds: Diminished, RLL Breath Sounds: Diminished, RIGO Breath Sounds: Clear, LLL Breath Sounds: Diminished    Fluids    Intake/Output Summary (Last 24 hours) at 17 1715  Last data filed at 17 1300   Gross per 24 hour   Intake              720 ml   Output             1600 ml   Net             -880 ml       Nutrition  Orders Placed This Encounter   Procedures   • DIET ORDER     Standing Status:   Standing     Number of Occurrences:   1     Order Specific Question:   Diet:     Answer:   Regular [1]     Comments:    please send mashed potatoes with lunch and dinner.     Order Specific Question:   Texture/Fiber modifications:     Answer:   Dysphagia 2(Pureed/Chopped)specify fluid consistency(question 6) [2]     Order Specific Question:   Consistency/Fluid modifications:     Answer:   Nectar Thick [2]     Order Specific Question:   Miscellaneous modifications:     Answer:   SLP - Deliver to Nursing Station [22]     Physical Exam   Constitutional: No distress.   Thin, frail   HENT:   Head: Normocephalic and atraumatic.   Mouth/Throat: No oropharyngeal exudate.   Eyes: EOM are normal. Pupils are equal, round, and reactive to light.   Neck: Normal range of motion. No JVD present.   Cardiovascular: Normal rate and regular rhythm.    Pulmonary/Chest: Effort normal.   Abdominal: Soft. There is no tenderness.   Musculoskeletal:   Right BKA with no drainage, left forefoot amputation. Right knee with circular wound, minimal drainage, wound is smaller but still to the tendon.   Neurological: He is alert. No cranial nerve deficit. Coordination normal.   AOx2   Skin: Skin is warm. He is not diaphoretic. There is erythema.   Psychiatric: He has a normal mood and affect. His behavior is normal. His speech is delayed.   Nursing note and vitals reviewed.      Recent Labs      11/08/17   0150   WBC  12.4*   RBC  4.75   HEMOGLOBIN  10.8*   HEMATOCRIT  35.0*   MCV  73.7*   MCH  22.7*   MCHC  30.9*   RDW  42.9   PLATELETCT  678*   MPV  9.5                          Assessment/Plan     * Cellulitis of right knee- (present on admission)   Assessment & Plan    P{resented with malaise and confusion  Has R knee cellulitis and UTI on admission  With minimally draining wound  Status post wound culture on October 30 with MRSA, per lab, now heavy growth with ongoing leukocytosis  - resume on vanco per pharmacy     -We'll consider ID consult as needed  Discussed with pharmacy    - MRI R LE- r/o osteomyelitis- positive mild osteomyelitis and cellulitis at the  "patella  Discussed with Dr. Rodriguez, will be evaluated although is a poor candidate for AKA, continue antibiotics with vancomycin x 6 weeks        Hyponatremia- (present on admission)   Assessment & Plan    Hypovolemic hyponatremia. Fluctuates with PO fluid intake. Not symptomatic and continues to be more interactive today. Continuing with daily fluctuations,  - fluctuating, asx  - continue to follow clinically  encourage PO intake  - salt tabs, 1gm BID        Leukocytosis- (present on admission)   Assessment & Plan    - 2nd to cellulitis R LE, r/o osteo  - s/p 14 days of abx  - blood and urine cultures negative  - follow clinically  - XR right knee and wound culture 10/30        PAD (peripheral artery disease) (CMS-[facility])- (present on admission)   Assessment & Plan    Significant PAD s/p left forefoot amputation and right BKA  - continue aspirin and statin  - not a candidate for prosthesis  - followed by vascular surgery and wound care        Dementia- (present on admission)   Assessment & Plan    Admitted for encephalopathy and debility. Has been evaluated by ethics committee, unable to make his own medical decisions.  - guardianship pending, no court date yet  - Patient's friend,\"wife\"Norma expressing concerns with guardianship hearing, would like to discuss recommendations for needs for guardianship, explained to patient and patient's friend regarding ethics committee meeting and per Dr. Eamon Blackmon, patient lacks capacity for medical decision making and is requiring guardianship at this time.    - Patient's friend has been in contact with our  regarding her concerns, and multiple conversations regarding guardianship status   is aware, and well follow-up with patient and friend as needed  - 11/1 Discussed with RN, to follow up with Dr. Blackmon for any further recommendations regarding guardianship hearing  11/3- scars  regarding patient's wife/friend concerns, social " worker to follow up, as per prior , patient didn't friend is not an appropriate guardian and does not have power of , [facility] staff aware        Hypomagnesemia- (present on admission)   Assessment & Plan    Mg 1.8 10/28, h/o alcohol dependence.  - oral mag daily and monitor  - IV mag PRN repletion        Essential hypertension- (present on admission)   Assessment & Plan    Currently normotensive.  - continue with metoprolol 75 mg BID  - PRN normodyne        Severe protein-calorie malnutrition (CMS-HCC)- (present on admission)   Assessment & Plan    Poor PO intake.  - Nutrition following  - Cont boost TID        I spent 38 minutes, reviewing the chart, notes, vitals, labs, imaging, ordering labs, evaluating Isiah Lagos for assessment, enacting the plan above. 50% of the time was spent in counseling Isiah Lagos and answering questions.  Also spoke with Dr. Juarez, ID and Pharmacy      Reviewed items::  Medications reviewed, Labs reviewed and Radiology images reviewed  Barrera catheter::  No Barrera  DVT prophylaxis pharmacological::  Heparin

## 2017-11-10 LAB
ERYTHROCYTE [DISTWIDTH] IN BLOOD BY AUTOMATED COUNT: 42.2 FL (ref 35.9–50)
ERYTHROCYTE [SEDIMENTATION RATE] IN BLOOD BY WESTERGREN METHOD: 22 MM/HOUR (ref 0–20)
HCT VFR BLD AUTO: 35.1 % (ref 42–52)
HGB BLD-MCNC: 10.8 G/DL (ref 14–18)
MCH RBC QN AUTO: 22.3 PG (ref 27–33)
MCHC RBC AUTO-ENTMCNC: 30.8 G/DL (ref 33.7–35.3)
MCV RBC AUTO: 72.5 FL (ref 81.4–97.8)
PLATELET # BLD AUTO: 698 K/UL (ref 164–446)
PMV BLD AUTO: 8.7 FL (ref 9–12.9)
RBC # BLD AUTO: 4.84 M/UL (ref 4.7–6.1)
WBC # BLD AUTO: 14.4 K/UL (ref 4.8–10.8)

## 2017-11-10 PROCEDURE — 700111 HCHG RX REV CODE 636 W/ 250 OVERRIDE (IP): Performed by: HOSPITALIST

## 2017-11-10 PROCEDURE — A9270 NON-COVERED ITEM OR SERVICE: HCPCS | Performed by: HOSPITALIST

## 2017-11-10 PROCEDURE — 770021 HCHG ROOM/CARE - ISO PRIVATE

## 2017-11-10 PROCEDURE — 700102 HCHG RX REV CODE 250 W/ 637 OVERRIDE(OP): Performed by: HOSPITALIST

## 2017-11-10 PROCEDURE — 99231 SBSQ HOSP IP/OBS SF/LOW 25: CPT | Performed by: INTERNAL MEDICINE

## 2017-11-10 PROCEDURE — 85652 RBC SED RATE AUTOMATED: CPT

## 2017-11-10 PROCEDURE — 36415 COLL VENOUS BLD VENIPUNCTURE: CPT

## 2017-11-10 PROCEDURE — A9270 NON-COVERED ITEM OR SERVICE: HCPCS | Performed by: INTERNAL MEDICINE

## 2017-11-10 PROCEDURE — 700102 HCHG RX REV CODE 250 W/ 637 OVERRIDE(OP): Performed by: INTERNAL MEDICINE

## 2017-11-10 PROCEDURE — 85027 COMPLETE CBC AUTOMATED: CPT

## 2017-11-10 RX ADMIN — GABAPENTIN 100 MG: 100 CAPSULE ORAL at 09:05

## 2017-11-10 RX ADMIN — SULFAMETHOXAZOLE AND TRIMETHOPRIM 2 TABLET: 800; 160 TABLET ORAL at 09:05

## 2017-11-10 RX ADMIN — THERA TABS 1 TABLET: TAB at 09:05

## 2017-11-10 RX ADMIN — ATORVASTATIN CALCIUM 40 MG: 40 TABLET, FILM COATED ORAL at 20:02

## 2017-11-10 RX ADMIN — STANDARDIZED SENNA CONCENTRATE AND DOCUSATE SODIUM 2 TABLET: 8.6; 5 TABLET, FILM COATED ORAL at 20:01

## 2017-11-10 RX ADMIN — HEPARIN SODIUM 5000 UNITS: 5000 INJECTION, SOLUTION INTRAVENOUS; SUBCUTANEOUS at 09:04

## 2017-11-10 RX ADMIN — LEVOTHYROXINE SODIUM 75 MCG: 75 TABLET ORAL at 06:20

## 2017-11-10 RX ADMIN — SULFAMETHOXAZOLE AND TRIMETHOPRIM 2 TABLET: 800; 160 TABLET ORAL at 20:02

## 2017-11-10 RX ADMIN — SODIUM CHLORIDE TAB 1 GM 1 G: 1 TAB at 09:05

## 2017-11-10 RX ADMIN — MAGNESIUM GLUCONATE 500 MG ORAL TABLET 400 MG: 500 TABLET ORAL at 09:06

## 2017-11-10 RX ADMIN — GABAPENTIN 100 MG: 100 CAPSULE ORAL at 16:54

## 2017-11-10 RX ADMIN — METOPROLOL TARTRATE 75 MG: 25 TABLET, FILM COATED ORAL at 09:05

## 2017-11-10 RX ADMIN — ASPIRIN 81 MG: 81 TABLET, COATED ORAL at 09:05

## 2017-11-10 RX ADMIN — SODIUM CHLORIDE TAB 1 GM 1 G: 1 TAB at 16:54

## 2017-11-10 RX ADMIN — STANDARDIZED SENNA CONCENTRATE AND DOCUSATE SODIUM 2 TABLET: 8.6; 5 TABLET, FILM COATED ORAL at 09:06

## 2017-11-10 RX ADMIN — FOLIC ACID 1 MG: 1 TABLET ORAL at 09:05

## 2017-11-10 RX ADMIN — GABAPENTIN 100 MG: 100 CAPSULE ORAL at 20:01

## 2017-11-10 RX ADMIN — METOPROLOL TARTRATE 75 MG: 25 TABLET, FILM COATED ORAL at 20:01

## 2017-11-10 RX ADMIN — HEPARIN SODIUM 5000 UNITS: 5000 INJECTION, SOLUTION INTRAVENOUS; SUBCUTANEOUS at 20:02

## 2017-11-10 ASSESSMENT — ENCOUNTER SYMPTOMS
WEAKNESS: 1
ABDOMINAL PAIN: 0
FEVER: 0
HEADACHES: 0
NAUSEA: 0
MYALGIAS: 0
SHORTNESS OF BREATH: 0
MEMORY LOSS: 1
DEPRESSION: 0
TREMORS: 0
COUGH: 0
CHILLS: 0

## 2017-11-10 ASSESSMENT — PAIN SCALES - GENERAL
PAINLEVEL_OUTOF10: 0
PAINLEVEL_OUTOF10: 0

## 2017-11-10 NOTE — PROGRESS NOTES
Patient alert and oriented x 3. Continues on contact precaution for MRSA in right BKA wound. Denied pain. Bed in lowest position and call light within reach.

## 2017-11-10 NOTE — CARE PLAN
Problem: Discharge Barriers/Planning  Goal: Patient's continuum of care needs will be met    Intervention: Assess potential discharge barriers on admission and throughout hospital stay  Awaiting dc/guardianship plans

## 2017-11-10 NOTE — CARE PLAN
Problem: Safety  Goal: Will remain free from falls    Intervention: Assess risk factors for falls  Call light within reach, bed alarm on

## 2017-11-10 NOTE — PROGRESS NOTES
[facility]    Date of Service: 2017    Chief Complaint  72 y.o. male admitted 2017 with urinary tract infection and confusion.    Interval Problem Update  Awaiting guardianship and placement. No new issues or concerns. Good mood.      Consultants/Specialty  Geriatrics  Palliative Care  Ethics committee/ Dr. Eamon Blackmon    Disposition  Pending guardianship and placement   assisting  No Court date yet    Encourage out of bed, nursing staff to assist          Review of Systems   Constitutional: Negative for chills and fever.   HENT: Negative for hearing loss.    Respiratory: Negative for cough and shortness of breath.    Cardiovascular: Negative for leg swelling.   Gastrointestinal: Negative for abdominal pain and nausea.   Musculoskeletal: Negative for myalgias.   Neurological: Positive for weakness. Negative for tremors and headaches.   Psychiatric/Behavioral: Positive for memory loss. Negative for depression.      Physical Exam  Laboratory/Imaging   Hemodynamics  Temp (24hrs), Av.6 °C (97.8 °F), Min:36.2 °C (97.1 °F), Max:37.1 °C (98.8 °F)   Temperature: 36.6 °C (97.8 °F)  Pulse  Av.3  Min: 58  Max: 144    Blood Pressure : 121/74      Respiratory      Respiration: 16, Pulse Oximetry: 99 %        RUL Breath Sounds: Clear, RML Breath Sounds: Diminished, RLL Breath Sounds: Diminished, RIGO Breath Sounds: Clear, LLL Breath Sounds: Diminished    Fluids    Intake/Output Summary (Last 24 hours) at 17 1830  Last data filed at 17 1700   Gross per 24 hour   Intake              480 ml   Output              500 ml   Net              -20 ml       Nutrition  Orders Placed This Encounter   Procedures   • DIET ORDER     Standing Status:   Standing     Number of Occurrences:   1     Order Specific Question:   Diet:     Answer:   Regular [1]     Comments:   please send mashed potatoes with lunch and dinner.     Order Specific Question:   Texture/Fiber modifications:      Answer:   Dysphagia 2(Pureed/Chopped)specify fluid consistency(question 6) [2]     Order Specific Question:   Consistency/Fluid modifications:     Answer:   Nectar Thick [2]     Order Specific Question:   Miscellaneous modifications:     Answer:   SLP - Deliver to Nursing Station [22]     Physical Exam   Constitutional: No distress.   Thin, frail   HENT:   Head: Normocephalic and atraumatic.   Mouth/Throat: No oropharyngeal exudate.   Eyes: EOM are normal. Pupils are equal, round, and reactive to light.   Neck: Normal range of motion. No JVD present.   Cardiovascular: Normal rate and regular rhythm.    Pulmonary/Chest: Effort normal.   Abdominal: Soft. There is no tenderness.   Musculoskeletal: He exhibits edema (awelling improved).   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 Right knee with circular wound, minimal drainage, wound is smaller but still to the tendon.   Neurological: He is alert. No cranial nerve deficit. Coordination normal.   AOx2   Skin: Skin is warm. He is not diaphoretic. There is erythema.   Psychiatric: He has a normal mood and affect. His behavior is normal. His speech is delayed.   Nursing note and vitals reviewed.      Recent Labs      11/08/17   0150  11/09/17   0303   WBC  12.4*  12.2*   RBC  4.75  4.62*   HEMOGLOBIN  10.8*  10.7*   HEMATOCRIT  35.0*  34.7*   MCV  73.7*  75.1*   MCH  22.7*  23.2*   MCHC  30.9*  30.8*   RDW  42.9  44.2   PLATELETCT  678*  651*   MPV  9.5  9.0     Recent Labs      11/09/17   0953   SODIUM  127*   POTASSIUM  4.1   CHLORIDE  95*   CO2  25   GLUCOSE  95   BUN  14   CREATININE  0.63   CALCIUM  9.0                      Assessment/Plan     * Cellulitis of right knee- (present on admission)   Assessment & Plan    P{resented with malaise and confusion  Has R knee cellulitis and UTI on admission  With minimally draining wound  Status post wound culture on October 30 with MRSA, per lab, now heavy growth with ongoing leukocytosis  - resume on vanco per  "pharmacy   -We'll consider ID consult as needed  Discussed with pharmacy  On 11/8 Pharmacy had strongly recommended me speaking with ID> I spoke with ID, Dr. Juarez. She was OK doing PO Septra or PO Abx for CAMRSA. Will switch to PO antibiotics.  - MRI R LE- r/o osteomyelitis- positive mild osteomyelitis and cellulitis at the patella  Discussed with Dr. Rodriguez, will be evaluated although is a poor candidate for AKA, continue antibiotics with vancomycin x 6 weeks but after curbsiding with Dr. 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 Fluctuates with PO fluid intake. Not symptomatic and continues to be more interactive today. Continuing with daily fluctuations,  - fluctuating, asx  - continue to follow clinically  encourage PO intake  - salt tabs, 1gm BID        Leukocytosis- (present on admission)   Assessment & Plan    - 2nd to cellulitis R LE, r/o osteo  - s/p 14 days of abx  - blood and urine cultures negative  - follow clinically  - XR right knee and wound culture 10/30        PAD (peripheral artery disease) (CMS-[facility])- (present on admission)   Assessment & Plan    Significant PAD s/p left forefoot amputation and right BKA  - continue aspirin and statin  - not a candidate for prosthesis  - followed by vascular surgery and wound care        Dementia- (present on admission)   Assessment & Plan    Admitted for encephalopathy and debility. Has been evaluated by ethics committee, unable to make his own medical decisions.  - guardianship pending, no court date yet  - Patient's friend,\"wife\"Norma expressing concerns with guardianship hearing, would like to discuss recommendations for needs for guardianship, explained to patient and patient's friend regarding ethics committee meeting and per Dr. Eamon Blackmon, patient lacks capacity for medical decision making and is requiring guardianship at this " time.    - Patient's friend has been in contact with our  regarding her concerns, and multiple conversations regarding guardianship status   is aware, and well follow-up with patient and friend as needed  - 11/1 Discussed with RN, to follow up with Dr. Blackmon for any further recommendations regarding guardianship hearing  11/3- scars  regarding patient's wife/friend concerns,  to follow up, as per prior , patient didn't friend is not an appropriate guardian and does not have power of , [facility] staff aware        Hypomagnesemia- (present on admission)   Assessment & Plan    Mg 1.8 10/28, h/o alcohol dependence.  - oral mag daily and monitor  - IV mag PRN repletion        Essential hypertension- (present on admission)   Assessment & Plan    Currently normotensive.  - continue with metoprolol 75 mg BID  - PRN normodyne        Severe protein-calorie malnutrition (CMS-HCC)- (present on admission)   Assessment & Plan    Poor PO intake.  - Nutrition following  - Cont boost TID            Reviewed items::  Medications reviewed, Labs reviewed and Radiology images reviewed  Barrera catheter::  No Barrera  DVT prophylaxis pharmacological::  Heparin

## 2017-11-10 NOTE — PROGRESS NOTES
Aaox4, denies any discomfort, poor appetite, q2 turn observed, will get oob to chair, awaiting guardiandhip.

## 2017-11-11 LAB
ERYTHROCYTE [DISTWIDTH] IN BLOOD BY AUTOMATED COUNT: 41.8 FL (ref 35.9–50)
HCT VFR BLD AUTO: 33.2 % (ref 42–52)
HGB BLD-MCNC: 10.4 G/DL (ref 14–18)
MCH RBC QN AUTO: 22.8 PG (ref 27–33)
MCHC RBC AUTO-ENTMCNC: 31.3 G/DL (ref 33.7–35.3)
MCV RBC AUTO: 72.8 FL (ref 81.4–97.8)
PLATELET # BLD AUTO: 666 K/UL (ref 164–446)
PMV BLD AUTO: 8.9 FL (ref 9–12.9)
RBC # BLD AUTO: 4.56 M/UL (ref 4.7–6.1)
WBC # BLD AUTO: 13.8 K/UL (ref 4.8–10.8)

## 2017-11-11 PROCEDURE — 700102 HCHG RX REV CODE 250 W/ 637 OVERRIDE(OP): Performed by: HOSPITALIST

## 2017-11-11 PROCEDURE — 36415 COLL VENOUS BLD VENIPUNCTURE: CPT

## 2017-11-11 PROCEDURE — 700102 HCHG RX REV CODE 250 W/ 637 OVERRIDE(OP): Performed by: INTERNAL MEDICINE

## 2017-11-11 PROCEDURE — 700111 HCHG RX REV CODE 636 W/ 250 OVERRIDE (IP): Performed by: HOSPITALIST

## 2017-11-11 PROCEDURE — 85027 COMPLETE CBC AUTOMATED: CPT

## 2017-11-11 PROCEDURE — 770021 HCHG ROOM/CARE - ISO PRIVATE

## 2017-11-11 PROCEDURE — A9270 NON-COVERED ITEM OR SERVICE: HCPCS | Performed by: HOSPITALIST

## 2017-11-11 PROCEDURE — A9270 NON-COVERED ITEM OR SERVICE: HCPCS | Performed by: INTERNAL MEDICINE

## 2017-11-11 PROCEDURE — 99233 SBSQ HOSP IP/OBS HIGH 50: CPT | Performed by: INTERNAL MEDICINE

## 2017-11-11 RX ADMIN — HEPARIN SODIUM 5000 UNITS: 5000 INJECTION, SOLUTION INTRAVENOUS; SUBCUTANEOUS at 08:58

## 2017-11-11 RX ADMIN — HEPARIN SODIUM 5000 UNITS: 5000 INJECTION, SOLUTION INTRAVENOUS; SUBCUTANEOUS at 21:47

## 2017-11-11 RX ADMIN — SODIUM CHLORIDE TAB 1 GM 1 G: 1 TAB at 08:57

## 2017-11-11 RX ADMIN — MAGNESIUM GLUCONATE 500 MG ORAL TABLET 400 MG: 500 TABLET ORAL at 08:57

## 2017-11-11 RX ADMIN — SULFAMETHOXAZOLE AND TRIMETHOPRIM 2 TABLET: 800; 160 TABLET ORAL at 08:58

## 2017-11-11 RX ADMIN — GABAPENTIN 100 MG: 100 CAPSULE ORAL at 15:07

## 2017-11-11 RX ADMIN — ASPIRIN 81 MG: 81 TABLET, COATED ORAL at 08:58

## 2017-11-11 RX ADMIN — SODIUM CHLORIDE TAB 1 GM 1 G: 1 TAB at 16:52

## 2017-11-11 RX ADMIN — GABAPENTIN 100 MG: 100 CAPSULE ORAL at 08:57

## 2017-11-11 RX ADMIN — METOPROLOL TARTRATE 75 MG: 25 TABLET, FILM COATED ORAL at 08:57

## 2017-11-11 RX ADMIN — ATORVASTATIN CALCIUM 40 MG: 40 TABLET, FILM COATED ORAL at 21:00

## 2017-11-11 RX ADMIN — LEVOTHYROXINE SODIUM 75 MCG: 75 TABLET ORAL at 08:57

## 2017-11-11 RX ADMIN — SULFAMETHOXAZOLE AND TRIMETHOPRIM 2 TABLET: 800; 160 TABLET ORAL at 21:47

## 2017-11-11 RX ADMIN — THERA TABS 1 TABLET: TAB at 08:58

## 2017-11-11 RX ADMIN — METOPROLOL TARTRATE 75 MG: 25 TABLET, FILM COATED ORAL at 21:47

## 2017-11-11 RX ADMIN — FOLIC ACID 1 MG: 1 TABLET ORAL at 08:57

## 2017-11-11 RX ADMIN — GABAPENTIN 100 MG: 100 CAPSULE ORAL at 21:47

## 2017-11-11 ASSESSMENT — ENCOUNTER SYMPTOMS
TREMORS: 0
MEMORY LOSS: 1
WEAKNESS: 1
HEADACHES: 0
CHILLS: 0
COUGH: 0
NAUSEA: 0
FEVER: 0
MYALGIAS: 0
SHORTNESS OF BREATH: 0
ABDOMINAL PAIN: 0
DEPRESSION: 0

## 2017-11-11 ASSESSMENT — PAIN SCALES - GENERAL
PAINLEVEL_OUTOF10: 0
PAINLEVEL_OUTOF10: 0

## 2017-11-11 NOTE — PROGRESS NOTES
Josie Osuna Fall Risk Assessment:     Last Known Fall: Within the last six months  Mobility: Use of assistive device/requires assist of two people  Medications: Cardiovascular or central nervous system meds  Mental Status/LOC/Awareness: Awake, alert, and oriented to date, place, and person  Toileting Needs: Incontinence  Volume/Electrolyte Status: Low blood sugar/electrolyte imbalances  Communication/Sensory: Neuropathy  Behavior: Appropriate behavior  Josie Osuna Fall Risk Total: 19  Fall Risk Level: HIGH RISK    Universal Fall Precautions:  bed in low position and locked, call light/belongings in reach, wheelchairs and assistive devices out of sight, siderails up x 2, use non-slip footwear, adequate lighting, clutter free and spill free environment, educate on level of risk, educate to call for assistance    Fall Risk Level Interventions:    TRIAL ([employer], NEURO, MED MARINA 5) Moderate Fall Risk Interventions  Place yellow fall risk ID band on patient: verified  Provide patient/family education based on risk assessment : verified  Educate patient/family to call staff for assistance when getting out of bed: verified  Place fall precaution signage outside patient door: verified  Utilize bed/chair fall alarm: verifiedTRIAL ([facility], NEURO, MED MARINA 5) High Fall Risk Interventions  Place yellow fall risk ID band on patient: verified  Provide patient/family education based on risk assessment: completed  Educate patient/family to call staff for assistance when getting out of bed: completed  Place fall precaution signage outside patient door: verified  Place patient in room close to nursing station: currently not available/charge notified  Utilize bed/chair fall alarm: completed  Notify charge of high risk for huddle: completed    Patient Specific Interventions:     Medication: review medications with patient and family  Mental Status/LOC/Awareness: check on patient hourly  Toileting: provide frquent  toileting  Volume/Electrolyte Status: ensure patient remains hydrated  Communication/Sensory: update plan of care on whiteboard  Behavioral: not applicable  Mobility: utilize bed/chair fall alarm and ensure bed is locked and in lowest position

## 2017-11-11 NOTE — PROGRESS NOTES
Assumed care of patient @ 1900. Bedside report received. Patient AAO x4. VS s, denies Pain at this time, PT up with 2 assist. self turn. Fall precautions in place. Voiding by condom catheter, last BM 11/10. POC discussed with patient, all questions answered at this time. Patient makes needs known, call light within reach, hourly rounding in place.

## 2017-11-11 NOTE — PROGRESS NOTES
[facility] Hospitalist Progress Note    Date of Service: 11/10/2017    Chief Complaint  72 y.o. male admitted 2017 with urinary tract infection and confusion.    Interval Problem Update  Unchanged. Awaiting guardianship      Consultants/Specialty  Geriatrics  Palliative Care  Ethics committee/ Dr. Eamon Blackmon    Disposition  Pending guardianship and placement   assisting  No Court date yet    Encourage out of bed, nursing staff to assist          Review of Systems   Constitutional: Negative for chills and fever.   HENT: Negative for hearing loss.    Respiratory: Negative for cough and shortness of breath.    Cardiovascular: Negative for leg swelling.   Gastrointestinal: Negative for abdominal pain and nausea.   Musculoskeletal: Negative for myalgias.   Neurological: Positive for weakness. Negative for tremors and headaches.   Psychiatric/Behavioral: Positive for memory loss. Negative for depression.      Physical Exam  Laboratory/Imaging   Hemodynamics  Temp (24hrs), Av.9 °C (98.5 °F), Min:36.4 °C (97.6 °F), Max:37.7 °C (99.8 °F)   Temperature: 36.6 °C (97.8 °F)  Pulse  Av  Min: 58  Max: 144    Blood Pressure : 116/73      Respiratory      Respiration: 20, Pulse Oximetry: 98 %        RUL Breath Sounds: Clear, RML Breath Sounds: Diminished, RLL Breath Sounds: Diminished, RIGO Breath Sounds: Clear, LLL Breath Sounds: Diminished    Fluids    Intake/Output Summary (Last 24 hours) at 11/10/17 1718  Last data filed at 11/10/17 0400   Gross per 24 hour   Intake              120 ml   Output             1250 ml   Net            -1130 ml       Nutrition  Orders Placed This Encounter   Procedures   • DIET ORDER     Standing Status:   Standing     Number of Occurrences:   1     Order Specific Question:   Diet:     Answer:   Regular [1]     Comments:   please send mashed potatoes with lunch and dinner.     Order Specific Question:   Texture/Fiber modifications:     Answer:   Dysphagia  2(Pureed/Chopped)specify fluid consistency(question 6) [2]     Order Specific Question:   Consistency/Fluid modifications:     Answer:   Nectar Thick [2]     Order Specific Question:   Miscellaneous modifications:     Answer:   SLP - Deliver to Nursing Station [22]     Physical Exam   Constitutional: No distress.   Thin, frail   HENT:   Head: Normocephalic and atraumatic.   Mouth/Throat: No oropharyngeal exudate.   Eyes: EOM are normal. Pupils are equal, round, and reactive to light.   Neck: Normal range of motion. No JVD present.   Cardiovascular: Normal rate and regular rhythm.    Pulmonary/Chest: Effort normal.   Abdominal: Soft. There is no tenderness.   Musculoskeletal: He exhibits edema (awelling improved).   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 Right knee with circular wound, minimal drainage, wound is smaller but still to the tendon.   Neurological: He is alert. No cranial nerve deficit. Coordination normal.   AOx2   Skin: Skin is warm. He is not diaphoretic. There is erythema.   Psychiatric: He has a normal mood and affect. His behavior is normal. His speech is delayed.   Nursing note and vitals reviewed.      Recent Labs      11/08/17   0150  11/09/17   0303  11/10/17   0410   WBC  12.4*  12.2*  14.4*   RBC  4.75  4.62*  4.84   HEMOGLOBIN  10.8*  10.7*  10.8*   HEMATOCRIT  35.0*  34.7*  35.1*   MCV  73.7*  75.1*  72.5*   MCH  22.7*  23.2*  22.3*   MCHC  30.9*  30.8*  30.8*   RDW  42.9  44.2  42.2   PLATELETCT  678*  651*  698*   MPV  9.5  9.0  8.7*     Recent Labs      11/09/17   0953   SODIUM  127*   POTASSIUM  4.1   CHLORIDE  95*   CO2  25   GLUCOSE  95   BUN  14   CREATININE  0.63   CALCIUM  9.0                      Assessment/Plan     * Cellulitis of right knee- (present on admission)   Assessment & Plan    P{resented with malaise and confusion  Has R knee cellulitis and UTI on admission  With minimally draining wound  Status post wound culture on October 30 with MRSA, per lab, now  "heavy growth with ongoing leukocytosis  - resume on vanco per pharmacy   -We'll consider ID consult as needed  Discussed with pharmacy  On 11/8 Pharmacy had strongly recommended me speaking with ID> I spoke with ID, Dr. Juarez. She was OK doing PO Septra or PO Abx for CAMRSA. Will switch to PO antibiotics.  - MRI R LE- r/o osteomyelitis- positive mild osteomyelitis and cellulitis at the patella  Discussed with Dr. Rodriguez, will be evaluated although is a poor candidate for AKA, continue antibiotics with vancomycin x 6 weeks but after curbsiding with Dr. 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 Fluctuates with PO fluid intake. Not symptomatic and continues to be more interactive today. Continuing with daily fluctuations,  - fluctuating, asx  - continue to follow clinically  encourage PO intake  - salt tabs, 1gm BID        Leukocytosis- (present on admission)   Assessment & Plan    - 2nd to cellulitis R LE, r/o osteo  - s/p 14 days of abx  - blood and urine cultures negative  - follow clinically  - XR right knee and wound culture 10/30        PAD (peripheral artery disease) (CMS-HCC)- (present on admission)   Assessment & Plan    Significant PAD s/p left forefoot amputation and right BKA  - continue aspirin and statin  - not a candidate for prosthesis  - followed by vascular surgery and wound care        Dementia- (present on admission)   Assessment & Plan    Admitted for encephalopathy and debility. Has been evaluated by ethics committee, unable to make his own medical decisions.  - guardianship pending, no court date yet  - Patient's friend,\"wife\"Norma expressing concerns with guardianship hearing, would like to discuss recommendations for needs for guardianship, explained to patient and patient's friend regarding ethics committee meeting and per Dr. Eamon Blackmon, patient lacks capacity for " medical decision making and is requiring guardianship at this time.    - Patient's friend has been in contact with our  regarding her concerns, and multiple conversations regarding guardianship status   is aware, and well follow-up with patient and friend as needed  - 11/1 Discussed with RN, to follow up with Dr. Blackmon for any further recommendations regarding guardianship hearing  11/3- scars  regarding patient's wife/friend concerns,  to follow up, as per prior , patient didn't friend is not an appropriate guardian and does not have power of , [facility] staff aware        Hypomagnesemia- (present on admission)   Assessment & Plan    Mg 1.8 10/28, h/o alcohol dependence.  - oral mag daily and monitor  - IV mag PRN repletion        Essential hypertension- (present on admission)   Assessment & Plan    Currently normotensive.  - continue with metoprolol 75 mg BID  - PRN normodyne        Severe protein-calorie malnutrition (CMS-HCC)- (present on admission)   Assessment & Plan    Poor PO intake.  - Nutrition following  - Cont boost TID        .    Reviewed items::  Medications reviewed, Labs reviewed and Radiology images reviewed  Barrera catheter::  No Barrera  DVT prophylaxis pharmacological::  Heparin

## 2017-11-11 NOTE — PROGRESS NOTES
[facility] Hospitalist Progress Note    Date of Service: 2017    Chief Complaint  72 y.o. male admitted 2017 with urinary tract infection and confusion.    Interval Problem Update  Unchanged. No new issues or complaints.    Consultants/Specialty  Geriatrics  Palliative Care  Ethics committee/ Dr. Eamon Blackmon    Disposition  Pending guardianship and placement   assisting  No Court date yet    Encourage out of bed, nursing staff to assist          Review of Systems   Constitutional: Negative for chills and fever.   HENT: Negative for hearing loss.    Respiratory: Negative for cough and shortness of breath.    Cardiovascular: Negative for leg swelling.   Gastrointestinal: Negative for abdominal pain and nausea.   Musculoskeletal: Negative for myalgias.   Neurological: Positive for weakness. Negative for tremors and headaches.   Psychiatric/Behavioral: Positive for memory loss. Negative for depression.      Physical Exam  Laboratory/Imaging   Hemodynamics  Temp (24hrs), Av.5 °C (97.7 °F), Min:36.4 °C (97.5 °F), Max:36.8 °C (98.2 °F)   Temperature: 36.4 °C (97.5 °F)  Pulse  Av.8  Min: 58  Max: 144    Blood Pressure : 122/60      Respiratory      Respiration: 16, Pulse Oximetry: 96 %        RUL Breath Sounds: Clear, RML Breath Sounds: Diminished, RLL Breath Sounds: Diminished, RIGO Breath Sounds: Clear, LLL Breath Sounds: Diminished    Fluids    Intake/Output Summary (Last 24 hours) at 17 1513  Last data filed at 17 1400   Gross per 24 hour   Intake                0 ml   Output             2800 ml   Net            -2800 ml       Nutrition  Orders Placed This Encounter   Procedures   • DIET ORDER     Standing Status:   Standing     Number of Occurrences:   1     Order Specific Question:   Diet:     Answer:   Regular [1]     Comments:   please send mashed potatoes with lunch and dinner.     Order Specific Question:   Texture/Fiber modifications:     Answer:   Dysphagia  2(Pureed/Chopped)specify fluid consistency(question 6) [2]     Order Specific Question:   Consistency/Fluid modifications:     Answer:   Nectar Thick [2]     Order Specific Question:   Miscellaneous modifications:     Answer:   SLP - Deliver to Nursing Station [22]     Physical Exam   Constitutional: No distress.   Thin, frail   HENT:   Head: Normocephalic and atraumatic.   Mouth/Throat: No oropharyngeal exudate.   Eyes: EOM are normal. Pupils are equal, round, and reactive to light.   Neck: Normal range of motion. No JVD present.   Cardiovascular: Normal rate and regular rhythm.    Pulmonary/Chest: Effort normal.   Abdominal: Soft. There is no tenderness.   Musculoskeletal: He exhibits edema (awelling improved).   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 Right knee with circular wound, minimal drainage, wound is smaller but still to the tendon.   Neurological: He is alert. No cranial nerve deficit. Coordination normal.   AOx2   Skin: Skin is warm. He is not diaphoretic. There is erythema.   Psychiatric: He has a normal mood and affect. His behavior is normal. His speech is delayed.   Nursing note and vitals reviewed.      Recent Labs      11/09/17   0303  11/10/17   0410  11/11/17   0421   WBC  12.2*  14.4*  13.8*   RBC  4.62*  4.84  4.56*   HEMOGLOBIN  10.7*  10.8*  10.4*   HEMATOCRIT  34.7*  35.1*  33.2*   MCV  75.1*  72.5*  72.8*   MCH  23.2*  22.3*  22.8*   MCHC  30.8*  30.8*  31.3*   RDW  44.2  42.2  41.8   PLATELETCT  651*  698*  666*   MPV  9.0  8.7*  8.9*     Recent Labs      11/09/17   0953   SODIUM  127*   POTASSIUM  4.1   CHLORIDE  95*   CO2  25   GLUCOSE  95   BUN  14   CREATININE  0.63   CALCIUM  9.0                      Assessment/Plan     * Cellulitis of right knee- (present on admission)   Assessment & Plan    P{resented with malaise and confusion  Has R knee cellulitis and UTI on admission  With minimally draining wound  Status post wound culture on October 30 with MRSA, per lab, now  "heavy growth with ongoing leukocytosis  - resume on vanco per pharmacy   -We'll consider ID consult as needed  Discussed with pharmacy  On 11/8 Pharmacy had strongly recommended me speaking with ID> I spoke with ID, Dr. Juarez. She was OK doing PO Septra or PO Abx for CAMRSA. Will switch to PO antibiotics.  - MRI R LE- r/o osteomyelitis- positive mild osteomyelitis and cellulitis at the patella  Discussed with Dr. Rodriguez, will be evaluated although is a poor candidate for AKA, continue antibiotics with vancomycin x 6 weeks but after curbsiding with Dr. 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 Fluctuates with PO fluid intake. Not symptomatic and continues to be more interactive today. Continuing with daily fluctuations,  - fluctuating, asx  - continue to follow clinically  encourage PO intake  - salt tabs, 1gm BID        Leukocytosis- (present on admission)   Assessment & Plan    - 2nd to cellulitis R LE, r/o osteo  - s/p 14 days of abx  - blood and urine cultures negative  - follow clinically  - XR right knee and wound culture 10/30        PAD (peripheral artery disease) (CMS-HCC)- (present on admission)   Assessment & Plan    Significant PAD s/p left forefoot amputation and right BKA  - continue aspirin and statin  - not a candidate for prosthesis  - followed by vascular surgery and wound care        Dementia- (present on admission)   Assessment & Plan    Admitted for encephalopathy and debility. Has been evaluated by ethics committee, unable to make his own medical decisions.  - guardianship pending, no court date yet  - Patient's friend,\"wife\"Norma expressing concerns with guardianship hearing, would like to discuss recommendations for needs for guardianship, explained to patient and patient's friend regarding ethics committee meeting and per Dr. Eamon Blackmon, patient lacks capacity for " medical decision making and is requiring guardianship at this time.    - Patient's friend has been in contact with our  regarding her concerns, and multiple conversations regarding guardianship status   is aware, and well follow-up with patient and friend as needed  - 11/1 Discussed with RN, to follow up with Dr. Blackmon for any further recommendations regarding guardianship hearing  11/3- scars  regarding patient's wife/friend concerns,  to follow up, as per prior , patient didn't friend is not an appropriate guardian and does not have power of , [facility] staff aware        Hypomagnesemia- (present on admission)   Assessment & Plan    Mg 1.8 10/28, h/o alcohol dependence.  - oral mag daily and monitor  - IV mag PRN repletion        Essential hypertension- (present on admission)   Assessment & Plan    Currently normotensive.  - continue with metoprolol 75 mg BID  - PRN normodyne        Severe protein-calorie malnutrition (CMS-HCC)- (present on admission)   Assessment & Plan    Poor PO intake.  - Nutrition following  - Cont boost TID          .    Reviewed items::  Medications reviewed, Labs reviewed and Radiology images reviewed  Barrera catheter::  No Barrera  DVT prophylaxis pharmacological::  Heparin

## 2017-11-11 NOTE — CARE PLAN
Problem: Safety  Goal: Will remain free from injury  Educated patient on importance of calling before getting up out of bed, bed alarm on and sounding, bed height low and locked, call light within reach, non-skid socks in place.       Problem: Skin Integrity  Goal: Risk for impaired skin integrity will decrease  Patient encouraged to turn and reposition at least every 2 hours, assistance provided with this.

## 2017-11-11 NOTE — CARE PLAN
Problem: Venous Thromboembolism (VTW)/Deep Vein Thrombosis (DVT) Prevention:  Goal: Patient will participate in Venous Thrombosis (VTE)/Deep Vein Thrombosis (DVT)Prevention Measures    Intervention: Assess and monitor for anticoagulation complications  Subcutaneous heparin administered per MAR.      Problem: Knowledge Deficit  Goal: Knowledge of disease process/condition, treatment plan, diagnostic tests, and medications will improve    Intervention: Assess knowledge level of disease process/condition, treatment plan, diagnostic tests, and medications  Pt educated on POC and verbalized understanding.

## 2017-11-11 NOTE — PROGRESS NOTES
Received report from [facility] nurse. Assessment complete. Patient is A&Ox3, reoriented to date, denies pain at this time, no family present, no PIV ok per MD. Pt educated on POC and verbalized understanding. Patient is resting now. Call light within reach, bed in lowest position, treaded socks in place, and bed alarm on.

## 2017-11-12 LAB
ANION GAP SERPL CALC-SCNC: 10 MMOL/L (ref 0–11.9)
BUN SERPL-MCNC: 23 MG/DL (ref 8–22)
CALCIUM SERPL-MCNC: 8.8 MG/DL (ref 8.5–10.5)
CHLORIDE SERPL-SCNC: 93 MMOL/L (ref 96–112)
CO2 SERPL-SCNC: 20 MMOL/L (ref 20–33)
CREAT SERPL-MCNC: 0.83 MG/DL (ref 0.5–1.4)
ERYTHROCYTE [DISTWIDTH] IN BLOOD BY AUTOMATED COUNT: 42.3 FL (ref 35.9–50)
GFR SERPL CREATININE-BSD FRML MDRD: >60 ML/MIN/1.73 M 2
GLUCOSE SERPL-MCNC: 75 MG/DL (ref 65–99)
HCT VFR BLD AUTO: 33.6 % (ref 42–52)
HGB BLD-MCNC: 10.4 G/DL (ref 14–18)
MCH RBC QN AUTO: 22.4 PG (ref 27–33)
MCHC RBC AUTO-ENTMCNC: 31 G/DL (ref 33.7–35.3)
MCV RBC AUTO: 72.4 FL (ref 81.4–97.8)
PLATELET # BLD AUTO: 734 K/UL (ref 164–446)
PMV BLD AUTO: 9.1 FL (ref 9–12.9)
POTASSIUM SERPL-SCNC: 4.5 MMOL/L (ref 3.6–5.5)
RBC # BLD AUTO: 4.64 M/UL (ref 4.7–6.1)
SODIUM SERPL-SCNC: 123 MMOL/L (ref 135–145)
WBC # BLD AUTO: 13.9 K/UL (ref 4.8–10.8)

## 2017-11-12 PROCEDURE — 700102 HCHG RX REV CODE 250 W/ 637 OVERRIDE(OP): Performed by: HOSPITALIST

## 2017-11-12 PROCEDURE — 36415 COLL VENOUS BLD VENIPUNCTURE: CPT

## 2017-11-12 PROCEDURE — A9270 NON-COVERED ITEM OR SERVICE: HCPCS | Performed by: HOSPITALIST

## 2017-11-12 PROCEDURE — 700102 HCHG RX REV CODE 250 W/ 637 OVERRIDE(OP): Performed by: INTERNAL MEDICINE

## 2017-11-12 PROCEDURE — A9270 NON-COVERED ITEM OR SERVICE: HCPCS | Performed by: INTERNAL MEDICINE

## 2017-11-12 PROCEDURE — 80048 BASIC METABOLIC PNL TOTAL CA: CPT

## 2017-11-12 PROCEDURE — 700111 HCHG RX REV CODE 636 W/ 250 OVERRIDE (IP): Performed by: HOSPITALIST

## 2017-11-12 PROCEDURE — 85027 COMPLETE CBC AUTOMATED: CPT

## 2017-11-12 PROCEDURE — 99231 SBSQ HOSP IP/OBS SF/LOW 25: CPT | Performed by: INTERNAL MEDICINE

## 2017-11-12 PROCEDURE — 770021 HCHG ROOM/CARE - ISO PRIVATE

## 2017-11-12 RX ADMIN — METOPROLOL TARTRATE 75 MG: 25 TABLET, FILM COATED ORAL at 08:32

## 2017-11-12 RX ADMIN — SULFAMETHOXAZOLE AND TRIMETHOPRIM 2 TABLET: 800; 160 TABLET ORAL at 22:13

## 2017-11-12 RX ADMIN — SULFAMETHOXAZOLE AND TRIMETHOPRIM 2 TABLET: 800; 160 TABLET ORAL at 08:32

## 2017-11-12 RX ADMIN — LEVOTHYROXINE SODIUM 75 MCG: 75 TABLET ORAL at 05:39

## 2017-11-12 RX ADMIN — SODIUM CHLORIDE TAB 1 GM 1 G: 1 TAB at 08:32

## 2017-11-12 RX ADMIN — SODIUM CHLORIDE TAB 1 GM 1 G: 1 TAB at 16:50

## 2017-11-12 RX ADMIN — ATORVASTATIN CALCIUM 40 MG: 40 TABLET, FILM COATED ORAL at 22:13

## 2017-11-12 RX ADMIN — THERA TABS 1 TABLET: TAB at 08:32

## 2017-11-12 RX ADMIN — FOLIC ACID 1 MG: 1 TABLET ORAL at 08:32

## 2017-11-12 RX ADMIN — HEPARIN SODIUM 5000 UNITS: 5000 INJECTION, SOLUTION INTRAVENOUS; SUBCUTANEOUS at 22:13

## 2017-11-12 RX ADMIN — ASPIRIN 81 MG: 81 TABLET, COATED ORAL at 08:34

## 2017-11-12 RX ADMIN — GABAPENTIN 100 MG: 100 CAPSULE ORAL at 14:09

## 2017-11-12 RX ADMIN — GABAPENTIN 100 MG: 100 CAPSULE ORAL at 22:13

## 2017-11-12 RX ADMIN — HEPARIN SODIUM 5000 UNITS: 5000 INJECTION, SOLUTION INTRAVENOUS; SUBCUTANEOUS at 08:32

## 2017-11-12 RX ADMIN — GABAPENTIN 100 MG: 100 CAPSULE ORAL at 08:32

## 2017-11-12 RX ADMIN — MAGNESIUM GLUCONATE 500 MG ORAL TABLET 400 MG: 500 TABLET ORAL at 08:32

## 2017-11-12 RX ADMIN — METOPROLOL TARTRATE 25 MG: 25 TABLET, FILM COATED ORAL at 22:13

## 2017-11-12 ASSESSMENT — PATIENT HEALTH QUESTIONNAIRE - PHQ9
1. LITTLE INTEREST OR PLEASURE IN DOING THINGS: NOT AT ALL
SUM OF ALL RESPONSES TO PHQ QUESTIONS 1-9: 0
SUM OF ALL RESPONSES TO PHQ9 QUESTIONS 1 AND 2: 0
2. FEELING DOWN, DEPRESSED, IRRITABLE, OR HOPELESS: NOT AT ALL

## 2017-11-12 ASSESSMENT — COPD QUESTIONNAIRES
HAVE YOU SMOKED AT LEAST 100 CIGARETTES IN YOUR ENTIRE LIFE: YES
COPD SCREENING SCORE: 7
DURING THE PAST 4 WEEKS HOW MUCH DID YOU FEEL SHORT OF BREATH: SOME OF THE TIME
DO YOU EVER COUGH UP ANY MUCUS OR PHLEGM?: YES, A FEW DAYS A WEEK OR MONTH

## 2017-11-12 ASSESSMENT — ENCOUNTER SYMPTOMS
TREMORS: 0
CHILLS: 0
FEVER: 0
HEADACHES: 0
COUGH: 0
SHORTNESS OF BREATH: 0
MEMORY LOSS: 1
NAUSEA: 0
ABDOMINAL PAIN: 0
WEAKNESS: 1
DEPRESSION: 0
MYALGIAS: 0

## 2017-11-12 ASSESSMENT — LIFESTYLE VARIABLES
CONSUMPTION TOTAL: NEGATIVE
TOTAL SCORE: 0
EVER FELT BAD OR GUILTY ABOUT YOUR DRINKING: NO
TOTAL SCORE: 0
EVER HAD A DRINK FIRST THING IN THE MORNING TO STEADY YOUR NERVES TO GET RID OF A HANGOVER: NO
HAVE PEOPLE ANNOYED YOU BY CRITICIZING YOUR DRINKING: NO
HOW MANY TIMES IN THE PAST YEAR HAVE YOU HAD 5 OR MORE DRINKS IN A DAY: 0
DO YOU DRINK ALCOHOL: YES
AVERAGE NUMBER OF DAYS PER WEEK YOU HAVE A DRINK CONTAINING ALCOHOL: 7
ON A TYPICAL DAY WHEN YOU DRINK ALCOHOL HOW MANY DRINKS DO YOU HAVE: 1
HAVE YOU EVER FELT YOU SHOULD CUT DOWN ON YOUR DRINKING: NO
TOTAL SCORE: 0

## 2017-11-12 ASSESSMENT — PAIN SCALES - GENERAL
PAINLEVEL_OUTOF10: 0
PAINLEVEL_OUTOF10: 0

## 2017-11-12 NOTE — PROGRESS NOTES
Josie Osuna Fall Risk Assessment:     Last Known Fall: Within the last six months  Mobility: Use of assistive device/requires assist of two people  Medications: Cardiovascular or central nervous system meds  Mental Status/LOC/Awareness: Oriented to person and place  Toileting Needs: Incontinence  Volume/Electrolyte Status: Low blood sugar/electrolyte imbalances  Communication/Sensory: Neuropathy  Behavior: Appropriate behavior  Josie Osuna Fall Risk Total: 20  Fall Risk Level: HIGH RISK     Universal Fall Precautions:  call light/belongings in reach, bed in low position and locked, wheelchairs and assistive devices out of sight, siderails up x 2, use non-slip footwear, adequate lighting, clutter free and spill free environment, educate on level of risk     Fall Risk Level Interventions:    TRIAL (TELE 8, NEURO, MED MARINA 5) Moderate Fall Risk Interventions  Place yellow fall risk ID band on patient: verified  Provide patient/family education based on risk assessment : verified  Educate patient/family to call staff for assistance when getting out of bed: verified  Place fall precaution signage outside patient door: verified  Utilize bed/chair fall alarm: verifiedTRIAL (TELE 8, NEURO, VEEDIMS MARINA 5) High Fall Risk Interventions  Place yellow fall risk ID band on patient: verified  Provide patient/family education based on risk assessment: completed  Educate patient/family to call staff for assistance when getting out of bed: completed  Place fall precaution signage outside patient door: verified  Place patient in room close to nursing station: currently not available/charge notified  Utilize bed/chair fall alarm: completed  Notify charge of high risk for huddle: completed     Patient Specific Interventions:      Medication: review medications with patient and family, limit combination of prn medications, provide patients who received diuretics/laxatives frequent toileting and assess need for orthostatic hypotension  evaluation  Mental Status/LOC/Awareness: reorient patient, reinforce falls education, encourage family to stay with patient, check on patient hourly, utilize bed/chair fall alarm, reinforce the use of call light and provide activity  Toileting: provide frquent toileting, monitor intake and output/use of appropriate interventions, instruct male patients prone to dizziness to void while sitting, instruct patient/family on the use of grab bars, instruct patient/family on the need to call for assistance when toileting, do not leave patient unattended in bathroom/refer to toileting scripting and toilet prior to giving pain medications  Volume/Electrolyte Status: ensure patient remains hydrated, advance diet as tolerated, administer medications as ordered for nausea and vomiting, monitor abnormal lab values, ensure IV fluids are appropriate and teach patients to dangle before rising if hypotensive  Communication/Sensory: update plan of care on whiteboard, ensure proper positioning when transferrng/ambulating and ensure patient has glasses/contacts and hearing aids/dentures  Behavioral: encourage patient to voice feelings, engage patient in daily activities, administer medication as ordered, instruct/reinforce fall program rationale and encourage family to stay with impulsive patients  Mobility: schedule physical activity throughout the day, provide comfort measures during transport, dangle prior to standing, utilize bed/chair fall alarm, ensure bed is locked and in lowest position, provide appropriate assistive device, instruct patient to exit bed on their strongest side and collaborate with doctor for possible PT/OT consult

## 2017-11-12 NOTE — CARE PLAN
Problem: Venous Thromboembolism (VTW)/Deep Vein Thrombosis (DVT) Prevention:  Goal: Patient will participate in Venous Thrombosis (VTE)/Deep Vein Thrombosis (DVT)Prevention Measures    Intervention: Assess and monitor for anticoagulation complications  Subcutaneous heparin administered per MAR.      Problem: Psychosocial Needs:  Goal: Level of anxiety will decrease    Intervention: Identify and develop with patient strategies to cope with anxiety triggers  Pt anxious at times, emotional support provided.

## 2017-11-12 NOTE — PROGRESS NOTES
Received report from [facility] nurse. Assessment complete. Patient is A&Ox3, reoriented to date, denies pain at this time, no PIV ok per MD. Dressing to R knee CDI. Pt educated on POC and verbalized understanding. Patient is resting now. Call light within reach, bed in lowest position, treaded socks in place, and bed alarm on.

## 2017-11-12 NOTE — PROGRESS NOTES
Josie Osuna Fall Risk Assessment:     Last Known Fall: Within the last six months  Mobility: Use of assistive device/requires assist of two people  Medications: Cardiovascular or central nervous system meds  Mental Status/LOC/Awareness: Oriented to person and place  Toileting Needs: Incontinence  Volume/Electrolyte Status: Low blood sugar/electrolyte imbalances  Communication/Sensory: Neuropathy  Behavior: Appropriate behavior  Josie Osuna Fall Risk Total: 20  Fall Risk Level: HIGH RISK    Universal Fall Precautions:  call light/belongings in reach, bed in low position and locked, wheelchairs and assistive devices out of sight, siderails up x 2, use non-slip footwear, adequate lighting, clutter free and spill free environment, educate on level of risk    Fall Risk Level Interventions:    TRIAL (TELE 8, NEURO, MED MARINA 5) Moderate Fall Risk Interventions  Place yellow fall risk ID band on patient: verified  Provide patient/family education based on risk assessment : verified  Educate patient/family to call staff for assistance when getting out of bed: verified  Place fall precaution signage outside patient door: verified  Utilize bed/chair fall alarm: verifiedTRIAL (TELE 8, NEURO, [employer] MARINA 5) High Fall Risk Interventions  Place yellow fall risk ID band on patient: verified  Provide patient/family education based on risk assessment: completed  Educate patient/family to call staff for assistance when getting out of bed: completed  Place fall precaution signage outside patient door: verified  Place patient in room close to nursing station: currently not available/charge notified  Utilize bed/chair fall alarm: completed  Notify charge of high risk for huddle: completed    Patient Specific Interventions:     Medication: review medications with patient and family, limit combination of prn medications, provide patients who received diuretics/laxatives frequent toileting and assess need for orthostatic hypotension  evaluation  Mental Status/LOC/Awareness: reorient patient, reinforce falls education, encourage family to stay with patient, check on patient hourly, utilize bed/chair fall alarm, reinforce the use of call light and provide activity  Toileting: provide frquent toileting, monitor intake and output/use of appropriate interventions, instruct male patients prone to dizziness to void while sitting, instruct patient/family on the use of grab bars, instruct patient/family on the need to call for assistance when toileting, do not leave patient unattended in bathroom/refer to toileting scripting and toilet prior to giving pain medications  Volume/Electrolyte Status: ensure patient remains hydrated, advance diet as tolerated, administer medications as ordered for nausea and vomiting, monitor abnormal lab values, ensure IV fluids are appropriate and teach patients to dangle before rising if hypotensive  Communication/Sensory: update plan of care on whiteboard, ensure proper positioning when transferrng/ambulating and ensure patient has glasses/contacts and hearing aids/dentures  Behavioral: encourage patient to voice feelings, engage patient in daily activities, administer medication as ordered, instruct/reinforce fall program rationale and encourage family to stay with impulsive patients  Mobility: schedule physical activity throughout the day, provide comfort measures during transport, dangle prior to standing, utilize bed/chair fall alarm, ensure bed is locked and in lowest position, provide appropriate assistive device, instruct patient to exit bed on their strongest side and collaborate with doctor for possible PT/OT consult

## 2017-11-12 NOTE — PROGRESS NOTES
Received patient at change of shift sitting comfortable on the chair, AAOX3.  Patient is on RA, no IV access noted.  Contact isolation for MRSA in place.  Comfort measures maintained.  Call light within reach.

## 2017-11-12 NOTE — CARE PLAN
Problem: Safety  Goal: Will remain free from injury    Intervention: Provide assistance with mobility  Safety and fall precautions in place.  Bed in low position, upper side rails X 2, treaded socks applied.  Hourly rounding, bed alarm activated.  Call light within reach.      Problem: Venous Thromboembolism (VTW)/Deep Vein Thrombosis (DVT) Prevention:  Goal: Patient will participate in Venous Thrombosis (VTE)/Deep Vein Thrombosis (DVT)Prevention Measures    Intervention: Assess and monitor for anticoagulation complications  Heparin subcutaneous injection given as scheduled.  Patient unable to ambulate, assisted from bed chair as tolerated.

## 2017-11-13 LAB
ERYTHROCYTE [DISTWIDTH] IN BLOOD BY AUTOMATED COUNT: 42.2 FL (ref 35.9–50)
HCT VFR BLD AUTO: 34.6 % (ref 42–52)
HGB BLD-MCNC: 10.8 G/DL (ref 14–18)
MCH RBC QN AUTO: 22.4 PG (ref 27–33)
MCHC RBC AUTO-ENTMCNC: 31.2 G/DL (ref 33.7–35.3)
MCV RBC AUTO: 71.8 FL (ref 81.4–97.8)
PLATELET # BLD AUTO: 696 K/UL (ref 164–446)
PMV BLD AUTO: 8.9 FL (ref 9–12.9)
RBC # BLD AUTO: 4.82 M/UL (ref 4.7–6.1)
WBC # BLD AUTO: 12.9 K/UL (ref 4.8–10.8)

## 2017-11-13 PROCEDURE — 770021 HCHG ROOM/CARE - ISO PRIVATE

## 2017-11-13 PROCEDURE — A9270 NON-COVERED ITEM OR SERVICE: HCPCS | Performed by: HOSPITALIST

## 2017-11-13 PROCEDURE — 700102 HCHG RX REV CODE 250 W/ 637 OVERRIDE(OP): Performed by: HOSPITALIST

## 2017-11-13 PROCEDURE — 700102 HCHG RX REV CODE 250 W/ 637 OVERRIDE(OP): Performed by: INTERNAL MEDICINE

## 2017-11-13 PROCEDURE — 99231 SBSQ HOSP IP/OBS SF/LOW 25: CPT | Performed by: INTERNAL MEDICINE

## 2017-11-13 PROCEDURE — 85027 COMPLETE CBC AUTOMATED: CPT

## 2017-11-13 PROCEDURE — 36415 COLL VENOUS BLD VENIPUNCTURE: CPT

## 2017-11-13 PROCEDURE — A6212 FOAM DRG <=16 SQ IN W/BORDER: HCPCS | Performed by: INTERNAL MEDICINE

## 2017-11-13 PROCEDURE — 700111 HCHG RX REV CODE 636 W/ 250 OVERRIDE (IP): Performed by: HOSPITALIST

## 2017-11-13 PROCEDURE — 97530 THERAPEUTIC ACTIVITIES: CPT

## 2017-11-13 PROCEDURE — A9270 NON-COVERED ITEM OR SERVICE: HCPCS | Performed by: INTERNAL MEDICINE

## 2017-11-13 RX ORDER — SULFAMETHOXAZOLE AND TRIMETHOPRIM 800; 160 MG/1; MG/1
1 TABLET ORAL EVERY 8 HOURS
Status: DISCONTINUED | OUTPATIENT
Start: 2017-11-13 | End: 2017-11-30 | Stop reason: HOSPADM

## 2017-11-13 RX ADMIN — HEPARIN SODIUM 5000 UNITS: 5000 INJECTION, SOLUTION INTRAVENOUS; SUBCUTANEOUS at 09:31

## 2017-11-13 RX ADMIN — GABAPENTIN 100 MG: 100 CAPSULE ORAL at 14:19

## 2017-11-13 RX ADMIN — SULFAMETHOXAZOLE AND TRIMETHOPRIM 2 TABLET: 800; 160 TABLET ORAL at 09:30

## 2017-11-13 RX ADMIN — SULFAMETHOXAZOLE AND TRIMETHOPRIM 1 TABLET: 800; 160 TABLET ORAL at 21:36

## 2017-11-13 RX ADMIN — SODIUM CHLORIDE TAB 1 GM 1 G: 1 TAB at 09:30

## 2017-11-13 RX ADMIN — HEPARIN SODIUM 5000 UNITS: 5000 INJECTION, SOLUTION INTRAVENOUS; SUBCUTANEOUS at 21:35

## 2017-11-13 RX ADMIN — METOPROLOL TARTRATE 75 MG: 25 TABLET, FILM COATED ORAL at 21:35

## 2017-11-13 RX ADMIN — STANDARDIZED SENNA CONCENTRATE AND DOCUSATE SODIUM 2 TABLET: 8.6; 5 TABLET, FILM COATED ORAL at 21:36

## 2017-11-13 RX ADMIN — GABAPENTIN 100 MG: 100 CAPSULE ORAL at 21:36

## 2017-11-13 RX ADMIN — SODIUM CHLORIDE TAB 1 GM 1 G: 1 TAB at 16:56

## 2017-11-13 RX ADMIN — ATORVASTATIN CALCIUM 40 MG: 40 TABLET, FILM COATED ORAL at 21:36

## 2017-11-13 RX ADMIN — THERA TABS 1 TABLET: TAB at 09:30

## 2017-11-13 RX ADMIN — ASPIRIN 81 MG: 81 TABLET, COATED ORAL at 09:30

## 2017-11-13 RX ADMIN — LEVOTHYROXINE SODIUM 75 MCG: 75 TABLET ORAL at 05:39

## 2017-11-13 RX ADMIN — METOPROLOL TARTRATE 75 MG: 25 TABLET, FILM COATED ORAL at 09:30

## 2017-11-13 RX ADMIN — GABAPENTIN 100 MG: 100 CAPSULE ORAL at 09:31

## 2017-11-13 RX ADMIN — MAGNESIUM GLUCONATE 500 MG ORAL TABLET 400 MG: 500 TABLET ORAL at 09:31

## 2017-11-13 RX ADMIN — FOLIC ACID 1 MG: 1 TABLET ORAL at 09:30

## 2017-11-13 ASSESSMENT — GAIT ASSESSMENTS: GAIT LEVEL OF ASSIST: UNABLE TO PARTICIPATE

## 2017-11-13 ASSESSMENT — ENCOUNTER SYMPTOMS
DEPRESSION: 0
MYALGIAS: 0
WEAKNESS: 1
FEVER: 0
MEMORY LOSS: 1
TREMORS: 0
NAUSEA: 0
COUGH: 0
HEADACHES: 0
ABDOMINAL PAIN: 0
SHORTNESS OF BREATH: 0
CHILLS: 0

## 2017-11-13 ASSESSMENT — COGNITIVE AND FUNCTIONAL STATUS - GENERAL
WALKING IN HOSPITAL ROOM: TOTAL
STANDING UP FROM CHAIR USING ARMS: TOTAL
CLIMB 3 TO 5 STEPS WITH RAILING: TOTAL
MOBILITY SCORE: 7
SUGGESTED CMS G CODE MODIFIER MOBILITY: CM
TURNING FROM BACK TO SIDE WHILE IN FLAT BAD: A LOT
MOVING FROM LYING ON BACK TO SITTING ON SIDE OF FLAT BED: UNABLE
MOVING TO AND FROM BED TO CHAIR: UNABLE

## 2017-11-13 ASSESSMENT — PAIN SCALES - GENERAL: PAINLEVEL_OUTOF10: 0

## 2017-11-13 NOTE — CARE PLAN
Problem: Knowledge Deficit  Goal: Knowledge of disease process/condition, treatment plan, diagnostic tests, and medications will improve    Intervention: Assess knowledge level of disease process/condition, treatment plan, diagnostic tests, and medications  Educated pt that he is still on a regular dysphagia 2 and nectar thick liquid diet. Explained to pt that it is to prevent possible aspiration (or silent choking) to decrease his chances of getting pneumonia. Pt verbalized understanding.      Problem: Skin Integrity  Goal: Risk for impaired skin integrity will decrease    Intervention: Assess risk factors for impaired skin integrity and/or pressure ulcers  Will educate and encourage pt to turn q2 hrs or whenever he is awake to prevent the occurrence of pressure ulcers. Will assist pt in turning if needed, and place pillows underneath common pressure ulcer sites (e.g. Arms and left heel).

## 2017-11-13 NOTE — THERAPY
"Physical Therapy Treatment completed.   Bed Mobility:  Supine to Sit: Minimal Assist  Transfers: Sit to Stand: Maximal Assist (x2)  Gait: Level Of Assist: Unable to Participate    Plan of Care: Will benefit from Physical Therapy 3 times per week and Plan to complete next treatment by Wednesday 11/15  Discharge Recommendations: Equipment: Will Continue to Assess for Equipment Needs. Post-acute therapy Discharge to a transitional care facility for continued skilled therapy services.     Pt with decreased static/dynamic sitting balance today compared to last session. Pt with increased R lateral lean in sitting and increased difficulty bringing body to midline. In addition, increased difficulty assisting with scooting for slide board transition today. Pt would benefit from ongoing PT intervention while in the acute care setting. Pt will need post acute transitional care in the SNF setting upon DC    See \"Rehab Therapy-Acute\" Patient Summary Report for complete documentation.       "

## 2017-11-13 NOTE — PROGRESS NOTES
[facility] Hospital[facility] Progress Note    Date of Service: 2017    Chief Complaint  72 y.o. male admitted 2017 with urinary tract infection and confusion.    Interval Problem Update  Unchanged. No new issues or complaints. Pleasant.    Consultants/Specialty  Geriatrics  Palliative Care  Ethics committee/ Dr. Eamon Blackmon    Disposition  Pending guardianship and placement   assisting  No Court date yet    Encourage out of bed, nursing staff to assist          Review of Systems   Constitutional: Negative for chills and fever.   HENT: Negative for hearing loss.    Respiratory: Negative for cough and shortness of breath.    Cardiovascular: Negative for leg swelling.   Gastrointestinal: Negative for abdominal pain and nausea.   Musculoskeletal: Negative for myalgias.   Neurological: Positive for weakness. Negative for tremors and headaches.   Psychiatric/Behavioral: Positive for memory loss. Negative for depression.      Physical Exam  Laboratory/Imaging   Hemodynamics  Temp (24hrs), Av.6 °C (97.8 °F), Min:36.3 °C (97.3 °F), Max:36.7 °C (98.1 °F)   Temperature: 36.7 °C (98 °F)  Pulse  Av.6  Min: 58  Max: 144    Blood Pressure : 114/83      Respiratory      Respiration: 16, Pulse Oximetry: 95 %        RUL Breath Sounds: Clear, RML Breath Sounds: Diminished, RLL Breath Sounds: Diminished, RIGO Breath Sounds: Clear, LLL Breath Sounds: Diminished    Fluids    Intake/Output Summary (Last 24 hours) at 17 1600  Last data filed at 17 1300   Gross per 24 hour   Intake              600 ml   Output              900 ml   Net             -300 ml       Nutrition  Orders Placed This Encounter   Procedures   • DIET ORDER     Standing Status:   Standing     Number of Occurrences:   1     Order Specific Question:   Diet:     Answer:   Regular [1]     Comments:   please send mashed potatoes with lunch and dinner.     Order Specific Question:   Texture/Fiber modifications:     Answer:   Dysphagia  2(Pureed/Chopped)specify fluid consistency(question 6) [2]     Order Specific Question:   Consistency/Fluid modifications:     Answer:   Nectar Thick [2]     Order Specific Question:   Miscellaneous modifications:     Answer:   SLP - Deliver to Nursing Station [22]     Physical Exam   Constitutional: No distress.   Thin, frail   HENT:   Head: Normocephalic and atraumatic.   Mouth/Throat: No oropharyngeal exudate.   Eyes: EOM are normal. Pupils are equal, round, and reactive to light.   Neck: Normal range of motion. No JVD present.   Cardiovascular: Normal rate and regular rhythm.    Pulmonary/Chest: Effort normal.   Abdominal: Soft. There is no tenderness.   Musculoskeletal: He exhibits edema (awelling improved).   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 Right knee with circular wound, minimal drainage, wound is smaller but still to the tendon.   Neurological: He is alert. No cranial nerve deficit. Coordination normal.   AOx2   Skin: Skin is warm. He is not diaphoretic. There is erythema.   Psychiatric: He has a normal mood and affect. His behavior is normal. His speech is delayed.   Nursing note and vitals reviewed.      Recent Labs      11/10/17   0410  11/11/17   0421  11/12/17   0129   WBC  14.4*  13.8*  13.9*   RBC  4.84  4.56*  4.64*   HEMOGLOBIN  10.8*  10.4*  10.4*   HEMATOCRIT  35.1*  33.2*  33.6*   MCV  72.5*  72.8*  72.4*   MCH  22.3*  22.8*  22.4*   MCHC  30.8*  31.3*  31.0*   RDW  42.2  41.8  42.3   PLATELETCT  698*  666*  734*   MPV  8.7*  8.9*  9.1     Recent Labs      11/12/17   0129   SODIUM  123*   POTASSIUM  4.5   CHLORIDE  93*   CO2  20   GLUCOSE  75   BUN  23*   CREATININE  0.83   CALCIUM  8.8                      Assessment/Plan     * Cellulitis of right knee- (present on admission)   Assessment & Plan    P{resented with malaise and confusion  Has R knee cellulitis and UTI on admission  With minimally draining wound  Status post wound culture on October 30 with MRSA, per lab, now  "heavy growth with ongoing leukocytosis  - resume on vanco per pharmacy   -We'll consider ID consult as needed  Discussed with pharmacy  On 11/8 Pharmacy had strongly recommended me speaking with ID> I spoke with ID, Dr. Juarez. She was OK doing PO Septra or PO Abx for CAMRSA. Will switch to PO antibiotics.  - MRI R LE- r/o osteomyelitis- positive mild osteomyelitis and cellulitis at the patella  Discussed with Dr. Rodriguez, will be evaluated although is a poor candidate for AKA, continue antibiotics with vancomycin x 6 weeks but after curbsiding with Dr. 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 Fluctuates with PO fluid intake. Not symptomatic and continues to be more interactive today. Continuing with daily fluctuations,  - fluctuating, asx  - continue to follow clinically  encourage PO intake  - salt tabs, 1gm BID        Leukocytosis- (present on admission)   Assessment & Plan    - 2nd to cellulitis R LE, r/o osteo  - s/p 14 days of abx  - blood and urine cultures negative  - follow clinically  - XR right knee and wound culture 10/30        PAD (peripheral artery disease) (CMS-HCC)- (present on admission)   Assessment & Plan    Significant PAD s/p left forefoot amputation and right BKA  - continue aspirin and statin  - not a candidate for prosthesis  - followed by vascular surgery and wound care        Dementia- (present on admission)   Assessment & Plan    Admitted for encephalopathy and debility. Has been evaluated by ethics committee, unable to make his own medical decisions.  - guardianship pending, no court date yet  - Patient's friend,\"wife\"Norma expressing concerns with guardianship hearing, would like to discuss recommendations for needs for guardianship, explained to patient and patient's friend regarding ethics committee meeting and per Dr. Eamon Blackmon, patient lacks capacity for " medical decision making and is requiring guardianship at this time.    - Patient's friend has been in contact with our  regarding her concerns, and multiple conversations regarding guardianship status   is aware, and well follow-up with patient and friend as needed  - 11/1 Discussed with RN, to follow up with Dr. Blackmon for any further recommendations regarding guardianship hearing  11/3- scars  regarding patient's wife/friend concerns,  to follow up, as per prior , patient didn't friend is not an appropriate guardian and does not have power of , [facility] staff aware        Hypomagnesemia- (present on admission)   Assessment & Plan    Mg 1.8 10/28, h/o alcohol dependence.  - oral mag daily and monitor  - IV mag PRN repletion        Essential hypertension- (present on admission)   Assessment & Plan    Currently normotensive.  - continue with metoprolol 75 mg BID  - PRN normodyne        Severe protein-calorie malnutrition (CMS-HCC)- (present on admission)   Assessment & Plan    Poor PO intake.  - Nutrition following  - Cont boost TID          .    Reviewed items::  Medications reviewed, Labs reviewed and Radiology images reviewed  Barrera catheter::  No Barrera  DVT prophylaxis pharmacological::  Heparin

## 2017-11-13 NOTE — PROGRESS NOTES
Assumed care of pt at 1900 from dayshift Rn. Pt is laying comfortably in bed, watching tv. Introduced self to pt and explained POC for the night. Pt is agreeable to this plan. Addressed needs at this time and pt has no further questions or comments for staff at this moment. All fall precautions in place and call light is within pt reach.

## 2017-11-13 NOTE — PROGRESS NOTES
Josie Osuna Fall Risk Assessment:     Last Known Fall: Within the last six months  Mobility: Use of assistive device/requires assist of two people  Medications: Cardiovascular or central nervous system meds  Mental Status/LOC/Awareness: Oriented to person and place  Toileting Needs: Incontinence  Volume/Electrolyte Status: Low blood sugar/electrolyte imbalances  Communication/Sensory: Neuropathy  Behavior: Appropriate behavior  Josie Osuna Fall Risk Total: 20  Fall Risk Level: HIGH RISK    Universal Fall Precautions:  call light/belongings in reach, bed in low position and locked, wheelchairs and assistive devices out of sight, siderails up x 2, use non-slip footwear, adequate lighting, clutter free and spill free environment, educate on level of risk, educate to call for assistance    Fall Risk Level Interventions:    TRIAL ([employer], NEURO, MED MARINA 5) Moderate Fall Risk Interventions  Place yellow fall risk ID band on patient: verified  Provide patient/family education based on risk assessment : verified  Educate patient/family to call staff for assistance when getting out of bed: verified  Place fall precaution signage outside patient door: verified  Utilize bed/chair fall alarm: verifiedTRIAL (elmenus 8, NEURO, [employer] MARINA 5) High Fall Risk Interventions  Place yellow fall risk ID band on patient: verified  Provide patient/family education based on risk assessment: completed  Educate patient/family to call staff for assistance when getting out of bed: completed  Place fall precaution signage outside patient door: verified  Place patient in room close to nursing station: currently not available/charge notified  Utilize bed/chair fall alarm: completed  Notify charge of high risk for huddle: completed    Patient Specific Interventions:     Medication: review medications with patient and family, limit combination of prn medications and assess need for orthostatic hypotension evaluation  Mental Status/LOC/Awareness:  reinforce falls education, check on patient hourly, reinforce the use of call light and provide activity  Toileting: provide frquent toileting, instruct patient/family on the need to call for assistance when toileting and do not leave patient unattended in bathroom/refer to toileting scripting  Volume/Electrolyte Status: ensure patient remains hydrated  Communication/Sensory: update plan of care on whiteboard, collaborate with doctor for possible speech therapy consult and ensure proper positioning when transferrng/ambulating  Behavioral: encourage patient to voice feelings, engage patient in daily activities and administer medication as ordered  Mobility: schedule physical activity throughout the day, provide comfort measures during transport, ensure bed is locked and in lowest position and instruct patient to exit bed on their strongest side

## 2017-11-13 NOTE — PROGRESS NOTES
Assumed care of pt at 0700, pt AAOx3 disoriented to time. Pt up to wheelchair this AM with assistance of PT. Pt very pleasant and denies need for pain medication at this time. RN discussed POC and addressed all questions and concerns. Pt call light within reach, pt educated to call prior to getting up and pt agreeable, treaded non slip sock placed to R foot, hourly rounding in place.

## 2017-11-14 LAB
ERYTHROCYTE [DISTWIDTH] IN BLOOD BY AUTOMATED COUNT: 42.4 FL (ref 35.9–50)
HCT VFR BLD AUTO: 34.6 % (ref 42–52)
HGB BLD-MCNC: 10.8 G/DL (ref 14–18)
MCH RBC QN AUTO: 22.8 PG (ref 27–33)
MCHC RBC AUTO-ENTMCNC: 31.2 G/DL (ref 33.7–35.3)
MCV RBC AUTO: 73.2 FL (ref 81.4–97.8)
PLATELET # BLD AUTO: 708 K/UL (ref 164–446)
PMV BLD AUTO: 9.1 FL (ref 9–12.9)
RBC # BLD AUTO: 4.73 M/UL (ref 4.7–6.1)
WBC # BLD AUTO: 15 K/UL (ref 4.8–10.8)

## 2017-11-14 PROCEDURE — 770021 HCHG ROOM/CARE - ISO PRIVATE

## 2017-11-14 PROCEDURE — 97535 SELF CARE MNGMENT TRAINING: CPT

## 2017-11-14 PROCEDURE — 700102 HCHG RX REV CODE 250 W/ 637 OVERRIDE(OP): Performed by: INTERNAL MEDICINE

## 2017-11-14 PROCEDURE — 97530 THERAPEUTIC ACTIVITIES: CPT

## 2017-11-14 PROCEDURE — A9270 NON-COVERED ITEM OR SERVICE: HCPCS | Performed by: INTERNAL MEDICINE

## 2017-11-14 PROCEDURE — 700111 HCHG RX REV CODE 636 W/ 250 OVERRIDE (IP): Performed by: HOSPITALIST

## 2017-11-14 PROCEDURE — A9270 NON-COVERED ITEM OR SERVICE: HCPCS | Performed by: HOSPITALIST

## 2017-11-14 PROCEDURE — 700102 HCHG RX REV CODE 250 W/ 637 OVERRIDE(OP): Performed by: HOSPITALIST

## 2017-11-14 PROCEDURE — 36415 COLL VENOUS BLD VENIPUNCTURE: CPT

## 2017-11-14 PROCEDURE — 85027 COMPLETE CBC AUTOMATED: CPT

## 2017-11-14 PROCEDURE — 99231 SBSQ HOSP IP/OBS SF/LOW 25: CPT | Performed by: HOSPITALIST

## 2017-11-14 RX ADMIN — METOPROLOL TARTRATE 75 MG: 25 TABLET, FILM COATED ORAL at 22:04

## 2017-11-14 RX ADMIN — FOLIC ACID 1 MG: 1 TABLET ORAL at 08:32

## 2017-11-14 RX ADMIN — STANDARDIZED SENNA CONCENTRATE AND DOCUSATE SODIUM 2 TABLET: 8.6; 5 TABLET, FILM COATED ORAL at 22:04

## 2017-11-14 RX ADMIN — SODIUM CHLORIDE TAB 1 GM 1 G: 1 TAB at 17:35

## 2017-11-14 RX ADMIN — MAGNESIUM GLUCONATE 500 MG ORAL TABLET 400 MG: 500 TABLET ORAL at 08:32

## 2017-11-14 RX ADMIN — LEVOTHYROXINE SODIUM 75 MCG: 75 TABLET ORAL at 06:12

## 2017-11-14 RX ADMIN — SULFAMETHOXAZOLE AND TRIMETHOPRIM 1 TABLET: 800; 160 TABLET ORAL at 06:12

## 2017-11-14 RX ADMIN — HEPARIN SODIUM 5000 UNITS: 5000 INJECTION, SOLUTION INTRAVENOUS; SUBCUTANEOUS at 22:05

## 2017-11-14 RX ADMIN — GABAPENTIN 100 MG: 100 CAPSULE ORAL at 08:32

## 2017-11-14 RX ADMIN — ATORVASTATIN CALCIUM 40 MG: 40 TABLET, FILM COATED ORAL at 22:04

## 2017-11-14 RX ADMIN — HEPARIN SODIUM 5000 UNITS: 5000 INJECTION, SOLUTION INTRAVENOUS; SUBCUTANEOUS at 08:34

## 2017-11-14 RX ADMIN — SULFAMETHOXAZOLE AND TRIMETHOPRIM 1 TABLET: 800; 160 TABLET ORAL at 22:04

## 2017-11-14 RX ADMIN — SODIUM CHLORIDE TAB 1 GM 1 G: 1 TAB at 08:32

## 2017-11-14 RX ADMIN — METOPROLOL TARTRATE 75 MG: 25 TABLET, FILM COATED ORAL at 08:32

## 2017-11-14 RX ADMIN — GABAPENTIN 100 MG: 100 CAPSULE ORAL at 15:14

## 2017-11-14 RX ADMIN — SULFAMETHOXAZOLE AND TRIMETHOPRIM 1 TABLET: 800; 160 TABLET ORAL at 15:14

## 2017-11-14 RX ADMIN — THERA TABS 1 TABLET: TAB at 08:33

## 2017-11-14 RX ADMIN — ASPIRIN 81 MG: 81 TABLET, COATED ORAL at 08:32

## 2017-11-14 RX ADMIN — GABAPENTIN 100 MG: 100 CAPSULE ORAL at 22:04

## 2017-11-14 ASSESSMENT — COGNITIVE AND FUNCTIONAL STATUS - GENERAL
DRESSING REGULAR UPPER BODY CLOTHING: A LITTLE
EATING MEALS: A LITTLE
SUGGESTED CMS G CODE MODIFIER DAILY ACTIVITY: CK
PERSONAL GROOMING: A LITTLE
HELP NEEDED FOR BATHING: A LOT
DAILY ACTIVITIY SCORE: 14
DRESSING REGULAR LOWER BODY CLOTHING: A LOT
TOILETING: TOTAL

## 2017-11-14 ASSESSMENT — ENCOUNTER SYMPTOMS
DEPRESSION: 0
NAUSEA: 0
EYES NEGATIVE: 1
HEADACHES: 0
MYALGIAS: 0
MEMORY LOSS: 1
ABDOMINAL PAIN: 0
SHORTNESS OF BREATH: 0
WEAKNESS: 1
TREMORS: 0
COUGH: 0

## 2017-11-14 ASSESSMENT — PAIN SCALES - GENERAL
PAINLEVEL_OUTOF10: 0
PAINLEVEL_OUTOF10: 0

## 2017-11-14 ASSESSMENT — PATIENT HEALTH QUESTIONNAIRE - PHQ9
1. LITTLE INTEREST OR PLEASURE IN DOING THINGS: NOT AT ALL
2. FEELING DOWN, DEPRESSED, IRRITABLE, OR HOPELESS: NOT AT ALL
SUM OF ALL RESPONSES TO PHQ9 QUESTIONS 1 AND 2: 0
SUM OF ALL RESPONSES TO PHQ QUESTIONS 1-9: 0

## 2017-11-14 NOTE — PROGRESS NOTES
"Pharmacy Kinetics 72 y.o. male on vancomycin day # 1 (restart) 2017    Vancomycin -restart last dose 17 @ 1800    Indication for Treatment: SSTI and Right patellar osteomyelitis     Pertinent history per medical record: Admitted on 2017 for UTI symptoms and cellulitis of the right knee. In the ED, he stated he generally felt weak and per his caregiver, he had been confused for a few days. Has already been treated for aspiration pneumonia, cellulitis, and UTI. Vancomycin started as wound growing MRSA. MRI revealed mild right patellar osteomyelitis.  Sensitivites showed CAMRSA sensitive to Bactrim, pt initiated on Bactrim DS po 1 tab bid, with dose increased to 1 tab tid starting 17.  MD re-starting vancomycin at this time for increased WBC's.  Strongly suggest ID consult.    Other antibiotics: Bactrim DS 1 tab po tid      Allergies: Review of patient's allergies indicates no known allergies.      List concerns for renal function: Age, BUN:SCr>20:1     Pertinent cultures to date:   10/30/17 - wound (R leg): MRSA heavy growth - vanc LOTTIE 2 to vanco, sensitive to bactrim      Recent Labs      17   0129  17   0418  17   0038   WBC  13.9*  12.9*  15.0*     Recent Labs      17   0129   BUN  23*   CREATININE  0.83     Intake/Output Summary (Last 24 hours) at 17 0843  Last data filed at 17 1507   Gross per 24 hour   Intake              480 ml   Output              700 ml   Net             -220 ml      Blood pressure 127/65, pulse 69, temperature 36.5 °C (97.7 °F), resp. rate 17, height 1.829 m (6' 0.01\"), weight 53.5 kg (117 lb 15.1 oz), SpO2 96 %. Temp (24hrs), Av.6 °C (97.9 °F), Min:36.3 °C (97.4 °F), Max:37 °C (98.6 °F)      A/P   1. Vancomycin dose change: Vancomycin 1300 mg IV x1 followed by vancomycin 1100mg IV q24 hr  2. Next vancomycin level: ~ 4 days (not ordered)  3. Goal trough: 12-16 mcg/mL  4. Comments: Some concern for renal accumulation, however, " patient was stable earlier this admission on Vancomycin 1100 mg IV q24h. Will re-load patient with 25 mg/kg load followed scheduled maintenance dose of ~ 20 mg/kg every 24 hours. Strongly recommend ID consult at this time to determine long term plan for treatment of osteomyelitis.    Aileen Edwards, PharmD

## 2017-11-14 NOTE — PROGRESS NOTES
[facility] Hospitalist Progress Note    Date of Service: 2017    Chief Complaint  72 y.o. male admitted 2017 with urinary tract infection and confusion.    Interval Problem Update  No complaints today continues to wait for guardianship..    Consultants/Specialty  Geriatrics  Palliative Care  Ethics committee/ Dr. Eamon Blackmon    Disposition  Pending guardianship and placement   assisting    Encourage out of bed, nursing staff to assist          Review of Systems   HENT: Negative for hearing loss.    Eyes: Negative.    Respiratory: Negative for cough and shortness of breath.    Cardiovascular: Negative for leg swelling.   Gastrointestinal: Negative for abdominal pain and nausea.   Genitourinary: Negative.    Musculoskeletal: Negative for myalgias.   Neurological: Positive for weakness. Negative for tremors and headaches.   Endo/Heme/Allergies: Negative.    Psychiatric/Behavioral: Positive for memory loss. Negative for depression.      Physical Exam  Laboratory/Imaging   Hemodynamics  Temp (24hrs), Av.5 °C (97.7 °F), Min:36.1 °C (97 °F), Max:37 °C (98.6 °F)   Temperature: 36.1 °C (97 °F)  Pulse  Av.2  Min: 58  Max: 144    Blood Pressure : 127/68      Respiratory      Respiration: 16, Pulse Oximetry: 97 %        RUL Breath Sounds: Clear, RML Breath Sounds: Diminished, RLL Breath Sounds: Diminished, RIGO Breath Sounds: Clear, LLL Breath Sounds: Diminished    Fluids    Intake/Output Summary (Last 24 hours) at 17 1138  Last data filed at 17 1507   Gross per 24 hour   Intake              240 ml   Output              700 ml   Net             -460 ml       Nutrition  Orders Placed This Encounter   Procedures   • DIET ORDER     Standing Status:   Standing     Number of Occurrences:   1     Order Specific Question:   Diet:     Answer:   Regular [1]     Comments:   please send mashed potatoes with lunch and dinner.     Order Specific Question:   Texture/Fiber modifications:     Answer:    Dysphagia 2(Pureed/Chopped)specify fluid consistency(question 6) [2]     Order Specific Question:   Consistency/Fluid modifications:     Answer:   Nectar Thick [2]     Order Specific Question:   Miscellaneous modifications:     Answer:   SLP - Deliver to Nursing Station [22]     Physical Exam   Constitutional: No distress.   Thin, frail   HENT:   Head: Normocephalic and atraumatic.   Mouth/Throat: No oropharyngeal exudate.   Eyes: EOM are normal. Pupils are equal, round, and reactive to light.   Neck: Normal range of motion. No JVD present.   Cardiovascular: Normal rate and regular rhythm.    Pulmonary/Chest: Effort normal.   Abdominal: Soft. There is no tenderness.   Musculoskeletal: He exhibits edema (awelling improved).   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 Right knee with circular wound, minimal drainage, wound is smaller but still to the tendon.   Neurological: He is alert. No cranial nerve deficit. Coordination normal.   AOx2   Skin: Skin is warm. He is not diaphoretic. There is erythema.   Psychiatric: He has a normal mood and affect. His behavior is normal. His speech is delayed.   Nursing note and vitals reviewed.      Recent Labs      11/12/17   0129  11/13/17   0418  11/14/17   0038   WBC  13.9*  12.9*  15.0*   RBC  4.64*  4.82  4.73   HEMOGLOBIN  10.4*  10.8*  10.8*   HEMATOCRIT  33.6*  34.6*  34.6*   MCV  72.4*  71.8*  73.2*   MCH  22.4*  22.4*  22.8*   MCHC  31.0*  31.2*  31.2*   RDW  42.3  42.2  42.4   PLATELETCT  734*  696*  708*   MPV  9.1  8.9*  9.1     Recent Labs      11/12/17   0129   SODIUM  123*   POTASSIUM  4.5   CHLORIDE  93*   CO2  20   GLUCOSE  75   BUN  23*   CREATININE  0.83   CALCIUM  8.8                      Assessment/Plan     * Cellulitis of right knee- (present on admission)   Assessment & Plan    P{resented with malaise and confusion  Has R knee cellulitis and UTI on admission  With minimally draining wound  Status post wound culture on October 30 with MRSA, per  "lab, now heavy growth with ongoing leukocytosis  - resume on vanco per pharmacy   -We'll consider ID consult as needed  Discussed with pharmacy  Continue PO antibiotics for now            Hyponatremia- (present on admission)   Assessment & Plan    Hypovolemic hyponatremia. Fluctuates with PO fluid intake. Not symptomatic and continues to be more interactive today. Continuing with daily fluctuations,  - fluctuating, asx  - continue to follow clinically  encourage PO intake  - salt tabs, 1gm BID        Leukocytosis- (present on admission)   Assessment & Plan    - 2nd to cellulitis R LE, r/o osteo  - s/p 14 days of abx  - blood and urine cultures negative  - follow clinically  - XR right knee and wound culture 10/30        PAD (peripheral artery disease) (CMS-[facility])- (present on admission)   Assessment & Plan    Significant PAD s/p left forefoot amputation and right BKA  - continue aspirin and statin  - not a candidate for prosthesis  - followed by vascular surgery and wound care        Dementia- (present on admission)   Assessment & Plan    Admitted for encephalopathy and debility. Has been evaluated by ethics committee, unable to make his own medical decisions.  - guardianship pending, no court date yet  - Patient's friend,\"wife\"Norma expressing concerns with guardianship hearing, would like to discuss recommendations for needs for guardianship, explained to patient and patient's friend regarding ethics committee meeting and per Dr. Eamon Blackmon, patient lacks capacity for medical decision making and is requiring guardianship at this time.    - Patient's friend has been in contact with our  regarding her concerns, and multiple conversations regarding guardianship status   is aware, and well follow-up with patient and friend as needed        Hypomagnesemia- (present on admission)   Assessment & Plan    Mg 1.8 10/28, h/o alcohol dependence.  - oral mag daily and monitor  - IV mag PRN " repletion        Essential hypertension- (present on admission)   Assessment & Plan    Currently normotensive.  - continue with metoprolol 75 mg BID  - PRN normodyne        Severe protein-calorie malnutrition (CMS-HCC)- (present on admission)   Assessment & Plan    Poor PO intake.  - Nutrition following  - Cont boost TID            Reviewed items::  Medications reviewed, Labs reviewed and Radiology images reviewed  Barrera catheter::  No Barrera  DVT prophylaxis pharmacological::  Heparin

## 2017-11-14 NOTE — WOUND TEAM
"[facility]  Inpatient Services  Wound and Skin Care Progress Note    HPI, PMH, SH: Reviewed  Unit where seen by Wound Team: [address]    WOUND TEAM FOLLOW UP:  re-evaluation of R knee and stump wound    SUBJECTIVE:  \"I think it's better\"    Self Report / Pain Level: denies     OBJECTIVE: sitting up in chair with dressing intact and stump CONNOR    WOUND TYPE, LOCATION, CHARACTERISTICS:  Wound POA  Right bka surgical incision  Periwound Skin: Intact  Drainage : None  Tissue Type and %:  Closed approximated incision line  Wound Edges:   Well approximated    Odor:  None   Exposed structure(s):  none  Signs and Symptoms of Infection: none    Wound: stage 2 pressure ulcer anterior right knee 10/3/17  Periwound Skin:  Intact; no erythema  Drainage : scant serosanguinous      Tissue Type and %:   100% red  Wound Edges:   attached    Odor:    none   Exposed structure(s):  none  Signs and Symptoms of Infection: none  Wound Type/Location:  Stage 2 pressure ulcer anterior right knee 10/3/17   Periwound:   intact      Drainage: scant serosanguinous       Tissue Type and %:  Red 100%    Wound Edges:  attached    Odor:  none      Exposed structure(s):  none   S&S of Infection:  none    Measurements:  (taken 11/13/17)    Length:  3cm   Width:  2cm      Depth:  <0.5cm    Tunnels/undermining: none     INTERVENTIONS BY WOUND TEAM:   Removed dressing to knee and cleansed with NS gauze.  Measurements and photo taken.  Right BKA incision appears intact, no open wounds.  Photo taken and betadine applied.  Covered knee wound with silver hydrofiber and secured with adhesive foam.  Discussed with staff RN.    Interdisciplinary consultation: staff RN, patient    EVALUATION AND PROGRESS OF WOUND(S):  Right knee wound improved with no ritu wound erythema and no cartilage observed.  Incision appears intact.    Factors affecting wound healing: PAD, decreased nutrition, smoker, alcohol abuse     Goals: decrease size of knee wound by 1% " each week      NURSING PLAN OF CARE:    Wound care orders:  No changes    Skin care: See Skin Care orders:        Rectal tube care: See Rectal Tube Care orders:      Other orders:           WOUND TEAM PLAN OF CARE (X):   NPWT change 3 x week:        Dressing changes:       Follow up as needed:     Other: X once weekly

## 2017-11-14 NOTE — PROGRESS NOTES
[facility] Hospitalist Progress Note    Date of Service: 2017    Chief Complaint  72 y.o. male admitted 2017 with urinary tract infection and confusion.    Interval Problem Update  Awaiting guardianship. Wound team came by, changed dressing. No new issues on that.    Consultants/Specialty  Geriatrics  Palliative Care  Ethics committee/ Dr. Eamon Blackmon    Disposition  Pending guardianship and placement   assisting  No Court date yet    Encourage out of bed, nursing staff to assist          Review of Systems   Constitutional: Negative for chills and fever.   HENT: Negative for hearing loss.    Respiratory: Negative for cough and shortness of breath.    Cardiovascular: Negative for leg swelling.   Gastrointestinal: Negative for abdominal pain and nausea.   Musculoskeletal: Negative for myalgias.   Neurological: Positive for weakness. Negative for tremors and headaches.   Psychiatric/Behavioral: Positive for memory loss. Negative for depression.      Physical Exam  Laboratory/Imaging   Hemodynamics  Temp (24hrs), Av.6 °C (97.8 °F), Min:36.3 °C (97.4 °F), Max:36.7 °C (98.1 °F)   Temperature: 36.3 °C (97.4 °F)  Pulse  Av.4  Min: 58  Max: 144    Blood Pressure : 119/58      Respiratory      Respiration: 18, Pulse Oximetry: 98 %        RUL Breath Sounds: Clear, RML Breath Sounds: Diminished, RLL Breath Sounds: Diminished, RIGO Breath Sounds: Clear, LLL Breath Sounds: Diminished    Fluids    Intake/Output Summary (Last 24 hours) at 17 1720  Last data filed at 17 1507   Gross per 24 hour   Intake              480 ml   Output             2100 ml   Net            -1620 ml       Nutrition  Orders Placed This Encounter   Procedures   • DIET ORDER     Standing Status:   Standing     Number of Occurrences:   1     Order Specific Question:   Diet:     Answer:   Regular [1]     Comments:   please send mashed potatoes with lunch and dinner.     Order Specific Question:   Texture/Fiber  modifications:     Answer:   Dysphagia 2(Pureed/Chopped)specify fluid consistency(question 6) [2]     Order Specific Question:   Consistency/Fluid modifications:     Answer:   Nectar Thick [2]     Order Specific Question:   Miscellaneous modifications:     Answer:   SLP - Deliver to Nursing Station [22]     Physical Exam   Constitutional: No distress.   Thin, frail   HENT:   Head: Normocephalic and atraumatic.   Mouth/Throat: No oropharyngeal exudate.   Eyes: EOM are normal. Pupils are equal, round, and reactive to light.   Neck: Normal range of motion. No JVD present.   Cardiovascular: Normal rate and regular rhythm.    Pulmonary/Chest: Effort normal.   Abdominal: Soft. There is no tenderness.   Musculoskeletal: He exhibits edema (awelling improved).   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 Right knee with circular wound, minimal drainage, wound is smaller but still to the tendon.   Neurological: He is alert. No cranial nerve deficit. Coordination normal.   AOx2   Skin: Skin is warm. He is not diaphoretic. There is erythema.   Psychiatric: He has a normal mood and affect. His behavior is normal. His speech is delayed.   Nursing note and vitals reviewed.      Recent Labs      11/11/17   0421  11/12/17   0129  11/13/17   0418   WBC  13.8*  13.9*  12.9*   RBC  4.56*  4.64*  4.82   HEMOGLOBIN  10.4*  10.4*  10.8*   HEMATOCRIT  33.2*  33.6*  34.6*   MCV  72.8*  72.4*  71.8*   MCH  22.8*  22.4*  22.4*   MCHC  31.3*  31.0*  31.2*   RDW  41.8  42.3  42.2   PLATELETCT  666*  734*  696*   MPV  8.9*  9.1  8.9*     Recent Labs      11/12/17   0129   SODIUM  123*   POTASSIUM  4.5   CHLORIDE  93*   CO2  20   GLUCOSE  75   BUN  23*   CREATININE  0.83   CALCIUM  8.8                      Assessment/Plan     * Cellulitis of right knee- (present on admission)   Assessment & Plan    P{resented with malaise and confusion  Has R knee cellulitis and UTI on admission  With minimally draining wound  Status post wound culture  "on October 30 with MRSA, per lab, now heavy growth with ongoing leukocytosis  - resume on vanco per pharmacy   -We'll consider ID consult as needed  Discussed with pharmacy  On 11/8 Pharmacy had strongly recommended me speaking with ID> I spoke with ID, Dr. Juarez. She was OK doing PO Septra or PO Abx for CAMRSA. Will switch to PO antibiotics.  - MRI R LE- r/o osteomyelitis- positive mild osteomyelitis and cellulitis at the patella  Discussed with Dr. Rodriguez, will be evaluated although is a poor candidate for AKA, continue antibiotics with vancomycin x 6 weeks but after curbsiding with Dr. 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 Fluctuates with PO fluid intake. Not symptomatic and continues to be more interactive today. Continuing with daily fluctuations,  - fluctuating, asx  - continue to follow clinically  encourage PO intake  - salt tabs, 1gm BID        Leukocytosis- (present on admission)   Assessment & Plan    - 2nd to cellulitis R LE, r/o osteo  - s/p 14 days of abx  - blood and urine cultures negative  - follow clinically  - XR right knee and wound culture 10/30        PAD (peripheral artery disease) (CMS-[facility])- (present on admission)   Assessment & Plan    Significant PAD s/p left forefoot amputation and right BKA  - continue aspirin and statin  - not a candidate for prosthesis  - followed by vascular surgery and wound care        Dementia- (present on admission)   Assessment & Plan    Admitted for encephalopathy and debility. Has been evaluated by ethics committee, unable to make his own medical decisions.  - guardianship pending, no court date yet  - Patient's friend,\"wife\"Norma expressing concerns with guardianship hearing, would like to discuss recommendations for needs for guardianship, explained to patient and patient's friend regarding ethics committee meeting and per Dr. Knutson " Neymar, patient lacks capacity for medical decision making and is requiring guardianship at this time.    - Patient's friend has been in contact with our  regarding her concerns, and multiple conversations regarding guardianship status   is aware, and well follow-up with patient and friend as needed  - 11/1 Discussed with RN, to follow up with Dr. Blackmon for any further recommendations regarding guardianship hearing  11/3- scars  regarding patient's wife/friend concerns,  to follow up, as per prior , patient didn't friend is not an appropriate guardian and does not have power of , [facility] staff aware        Hypomagnesemia- (present on admission)   Assessment & Plan    Mg 1.8 10/28, h/o alcohol dependence.  - oral mag daily and monitor  - IV mag PRN repletion        Essential hypertension- (present on admission)   Assessment & Plan    Currently normotensive.  - continue with metoprolol 75 mg BID  - PRN normodyne        Severe protein-calorie malnutrition (CMS-HCC)- (present on admission)   Assessment & Plan    Poor PO intake.  - Nutrition following  - Cont boost TID            Reviewed items::  Medications reviewed, Labs reviewed and Radiology images reviewed  Barrera catheter::  No Barrera  DVT prophylaxis pharmacological::  Heparin

## 2017-11-14 NOTE — CARE PLAN
Problem: Discharge Barriers/Planning  Goal: Patient's continuum of care needs will be met  Patient is pending guardianship

## 2017-11-14 NOTE — PROGRESS NOTES
A&Ox3, remains disoriented to time. Denies pain. R knee wound assessed, dressing CDI. R BKA incision, CONNOR, no s/s of infection. No s/s of skin breakdown at this time, remains on full waffle mattress. q 2 hr turns in effect. POC discussed, including need to get OOB x 3 in to wheelchair. Pt agrees. WBC increased to 15, Vancomycin ordered per MD    Remains in isolation for MRSA in wound, education done with pt

## 2017-11-15 LAB
ANION GAP SERPL CALC-SCNC: 6 MMOL/L (ref 0–11.9)
BASOPHILS # BLD AUTO: 2.3 % (ref 0–1.8)
BASOPHILS # BLD: 0.33 K/UL (ref 0–0.12)
BUN SERPL-MCNC: 18 MG/DL (ref 8–22)
CALCIUM SERPL-MCNC: 9.3 MG/DL (ref 8.5–10.5)
CHLORIDE SERPL-SCNC: 93 MMOL/L (ref 96–112)
CO2 SERPL-SCNC: 21 MMOL/L (ref 20–33)
CREAT SERPL-MCNC: 0.68 MG/DL (ref 0.5–1.4)
EOSINOPHIL # BLD AUTO: 0.79 K/UL (ref 0–0.51)
EOSINOPHIL NFR BLD: 5.6 % (ref 0–6.9)
ERYTHROCYTE [DISTWIDTH] IN BLOOD BY AUTOMATED COUNT: 42.3 FL (ref 35.9–50)
GFR SERPL CREATININE-BSD FRML MDRD: >60 ML/MIN/1.73 M 2
GLUCOSE SERPL-MCNC: 93 MG/DL (ref 65–99)
HCT VFR BLD AUTO: 35.2 % (ref 42–52)
HGB BLD-MCNC: 10.9 G/DL (ref 14–18)
IMM GRANULOCYTES # BLD AUTO: 0.08 K/UL (ref 0–0.11)
IMM GRANULOCYTES NFR BLD AUTO: 0.6 % (ref 0–0.9)
LYMPHOCYTES # BLD AUTO: 2.12 K/UL (ref 1–4.8)
LYMPHOCYTES NFR BLD: 15 % (ref 22–41)
MCH RBC QN AUTO: 22.2 PG (ref 27–33)
MCHC RBC AUTO-ENTMCNC: 31 G/DL (ref 33.7–35.3)
MCV RBC AUTO: 71.8 FL (ref 81.4–97.8)
MONOCYTES # BLD AUTO: 1 K/UL (ref 0–0.85)
MONOCYTES NFR BLD AUTO: 7.1 % (ref 0–13.4)
NEUTROPHILS # BLD AUTO: 9.81 K/UL (ref 1.82–7.42)
NEUTROPHILS NFR BLD: 69.4 % (ref 44–72)
NRBC # BLD AUTO: 0 K/UL
NRBC BLD AUTO-RTO: 0 /100 WBC
PLATELET # BLD AUTO: 766 K/UL (ref 164–446)
PMV BLD AUTO: 9.1 FL (ref 9–12.9)
POTASSIUM SERPL-SCNC: 4.7 MMOL/L (ref 3.6–5.5)
RBC # BLD AUTO: 4.9 M/UL (ref 4.7–6.1)
SODIUM SERPL-SCNC: 120 MMOL/L (ref 135–145)
WBC # BLD AUTO: 14.1 K/UL (ref 4.8–10.8)

## 2017-11-15 PROCEDURE — 700102 HCHG RX REV CODE 250 W/ 637 OVERRIDE(OP): Performed by: HOSPITALIST

## 2017-11-15 PROCEDURE — A9270 NON-COVERED ITEM OR SERVICE: HCPCS | Performed by: HOSPITALIST

## 2017-11-15 PROCEDURE — 770021 HCHG ROOM/CARE - ISO PRIVATE

## 2017-11-15 PROCEDURE — A9270 NON-COVERED ITEM OR SERVICE: HCPCS | Performed by: INTERNAL MEDICINE

## 2017-11-15 PROCEDURE — 700102 HCHG RX REV CODE 250 W/ 637 OVERRIDE(OP): Performed by: INTERNAL MEDICINE

## 2017-11-15 PROCEDURE — 80048 BASIC METABOLIC PNL TOTAL CA: CPT

## 2017-11-15 PROCEDURE — 85025 COMPLETE CBC W/AUTO DIFF WBC: CPT

## 2017-11-15 PROCEDURE — 36415 COLL VENOUS BLD VENIPUNCTURE: CPT

## 2017-11-15 PROCEDURE — 700111 HCHG RX REV CODE 636 W/ 250 OVERRIDE (IP): Performed by: HOSPITALIST

## 2017-11-15 PROCEDURE — 92526 ORAL FUNCTION THERAPY: CPT

## 2017-11-15 PROCEDURE — 99232 SBSQ HOSP IP/OBS MODERATE 35: CPT | Performed by: HOSPITALIST

## 2017-11-15 RX ORDER — SODIUM CHLORIDE 1 G/1
2 TABLET ORAL 2 TIMES DAILY WITH MEALS
Status: DISCONTINUED | OUTPATIENT
Start: 2017-11-15 | End: 2017-11-29

## 2017-11-15 RX ADMIN — GABAPENTIN 100 MG: 100 CAPSULE ORAL at 16:04

## 2017-11-15 RX ADMIN — HEPARIN SODIUM 5000 UNITS: 5000 INJECTION, SOLUTION INTRAVENOUS; SUBCUTANEOUS at 22:28

## 2017-11-15 RX ADMIN — THERA TABS 1 TABLET: TAB at 08:33

## 2017-11-15 RX ADMIN — ASPIRIN 81 MG: 81 TABLET, COATED ORAL at 08:32

## 2017-11-15 RX ADMIN — ATORVASTATIN CALCIUM 40 MG: 40 TABLET, FILM COATED ORAL at 22:28

## 2017-11-15 RX ADMIN — SULFAMETHOXAZOLE AND TRIMETHOPRIM 1 TABLET: 800; 160 TABLET ORAL at 22:28

## 2017-11-15 RX ADMIN — METOPROLOL TARTRATE 75 MG: 25 TABLET, FILM COATED ORAL at 22:28

## 2017-11-15 RX ADMIN — MAGNESIUM GLUCONATE 500 MG ORAL TABLET 400 MG: 500 TABLET ORAL at 08:32

## 2017-11-15 RX ADMIN — GABAPENTIN 100 MG: 100 CAPSULE ORAL at 08:32

## 2017-11-15 RX ADMIN — METOPROLOL TARTRATE 75 MG: 25 TABLET, FILM COATED ORAL at 08:32

## 2017-11-15 RX ADMIN — LEVOTHYROXINE SODIUM 75 MCG: 75 TABLET ORAL at 06:09

## 2017-11-15 RX ADMIN — SODIUM CHLORIDE TAB 1 GM 1 G: 1 TAB at 08:32

## 2017-11-15 RX ADMIN — SODIUM CHLORIDE TAB 1 GM 2 G: 1 TAB at 16:32

## 2017-11-15 RX ADMIN — SULFAMETHOXAZOLE AND TRIMETHOPRIM 1 TABLET: 800; 160 TABLET ORAL at 13:38

## 2017-11-15 RX ADMIN — FOLIC ACID 1 MG: 1 TABLET ORAL at 08:33

## 2017-11-15 RX ADMIN — STANDARDIZED SENNA CONCENTRATE AND DOCUSATE SODIUM 2 TABLET: 8.6; 5 TABLET, FILM COATED ORAL at 09:00

## 2017-11-15 RX ADMIN — GABAPENTIN 100 MG: 100 CAPSULE ORAL at 22:28

## 2017-11-15 RX ADMIN — SULFAMETHOXAZOLE AND TRIMETHOPRIM 1 TABLET: 800; 160 TABLET ORAL at 06:08

## 2017-11-15 RX ADMIN — HEPARIN SODIUM 5000 UNITS: 5000 INJECTION, SOLUTION INTRAVENOUS; SUBCUTANEOUS at 08:33

## 2017-11-15 ASSESSMENT — PAIN SCALES - GENERAL
PAINLEVEL_OUTOF10: 0

## 2017-11-15 ASSESSMENT — ENCOUNTER SYMPTOMS
ABDOMINAL PAIN: 0
EYES NEGATIVE: 1
WEAKNESS: 1
COUGH: 0
MYALGIAS: 0
MEMORY LOSS: 1
HEADACHES: 0
DEPRESSION: 0
TREMORS: 0
NAUSEA: 0
SHORTNESS OF BREATH: 0

## 2017-11-15 NOTE — THERAPY
"Occupational Therapy Treatment completed with focus on ADLs and ADL transfers.  Functional Status:  Mod A supine to sit.  Pt completed slideboard transfer to  with mod A x2.  Pt with poor UE strength, limiting his ability to assist with transfer; pt very feaful and leans backward during transfer.  Pt agreeable only to sit up in  to have linens changed.  Pt refused grooming/bed bath.  Max A to return to bed with slideboard.  Pt incontinent of bowel with total A for clean up/hygiene.  Plan of Care: Will benefit from Occupational Therapy 3 times per week  Discharge Recommendations:  Equipment Will Continue to Assess for Equipment Needs. Post-acute therapy Discharge to a transitional care facility for continued skilled therapy services.    See \"Rehab Therapy-Acute\" Patient Summary Report for complete documentation.   "

## 2017-11-15 NOTE — PROGRESS NOTES
[facility] Hospitalist Progress Note    Date of Service: 11/15/2017    Chief Complaint  72 y.o. male admitted 2017 with urinary tract infection and confusion.    Interval Problem Update  No complaints today continues to wait for guardianship..    Consultants/Specialty  Geriatrics  Palliative Care  Ethics committee/ Dr. Eamon Blackmon    Disposition  Pending guardianship and placement   assisting    Encourage out of bed, nursing staff to assist          Review of Systems   HENT: Negative for hearing loss.    Eyes: Negative.    Respiratory: Negative for cough and shortness of breath.    Cardiovascular: Negative for leg swelling.   Gastrointestinal: Negative for abdominal pain and nausea.   Genitourinary: Negative.    Musculoskeletal: Negative for myalgias.   Neurological: Positive for weakness. Negative for tremors and headaches.   Endo/Heme/Allergies: Negative.    Psychiatric/Behavioral: Positive for memory loss. Negative for depression.      Physical Exam  Laboratory/Imaging   Hemodynamics  Temp (24hrs), Av.8 °C (98.2 °F), Min:36.6 °C (97.9 °F), Max:37.1 °C (98.7 °F)   Temperature: 37.1 °C (98.7 °F)  Pulse  Av  Min: 58  Max: 144    Blood Pressure : 131/64      Respiratory      Respiration: 19, Pulse Oximetry: 94 %        RUL Breath Sounds: Diminished, RML Breath Sounds: Diminished, RLL Breath Sounds: Diminished, RIGO Breath Sounds: Diminished, LLL Breath Sounds: Diminished    Fluids    Intake/Output Summary (Last 24 hours) at 11/15/17 1157  Last data filed at 11/15/17 1000   Gross per 24 hour   Intake              120 ml   Output                0 ml   Net              120 ml       Nutrition  Orders Placed This Encounter   Procedures   • DIET ORDER     Standing Status:   Standing     Number of Occurrences:   1     Order Specific Question:   Diet:     Answer:   Regular [1]     Comments:   please send mashed potatoes with lunch and dinner.     Order Specific Question:   Texture/Fiber modifications:      Answer:   Dysphagia 2(Pureed/Chopped)specify fluid consistency(question 6) [2]     Order Specific Question:   Consistency/Fluid modifications:     Answer:   Nectar Thick [2]     Order Specific Question:   Miscellaneous modifications:     Answer:   SLP - Deliver to Nursing Station [22]     Physical Exam   Constitutional: No distress.   Thin, frail   HENT:   Head: Normocephalic and atraumatic.   Mouth/Throat: No oropharyngeal exudate.   Eyes: EOM are normal. Pupils are equal, round, and reactive to light.   Neck: Normal range of motion. No JVD present.   Cardiovascular: Normal rate and regular rhythm.    Pulmonary/Chest: Effort normal.   Abdominal: Soft. There is no tenderness.   Musculoskeletal: He exhibits edema (awelling improved).   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 Right knee with circular wound, minimal drainage, wound is smaller but still to the tendon.   Neurological: He is alert. No cranial nerve deficit. Coordination normal.   AOx2   Skin: Skin is warm. He is not diaphoretic. There is erythema.   Psychiatric: He has a normal mood and affect. His behavior is normal. His speech is delayed.   Nursing note and vitals reviewed.      Recent Labs      11/13/17   0418  11/14/17   0038  11/15/17   0021   WBC  12.9*  15.0*  14.1*   RBC  4.82  4.73  4.90   HEMOGLOBIN  10.8*  10.8*  10.9*   HEMATOCRIT  34.6*  34.6*  35.2*   MCV  71.8*  73.2*  71.8*   MCH  22.4*  22.8*  22.2*   MCHC  31.2*  31.2*  31.0*   RDW  42.2  42.4  42.3   PLATELETCT  696*  708*  766*   MPV  8.9*  9.1  9.1     Recent Labs      11/15/17   0021   SODIUM  120*   POTASSIUM  4.7   CHLORIDE  93*   CO2  21   GLUCOSE  93   BUN  18   CREATININE  0.68   CALCIUM  9.3                      Assessment/Plan     * Cellulitis of right knee- (present on admission)   Assessment & Plan    P{resented with malaise and confusion  Has R knee cellulitis and UTI on admission  With minimally draining wound  Status post wound culture on October 30 with  "MRSA, per lab, now heavy growth with ongoing leukocytosis  - resume on vanco per pharmacy   -We'll consider ID consult as needed  Discussed with pharmacy  Continue PO antibiotics for now            Hyponatremia- (present on admission)   Assessment & Plan    Hypovolemic hyponatremia. Fluctuates with PO fluid intake. Not symptomatic and continues to be more interactive today. Continuing with daily fluctuations,  -Asymptomatic we'll repeat BMP tomorrow increase SodiumTabs to 2 g        Leukocytosis- (present on admission)   Assessment & Plan    - 2nd to cellulitis R LE, r/o osteo  - s/p 14 days of abx  - blood and urine cultures negative  - follow clinically  - XR right knee and wound culture 10/30        PAD (peripheral artery disease) (CMS-[facility])- (present on admission)   Assessment & Plan    Significant PAD s/p left forefoot amputation and right BKA  - continue aspirin and statin  - not a candidate for prosthesis  - followed by vascular surgery and wound care        Dementia- (present on admission)   Assessment & Plan    Admitted for encephalopathy and debility. Has been evaluated by ethics committee, unable to make his own medical decisions.  - guardianship pending, no court date yet  - Patient's friend,\"wife\"Norma expressing concerns with guardianship hearing, would like to discuss recommendations for needs for guardianship, explained to patient and patient's friend regarding ethics committee meeting and per Dr. Eamon Blackmon, patient lacks capacity for medical decision making and is requiring guardianship at this time.    - Patient's friend has been in contact with our  regarding her concerns, and multiple conversations regarding guardianship status   is aware, and well follow-up with patient and friend as needed        Hypomagnesemia- (present on admission)   Assessment & Plan    Mg 1.8 10/28, h/o alcohol dependence.  - oral mag daily and monitor  - IV mag PRN repletion        Essential " hypertension- (present on admission)   Assessment & Plan    Currently normotensive.  - continue with metoprolol 75 mg BID  - PRN normodyne        Severe protein-calorie malnutrition (CMS-HCC)- (present on admission)   Assessment & Plan    Poor PO intake.  - Nutrition following  - Cont boost TID            Reviewed items::  Medications reviewed, Labs reviewed and Radiology images reviewed  Barrera catheter::  No Barrera  DVT prophylaxis pharmacological::  Heparin      I spent a total of 28 minutes of care time during this clinical encounter of which > 50% was devoted to counseling and coordinating care including review of records, pertinent lab data and studies, as well as discussing diagnostic evaluation and work up, planned therapeutic interventions and future disposition of care. Where indicated, the assessment and plan reflect discussion of patient with consultants, other healthcare providers, family members, and additional research needed to obtain further information in formulating the plan of care of this patient. This time includes no procedures or overlap with other providers.

## 2017-11-15 NOTE — PROGRESS NOTES
Report called to JESSICA Munoz on Ortho. Pt being transferred to ortho as a lateral transfer to double up in isolation room.

## 2017-11-15 NOTE — CARE PLAN
Problem: Safety  Goal: Will remain free from falls    Intervention: Assess risk factors for falls   11/14/17 2100   OTHER   Fall Risk High Risk to Fall - 2 or more points    Risk for Injury-Any positive answers results in the pt being at high risk for fall related injury Not Applicable   Mobility Status Assessment 2-2 Healthcare Providers Required for Assistance with Ambulation & Transfer   History of fall 2   Date of Last Fall 11/02/17   Pt Calls for Assistance No   Environment is clutter free. Personal possession and bedside table near patient. Patient encouraged to call when needing assistance. Call light within reach. Bed alarm on. L Non-skid socks in place. Bed locked and in the lowest position.       Problem: Venous Thromboembolism (VTW)/Deep Vein Thrombosis (DVT) Prevention:  Goal: Patient will participate in Venous Thrombosis (VTE)/Deep Vein Thrombosis (DVT)Prevention Measures    Intervention: Assess and monitor for anticoagulation complications  Patient receiving unfractionated heparin, refer to MAR.

## 2017-11-15 NOTE — PROGRESS NOTES
Josie Osuna Fall Risk Assessment:     Last Known Fall: Within the last six months  Mobility: Use of assistive device/requires assist of two people  Medications: Cardiovascular or central nervous system meds  Mental Status/LOC/Awareness: Oriented to person and place  Toileting Needs: Incontinence  Volume/Electrolyte Status: Low blood sugar/electrolyte imbalances  Communication/Sensory: Neuropathy  Behavior: Appropriate behavior  Josie Osuna Fall Risk Total: 20  Fall Risk Level: HIGH RISK    Universal Fall Precautions:  call light/belongings in reach, bed in low position and locked, siderails up x 2, use non-slip footwear, adequate lighting, clutter free and spill free environment, educate on level of risk, educate to call for assistance    Fall Risk Level Interventions:    TRIAL (TELE 8, NEURO, MED MARINA 5) Moderate Fall Risk Interventions  Place yellow fall risk ID band on patient: verified  Provide patient/family education based on risk assessment : verified  Educate patient/family to call staff for assistance when getting out of bed: verified  Place fall precaution signage outside patient door: verified  Utilize bed/chair fall alarm: verifiedTRIAL (TELE 8, NEURO, IR [employer] MARINA 5) High Fall Risk Interventions  Place yellow fall risk ID band on patient: verified  Provide patient/family education based on risk assessment: verified  Educate patient/family to call staff for assistance when getting out of bed: verified  Place fall precaution signage outside patient door: verified  Place patient in room close to nursing station: verified  Utilize bed/chair fall alarm: verified  Notify charge of high risk for huddle: verified    Patient Specific Interventions:     Medication: review medications with patient and family  Mental Status/LOC/Awareness: reorient patient, reinforce falls education, check on patient hourly, utilize bed/chair fall alarm and reinforce the use of call light  Toileting: provide frquent toileting and  instruct patient/family on the need to call for assistance when toileting  Volume/Electrolyte Status: ensure patient remains hydrated and monitor abnormal lab values  Communication/Sensory: update plan of care on whiteboard and ensure proper positioning when transferrng/ambulating  Behavioral: encourage patient to voice feelings and administer medication as ordered  Mobility: utilize bed/chair fall alarm, ensure bed is locked and in lowest position, provide appropriate assistive device and instruct patient to exit bed on their strongest side

## 2017-11-15 NOTE — PROGRESS NOTES
Report received from day RN. Assumed patient care at 1900. Patient is AAOx3, patient reoriented to time. Patient appears calm and in no acute distress. Labs and orders reviewed. Assessment completed. Patient denies any pain at this time. Patient provided with scheduled medication crushed and administered in applesause, refer to MAR. Patient Right Knee dressing observed.  Plan of care discussed with patient; questions have been answered at this time. Patient needs have been met at this time. Patient encouraged to call when needing assistance. Call light within reach. Bed alarm on. L non-skid sock in place. Bed locked and in the lowest position. Hourly rounding in place

## 2017-11-15 NOTE — DIETARY
Brief Nutrition Update:  Patient continues to eat 25-50% of meals and he receives Boost VHC as well as magic cups and snacks between meals.  Reviewed snack and supplement preferences with patient.  Weight down slightly to 53.5 kg. Admit bed scale weight was 55.4 kg.  Some weight loss not surprising given extended hospital stay.  Patient receiving adequate kcal to meet estimated needs.  Nutrition Representative will continue to see him for ongoing meal and snack preferences.  RD following.

## 2017-11-16 LAB
ANION GAP SERPL CALC-SCNC: 10 MMOL/L (ref 0–11.9)
BUN SERPL-MCNC: 17 MG/DL (ref 8–22)
CALCIUM SERPL-MCNC: 9.1 MG/DL (ref 8.5–10.5)
CHLORIDE SERPL-SCNC: 95 MMOL/L (ref 96–112)
CO2 SERPL-SCNC: 20 MMOL/L (ref 20–33)
CREAT SERPL-MCNC: 0.69 MG/DL (ref 0.5–1.4)
ERYTHROCYTE [DISTWIDTH] IN BLOOD BY AUTOMATED COUNT: 43.2 FL (ref 35.9–50)
GFR SERPL CREATININE-BSD FRML MDRD: >60 ML/MIN/1.73 M 2
GLUCOSE SERPL-MCNC: 79 MG/DL (ref 65–99)
HCT VFR BLD AUTO: 35.4 % (ref 42–52)
HGB BLD-MCNC: 11 G/DL (ref 14–18)
MCH RBC QN AUTO: 22.5 PG (ref 27–33)
MCHC RBC AUTO-ENTMCNC: 31.1 G/DL (ref 33.7–35.3)
MCV RBC AUTO: 72.4 FL (ref 81.4–97.8)
PLATELET # BLD AUTO: 701 K/UL (ref 164–446)
PMV BLD AUTO: 8.7 FL (ref 9–12.9)
POTASSIUM SERPL-SCNC: 4.4 MMOL/L (ref 3.6–5.5)
RBC # BLD AUTO: 4.89 M/UL (ref 4.7–6.1)
SODIUM SERPL-SCNC: 125 MMOL/L (ref 135–145)
WBC # BLD AUTO: 13.1 K/UL (ref 4.8–10.8)

## 2017-11-16 PROCEDURE — 700102 HCHG RX REV CODE 250 W/ 637 OVERRIDE(OP): Performed by: INTERNAL MEDICINE

## 2017-11-16 PROCEDURE — A9270 NON-COVERED ITEM OR SERVICE: HCPCS | Performed by: INTERNAL MEDICINE

## 2017-11-16 PROCEDURE — 80048 BASIC METABOLIC PNL TOTAL CA: CPT

## 2017-11-16 PROCEDURE — A9270 NON-COVERED ITEM OR SERVICE: HCPCS | Performed by: HOSPITALIST

## 2017-11-16 PROCEDURE — G8996 SWALLOW CURRENT STATUS: HCPCS | Mod: CK

## 2017-11-16 PROCEDURE — 99232 SBSQ HOSP IP/OBS MODERATE 35: CPT | Performed by: HOSPITALIST

## 2017-11-16 PROCEDURE — 85027 COMPLETE CBC AUTOMATED: CPT

## 2017-11-16 PROCEDURE — 700111 HCHG RX REV CODE 636 W/ 250 OVERRIDE (IP): Performed by: HOSPITALIST

## 2017-11-16 PROCEDURE — 700102 HCHG RX REV CODE 250 W/ 637 OVERRIDE(OP): Performed by: HOSPITALIST

## 2017-11-16 PROCEDURE — G8997 SWALLOW GOAL STATUS: HCPCS | Mod: CI

## 2017-11-16 PROCEDURE — 36415 COLL VENOUS BLD VENIPUNCTURE: CPT

## 2017-11-16 PROCEDURE — 92526 ORAL FUNCTION THERAPY: CPT

## 2017-11-16 PROCEDURE — 770021 HCHG ROOM/CARE - ISO PRIVATE

## 2017-11-16 RX ADMIN — HEPARIN SODIUM 5000 UNITS: 5000 INJECTION, SOLUTION INTRAVENOUS; SUBCUTANEOUS at 09:49

## 2017-11-16 RX ADMIN — HEPARIN SODIUM 5000 UNITS: 5000 INJECTION, SOLUTION INTRAVENOUS; SUBCUTANEOUS at 20:48

## 2017-11-16 RX ADMIN — GABAPENTIN 100 MG: 100 CAPSULE ORAL at 15:22

## 2017-11-16 RX ADMIN — LEVOTHYROXINE SODIUM 75 MCG: 75 TABLET ORAL at 05:33

## 2017-11-16 RX ADMIN — SODIUM CHLORIDE TAB 1 GM 2 G: 1 TAB at 17:07

## 2017-11-16 RX ADMIN — SULFAMETHOXAZOLE AND TRIMETHOPRIM 1 TABLET: 800; 160 TABLET ORAL at 05:33

## 2017-11-16 RX ADMIN — SULFAMETHOXAZOLE AND TRIMETHOPRIM 1 TABLET: 800; 160 TABLET ORAL at 20:48

## 2017-11-16 RX ADMIN — SULFAMETHOXAZOLE AND TRIMETHOPRIM 1 TABLET: 800; 160 TABLET ORAL at 15:22

## 2017-11-16 RX ADMIN — ATORVASTATIN CALCIUM 40 MG: 40 TABLET, FILM COATED ORAL at 20:48

## 2017-11-16 RX ADMIN — METOPROLOL TARTRATE 75 MG: 25 TABLET, FILM COATED ORAL at 09:49

## 2017-11-16 RX ADMIN — GABAPENTIN 100 MG: 100 CAPSULE ORAL at 09:49

## 2017-11-16 RX ADMIN — THERA TABS 1 TABLET: TAB at 09:49

## 2017-11-16 RX ADMIN — FOLIC ACID 1 MG: 1 TABLET ORAL at 09:49

## 2017-11-16 RX ADMIN — MAGNESIUM GLUCONATE 500 MG ORAL TABLET 400 MG: 500 TABLET ORAL at 09:50

## 2017-11-16 RX ADMIN — METOPROLOL TARTRATE 75 MG: 25 TABLET, FILM COATED ORAL at 20:48

## 2017-11-16 RX ADMIN — GABAPENTIN 100 MG: 100 CAPSULE ORAL at 20:48

## 2017-11-16 RX ADMIN — SODIUM CHLORIDE TAB 1 GM 2 G: 1 TAB at 09:50

## 2017-11-16 RX ADMIN — ASPIRIN 81 MG: 81 TABLET, COATED ORAL at 09:49

## 2017-11-16 ASSESSMENT — ENCOUNTER SYMPTOMS
DEPRESSION: 0
SHORTNESS OF BREATH: 0
FEVER: 0
SPEECH CHANGE: 0
SORE THROAT: 0
PALPITATIONS: 0
PND: 0
VOMITING: 0
WEAKNESS: 1
CLAUDICATION: 0
ORTHOPNEA: 0
MYALGIAS: 0
DIZZINESS: 0
SPUTUM PRODUCTION: 0
NECK PAIN: 0
STRIDOR: 0
HEADACHES: 0
PHOTOPHOBIA: 0
TREMORS: 0
TINGLING: 0
NAUSEA: 0
SENSORY CHANGE: 0
BLOOD IN STOOL: 0
NERVOUS/ANXIOUS: 0
EYES NEGATIVE: 1
HEARTBURN: 0
DOUBLE VISION: 0
BLURRED VISION: 0
COUGH: 0
ABDOMINAL PAIN: 0
EYE PAIN: 0
MEMORY LOSS: 1
BACK PAIN: 0
HEMOPTYSIS: 0
CONSTIPATION: 0
CHILLS: 0

## 2017-11-16 ASSESSMENT — PAIN SCALES - GENERAL
PAINLEVEL_OUTOF10: 0
PAINLEVEL_OUTOF10: 0

## 2017-11-16 NOTE — DISCHARGE PLANNING
Notified by bedside nurse that pt and SO would like to speak with SW. Spoke with pt and pt's SO at bedside. SO, Norma wanted to know how to stop the guardianship process. TRUONG informed her that the process has already started and is now in the county's hands.    Norma was very upset and said that [facility] had no right to file guardianship because she is his legal wife. TRUONG informed Norma that she has misrepresented herself several time to [facility] staff, and therefore we have no proof that she is who she says she is. Norma claims to have a legal marriage certificate to prove she is  to pt. Norma was  to pt's brother, but has been  to pt since 1999. At one point Norma said she was not sure if her marriage with pt was legal because pt had not finalized divorce papers with previous wife.    Norma reported that she had misrepresented herself so that pt's Medicaid would not be revoked. TRUONG asked why Norma did not notify staff in care conference on Holli 5 that she is pt's legal wife. At that meeting Norma reported to have her marriage licence on her but refused to notify staff because she felt they were not compassionate. TRUONG informed her that regardless, the truth of her relationship with the pt should not have been withheld. Norma informed SW that what she did is not illegal so it's not a big deal, and that she is going to [employer] to notify them that [facility] is not letting pt leave.

## 2017-11-16 NOTE — PROGRESS NOTES
[facility] Hospitalist Progress Note    Date of Service: 2017    Chief Complaint  72 y.o. male admitted 2017 with urinary tract infection and confusion.    Interval Problem Update  Patient comfortable, has no acute needs. Patient denies fevers/chills, chest pain, shortness of breath or nausea/vommiting.   encourage patient to get out of bed if possible.     Consultants/Specialty  Geriatrics  Palliative Care  Ethics committee/ Dr. Eamon Blackmon    Disposition  Pending guardianship and placement   assisting            Review of Systems   Unable to perform ROS: Mental status change   Constitutional: Positive for malaise/fatigue. Negative for chills and fever.   HENT: Negative for congestion, hearing loss, sore throat and tinnitus.    Eyes: Negative.  Negative for blurred vision, double vision, photophobia and pain.   Respiratory: Negative for cough, hemoptysis, sputum production, shortness of breath and stridor.    Cardiovascular: Negative for chest pain, palpitations, orthopnea, claudication, leg swelling and PND.   Gastrointestinal: Negative for abdominal pain, blood in stool, constipation, heartburn, melena, nausea and vomiting.   Genitourinary: Negative.  Negative for dysuria, frequency and urgency.   Musculoskeletal: Negative for back pain, myalgias and neck pain.   Neurological: Positive for weakness. Negative for dizziness, tingling, tremors, sensory change, speech change and headaches.   Endo/Heme/Allergies: Negative.    Psychiatric/Behavioral: Positive for memory loss. Negative for depression and suicidal ideas. The patient is not nervous/anxious.       Physical Exam  Laboratory/Imaging   Hemodynamics  Temp (24hrs), Av.7 °C (98 °F), Min:36.5 °C (97.7 °F), Max:36.9 °C (98.4 °F)   Temperature: 36.9 °C (98.4 °F)  Pulse  Av.6  Min: 58  Max: 144    Blood Pressure : 110/50      Respiratory      Respiration: 16, Pulse Oximetry: 91 %        RUL Breath Sounds: Diminished, RML Breath Sounds:  Diminished, RLL Breath Sounds: Diminished, RIGO Breath Sounds: Diminished, LLL Breath Sounds: Diminished    Fluids  No intake or output data in the 24 hours ending 11/16/17 1548    Nutrition  Orders Placed This Encounter   Procedures   • DIET ORDER     Standing Status:   Standing     Number of Occurrences:   1     Order Specific Question:   Diet:     Answer:   Regular [1]     Comments:   please send mashed potatoes with lunch and dinner.     Order Specific Question:   Texture/Fiber modifications:     Answer:   Dysphagia 2(Pureed/Chopped)specify fluid consistency(question 6) [2]     Order Specific Question:   Consistency/Fluid modifications:     Answer:   Nectar Thick [2]     Order Specific Question:   Miscellaneous modifications:     Answer:   SLP - Deliver to Nursing Station [22]     Physical Exam   Constitutional: No distress.   Thin, frail   HENT:   Head: Normocephalic and atraumatic.   Mouth/Throat: No oropharyngeal exudate.   Eyes: Conjunctivae and EOM are normal. Pupils are equal, round, and reactive to light. Right eye exhibits no discharge. Left eye exhibits no discharge.   Neck: Normal range of motion. No JVD present. No thyromegaly present.   Cardiovascular: Normal rate and regular rhythm.    No murmur heard.  Pulmonary/Chest: No respiratory distress. He has no wheezes. He has no rales.   Abdominal: Soft. He exhibits no distension. There is no tenderness. There is no rebound.   Musculoskeletal: He exhibits edema (swelling regressing).   Right BKA with no drainage noted,  left forefoot amputation. Right knee with circular wound,   Neurological: He is alert. No cranial nerve deficit. Coordination normal.   AOx2   Skin: Skin is warm. He is not diaphoretic. There is erythema.   Psychiatric: He has a normal mood and affect. His behavior is normal. His speech is delayed.   Nursing note and vitals reviewed.      Recent Labs      11/14/17   0038  11/15/17   0021  11/16/17   0356   WBC  15.0*  14.1*  13.1*   RBC   4.73  4.90  4.89   HEMOGLOBIN  10.8*  10.9*  11.0*   HEMATOCRIT  34.6*  35.2*  35.4*   MCV  73.2*  71.8*  72.4*   MCH  22.8*  22.2*  22.5*   MCHC  31.2*  31.0*  31.1*   RDW  42.4  42.3  43.2   PLATELETCT  708*  766*  701*   MPV  9.1  9.1  8.7*     Recent Labs      11/15/17   0021  11/16/17   0356   SODIUM  120*  125*   POTASSIUM  4.7  4.4   CHLORIDE  93*  95*   CO2  21  20   GLUCOSE  93  79   BUN  18  17   CREATININE  0.68  0.69   CALCIUM  9.3  9.1                      Assessment/Plan     * Cellulitis of right knee- (present on admission)   Assessment & Plan    Now diagnosed with right knee osteomyelitics.  Surgical evaluation by Dr.Jaquish priest, not a surgical candidate.  Continue PO bactrim.              Hyponatremia- (present on admission)   Assessment & Plan    Hypovolemic hyponatremia.  Not symptomatic.  encourage PO intake  Continue Salt tabs.          Leukocytosis- (present on admission)   Assessment & Plan    2/2 to Right LE cellulitis.  Completed antibiotic therapy. However right knee cellulitis again noted, transferred from medical floor for further observation.  Patient's leukocytosis has been persistent with daily fluctuations, reactive and less likely correlated with infection at this point.         PAD (peripheral artery disease) (CMS-[facility])- (present on admission)   Assessment & Plan    Significant PAD s/p left forefoot amputation and right BKA  Continue aspirin and statin  Not candidate for prosthesis  Continue with vascular surgery and wound care        Dementia- (present on admission)   Assessment & Plan    Admitted for encephalopathy and debility.   Has been evaluated by ethics committee, unable to make his own medical decisions.  Guardianship pending        Hypomagnesemia- (present on admission)   Assessment & Plan    Continue daily oral mag supplementation.        Essential hypertension- (present on admission)   Assessment & Plan    Currently normotensive.  Continue with metoprolol 75 mg BID         Severe protein-calorie malnutrition (CMS-HCC)- (present on admission)   Assessment & Plan    Nutrition following  Continue boost TID          Patient plan of care discussed at multidisplinary team rounds and with patient and R.N at beside.      Reviewed items::  Medications reviewed, Labs reviewed and Radiology images reviewed  Barrera catheter::  No Barrera  DVT prophylaxis pharmacological::  Heparin

## 2017-11-16 NOTE — THERAPY
"Speech Language Therapy dysphagia treatment completed.   Functional Status:  Attempted FEES was unsuccessful; although pt agreed to procedure and has had FEES in the past, he did not allow insertion of the scope on this day, past the  port.  Subsequent treatment with pt appropriate for cautiously controlled upgrade trial with contd supplements per RD due to poor intake over time.  Recommendations: 1) Upgrade diet to D3/NTL, 2) repeat chest xray in one wk, 3) close monitor of tempt and breath sounds, 4) contd supplements per RD; boost ok.  SLP to follow for addtl upgrade trials.  RN to obtain upgraded diet order.    Plan of Care: Will benefit from Speech Therapy 3 times per week  Post-Acute Therapy: Discharge to a transitional care facility for continued skilled therapy services.    See \"Rehab Therapy-Acute\" Patient Summary Report for complete documentation.     "

## 2017-11-16 NOTE — PROGRESS NOTES
Hourly rounding, call bell within reach. Patient educated about plan of care, pain management, call bell use, and mobility. Patient transferred, stable vitals upon arrival. Oriented to being in the hospital and who he is but did not correctly state year and repeated questions frequently. Incontinent of urine and stool. Cleansed PRN. Demonstrated good bed mobility with turns in bed.

## 2017-11-17 LAB
ERYTHROCYTE [DISTWIDTH] IN BLOOD BY AUTOMATED COUNT: 43.5 FL (ref 35.9–50)
HCT VFR BLD AUTO: 36.5 % (ref 42–52)
HGB BLD-MCNC: 11.3 G/DL (ref 14–18)
MCH RBC QN AUTO: 22.6 PG (ref 27–33)
MCHC RBC AUTO-ENTMCNC: 31 G/DL (ref 33.7–35.3)
MCV RBC AUTO: 73.1 FL (ref 81.4–97.8)
PLATELET # BLD AUTO: 691 K/UL (ref 164–446)
PMV BLD AUTO: 8.8 FL (ref 9–12.9)
RBC # BLD AUTO: 4.99 M/UL (ref 4.7–6.1)
WBC # BLD AUTO: 13.3 K/UL (ref 4.8–10.8)

## 2017-11-17 PROCEDURE — 700102 HCHG RX REV CODE 250 W/ 637 OVERRIDE(OP): Performed by: INTERNAL MEDICINE

## 2017-11-17 PROCEDURE — 700102 HCHG RX REV CODE 250 W/ 637 OVERRIDE(OP): Performed by: HOSPITALIST

## 2017-11-17 PROCEDURE — A9270 NON-COVERED ITEM OR SERVICE: HCPCS | Performed by: HOSPITALIST

## 2017-11-17 PROCEDURE — 85027 COMPLETE CBC AUTOMATED: CPT

## 2017-11-17 PROCEDURE — 700111 HCHG RX REV CODE 636 W/ 250 OVERRIDE (IP): Performed by: HOSPITALIST

## 2017-11-17 PROCEDURE — 36415 COLL VENOUS BLD VENIPUNCTURE: CPT

## 2017-11-17 PROCEDURE — 99232 SBSQ HOSP IP/OBS MODERATE 35: CPT | Performed by: HOSPITALIST

## 2017-11-17 PROCEDURE — A9270 NON-COVERED ITEM OR SERVICE: HCPCS | Performed by: INTERNAL MEDICINE

## 2017-11-17 PROCEDURE — 770021 HCHG ROOM/CARE - ISO PRIVATE

## 2017-11-17 RX ADMIN — METOPROLOL TARTRATE 75 MG: 25 TABLET, FILM COATED ORAL at 21:57

## 2017-11-17 RX ADMIN — METOPROLOL TARTRATE 75 MG: 25 TABLET, FILM COATED ORAL at 08:58

## 2017-11-17 RX ADMIN — LEVOTHYROXINE SODIUM 75 MCG: 75 TABLET ORAL at 06:07

## 2017-11-17 RX ADMIN — ASPIRIN 81 MG: 81 TABLET, COATED ORAL at 08:59

## 2017-11-17 RX ADMIN — SODIUM CHLORIDE TAB 1 GM 2 G: 1 TAB at 16:22

## 2017-11-17 RX ADMIN — GABAPENTIN 100 MG: 100 CAPSULE ORAL at 21:57

## 2017-11-17 RX ADMIN — GABAPENTIN 100 MG: 100 CAPSULE ORAL at 08:58

## 2017-11-17 RX ADMIN — SODIUM CHLORIDE TAB 1 GM 2 G: 1 TAB at 08:58

## 2017-11-17 RX ADMIN — THERA TABS 1 TABLET: TAB at 08:59

## 2017-11-17 RX ADMIN — HEPARIN SODIUM 5000 UNITS: 5000 INJECTION, SOLUTION INTRAVENOUS; SUBCUTANEOUS at 08:57

## 2017-11-17 RX ADMIN — STANDARDIZED SENNA CONCENTRATE AND DOCUSATE SODIUM 2 TABLET: 8.6; 5 TABLET, FILM COATED ORAL at 08:58

## 2017-11-17 RX ADMIN — SULFAMETHOXAZOLE AND TRIMETHOPRIM 1 TABLET: 800; 160 TABLET ORAL at 14:14

## 2017-11-17 RX ADMIN — FOLIC ACID 1 MG: 1 TABLET ORAL at 08:59

## 2017-11-17 RX ADMIN — MAGNESIUM GLUCONATE 500 MG ORAL TABLET 400 MG: 500 TABLET ORAL at 08:58

## 2017-11-17 RX ADMIN — SULFAMETHOXAZOLE AND TRIMETHOPRIM 1 TABLET: 800; 160 TABLET ORAL at 21:57

## 2017-11-17 RX ADMIN — STANDARDIZED SENNA CONCENTRATE AND DOCUSATE SODIUM 2 TABLET: 8.6; 5 TABLET, FILM COATED ORAL at 22:01

## 2017-11-17 RX ADMIN — ATORVASTATIN CALCIUM 40 MG: 40 TABLET, FILM COATED ORAL at 21:57

## 2017-11-17 RX ADMIN — HEPARIN SODIUM 5000 UNITS: 5000 INJECTION, SOLUTION INTRAVENOUS; SUBCUTANEOUS at 21:58

## 2017-11-17 RX ADMIN — SULFAMETHOXAZOLE AND TRIMETHOPRIM 1 TABLET: 800; 160 TABLET ORAL at 06:07

## 2017-11-17 RX ADMIN — GABAPENTIN 100 MG: 100 CAPSULE ORAL at 14:14

## 2017-11-17 ASSESSMENT — ENCOUNTER SYMPTOMS
PHOTOPHOBIA: 0
NERVOUS/ANXIOUS: 0
MEMORY LOSS: 1
CLAUDICATION: 0
SHORTNESS OF BREATH: 0
VOMITING: 0
WEAKNESS: 1
SPUTUM PRODUCTION: 0
COUGH: 0
PND: 0
PALPITATIONS: 0
BLURRED VISION: 0
HEARTBURN: 0
DOUBLE VISION: 0
SENSORY CHANGE: 0
EYES NEGATIVE: 1
HEMOPTYSIS: 0
EYE PAIN: 0
NAUSEA: 0
DIZZINESS: 0
SORE THROAT: 0
CHILLS: 0
TREMORS: 0
ORTHOPNEA: 0
STRIDOR: 0
NECK PAIN: 0
FEVER: 0
DEPRESSION: 0
ABDOMINAL PAIN: 0
SPEECH CHANGE: 0
HEADACHES: 0
TINGLING: 0
BLOOD IN STOOL: 0
CONSTIPATION: 0
BACK PAIN: 0
MYALGIAS: 0

## 2017-11-17 ASSESSMENT — PAIN SCALES - GENERAL: PAINLEVEL_OUTOF10: 0

## 2017-11-17 NOTE — PROGRESS NOTES
Assumed care of patient following shift report. Patient is A&Ox2-3. He is pleasant and calm, sitting up in bed. He reports he has no needs or complaints at this time. Safety precautions are in place. Plan of care reviewed. Call device is within reach. Will continue to monitor.

## 2017-11-17 NOTE — DISCHARGE PLANNING
TRUONG met with pt and Norma Lagos at bedside. Norma notified TRUONG that she has been discussing her case with . Norma also informed TRUONG that she has an appointment on Monday at 1015 with Patient , Bettye Del Toro.  Norma plans on providing Bettye with her legal marriage license during this meeting.     Norma requesting to meet with SW Supervisor Mary to discuss Guardianship.  TRUONG notified Mary.   Mary went to pt's room to speak with Norma and she was not at bedside. SW to follow up with Norma on Monday.

## 2017-11-17 NOTE — PROGRESS NOTES
[facility]    Date of Service: 2017    Chief Complaint  72 y.o. male admitted 2017 with urinary tract infection and confusion.    Interval Problem Update  Patient comfortable, has no acute needs. Patient denies fevers/chills, chest pain, shortness of breath or nausea/vommiting.   encourage patient to get out of bed if possible.       No clinical changes, patient awake and alert, no needs.  Continue PO bactrim and supportive therapy.  Up in chair if possible.    Consultants/Specialty  Geriatrics  Palliative Care  Ethics committee/ Dr. Eamon Blackmon    Disposition  Pending guardianship and placement   assisting            Review of Systems   Unable to perform ROS: Mental status change   Constitutional: Positive for malaise/fatigue. Negative for chills and fever.   HENT: Negative for congestion, hearing loss, sore throat and tinnitus.    Eyes: Negative.  Negative for blurred vision, double vision, photophobia and pain.   Respiratory: Negative for cough, hemoptysis, sputum production, shortness of breath and stridor.    Cardiovascular: Negative for chest pain, palpitations, orthopnea, claudication, leg swelling and PND.   Gastrointestinal: Negative for abdominal pain, blood in stool, constipation, heartburn, melena, nausea and vomiting.   Genitourinary: Negative.  Negative for dysuria, frequency and urgency.   Musculoskeletal: Negative for back pain, myalgias and neck pain.   Neurological: Positive for weakness. Negative for dizziness, tingling, tremors, sensory change, speech change and headaches.   Endo/Heme/Allergies: Negative.    Psychiatric/Behavioral: Positive for memory loss. Negative for depression and suicidal ideas. The patient is not nervous/anxious.       Physical Exam  Laboratory/Imaging   Hemodynamics  Temp (24hrs), Av.9 °C (98.5 °F), Min:36.6 °C (97.8 °F), Max:37.4 °C (99.4 °F)   Temperature: 36.6 °C (97.8 °F)  Pulse  Av.4  Min: 58  Max: 144     Blood Pressure : 123/61      Respiratory      Respiration: 18, Pulse Oximetry: 94 %        RUL Breath Sounds: Diminished, RML Breath Sounds: Diminished, RLL Breath Sounds: Diminished, RIGO Breath Sounds: Diminished, LLL Breath Sounds: Diminished    Fluids    Intake/Output Summary (Last 24 hours) at 11/17/17 0954  Last data filed at 11/17/17 0800   Gross per 24 hour   Intake              480 ml   Output                0 ml   Net              480 ml       Nutrition  Orders Placed This Encounter   Procedures   • DIET ORDER     Standing Status:   Standing     Number of Occurrences:   1     Order Specific Question:   Diet:     Answer:   Regular [1]     Comments:   please send mashed potatoes with lunch and dinner.     Order Specific Question:   Texture/Fiber modifications:     Answer:   Dysphagia 2(Pureed/Chopped)specify fluid consistency(question 6) [2]     Order Specific Question:   Consistency/Fluid modifications:     Answer:   Nectar Thick [2]     Order Specific Question:   Miscellaneous modifications:     Answer:   SLP - Deliver to Nursing Station [22]   grossly unchanged.  Physical Exam   Constitutional: No distress.   Thin, frail   HENT:   Head: Normocephalic and atraumatic.   Mouth/Throat: No oropharyngeal exudate.   Eyes: Conjunctivae and EOM are normal. Pupils are equal, round, and reactive to light. Right eye exhibits no discharge. Left eye exhibits no discharge.   Neck: Normal range of motion. No JVD present. No thyromegaly present.   Cardiovascular: Normal rate and regular rhythm.    No murmur heard.  Pulmonary/Chest: No respiratory distress. He has no wheezes. He has no rales.   Abdominal: Soft. He exhibits no distension. There is no tenderness. There is no rebound.   Musculoskeletal: He exhibits edema (swelling regressing).   Right BKA with no drainage noted,  left forefoot amputation. Right knee with circular wound, improving slowly. Less eryhtmea   Neurological: He is alert. No cranial nerve deficit.  Coordination normal.   AOx2   Skin: Skin is warm. He is not diaphoretic. There is erythema.   Psychiatric: He has a normal mood and affect. His behavior is normal. His speech is delayed.   Nursing note and vitals reviewed.      Recent Labs      11/15/17   0021  11/16/17   0356  11/17/17   0400   WBC  14.1*  13.1*  13.3*   RBC  4.90  4.89  4.99   HEMOGLOBIN  10.9*  11.0*  11.3*   HEMATOCRIT  35.2*  35.4*  36.5*   MCV  71.8*  72.4*  73.1*   MCH  22.2*  22.5*  22.6*   MCHC  31.0*  31.1*  31.0*   RDW  42.3  43.2  43.5   PLATELETCT  766*  701*  691*   MPV  9.1  8.7*  8.8*     Recent Labs      11/15/17   0021  11/16/17   0356   SODIUM  120*  125*   POTASSIUM  4.7  4.4   CHLORIDE  93*  95*   CO2  21  20   GLUCOSE  93  79   BUN  18  17   CREATININE  0.68  0.69   CALCIUM  9.3  9.1                      Assessment/Plan     * Cellulitis of right knee- (present on admission)   Assessment & Plan    Now diagnosed with right knee osteomyelitics.  Surgical evaluation by Dr.Jaquish priest, not a surgical candidate.  Continue PO bactrim.          Hyponatremia- (present on admission)   Assessment & Plan    Hypovolemic hyponatremia. Improving actually. Na:125 today from 120.  Not symptomatic.  encourage PO intake  Continue Salt tabs.          Leukocytosis- (present on admission)   Assessment & Plan    2/2 to Right LE cellulitis.  Completed antibiotic therapy. However right knee cellulitis again noted, transferred from medical floor for further observation.  Patient's leukocytosis has been persistent with daily fluctuations, reactive and less likely correlated with infection at this point.         PAD (peripheral artery disease) (CMS-HCC)- (present on admission)   Assessment & Plan    Significant PAD s/p left forefoot amputation and right BKA  Continue aspirin and statin  Not candidate for prosthesis  Continue with vascular surgery and wound care        Dementia- (present on admission)   Assessment & Plan    Admitted for encephalopathy and  debility.   Has been evaluated by ethics committee, unable to make his own medical decisions.  Guardianship pending        Hypomagnesemia- (present on admission)   Assessment & Plan    Continue daily oral mag supplementation.        Essential hypertension- (present on admission)   Assessment & Plan    Currently normotensive.  Continue with metoprolol 75 mg BID        Severe protein-calorie malnutrition (CMS-HCC)- (present on admission)   Assessment & Plan    Nutrition following  Continue boost TID          Patient plan of care discussed at multidisplinary team rounds and with patient and R.N at beside.      Reviewed items::  Medications reviewed, Labs reviewed and Radiology images reviewed  Barrera catheter::  No Barrear  DVT prophylaxis pharmacological::  Heparin

## 2017-11-17 NOTE — CARE PLAN
Problem: Safety  Goal: Will remain free from injury  Outcome: PROGRESSING AS EXPECTED  Safety maintained. Bed low and locked position, call light and belongings within reach, hourly rounding in place, tread socks on and patient instructed to call for assist.

## 2017-11-17 NOTE — CARE PLAN
Problem: Communication  Goal: The ability to communicate needs accurately and effectively will improve  Outcome: PROGRESSING AS EXPECTED  Patient is encouraged to participate and ask questions related to plan of care.     Problem: Urinary Elimination:  Goal: Ability to reestablish a normal urinary elimination pattern will improve  Outcome: PROGRESSING AS EXPECTED  Toileting assistance offered during rounds

## 2017-11-18 LAB
ERYTHROCYTE [DISTWIDTH] IN BLOOD BY AUTOMATED COUNT: 45.1 FL (ref 35.9–50)
HCT VFR BLD AUTO: 35.7 % (ref 42–52)
HGB BLD-MCNC: 11.1 G/DL (ref 14–18)
MCH RBC QN AUTO: 23.2 PG (ref 27–33)
MCHC RBC AUTO-ENTMCNC: 31.1 G/DL (ref 33.7–35.3)
MCV RBC AUTO: 74.7 FL (ref 81.4–97.8)
PLATELET # BLD AUTO: 673 K/UL (ref 164–446)
PMV BLD AUTO: 8.9 FL (ref 9–12.9)
RBC # BLD AUTO: 4.78 M/UL (ref 4.7–6.1)
WBC # BLD AUTO: 13 K/UL (ref 4.8–10.8)

## 2017-11-18 PROCEDURE — 770021 HCHG ROOM/CARE - ISO PRIVATE

## 2017-11-18 PROCEDURE — 700102 HCHG RX REV CODE 250 W/ 637 OVERRIDE(OP): Performed by: HOSPITALIST

## 2017-11-18 PROCEDURE — 36415 COLL VENOUS BLD VENIPUNCTURE: CPT

## 2017-11-18 PROCEDURE — 700111 HCHG RX REV CODE 636 W/ 250 OVERRIDE (IP): Performed by: HOSPITALIST

## 2017-11-18 PROCEDURE — 85027 COMPLETE CBC AUTOMATED: CPT

## 2017-11-18 PROCEDURE — A9270 NON-COVERED ITEM OR SERVICE: HCPCS | Performed by: HOSPITALIST

## 2017-11-18 PROCEDURE — 700102 HCHG RX REV CODE 250 W/ 637 OVERRIDE(OP): Performed by: INTERNAL MEDICINE

## 2017-11-18 PROCEDURE — 99232 SBSQ HOSP IP/OBS MODERATE 35: CPT | Performed by: HOSPITALIST

## 2017-11-18 PROCEDURE — A9270 NON-COVERED ITEM OR SERVICE: HCPCS | Performed by: INTERNAL MEDICINE

## 2017-11-18 RX ADMIN — GABAPENTIN 100 MG: 100 CAPSULE ORAL at 09:02

## 2017-11-18 RX ADMIN — HEPARIN SODIUM 5000 UNITS: 5000 INJECTION, SOLUTION INTRAVENOUS; SUBCUTANEOUS at 20:04

## 2017-11-18 RX ADMIN — SULFAMETHOXAZOLE AND TRIMETHOPRIM 1 TABLET: 800; 160 TABLET ORAL at 20:04

## 2017-11-18 RX ADMIN — SODIUM CHLORIDE TAB 1 GM 2 G: 1 TAB at 17:32

## 2017-11-18 RX ADMIN — SODIUM CHLORIDE TAB 1 GM 2 G: 1 TAB at 09:02

## 2017-11-18 RX ADMIN — THERA TABS 1 TABLET: TAB at 09:03

## 2017-11-18 RX ADMIN — METOPROLOL TARTRATE 75 MG: 25 TABLET, FILM COATED ORAL at 20:04

## 2017-11-18 RX ADMIN — GABAPENTIN 100 MG: 100 CAPSULE ORAL at 20:04

## 2017-11-18 RX ADMIN — SULFAMETHOXAZOLE AND TRIMETHOPRIM 1 TABLET: 800; 160 TABLET ORAL at 14:45

## 2017-11-18 RX ADMIN — ATORVASTATIN CALCIUM 40 MG: 40 TABLET, FILM COATED ORAL at 20:04

## 2017-11-18 RX ADMIN — SULFAMETHOXAZOLE AND TRIMETHOPRIM 1 TABLET: 800; 160 TABLET ORAL at 05:14

## 2017-11-18 RX ADMIN — GABAPENTIN 100 MG: 100 CAPSULE ORAL at 14:45

## 2017-11-18 RX ADMIN — HEPARIN SODIUM 5000 UNITS: 5000 INJECTION, SOLUTION INTRAVENOUS; SUBCUTANEOUS at 09:03

## 2017-11-18 RX ADMIN — LEVOTHYROXINE SODIUM 75 MCG: 75 TABLET ORAL at 05:14

## 2017-11-18 RX ADMIN — MAGNESIUM GLUCONATE 500 MG ORAL TABLET 400 MG: 500 TABLET ORAL at 09:03

## 2017-11-18 RX ADMIN — METOPROLOL TARTRATE 75 MG: 25 TABLET, FILM COATED ORAL at 09:02

## 2017-11-18 RX ADMIN — ASPIRIN 81 MG: 81 TABLET, COATED ORAL at 09:02

## 2017-11-18 RX ADMIN — ACETAMINOPHEN 650 MG: 325 TABLET, FILM COATED ORAL at 20:04

## 2017-11-18 RX ADMIN — FOLIC ACID 1 MG: 1 TABLET ORAL at 09:03

## 2017-11-18 ASSESSMENT — PAIN SCALES - GENERAL
PAINLEVEL_OUTOF10: 8
PAINLEVEL_OUTOF10: 3
PAINLEVEL_OUTOF10: 4

## 2017-11-18 ASSESSMENT — ENCOUNTER SYMPTOMS
EYE PAIN: 0
STRIDOR: 0
PHOTOPHOBIA: 0
FEVER: 0
EYES NEGATIVE: 1
HEADACHES: 0
SPUTUM PRODUCTION: 0
HEMOPTYSIS: 0
CHILLS: 0
SPEECH CHANGE: 0
PALPITATIONS: 0
SHORTNESS OF BREATH: 0
NECK PAIN: 0
PND: 0
MYALGIAS: 0
BACK PAIN: 0
HEARTBURN: 0
SENSORY CHANGE: 0
NAUSEA: 0
MEMORY LOSS: 1
DIZZINESS: 0
TINGLING: 0
ABDOMINAL PAIN: 0
DOUBLE VISION: 0
SORE THROAT: 0
BLURRED VISION: 0
BLOOD IN STOOL: 0
COUGH: 0
CLAUDICATION: 0
NERVOUS/ANXIOUS: 0
WEAKNESS: 1
CONSTIPATION: 0
ORTHOPNEA: 0
VOMITING: 0
TREMORS: 0
DEPRESSION: 0

## 2017-11-18 NOTE — CARE PLAN
Problem: Communication  Goal: The ability to communicate needs accurately and effectively will improve    Intervention: [place] patient and significant other/support system to call light to alert staff of needs  Pt educated on use of call light when needing assistance, pt states understanding and has been using appropriately.       Problem: Skin Integrity  Goal: Risk for impaired skin integrity will decrease    Intervention: Implement precautions to protect skin integrity in collaboration with the interdisciplinary team  Pt has been incontinent of urine and skin in perineal area is excoriated, condom catheter was placed as well as barrier cream. Will continue to monitor skin. integrity

## 2017-11-18 NOTE — CARE PLAN
Problem: Urinary Elimination:  Goal: Ability to reestablish a normal urinary elimination pattern will improve  Outcome: PROGRESSING AS EXPECTED  Toileting assistance offered during rounds.     Problem: Skin Integrity  Goal: Risk for impaired skin integrity will decrease  Outcome: PROGRESSING AS EXPECTED  Waffle overlay in place.

## 2017-11-18 NOTE — PROGRESS NOTES
Pt is resting comfortably in bed at this time with no complaints. VSS. Pt is disoriented to time. Dressing to knee is dry and intact. Q2hr turns in place.

## 2017-11-18 NOTE — PROGRESS NOTES
[facility]    Date of Service: 2017    Chief Complaint  72 y.o. male admitted 2017 with urinary tract infection and confusion.    Interval Problem Update  Patient comfortable, has no acute needs. Patient denies fevers/chills, chest pain, shortness of breath or nausea/vommiting.   encourage patient to get out of bed if possible.       No clinical changes, patient awake and alert, no needs.  Continue PO bactrim and supportive therapy.  Up in chair if possible.      Patient comfortable watching tv, denies any discomfort.   Continue oral bactrim.  Pending guardianship.    Consultants/Specialty  Geriatrics  Palliative Care  Ethics committee/ Dr. Eamon Blackmon    Disposition  Pending guardianship and placement   assisting            Review of Systems   Unable to perform ROS: Mental status change   Constitutional: Positive for malaise/fatigue. Negative for chills and fever.   HENT: Negative for congestion, hearing loss, sore throat and tinnitus.    Eyes: Negative.  Negative for blurred vision, double vision, photophobia and pain.   Respiratory: Negative for cough, hemoptysis, sputum production, shortness of breath and stridor.    Cardiovascular: Negative for chest pain, palpitations, orthopnea, claudication, leg swelling and PND.   Gastrointestinal: Negative for abdominal pain, blood in stool, constipation, heartburn, melena, nausea and vomiting.   Genitourinary: Negative.  Negative for dysuria, frequency and urgency.   Musculoskeletal: Negative for back pain, myalgias and neck pain.   Neurological: Positive for weakness. Negative for dizziness, tingling, tremors, sensory change, speech change and headaches.   Endo/Heme/Allergies: Negative.    Psychiatric/Behavioral: Positive for memory loss. Negative for depression and suicidal ideas. The patient is not nervous/anxious.       Physical Exam  Laboratory/Imaging   Hemodynamics  Temp (24hrs), Av.7 °C (98.1 °F),  Min:36.6 °C (97.8 °F), Max:36.8 °C (98.3 °F)   Temperature:  (Q8 vitials)  Pulse  Av.3  Min: 58  Max: 144    Blood Pressure : 135/70      Respiratory      Respiration: 17, Pulse Oximetry: 96 %        RUL Breath Sounds: Diminished, RML Breath Sounds: Diminished, RLL Breath Sounds: Diminished, RIGO Breath Sounds: Diminished, LLL Breath Sounds: Diminished    Fluids    Intake/Output Summary (Last 24 hours) at 17 1517  Last data filed at 17 1300   Gross per 24 hour   Intake              960 ml   Output                0 ml   Net              960 ml       Nutrition  Orders Placed This Encounter   Procedures   • DIET ORDER     Standing Status:   Standing     Number of Occurrences:   1     Order Specific Question:   Diet:     Answer:   Regular [1]     Comments:   please send mashed potatoes with lunch and dinner.     Order Specific Question:   Texture/Fiber modifications:     Answer:   Dysphagia 2(Pureed/Chopped)specify fluid consistency(question 6) [2]     Order Specific Question:   Consistency/Fluid modifications:     Answer:   Nectar Thick [2]     Order Specific Question:   Miscellaneous modifications:     Answer:   SLP - Deliver to Nursing Station [22]   grossly unchanged.  Physical Exam   Constitutional: No distress.   Thin, frail   HENT:   Head: Normocephalic and atraumatic.   Mouth/Throat: No oropharyngeal exudate.   Eyes: Conjunctivae and EOM are normal. Pupils are equal, round, and reactive to light. Right eye exhibits no discharge. Left eye exhibits no discharge.   Neck: Normal range of motion. No JVD present. No thyromegaly present.   Cardiovascular: Normal rate and regular rhythm.    No murmur heard.  Pulmonary/Chest: No respiratory distress. He has no wheezes. He has no rales.   Abdominal: Soft. He exhibits no distension. There is no tenderness. There is no rebound.   Musculoskeletal: He exhibits edema (swelling improving).   Right BKA with no drainage noted,  left forefoot amputation. Right knee  with circular wound, improving slowly. Less eryhtmea   Neurological: He is alert. No cranial nerve deficit. Coordination normal.   AOx2   Skin: Skin is warm. He is not diaphoretic. There is erythema (improving).   Psychiatric: He has a normal mood and affect. His behavior is normal. His speech is delayed.   Nursing note and vitals reviewed.      Recent Labs      11/16/17   0356  11/17/17   0400  11/18/17   0247   WBC  13.1*  13.3*  13.0*   RBC  4.89  4.99  4.78   HEMOGLOBIN  11.0*  11.3*  11.1*   HEMATOCRIT  35.4*  36.5*  35.7*   MCV  72.4*  73.1*  74.7*   MCH  22.5*  22.6*  23.2*   MCHC  31.1*  31.0*  31.1*   RDW  43.2  43.5  45.1   PLATELETCT  701*  691*  673*   MPV  8.7*  8.8*  8.9*     Recent Labs      11/16/17   0356   SODIUM  125*   POTASSIUM  4.4   CHLORIDE  95*   CO2  20   GLUCOSE  79   BUN  17   CREATININE  0.69   CALCIUM  9.1                      Assessment/Plan     * Cellulitis of right knee- (present on admission)   Assessment & Plan    Now diagnosed with right knee osteomyelitics.  Surgical evaluation by Dr.Jaquish priest, not a surgical candidate.  Continue PO bactrim.          Hyponatremia- (present on admission)   Assessment & Plan    Hypovolemic hyponatremia. Improving actually. Na:125 today from 120.  Not symptomatic.  encourage PO intake  Continue Salt tabs.  Check bmp in the am.          Leukocytosis- (present on admission)   Assessment & Plan    2/2 to Right LE cellulitis.  Completed antibiotic therapy. However right knee cellulitis again noted, transferred from medical floor for further observation.  Patient's leukocytosis has been persistent with daily fluctuations, reactive and less likely correlated with infection but can be possible, continue to monitor daily cbc.        PAD (peripheral artery disease) (CMS-HCC)- (present on admission)   Assessment & Plan    Significant PAD s/p left forefoot amputation and right BKA  Continue aspirin and statin  Not candidate for prosthesis  Continue with  vascular surgery and wound care        Dementia- (present on admission)   Assessment & Plan    Admitted for encephalopathy and debility.   Has been evaluated by ethics committee, unable to make his own medical decisions.  Guardianship pending        Hypomagnesemia- (present on admission)   Assessment & Plan    Continue daily oral mag supplementation.        Essential hypertension- (present on admission)   Assessment & Plan    Currently normotensive.  Continue with metoprolol 75 mg BID        Severe protein-calorie malnutrition (CMS-HCC)- (present on admission)   Assessment & Plan    Nutrition following  Continue boost TID          Patient plan of care discussed at multidisplinary team rounds and with patient and R.N at beside.      Reviewed items::  Medications reviewed, Labs reviewed and Radiology images reviewed  Barrera catheter::  No Barrera  DVT prophylaxis pharmacological::  Heparin

## 2017-11-18 NOTE — PROGRESS NOTES
Assumed care of patient following shift report. Patient is A&Ox3. He is not oriented to time. He is resting in bed with no needs or complaints. POC and safety precautions reviewed. Call device is within reach. Will continue to monitor.

## 2017-11-19 LAB
ALBUMIN SERPL BCP-MCNC: 3.6 G/DL (ref 3.2–4.9)
ALBUMIN/GLOB SERPL: 1 G/DL
ALP SERPL-CCNC: 65 U/L (ref 30–99)
ALT SERPL-CCNC: 24 U/L (ref 2–50)
ANION GAP SERPL CALC-SCNC: 9 MMOL/L (ref 0–11.9)
AST SERPL-CCNC: 27 U/L (ref 12–45)
BILIRUB SERPL-MCNC: 0.3 MG/DL (ref 0.1–1.5)
BUN SERPL-MCNC: 18 MG/DL (ref 8–22)
CALCIUM SERPL-MCNC: 9 MG/DL (ref 8.5–10.5)
CHLORIDE SERPL-SCNC: 99 MMOL/L (ref 96–112)
CO2 SERPL-SCNC: 23 MMOL/L (ref 20–33)
CREAT SERPL-MCNC: 0.8 MG/DL (ref 0.5–1.4)
ERYTHROCYTE [DISTWIDTH] IN BLOOD BY AUTOMATED COUNT: 45.6 FL (ref 35.9–50)
GFR SERPL CREATININE-BSD FRML MDRD: >60 ML/MIN/1.73 M 2
GLOBULIN SER CALC-MCNC: 3.5 G/DL (ref 1.9–3.5)
GLUCOSE SERPL-MCNC: 74 MG/DL (ref 65–99)
HCT VFR BLD AUTO: 34.5 % (ref 42–52)
HGB BLD-MCNC: 10.8 G/DL (ref 14–18)
MAGNESIUM SERPL-MCNC: 1.7 MG/DL (ref 1.5–2.5)
MCH RBC QN AUTO: 23.3 PG (ref 27–33)
MCHC RBC AUTO-ENTMCNC: 31.3 G/DL (ref 33.7–35.3)
MCV RBC AUTO: 74.4 FL (ref 81.4–97.8)
PLATELET # BLD AUTO: 666 K/UL (ref 164–446)
PMV BLD AUTO: 8.7 FL (ref 9–12.9)
POTASSIUM SERPL-SCNC: 4.4 MMOL/L (ref 3.6–5.5)
PROT SERPL-MCNC: 7.1 G/DL (ref 6–8.2)
RBC # BLD AUTO: 4.64 M/UL (ref 4.7–6.1)
SODIUM SERPL-SCNC: 131 MMOL/L (ref 135–145)
WBC # BLD AUTO: 12.7 K/UL (ref 4.8–10.8)

## 2017-11-19 PROCEDURE — 700111 HCHG RX REV CODE 636 W/ 250 OVERRIDE (IP): Performed by: HOSPITALIST

## 2017-11-19 PROCEDURE — 700102 HCHG RX REV CODE 250 W/ 637 OVERRIDE(OP): Performed by: HOSPITALIST

## 2017-11-19 PROCEDURE — A9270 NON-COVERED ITEM OR SERVICE: HCPCS | Performed by: HOSPITALIST

## 2017-11-19 PROCEDURE — 99232 SBSQ HOSP IP/OBS MODERATE 35: CPT | Performed by: HOSPITALIST

## 2017-11-19 PROCEDURE — 700102 HCHG RX REV CODE 250 W/ 637 OVERRIDE(OP): Performed by: INTERNAL MEDICINE

## 2017-11-19 PROCEDURE — 80053 COMPREHEN METABOLIC PANEL: CPT

## 2017-11-19 PROCEDURE — A9270 NON-COVERED ITEM OR SERVICE: HCPCS | Performed by: INTERNAL MEDICINE

## 2017-11-19 PROCEDURE — 85027 COMPLETE CBC AUTOMATED: CPT

## 2017-11-19 PROCEDURE — 770021 HCHG ROOM/CARE - ISO PRIVATE

## 2017-11-19 PROCEDURE — 36415 COLL VENOUS BLD VENIPUNCTURE: CPT

## 2017-11-19 PROCEDURE — 83735 ASSAY OF MAGNESIUM: CPT

## 2017-11-19 RX ADMIN — METOPROLOL TARTRATE 75 MG: 25 TABLET, FILM COATED ORAL at 09:11

## 2017-11-19 RX ADMIN — GABAPENTIN 100 MG: 100 CAPSULE ORAL at 15:27

## 2017-11-19 RX ADMIN — HEPARIN SODIUM 5000 UNITS: 5000 INJECTION, SOLUTION INTRAVENOUS; SUBCUTANEOUS at 22:14

## 2017-11-19 RX ADMIN — ACETAMINOPHEN 650 MG: 325 TABLET, FILM COATED ORAL at 22:14

## 2017-11-19 RX ADMIN — SODIUM CHLORIDE TAB 1 GM 2 G: 1 TAB at 18:31

## 2017-11-19 RX ADMIN — LEVOTHYROXINE SODIUM 75 MCG: 75 TABLET ORAL at 06:00

## 2017-11-19 RX ADMIN — SULFAMETHOXAZOLE AND TRIMETHOPRIM 1 TABLET: 800; 160 TABLET ORAL at 15:27

## 2017-11-19 RX ADMIN — MAGNESIUM GLUCONATE 500 MG ORAL TABLET 400 MG: 500 TABLET ORAL at 09:11

## 2017-11-19 RX ADMIN — ATORVASTATIN CALCIUM 40 MG: 40 TABLET, FILM COATED ORAL at 22:14

## 2017-11-19 RX ADMIN — FOLIC ACID 1 MG: 1 TABLET ORAL at 09:11

## 2017-11-19 RX ADMIN — GABAPENTIN 100 MG: 100 CAPSULE ORAL at 22:14

## 2017-11-19 RX ADMIN — METOPROLOL TARTRATE 75 MG: 25 TABLET, FILM COATED ORAL at 22:14

## 2017-11-19 RX ADMIN — ASPIRIN 81 MG: 81 TABLET, COATED ORAL at 09:11

## 2017-11-19 RX ADMIN — GABAPENTIN 100 MG: 100 CAPSULE ORAL at 09:11

## 2017-11-19 RX ADMIN — SULFAMETHOXAZOLE AND TRIMETHOPRIM 1 TABLET: 800; 160 TABLET ORAL at 06:00

## 2017-11-19 RX ADMIN — SODIUM CHLORIDE TAB 1 GM 2 G: 1 TAB at 09:11

## 2017-11-19 RX ADMIN — THERA TABS 1 TABLET: TAB at 09:11

## 2017-11-19 RX ADMIN — SULFAMETHOXAZOLE AND TRIMETHOPRIM 1 TABLET: 800; 160 TABLET ORAL at 22:14

## 2017-11-19 RX ADMIN — HEPARIN SODIUM 5000 UNITS: 5000 INJECTION, SOLUTION INTRAVENOUS; SUBCUTANEOUS at 09:11

## 2017-11-19 ASSESSMENT — ENCOUNTER SYMPTOMS
SPUTUM PRODUCTION: 0
TINGLING: 0
CONSTIPATION: 0
BLURRED VISION: 0
FEVER: 0
BLOOD IN STOOL: 0
TREMORS: 0
BACK PAIN: 0
PND: 0
DOUBLE VISION: 0
SENSORY CHANGE: 0
ABDOMINAL PAIN: 0
VOMITING: 0
EYE PAIN: 0
COUGH: 0
HEADACHES: 0
ORTHOPNEA: 0
WEAKNESS: 1
PHOTOPHOBIA: 0
SPEECH CHANGE: 0
CHILLS: 0
NECK PAIN: 0
NERVOUS/ANXIOUS: 0
MEMORY LOSS: 1
SHORTNESS OF BREATH: 0
DIZZINESS: 0
HEARTBURN: 0
DEPRESSION: 0
NAUSEA: 0
STRIDOR: 0
EYES NEGATIVE: 1
SORE THROAT: 0
HEMOPTYSIS: 0
PALPITATIONS: 0
MYALGIAS: 0
CLAUDICATION: 0

## 2017-11-19 ASSESSMENT — PAIN SCALES - GENERAL
PAINLEVEL_OUTOF10: 5
PAINLEVEL_OUTOF10: 7

## 2017-11-19 NOTE — CARE PLAN
Problem: Urinary Elimination:  Goal: Ability to reestablish a normal urinary elimination pattern will improve  Outcome: PROGRESSING AS EXPECTED  Condom cath in place to protect skin    Problem: Skin Integrity  Goal: Risk for impaired skin integrity will decrease  Outcome: PROGRESSING AS EXPECTED  Pt makes major position changes frequently on own. Waffle over play in place. condom cath in place to protect groin and perineal area.

## 2017-11-19 NOTE — PROGRESS NOTES
[facility] Progress [facility]    Date of Service: 11/19/2017    Chief Complaint  72 y.o. male admitted 9/30/2017 with urinary tract infection and confusion.    Interval Problem Update  Patient comfortable, has no acute needs. Patient denies fevers/chills, chest pain, shortness of breath or nausea/vommiting.   encourage patient to get out of bed if possible.     11/17  No clinical changes, patient awake and alert, no needs.  Continue PO bactrim and supportive therapy.  Up in chair if possible.    11/18  Patient comfortable watching tv, denies any discomfort.   Continue oral bactrim.  Pending guardianship.    11/19  No clinical changes, stable clinically. Continue bactrim   Again pending placement.    Consultants/Specialty  Geriatrics  Palliative Care  Ethics committee/ Dr. Eamon Blackmon    Disposition  Pending guardianship and placement   assisting            Review of Systems   Unable to perform ROS: Mental status change   Constitutional: Positive for malaise/fatigue. Negative for chills and fever.   HENT: Negative for congestion, hearing loss, sore throat and tinnitus.    Eyes: Negative.  Negative for blurred vision, double vision, photophobia and pain.   Respiratory: Negative for cough, hemoptysis, sputum production, shortness of breath and stridor.    Cardiovascular: Negative for chest pain, palpitations, orthopnea, claudication, leg swelling and PND.   Gastrointestinal: Negative for abdominal pain, blood in stool, constipation, heartburn, melena, nausea and vomiting.   Genitourinary: Negative.  Negative for dysuria, frequency and urgency.   Musculoskeletal: Negative for back pain, myalgias and neck pain.   Neurological: Positive for weakness. Negative for dizziness, tingling, tremors, sensory change, speech change and headaches.   Endo/Heme/Allergies: Negative.    Psychiatric/Behavioral: Positive for memory loss. Negative for depression and suicidal ideas. The patient is not nervous/anxious.        Physical Exam  Laboratory/Imaging   Hemodynamics  Temp (24hrs), Av.8 °C (98.3 °F), Min:36.4 °C (97.6 °F), Max:37.1 °C (98.8 °F)   Temperature:  (Q8 vitials)  Pulse  Av  Min: 58  Max: 144    Blood Pressure : 127/79      Respiratory      Respiration: 17, Pulse Oximetry: 97 %        RUL Breath Sounds: Diminished, RML Breath Sounds: Diminished, RLL Breath Sounds: Diminished, RIGO Breath Sounds: Diminished, LLL Breath Sounds: Diminished    Fluids    Intake/Output Summary (Last 24 hours) at 17 1250  Last data filed at 17 0800   Gross per 24 hour   Intake             1440 ml   Output              700 ml   Net              740 ml       Nutrition  Orders Placed This Encounter   Procedures   • DIET ORDER     Standing Status:   Standing     Number of Occurrences:   1     Order Specific Question:   Diet:     Answer:   Regular [1]     Comments:   please send mashed potatoes with lunch and dinner.     Order Specific Question:   Texture/Fiber modifications:     Answer:   Dysphagia 2(Pureed/Chopped)specify fluid consistency(question 6) [2]     Order Specific Question:   Consistency/Fluid modifications:     Answer:   Nectar Thick [2]     Order Specific Question:   Miscellaneous modifications:     Answer:   SLP - Deliver to Nursing Station [22]   grossly unchanged.  Physical Exam   Constitutional: No distress.   Thin, frail   HENT:   Head: Normocephalic and atraumatic.   Mouth/Throat: No oropharyngeal exudate.   Eyes: Conjunctivae and EOM are normal. Pupils are equal, round, and reactive to light. Right eye exhibits no discharge. Left eye exhibits no discharge.   Neck: Normal range of motion. No JVD present. No thyromegaly present.   Cardiovascular: Normal rate and regular rhythm.    No murmur heard.  Pulmonary/Chest: No respiratory distress. He has no wheezes. He has no rales.   Abdominal: Soft. He exhibits no distension. There is no tenderness. There is no rebound.   Musculoskeletal: He exhibits edema  (swelling unchanged).   Right BKA with no drainage noted,  left forefoot amputation. Right knee with circular wound, improving slowly. Less eryhtmea   Neurological: He is alert. No cranial nerve deficit. Coordination normal.   AOx2   Skin: Skin is warm. He is not diaphoretic. There is erythema (no changes).   Psychiatric: He has a normal mood and affect. His behavior is normal. His speech is delayed.   Nursing note and vitals reviewed.      Recent Labs      11/17/17   0400  11/18/17   0247  11/19/17   0321   WBC  13.3*  13.0*  12.7*   RBC  4.99  4.78  4.64*   HEMOGLOBIN  11.3*  11.1*  10.8*   HEMATOCRIT  36.5*  35.7*  34.5*   MCV  73.1*  74.7*  74.4*   MCH  22.6*  23.2*  23.3*   MCHC  31.0*  31.1*  31.3*   RDW  43.5  45.1  45.6   PLATELETCT  691*  673*  666*   MPV  8.8*  8.9*  8.7*     Recent Labs      11/19/17   0321   SODIUM  131*   POTASSIUM  4.4   CHLORIDE  99   CO2  23   GLUCOSE  74   BUN  18   CREATININE  0.80   CALCIUM  9.0                      Assessment/Plan     * Cellulitis of right knee- (present on admission)   Assessment & Plan    Now diagnosed with right knee osteomyelitics.  Surgical evaluation by Dr.Jaquish priest, not a surgical candidate.  Continue PO bactrim.          Hyponatremia- (present on admission)   Assessment & Plan    Hypovolemic hyponatremia. Improving actually. Na:125 today from 120.  Not symptomatic.  encourage PO intake  Continue Salt tabs.  Check bmp in the am.          Leukocytosis- (present on admission)   Assessment & Plan    2/2 to Right LE cellulitis.  Completed antibiotic therapy. However right knee cellulitis again noted, transferred from medical floor for further observation.  Patient's leukocytosis has been persistent with daily fluctuations, reactive and less likely correlated with infection but can be possible, continue to monitor daily cbc.  Wbc 12.7<13.0<13.3        PAD (peripheral artery disease) (CMS-[facility])- (present on admission)   Assessment & Plan    Significant PAD s/p  left forefoot amputation and right BKA  Continue aspirin and statin  Not candidate for prosthesis  Continue with vascular surgery and wound care        Dementia- (present on admission)   Assessment & Plan    Admitted for encephalopathy and debility.   Has been evaluated by ethics committee, unable to make his own medical decisions.  Guardianship pending        Hypomagnesemia- (present on admission)   Assessment & Plan    Continue daily oral mag supplementation.        Essential hypertension- (present on admission)   Assessment & Plan    Currently normotensive.  Continue with metoprolol 75 mg BID        Severe protein-calorie malnutrition (CMS-HCC)- (present on admission)   Assessment & Plan    Nutrition following  Continue boost TID          Patient plan of care discussed at multidisplinary team rounds and with patient and R.N at beside.      Reviewed items::  Medications reviewed, Labs reviewed and Radiology images reviewed  Barrera catheter::  No Barrera  DVT prophylaxis pharmacological::  Heparin

## 2017-11-19 NOTE — PROGRESS NOTES
Shift report received from night RN, assumed care at 0715. Patient asleep in bed, AOx3-no time, patient not currently expressing any pain at this time, routinely medicated prn per MAR. Pt up standby assist-pivot to commode/wheelchair, calls appropriately, bed alarm in use. No PIV access, doctors aware. O2 RA, SPO2 97%. VTE Heparin. Plan is to work out guardianship with SO and SW. Discussed POC for multimodal pain management, monitoring VS/labs/I&O, mobility, day time routine, comfort, and safety. Patient has call light and personal belongings within reach. Safety and fall precautions in place. Reviewed current labs, notes, medications, and orders. Hourly rounding in place with RN rounding on odd hours and CNA on even hours.

## 2017-11-20 LAB
ERYTHROCYTE [DISTWIDTH] IN BLOOD BY AUTOMATED COUNT: 44.7 FL (ref 35.9–50)
HCT VFR BLD AUTO: 35 % (ref 42–52)
HGB BLD-MCNC: 10.7 G/DL (ref 14–18)
MCH RBC QN AUTO: 22.5 PG (ref 27–33)
MCHC RBC AUTO-ENTMCNC: 30.6 G/DL (ref 33.7–35.3)
MCV RBC AUTO: 73.5 FL (ref 81.4–97.8)
PLATELET # BLD AUTO: 670 K/UL (ref 164–446)
PMV BLD AUTO: 8.6 FL (ref 9–12.9)
RBC # BLD AUTO: 4.76 M/UL (ref 4.7–6.1)
WBC # BLD AUTO: 12.6 K/UL (ref 4.8–10.8)

## 2017-11-20 PROCEDURE — A9270 NON-COVERED ITEM OR SERVICE: HCPCS | Performed by: HOSPITALIST

## 2017-11-20 PROCEDURE — 97530 THERAPEUTIC ACTIVITIES: CPT

## 2017-11-20 PROCEDURE — 700102 HCHG RX REV CODE 250 W/ 637 OVERRIDE(OP): Performed by: INTERNAL MEDICINE

## 2017-11-20 PROCEDURE — 92526 ORAL FUNCTION THERAPY: CPT

## 2017-11-20 PROCEDURE — 700102 HCHG RX REV CODE 250 W/ 637 OVERRIDE(OP): Performed by: HOSPITALIST

## 2017-11-20 PROCEDURE — 99232 SBSQ HOSP IP/OBS MODERATE 35: CPT | Performed by: HOSPITALIST

## 2017-11-20 PROCEDURE — 85027 COMPLETE CBC AUTOMATED: CPT

## 2017-11-20 PROCEDURE — 770021 HCHG ROOM/CARE - ISO PRIVATE

## 2017-11-20 PROCEDURE — 700111 HCHG RX REV CODE 636 W/ 250 OVERRIDE (IP): Performed by: HOSPITALIST

## 2017-11-20 PROCEDURE — G8978 MOBILITY CURRENT STATUS: HCPCS | Mod: CL

## 2017-11-20 PROCEDURE — G8979 MOBILITY GOAL STATUS: HCPCS | Mod: CJ

## 2017-11-20 PROCEDURE — A9270 NON-COVERED ITEM OR SERVICE: HCPCS | Performed by: INTERNAL MEDICINE

## 2017-11-20 PROCEDURE — 36415 COLL VENOUS BLD VENIPUNCTURE: CPT

## 2017-11-20 PROCEDURE — 97535 SELF CARE MNGMENT TRAINING: CPT

## 2017-11-20 PROCEDURE — 302255 BARRIER CREAM MOISTURE BAZA PROTECT (ZINC) 5OZ: Performed by: HOSPITALIST

## 2017-11-20 RX ADMIN — GABAPENTIN 100 MG: 100 CAPSULE ORAL at 09:24

## 2017-11-20 RX ADMIN — SODIUM CHLORIDE TAB 1 GM 2 G: 1 TAB at 18:02

## 2017-11-20 RX ADMIN — ACETAMINOPHEN 650 MG: 325 TABLET, FILM COATED ORAL at 05:35

## 2017-11-20 RX ADMIN — ACETAMINOPHEN 650 MG: 325 TABLET, FILM COATED ORAL at 20:15

## 2017-11-20 RX ADMIN — ATORVASTATIN CALCIUM 40 MG: 40 TABLET, FILM COATED ORAL at 20:15

## 2017-11-20 RX ADMIN — HEPARIN SODIUM 5000 UNITS: 5000 INJECTION, SOLUTION INTRAVENOUS; SUBCUTANEOUS at 09:24

## 2017-11-20 RX ADMIN — GABAPENTIN 100 MG: 100 CAPSULE ORAL at 20:15

## 2017-11-20 RX ADMIN — GABAPENTIN 100 MG: 100 CAPSULE ORAL at 15:23

## 2017-11-20 RX ADMIN — HEPARIN SODIUM 5000 UNITS: 5000 INJECTION, SOLUTION INTRAVENOUS; SUBCUTANEOUS at 20:15

## 2017-11-20 RX ADMIN — SULFAMETHOXAZOLE AND TRIMETHOPRIM 1 TABLET: 800; 160 TABLET ORAL at 15:23

## 2017-11-20 RX ADMIN — THERA TABS 1 TABLET: TAB at 09:24

## 2017-11-20 RX ADMIN — METOPROLOL TARTRATE 75 MG: 25 TABLET, FILM COATED ORAL at 09:24

## 2017-11-20 RX ADMIN — FOLIC ACID 1 MG: 1 TABLET ORAL at 09:24

## 2017-11-20 RX ADMIN — SULFAMETHOXAZOLE AND TRIMETHOPRIM 1 TABLET: 800; 160 TABLET ORAL at 05:35

## 2017-11-20 RX ADMIN — SODIUM CHLORIDE TAB 1 GM 2 G: 1 TAB at 09:24

## 2017-11-20 RX ADMIN — ASPIRIN 81 MG: 81 TABLET, COATED ORAL at 09:24

## 2017-11-20 RX ADMIN — SULFAMETHOXAZOLE AND TRIMETHOPRIM 1 TABLET: 800; 160 TABLET ORAL at 20:15

## 2017-11-20 RX ADMIN — MAGNESIUM GLUCONATE 500 MG ORAL TABLET 400 MG: 500 TABLET ORAL at 09:24

## 2017-11-20 RX ADMIN — LEVOTHYROXINE SODIUM 75 MCG: 75 TABLET ORAL at 05:35

## 2017-11-20 RX ADMIN — METOPROLOL TARTRATE 75 MG: 25 TABLET, FILM COATED ORAL at 20:15

## 2017-11-20 ASSESSMENT — ENCOUNTER SYMPTOMS
NERVOUS/ANXIOUS: 0
DIZZINESS: 0
ABDOMINAL PAIN: 0
DOUBLE VISION: 0
EYES NEGATIVE: 1
WEAKNESS: 1
EYE PAIN: 0
CHILLS: 0
DEPRESSION: 0
BACK PAIN: 0
HEMOPTYSIS: 0
TINGLING: 0
SORE THROAT: 0
BLURRED VISION: 0
VOMITING: 0
NAUSEA: 0
PHOTOPHOBIA: 0
HEARTBURN: 0
HEADACHES: 0
MYALGIAS: 0
MEMORY LOSS: 1
FEVER: 0
TREMORS: 0
SPUTUM PRODUCTION: 0
CONSTIPATION: 0
PND: 0
SPEECH CHANGE: 0
ORTHOPNEA: 0
SHORTNESS OF BREATH: 0
PALPITATIONS: 0
COUGH: 0
NECK PAIN: 0
STRIDOR: 0
BLOOD IN STOOL: 0
SENSORY CHANGE: 0
CLAUDICATION: 0

## 2017-11-20 ASSESSMENT — PAIN SCALES - GENERAL
PAINLEVEL_OUTOF10: ASSUMED PAIN PRESENT
PAINLEVEL_OUTOF10: 6
PAINLEVEL_OUTOF10: ASSUMED PAIN PRESENT
PAINLEVEL_OUTOF10: 0
PAINLEVEL_OUTOF10: ASSUMED PAIN PRESENT
PAINLEVEL_OUTOF10: 7

## 2017-11-20 ASSESSMENT — COGNITIVE AND FUNCTIONAL STATUS - GENERAL
STANDING UP FROM CHAIR USING ARMS: A LOT
DAILY ACTIVITIY SCORE: 15
CLIMB 3 TO 5 STEPS WITH RAILING: TOTAL
DRESSING REGULAR LOWER BODY CLOTHING: A LOT
TOILETING: TOTAL
SUGGESTED CMS G CODE MODIFIER DAILY ACTIVITY: CK
MOBILITY SCORE: 9
WALKING IN HOSPITAL ROOM: TOTAL
SUGGESTED CMS G CODE MODIFIER MOBILITY: CM
TURNING FROM BACK TO SIDE WHILE IN FLAT BAD: A LOT
DRESSING REGULAR UPPER BODY CLOTHING: A LITTLE
HELP NEEDED FOR BATHING: A LOT
PERSONAL GROOMING: A LITTLE
MOVING FROM LYING ON BACK TO SITTING ON SIDE OF FLAT BED: UNABLE
MOVING TO AND FROM BED TO CHAIR: A LOT

## 2017-11-20 ASSESSMENT — GAIT ASSESSMENTS: GAIT LEVEL OF ASSIST: UNABLE TO PARTICIPATE

## 2017-11-20 NOTE — PROGRESS NOTES
[facility] Hospitalist Progress Note    Date of Service: 11/20/2017    Chief Complaint  72 y.o. male admitted 9/30/2017 with urinary tract infection and confusion.    Interval Problem Update  Patient comfortable, has no acute needs. Patient denies fevers/chills, chest pain, shortness of breath or nausea/vommiting.   encourage patient to get out of bed if possible.     11/17  No clinical changes, patient awake and alert, no needs.  Continue PO bactrim and supportive therapy.  Up in chair if possible.    11/18  Patient comfortable watching tv, denies any discomfort.   Continue oral bactrim.  Pending guardianship.    11/19  No clinical changes, stable clinically. Continue bactrim   Again pending placement.  Apparently patient has a wife which she is to present legal marriage papers to obtain guardianship?     Consultants/Specialty  Geriatrics  Palliative Care  Ethics committee/ Dr. Eamon Blackmon    Disposition  Pending guardianship and placement   assisting            Review of Systems   Unable to perform ROS: Mental status change   Constitutional: Positive for malaise/fatigue. Negative for chills and fever.   HENT: Negative for congestion, hearing loss, sore throat and tinnitus.    Eyes: Negative.  Negative for blurred vision, double vision, photophobia and pain.   Respiratory: Negative for cough, hemoptysis, sputum production, shortness of breath and stridor.    Cardiovascular: Negative for chest pain, palpitations, orthopnea, claudication, leg swelling and PND.   Gastrointestinal: Negative for abdominal pain, blood in stool, constipation, heartburn, melena, nausea and vomiting.   Genitourinary: Negative.  Negative for dysuria, frequency and urgency.   Musculoskeletal: Negative for back pain, myalgias and neck pain.   Neurological: Positive for weakness. Negative for dizziness, tingling, tremors, sensory change, speech change and headaches.   Endo/Heme/Allergies: Negative.    Psychiatric/Behavioral: Positive  for memory loss. Negative for depression and suicidal ideas. The patient is not nervous/anxious.       Physical Exam  Laboratory/Imaging   Hemodynamics  Temp (24hrs), Av.8 °C (98.2 °F), Min:36.6 °C (97.8 °F), Max:36.9 °C (98.5 °F)   Temperature: 36.9 °C (98.4 °F)  Pulse  Av.8  Min: 58  Max: 144    Blood Pressure : 126/56      Respiratory      Respiration: 16, Pulse Oximetry: 93 %        LLL Breath Sounds: Diminished    Fluids    Intake/Output Summary (Last 24 hours) at 17 0941  Last data filed at 17 1600   Gross per 24 hour   Intake              580 ml   Output              300 ml   Net              280 ml       Nutrition  Orders Placed This Encounter   Procedures   • DIET ORDER     Standing Status:   Standing     Number of Occurrences:   1     Order Specific Question:   Diet:     Answer:   Regular [1]     Comments:   please send mashed potatoes with lunch and dinner.     Order Specific Question:   Texture/Fiber modifications:     Answer:   Dysphagia 2(Pureed/Chopped)specify fluid consistency(question 6) [2]     Order Specific Question:   Consistency/Fluid modifications:     Answer:   Nectar Thick [2]     Order Specific Question:   Miscellaneous modifications:     Answer:   SLP - Deliver to Nursing Station [22]   grossly unchanged.  Physical Exam   Constitutional: No distress.   Thin, frail   HENT:   Head: Normocephalic and atraumatic.   Mouth/Throat: No oropharyngeal exudate.   Eyes: Conjunctivae and EOM are normal. Pupils are equal, round, and reactive to light. Right eye exhibits no discharge. Left eye exhibits no discharge.   Neck: Normal range of motion. No JVD present. No thyromegaly present.   Cardiovascular: Normal rate and regular rhythm.    No murmur heard.  Pulmonary/Chest: No respiratory distress. He has no wheezes. He has no rales.   Abdominal: Soft. He exhibits no distension. There is no tenderness. There is no rebound.   Musculoskeletal: He exhibits edema (minimal).   Right BKA  with no drainage noted,  left forefoot amputation. Right knee with circular wound, improving slowly/minimal .   Neurological: He is alert. No cranial nerve deficit. Coordination normal.   AOx2   Skin: Skin is warm. He is not diaphoretic. There is erythema (no changes).   Psychiatric: He has a normal mood and affect. His behavior is normal. His speech is delayed.   Nursing note and vitals reviewed.      Recent Labs      11/18/17   0247  11/19/17   0321  11/20/17   0312   WBC  13.0*  12.7*  12.6*   RBC  4.78  4.64*  4.76   HEMOGLOBIN  11.1*  10.8*  10.7*   HEMATOCRIT  35.7*  34.5*  35.0*   MCV  74.7*  74.4*  73.5*   MCH  23.2*  23.3*  22.5*   MCHC  31.1*  31.3*  30.6*   RDW  45.1  45.6  44.7   PLATELETCT  673*  666*  670*   MPV  8.9*  8.7*  8.6*     Recent Labs      11/19/17   0321   SODIUM  131*   POTASSIUM  4.4   CHLORIDE  99   CO2  23   GLUCOSE  74   BUN  18   CREATININE  0.80   CALCIUM  9.0                      Assessment/Plan     Hyponatremia  Hypovolemic hyponatremia. Improving actually. Na:131 yesterday.  Not symptomatic.  encourage PO intake  Continue Salt tabs.  Check bmp in the am.      PAD (peripheral artery disease) (CMS-[facility])  Significant PAD s/p left forefoot amputation and right BKA  Continue aspirin and statin  Not candidate for prosthesis  Continue with vascular surgery and wound care    Essential hypertension  Currently normotensive.  Continue with metoprolol 75 mg BID    Severe protein-calorie malnutrition (CMS-[facility])  Nutrition following  Continue boost TID    Dementia  Admitted for encephalopathy and debility.   Has been evaluated by ethics committee, unable to make his own medical decisions.  Guardianship pending      Leukocytosis  2/2 to Right LE cellulitis.  Completed antibiotic therapy. However right knee cellulitis again noted, transferred from medical floor for further observation.  Patient's leukocytosis has been persistent with daily fluctuations, reactive and less likely correlated with  infection but can be possible, continue to monitor daily cbc.  Wbc 12.6<12.7<13.0<13.3    Hypomagnesemia  Continue daily oral mag supplementation.    Cellulitis of right knee  Now diagnosed with right knee osteomyelitics.  Surgical evaluation by Dr.Jaquish priest, not a surgical candidate.  Continue PO bactrim.          Patient plan of care discussed at multidisplinary team rounds and with patient and R.N at beside.      Reviewed items::  Medications reviewed, Labs reviewed and Radiology images reviewed  Barrera catheter::  No Barrera  DVT prophylaxis pharmacological::  Heparin

## 2017-11-20 NOTE — THERAPY
"Pt agreeable to tx today. Pt mobility cont to be limited by low activity tolerance and poor trunk control. Pt educated about importanc eof OOB to improve his strength and activity tolerance. Pt required modA for bed mobility and maxA for slide board transfer from EOB->WC. Pt demonstrared poor trunck control and poor balance. Pt tends to lean posteriorly and requires verbal and tactile cuing to correct. Pt unable to maintain anterior position. Pt would benefit from further acute PT txs to progress towards goals and independence.    Physical Therapy Treatment completed.   Bed Mobility:  Supine to Sit: Moderate Assist  Transfers: Sit to Stand: Unable to Participate  Gait: Level Of Assist: Unable to Participate        Plan of Care: Will benefit from Physical Therapy 3 times per week      See \"Rehab Therapy-Acute\" Patient Summary Report for complete documentation.       "

## 2017-11-20 NOTE — WOUND TEAM
"[facility]  Inpatient Services  Wound and Skin Care Progress Note    HPI, PMH, SH: Reviewed  Unit where seen by Wound Team: [id number]    WOUND TEAM FOLLOW UP:  re-evaluation of R knee and stump wound    SUBJECTIVE:  \"I think it's better\"    Self Report / Pain Level: denies     OBJECTIVE: in bed, incontinent of urine, dressing intact right knee.  Right knee incision wound resolved     WOUND TYPE, LOCATION, CHARACTERISTICS:    Wound Type/Location:  Stage 2 pressure ulcer anterior right knee 10/3/17   Periwound:   intact      Drainage: scant serosanguinous       Tissue Type and %:  Red 100%    Wound Edges:  attached    Odor:  none      Exposed structure(s):  none   S&S of Infection:  none    Measurements:  (taken 11/20/17)    Length:  3.1cm   Width:  2cm      Depth:  <0.5cm    Tunnels/undermining: none     INTERVENTIONS BY WOUND TEAM:   Removed dressing to knee and cleansed with NS gauze.  Measurements and photo taken.  Right BKA incisional wound resolved.  Covered knee wound with silver hydrofiber and secured with adhesive foam.  Discussed with staff RN.    Interdisciplinary consultation: staff RN, patient    EVALUATION AND PROGRESS OF WOUND(S):  Right knee wound unchanged from last week; stable;  incisionalmproved with no ritu wound erythema and no cartilage observed.  Incision appears intact.    Factors affecting wound healing: PAD, decreased nutrition, smoker, alcohol abuse     Goals: decrease size of knee wound by 1% each week      NURSING PLAN OF CARE:    Wound care orders:   X updated    Skin care: See Skin Care orders:        Rectal tube care: See Rectal Tube Care orders:      Other orders:           WOUND TEAM PLAN OF CARE (X):   NPWT change 3 x week:        Dressing changes:       Follow up as needed:   X weekly  Other:   "

## 2017-11-20 NOTE — THERAPY
"Occupational Therapy Treatment completed with focus on ADLs, ADL transfers and patient education.  Functional Status:  Pt seen for OT tx. Pt up in WC at beginning of session. While seated up in WC, pt is able to self-feed and complete light grooming as able. Pt continues to have posterior pushing during transfers. Pt unable to successfully complete transfer BTB w/ SB d/t heavy posterior pushing and required max A squat pivot transfer. Pt required max A for pericare d/t incontinence.   Plan of Care: Will discuss POC w/ OTR and discuss goals and frequency.   Discharge Recommendations:  Equipment Will Continue to Assess for Equipment Needs.     See \"Rehab Therapy-Acute\" Patient Summary Report for complete documentation.   "

## 2017-11-20 NOTE — THERAPY
"Speech Language Therapy dysphagia treatment completed.   Functional Status:  Patient upright in wheelchair eating his lunch. Thin coffee was noted at bedside. Breakfast tray was also at bedside and patient had not eaten the main entree. He reported it was because he didn't want solid foods this morning. For lunch his entree was mashed potatoes only. He declined trailing solid foods. He did wish to work on thin liquids and stated \"there's just something you taste in the thick stuff.\" Given a cold thin liquid beverage the patient immediately started gulping even with cues for small sips. His voice was wet but he was able to clear with a cough. Patient educated again on signs of and risks of aspiration. He consumed his thin coffee in which he independently took small single sips throughout the rest of the meal and no overt signs of aspiration noted. Discussing POC with patient regarding his meals he reported he preferred to work on improving tolerance of thin liquids instead of solid foods. There was a recommended upgrade in diet last week but this change did not occur. I was unable to assess solids today due to patient's refusal.     Recommendations: Recommend to continue Dysphagia 2, nectar thick liquid diet. SLP will continue to follow patient and work on improving safety with upgraded textures     Plan of Care: Will benefit from Speech Therapy 3 times per week  Post-Acute Therapy: Discharge to a transitional care facility for continued skilled therapy services.    See \"Rehab Therapy-Acute\" Patient Summary Report for complete documentation.     "

## 2017-11-20 NOTE — DISCHARGE PLANNING
SW called pt's wife to discuss SNF choice. Wife agreeable to blanket referral to increase likelihood of sooner d/c. Verbal auth obtained. Choice faxed to [facility].

## 2017-11-20 NOTE — DISCHARGE PLANNING
Guardianship Status    SW Supervisor spoke to Opal at  Olivia Carreon's office ([phone]) and asked that guardianship be withdrawal, explained wife has been located and confirmed as decision maker.    Plan: Kerry Bernal is patient's legal wife and his NOK decision maker.

## 2017-11-21 LAB
ANION GAP SERPL CALC-SCNC: 8 MMOL/L (ref 0–11.9)
BUN SERPL-MCNC: 18 MG/DL (ref 8–22)
CALCIUM SERPL-MCNC: 9.4 MG/DL (ref 8.5–10.5)
CHLORIDE SERPL-SCNC: 98 MMOL/L (ref 96–112)
CO2 SERPL-SCNC: 22 MMOL/L (ref 20–33)
CREAT SERPL-MCNC: 0.76 MG/DL (ref 0.5–1.4)
ERYTHROCYTE [DISTWIDTH] IN BLOOD BY AUTOMATED COUNT: 45.9 FL (ref 35.9–50)
GFR SERPL CREATININE-BSD FRML MDRD: >60 ML/MIN/1.73 M 2
GLUCOSE SERPL-MCNC: 81 MG/DL (ref 65–99)
HCT VFR BLD AUTO: 36.4 % (ref 42–52)
HGB BLD-MCNC: 11 G/DL (ref 14–18)
MCH RBC QN AUTO: 22.5 PG (ref 27–33)
MCHC RBC AUTO-ENTMCNC: 30.2 G/DL (ref 33.7–35.3)
MCV RBC AUTO: 74.4 FL (ref 81.4–97.8)
PLATELET # BLD AUTO: 691 K/UL (ref 164–446)
PMV BLD AUTO: 8.9 FL (ref 9–12.9)
POTASSIUM SERPL-SCNC: 4.7 MMOL/L (ref 3.6–5.5)
RBC # BLD AUTO: 4.89 M/UL (ref 4.7–6.1)
SODIUM SERPL-SCNC: 128 MMOL/L (ref 135–145)
WBC # BLD AUTO: 10.9 K/UL (ref 4.8–10.8)

## 2017-11-21 PROCEDURE — 700102 HCHG RX REV CODE 250 W/ 637 OVERRIDE(OP): Performed by: INTERNAL MEDICINE

## 2017-11-21 PROCEDURE — 700102 HCHG RX REV CODE 250 W/ 637 OVERRIDE(OP): Performed by: HOSPITALIST

## 2017-11-21 PROCEDURE — 36415 COLL VENOUS BLD VENIPUNCTURE: CPT

## 2017-11-21 PROCEDURE — 700111 HCHG RX REV CODE 636 W/ 250 OVERRIDE (IP): Performed by: HOSPITALIST

## 2017-11-21 PROCEDURE — A9270 NON-COVERED ITEM OR SERVICE: HCPCS | Performed by: HOSPITALIST

## 2017-11-21 PROCEDURE — 99233 SBSQ HOSP IP/OBS HIGH 50: CPT | Performed by: INTERNAL MEDICINE

## 2017-11-21 PROCEDURE — 302112 WASHCLOTH,PERINEAL CARE: Performed by: HOSPITALIST

## 2017-11-21 PROCEDURE — 85027 COMPLETE CBC AUTOMATED: CPT

## 2017-11-21 PROCEDURE — 80048 BASIC METABOLIC PNL TOTAL CA: CPT

## 2017-11-21 PROCEDURE — A9270 NON-COVERED ITEM OR SERVICE: HCPCS | Performed by: INTERNAL MEDICINE

## 2017-11-21 PROCEDURE — 770021 HCHG ROOM/CARE - ISO PRIVATE

## 2017-11-21 RX ORDER — M-VIT,TX,IRON,MINS/CALC/FOLIC 27MG-0.4MG
1 TABLET ORAL DAILY
Status: DISCONTINUED | OUTPATIENT
Start: 2017-11-21 | End: 2017-11-30 | Stop reason: HOSPADM

## 2017-11-21 RX ORDER — ASCORBIC ACID 500 MG
500 TABLET ORAL DAILY
Status: DISCONTINUED | OUTPATIENT
Start: 2017-11-21 | End: 2017-11-29

## 2017-11-21 RX ADMIN — GABAPENTIN 100 MG: 100 CAPSULE ORAL at 21:27

## 2017-11-21 RX ADMIN — METOPROLOL TARTRATE 75 MG: 25 TABLET, FILM COATED ORAL at 21:27

## 2017-11-21 RX ADMIN — LEVOTHYROXINE SODIUM 75 MCG: 75 TABLET ORAL at 06:13

## 2017-11-21 RX ADMIN — HEPARIN SODIUM 5000 UNITS: 5000 INJECTION, SOLUTION INTRAVENOUS; SUBCUTANEOUS at 21:26

## 2017-11-21 RX ADMIN — HEPARIN SODIUM 5000 UNITS: 5000 INJECTION, SOLUTION INTRAVENOUS; SUBCUTANEOUS at 09:02

## 2017-11-21 RX ADMIN — SULFAMETHOXAZOLE AND TRIMETHOPRIM 1 TABLET: 800; 160 TABLET ORAL at 14:20

## 2017-11-21 RX ADMIN — SODIUM CHLORIDE TAB 1 GM 2 G: 1 TAB at 09:02

## 2017-11-21 RX ADMIN — OXYCODONE HYDROCHLORIDE AND ACETAMINOPHEN 500 MG: 500 TABLET ORAL at 14:20

## 2017-11-21 RX ADMIN — ACETAMINOPHEN 650 MG: 325 TABLET, FILM COATED ORAL at 06:13

## 2017-11-21 RX ADMIN — SULFAMETHOXAZOLE AND TRIMETHOPRIM 1 TABLET: 800; 160 TABLET ORAL at 21:28

## 2017-11-21 RX ADMIN — GABAPENTIN 100 MG: 100 CAPSULE ORAL at 14:20

## 2017-11-21 RX ADMIN — FOLIC ACID 1 MG: 1 TABLET ORAL at 09:02

## 2017-11-21 RX ADMIN — MAGNESIUM GLUCONATE 500 MG ORAL TABLET 400 MG: 500 TABLET ORAL at 09:02

## 2017-11-21 RX ADMIN — SODIUM CHLORIDE TAB 1 GM 2 G: 1 TAB at 17:30

## 2017-11-21 RX ADMIN — ASPIRIN 81 MG: 81 TABLET, COATED ORAL at 09:02

## 2017-11-21 RX ADMIN — ATORVASTATIN CALCIUM 40 MG: 40 TABLET, FILM COATED ORAL at 21:28

## 2017-11-21 RX ADMIN — THERA TABS 1 TABLET: TAB at 09:02

## 2017-11-21 RX ADMIN — GABAPENTIN 100 MG: 100 CAPSULE ORAL at 09:02

## 2017-11-21 RX ADMIN — SULFAMETHOXAZOLE AND TRIMETHOPRIM 1 TABLET: 800; 160 TABLET ORAL at 06:13

## 2017-11-21 ASSESSMENT — PAIN SCALES - GENERAL
PAINLEVEL_OUTOF10: 7
PAINLEVEL_OUTOF10: 0
PAINLEVEL_OUTOF10: 0

## 2017-11-21 ASSESSMENT — ENCOUNTER SYMPTOMS
NAUSEA: 0
TREMORS: 0
BLOOD IN STOOL: 0
EYE PAIN: 0
BLURRED VISION: 0
CHILLS: 0
DIZZINESS: 0
PALPITATIONS: 0
SENSORY CHANGE: 0
NERVOUS/ANXIOUS: 0
SORE THROAT: 0
PHOTOPHOBIA: 0
MYALGIAS: 0
HEARTBURN: 0
MEMORY LOSS: 1
STRIDOR: 0
TINGLING: 0
HEADACHES: 0
NECK PAIN: 0
PND: 0
FEVER: 0
SHORTNESS OF BREATH: 0
CONSTIPATION: 0
EYES NEGATIVE: 1
COUGH: 0
SPUTUM PRODUCTION: 0
WEAKNESS: 1
HEMOPTYSIS: 0
DOUBLE VISION: 0
ABDOMINAL PAIN: 0
BACK PAIN: 0
VOMITING: 0
CLAUDICATION: 0
DEPRESSION: 0
SPEECH CHANGE: 0
ORTHOPNEA: 0

## 2017-11-21 NOTE — CARE PLAN
Problem: Safety  Goal: Will remain free from injury  Outcome: PROGRESSING AS EXPECTED  Bed in low locked position. Call light within reach. Bed alarm in place. Educated pt regarding safety equipment. DME out of sight.     Problem: Skin Integrity  Goal: Risk for impaired skin integrity will decrease  Outcome: PROGRESSING SLOWER THAN EXPECTED  Patient able to make significant changes in position on own. Assisting with turns. Waffle overlay in place. Ordered IAD protocol. Tania barrier cream ordered as well as barrier wipes.

## 2017-11-21 NOTE — PROGRESS NOTES
[facility] Hospitalist Progress Note    Date of Service: 2017    Chief Complaint  72 y.o. male admitted 2017 with urinary tract infection and confusion.    Interval Problem Update    No clinical changes, stable clinically. Continue bactrim   Again pending placement.  Apparently patient has a wife which she is to present legal marriage papers to obtain guardianship?      Right lower extremity wound with mild purulence. Continue abx and wound care. Na continues to worsen, he appears euvolemic. Start on fluid restriction < 1200ml/day.     Consultants/Specialty  Geriatrics  Palliative Care  Ethics committee/ Dr. Eamon Blackmon    Disposition  Pending guardianship and placement   assisting      Review of Systems   Unable to perform ROS: Mental status change   Constitutional: Positive for malaise/fatigue. Negative for chills and fever.   HENT: Negative for congestion, hearing loss, sore throat and tinnitus.    Eyes: Negative.  Negative for blurred vision, double vision, photophobia and pain.   Respiratory: Negative for cough, hemoptysis, sputum production, shortness of breath and stridor.    Cardiovascular: Negative for chest pain, palpitations, orthopnea, claudication, leg swelling and PND.   Gastrointestinal: Negative for abdominal pain, blood in stool, constipation, heartburn, melena, nausea and vomiting.   Genitourinary: Negative.  Negative for dysuria, frequency and urgency.   Musculoskeletal: Negative for back pain, myalgias and neck pain.   Neurological: Positive for weakness. Negative for dizziness, tingling, tremors, sensory change, speech change and headaches.   Endo/Heme/Allergies: Negative.    Psychiatric/Behavioral: Positive for memory loss. Negative for depression and suicidal ideas. The patient is not nervous/anxious.       Physical Exam  Laboratory/Imaging   Hemodynamics  Temp (24hrs), Av.7 °C (98.1 °F), Min:36.6 °C (97.8 °F), Max:37.1 °C (98.8 °F)   Temperature: 36.7 °C (98  °F)  Pulse  Av.6  Min: 58  Max: 144    Blood Pressure : 104/61      Respiratory      Respiration: 19, Pulse Oximetry: 96 %        RUL Breath Sounds: Clear, RML Breath Sounds: Diminished, RLL Breath Sounds: Diminished, RIGO Breath Sounds: Clear, LLL Breath Sounds: Diminished    Fluids  No intake or output data in the 24 hours ending 17 0852    Nutrition  Orders Placed This Encounter   Procedures   • DIET ORDER     Standing Status:   Standing     Number of Occurrences:   1     Order Specific Question:   Diet:     Answer:   Regular [1]     Comments:   please send mashed potatoes with lunch and dinner.     Order Specific Question:   Texture/Fiber modifications:     Answer:   Dysphagia 2(Pureed/Chopped)specify fluid consistency(question 6) [2]     Order Specific Question:   Consistency/Fluid modifications:     Answer:   Nectar Thick [2]     Order Specific Question:   Miscellaneous modifications:     Answer:   SLP - Deliver to Nursing Station [22]   grossly unchanged.  Physical Exam   Constitutional: No distress.   Thin, frail   HENT:   Head: Normocephalic and atraumatic.   Mouth/Throat: No oropharyngeal exudate.   Eyes: Conjunctivae and EOM are normal. Pupils are equal, round, and reactive to light. Right eye exhibits no discharge. Left eye exhibits no discharge.   Neck: Normal range of motion. No JVD present. No thyromegaly present.   Cardiovascular: Normal rate and regular rhythm.    No murmur heard.  Pulmonary/Chest: No respiratory distress. He has no wheezes. He has no rales.   Abdominal: Soft. He exhibits no distension. There is no tenderness. There is no rebound.   Musculoskeletal: He exhibits edema (minimal).   Right BKA with no drainage noted,  left forefoot amputation. Right knee with circular wound, improving slowly/minimal .   Neurological: He is alert. No cranial nerve deficit. Coordination normal.   AOx2   Skin: Skin is warm. He is not diaphoretic. There is erythema (no changes).   Psychiatric: He  has a normal mood and affect. His behavior is normal. His speech is delayed.   Nursing note and vitals reviewed.      Recent Labs      11/19/17 0321 11/20/17 0312 11/21/17 0417   WBC  12.7*  12.6*  10.9*   RBC  4.64*  4.76  4.89   HEMOGLOBIN  10.8*  10.7*  11.0*   HEMATOCRIT  34.5*  35.0*  36.4*   MCV  74.4*  73.5*  74.4*   MCH  23.3*  22.5*  22.5*   MCHC  31.3*  30.6*  30.2*   RDW  45.6  44.7  45.9   PLATELETCT  666*  670*  691*   MPV  8.7*  8.6*  8.9*     Recent Labs      11/19/17 0321 11/21/17 0417   SODIUM  131*  128*   POTASSIUM  4.4  4.7   CHLORIDE  99  98   CO2  23  22   GLUCOSE  74  81   BUN  18  18   CREATININE  0.80  0.76   CALCIUM  9.0  9.4                      Assessment/Plan     Hyponatremia  Worsening  Check serum osm, urine osmolality and urine sodium  asymptomatic.  Start on Fluid restriction less than 1200 mL per day  Continue Salt tabs.  Check bmp in the am.      PAD (peripheral artery disease) (CMS-[facility])  Significant PAD s/p left forefoot amputation and right BKA  Continue aspirin and statin  Not candidate for prosthesis  Continue with vascular surgery and wound care    Essential hypertension  Currently normotensive.  Continue with metoprolol 75 mg BID    Severe protein-calorie malnutrition (CMS-[facility])  Nutrition following  Continue boost TID    Dementia  Admitted for encephalopathy and debility.   Has been evaluated by ethics committee, unable to make his own medical decisions.  Guardianship pending      Leukocytosis  Improving  2/2 to Right LE cellulitis.  Completed antibiotic therapy. However right knee cellulitis again noted, transferred from medical floor for further observation.  Patient's leukocytosis has been persistent with daily fluctuations, reactive and less likely correlated with infection but can be possible, continue to monitor daily cbc.  Wbc 10<12.6<12.7<13.0    Hypomagnesemia  Continue daily oral mag supplementation.    Cellulitis of right knee  Now diagnosed with right  knee osteomyelitics.  Surgical evaluation by Dr.Jaquish priest, not a surgical candidate.  Continue PO bactrim.          Patient plan of care discussed at multidisplinary team rounds and with patient and R.N at beside.      Reviewed items::  Medications reviewed, Labs reviewed and Radiology images reviewed  Barrera catheter::  No Barrera  DVT prophylaxis pharmacological::  Heparin

## 2017-11-21 NOTE — DISCHARGE PLANNING
[facility] received notification from [facility] the referral has been denied No Skilled Days Remaining

## 2017-11-21 NOTE — DISCHARGE PLANNING
[facility] received notification from [facility] the Liaison will conduct an onsite evaluation.

## 2017-11-21 NOTE — DISCHARGE PLANNING
[facility] received a call from Dorys at [facility]. The referral has been denied. The patient does not have any Medicare Days Left

## 2017-11-21 NOTE — DISCHARGE PLANNING
"SW Supervisor was asked to meet with pt and Kerry bedside; SW Supervisor arrived and Kerry was no longer bedside.     SW Supervisor spoke with Kerry Lagos via phone ([phone]). Kerry explained that she wanted to understand the guardianship process and discuss her concerns. Kerry explained that she and pt were  in 1998. Kerry explained they had also completed a POA in 2002 following the pt having an acute admission following hepatitis & prostrate cancer complications. Kerry explained that she cannot find proof of the POA but that she is certain she has her certified marriage certificate. Kerry explained that she had \"personal reasons\" for wanting to not share that she was his wife but that she is his wife and wants to make his decisions as she knows what his wishes are and he has always been adamant about not going into long term care. Kerry stated she has been [facility], and they have increased his PCA services to get more hours of help in the home and that they have support systems at place to prevent him from deteriorating at home. Kerry stated she will bring in the documents on Monday to show that she is in fact the spouse. SW Supervisor answered all questions regarding guardianship and terminating the petition once NOK is established. All other additional concerns about involvement in care planning also addressed at this time.     "

## 2017-11-21 NOTE — PROGRESS NOTES
Small puncture-like wound found on left buttocks near anus. ritu wound tissue soft intact normal temp on palpation. Wound picture taken and uploaded wound care notified.

## 2017-11-21 NOTE — DISCHARGE PLANNING
Received choice form from [facility] at [phone].  Referral sent to all local SNF as per request at 1262 on 11-20-17.

## 2017-11-21 NOTE — DISCHARGE PLANNING
Received call from Dorys at [facility], they are requesting Discharge Plan, as they have no long term beds.

## 2017-11-21 NOTE — DIETARY
Nutrition Services Brief note: Pt seen for weekly rescreen. Discussed with RN and pt, who both report pt eating ~ 50% of meals. Per RN, pt has bouts of confusion. Pt states he likes Boost drinks. Diet= Dysphagia II, Nectar Thick Liquid with Boost Very High Calories twice daily and Magic Cup twice daily. Expect adequate intake. Pt with increased needs, pt with Stage II pressure ulcer to right knee. Pt on Daily MVI. Labs/Meds reviewed. Wt is 53.5 kg/ 117 lbs (11/11), down from first bedscale wt of 55.4 kg/ 122 lbs on 9/30. Some wt loss expected with long term hospital stay.    Plan/rec: Continue current diet/supplement regimen. Change MVI to multivitamin with minerals and 500 mg Vit C twice daily (called MD to discuss recommendations). RD available PRN.

## 2017-11-22 LAB
ANION GAP SERPL CALC-SCNC: 7 MMOL/L (ref 0–11.9)
BUN SERPL-MCNC: 14 MG/DL (ref 8–22)
CALCIUM SERPL-MCNC: 9.4 MG/DL (ref 8.5–10.5)
CHLORIDE SERPL-SCNC: 97 MMOL/L (ref 96–112)
CO2 SERPL-SCNC: 23 MMOL/L (ref 20–33)
CREAT SERPL-MCNC: 0.79 MG/DL (ref 0.5–1.4)
ERYTHROCYTE [DISTWIDTH] IN BLOOD BY AUTOMATED COUNT: 45.1 FL (ref 35.9–50)
GFR SERPL CREATININE-BSD FRML MDRD: >60 ML/MIN/1.73 M 2
GLUCOSE SERPL-MCNC: 74 MG/DL (ref 65–99)
HCT VFR BLD AUTO: 34.7 % (ref 42–52)
HGB BLD-MCNC: 10.6 G/DL (ref 14–18)
MCH RBC QN AUTO: 22.4 PG (ref 27–33)
MCHC RBC AUTO-ENTMCNC: 30.5 G/DL (ref 33.7–35.3)
MCV RBC AUTO: 73.2 FL (ref 81.4–97.8)
OSMOLALITY SERPL: 269 MOSM/KG H2O (ref 278–298)
PLATELET # BLD AUTO: 708 K/UL (ref 164–446)
PMV BLD AUTO: 8.8 FL (ref 9–12.9)
POTASSIUM SERPL-SCNC: 4.5 MMOL/L (ref 3.6–5.5)
RBC # BLD AUTO: 4.74 M/UL (ref 4.7–6.1)
SODIUM SERPL-SCNC: 127 MMOL/L (ref 135–145)
T4 FREE SERPL-MCNC: 0.75 NG/DL (ref 0.53–1.43)
TSH SERPL DL<=0.005 MIU/L-ACNC: 15.43 UIU/ML (ref 0.3–3.7)
WBC # BLD AUTO: 12.1 K/UL (ref 4.8–10.8)

## 2017-11-22 PROCEDURE — 700102 HCHG RX REV CODE 250 W/ 637 OVERRIDE(OP): Performed by: HOSPITALIST

## 2017-11-22 PROCEDURE — 36415 COLL VENOUS BLD VENIPUNCTURE: CPT

## 2017-11-22 PROCEDURE — A9270 NON-COVERED ITEM OR SERVICE: HCPCS | Performed by: INTERNAL MEDICINE

## 2017-11-22 PROCEDURE — A9270 NON-COVERED ITEM OR SERVICE: HCPCS | Performed by: HOSPITALIST

## 2017-11-22 PROCEDURE — 84439 ASSAY OF FREE THYROXINE: CPT

## 2017-11-22 PROCEDURE — 700102 HCHG RX REV CODE 250 W/ 637 OVERRIDE(OP): Performed by: INTERNAL MEDICINE

## 2017-11-22 PROCEDURE — 84443 ASSAY THYROID STIM HORMONE: CPT

## 2017-11-22 PROCEDURE — 83930 ASSAY OF BLOOD OSMOLALITY: CPT

## 2017-11-22 PROCEDURE — 770021 HCHG ROOM/CARE - ISO PRIVATE

## 2017-11-22 PROCEDURE — 700111 HCHG RX REV CODE 636 W/ 250 OVERRIDE (IP): Performed by: HOSPITALIST

## 2017-11-22 PROCEDURE — 99232 SBSQ HOSP IP/OBS MODERATE 35: CPT | Performed by: INTERNAL MEDICINE

## 2017-11-22 PROCEDURE — 80048 BASIC METABOLIC PNL TOTAL CA: CPT

## 2017-11-22 PROCEDURE — 85027 COMPLETE CBC AUTOMATED: CPT

## 2017-11-22 RX ORDER — LEVOTHYROXINE SODIUM 0.03 MG/1
100 TABLET ORAL
Status: DISCONTINUED | OUTPATIENT
Start: 2017-11-23 | End: 2017-11-30 | Stop reason: HOSPADM

## 2017-11-22 RX ADMIN — HEPARIN SODIUM 5000 UNITS: 5000 INJECTION, SOLUTION INTRAVENOUS; SUBCUTANEOUS at 08:26

## 2017-11-22 RX ADMIN — METOPROLOL TARTRATE 75 MG: 25 TABLET, FILM COATED ORAL at 21:37

## 2017-11-22 RX ADMIN — HEPARIN SODIUM 5000 UNITS: 5000 INJECTION, SOLUTION INTRAVENOUS; SUBCUTANEOUS at 21:38

## 2017-11-22 RX ADMIN — LEVOTHYROXINE SODIUM 75 MCG: 75 TABLET ORAL at 06:01

## 2017-11-22 RX ADMIN — MAGNESIUM GLUCONATE 500 MG ORAL TABLET 400 MG: 500 TABLET ORAL at 08:25

## 2017-11-22 RX ADMIN — FOLIC ACID 1 MG: 1 TABLET ORAL at 08:26

## 2017-11-22 RX ADMIN — SULFAMETHOXAZOLE AND TRIMETHOPRIM 1 TABLET: 800; 160 TABLET ORAL at 13:52

## 2017-11-22 RX ADMIN — ATORVASTATIN CALCIUM 40 MG: 40 TABLET, FILM COATED ORAL at 21:41

## 2017-11-22 RX ADMIN — SODIUM CHLORIDE TAB 1 GM 2 G: 1 TAB at 08:25

## 2017-11-22 RX ADMIN — ASPIRIN 81 MG: 81 TABLET, COATED ORAL at 08:26

## 2017-11-22 RX ADMIN — SULFAMETHOXAZOLE AND TRIMETHOPRIM 1 TABLET: 800; 160 TABLET ORAL at 21:37

## 2017-11-22 RX ADMIN — SULFAMETHOXAZOLE AND TRIMETHOPRIM 1 TABLET: 800; 160 TABLET ORAL at 06:01

## 2017-11-22 RX ADMIN — STANDARDIZED SENNA CONCENTRATE AND DOCUSATE SODIUM 2 TABLET: 8.6; 5 TABLET, FILM COATED ORAL at 21:36

## 2017-11-22 RX ADMIN — GABAPENTIN 100 MG: 100 CAPSULE ORAL at 13:52

## 2017-11-22 RX ADMIN — SODIUM CHLORIDE TAB 1 GM 2 G: 1 TAB at 16:39

## 2017-11-22 RX ADMIN — GABAPENTIN 100 MG: 100 CAPSULE ORAL at 08:25

## 2017-11-22 RX ADMIN — GABAPENTIN 100 MG: 100 CAPSULE ORAL at 21:37

## 2017-11-22 RX ADMIN — METOPROLOL TARTRATE 75 MG: 25 TABLET, FILM COATED ORAL at 08:26

## 2017-11-22 RX ADMIN — OXYCODONE HYDROCHLORIDE AND ACETAMINOPHEN 500 MG: 500 TABLET ORAL at 08:25

## 2017-11-22 RX ADMIN — MULTIPLE VITAMINS W/ MINERALS TAB 1 TABLET: TAB at 08:26

## 2017-11-22 ASSESSMENT — PAIN SCALES - GENERAL
PAINLEVEL_OUTOF10: 0

## 2017-11-22 ASSESSMENT — ENCOUNTER SYMPTOMS
EYES NEGATIVE: 1
SPUTUM PRODUCTION: 0
CLAUDICATION: 0
CONSTIPATION: 0
NAUSEA: 0
BLOOD IN STOOL: 0
BLURRED VISION: 0
PHOTOPHOBIA: 0
COUGH: 0
SORE THROAT: 0
MYALGIAS: 0
NERVOUS/ANXIOUS: 0
ORTHOPNEA: 0
CHILLS: 0
FEVER: 0
SHORTNESS OF BREATH: 0
NECK PAIN: 0
PALPITATIONS: 0
WEAKNESS: 1
DEPRESSION: 0
VOMITING: 0
HEARTBURN: 0
STRIDOR: 0
DIZZINESS: 0
BACK PAIN: 0
SENSORY CHANGE: 0
SPEECH CHANGE: 0
TINGLING: 0
ABDOMINAL PAIN: 0
DOUBLE VISION: 0
MEMORY LOSS: 1
PND: 0
TREMORS: 0
EYE PAIN: 0
HEADACHES: 0
HEMOPTYSIS: 0

## 2017-11-22 NOTE — PROGRESS NOTES
[facility]ist Progress Note    Date of Service: 11/22/2017    Chief Complaint  72 y.o. male admitted 9/30/2017 with urinary tract infection and confusion.    Interval Problem Update  11/19  No clinical changes, stable clinically. Continue bactrim   Again pending placement.  Apparently patient has a wife which she is to present legal marriage papers to obtain guardianship?     11/21 Right lower extremity wound with mild purulence. Continue abx and wound care. Na continues to worsen, he appears euvolemic. Start on fluid restriction < 1200ml/day.     11/22 No acute events. No sob, CP or lightheadedness. Na down to 127, serum osm low at 269 increase fluid restriction to 1000 ml/ day. TSH elevated. Will increase synthroid to 100 mcg daily.    Consultants/Specialty  Geriatrics  Palliative Care  Ethics committee/ Dr. Eamon Blackmon    Disposition  Pending guardianship and placement   assisting      Review of Systems   Unable to perform ROS: Mental status change   Constitutional: Positive for malaise/fatigue. Negative for chills and fever.   HENT: Negative for congestion, hearing loss, sore throat and tinnitus.    Eyes: Negative.  Negative for blurred vision, double vision, photophobia and pain.   Respiratory: Negative for cough, hemoptysis, sputum production, shortness of breath and stridor.    Cardiovascular: Negative for chest pain, palpitations, orthopnea, claudication, leg swelling and PND.   Gastrointestinal: Negative for abdominal pain, blood in stool, constipation, heartburn, melena, nausea and vomiting.   Genitourinary: Negative.  Negative for dysuria, frequency and urgency.   Musculoskeletal: Negative for back pain, myalgias and neck pain.   Neurological: Positive for weakness. Negative for dizziness, tingling, tremors, sensory change, speech change and headaches.   Endo/Heme/Allergies: Negative.    Psychiatric/Behavioral: Positive for memory loss. Negative for depression and suicidal ideas. The  patient is not nervous/anxious.       Physical Exam  Laboratory/Imaging   Hemodynamics  Temp (24hrs), Av.6 °C (97.9 °F), Min:36.6 °C (97.8 °F), Max:36.7 °C (98.1 °F)   Temperature: 36.6 °C (97.8 °F)  Pulse  Av.4  Min: 58  Max: 144    Blood Pressure : 114/54      Respiratory      Respiration: 18, Pulse Oximetry: 94 %        RUL Breath Sounds: Clear, RML Breath Sounds: Diminished, RLL Breath Sounds: Diminished, RIGO Breath Sounds: Clear, LLL Breath Sounds: Diminished    Fluids    Intake/Output Summary (Last 24 hours) at 17 0934  Last data filed at 17 0604   Gross per 24 hour   Intake              798 ml   Output                0 ml   Net              798 ml       Nutrition  Orders Placed This Encounter   Procedures   • DIET ORDER     Standing Status:   Standing     Number of Occurrences:   1     Order Specific Question:   Diet:     Answer:   Regular [1]     Comments:   please send mashed potatoes with lunch and dinner.     Order Specific Question:   Texture/Fiber modifications:     Answer:   Dysphagia 2(Pureed/Chopped)specify fluid consistency(question 6) [2]     Order Specific Question:   Consistency/Fluid modifications:     Answer:   Nectar Thick [2]     Order Specific Question:   Consistency/Fluid modifications:     Answer:   1000 ml Fluid Restriction [7]     Order Specific Question:   Miscellaneous modifications:     Answer:   SLP - Deliver to Nursing Station [22]   grossly unchanged.  Physical Exam   Constitutional: No distress.   Thin, frail   HENT:   Head: Normocephalic and atraumatic.   Mouth/Throat: No oropharyngeal exudate.   Eyes: Conjunctivae and EOM are normal. Pupils are equal, round, and reactive to light. Right eye exhibits no discharge. Left eye exhibits no discharge.   Neck: Normal range of motion. No JVD present. No thyromegaly present.   Cardiovascular: Normal rate and regular rhythm.    No murmur heard.  Pulmonary/Chest: No respiratory distress. He has no wheezes. He has no  rales.   Abdominal: Soft. He exhibits no distension. There is no tenderness. There is no rebound.   Musculoskeletal: He exhibits edema (minimal).   Right BKA with no drainage noted,  left forefoot amputation. Right knee with circular wound, improving slowly/minimal .   Neurological: He is alert. No cranial nerve deficit. Coordination normal.   AOx2   Skin: Skin is warm. He is not diaphoretic. There is erythema (no changes).   Psychiatric: He has a normal mood and affect. His behavior is normal. His speech is delayed.   Nursing note and vitals reviewed.      Recent Labs      11/20/17 0312  11/21/17 0417 11/22/17   0350   WBC  12.6*  10.9*  12.1*   RBC  4.76  4.89  4.74   HEMOGLOBIN  10.7*  11.0*  10.6*   HEMATOCRIT  35.0*  36.4*  34.7*   MCV  73.5*  74.4*  73.2*   MCH  22.5*  22.5*  22.4*   MCHC  30.6*  30.2*  30.5*   RDW  44.7  45.9  45.1   PLATELETCT  670*  691*  708*   MPV  8.6*  8.9*  8.8*     Recent Labs      11/21/17 0417 11/22/17   0350   SODIUM  128*  127*   POTASSIUM  4.7  4.5   CHLORIDE  98  97   CO2  22  23   GLUCOSE  81  74   BUN  18  14   CREATININE  0.76  0.79   CALCIUM  9.4  9.4                      Assessment/Plan     Hyponatremia  Worsening  Check serum osm, urine osmolality and urine sodium  asymptomatic.  Start on Fluid restriction less than 1200 mL per day  Continue Salt tabs.  TSH increased at 15, increase synthroid to 100 mcg daily  Monitor BMP      PAD (peripheral artery disease) (CMS-[facility])  Significant PAD s/p left forefoot amputation and right BKA  Continue aspirin and statin  Not candidate for prosthesis  Continue with vascular surgery and wound care    Essential hypertension  Currently normotensive.  Continue with metoprolol 75 mg BID    Severe protein-calorie malnutrition (CMS-[facility])  Nutrition following  Continue boost TID    Dementia  Admitted for encephalopathy and debility.   Has been evaluated by ethics committee, unable to make his own medical decisions.  Guardianship  pending      Leukocytosis  Improving  2/2 to Right LE cellulitis.  Completed antibiotic therapy. However right knee cellulitis again noted, transferred from medical floor for further observation.  Patient's leukocytosis has been persistent with daily fluctuations, reactive and less likely correlated with infection but can be possible, continue to monitor daily cbc.  Wbc 10<12.6<12.7<13.0    Hypomagnesemia  Continue daily oral mag supplementation.    Cellulitis of right knee  Now diagnosed with right knee osteomyelitics.  Surgical evaluation by Dr.Jaquish priest, not a surgical candidate.  Continue PO bactrim.          Hypothyroidism  TSH elevated at 15.4  Increase synthroid to 100 mcg daily    Patient plan of care discussed at multidisplinary team rounds and with patient and R.N at [facility].      Reviewed items::  Medications reviewed, Labs reviewed and Radiology images reviewed  Barrera catheter::  No Barrera  DVT prophylaxis pharmacological::  Heparin

## 2017-11-22 NOTE — CARE PLAN
Problem: Communication  Goal: The ability to communicate needs accurately and effectively will improve  Outcome: PROGRESSING AS EXPECTED  Pt calls for help appropriately on call light. Call light with in reach. Hourly rounding in place.      Problem: Pain Management  Goal: Pain level will decrease to patient's comfort goal  Outcome: PROGRESSING AS EXPECTED  Pt report 0/10 pain and can sleep comfortably.

## 2017-11-22 NOTE — WOUND TEAM
Wound consult placed for re evaluation of buttocks wound.  Absorbent diapers are now under patient and he is incontinent of large amount urine.  He has had condom catheters in place but his penis became quite red and irritated.  Cleansed scrotum/perineum with staff RN and applied thick layer of zinc barrier paste.  Turned patient to side and note that buttocks pink but blanching and tiny black head noted near anus.  No open wounds.  Barrier paste applied.  Discussed with staff RN.

## 2017-11-22 NOTE — PROGRESS NOTES
Assumed care of pt 1845. Pt resting in bed.  Reviewed POC including safety, mobility, pain management, medication administration, DVT prophylaxis, skin integrity and discharge. Checking pt for incontinence. Applying ivan cream to sacrum. Call light within reach. Pt calls appropriately. Hourly rounding in place. Pt has no needs or concerns at this time.

## 2017-11-22 NOTE — CARE PLAN
Problem: Safety  Goal: Will remain free from falls    Intervention: Implement fall precautions  Fall precautions in place.      Problem: Knowledge Deficit  Goal: Knowledge of disease process/condition, treatment plan, diagnostic tests, and medications will improve    Intervention: Assess knowledge level of disease process/condition, treatment plan, diagnostic tests, and medications  Went over POC.      Problem: Mobility  Goal: Risk for activity intolerance will decrease    Intervention: Assess and monitor signs of activity intolerance  Up to chair for meal two person max assistance.

## 2017-11-23 LAB
ANION GAP SERPL CALC-SCNC: 9 MMOL/L (ref 0–11.9)
BUN SERPL-MCNC: 17 MG/DL (ref 8–22)
CALCIUM SERPL-MCNC: 9.6 MG/DL (ref 8.5–10.5)
CHLORIDE SERPL-SCNC: 97 MMOL/L (ref 96–112)
CO2 SERPL-SCNC: 21 MMOL/L (ref 20–33)
CREAT SERPL-MCNC: 0.75 MG/DL (ref 0.5–1.4)
ERYTHROCYTE [DISTWIDTH] IN BLOOD BY AUTOMATED COUNT: 44.6 FL (ref 35.9–50)
GFR SERPL CREATININE-BSD FRML MDRD: >60 ML/MIN/1.73 M 2
GLUCOSE SERPL-MCNC: 79 MG/DL (ref 65–99)
HCT VFR BLD AUTO: 34.6 % (ref 42–52)
HGB BLD-MCNC: 10.8 G/DL (ref 14–18)
MCH RBC QN AUTO: 22.7 PG (ref 27–33)
MCHC RBC AUTO-ENTMCNC: 31.2 G/DL (ref 33.7–35.3)
MCV RBC AUTO: 72.8 FL (ref 81.4–97.8)
PLATELET # BLD AUTO: 644 K/UL (ref 164–446)
PMV BLD AUTO: 8.5 FL (ref 9–12.9)
POTASSIUM SERPL-SCNC: 4.5 MMOL/L (ref 3.6–5.5)
RBC # BLD AUTO: 4.75 M/UL (ref 4.7–6.1)
SODIUM SERPL-SCNC: 127 MMOL/L (ref 135–145)
WBC # BLD AUTO: 11.7 K/UL (ref 4.8–10.8)

## 2017-11-23 PROCEDURE — A9270 NON-COVERED ITEM OR SERVICE: HCPCS | Performed by: HOSPITALIST

## 2017-11-23 PROCEDURE — 700102 HCHG RX REV CODE 250 W/ 637 OVERRIDE(OP): Performed by: HOSPITALIST

## 2017-11-23 PROCEDURE — A9270 NON-COVERED ITEM OR SERVICE: HCPCS | Performed by: INTERNAL MEDICINE

## 2017-11-23 PROCEDURE — 80048 BASIC METABOLIC PNL TOTAL CA: CPT

## 2017-11-23 PROCEDURE — 85027 COMPLETE CBC AUTOMATED: CPT

## 2017-11-23 PROCEDURE — 99232 SBSQ HOSP IP/OBS MODERATE 35: CPT | Performed by: INTERNAL MEDICINE

## 2017-11-23 PROCEDURE — 36415 COLL VENOUS BLD VENIPUNCTURE: CPT

## 2017-11-23 PROCEDURE — 770021 HCHG ROOM/CARE - ISO PRIVATE

## 2017-11-23 PROCEDURE — 700102 HCHG RX REV CODE 250 W/ 637 OVERRIDE(OP): Performed by: INTERNAL MEDICINE

## 2017-11-23 PROCEDURE — 700111 HCHG RX REV CODE 636 W/ 250 OVERRIDE (IP): Performed by: HOSPITALIST

## 2017-11-23 RX ADMIN — GABAPENTIN 100 MG: 100 CAPSULE ORAL at 09:16

## 2017-11-23 RX ADMIN — METOPROLOL TARTRATE 75 MG: 25 TABLET, FILM COATED ORAL at 20:32

## 2017-11-23 RX ADMIN — METOPROLOL TARTRATE 75 MG: 25 TABLET, FILM COATED ORAL at 09:15

## 2017-11-23 RX ADMIN — FOLIC ACID 1 MG: 1 TABLET ORAL at 09:16

## 2017-11-23 RX ADMIN — LEVOTHYROXINE SODIUM 100 MCG: 25 TABLET ORAL at 06:46

## 2017-11-23 RX ADMIN — SODIUM CHLORIDE TAB 1 GM 2 G: 1 TAB at 09:16

## 2017-11-23 RX ADMIN — SULFAMETHOXAZOLE AND TRIMETHOPRIM 1 TABLET: 800; 160 TABLET ORAL at 06:44

## 2017-11-23 RX ADMIN — MULTIPLE VITAMINS W/ MINERALS TAB 1 TABLET: TAB at 09:15

## 2017-11-23 RX ADMIN — STANDARDIZED SENNA CONCENTRATE AND DOCUSATE SODIUM 2 TABLET: 8.6; 5 TABLET, FILM COATED ORAL at 09:15

## 2017-11-23 RX ADMIN — HEPARIN SODIUM 5000 UNITS: 5000 INJECTION, SOLUTION INTRAVENOUS; SUBCUTANEOUS at 20:32

## 2017-11-23 RX ADMIN — ASPIRIN 81 MG: 81 TABLET, COATED ORAL at 09:16

## 2017-11-23 RX ADMIN — OXYCODONE HYDROCHLORIDE AND ACETAMINOPHEN 500 MG: 500 TABLET ORAL at 09:15

## 2017-11-23 RX ADMIN — STANDARDIZED SENNA CONCENTRATE AND DOCUSATE SODIUM 2 TABLET: 8.6; 5 TABLET, FILM COATED ORAL at 20:32

## 2017-11-23 RX ADMIN — HEPARIN SODIUM 5000 UNITS: 5000 INJECTION, SOLUTION INTRAVENOUS; SUBCUTANEOUS at 09:14

## 2017-11-23 RX ADMIN — SODIUM CHLORIDE TAB 1 GM 2 G: 1 TAB at 18:37

## 2017-11-23 RX ADMIN — GABAPENTIN 100 MG: 100 CAPSULE ORAL at 15:50

## 2017-11-23 RX ADMIN — SULFAMETHOXAZOLE AND TRIMETHOPRIM 1 TABLET: 800; 160 TABLET ORAL at 20:32

## 2017-11-23 RX ADMIN — ATORVASTATIN CALCIUM 40 MG: 40 TABLET, FILM COATED ORAL at 20:32

## 2017-11-23 RX ADMIN — SULFAMETHOXAZOLE AND TRIMETHOPRIM 1 TABLET: 800; 160 TABLET ORAL at 15:50

## 2017-11-23 RX ADMIN — GABAPENTIN 100 MG: 100 CAPSULE ORAL at 20:32

## 2017-11-23 RX ADMIN — MAGNESIUM GLUCONATE 500 MG ORAL TABLET 400 MG: 500 TABLET ORAL at 09:16

## 2017-11-23 ASSESSMENT — COGNITIVE AND FUNCTIONAL STATUS - GENERAL
CLIMB 3 TO 5 STEPS WITH RAILING: TOTAL
WALKING IN HOSPITAL ROOM: TOTAL
MOVING FROM LYING ON BACK TO SITTING ON SIDE OF FLAT BED: UNABLE
STANDING UP FROM CHAIR USING ARMS: A LOT
MOBILITY SCORE: 9
DRESSING REGULAR LOWER BODY CLOTHING: A LOT
MOVING TO AND FROM BED TO CHAIR: A LOT
PERSONAL GROOMING: A LITTLE
DRESSING REGULAR UPPER BODY CLOTHING: A LITTLE
TOILETING: TOTAL
TURNING FROM BACK TO SIDE WHILE IN FLAT BAD: A LOT
DAILY ACTIVITIY SCORE: 15
SUGGESTED CMS G CODE MODIFIER MOBILITY: CM
SUGGESTED CMS G CODE MODIFIER DAILY ACTIVITY: CK
HELP NEEDED FOR BATHING: A LOT

## 2017-11-23 ASSESSMENT — ENCOUNTER SYMPTOMS
PND: 0
EYE PAIN: 0
STRIDOR: 0
MYALGIAS: 0
DIZZINESS: 0
PALPITATIONS: 0
ABDOMINAL PAIN: 0
BACK PAIN: 0
PHOTOPHOBIA: 0
BLURRED VISION: 0
DEPRESSION: 0
VOMITING: 0
HEARTBURN: 0
NECK PAIN: 0
HEADACHES: 0
FEVER: 0
ORTHOPNEA: 0
DOUBLE VISION: 0
SPEECH CHANGE: 0
HEMOPTYSIS: 0
SENSORY CHANGE: 0
EYES NEGATIVE: 1
CONSTIPATION: 0
TINGLING: 0
SPUTUM PRODUCTION: 0
MEMORY LOSS: 1
NERVOUS/ANXIOUS: 0
TREMORS: 0
SORE THROAT: 0
COUGH: 0
CLAUDICATION: 0
SHORTNESS OF BREATH: 0
BLOOD IN STOOL: 0
NAUSEA: 0
WEAKNESS: 1
CHILLS: 0

## 2017-11-23 ASSESSMENT — PAIN SCALES - GENERAL
PAINLEVEL_OUTOF10: 0
PAINLEVEL_OUTOF10: 0

## 2017-11-23 ASSESSMENT — PATIENT HEALTH QUESTIONNAIRE - PHQ9
SUM OF ALL RESPONSES TO PHQ9 QUESTIONS 1 AND 2: 0
1. LITTLE INTEREST OR PLEASURE IN DOING THINGS: NOT AT ALL
2. FEELING DOWN, DEPRESSED, IRRITABLE, OR HOPELESS: NOT AT ALL
SUM OF ALL RESPONSES TO PHQ QUESTIONS 1-9: 0

## 2017-11-23 NOTE — PROGRESS NOTES
[facility] Hospitalist Progress Note    Date of Service: 11/23/2017    Chief Complaint  72 y.o. male admitted 9/30/2017 with urinary tract infection and confusion.    Interval Problem Update  11/19  No clinical changes, stable clinically. Continue bactrim   Again pending placement.  Apparently patient has a wife which she is to present legal marriage papers to obtain guardianship?     11/21 Right lower extremity wound with mild purulence. Continue abx and wound care. Na continues to worsen, he appears euvolemic. Start on fluid restriction < 1200ml/day.     11/22 No acute events. No sob, CP or lightheadedness. Na down to 127, serum osm low at 269 increase fluid restriction to 1000 ml/ day. TSH elevated. Will increase synthroid to 100 mcg daily.    11/23 Doing well, no complaints. Na is stable at 127. Remains asymptomatic. Awaiting placement.     Consultants/Specialty  Geriatrics  Palliative Care  Ethics committee/ Dr. Eamon Blackmon    Disposition  Pending guardianship and placement   assisting      Review of Systems   Unable to perform ROS: Mental status change   Constitutional: Positive for malaise/fatigue. Negative for chills and fever.   HENT: Negative for congestion, hearing loss, sore throat and tinnitus.    Eyes: Negative.  Negative for blurred vision, double vision, photophobia and pain.   Respiratory: Negative for cough, hemoptysis, sputum production, shortness of breath and stridor.    Cardiovascular: Negative for chest pain, palpitations, orthopnea, claudication, leg swelling and PND.   Gastrointestinal: Negative for abdominal pain, blood in stool, constipation, heartburn, melena, nausea and vomiting.   Genitourinary: Negative.  Negative for dysuria, frequency and urgency.   Musculoskeletal: Negative for back pain, myalgias and neck pain.   Neurological: Positive for weakness. Negative for dizziness, tingling, tremors, sensory change, speech change and headaches.   Endo/Heme/Allergies: Negative.     Psychiatric/Behavioral: Positive for memory loss. Negative for depression and suicidal ideas. The patient is not nervous/anxious.       Physical Exam  Laboratory/Imaging   Hemodynamics  Temp (24hrs), Av.9 °C (98.5 °F), Min:36.7 °C (98.1 °F), Max:37.2 °C (98.9 °F)   Temperature: 36.8 °C (98.2 °F)  Pulse  Av.2  Min: 58  Max: 144    Blood Pressure : 129/70      Respiratory      Respiration: 16, Pulse Oximetry: 93 %        RUL Breath Sounds: Diminished, RML Breath Sounds: Diminished, RLL Breath Sounds: Diminished, RIGO Breath Sounds: Clear, LLL Breath Sounds: Diminished    Fluids    Intake/Output Summary (Last 24 hours) at 17 0940  Last data filed at 17 0600   Gross per 24 hour   Intake              340 ml   Output              200 ml   Net              140 ml       Nutrition  Orders Placed This Encounter   Procedures   • DIET ORDER     Standing Status:   Standing     Number of Occurrences:   1     Order Specific Question:   Diet:     Answer:   Regular [1]     Comments:   please send mashed potatoes with lunch and dinner.     Order Specific Question:   Texture/Fiber modifications:     Answer:   Dysphagia 2(Pureed/Chopped)specify fluid consistency(question 6) [2]     Order Specific Question:   Consistency/Fluid modifications:     Answer:   Nectar Thick [2]     Order Specific Question:   Consistency/Fluid modifications:     Answer:   1000 ml Fluid Restriction [7]     Order Specific Question:   Miscellaneous modifications:     Answer:   SLP - Deliver to Nursing Station [22]   grossly unchanged.  Physical Exam   Constitutional: No distress.   Thin, frail   HENT:   Head: Normocephalic and atraumatic.   Mouth/Throat: No oropharyngeal exudate.   Eyes: Conjunctivae and EOM are normal. Pupils are equal, round, and reactive to light. Right eye exhibits no discharge. Left eye exhibits no discharge.   Neck: Normal range of motion. No JVD present. No thyromegaly present.   Cardiovascular: Normal rate and  regular rhythm.    No murmur heard.  Pulmonary/Chest: No respiratory distress. He has no wheezes. He has no rales.   Abdominal: Soft. He exhibits no distension. There is no tenderness. There is no rebound.   Musculoskeletal: He exhibits edema (minimal).   Right BKA with no drainage noted,  left forefoot amputation. Right knee with circular wound, improving slowly/minimal .   Neurological: He is alert. No cranial nerve deficit. Coordination normal.   AOx2   Skin: Skin is warm. He is not diaphoretic. There is erythema (no changes).   Psychiatric: He has a normal mood and affect. His behavior is normal. His speech is delayed.   Nursing note and vitals reviewed.      Recent Labs      11/21/17 0417 11/22/17 0350 11/23/17 0337   WBC  10.9*  12.1*  11.7*   RBC  4.89  4.74  4.75   HEMOGLOBIN  11.0*  10.6*  10.8*   HEMATOCRIT  36.4*  34.7*  34.6*   MCV  74.4*  73.2*  72.8*   MCH  22.5*  22.4*  22.7*   MCHC  30.2*  30.5*  31.2*   RDW  45.9  45.1  44.6   PLATELETCT  691*  708*  644*   MPV  8.9*  8.8*  8.5*     Recent Labs      11/21/17 0417 11/22/17 0350 11/23/17 0337   SODIUM  128*  127*  127*   POTASSIUM  4.7  4.5  4.5   CHLORIDE  98  97  97   CO2  22  23  21   GLUCOSE  81  74  79   BUN  18  14  17   CREATININE  0.76  0.79  0.75   CALCIUM  9.4  9.4  9.6                      Assessment/Plan     Hyponatremia  Worsening  Check serum osm, urine osmolality and urine sodium  Currently asymptomatic  Start on Fluid restriction less than 1200 mL per day  Continue Salt tabs.  TSH increased at 15, increase synthroid to 100 mcg daily  Monitor BMP      PAD (peripheral artery disease) (CMS-[facility])  Significant PAD s/p left forefoot amputation and right BKA  Continue aspirin and statin  Not candidate for prosthesis  Continue with vascular surgery and wound care    Essential hypertension  Currently normotensive.  Continue with metoprolol 75 mg BID    Severe protein-calorie malnutrition (CMS-[facility])  Nutrition following  Continue  boost TID    Dementia  Admitted for encephalopathy and debility.   Has been evaluated by ethics committee, unable to make his own medical decisions.  Guardianship pending      Leukocytosis  Improving  2/2 to Right LE cellulitis.  Completed antibiotic therapy. However right knee cellulitis again noted, transferred from medical floor for further observation.  Patient's leukocytosis has been persistent with daily fluctuations, reactive and less likely correlated with infection but can be possible, continue to monitor daily cbc.  Wbc 10<12.6<12.7<13.0    Hypomagnesemia  Continue daily oral mag supplementation.    Cellulitis of right knee  Now diagnosed with right knee osteomyelitics.  Surgical evaluation by Dr.Jaquish priest, not a surgical candidate.  Continue PO bactrim.          Hypothyroidism  TSH elevated at 15.4  Increase synthroid to 100 mcg daily    Patient plan of care discussed at multidisplinary team rounds and with patient and R.N at beside.      Reviewed items::  Medications reviewed, Labs reviewed and Radiology images reviewed  Barrera catheter::  No Barrera  DVT prophylaxis pharmacological::  Heparin

## 2017-11-23 NOTE — PROGRESS NOTES
Assumed care at 1900     No complaints of pain at this time , patient was very agitated and was not wanting to complete assessment.    Gave meds and he asked to be left alone. Checked back in later and he was asleep and appeared comfortable.

## 2017-11-23 NOTE — THERAPY
Attempted to see pt for PT tx today. Pt had been up in chair [place] today and had just gotten back to bed, and feeling fatigued. Will reattempt as able.

## 2017-11-24 LAB
ANION GAP SERPL CALC-SCNC: 9 MMOL/L (ref 0–11.9)
BUN SERPL-MCNC: 15 MG/DL (ref 8–22)
CALCIUM SERPL-MCNC: 9.3 MG/DL (ref 8.5–10.5)
CHLORIDE SERPL-SCNC: 97 MMOL/L (ref 96–112)
CO2 SERPL-SCNC: 21 MMOL/L (ref 20–33)
CREAT SERPL-MCNC: 0.82 MG/DL (ref 0.5–1.4)
ERYTHROCYTE [DISTWIDTH] IN BLOOD BY AUTOMATED COUNT: 44.5 FL (ref 35.9–50)
GFR SERPL CREATININE-BSD FRML MDRD: >60 ML/MIN/1.73 M 2
GLUCOSE SERPL-MCNC: 73 MG/DL (ref 65–99)
HCT VFR BLD AUTO: 34.6 % (ref 42–52)
HGB BLD-MCNC: 10.9 G/DL (ref 14–18)
MCH RBC QN AUTO: 22.9 PG (ref 27–33)
MCHC RBC AUTO-ENTMCNC: 31.5 G/DL (ref 33.7–35.3)
MCV RBC AUTO: 72.8 FL (ref 81.4–97.8)
PLATELET # BLD AUTO: 622 K/UL (ref 164–446)
PMV BLD AUTO: 8.7 FL (ref 9–12.9)
POTASSIUM SERPL-SCNC: 4.4 MMOL/L (ref 3.6–5.5)
RBC # BLD AUTO: 4.75 M/UL (ref 4.7–6.1)
SODIUM SERPL-SCNC: 127 MMOL/L (ref 135–145)
WBC # BLD AUTO: 12 K/UL (ref 4.8–10.8)

## 2017-11-24 PROCEDURE — A9270 NON-COVERED ITEM OR SERVICE: HCPCS | Performed by: HOSPITALIST

## 2017-11-24 PROCEDURE — A9270 NON-COVERED ITEM OR SERVICE: HCPCS | Performed by: INTERNAL MEDICINE

## 2017-11-24 PROCEDURE — 700102 HCHG RX REV CODE 250 W/ 637 OVERRIDE(OP): Performed by: HOSPITALIST

## 2017-11-24 PROCEDURE — 99232 SBSQ HOSP IP/OBS MODERATE 35: CPT | Performed by: INTERNAL MEDICINE

## 2017-11-24 PROCEDURE — 80048 BASIC METABOLIC PNL TOTAL CA: CPT

## 2017-11-24 PROCEDURE — 700102 HCHG RX REV CODE 250 W/ 637 OVERRIDE(OP): Performed by: INTERNAL MEDICINE

## 2017-11-24 PROCEDURE — 97530 THERAPEUTIC ACTIVITIES: CPT

## 2017-11-24 PROCEDURE — 36415 COLL VENOUS BLD VENIPUNCTURE: CPT

## 2017-11-24 PROCEDURE — 770021 HCHG ROOM/CARE - ISO PRIVATE

## 2017-11-24 PROCEDURE — 85027 COMPLETE CBC AUTOMATED: CPT

## 2017-11-24 PROCEDURE — 700111 HCHG RX REV CODE 636 W/ 250 OVERRIDE (IP): Performed by: HOSPITALIST

## 2017-11-24 RX ADMIN — GABAPENTIN 100 MG: 100 CAPSULE ORAL at 08:14

## 2017-11-24 RX ADMIN — METOPROLOL TARTRATE 75 MG: 25 TABLET, FILM COATED ORAL at 08:14

## 2017-11-24 RX ADMIN — LEVOTHYROXINE SODIUM 100 MCG: 25 TABLET ORAL at 05:46

## 2017-11-24 RX ADMIN — GABAPENTIN 100 MG: 100 CAPSULE ORAL at 21:39

## 2017-11-24 RX ADMIN — SULFAMETHOXAZOLE AND TRIMETHOPRIM 1 TABLET: 800; 160 TABLET ORAL at 05:46

## 2017-11-24 RX ADMIN — SODIUM CHLORIDE TAB 1 GM 2 G: 1 TAB at 08:14

## 2017-11-24 RX ADMIN — FOLIC ACID 1 MG: 1 TABLET ORAL at 08:14

## 2017-11-24 RX ADMIN — HEPARIN SODIUM 5000 UNITS: 5000 INJECTION, SOLUTION INTRAVENOUS; SUBCUTANEOUS at 08:15

## 2017-11-24 RX ADMIN — MAGNESIUM GLUCONATE 500 MG ORAL TABLET 400 MG: 500 TABLET ORAL at 08:14

## 2017-11-24 RX ADMIN — METOPROLOL TARTRATE 75 MG: 25 TABLET, FILM COATED ORAL at 21:39

## 2017-11-24 RX ADMIN — GABAPENTIN 100 MG: 100 CAPSULE ORAL at 15:11

## 2017-11-24 RX ADMIN — SULFAMETHOXAZOLE AND TRIMETHOPRIM 1 TABLET: 800; 160 TABLET ORAL at 21:39

## 2017-11-24 RX ADMIN — SODIUM CHLORIDE TAB 1 GM 2 G: 1 TAB at 16:48

## 2017-11-24 RX ADMIN — OXYCODONE HYDROCHLORIDE AND ACETAMINOPHEN 500 MG: 500 TABLET ORAL at 08:14

## 2017-11-24 RX ADMIN — SULFAMETHOXAZOLE AND TRIMETHOPRIM 1 TABLET: 800; 160 TABLET ORAL at 15:11

## 2017-11-24 RX ADMIN — MULTIPLE VITAMINS W/ MINERALS TAB 1 TABLET: TAB at 08:15

## 2017-11-24 RX ADMIN — ASPIRIN 81 MG: 81 TABLET, COATED ORAL at 08:14

## 2017-11-24 RX ADMIN — HEPARIN SODIUM 5000 UNITS: 5000 INJECTION, SOLUTION INTRAVENOUS; SUBCUTANEOUS at 21:40

## 2017-11-24 RX ADMIN — ATORVASTATIN CALCIUM 40 MG: 40 TABLET, FILM COATED ORAL at 21:39

## 2017-11-24 ASSESSMENT — ENCOUNTER SYMPTOMS
MYALGIAS: 0
PHOTOPHOBIA: 0
PND: 0
ABDOMINAL PAIN: 0
HEMOPTYSIS: 0
SHORTNESS OF BREATH: 0
BLOOD IN STOOL: 0
SPUTUM PRODUCTION: 0
SPEECH CHANGE: 0
DOUBLE VISION: 0
NECK PAIN: 0
ORTHOPNEA: 0
PALPITATIONS: 0
SENSORY CHANGE: 0
HEADACHES: 0
CHILLS: 0
BLURRED VISION: 0
CONSTIPATION: 0
WEAKNESS: 1
EYES NEGATIVE: 1
COUGH: 0
TREMORS: 0
HEARTBURN: 0
EYE PAIN: 0
NAUSEA: 0
MEMORY LOSS: 1
SORE THROAT: 0
BACK PAIN: 0
VOMITING: 0
DIZZINESS: 0
NERVOUS/ANXIOUS: 0
STRIDOR: 0
DEPRESSION: 0
FEVER: 0
TINGLING: 0
CLAUDICATION: 0

## 2017-11-24 ASSESSMENT — PAIN SCALES - GENERAL
PAINLEVEL_OUTOF10: 0

## 2017-11-24 ASSESSMENT — COGNITIVE AND FUNCTIONAL STATUS - GENERAL
MOVING TO AND FROM BED TO CHAIR: A LOT
SUGGESTED CMS G CODE MODIFIER MOBILITY: CM
MOBILITY SCORE: 9
CLIMB 3 TO 5 STEPS WITH RAILING: TOTAL
TURNING FROM BACK TO SIDE WHILE IN FLAT BAD: A LOT
MOVING FROM LYING ON BACK TO SITTING ON SIDE OF FLAT BED: UNABLE
STANDING UP FROM CHAIR USING ARMS: A LOT
WALKING IN HOSPITAL ROOM: TOTAL

## 2017-11-24 ASSESSMENT — GAIT ASSESSMENTS: GAIT LEVEL OF ASSIST: UNABLE TO PARTICIPATE

## 2017-11-24 NOTE — PROGRESS NOTES
Assumed care of patient following shift report. Patient is A&Ox2-3. He is on room air. He is sitting up in bed. Fluid restrictions was discussed with patient and he verbalizes understanding. Plan of care and safety precautions reviewed. Bed alarm is on. Call device is within reach. Will continue to monitor.

## 2017-11-24 NOTE — PROGRESS NOTES
[facility] Hospitalist Progress Note    Date of Service: 11/24/2017    Chief Complaint  72 y.o. male admitted 9/30/2017 with urinary tract infection and confusion.    Interval Problem Update  11/19  No clinical changes, stable clinically. Continue bactrim   Again pending placement.  Apparently patient has a wife which she is to present legal marriage papers to obtain guardianship?     11/21 Right lower extremity wound with mild purulence. Continue abx and wound care. Na continues to worsen, he appears euvolemic. Start on fluid restriction < 1200ml/day.     11/22 No acute events. No sob, CP or lightheadedness. Na down to 127, serum osm low at 269 increase fluid restriction to 1000 ml/ day. TSH elevated. Will increase synthroid to 100 mcg daily.    11/23 Doing well, no complaints. Na is stable at 127. Remains asymptomatic. Awaiting placement.     11/24 No acute events. No complaints. VSS. Awaiting placement. Na remains table at 127 which appears to be his baseline.     Consultants/Specialty  Geriatrics  Palliative Care  Ethics committee/ Dr. Eamon Blackmon    Disposition  Cleared for discharge from medical standpoint. Pending guardianship and placement   assisting      Review of Systems   Unable to perform ROS: Mental status change   Constitutional: Positive for malaise/fatigue. Negative for chills and fever.   HENT: Negative for congestion, hearing loss, sore throat and tinnitus.    Eyes: Negative.  Negative for blurred vision, double vision, photophobia and pain.   Respiratory: Negative for cough, hemoptysis, sputum production, shortness of breath and stridor.    Cardiovascular: Negative for chest pain, palpitations, orthopnea, claudication, leg swelling and PND.   Gastrointestinal: Negative for abdominal pain, blood in stool, constipation, heartburn, melena, nausea and vomiting.   Genitourinary: Negative.  Negative for dysuria, frequency and urgency.   Musculoskeletal: Negative for back pain, myalgias and  neck pain.   Neurological: Positive for weakness. Negative for dizziness, tingling, tremors, sensory change, speech change and headaches.   Endo/Heme/Allergies: Negative.    Psychiatric/Behavioral: Positive for memory loss. Negative for depression and suicidal ideas. The patient is not nervous/anxious.       Physical Exam  Laboratory/Imaging   Hemodynamics  Temp (24hrs), Av.7 °C (98.1 °F), Min:36.6 °C (97.8 °F), Max:36.9 °C (98.5 °F)   Temperature: 36.6 °C (97.8 °F)  Pulse  Av.1  Min: 58  Max: 144    Blood Pressure : 121/61      Respiratory      Respiration: 16, Pulse Oximetry: 93 %        RUL Breath Sounds: Diminished, RML Breath Sounds: Diminished, RLL Breath Sounds: Diminished, RIGO Breath Sounds: Diminished, LLL Breath Sounds: Diminished    Fluids    Intake/Output Summary (Last 24 hours) at 17 1132  Last data filed at 17 0400   Gross per 24 hour   Intake              400 ml   Output                0 ml   Net              400 ml       Nutrition  Orders Placed This Encounter   Procedures   • DIET ORDER     Standing Status:   Standing     Number of Occurrences:   1     Order Specific Question:   Diet:     Answer:   Regular [1]     Comments:   please send mashed potatoes with lunch and dinner.     Order Specific Question:   Texture/Fiber modifications:     Answer:   Dysphagia 2(Pureed/Chopped)specify fluid consistency(question 6) [2]     Order Specific Question:   Consistency/Fluid modifications:     Answer:   Nectar Thick [2]     Order Specific Question:   Consistency/Fluid modifications:     Answer:   1000 ml Fluid Restriction [7]     Order Specific Question:   Miscellaneous modifications:     Answer:   SLP - Deliver to Nursing Station [22]   grossly unchanged.  Physical Exam   Constitutional: No distress.   Thin, frail   HENT:   Head: Normocephalic and atraumatic.   Mouth/Throat: No oropharyngeal exudate.   Eyes: Conjunctivae and EOM are normal. Pupils are equal, round, and reactive to light.  Right eye exhibits no discharge. Left eye exhibits no discharge.   Neck: Normal range of motion. No JVD present. No thyromegaly present.   Cardiovascular: Normal rate and regular rhythm.    No murmur heard.  Pulmonary/Chest: No respiratory distress. He has no wheezes. He has no rales.   Abdominal: Soft. He exhibits no distension. There is no tenderness. There is no rebound.   Musculoskeletal: He exhibits edema (minimal).   Right BKA with no drainage noted,  left forefoot amputation. Right knee with circular wound, improving slowly/minimal .   Neurological: He is alert. No cranial nerve deficit. Coordination normal.   AOx2   Skin: Skin is warm. He is not diaphoretic. There is erythema (no changes).   Psychiatric: He has a normal mood and affect. His behavior is normal. His speech is delayed.   Nursing note and vitals reviewed.      Recent Labs      11/22/17 0350 11/23/17 0337 11/24/17   0252   WBC  12.1*  11.7*  12.0*   RBC  4.74  4.75  4.75   HEMOGLOBIN  10.6*  10.8*  10.9*   HEMATOCRIT  34.7*  34.6*  34.6*   MCV  73.2*  72.8*  72.8*   MCH  22.4*  22.7*  22.9*   MCHC  30.5*  31.2*  31.5*   RDW  45.1  44.6  44.5   PLATELETCT  708*  644*  622*   MPV  8.8*  8.5*  8.7*     Recent Labs      11/22/17   0350  11/23/17 0337 11/24/17   0252   SODIUM  127*  127*  127*   POTASSIUM  4.5  4.5  4.4   CHLORIDE  97  97  97   CO2  23  21  21   GLUCOSE  74  79  73   BUN  14  17  15   CREATININE  0.79  0.75  0.82   CALCIUM  9.4  9.6  9.3                      Assessment/Plan     Hyponatremia  Chronic, euvolemic  Stable at 127  Currently asymptomatic  continue Fluid restriction less than 1200 mL per day  Continue Salt tabs.  TSH increased at 15, increase synthroid to 100 mcg daily  Monitor BMP      PAD (peripheral artery disease) (CMS-[facility])  Significant PAD s/p left forefoot amputation and right BKA  Continue aspirin and statin  Not candidate for prosthesis  Continue with vascular surgery and wound care    Essential  hypertension  Currently normotensive.  Continue with metoprolol 75 mg BID    Severe protein-calorie malnutrition (CMS-[facility])  Nutrition following  Continue boost TID    Dementia  Admitted for encephalopathy and debility.   Has been evaluated by ethics committee, unable to make his own medical decisions.  Guardianship pending      Leukocytosis  Improving  2/2 to Right LE cellulitis.  Completed antibiotic therapy. However right knee cellulitis again noted, transferred from medical floor for further observation.  Patient's leukocytosis has been persistent with daily fluctuations, reactive and less likely correlated with infection but can be possible, continue to monitor daily cbc.  Wbc 10<12.6<12.7<13.0    Hypomagnesemia  Continue daily oral mag supplementation.    Cellulitis of right knee  Now diagnosed with right knee osteomyelitics.  Surgical evaluation by Dr.Jaquish priest, not a surgical candidate.  Continue PO bactrim.          Hypothyroidism  TSH elevated at 15.4  Increase synthroid to 100 mcg daily    Patient plan of care discussed at multidisplinary team rounds and with patient and R.N at beside.      Reviewed items::  Medications reviewed, Labs reviewed and Radiology images reviewed  Barrera catheter::  No Barrera  DVT prophylaxis pharmacological::  Heparin

## 2017-11-24 NOTE — CARE PLAN
Problem: Venous Thromboembolism (VTW)/Deep Vein Thrombosis (DVT) Prevention:  Goal: Patient will participate in Venous Thrombosis (VTE)/Deep Vein Thrombosis (DVT)Prevention Measures  Outcome: PROGRESSING AS EXPECTED  Complies with administration of subQ heparin. Patient educated on performing foot pumps and/or wearing SCD device.     Problem: Skin Integrity  Goal: Risk for impaired skin integrity will decrease  Outcome: PROGRESSING AS EXPECTED  Waffle overlay in place. Barrier cream in use. q2H turns.

## 2017-11-24 NOTE — CARE PLAN
Problem: Knowledge Deficit  Goal: Knowledge of disease process/condition, treatment plan, diagnostic tests, and medications will improve  Went over POC.

## 2017-11-24 NOTE — PROGRESS NOTES
Vascular    Seen and examined.  Open wound on right knee, no evidence of spreading cellulitis  Possible underlying osteo  The right BKA incision is completely healed.    Recommend continuing antibiotics and local care.    If the wound continues to improve then I don't recommend any surgical intervention given the patient's overall very poor health.  However if the wound gets worse it could be debrided or the patient could undergo revision to a right Above-knee amputation, but I'm not recommending either of these at this time.    Frantz Rodriguez MD  General&Vascular Surgery  [facility]  Cell: [phone]  Office: [phone]

## 2017-11-24 NOTE — THERAPY
"Pt agreeable to tx today. Pt cont to demonstrate difficulty mobilizing. Pt requires min/modA for bed mobility, maxAx 2 for seated slideboard transfer EOB<->WC. Pt educated about importance of mobilizing and self propulsion. Pt cont to refuse to self propel. During transfer, pt demonstrates rigidity due to fear and noncompliance. Pt tends to lean posteriorly, requiring verbal and tactile cuing for anterior lean for safer transfer. Pt cont to demosntrate rigidity and noncompliance. Pt would benefit from further acute PT txs to progress towards goals and independence.    Physical Therapy Treatment completed.   Bed Mobility:  Supine to Sit: Minimal Assist  Transfers: Sit to Stand: Maximal Assist  Gait: Level Of Assist: Unable to Participate        Plan of Care: Will benefit from Physical Therapy 3 times per week      See \"Rehab Therapy-Acute\" Patient Summary Report for complete documentation.       "

## 2017-11-25 LAB
ANION GAP SERPL CALC-SCNC: 10 MMOL/L (ref 0–11.9)
BUN SERPL-MCNC: 17 MG/DL (ref 8–22)
CALCIUM SERPL-MCNC: 9.4 MG/DL (ref 8.5–10.5)
CHLORIDE SERPL-SCNC: 96 MMOL/L (ref 96–112)
CO2 SERPL-SCNC: 22 MMOL/L (ref 20–33)
CREAT SERPL-MCNC: 0.8 MG/DL (ref 0.5–1.4)
ERYTHROCYTE [DISTWIDTH] IN BLOOD BY AUTOMATED COUNT: 45.2 FL (ref 35.9–50)
GFR SERPL CREATININE-BSD FRML MDRD: >60 ML/MIN/1.73 M 2
GLUCOSE SERPL-MCNC: 73 MG/DL (ref 65–99)
HCT VFR BLD AUTO: 34.3 % (ref 42–52)
HGB BLD-MCNC: 10.5 G/DL (ref 14–18)
MCH RBC QN AUTO: 22.2 PG (ref 27–33)
MCHC RBC AUTO-ENTMCNC: 30.6 G/DL (ref 33.7–35.3)
MCV RBC AUTO: 72.7 FL (ref 81.4–97.8)
PLATELET # BLD AUTO: 665 K/UL (ref 164–446)
PMV BLD AUTO: 8.7 FL (ref 9–12.9)
POTASSIUM SERPL-SCNC: 4.4 MMOL/L (ref 3.6–5.5)
RBC # BLD AUTO: 4.72 M/UL (ref 4.7–6.1)
SODIUM SERPL-SCNC: 128 MMOL/L (ref 135–145)
WBC # BLD AUTO: 11.2 K/UL (ref 4.8–10.8)

## 2017-11-25 PROCEDURE — 700111 HCHG RX REV CODE 636 W/ 250 OVERRIDE (IP): Performed by: HOSPITALIST

## 2017-11-25 PROCEDURE — A9270 NON-COVERED ITEM OR SERVICE: HCPCS | Performed by: HOSPITALIST

## 2017-11-25 PROCEDURE — 770021 HCHG ROOM/CARE - ISO PRIVATE

## 2017-11-25 PROCEDURE — 85027 COMPLETE CBC AUTOMATED: CPT

## 2017-11-25 PROCEDURE — A9270 NON-COVERED ITEM OR SERVICE: HCPCS | Performed by: INTERNAL MEDICINE

## 2017-11-25 PROCEDURE — 36415 COLL VENOUS BLD VENIPUNCTURE: CPT

## 2017-11-25 PROCEDURE — 80048 BASIC METABOLIC PNL TOTAL CA: CPT

## 2017-11-25 PROCEDURE — 99232 SBSQ HOSP IP/OBS MODERATE 35: CPT | Performed by: INTERNAL MEDICINE

## 2017-11-25 PROCEDURE — 700102 HCHG RX REV CODE 250 W/ 637 OVERRIDE(OP): Performed by: INTERNAL MEDICINE

## 2017-11-25 PROCEDURE — 700102 HCHG RX REV CODE 250 W/ 637 OVERRIDE(OP): Performed by: HOSPITALIST

## 2017-11-25 PROCEDURE — 306015 LOCK STAT FOLEY

## 2017-11-25 RX ADMIN — GABAPENTIN 100 MG: 100 CAPSULE ORAL at 21:13

## 2017-11-25 RX ADMIN — MULTIPLE VITAMINS W/ MINERALS TAB 1 TABLET: TAB at 08:25

## 2017-11-25 RX ADMIN — GABAPENTIN 100 MG: 100 CAPSULE ORAL at 15:45

## 2017-11-25 RX ADMIN — MAGNESIUM GLUCONATE 500 MG ORAL TABLET 400 MG: 500 TABLET ORAL at 08:25

## 2017-11-25 RX ADMIN — SULFAMETHOXAZOLE AND TRIMETHOPRIM 1 TABLET: 800; 160 TABLET ORAL at 15:46

## 2017-11-25 RX ADMIN — GABAPENTIN 100 MG: 100 CAPSULE ORAL at 08:25

## 2017-11-25 RX ADMIN — FOLIC ACID 1 MG: 1 TABLET ORAL at 08:25

## 2017-11-25 RX ADMIN — HEPARIN SODIUM 5000 UNITS: 5000 INJECTION, SOLUTION INTRAVENOUS; SUBCUTANEOUS at 21:14

## 2017-11-25 RX ADMIN — METOPROLOL TARTRATE 75 MG: 25 TABLET, FILM COATED ORAL at 08:24

## 2017-11-25 RX ADMIN — SULFAMETHOXAZOLE AND TRIMETHOPRIM 1 TABLET: 800; 160 TABLET ORAL at 21:13

## 2017-11-25 RX ADMIN — SODIUM CHLORIDE TAB 1 GM 2 G: 1 TAB at 08:25

## 2017-11-25 RX ADMIN — ATORVASTATIN CALCIUM 40 MG: 40 TABLET, FILM COATED ORAL at 21:13

## 2017-11-25 RX ADMIN — HEPARIN SODIUM 5000 UNITS: 5000 INJECTION, SOLUTION INTRAVENOUS; SUBCUTANEOUS at 08:24

## 2017-11-25 RX ADMIN — ASPIRIN 81 MG: 81 TABLET, COATED ORAL at 08:25

## 2017-11-25 RX ADMIN — SODIUM CHLORIDE TAB 1 GM 2 G: 1 TAB at 15:45

## 2017-11-25 RX ADMIN — SULFAMETHOXAZOLE AND TRIMETHOPRIM 1 TABLET: 800; 160 TABLET ORAL at 05:30

## 2017-11-25 RX ADMIN — LEVOTHYROXINE SODIUM 100 MCG: 25 TABLET ORAL at 05:30

## 2017-11-25 RX ADMIN — METOPROLOL TARTRATE 75 MG: 25 TABLET, FILM COATED ORAL at 21:13

## 2017-11-25 RX ADMIN — STANDARDIZED SENNA CONCENTRATE AND DOCUSATE SODIUM 2 TABLET: 8.6; 5 TABLET, FILM COATED ORAL at 21:13

## 2017-11-25 RX ADMIN — OXYCODONE HYDROCHLORIDE AND ACETAMINOPHEN 500 MG: 500 TABLET ORAL at 08:25

## 2017-11-25 ASSESSMENT — ENCOUNTER SYMPTOMS
ABDOMINAL PAIN: 0
PHOTOPHOBIA: 0
HEMOPTYSIS: 0
EYE PAIN: 0
DOUBLE VISION: 0
SPEECH CHANGE: 0
VOMITING: 0
CLAUDICATION: 0
NAUSEA: 0
CONSTIPATION: 0
HEADACHES: 0
MEMORY LOSS: 1
TREMORS: 0
TINGLING: 0
BLURRED VISION: 0
HEARTBURN: 0
BLOOD IN STOOL: 0
CHILLS: 0
STRIDOR: 0
SENSORY CHANGE: 0
DIZZINESS: 0
SORE THROAT: 0
SPUTUM PRODUCTION: 0
COUGH: 0
NERVOUS/ANXIOUS: 0
FEVER: 0
WEAKNESS: 1
NECK PAIN: 0
EYES NEGATIVE: 1
BACK PAIN: 0
ORTHOPNEA: 0
DEPRESSION: 0
SHORTNESS OF BREATH: 0
PND: 0
MYALGIAS: 0
PALPITATIONS: 0

## 2017-11-25 ASSESSMENT — PAIN SCALES - GENERAL
PAINLEVEL_OUTOF10: 0

## 2017-11-25 NOTE — PROGRESS NOTES
[facility]    Date of Service: 2017    Chief Complaint  72 y.o. male admitted 2017 with urinary tract infection and confusion.    Interval Problem Update     Evaluated by vascular surgery, no surgical intervention recommended at this time. Continue antibiotics and wound care. Sodium improving.    Consultants/Specialty  Geriatrics  Palliative Care  Ethics committee/ Dr. Eamon Blackmon    Disposition  Cleared for discharge from medical standpoint. Pending guardianship and placement   assisting      Review of Systems   Unable to perform ROS: Mental status change   Constitutional: Positive for malaise/fatigue. Negative for chills and fever.   HENT: Negative for congestion, hearing loss, sore throat and tinnitus.    Eyes: Negative.  Negative for blurred vision, double vision, photophobia and pain.   Respiratory: Negative for cough, hemoptysis, sputum production, shortness of breath and stridor.    Cardiovascular: Negative for chest pain, palpitations, orthopnea, claudication, leg swelling and PND.   Gastrointestinal: Negative for abdominal pain, blood in stool, constipation, heartburn, melena, nausea and vomiting.   Genitourinary: Negative.  Negative for dysuria, frequency and urgency.   Musculoskeletal: Negative for back pain, myalgias and neck pain.   Neurological: Positive for weakness. Negative for dizziness, tingling, tremors, sensory change, speech change and headaches.   Endo/Heme/Allergies: Negative.    Psychiatric/Behavioral: Positive for memory loss. Negative for depression and suicidal ideas. The patient is not nervous/anxious.       Physical Exam  Laboratory/Imaging   Hemodynamics  Temp (24hrs), Av.7 °C (98.1 °F), Min:36.5 °C (97.7 °F), Max:36.9 °C (98.4 °F)   Temperature: 36.9 °C (98.4 °F)  Pulse  Av  Min: 58  Max: 144    Blood Pressure : 132/59      Respiratory      Respiration: 16, Pulse Oximetry: 97 %        RUL Breath Sounds: Diminished, RML  Breath Sounds: Diminished, RLL Breath Sounds: Diminished, RIGO Breath Sounds: Diminished, LLL Breath Sounds: Diminished    Fluids  No intake or output data in the 24 hours ending 11/25/17 1034    Nutrition  Orders Placed This Encounter   Procedures   • DIET ORDER     Standing Status:   Standing     Number of Occurrences:   1     Order Specific Question:   Diet:     Answer:   Regular [1]     Comments:   please send mashed potatoes with lunch and dinner.     Order Specific Question:   Texture/Fiber modifications:     Answer:   Dysphagia 2(Pureed/Chopped)specify fluid consistency(question 6) [2]     Order Specific Question:   Consistency/Fluid modifications:     Answer:   Nectar Thick [2]     Order Specific Question:   Consistency/Fluid modifications:     Answer:   1000 ml Fluid Restriction [7]     Order Specific Question:   Miscellaneous modifications:     Answer:   SLP - Deliver to Nursing Station [22]   grossly unchanged.  Physical Exam   Constitutional: No distress.   Thin, frail   HENT:   Head: Normocephalic and atraumatic.   Mouth/Throat: No oropharyngeal exudate.   Eyes: Conjunctivae and EOM are normal. Pupils are equal, round, and reactive to light. Right eye exhibits no discharge. Left eye exhibits no discharge.   Neck: Normal range of motion. No JVD present. No thyromegaly present.   Cardiovascular: Normal rate and regular rhythm.    No murmur heard.  Pulmonary/Chest: No respiratory distress. He has no wheezes. He has no rales.   Abdominal: Soft. He exhibits no distension. There is no tenderness. There is no rebound.   Musculoskeletal: He exhibits no edema or tenderness.   Right BKA with no drainage noted,  left forefoot amputation. Right knee wound appears clean and dry   Neurological: He is alert. No cranial nerve deficit. Coordination normal.   AOx2   Skin: Skin is warm. He is not diaphoretic.   Psychiatric: He has a normal mood and affect. His behavior is normal. His speech is delayed.   Nursing note and  vitals reviewed.      Recent Labs      11/23/17 0337 11/24/17 0252 11/25/17   0234   WBC  11.7*  12.0*  11.2*   RBC  4.75  4.75  4.72   HEMOGLOBIN  10.8*  10.9*  10.5*   HEMATOCRIT  34.6*  34.6*  34.3*   MCV  72.8*  72.8*  72.7*   MCH  22.7*  22.9*  22.2*   MCHC  31.2*  31.5*  30.6*   RDW  44.6  44.5  45.2   PLATELETCT  644*  622*  665*   MPV  8.5*  8.7*  8.7*     Recent Labs      11/23/17 0337 11/24/17 0252 11/25/17 0234   SODIUM  127*  127*  128*   POTASSIUM  4.5  4.4  4.4   CHLORIDE  97  97  96   CO2  21  21  22   GLUCOSE  79  73  73   BUN  17  15  17   CREATININE  0.75  0.82  0.80   CALCIUM  9.6  9.3  9.4                      Assessment/Plan     Hyponatremia  Chronic, euvolemic  Stable at 128  Currently asymptomatic  continue Fluid restriction less than 1200 mL per day  Continue Salt tabs.  TSH increased at 15, increase synthroid to 100 mcg daily  Monitor BMP      PAD (peripheral artery disease) (CMS-HCC)  Significant PAD s/p left forefoot amputation and right BKA  Continue aspirin and statin  Not candidate for prosthesis  Continue with vascular surgery and wound care    Essential hypertension  Currently normotensive.  Continue with metoprolol 75 mg BID    Severe protein-calorie malnutrition (CMS-[facility])  Nutrition following  Continue boost TID    Dementia  Admitted for encephalopathy and debility.   Has been evaluated by ethics committee, unable to make his own medical decisions.  Guardianship pending      Leukocytosis  Improving  2/2 to Right LE cellulitis.  Completed antibiotic therapy. However right knee cellulitis again noted, transferred from medical floor for further observation.  Patient's leukocytosis has been persistent with daily fluctuations, reactive and less likely correlated with infection but can be possible, continue to monitor daily cbc.  Wbc 10<12.6<12.7<13.0    Hypomagnesemia  Continue daily oral mag supplementation.    Cellulitis of right knee  Possible right knee  osteomyelitis  Surgical evaluation by Dr.Jaquish priest, no surgical intervention recommended at this time  Continue PO bactrim and wound care  If worsening infection may consider debridement and right AKA          Hypothyroidism  TSH elevated at 15.4  Increase synthroid to 100 mcg daily    Patient plan of care discussed at multidisplinary team rounds and with patient and R.N at beside.      Reviewed items::  Medications reviewed, Labs reviewed and Radiology images reviewed  Barrera catheter::  No Barrera  DVT prophylaxis pharmacological::  Heparin

## 2017-11-26 LAB
ANION GAP SERPL CALC-SCNC: 9 MMOL/L (ref 0–11.9)
BUN SERPL-MCNC: 18 MG/DL (ref 8–22)
CALCIUM SERPL-MCNC: 9.5 MG/DL (ref 8.5–10.5)
CHLORIDE SERPL-SCNC: 99 MMOL/L (ref 96–112)
CO2 SERPL-SCNC: 20 MMOL/L (ref 20–33)
CREAT SERPL-MCNC: 0.71 MG/DL (ref 0.5–1.4)
ERYTHROCYTE [DISTWIDTH] IN BLOOD BY AUTOMATED COUNT: 46 FL (ref 35.9–50)
GFR SERPL CREATININE-BSD FRML MDRD: >60 ML/MIN/1.73 M 2
GLUCOSE SERPL-MCNC: 78 MG/DL (ref 65–99)
HCT VFR BLD AUTO: 36.4 % (ref 42–52)
HGB BLD-MCNC: 11.1 G/DL (ref 14–18)
MCH RBC QN AUTO: 22.7 PG (ref 27–33)
MCHC RBC AUTO-ENTMCNC: 30.5 G/DL (ref 33.7–35.3)
MCV RBC AUTO: 74.3 FL (ref 81.4–97.8)
PLATELET # BLD AUTO: 609 K/UL (ref 164–446)
PMV BLD AUTO: 8.7 FL (ref 9–12.9)
POTASSIUM SERPL-SCNC: 4.3 MMOL/L (ref 3.6–5.5)
RBC # BLD AUTO: 4.9 M/UL (ref 4.7–6.1)
SODIUM SERPL-SCNC: 128 MMOL/L (ref 135–145)
WBC # BLD AUTO: 11.4 K/UL (ref 4.8–10.8)

## 2017-11-26 PROCEDURE — 302255 BARRIER CREAM MOISTURE BAZA PROTECT (ZINC) 5OZ: Performed by: INTERNAL MEDICINE

## 2017-11-26 PROCEDURE — A4357 BEDSIDE DRAINAGE BAG: HCPCS | Performed by: INTERNAL MEDICINE

## 2017-11-26 PROCEDURE — 700102 HCHG RX REV CODE 250 W/ 637 OVERRIDE(OP): Performed by: INTERNAL MEDICINE

## 2017-11-26 PROCEDURE — 700102 HCHG RX REV CODE 250 W/ 637 OVERRIDE(OP): Performed by: HOSPITALIST

## 2017-11-26 PROCEDURE — 700111 HCHG RX REV CODE 636 W/ 250 OVERRIDE (IP): Performed by: HOSPITALIST

## 2017-11-26 PROCEDURE — A9270 NON-COVERED ITEM OR SERVICE: HCPCS | Performed by: HOSPITALIST

## 2017-11-26 PROCEDURE — 99232 SBSQ HOSP IP/OBS MODERATE 35: CPT | Performed by: INTERNAL MEDICINE

## 2017-11-26 PROCEDURE — 770021 HCHG ROOM/CARE - ISO PRIVATE

## 2017-11-26 PROCEDURE — 85027 COMPLETE CBC AUTOMATED: CPT

## 2017-11-26 PROCEDURE — A9270 NON-COVERED ITEM OR SERVICE: HCPCS | Performed by: INTERNAL MEDICINE

## 2017-11-26 PROCEDURE — 36415 COLL VENOUS BLD VENIPUNCTURE: CPT

## 2017-11-26 PROCEDURE — 80048 BASIC METABOLIC PNL TOTAL CA: CPT

## 2017-11-26 RX ADMIN — SULFAMETHOXAZOLE AND TRIMETHOPRIM 1 TABLET: 800; 160 TABLET ORAL at 13:36

## 2017-11-26 RX ADMIN — GABAPENTIN 100 MG: 100 CAPSULE ORAL at 14:10

## 2017-11-26 RX ADMIN — ACETAMINOPHEN 650 MG: 325 TABLET, FILM COATED ORAL at 12:14

## 2017-11-26 RX ADMIN — SULFAMETHOXAZOLE AND TRIMETHOPRIM 1 TABLET: 800; 160 TABLET ORAL at 06:26

## 2017-11-26 RX ADMIN — MULTIPLE VITAMINS W/ MINERALS TAB 1 TABLET: TAB at 09:52

## 2017-11-26 RX ADMIN — ASPIRIN 81 MG: 81 TABLET, COATED ORAL at 09:52

## 2017-11-26 RX ADMIN — SODIUM CHLORIDE TAB 1 GM 2 G: 1 TAB at 09:52

## 2017-11-26 RX ADMIN — GABAPENTIN 100 MG: 100 CAPSULE ORAL at 20:43

## 2017-11-26 RX ADMIN — ACETAMINOPHEN 650 MG: 325 TABLET, FILM COATED ORAL at 20:10

## 2017-11-26 RX ADMIN — SODIUM CHLORIDE TAB 1 GM 2 G: 1 TAB at 17:39

## 2017-11-26 RX ADMIN — METOPROLOL TARTRATE 75 MG: 25 TABLET, FILM COATED ORAL at 09:51

## 2017-11-26 RX ADMIN — FOLIC ACID 1 MG: 1 TABLET ORAL at 09:52

## 2017-11-26 RX ADMIN — HEPARIN SODIUM 5000 UNITS: 5000 INJECTION, SOLUTION INTRAVENOUS; SUBCUTANEOUS at 20:43

## 2017-11-26 RX ADMIN — STANDARDIZED SENNA CONCENTRATE AND DOCUSATE SODIUM 2 TABLET: 8.6; 5 TABLET, FILM COATED ORAL at 09:51

## 2017-11-26 RX ADMIN — GABAPENTIN 100 MG: 100 CAPSULE ORAL at 09:51

## 2017-11-26 RX ADMIN — ATORVASTATIN CALCIUM 40 MG: 40 TABLET, FILM COATED ORAL at 20:43

## 2017-11-26 RX ADMIN — METOPROLOL TARTRATE 75 MG: 25 TABLET, FILM COATED ORAL at 20:42

## 2017-11-26 RX ADMIN — MAGNESIUM GLUCONATE 500 MG ORAL TABLET 400 MG: 500 TABLET ORAL at 09:52

## 2017-11-26 RX ADMIN — LEVOTHYROXINE SODIUM 100 MCG: 25 TABLET ORAL at 06:25

## 2017-11-26 RX ADMIN — SULFAMETHOXAZOLE AND TRIMETHOPRIM 1 TABLET: 800; 160 TABLET ORAL at 20:42

## 2017-11-26 RX ADMIN — OXYCODONE HYDROCHLORIDE AND ACETAMINOPHEN 500 MG: 500 TABLET ORAL at 09:53

## 2017-11-26 ASSESSMENT — ENCOUNTER SYMPTOMS
ABDOMINAL PAIN: 0
HEMOPTYSIS: 0
EYES NEGATIVE: 1
PALPITATIONS: 0
PND: 0
SHORTNESS OF BREATH: 0
FEVER: 0
WEAKNESS: 1
NAUSEA: 0
HEARTBURN: 0
TREMORS: 0
BACK PAIN: 0
ORTHOPNEA: 0
MYALGIAS: 0
MEMORY LOSS: 1
EYE PAIN: 0
SPEECH CHANGE: 0
DEPRESSION: 0
NERVOUS/ANXIOUS: 0
SORE THROAT: 0
DIZZINESS: 0
DOUBLE VISION: 0
COUGH: 0
HEADACHES: 0
TINGLING: 0
CONSTIPATION: 0
BLOOD IN STOOL: 0
SPUTUM PRODUCTION: 0
STRIDOR: 0
BLURRED VISION: 0
CLAUDICATION: 0
CHILLS: 0
SENSORY CHANGE: 0
PHOTOPHOBIA: 0
VOMITING: 0
NECK PAIN: 0

## 2017-11-26 ASSESSMENT — PAIN SCALES - GENERAL
PAINLEVEL_OUTOF10: 0
PAINLEVEL_OUTOF10: 0
PAINLEVEL_OUTOF10: 7
PAINLEVEL_OUTOF10: 0
PAINLEVEL_OUTOF10: 0

## 2017-11-26 NOTE — CARE PLAN
Problem: Safety  Goal: Will remain free from injury  Pt remains free from injury, Floor clear from clutter and cords.  Pt A+OX3, Non-Skid yellow socks, Bed/chair alarm on. Proper signs outside pt door in place. Door remains open.  Pt uses call light appropriately. Call light with in reach of pt.  Hourly rounding in place.    Problem: Venous Thromboembolism (VTW)/Deep Vein Thrombosis (DVT) Prevention:  Goal: Patient will participate in Venous Thrombosis (VTE)/Deep Vein Thrombosis (DVT)Prevention Measures  Outcome: PROGRESSING AS EXPECTED   11/26/17 0930 11/26/17 1053   OTHER   Pharmacologic Prophylaxis Used Unfractionated Heparin --    Mechanical/VTE Prophylaxis   Mechanical Prophylaxis  SCDs, Sequential Compression Device --    AV Foot Pumps --  Refused   GENEVIEVE Hose (Graduated Compression Stockings) --  Refused   SCDs, Sequential Compression Device --  Refused   Pt refused after education SCD's and Heparin injection.  Education on ankle pumps, pt needs continuous education on topic.     1212- pt has SCD's on.     Problem: Skin Integrity  Goal: Risk for impaired skin integrity will decrease  Outcome: PROGRESSING AS EXPECTED  Pt self repositions frequently, Pt incontinent of urine and stool, frequent assessment to make sure pt remains clean and dry.  Barrier cream applied.

## 2017-11-26 NOTE — CARE PLAN
Problem: Infection  Goal: Will remain free from infection  Outcome: PROGRESSING AS EXPECTED  Education provided on disease process, sources of infection, modes of transmission and PPE as well as hand hygiene.     Problem: Skin Integrity  Goal: Risk for impaired skin integrity will decrease  Outcome: PROGRESSING AS EXPECTED  Condom catheter in place to decrease moisture exposure to perineum. Waffle overlay in use.

## 2017-11-27 LAB
ANION GAP SERPL CALC-SCNC: 8 MMOL/L (ref 0–11.9)
BUN SERPL-MCNC: 21 MG/DL (ref 8–22)
CALCIUM SERPL-MCNC: 9.3 MG/DL (ref 8.5–10.5)
CHLORIDE SERPL-SCNC: 99 MMOL/L (ref 96–112)
CO2 SERPL-SCNC: 22 MMOL/L (ref 20–33)
CREAT SERPL-MCNC: 0.85 MG/DL (ref 0.5–1.4)
ERYTHROCYTE [DISTWIDTH] IN BLOOD BY AUTOMATED COUNT: 45.8 FL (ref 35.9–50)
GFR SERPL CREATININE-BSD FRML MDRD: >60 ML/MIN/1.73 M 2
GLUCOSE SERPL-MCNC: 78 MG/DL (ref 65–99)
HCT VFR BLD AUTO: 35.8 % (ref 42–52)
HGB BLD-MCNC: 10.8 G/DL (ref 14–18)
MCH RBC QN AUTO: 22.3 PG (ref 27–33)
MCHC RBC AUTO-ENTMCNC: 30.2 G/DL (ref 33.7–35.3)
MCV RBC AUTO: 74 FL (ref 81.4–97.8)
PLATELET # BLD AUTO: 627 K/UL (ref 164–446)
PMV BLD AUTO: 8.6 FL (ref 9–12.9)
POTASSIUM SERPL-SCNC: 4.6 MMOL/L (ref 3.6–5.5)
RBC # BLD AUTO: 4.84 M/UL (ref 4.7–6.1)
SODIUM SERPL-SCNC: 129 MMOL/L (ref 135–145)
WBC # BLD AUTO: 12.3 K/UL (ref 4.8–10.8)

## 2017-11-27 PROCEDURE — 700102 HCHG RX REV CODE 250 W/ 637 OVERRIDE(OP): Performed by: HOSPITALIST

## 2017-11-27 PROCEDURE — 85027 COMPLETE CBC AUTOMATED: CPT

## 2017-11-27 PROCEDURE — 770021 HCHG ROOM/CARE - ISO PRIVATE

## 2017-11-27 PROCEDURE — 700111 HCHG RX REV CODE 636 W/ 250 OVERRIDE (IP): Performed by: HOSPITALIST

## 2017-11-27 PROCEDURE — A9270 NON-COVERED ITEM OR SERVICE: HCPCS | Performed by: HOSPITALIST

## 2017-11-27 PROCEDURE — A9270 NON-COVERED ITEM OR SERVICE: HCPCS | Performed by: INTERNAL MEDICINE

## 2017-11-27 PROCEDURE — 80048 BASIC METABOLIC PNL TOTAL CA: CPT

## 2017-11-27 PROCEDURE — 99232 SBSQ HOSP IP/OBS MODERATE 35: CPT | Performed by: INTERNAL MEDICINE

## 2017-11-27 PROCEDURE — 700102 HCHG RX REV CODE 250 W/ 637 OVERRIDE(OP): Performed by: INTERNAL MEDICINE

## 2017-11-27 PROCEDURE — 36415 COLL VENOUS BLD VENIPUNCTURE: CPT

## 2017-11-27 RX ADMIN — OXYCODONE HYDROCHLORIDE AND ACETAMINOPHEN 500 MG: 500 TABLET ORAL at 08:43

## 2017-11-27 RX ADMIN — GABAPENTIN 100 MG: 100 CAPSULE ORAL at 21:03

## 2017-11-27 RX ADMIN — SULFAMETHOXAZOLE AND TRIMETHOPRIM 1 TABLET: 800; 160 TABLET ORAL at 05:54

## 2017-11-27 RX ADMIN — FOLIC ACID 1 MG: 1 TABLET ORAL at 08:43

## 2017-11-27 RX ADMIN — METOPROLOL TARTRATE 75 MG: 25 TABLET, FILM COATED ORAL at 08:45

## 2017-11-27 RX ADMIN — SODIUM CHLORIDE TAB 1 GM 2 G: 1 TAB at 08:19

## 2017-11-27 RX ADMIN — HEPARIN SODIUM 5000 UNITS: 5000 INJECTION, SOLUTION INTRAVENOUS; SUBCUTANEOUS at 21:09

## 2017-11-27 RX ADMIN — SULFAMETHOXAZOLE AND TRIMETHOPRIM 1 TABLET: 800; 160 TABLET ORAL at 21:03

## 2017-11-27 RX ADMIN — GABAPENTIN 100 MG: 100 CAPSULE ORAL at 08:44

## 2017-11-27 RX ADMIN — LEVOTHYROXINE SODIUM 100 MCG: 25 TABLET ORAL at 05:55

## 2017-11-27 RX ADMIN — SULFAMETHOXAZOLE AND TRIMETHOPRIM 1 TABLET: 800; 160 TABLET ORAL at 14:38

## 2017-11-27 RX ADMIN — ASPIRIN 81 MG: 81 TABLET, COATED ORAL at 08:43

## 2017-11-27 RX ADMIN — MULTIPLE VITAMINS W/ MINERALS TAB 1 TABLET: TAB at 08:46

## 2017-11-27 RX ADMIN — GABAPENTIN 100 MG: 100 CAPSULE ORAL at 14:37

## 2017-11-27 RX ADMIN — ACETAMINOPHEN 650 MG: 325 TABLET, FILM COATED ORAL at 11:41

## 2017-11-27 RX ADMIN — ATORVASTATIN CALCIUM 40 MG: 40 TABLET, FILM COATED ORAL at 21:03

## 2017-11-27 RX ADMIN — MAGNESIUM GLUCONATE 500 MG ORAL TABLET 400 MG: 500 TABLET ORAL at 08:44

## 2017-11-27 RX ADMIN — HEPARIN SODIUM 5000 UNITS: 5000 INJECTION, SOLUTION INTRAVENOUS; SUBCUTANEOUS at 08:44

## 2017-11-27 RX ADMIN — ACETAMINOPHEN 650 MG: 325 TABLET, FILM COATED ORAL at 21:03

## 2017-11-27 ASSESSMENT — ENCOUNTER SYMPTOMS
CONSTIPATION: 0
PND: 0
COUGH: 0
STRIDOR: 0
SENSORY CHANGE: 0
CHILLS: 0
ORTHOPNEA: 0
DOUBLE VISION: 0
SORE THROAT: 0
MEMORY LOSS: 1
NECK PAIN: 0
PALPITATIONS: 0
TREMORS: 0
BACK PAIN: 0
HEARTBURN: 0
DEPRESSION: 0
EYES NEGATIVE: 1
TINGLING: 0
FEVER: 0
WEAKNESS: 1
EYE PAIN: 0
HEMOPTYSIS: 0
NERVOUS/ANXIOUS: 0
CLAUDICATION: 0
SPEECH CHANGE: 0
HEADACHES: 0
BLOOD IN STOOL: 0
MYALGIAS: 0
VOMITING: 0
SPUTUM PRODUCTION: 0
PHOTOPHOBIA: 0
SHORTNESS OF BREATH: 0
BLURRED VISION: 0
NAUSEA: 0
DIZZINESS: 0
ABDOMINAL PAIN: 0

## 2017-11-27 ASSESSMENT — PAIN SCALES - GENERAL
PAINLEVEL_OUTOF10: 7
PAINLEVEL_OUTOF10: 1

## 2017-11-27 NOTE — CARE PLAN
Problem: Safety  Goal: Will remain free from injury  Outcome: PROGRESSING AS EXPECTED  Call light and personal belongings within reach; bed in the lowest position and wheels locked; hourly rounding in place.     Problem: Skin Integrity  Goal: Risk for impaired skin integrity will decrease    Intervention: Implement precautions to protect skin integrity in collaboration with the interdisciplinary team  Pt being repositioned every 2 hours; perineal pads changed frequently; barrier cream being used.

## 2017-11-27 NOTE — PROGRESS NOTES
Pt A/O x 4. Reports pain 7/10, medicated per MAR. Right BKA  with dressing to knee CDI. Left toes amputated. No PIV access, MD aware. On RA. POC discussed. All assessment done and charted. Call light within reach. Bed in the lowest position. Hourly rounding in place.

## 2017-11-27 NOTE — DISCHARGE PLANNING
SW received call back from Annabel at [place]. Pt does not have any Medicare days available and will be denied on Primary ins.

## 2017-11-27 NOTE — PROGRESS NOTES
[facility] Progress Note    Date of Service: 2017    Chief Complaint  72 y.o. male admitted 2017 with urinary tract infection and confusion.    Interval Problem Update    No acute events. He has no active complaints. Vitals remain stable. Leukocytosis and sodium are stable.    Consultants/Specialty  Geriatrics  Palliative Care  Ethics committee/ Dr. Eamon Blackmon    Disposition  Cleared for discharge from medical standpoint. Pending guardianship and placement   assisting      Review of Systems   Unable to perform ROS: Mental status change   Constitutional: Positive for malaise/fatigue. Negative for chills and fever.   HENT: Negative for congestion, hearing loss, sore throat and tinnitus.    Eyes: Negative.  Negative for blurred vision, double vision, photophobia and pain.   Respiratory: Negative for cough, hemoptysis, sputum production, shortness of breath and stridor.    Cardiovascular: Negative for chest pain, palpitations, orthopnea, claudication, leg swelling and PND.   Gastrointestinal: Negative for abdominal pain, blood in stool, constipation, heartburn, melena, nausea and vomiting.   Genitourinary: Negative.  Negative for dysuria, frequency and urgency.   Musculoskeletal: Negative for back pain, myalgias and neck pain.   Neurological: Positive for weakness. Negative for dizziness, tingling, tremors, sensory change, speech change and headaches.   Endo/Heme/Allergies: Negative.    Psychiatric/Behavioral: Positive for memory loss. Negative for depression and suicidal ideas. The patient is not nervous/anxious.       Physical Exam  Laboratory/Imaging   Hemodynamics  Temp (24hrs), Av.9 °C (98.5 °F), Min:36.3 °C (97.3 °F), Max:37.4 °C (99.3 °F)   Temperature: 36.3 °C (97.3 °F)  Pulse  Av.9  Min: 58  Max: 144    Blood Pressure : 135/67      Respiratory      Respiration: 19, Pulse Oximetry: 90 %        RUL Breath Sounds: Diminished, RML Breath Sounds: Diminished, RLL Breath  Sounds: Diminished, RIGO Breath Sounds: Diminished, LLL Breath Sounds: Diminished    Fluids    Intake/Output Summary (Last 24 hours) at 11/26/17 1651  Last data filed at 11/26/17 1000   Gross per 24 hour   Intake                0 ml   Output              600 ml   Net             -600 ml       Nutrition  Orders Placed This Encounter   Procedures   • DIET ORDER     Standing Status:   Standing     Number of Occurrences:   1     Order Specific Question:   Diet:     Answer:   Regular [1]     Comments:   please send mashed potatoes with lunch and dinner.     Order Specific Question:   Texture/Fiber modifications:     Answer:   Dysphagia 2(Pureed/Chopped)specify fluid consistency(question 6) [2]     Order Specific Question:   Consistency/Fluid modifications:     Answer:   Nectar Thick [2]     Order Specific Question:   Consistency/Fluid modifications:     Answer:   1000 ml Fluid Restriction [7]     Order Specific Question:   Miscellaneous modifications:     Answer:   SLP - Deliver to Nursing Station [22]   grossly unchanged.  Physical Exam   Constitutional: No distress.   Thin, frail   HENT:   Head: Normocephalic and atraumatic.   Mouth/Throat: No oropharyngeal exudate.   Eyes: Conjunctivae and EOM are normal. Pupils are equal, round, and reactive to light. Right eye exhibits no discharge. Left eye exhibits no discharge.   Neck: Normal range of motion. No JVD present. No thyromegaly present.   Cardiovascular: Normal rate and regular rhythm.    No murmur heard.  Pulmonary/Chest: No respiratory distress. He has no wheezes. He has no rales.   Abdominal: Soft. He exhibits no distension. There is no tenderness. There is no rebound.   Musculoskeletal: He exhibits no edema or tenderness.   Right BKA with no drainage noted,  left forefoot amputation. Right knee wound appears clean and dry   Neurological: He is alert. No cranial nerve deficit. Coordination normal.   AOx2   Skin: Skin is warm. He is not diaphoretic.   Psychiatric: He  has a normal mood and affect. His behavior is normal. His speech is delayed.   Nursing note and vitals reviewed.      Recent Labs      11/24/17 0252 11/25/17 0234 11/26/17   0304   WBC  12.0*  11.2*  11.4*   RBC  4.75  4.72  4.90   HEMOGLOBIN  10.9*  10.5*  11.1*   HEMATOCRIT  34.6*  34.3*  36.4*   MCV  72.8*  72.7*  74.3*   MCH  22.9*  22.2*  22.7*   MCHC  31.5*  30.6*  30.5*   RDW  44.5  45.2  46.0   PLATELETCT  622*  665*  609*   MPV  8.7*  8.7*  8.7*     Recent Labs      11/24/17 0252 11/25/17 0234 11/26/17   0304   SODIUM  127*  128*  128*   POTASSIUM  4.4  4.4  4.3   CHLORIDE  97  96  99   CO2  21  22  20   GLUCOSE  73  73  78   BUN  15  17  18   CREATININE  0.82  0.80  0.71   CALCIUM  9.3  9.4  9.5                      Assessment/Plan     Hyponatremia  Chronic, euvolemic  Stable at 128  Currently asymptomatic  continue Fluid restriction less than 1200 mL per day  Continue Salt tabs.  TSH increased at 15, increase synthroid to 100 mcg daily  Monitor BMP      PAD (peripheral artery disease) (CMS-HCC)  Significant PAD s/p left forefoot amputation and right BKA  Continue aspirin and statin  Not candidate for prosthesis  Continue with vascular surgery and wound care    Essential hypertension  Currently normotensive.  Continue with metoprolol 75 mg BID    Severe protein-calorie malnutrition (CMS-HCC)  Nutrition following  Continue boost TID    Dementia  Admitted for encephalopathy and debility.   Has been evaluated by ethics committee, unable to make his own medical decisions.  Guardianship pending      Leukocytosis  Improving  2/2 to Right LE cellulitis.  Completed antibiotic therapy. However right knee cellulitis again noted, transferred from medical floor for further observation.  Patient's leukocytosis has been persistent with daily fluctuations, reactive and less likely correlated with infection but can be possible, continue to monitor daily cbc.  Wbc 10<12.6<12.7<13.0    Hypomagnesemia  Continue  daily oral mag supplementation.    Cellulitis of right knee  Possible right knee osteomyelitis  Surgical evaluation by Dr.Jaquish priest, no surgical intervention recommended at this time  Continue PO bactrim and wound care  If worsening infection may consider debridement and right AKA          Hypothyroidism  TSH elevated at 15.4  Increase synthroid to 100 mcg daily    Patient plan of care discussed at multidisplinary team rounds and with patient and R.N at beside.      Reviewed items::  Medications reviewed, Labs reviewed and Radiology images reviewed  Barrera catheter::  No Barrera  DVT prophylaxis pharmacological::  Heparin

## 2017-11-28 LAB
ANION GAP SERPL CALC-SCNC: 8 MMOL/L (ref 0–11.9)
BUN SERPL-MCNC: 22 MG/DL (ref 8–22)
CALCIUM SERPL-MCNC: 9.8 MG/DL (ref 8.5–10.5)
CHLORIDE SERPL-SCNC: 102 MMOL/L (ref 96–112)
CO2 SERPL-SCNC: 22 MMOL/L (ref 20–33)
CREAT SERPL-MCNC: 0.83 MG/DL (ref 0.5–1.4)
ERYTHROCYTE [DISTWIDTH] IN BLOOD BY AUTOMATED COUNT: 46.4 FL (ref 35.9–50)
GFR SERPL CREATININE-BSD FRML MDRD: >60 ML/MIN/1.73 M 2
GLUCOSE SERPL-MCNC: 82 MG/DL (ref 65–99)
HCT VFR BLD AUTO: 36.2 % (ref 42–52)
HGB BLD-MCNC: 10.8 G/DL (ref 14–18)
MCH RBC QN AUTO: 22.3 PG (ref 27–33)
MCHC RBC AUTO-ENTMCNC: 29.8 G/DL (ref 33.7–35.3)
MCV RBC AUTO: 74.8 FL (ref 81.4–97.8)
PLATELET # BLD AUTO: 629 K/UL (ref 164–446)
PMV BLD AUTO: 9 FL (ref 9–12.9)
POTASSIUM SERPL-SCNC: 4.5 MMOL/L (ref 3.6–5.5)
RBC # BLD AUTO: 4.84 M/UL (ref 4.7–6.1)
SODIUM SERPL-SCNC: 132 MMOL/L (ref 135–145)
WBC # BLD AUTO: 11.8 K/UL (ref 4.8–10.8)

## 2017-11-28 PROCEDURE — 700102 HCHG RX REV CODE 250 W/ 637 OVERRIDE(OP): Performed by: INTERNAL MEDICINE

## 2017-11-28 PROCEDURE — 80048 BASIC METABOLIC PNL TOTAL CA: CPT

## 2017-11-28 PROCEDURE — 700111 HCHG RX REV CODE 636 W/ 250 OVERRIDE (IP): Performed by: HOSPITALIST

## 2017-11-28 PROCEDURE — 85027 COMPLETE CBC AUTOMATED: CPT

## 2017-11-28 PROCEDURE — 99231 SBSQ HOSP IP/OBS SF/LOW 25: CPT | Performed by: INTERNAL MEDICINE

## 2017-11-28 PROCEDURE — 700102 HCHG RX REV CODE 250 W/ 637 OVERRIDE(OP): Performed by: HOSPITALIST

## 2017-11-28 PROCEDURE — 770021 HCHG ROOM/CARE - ISO PRIVATE

## 2017-11-28 PROCEDURE — A9270 NON-COVERED ITEM OR SERVICE: HCPCS | Performed by: HOSPITALIST

## 2017-11-28 PROCEDURE — 97530 THERAPEUTIC ACTIVITIES: CPT

## 2017-11-28 PROCEDURE — A9270 NON-COVERED ITEM OR SERVICE: HCPCS | Performed by: INTERNAL MEDICINE

## 2017-11-28 PROCEDURE — 36415 COLL VENOUS BLD VENIPUNCTURE: CPT

## 2017-11-28 RX ADMIN — ACETAMINOPHEN 650 MG: 325 TABLET, FILM COATED ORAL at 20:38

## 2017-11-28 RX ADMIN — GABAPENTIN 100 MG: 100 CAPSULE ORAL at 20:19

## 2017-11-28 RX ADMIN — OXYCODONE HYDROCHLORIDE AND ACETAMINOPHEN 500 MG: 500 TABLET ORAL at 08:19

## 2017-11-28 RX ADMIN — ATORVASTATIN CALCIUM 40 MG: 40 TABLET, FILM COATED ORAL at 20:19

## 2017-11-28 RX ADMIN — GABAPENTIN 100 MG: 100 CAPSULE ORAL at 14:41

## 2017-11-28 RX ADMIN — METOPROLOL TARTRATE 75 MG: 25 TABLET, FILM COATED ORAL at 20:20

## 2017-11-28 RX ADMIN — HEPARIN SODIUM 5000 UNITS: 5000 INJECTION, SOLUTION INTRAVENOUS; SUBCUTANEOUS at 20:22

## 2017-11-28 RX ADMIN — LEVOTHYROXINE SODIUM 100 MCG: 25 TABLET ORAL at 05:54

## 2017-11-28 RX ADMIN — ASPIRIN 81 MG: 81 TABLET, COATED ORAL at 08:20

## 2017-11-28 RX ADMIN — SULFAMETHOXAZOLE AND TRIMETHOPRIM 1 TABLET: 800; 160 TABLET ORAL at 20:19

## 2017-11-28 RX ADMIN — MAGNESIUM GLUCONATE 500 MG ORAL TABLET 400 MG: 500 TABLET ORAL at 08:21

## 2017-11-28 RX ADMIN — FOLIC ACID 1 MG: 1 TABLET ORAL at 08:20

## 2017-11-28 RX ADMIN — SULFAMETHOXAZOLE AND TRIMETHOPRIM 1 TABLET: 800; 160 TABLET ORAL at 14:41

## 2017-11-28 RX ADMIN — ACETAMINOPHEN 650 MG: 325 TABLET, FILM COATED ORAL at 08:27

## 2017-11-28 RX ADMIN — MULTIPLE VITAMINS W/ MINERALS TAB 1 TABLET: TAB at 08:22

## 2017-11-28 RX ADMIN — METOPROLOL TARTRATE 75 MG: 25 TABLET, FILM COATED ORAL at 08:22

## 2017-11-28 RX ADMIN — GABAPENTIN 100 MG: 100 CAPSULE ORAL at 08:20

## 2017-11-28 RX ADMIN — SULFAMETHOXAZOLE AND TRIMETHOPRIM 1 TABLET: 800; 160 TABLET ORAL at 05:54

## 2017-11-28 ASSESSMENT — ENCOUNTER SYMPTOMS
SEIZURES: 0
MEMORY LOSS: 1
ABDOMINAL PAIN: 0
EYE DISCHARGE: 0
CONSTIPATION: 0
PALPITATIONS: 0
DIARRHEA: 0
DEPRESSION: 0
SENSORY CHANGE: 0
FOCAL WEAKNESS: 0
PND: 0
FALLS: 0
FLANK PAIN: 0
EYE REDNESS: 0
BLOOD IN STOOL: 0
CHILLS: 0
SPUTUM PRODUCTION: 0
TINGLING: 0
NECK PAIN: 0
BACK PAIN: 0
HEADACHES: 0
TREMORS: 0
SHORTNESS OF BREATH: 0
CLAUDICATION: 0
HEARTBURN: 0
SPEECH CHANGE: 0
NERVOUS/ANXIOUS: 0
VOMITING: 0
PHOTOPHOBIA: 0
NAUSEA: 0
WEAKNESS: 1
DIZZINESS: 0
EYES NEGATIVE: 1
DOUBLE VISION: 0
COUGH: 0
MYALGIAS: 0
FEVER: 0
ORTHOPNEA: 0
DIAPHORESIS: 0
LOSS OF CONSCIOUSNESS: 0
BLURRED VISION: 0
EYE PAIN: 0
HEMOPTYSIS: 0

## 2017-11-28 ASSESSMENT — PAIN SCALES - GENERAL: PAINLEVEL_OUTOF10: 6

## 2017-11-28 ASSESSMENT — COGNITIVE AND FUNCTIONAL STATUS - GENERAL
MOVING TO AND FROM BED TO CHAIR: A LOT
TURNING FROM BACK TO SIDE WHILE IN FLAT BAD: A LOT
MOVING FROM LYING ON BACK TO SITTING ON SIDE OF FLAT BED: UNABLE
WALKING IN HOSPITAL ROOM: TOTAL
STANDING UP FROM CHAIR USING ARMS: A LOT
MOBILITY SCORE: 9
SUGGESTED CMS G CODE MODIFIER MOBILITY: CM
CLIMB 3 TO 5 STEPS WITH RAILING: TOTAL

## 2017-11-28 ASSESSMENT — GAIT ASSESSMENTS: GAIT LEVEL OF ASSIST: UNABLE TO PARTICIPATE

## 2017-11-28 NOTE — CARE PLAN
Problem: Venous Thromboembolism (VTW)/Deep Vein Thrombosis (DVT) Prevention:  Goal: Patient will participate in Venous Thrombosis (VTE)/Deep Vein Thrombosis (DVT)Prevention Measures  Outcome: PROGRESSING AS EXPECTED  Heparin injection given per MAR.    Problem: Skin Integrity  Goal: Risk for impaired skin integrity will decrease  Outcome: PROGRESSING AS EXPECTED  Condom catheter in place; barrier cream being applied; repositioned every 2 hours.

## 2017-11-28 NOTE — DISCHARGE PLANNING
Discharge plan to SNF has not been successful r/t pt's Medicare coverage of skilled days depleted, and no bed availability with secondary payor of Medicaid FFS.  Contacted [facility], requested to s/w with Irene r/t possibility of referral for Medicaid Waiver program. Irene smith, out of office, will contact tomorrow.   Contacted Farida,  [phone] at [place] [employer] to discuss possible in- home services provided by Waiver program as patient is appropriate for SNF.  Per Farida pt would need to be screened for income guidelines and appropriate diagnosis.  If appropriate could be prioritized to receive services provided by [employer] personal services in home after discharge.    Will follow up with Irene tomorrow and proceed with discharge plan as appropriate.

## 2017-11-28 NOTE — DIETARY
Nutrition Services: Brief Update Day 59 of admit  Patiens has been consuming 25-50% of meals recently on a regular dysphagia 2 diet with nectar thick liquids and 1000 ml free water.  He receives magic cups BID and boost VHC once each day.  I visited with patient at bedside this afternoon to discuss PO intake and appetite.  Patient reports that his appetite is fair. I encouraged PO and discussed importance of adequate nutrition. Snacks provided BID and nutrition rep to see patient daily for meal and snack preferences.  Wt 11/24: 44 kg; Admit weight 9/30 55.4 kg - Weight expected with extended length of stay and potential loss of lean muscle mass.   Stage 2 pressure ulcer to knee per wound team - theragran and vitamin C 500 mg provided daily per MAR.     RD to continue to monitor.  Encourage PO with patient.

## 2017-11-28 NOTE — PROGRESS NOTES
[facility] Hospitalist Progress Note    Date of Service: 2017    Chief Complaint  72 y.o. male admitted 2017 with urinary tract infection and confusion developed right knee cellulitis at BKA site, treated with IV vancomycin. Not surgical candidate per . 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   No concerns.     Consultants/Specialty  Geriatrics  Palliative Care  Ethics committee/ Dr. Eamon Blackmon    Disposition  Cleared for discharge from medical standpoint. Pending guardianship and placement   assisting      Review of Systems   Unable to perform ROS: Mental status change   Constitutional: Positive for malaise/fatigue. Negative for chills, diaphoresis and fever.   HENT: Negative for hearing loss and tinnitus.    Eyes: Negative.  Negative for blurred vision, double vision, photophobia, pain, discharge and redness.   Respiratory: Negative for cough, hemoptysis, sputum production and shortness of breath.    Cardiovascular: Negative for chest pain, palpitations, orthopnea, claudication, leg swelling and PND.   Gastrointestinal: Negative for abdominal pain, blood in stool, constipation, diarrhea, heartburn, melena, nausea and vomiting.   Genitourinary: Negative.  Negative for dysuria, flank pain, frequency, hematuria and urgency.   Musculoskeletal: Positive for joint pain. Negative for back pain, falls, myalgias and neck pain.   Skin: Positive for rash. Negative for itching.   Neurological: Positive for weakness. Negative for dizziness, tingling, tremors, sensory change, speech change, focal weakness, seizures, loss of consciousness and headaches.   Endo/Heme/Allergies: Negative.    Psychiatric/Behavioral: Positive for memory loss. Negative for depression and suicidal ideas. The patient is not nervous/anxious.       Physical Exam  Laboratory/Imaging   Hemodynamics  Temp (24hrs), Av.9 °C (98.4 °F), Min:36.6 °C (97.9  °F), Max:37.1 °C (98.8 °F)   Temperature: 36.6 °C (97.9 °F)  Pulse  Av.7  Min: 58  Max: 144    Blood Pressure : 103/65      Respiratory      Respiration: 18, Pulse Oximetry: 93 %        RUL Breath Sounds: Diminished, RML Breath Sounds: Diminished, RLL Breath Sounds: Diminished, RIGO Breath Sounds: Diminished, LLL Breath Sounds: Diminished    Fluids    Intake/Output Summary (Last 24 hours) at 17 1433  Last data filed at 17 1300   Gross per 24 hour   Intake              575 ml   Output             1200 ml   Net             -625 ml       Nutrition  Orders Placed This Encounter   Procedures   • DIET ORDER     Standing Status:   Standing     Number of Occurrences:   1     Order Specific Question:   Diet:     Answer:   Regular [1]     Comments:   please send mashed potatoes with lunch and dinner.     Order Specific Question:   Texture/Fiber modifications:     Answer:   Dysphagia 2(Pureed/Chopped)specify fluid consistency(question 6) [2]     Order Specific Question:   Consistency/Fluid modifications:     Answer:   Nectar Thick [2]     Order Specific Question:   Consistency/Fluid modifications:     Answer:   1000 ml Fluid Restriction [7]     Order Specific Question:   Miscellaneous modifications:     Answer:   SLP - Deliver to Nursing Station [22]   grossly unchanged.  Physical Exam   Constitutional: No distress.   Thin, frail   HENT:   Head: Normocephalic and atraumatic.   Mouth/Throat: Oropharynx is clear and moist. No oropharyngeal exudate.   Eyes: Conjunctivae and EOM are normal. Pupils are equal, round, and reactive to light. No scleral icterus.   Neck: Normal range of motion. No JVD present.   Cardiovascular: Normal rate, regular rhythm and normal heart sounds.  Exam reveals no gallop and no friction rub.    No murmur heard.  Pulmonary/Chest: No stridor. No respiratory distress. He has no wheezes. He has no rales.   Abdominal: Soft. He exhibits no distension. There is no tenderness. There is no rebound  and no guarding.   Musculoskeletal: He exhibits deformity. He exhibits no edema or tenderness.   Right BKA with no drainage noted,  left forefoot amputation. Right knee wound appears clean and dry. Minimal surrounding erythema.    Neurological: He is alert. No cranial nerve deficit.   AOx2   Skin: Skin is warm and dry. He is not diaphoretic.   Psychiatric: He has a normal mood and affect. His behavior is normal. Judgment and thought content normal. His speech is delayed.   Nursing note and vitals reviewed.      Recent Labs      11/26/17   0304  11/27/17 0110 11/28/17   0412   WBC  11.4*  12.3*  11.8*   RBC  4.90  4.84  4.84   HEMOGLOBIN  11.1*  10.8*  10.8*   HEMATOCRIT  36.4*  35.8*  36.2*   MCV  74.3*  74.0*  74.8*   MCH  22.7*  22.3*  22.3*   MCHC  30.5*  30.2*  29.8*   RDW  46.0  45.8  46.4   PLATELETCT  609*  627*  629*   MPV  8.7*  8.6*  9.0     Recent Labs      11/26/17   0304  11/27/17 0110 11/28/17   0412   SODIUM  128*  129*  132*   POTASSIUM  4.3  4.6  4.5   CHLORIDE  99  99  102   CO2  20  22  22   GLUCOSE  78  78  82   BUN  18  21  22   CREATININE  0.71  0.85  0.83   CALCIUM  9.5  9.3  9.8                      Assessment/Plan     Hyponatremia  Chronic, euvolemic  Improving  Currently asymptomatic  continue Fluid restriction less than 1200 mL per day  Continue Salt tabs.  Hypothyroidism management  Monitor BMP      PAD (peripheral artery disease) (CMS-HCC)  Significant PAD s/p left forefoot amputation and right BKA  Continue aspirin and statin  Not candidate for prosthesis, due to nonhealing wound  Continue with vascular surgery and wound care    Essential hypertension  Currently normotensive.  Continue with metoprolol 75 mg BID    Severe protein-calorie malnutrition (CMS-[facility])  Nutrition following  Continue boost TID    Dementia  Admitted for encephalopathy and debility.   Has been evaluated by ethics committee, unable to make his own medical decisions.  Guardianship pending      Leukocytosis  In  the setting of right knee cellulitis and mild osteomyelitis  Not surgical candidate at this time per   Cx revealed CAMRSA  ID recommended Bactrim x 6 weeks  If continues to worsen consider re consulting infectious disease  Monitor CBC    Hypomagnesemia  Continue daily oral mag supplementation.    Cellulitis of right knee  Possible mild right knee osteomyelitis as seen on MRI  ID, Dr. Juarez recommended  PO Septra for CAMRSA. Switched from IV vancomycin to PO Bactrim  Surgical evaluation by , no surgical intervention recommended at this time  Continue PO bactrim for total of 6 weeks and wound care  If worsening infection may consider IV Abx, debridement and right AKA          Hypothyroidism  TSH elevated at 15.4  Increase synthroid to 100 mcg daily      Reviewed items::  Medications reviewed, Labs reviewed and Radiology images reviewed  Barrera catheter::  No Barrera  DVT prophylaxis pharmacological::  Heparin

## 2017-11-28 NOTE — THERAPY
"Attempted to see pt for OT tx. Pt declined OOB activity at this time as he \"just ate lunch\" but agreeable to time tomorrow. Will try again later as able.   "

## 2017-11-28 NOTE — PROGRESS NOTES
[facility]    Date of Service: 2017    Chief Complaint  72 y.o. male admitted 2017 with urinary tract infection and confusion developed right knee cellulitis at BKA site, treated with IV vancomycin. Not surgical candidate per . 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 No acute distress. Patient's sodium is improving. Continue PT OT and wound care.    Consultants/Specialty  Geriatrics  Palliative Care  Ethics committee/ Dr. Eamon Blackmon    Disposition  Cleared for discharge from medical standpoint. Pending guardianship and placement   assisting      Review of Systems   Unable to perform ROS: Mental status change   Constitutional: Positive for malaise/fatigue. Negative for chills and fever.   HENT: Negative for congestion, hearing loss, sore throat and tinnitus.    Eyes: Negative.  Negative for blurred vision, double vision, photophobia and pain.   Respiratory: Negative for cough, hemoptysis, sputum production, shortness of breath and stridor.    Cardiovascular: Negative for chest pain, palpitations, orthopnea, claudication, leg swelling and PND.   Gastrointestinal: Negative for abdominal pain, blood in stool, constipation, heartburn, melena, nausea and vomiting.   Genitourinary: Negative.  Negative for dysuria, frequency and urgency.   Musculoskeletal: Negative for back pain, myalgias and neck pain.   Neurological: Positive for weakness. Negative for dizziness, tingling, tremors, sensory change, speech change and headaches.   Endo/Heme/Allergies: Negative.    Psychiatric/Behavioral: Positive for memory loss. Negative for depression and suicidal ideas. The patient is not nervous/anxious.       Physical Exam  Laboratory/Imaging   Hemodynamics  Temp (24hrs), Av.7 °C (98.1 °F), Min:36.6 °C (97.9 °F), Max:36.8 °C (98.3 °F)   Temperature: 36.8 °C (98.3 °F)  Pulse  Av.7  Min: 58  Max: 144    Blood  Pressure : 106/54      Respiratory      Respiration: 16, Pulse Oximetry: 98 %        RUL Breath Sounds: Diminished, RML Breath Sounds: Diminished, RLL Breath Sounds: Diminished, RIGO Breath Sounds: Diminished, LLL Breath Sounds: Diminished    Fluids    Intake/Output Summary (Last 24 hours) at 11/27/17 1754  Last data filed at 11/27/17 1700   Gross per 24 hour   Intake                0 ml   Output              900 ml   Net             -900 ml       Nutrition  Orders Placed This Encounter   Procedures   • DIET ORDER     Standing Status:   Standing     Number of Occurrences:   1     Order Specific Question:   Diet:     Answer:   Regular [1]     Comments:   please send mashed potatoes with lunch and dinner.     Order Specific Question:   Texture/Fiber modifications:     Answer:   Dysphagia 2(Pureed/Chopped)specify fluid consistency(question 6) [2]     Order Specific Question:   Consistency/Fluid modifications:     Answer:   Nectar Thick [2]     Order Specific Question:   Consistency/Fluid modifications:     Answer:   1000 ml Fluid Restriction [7]     Order Specific Question:   Miscellaneous modifications:     Answer:   SLP - Deliver to Nursing Station [22]   grossly unchanged.  Physical Exam   Constitutional: No distress.   Thin, frail   HENT:   Head: Normocephalic and atraumatic.   Mouth/Throat: No oropharyngeal exudate.   Eyes: Conjunctivae and EOM are normal. Pupils are equal, round, and reactive to light. Right eye exhibits no discharge. Left eye exhibits no discharge.   Neck: Normal range of motion. No JVD present. No thyromegaly present.   Cardiovascular: Normal rate and regular rhythm.    No murmur heard.  Pulmonary/Chest: No respiratory distress. He has no wheezes. He has no rales.   Abdominal: Soft. He exhibits no distension. There is no tenderness. There is no rebound.   Musculoskeletal: He exhibits no edema or tenderness.   Right BKA with no drainage noted,  left forefoot amputation. Right knee wound appears  clean and dry   Neurological: He is alert. No cranial nerve deficit. Coordination normal.   AOx2   Skin: Skin is warm. He is not diaphoretic.   Psychiatric: He has a normal mood and affect. His behavior is normal. His speech is delayed.   Nursing note and vitals reviewed.      Recent Labs      11/25/17 0234  11/26/17   0304  11/27/17   0110   WBC  11.2*  11.4*  12.3*   RBC  4.72  4.90  4.84   HEMOGLOBIN  10.5*  11.1*  10.8*   HEMATOCRIT  34.3*  36.4*  35.8*   MCV  72.7*  74.3*  74.0*   MCH  22.2*  22.7*  22.3*   MCHC  30.6*  30.5*  30.2*   RDW  45.2  46.0  45.8   PLATELETCT  665*  609*  627*   MPV  8.7*  8.7*  8.6*     Recent Labs      11/25/17 0234 11/26/17   0304  11/27/17   0110   SODIUM  128*  128*  129*   POTASSIUM  4.4  4.3  4.6   CHLORIDE  96  99  99   CO2  22  20  22   GLUCOSE  73  78  78   BUN  17  18  21   CREATININE  0.80  0.71  0.85   CALCIUM  9.4  9.5  9.3                      Assessment/Plan     Hyponatremia  Chronic, euvolemic  Improving  Currently asymptomatic  continue Fluid restriction less than 1200 mL per day  Continue Salt tabs.  TSH increased at 15, increase synthroid to 100 mcg daily  Monitor BMP      PAD (peripheral artery disease) (CMS-[facility])  Significant PAD s/p left forefoot amputation and right BKA  Continue aspirin and statin  Not candidate for prosthesis, due to nonhealing wound  Continue with vascular surgery and wound care    Essential hypertension  Currently normotensive.  Continue with metoprolol 75 mg BID    Severe protein-calorie malnutrition (CMS-[facility])  Nutrition following  Continue boost TID    Dementia  Admitted for encephalopathy and debility.   Has been evaluated by ethics committee, unable to make his own medical decisions.  Guardianship pending      Leukocytosis  In the setting of right knee cellulitis and mild osteomyelitis  Not surgical candidate at this time per   Cx revealed CAMRSA  ID recommended Bactrim x 6 weeks  If continues to worsen consider re  consulting infectious disease  Monitor CBC    Hypomagnesemia  Continue daily oral mag supplementation.    Cellulitis of right knee  Possible mild right knee osteomyelitis as seen on MRI  ID, Dr. Juarez recommended  PO Septra for CAMRSA. Switched from IV vancomycin to PO Bactrim  Surgical evaluation by , no surgical intervention recommended at this time  Continue PO bactrim for total of 6 weeks and wound care  If worsening infection may consider IV Abx, debridement and right AKA          Hypothyroidism  TSH elevated at 15.4  Increase synthroid to 100 mcg daily    Patient plan of care discussed at multidisplinary team rounds and with patient and R.N at [facility].      Reviewed items::  Medications reviewed, Labs reviewed and Radiology images reviewed  Barrera catheter::  No Barrera  DVT prophylaxis pharmacological::  Heparin

## 2017-11-29 PROBLEM — I10 HTN (HYPERTENSION): Status: ACTIVE | Noted: 2017-09-30

## 2017-11-29 PROCEDURE — 700102 HCHG RX REV CODE 250 W/ 637 OVERRIDE(OP): Performed by: HOSPITALIST

## 2017-11-29 PROCEDURE — 700102 HCHG RX REV CODE 250 W/ 637 OVERRIDE(OP): Performed by: INTERNAL MEDICINE

## 2017-11-29 PROCEDURE — A9270 NON-COVERED ITEM OR SERVICE: HCPCS | Performed by: INTERNAL MEDICINE

## 2017-11-29 PROCEDURE — 770021 HCHG ROOM/CARE - ISO PRIVATE

## 2017-11-29 PROCEDURE — 99231 SBSQ HOSP IP/OBS SF/LOW 25: CPT | Performed by: INTERNAL MEDICINE

## 2017-11-29 PROCEDURE — A9270 NON-COVERED ITEM OR SERVICE: HCPCS | Performed by: HOSPITALIST

## 2017-11-29 PROCEDURE — 97530 THERAPEUTIC ACTIVITIES: CPT

## 2017-11-29 PROCEDURE — 700111 HCHG RX REV CODE 636 W/ 250 OVERRIDE (IP): Performed by: INTERNAL MEDICINE

## 2017-11-29 PROCEDURE — 97535 SELF CARE MNGMENT TRAINING: CPT

## 2017-11-29 RX ORDER — HEPARIN SODIUM 5000 [USP'U]/ML
5000 INJECTION, SOLUTION INTRAVENOUS; SUBCUTANEOUS EVERY 12 HOURS
Refills: 0
Start: 2017-11-29

## 2017-11-29 RX ORDER — LEVOTHYROXINE SODIUM 0.1 MG/1
100 TABLET ORAL
Qty: 30 TAB
Start: 2017-11-30

## 2017-11-29 RX ORDER — ASCORBIC ACID 500 MG
1000 TABLET ORAL
Status: DISCONTINUED | OUTPATIENT
Start: 2017-11-29 | End: 2017-11-30 | Stop reason: HOSPADM

## 2017-11-29 RX ORDER — SULFAMETHOXAZOLE AND TRIMETHOPRIM 800; 160 MG/1; MG/1
1 TABLET ORAL EVERY 8 HOURS
Refills: 0
Start: 2017-11-29

## 2017-11-29 RX ORDER — ACETAMINOPHEN 325 MG/1
650 TABLET ORAL EVERY 6 HOURS PRN
Qty: 30 TAB | Refills: 0
Start: 2017-11-29

## 2017-11-29 RX ADMIN — MULTIPLE VITAMINS W/ MINERALS TAB 1 TABLET: TAB at 08:03

## 2017-11-29 RX ADMIN — OXYCODONE HYDROCHLORIDE AND ACETAMINOPHEN 1000 MG: 500 TABLET ORAL at 20:18

## 2017-11-29 RX ADMIN — SULFAMETHOXAZOLE AND TRIMETHOPRIM 1 TABLET: 800; 160 TABLET ORAL at 14:44

## 2017-11-29 RX ADMIN — STANDARDIZED SENNA CONCENTRATE AND DOCUSATE SODIUM 2 TABLET: 8.6; 5 TABLET, FILM COATED ORAL at 20:18

## 2017-11-29 RX ADMIN — METOPROLOL TARTRATE 75 MG: 25 TABLET, FILM COATED ORAL at 20:18

## 2017-11-29 RX ADMIN — HEPARIN SODIUM 5000 UNITS: 5000 INJECTION, SOLUTION INTRAVENOUS; SUBCUTANEOUS at 20:18

## 2017-11-29 RX ADMIN — GABAPENTIN 100 MG: 100 CAPSULE ORAL at 20:18

## 2017-11-29 RX ADMIN — GABAPENTIN 100 MG: 100 CAPSULE ORAL at 08:01

## 2017-11-29 RX ADMIN — GABAPENTIN 100 MG: 100 CAPSULE ORAL at 14:43

## 2017-11-29 RX ADMIN — ATORVASTATIN CALCIUM 40 MG: 40 TABLET, FILM COATED ORAL at 20:18

## 2017-11-29 RX ADMIN — FOLIC ACID 1 MG: 1 TABLET ORAL at 08:01

## 2017-11-29 RX ADMIN — OXYCODONE HYDROCHLORIDE AND ACETAMINOPHEN 500 MG: 500 TABLET ORAL at 08:00

## 2017-11-29 RX ADMIN — SULFAMETHOXAZOLE AND TRIMETHOPRIM 1 TABLET: 800; 160 TABLET ORAL at 05:59

## 2017-11-29 RX ADMIN — ACETAMINOPHEN 650 MG: 325 TABLET, FILM COATED ORAL at 05:58

## 2017-11-29 RX ADMIN — SULFAMETHOXAZOLE AND TRIMETHOPRIM 1 TABLET: 800; 160 TABLET ORAL at 20:18

## 2017-11-29 RX ADMIN — MAGNESIUM GLUCONATE 500 MG ORAL TABLET 400 MG: 500 TABLET ORAL at 08:02

## 2017-11-29 RX ADMIN — METOPROLOL TARTRATE 75 MG: 25 TABLET, FILM COATED ORAL at 08:02

## 2017-11-29 RX ADMIN — LEVOTHYROXINE SODIUM 100 MCG: 25 TABLET ORAL at 05:59

## 2017-11-29 RX ADMIN — ASPIRIN 81 MG: 81 TABLET, COATED ORAL at 08:01

## 2017-11-29 ASSESSMENT — ENCOUNTER SYMPTOMS
FEVER: 0
FLANK PAIN: 0
SENSORY CHANGE: 0
DOUBLE VISION: 0
EYE PAIN: 0
DIAPHORESIS: 0
EYE REDNESS: 0
HEMOPTYSIS: 0
NERVOUS/ANXIOUS: 0
NAUSEA: 0
EYE DISCHARGE: 0
DIZZINESS: 0
BLOOD IN STOOL: 0
CHILLS: 0
SPEECH CHANGE: 0
BACK PAIN: 0
NECK PAIN: 0
HEADACHES: 0
TINGLING: 0
PND: 0
PHOTOPHOBIA: 0
CONSTIPATION: 0
LOSS OF CONSCIOUSNESS: 0
ORTHOPNEA: 0
MYALGIAS: 0
EYES NEGATIVE: 1
SHORTNESS OF BREATH: 0
PALPITATIONS: 0
WEAKNESS: 1
FALLS: 0
CLAUDICATION: 0
ABDOMINAL PAIN: 0
TREMORS: 0
FOCAL WEAKNESS: 0
BLURRED VISION: 0
DIARRHEA: 0
DEPRESSION: 0
MEMORY LOSS: 1
HEARTBURN: 0
SPUTUM PRODUCTION: 0
VOMITING: 0
COUGH: 0
SEIZURES: 0

## 2017-11-29 ASSESSMENT — PAIN SCALES - GENERAL
PAINLEVEL_OUTOF10: 3
PAINLEVEL_OUTOF10: 6

## 2017-11-29 ASSESSMENT — COGNITIVE AND FUNCTIONAL STATUS - GENERAL
PERSONAL GROOMING: A LITTLE
MOVING FROM LYING ON BACK TO SITTING ON SIDE OF FLAT BED: UNABLE
STANDING UP FROM CHAIR USING ARMS: A LOT
MOVING TO AND FROM BED TO CHAIR: A LITTLE
DRESSING REGULAR UPPER BODY CLOTHING: A LITTLE
CLIMB 3 TO 5 STEPS WITH RAILING: TOTAL
MOBILITY SCORE: 11
SUGGESTED CMS G CODE MODIFIER DAILY ACTIVITY: CK
WALKING IN HOSPITAL ROOM: TOTAL
DRESSING REGULAR LOWER BODY CLOTHING: A LOT
TOILETING: TOTAL
SUGGESTED CMS G CODE MODIFIER MOBILITY: CL
TURNING FROM BACK TO SIDE WHILE IN FLAT BAD: A LITTLE
DAILY ACTIVITIY SCORE: 15
HELP NEEDED FOR BATHING: A LOT

## 2017-11-29 ASSESSMENT — GAIT ASSESSMENTS: GAIT LEVEL OF ASSIST: UNABLE TO PARTICIPATE

## 2017-11-29 NOTE — DISCHARGE PLANNING
Update Discharge Planning:  IMM given by phone to wife. She said that she already talked to [facility] and that they are checking what insurance will cover. She also said that she does not want appeal the discharge as she really wants her  to go to rehab.   I told her that I would leave a copy of the IMM in pt's room and copy to chart. She said she could not take the number from me as she was driving.

## 2017-11-29 NOTE — DISCHARGE PLANNING
Pt's wife requesting update on d/c plan. SW informed wife that supervisor is following up with [facility].     SW to notify pt and wife when placement has been arranged.

## 2017-11-29 NOTE — THERAPY
"Pt agreeable to tx today. Pt mobility is improving. Pt able to self propel further today. Pt required Man to propel due to weaker L sided propulsion. Pt required verbal and visual cuing for efficient WC propulsion. Pt receptive to education and tolerated propulsion better w/cuing. Pt requird maxA to transfer back to bed via seated slide board. Pt deomonstrated less posterior lean during activities and tolerates activites well w/cuing for more anterior lean. Pt required Man for back to bed to manage LEs. Pt would benefit from further acute PT txs to progress towards goals and independence.    Physical Therapy Treatment completed.   Bed Mobility:  Supine to Sit:  (in WC)  Transfers: Sit to Stand: Maximal Assist  Gait: Level Of Assist: Unable to Participate        Plan of Care: Will benefit from Physical Therapy 3 times per week      See \"Rehab Therapy-Acute\" Patient Summary Report for complete documentation.       "

## 2017-11-29 NOTE — PROGRESS NOTES
[facility] Hospitalist Progress Note    Date of Service: 2017    Chief Complaint  72 y.o. male admitted 2017 with urinary tract infection and confusion developed right knee cellulitis at BKA site, treated with IV vancomycin. Not surgical candidate per . 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   No concerns.     Consultants/Specialty  Geriatrics  Palliative Care  Ethics committee/ Dr. Eamon Blackmon    Disposition  Weekly labs  Cleared for discharge from medical standpoint.   Pending guardianship and placement      Review of Systems   Unable to perform ROS: Mental status change   Constitutional: Positive for malaise/fatigue. Negative for chills, diaphoresis and fever.   HENT: Negative for hearing loss and tinnitus.    Eyes: Negative.  Negative for blurred vision, double vision, photophobia, pain, discharge and redness.   Respiratory: Negative for cough, hemoptysis, sputum production and shortness of breath.    Cardiovascular: Negative for chest pain, palpitations, orthopnea, claudication, leg swelling and PND.   Gastrointestinal: Negative for abdominal pain, blood in stool, constipation, diarrhea, heartburn, melena, nausea and vomiting.   Genitourinary: Negative.  Negative for dysuria, flank pain, frequency, hematuria and urgency.   Musculoskeletal: Positive for joint pain. Negative for back pain, falls, myalgias and neck pain.   Skin: Positive for rash. Negative for itching.   Neurological: Positive for weakness. Negative for dizziness, tingling, tremors, sensory change, speech change, focal weakness, seizures, loss of consciousness and headaches.   Endo/Heme/Allergies: Negative.    Psychiatric/Behavioral: Positive for memory loss. Negative for depression and suicidal ideas. The patient is not nervous/anxious.       Physical Exam  Laboratory/Imaging   Hemodynamics  Temp (24hrs), Av.5 °C (97.7 °F), Min:36.1 °C (96.9 °F),  Max:36.9 °C (98.5 °F)   Temperature: 36.9 °C (98.5 °F)  Pulse  Av.5  Min: 58  Max: 144    Blood Pressure : 122/53      Respiratory      Respiration: 16, Pulse Oximetry: 96 %        RUL Breath Sounds: Diminished, RML Breath Sounds: Diminished, RLL Breath Sounds: Diminished, RIGO Breath Sounds: Diminished, LLL Breath Sounds: Diminished    Fluids    Intake/Output Summary (Last 24 hours) at 17 0955  Last data filed at 17 1530   Gross per 24 hour   Intake              125 ml   Output              650 ml   Net             -525 ml       Nutrition  Orders Placed This Encounter   Procedures   • DIET ORDER     Standing Status:   Standing     Number of Occurrences:   1     Order Specific Question:   Diet:     Answer:   Regular [1]     Comments:   please send mashed potatoes with lunch and dinner.     Order Specific Question:   Texture/Fiber modifications:     Answer:   Dysphagia 2(Pureed/Chopped)specify fluid consistency(question 6) [2]     Order Specific Question:   Consistency/Fluid modifications:     Answer:   Nectar Thick [2]     Order Specific Question:   Consistency/Fluid modifications:     Answer:   1000 ml Fluid Restriction [7]     Order Specific Question:   Miscellaneous modifications:     Answer:   SLP - Deliver to Nursing Station [22]   grossly unchanged.  Physical Exam   Constitutional: No distress.   Thin, frail   HENT:   Head: Normocephalic and atraumatic.   Mouth/Throat: Oropharynx is clear and moist. No oropharyngeal exudate.   Eyes: Conjunctivae and EOM are normal. Pupils are equal, round, and reactive to light. No scleral icterus.   Neck: Normal range of motion. No JVD present.   Cardiovascular: Normal rate, regular rhythm and normal heart sounds.  Exam reveals no gallop and no friction rub.    No murmur heard.  Pulmonary/Chest: No stridor. No respiratory distress. He has no wheezes. He has no rales.   Abdominal: Soft. He exhibits no distension. There is no tenderness. There is no rebound and  no guarding.   Musculoskeletal: He exhibits deformity. He exhibits no edema or tenderness.   Right BKA with no drainage noted,  left forefoot amputation. Right knee wound appears clean and dry. Minimal surrounding erythema.    Neurological: He is alert. No cranial nerve deficit.   AOx2   Skin: Skin is warm and dry. He is not diaphoretic.   Psychiatric: He has a normal mood and affect. His behavior is normal. Judgment and thought content normal. His speech is delayed.   Nursing note and vitals reviewed.      Recent Labs      11/27/17 0110 11/28/17 0412   WBC  12.3*  11.8*   RBC  4.84  4.84   HEMOGLOBIN  10.8*  10.8*   HEMATOCRIT  35.8*  36.2*   MCV  74.0*  74.8*   MCH  22.3*  22.3*   MCHC  30.2*  29.8*   RDW  45.8  46.4   PLATELETCT  627*  629*   MPV  8.6*  9.0     Recent Labs      11/27/17 0110 11/28/17 0412   SODIUM  129*  132*   POTASSIUM  4.6  4.5   CHLORIDE  99  102   CO2  22  22   GLUCOSE  78  82   BUN  21  22   CREATININE  0.85  0.83   CALCIUM  9.3  9.8                      Assessment/Plan     Hyponatremia  Chronic, euvolemic  Improving  Currently asymptomatic  Hypothyroidism management  Monitor BMP      PAD (peripheral artery disease) (CMS-HCC)  Significant PAD s/p left forefoot amputation and right BKA  Continue aspirin and statin  Not candidate for prosthesis, due to nonhealing wound  Continue wound care    HTN (hypertension)  Stable with current regimen  Continue with metoprolol 75 mg BID    Severe protein-calorie malnutrition (CMS-[facility])  Nutrition following  Continue boost TID    Dementia  Admitted for encephalopathy and debility.   Has been evaluated by ethics committee, unable to make his own medical decisions.  Guardianship pending      Leukocytosis  In the setting of right knee cellulitis and mild osteomyelitis  Not surgical candidate at this time per   Cx revealed CAMRSA  ID recommended Bactrim x 6 weeks  If continues to worsen consider re consulting infectious disease  Monitor  CBC    Hypomagnesemia  Continue daily oral mag supplementation.    Cellulitis of right knee  Possible mild right knee osteomyelitis as seen on MRI  ID, Dr. Juarez recommended  PO Septra for CAMRSA. Switched from IV vancomycin to PO Bactrim  Surgical evaluation by , no surgical intervention recommended at this time  Continue PO bactrim for total of 6 weeks and wound care  If worsening infection may consider IV Abx, debridement and right AKA          Hypothyroidism  TSH elevated at 15.4  Increase synthroid to 100 mcg daily  Repeat TSH 12/10/2017      Reviewed items::  Medications reviewed, Labs reviewed and Radiology images reviewed  Barrera catheter::  No Barrera  DVT prophylaxis pharmacological::  Heparin

## 2017-11-29 NOTE — CARE PLAN
Problem: Venous Thromboembolism (VTW)/Deep Vein Thrombosis (DVT) Prevention:  Goal: Patient will participate in Venous Thrombosis (VTE)/Deep Vein Thrombosis (DVT)Prevention Measures  Outcome: PROGRESSING AS EXPECTED  Heparin injection given per MAR.     Problem: Fluid Volume:  Goal: Will maintain balanced intake and output  Outcome: PROGRESSING AS EXPECTED  Pt educated on fluid restriction of 1000 ml per day.

## 2017-11-29 NOTE — DISCHARGE PLANNING
Attempted to contact [place] Medicaid specialist ext [phone]. Left Vm, awaiting response regarding possibility of Medicaid Waiver program referral.  Will continue to assess for discharge planning needs.
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
## 2017-11-29 NOTE — THERAPY
"Occupational Therapy Treatment completed with focus on ADLs, ADL transfers and patient education.  Functional Status:  Pt seen for OT tx. Min A rolling in bed to Rt and Lt to don brief and pants. Pt required min A to don pants while supine in bed. Supine > sit w/ min A. Once seated EOB, pt required cueing to find midline and lean forward, pt demonstrating heavy posterior lean. Pt able to follow cues to lean forward. Pt required max A to place SB and was a mod A transfer from EOB to WC. W/ cueing pt able to reach and use UEs to help slide across board. Pt spouse present during session and reports that at home pt was completing SB transfer to WC as needed and reports already having equipment ready for home. Pt and spouse expressing motivation to regain strength and endurance for ADLs and ADL transfers.   Plan of Care: Will benefit from Occupational Therapy 3 times per week  Discharge Recommendations:  Equipment Will Continue to Assess for Equipment Needs.     See \"Rehab Therapy-Acute\" Patient Summary Report for complete documentation.   "

## 2017-11-30 VITALS
OXYGEN SATURATION: 95 % | SYSTOLIC BLOOD PRESSURE: 106 MMHG | HEIGHT: 72 IN | RESPIRATION RATE: 18 BRPM | HEART RATE: 67 BPM | DIASTOLIC BLOOD PRESSURE: 56 MMHG | TEMPERATURE: 97.3 F | WEIGHT: 97 LBS | BODY MASS INDEX: 13.14 KG/M2

## 2017-11-30 LAB
ANION GAP SERPL CALC-SCNC: 12 MMOL/L (ref 0–11.9)
BUN SERPL-MCNC: 18 MG/DL (ref 8–22)
CALCIUM SERPL-MCNC: 9.7 MG/DL (ref 8.5–10.5)
CHLORIDE SERPL-SCNC: 100 MMOL/L (ref 96–112)
CO2 SERPL-SCNC: 21 MMOL/L (ref 20–33)
CREAT SERPL-MCNC: 0.74 MG/DL (ref 0.5–1.4)
ERYTHROCYTE [DISTWIDTH] IN BLOOD BY AUTOMATED COUNT: 46.7 FL (ref 35.9–50)
GFR SERPL CREATININE-BSD FRML MDRD: >60 ML/MIN/1.73 M 2
GLUCOSE SERPL-MCNC: 77 MG/DL (ref 65–99)
HCT VFR BLD AUTO: 36.9 % (ref 42–52)
HGB BLD-MCNC: 11.2 G/DL (ref 14–18)
MCH RBC QN AUTO: 22.5 PG (ref 27–33)
MCHC RBC AUTO-ENTMCNC: 30.4 G/DL (ref 33.7–35.3)
MCV RBC AUTO: 74.2 FL (ref 81.4–97.8)
PLATELET # BLD AUTO: 643 K/UL (ref 164–446)
PMV BLD AUTO: 9 FL (ref 9–12.9)
POTASSIUM SERPL-SCNC: 4.5 MMOL/L (ref 3.6–5.5)
RBC # BLD AUTO: 4.97 M/UL (ref 4.7–6.1)
SODIUM SERPL-SCNC: 133 MMOL/L (ref 135–145)
WBC # BLD AUTO: 12 K/UL (ref 4.8–10.8)

## 2017-11-30 PROCEDURE — 700102 HCHG RX REV CODE 250 W/ 637 OVERRIDE(OP): Performed by: INTERNAL MEDICINE

## 2017-11-30 PROCEDURE — 80048 BASIC METABOLIC PNL TOTAL CA: CPT

## 2017-11-30 PROCEDURE — A9270 NON-COVERED ITEM OR SERVICE: HCPCS | Performed by: INTERNAL MEDICINE

## 2017-11-30 PROCEDURE — 700111 HCHG RX REV CODE 636 W/ 250 OVERRIDE (IP): Performed by: INTERNAL MEDICINE

## 2017-11-30 PROCEDURE — 36415 COLL VENOUS BLD VENIPUNCTURE: CPT

## 2017-11-30 PROCEDURE — 99239 HOSP IP/OBS DSCHRG MGMT >30: CPT | Performed by: INTERNAL MEDICINE

## 2017-11-30 PROCEDURE — 85027 COMPLETE CBC AUTOMATED: CPT

## 2017-11-30 RX ADMIN — SULFAMETHOXAZOLE AND TRIMETHOPRIM 1 TABLET: 800; 160 TABLET ORAL at 14:09

## 2017-11-30 RX ADMIN — SULFAMETHOXAZOLE AND TRIMETHOPRIM 1 TABLET: 800; 160 TABLET ORAL at 07:16

## 2017-11-30 RX ADMIN — LEVOTHYROXINE SODIUM 100 MCG: 25 TABLET ORAL at 07:16

## 2017-11-30 RX ADMIN — ASPIRIN 81 MG: 81 TABLET, COATED ORAL at 08:39

## 2017-11-30 RX ADMIN — GABAPENTIN 100 MG: 100 CAPSULE ORAL at 14:09

## 2017-11-30 RX ADMIN — HEPARIN SODIUM 5000 UNITS: 5000 INJECTION, SOLUTION INTRAVENOUS; SUBCUTANEOUS at 08:39

## 2017-11-30 RX ADMIN — FOLIC ACID 1 MG: 1 TABLET ORAL at 08:39

## 2017-11-30 RX ADMIN — MULTIPLE VITAMINS W/ MINERALS TAB 1 TABLET: TAB at 08:39

## 2017-11-30 RX ADMIN — MAGNESIUM GLUCONATE 500 MG ORAL TABLET 400 MG: 500 TABLET ORAL at 08:39

## 2017-11-30 RX ADMIN — METOPROLOL TARTRATE 75 MG: 25 TABLET, FILM COATED ORAL at 08:39

## 2017-11-30 RX ADMIN — GABAPENTIN 100 MG: 100 CAPSULE ORAL at 08:39

## 2017-11-30 ASSESSMENT — LIFESTYLE VARIABLES: EVER_SMOKED: YES

## 2017-11-30 ASSESSMENT — PAIN SCALES - GENERAL: PAINLEVEL_OUTOF10: 3

## 2017-11-30 NOTE — WOUND TEAM
"[facility]  Inpatient Services  Wound and Skin Care Progress Note    HPI, PMH, SH: Reviewed  Unit where seen by Wound Team: T333-1    WOUND TEAM FOLLOW UP:  re-evaluation of R knee wound    SUBJECTIVE:  \"I'm OK\"    Self Report / Pain Level: denies     OBJECTIVE:  in bed with dressing intact    WOUND TYPE, LOCATION, CHARACTERISTICS:    Wound Type/Location:  Stage 2 pressure ulcer anterior right knee 10/3/17   Periwound:   Intact; slight increase in color      Drainage: scant serosanguinous       Tissue Type and %:  Red 100%    Wound Edges:  attached    Odor:  none      Exposed structure(s):  none   S&S of Infection:  none    Measurements:  (taken 11/29/17)    Length:  2.5cm   Width:  1.8m      Depth:  <0.5cm    Tunnels/undermining: none     INTERVENTIONS BY WOUND TEAM:   Removed dressing to knee and cleansed with NS gauze.  Measurements and photo taken.  Covered knee wound with silver hydrofiber and secured with adhesive foam.  Discussed with staff RN.    Interdisciplinary consultation: staff RN, patient    EVALUATION AND PROGRESS OF WOUND(S):  Right knee wound slightly decreased in size;  Mild erythema of ritu wound that seems to come and go.    Factors affecting wound healing: PAD, decreased nutrition, smoker, alcohol abuse     Goals: decrease size of knee wound by 1% each week      NURSING PLAN OF CARE:    Wound care orders:   X     Skin care: See Skin Care orders:        Rectal tube care: See Rectal Tube Care orders:      Other orders:           WOUND TEAM PLAN OF CARE (X):   NPWT change 3 x week:        Dressing changes:       Follow up as needed:   X weekly  Other:   "

## 2017-11-30 NOTE — PROGRESS NOTES
Pt in stable condition, call light within reach, no complaints at this time, will continue with plan of care

## 2017-11-30 NOTE — DISCHARGE PLANNING
Transport arranged with [facility]. Patient will be leaving today @1630 via Smart Voicemail going to [facility]. TRUONG Pope notified.

## 2017-11-30 NOTE — DISCHARGE INSTRUCTIONS
Discharge Instructions    Discharged to other by medical transportation with self. Discharged via ambulance, hospital escort: Refused.  Special equipment needed: Not Applicable    Be sure to schedule a follow-up appointment with your primary care doctor or any specialists as instructed.     Discharge Plan:   Influenza Vaccine Indication: Patient Refuses    I understand that a diet low in cholesterol, fat, and sodium is recommended for good health. Unless I have been given specific instructions below for another diet, I accept this instruction as my diet prescription.   Other diet: dysphasia 3 with nectar thick liquids    Special Instructions: None    · Is patient discharged on Warfarin / Coumadin?   No     · Is patient Post Blood Transfusion?  No    Depression / Suicide Risk    As you are discharged from this [facility] facility, it is important to learn how to keep safe from harming yourself.    Recognize the warning signs:  · Abrupt changes in personality, positive or negative- including increase in energy   · Giving away possessions  · Change in eating patterns- significant weight changes-  positive or negative  · Change in sleeping patterns- unable to sleep or sleeping all the time   · Unwillingness or inability to communicate  · Depression  · Unusual sadness, discouragement and loneliness  · Talk of wanting to die  · Neglect of personal appearance   · Rebelliousness- reckless behavior  · Withdrawal from people/activities they love  · Confusion- inability to concentrate     If you or a loved one observes any of these behaviors or has concerns about self-harm, here's what you can do:  · Talk about it- your feelings and reasons for harming yourself  · Remove any means that you might use to hurt yourself (examples: pills, rope, extension cords, firearm)  · Get professional help from the community (Mental Health, Substance Abuse, psychological counseling)  · Do not be alone:Call your Safe Contact- someone whom  you trust who will be there for you.  · Call your local CRISIS HOTLINE 821-0272 or 659-671-4760  · Call your local Children's Mobile Crisis Response Team [place] (509) 435-9649 or www.Need Fixed  · Call the toll free National Suicide Prevention Hotlines   · National Suicide Prevention Lifeline 053-073-BMRE (1425)  · National [place] Line Network 800-SUICIDE (060-4384)

## 2017-11-30 NOTE — DOCUMENTATION QUERY
DOCUMENTATION QUERY     PROVIDERS: Please select “Cosign w/ note”to reply to query.     To better represent the severity of illness of your patient, please review the following information and exercise your independent professional judgment in responding to this query.      The Patient was noted to have an unstageable pressure ulcer of the right knee, partial thickness wound of the left hip, and a stage 2 pressure ulcer of the sacrum by the WoundCare Team. These conditions were not mentioned in your documentation. Coders are required to query in the documentation is inconsistent. Please confirm if you agree with the WoundCare Team's findings.      The medical record reflects the following:   Clinical Findings Wound Type/Location: unstageable pressure ulcer, right knee                    Periwound: intact, erythema                             Drainage: scant serosanguinous                                  Tissue Type and %: 10% moist red tissue, 90% black/brown eschar                         Wound Edges: attached                       Odor: none                                            Exposed structure(s): unable to assess           S&S of Infection: erythema                   Measurements: taken 10/03/2017                    Length: 3 cm   Width:  1.5 cm   Depth:   JORGE   Tracts/undermining: JORGE                         Wound Type/Location: partial thickness wound, left hip, not pressure related                    Periwound: peeling                               Drainage: scant serous                                     Tissue Type and %: 100% red tissue               Wound Edges: open                 Odor: none                                Exposed structure(s): none      S&S of Infection: none                          Measurements: taken 10/03/2017                    Length: 2 cm   Width:  0.9 cm   Depth:  0.1 cm   Tracts/undermining: none                        Wound Type/Location:                        Periwound: stage II pressure ulcer, sacrum                             Drainage:scant serous                                      Tissue Type and %: 100% red tissue                           Wound Edges: open                 Odor: none                                            Exposed structure(s): none      S&S of Infection: none                          Measurements: taken 10/03/2017 ( two wounds measured as one)               Length: ~ 2 cm   Width:  0.5 cm   Depth:  0.1 cm   Tracts/undermining: none       Treatment  INTERVENTIONS BY WOUND TEAM: Thorough head to toe assessment completed, patient has wounds to right knee and stump incision site as well as left hip and sacrum. RLE wounds had adhesive foams in place, which were removed and cleansed gently with moist washcloth, NO DEBRIDEMENT. Betadine swab to right knee and incision site, covered with adhesive foam. Left hip and sacrum cleansed with moist washcloth and dried. Mepilex foam to left hip and sacrum applied. Pt turned to right side, positioned with pillows.   Interdisciplinary Collaboration: patient (limited due to confusion), staff RN   Risk Factors    Location within medical record Wound Care Notes      Thank You,   Tammy Perez BA, CCS, CPC, JOLLY

## 2017-12-01 ASSESSMENT — ENCOUNTER SYMPTOMS
COUGH: 0
TINGLING: 0
DOUBLE VISION: 0
SORE THROAT: 0
BLURRED VISION: 0
TREMORS: 0
PALPITATIONS: 0
MYALGIAS: 0
PND: 0
VOMITING: 0
CLAUDICATION: 0
DIZZINESS: 0
FLANK PAIN: 0
HEMOPTYSIS: 0
NECK PAIN: 0
ABDOMINAL PAIN: 0
CHILLS: 0
HEADACHES: 0
ORTHOPNEA: 0
SPEECH CHANGE: 0
DEPRESSION: 0
MEMORY LOSS: 1
SENSORY CHANGE: 0
BACK PAIN: 0
BLOOD IN STOOL: 0
SHORTNESS OF BREATH: 0
FOCAL WEAKNESS: 0
EYE PAIN: 0
WEAKNESS: 1
STRIDOR: 0
HEARTBURN: 0
FEVER: 0
CONSTIPATION: 0
NERVOUS/ANXIOUS: 0
NAUSEA: 0
PHOTOPHOBIA: 0
SPUTUM PRODUCTION: 0

## 2017-12-01 NOTE — PROGRESS NOTES
[facility] patient, discharge information discussed, no questions or concerns, leaving floor in wheelchair with all personal belongings

## 2018-10-12 NOTE — PROGRESS NOTES
Cortrak Placement    Tube Team verified patient name and medical record number prior to tube placement.  Cortrak tube (43 inches, 10 Moldovan) placed at 70 cm in right nare.  Per Cortrak picture, tube appears to be in the stomach.    Nursing Instructions: Awaiting KUB to confirm placement before use for medications or feeding. Once placement confirmed, flush tube with 30 ml of water, and then remove and save stylet, in patient medication drawer.                No

## 2018-10-17 NOTE — ED NOTES
.  Chief Complaint   Patient presents with   • ALOC     x 5 days per care giver. A&O x 2   • UTI     smeels of urine, denies urinary sympt. pt incont of urine. wearing breif.      biba from home. Pt confused. Denies pain. Hx recent rle amputation.    Pt requests to have labs ordered before his appt. Thank you.

## 2019-04-27 NOTE — PROGRESS NOTES
Assumed care of patient @ 0700, report received at bedside,  assessment done, labs and orders noted.  Pt A & Ox2, did not know year or why he came to hospital, PIV saline locked and patent.  Pt is resting comfortably in bed, no signs or symptoms of distress.  Pt reports pain is a 0/10.  Pt has been updated on the plan of care.    Bed is in lowest position, fall risk socks in place, call light within reach. Pt verbalized all needs are met at this time.   no

## 2019-06-25 NOTE — PROGRESS NOTES
[facility]    Date of Service: 2017    Chief Complaint  72 y.o. male admitted 2017 with urinary tract infection and confusion    Interval Problem Update  Patient confused but pleasant, watching TV, no acute complaints.   Pending guardianship    Consultants/Specialty  None    Disposition  Pending placement  Pending guardianship     grossly unchanged   Review of Systems   Constitutional: Positive for malaise/fatigue. Negative for chills and fever.   HENT: Negative for congestion, hearing loss, sore throat and tinnitus.    Eyes: Negative for blurred vision, double vision, photophobia and pain.   Respiratory: Negative for cough, hemoptysis, sputum production, shortness of breath and stridor.    Cardiovascular: Negative for chest pain, palpitations, orthopnea, claudication, leg swelling and PND.   Gastrointestinal: Negative for abdominal pain, blood in stool, constipation, heartburn, melena, nausea and vomiting.   Genitourinary: Negative for dysuria, flank pain, frequency and urgency.   Musculoskeletal: Negative for back pain, myalgias and neck pain.   Neurological: Positive for weakness. Negative for dizziness, tingling, tremors, sensory change, speech change, focal weakness and headaches.   Psychiatric/Behavioral: Positive for memory loss. Negative for depression and suicidal ideas. The patient is not nervous/anxious.       Physical Exam  Laboratory/Imaging   Hemodynamics  Temp (24hrs), Av.3 °C (97.3 °F), Min:36.3 °C (97.3 °F), Max:36.3 °C (97.3 °F)   Temperature: 36.3 °C (97.3 °F)  Pulse  Av.1  Min: 6  Max: 144    Blood Pressure : 106/56      Respiratory      Respiration: 18, Pulse Oximetry: 95 %        RUL Breath Sounds: Diminished, RIGO Breath Sounds: Diminished    Fluids    Intake/Output Summary (Last 24 hours) at 17 0741  Last data filed at 17 0800   Gross per 24 hour   Intake              240 ml   Output                0 ml   Net              240 ml        Nutrition  No orders of the defined types were placed in this encounter.  grossly unchanged  Physical Exam   Constitutional: He appears well-developed and well-nourished. No distress.   Chronically ill-appearing  Frail, thin   HENT:   Head: Normocephalic and atraumatic.   Mouth/Throat: No oropharyngeal exudate.   Eyes: Conjunctivae and EOM are normal. Pupils are equal, round, and reactive to light. Right eye exhibits no discharge. Left eye exhibits no discharge. No scleral icterus.   Neck: Normal range of motion. Neck supple. No JVD present. No thyromegaly present.   Cardiovascular: Normal rate, regular rhythm and intact distal pulses.    No murmur heard.  Pulmonary/Chest: Effort normal and breath sounds normal. No stridor. No respiratory distress. He has no wheezes. He has no rales (unchanged).   Abdominal: Soft. Bowel sounds are normal. He exhibits no distension. There is no tenderness. There is no rebound.   Musculoskeletal: Normal range of motion. He exhibits no edema.   Right BKA,  Dressing clean and dry and intact     Neurological: He is alert. He is disoriented. No cranial nerve deficit. Coordination normal.   Skin: Skin is warm and dry. No erythema.   Psychiatric: His behavior is normal. Thought content normal.   Nursing note and vitals reviewed.      Recent Labs      11/30/17   0309   WBC  12.0*   RBC  4.97   HEMOGLOBIN  11.2*   HEMATOCRIT  36.9*   MCV  74.2*   MCH  22.5*   MCHC  30.4*   RDW  46.7   PLATELETCT  643*   MPV  9.0     Recent Labs      11/30/17   0309   SODIUM  133*   POTASSIUM  4.5   CHLORIDE  100   CO2  21   GLUCOSE  77   BUN  18   CREATININE  0.74   CALCIUM  9.7                      Assessment/Plan     * Cellulitis of right knee- (present on admission)   Assessment & Plan    Completed antibiotics, no acute issues        Pneumonia   Assessment & Plan    Possibly 2/2 to Aspiration pneumonia  Finished course of  Augmentin.  Continue RT protocol, duo nebs, Pep therapy if warranted, and  incentive spirometry.           Hyponatremia- (present on admission)   Assessment & Plan    no acute changes from prior, continue PO intake.           Leukocytosis- (present on admission)   Assessment & Plan    Most likely chronic, with fluctuations.   No signs of infection. No fevers or chills.  Completed antibiotics  CTM        PAD (peripheral artery disease) (CMS-HCC)- (present on admission)   Assessment & Plan    s/p left forefoot amputation and right BKA  Continue aspirin and statin.          Thrombocytosis (CMS-HCC)   Assessment & Plan    Possibly reactive?, Fluctuates on a daily basis continue monitor daily CBCs for any acute changes  No gross changes.        Dementia   Assessment & Plan    Admitted for encephalopathy and debility  Has been evaluated by ethics committee, unable to make his own medical decisions  Pending for guardianship pending          UTI (urinary tract infection)- (present on admission)   Assessment & Plan    As above.        Essential hypertension- (present on admission)   Assessment & Plan    Continue with metoprolol 50 mg BID        Severe protein-calorie malnutrition (CMS-HCC)- (present on admission)   Assessment & Plan    Nutrition following  Cont boost TID          Assessment and plan thoroughly reviewed no gross changes from yesterday we'll continue same management. 10/23    Patient plan of care discussed at multidisplinary team rounds and with patient and R.N at beside.      Reviewed items::  Radiology images reviewed, Labs reviewed and Medications reviewed  Barrera catheter::  No Barrera  DVT prophylaxis pharmacological::  Heparin         None

## 2021-01-11 NOTE — PROGRESS NOTES
Pharmacy Pharmacotherapy Consult for LOS >30 days    Admit Date: 9/30/2017      Medications were reviewed for appropriateness and ongoing need.     Current Facility-Administered Medications   Medication Dose Route Frequency Provider Last Rate Last Dose   • levothyroxine (SYNTHROID) tablet 100 mcg  100 mcg Oral QAM AC Jatinder Gonzalez M.D.   100 mcg at 11/29/17 0559   • therapeutic multivitamin-minerals (THERAGRAN-M) tablet 1 Tab  1 Tab Oral DAILY Jatinder Gonzalez M.D.   1 Tab at 11/29/17 0803   • ascorbic acid (ascorbic acid) tablet 500 mg  500 mg Oral DAILY Jatinder Gonzalez M.D.   500 mg at 11/29/17 0800   • sodium chloride (SALT) tablet 2 g  2 g Oral BID WITH MEALS Eddie Khan M.D.   2 g at 11/27/17 0819   • sulfamethoxazole-trimethoprim (BACTRIM DS) 800-160 MG tablet 1 Tab  1 Tab Oral Q8HRS Enrique Markham M.D.   1 Tab at 11/29/17 0559   • lorazepam (ATIVAN) injection 1 mg  1 mg Intravenous Q12HRS PRN Isha Rojas D.O.       • heparin injection 5,000 Units  5,000 Units Subcutaneous Q12HRS Nikkie Gamble M.D.   5,000 Units at 11/28/17 2022   • magnesium oxide (MAG-OX) tablet 400 mg  400 mg Oral DAILY Nikkie Gamble M.D.   400 mg at 11/29/17 0802   • metoprolol (LOPRESSOR) tablet 75 mg  75 mg Oral BID Nikkie Gamble M.D.   75 mg at 11/29/17 0802   • aspirin EC (ECOTRIN) tablet 81 mg  81 mg Oral DAILY Nikkie Gamble M.D.   81 mg at 11/29/17 0801   • atorvastatin (LIPITOR) tablet 40 mg  40 mg Oral QHS Nikkie Gamble M.D.   40 mg at 11/28/17 2019   • folic acid (FOLVITE) tablet 1 mg  1 mg Oral DAILY Nikkie Gamble M.D.   1 mg at 11/29/17 0801   • gabapentin (NEURONTIN) capsule 100 mg  100 mg Oral TID Nikkie Gamble M.D.   100 mg at 11/29/17 0801   • labetalol (NORMODYNE,TRANDATE) injection 10 mg  10 mg Intravenous Q4HRS PRN Nikkie Gamble M.D.   10 mg at 10/04/17 2342   • ondansetron (ZOFRAN) syringe/vial injection 4 mg  4 mg Intravenous Q4HRS PRN Nikkie Gamble M.D.   4 mg at 10/04/17  Pt examined. Chart reviewed. Imp: Severe hypothyroidism, with features of myxedema. Garima Graven Plan: 
 
1. Give single dose Synthroid, 300 mcg now, IV 
2. Give Synthroid, 150 mcg daily IV 3. Monitor free T4 level daily. Full consult to follow. 1623   • ondansetron (ZOFRAN ODT) dispertab 4 mg  4 mg Oral Q4HRS PRN Nikkie Gamble M.D.       • senna-docusate (PERICOLACE or SENOKOT S) 8.6-50 MG per tablet 2 Tab  2 Tab Oral BID Nikkie Gamble M.D.   2 Tab at 11/26/17 0951    And   • polyethylene glycol/lytes (MIRALAX) PACKET 1 Packet  1 Packet Oral QDAY PRN Nikkie Gamble M.D.        And   • magnesium hydroxide (MILK OF MAGNESIA) suspension 30 mL  30 mL Oral QDAY PRN Nikkie Gamble M.D.        And   • bisacodyl (DULCOLAX) suppository 10 mg  10 mg Rectal QDAY PRN Nikkie Gamble M.D.   Stopped at 09/30/17 2046   • acetaminophen (TYLENOL) tablet 650 mg  650 mg Oral Q6HRS PRN Nikkie Gamble M.D.   650 mg at 11/29/17 0558       Recommendations:    Patient has never received lorazepam injection. Recommend Dc.    The patient's blood pressure has been very stable and low, recommend DC of labetalol which hasn't been administered since 10/4.    Ondansetron ODT has never been used and the injection has been utilized once on 10/4. Recommend DC of both.    Donna Judd, Pharmacy Intern    Pharmacy Addendum:  Pharmacy Pharmacotherapy Consult   LOS >30 days  Admit Date: 9/30/2017    Medications were reviewed for appropriateness and ongoing need.   Current Facility-Administered Medications   Medication Dose Route Frequency Provider Last Rate Last Dose   • ascorbic acid (ascorbic acid) tablet 1,000 mg  1,000 mg Oral QHS Dorota Lopez M.D.       • levothyroxine (SYNTHROID) tablet 100 mcg  100 mcg Oral QAM AC Jatinder Gonzalez M.D.   100 mcg at 11/29/17 0559   • therapeutic multivitamin-minerals (THERAGRAN-M) tablet 1 Tab  1 Tab Oral DAILY Jatinder Gonzalez M.D.   1 Tab at 11/29/17 0803   • sulfamethoxazole-trimethoprim (BACTRIM DS) 800-160 MG tablet 1 Tab  1 Tab Oral Q8HRS Enrique Markham M.D.   1 Tab at 11/29/17 0559   • heparin injection 5,000 Units  5,000 Units Subcutaneous Q12HRS Nikkie Gamble M.D.   5,000 Units at 11/28/17 2022   • magnesium  oxide (MAG-OX) tablet 400 mg  400 mg Oral DAILY Nikkie Gamble M.D.   400 mg at 11/29/17 0802   • metoprolol (LOPRESSOR) tablet 75 mg  75 mg Oral BID Nikkie Gamble M.D.   75 mg at 11/29/17 0802   • aspirin EC (ECOTRIN) tablet 81 mg  81 mg Oral DAILY Nikkie Gamble M.D.   81 mg at 11/29/17 0801   • atorvastatin (LIPITOR) tablet 40 mg  40 mg Oral QHS Nikkie Gamble M.D.   40 mg at 11/28/17 2019   • folic acid (FOLVITE) tablet 1 mg  1 mg Oral DAILY Nikkie Gamble M.D.   1 mg at 11/29/17 0801   • gabapentin (NEURONTIN) capsule 100 mg  100 mg Oral TID Nikkie Gamble M.D.   100 mg at 11/29/17 0801   • ondansetron (ZOFRAN ODT) dispertab 4 mg  4 mg Oral Q4HRS PRN Nikkie Gamble M.D.       • senna-docusate (PERICOLACE or SENOKOT S) 8.6-50 MG per tablet 2 Tab  2 Tab Oral BID Nikkie Gamble M.D.   2 Tab at 11/26/17 0951    And   • polyethylene glycol/lytes (MIRALAX) PACKET 1 Packet  1 Packet Oral QDAY PRN Nikkie Gamble M.D.        And   • magnesium hydroxide (MILK OF MAGNESIA) suspension 30 mL  30 mL Oral QDAY PRN Nikkie Gamble M.D.        And   • bisacodyl (DULCOLAX) suppository 10 mg  10 mg Rectal QDAY PRN Nikkie Gamble M.D.   Stopped at 09/30/17 2046   • acetaminophen (TYLENOL) tablet 650 mg  650 mg Oral Q6HRS PRN Nikkie Gamble M.D.   650 mg at 11/29/17 0558     Recommendations:  1. Would consider discontinuation of BZD given risk for oversedation and no usage.  2. Appropriate PRN regimens located on MAR (e.g BP, bowel protocol, and N/V).  3. Antimicrobial therapy with stops dates in place. Given agent of choice appropriate monitoring of potassium during therapy.    Tenzin Salazar, PharmD

## 2021-01-14 DIAGNOSIS — Z23 NEED FOR VACCINATION: ICD-10-CM

## 2021-11-02 NOTE — THERAPY
"Speech Language Therapy dysphagia treatment completed.   Functional Status:  Pt seen for dysphagia follow-up.  Tolerating D2/ntl diet with continued subtle vocal quality changes with upgraded food and liquid trials.  Appropriate for re-scope to assess airaway protections and efficacy of strategies with upgraded diet.  Verbal order obtained from MD; study to be completed 11/16.  Recommendations: Continue D2/NTL with FEES 11/16 in the morning.   Plan of Care: Will benefit from Speech Therapy 3 times per week  Post-Acute Therapy: Discharge to a transitional care facility for continued skilled therapy services.    See \"Rehab Therapy-Acute\" Patient Summary Report for complete documentation.     " 37

## 2021-12-30 NOTE — THERAPY
"Pt agreeable to tx today. Pt cont to require extra time for all activities. Pt tendency to lean posteriorly duirng activities has decreased. Pt required verbal and tactile cuing for all tasks. Pt cont to require mod/maxA x2 for mobility. Pt able to attempt self propulsion of WC this tx. Pt able to self propel 10ft and required modA, as he demonstrated UE weakness, especially L>R UE. Pt less rigid during slide board transfers w/verbal cuing to lean anteriorly. Pt responded to cuing this tx. Pt cont to seem unmotivated and argumentative at times. Pt educated about importance of OOB and becoming more independent. Pt cont to be argumentative despite education given. Pt would benefit from further acute PT txs to progress towards goals and independence.    Physical Therapy Treatment completed.   Bed Mobility:  Supine to Sit: Minimal Assist (x2)  Transfers: Sit to Stand: Maximal Assist  Gait: Level Of Assist: Unable to Participate with Front-Wheel Walker       Plan of Care: Will benefit from Physical Therapy 3 times per week      See \"Rehab Therapy-Acute\" Patient Summary Report for complete documentation.       " Pharmacy Dosing Services: Vancomycin   SCr = 4.04  CrCl ~ 14.6 ml/min   WBC = 19.5  Temp = 99.1  Will change Vancomycin to trough based dosing due to CrCl < 29 ml/min per protocol   Vancomycin random level = 15.9 today   Will dose Vancomycin at 750 mg IV q48h   Pharmacy to continue to follow and adjust dose as needed   Claudy Mai   [phone]

## 2022-09-11 NOTE — CARE PLAN
Problem: Urinary Elimination:  Goal: Ability to reestablish a normal urinary elimination pattern will improve  Outcome: PROGRESSING AS EXPECTED  Pt is frequently incontinent of bowel and bladder.  3 P's being addressed with hourly rounding and condom cath placed.     Problem: Skin Integrity  Goal: Risk for impaired skin integrity will decrease  Outcome: PROGRESSING AS EXPECTED  Wound team consulted pt today.  Orders/interventions in place.        person/place/time

## 2023-02-21 NOTE — DISCHARGE PLANNING
Care Transition Team Discharge Planning    Anticipated Discharge Information  Anticipated discharge disposition: Home              Discharge Plan:  Met with EPS worker Levi Medrano #[phone] and she is following pt's case and would like to continue to be updated.    Detail Level: Simple

## 2023-03-28 NOTE — DIETARY
Nutrition Services     Pt is on day 6 of admit. He was recently receiving nutrition via cortrak. Per discussion w/pt's RN, pt pulled his cortrak and was NPO pending surgery.  Pt just had a diet ordered of cardiac, dysphagia 2 w/NTL.  SLP eval pending. No meals have been consumed at this time. Pertinent meds and labs reviewed.     Recommendations:   · Advance diet as pt is able per SLP   · If pt is unable to safely consume po diet, replace cortrak and re-start nutrition support: Fibersource at 55 mL/hr   · Please record percentage of meals and snacks consumed in ADLs to help monitor po adequacy   · Send snacks between meals as pt desires; will send high pro smoothies BID  · Monitor wt and lab trends     RD following      no

## 2023-08-28 NOTE — ASSESSMENT & PLAN NOTE
-pt/ot   O-Z Flap Text: Because of the full-thickness nature of the defect and location adjacent to important structure(s), a bilateral rotation flap (O to T) was planned. After prep and anesthesia, arcuate incisions were extended from two opposite side of the wound.  Two flaps were created by undermining in the subcutaneous plane. After hemostasis was obtained, the flaps were advanced and sutured in a layered fashion.

## 2024-12-12 NOTE — PROGRESS NOTES
Need for Home Care Service was communicated by care team on 12/12/24.       Spoke with PATIENT regarding need for home care. Patient is agreeable to home care services. The patient will benefit from home care services of SN/PT/OT/HHA/MSW (disciplines, skilled need)    Homebound criteria was discussed in detail and PATIENT verbalizes understanding.   Patient meets homebound criteria because Weakness which limits endurance .     Information provided to PATIENT along with [facility] contact information. Patient/caregiver understands that home care is an intermittent level of care and visits may not occur daily. Patient/caregiver is teachable and willing to learn therapy plan.  Teach back demonstrated by patient.     Address, phone number, email, and insurance verified with PATIENT as per [facility] records. Best contact number is [phone] (Home Phone .    Patient has/does not have any special communication needs. Those special communication needs are [place].     Patient does not have active [employer] Portal.  Patient does not have video visit capability (tablet, smartphone, or computer with a video camera).    Patient's support system is NIKKIE (Relative)  (name of support system)      Active with a skilled home health agency prior to admission? NO (Yes/No).     Does the patient have any unskilled agencies in the home, such as caregiver, cleaning services, meal assistance, private nurse? NO (Yes/No)    Verified with PATIENT that following provider is JESSE JOHNSON  and willing to follow for home care.    Any scheduling requests: NO    Presence of bedbugs and/or infestation of any bugs in the home: NO    Provided patient with the following 1st visit reminders:   Have insurance card, medications, and vitamins and OTCs available to review with    Any discharge paperwork for the  to review available   Please write down any questions you have, to review with nurse/therapist during  Rec'd report from day shift RN. Assumed pt care. Assessment completed. AA&OX3, reoriented to time. Denies pain at this time. No s/s of discomfort or distress. Q2 turns enforced. Pt is incontinent of stool. Linens changed and pt cleaned up. Condom cath in place. Tania cream applied to sacrum and perineal area. Dressing to right knee is CDI. 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 Bed in lowest position, bed locked, bed alarm on for safety, treaded socks in place, RN and CNA numbers provided, call light within reach.    the first visit   For safety, we ask that any pets or firearms are secured in the home prior to nurse/therapist visiting.    Liaison will continue to follow until discharge.   Anticipated dc date is 12/13/24.      Mireille Hernandez RN  Home Health Liaison  [facility]

## 2025-07-03 NOTE — DISCHARGE PLANNING
Medical Social Work    Referral: Question of NOK decision maker     Intervention:   TRUONG Supervisor, Mary Vargas, and Patient Service Excellence, Bettye Del Toro, met with Kerry outside pt's room. Kerry provided copies of legal documents stating she is pt's wife. Copies made and placed in pt chart.  SW Supervisor met with pt & spouse bedside & discussed that guardianship application will be terminated as patient now has a decision maker. Kerry is the pt's legal wife and therefor his decision maker. Kerry is willing to sign for pt to go to SNF but only if it is not for long term as pt has always wished to never go into long term care. Kerry is willing to sign for SNF and HH when pt reaches appropriate d/c levels, their long term goal is to get patient back home.   Pt's MD aware and placing consult for PT for evaluation of service need.     Plan: Kerry Bernal is patient's legal wife and his NOK decision maker.        Naltrexone will not make you sick if you drink alcohol but you will not feel the \"effects of the alcohol/buzz feeling\" as it blocks the receptors in your brain

## (undated) DEVICE — DETERGENT RENUZYME PLUS 10 OZ PACKET (50/BX)

## (undated) DEVICE — CLOSURE SKIN STRIP 1/2 X 4 IN - (STERI STRIP) (50/BX 4BX/CA)

## (undated) DEVICE — SET EXTENSION WITH 2 PORTS (48EA/CA) ***PART #2C8610 IS A SUBSTITUTE*****

## (undated) DEVICE — Device

## (undated) DEVICE — CATHETER IV NON-SAFETY 16 GA X 1 1/4 JELCO (50EA/BX 4BX/CA)

## (undated) DEVICE — TOWELS CLOTH SURGICAL - (4/PK 20PK/CA)

## (undated) DEVICE — LEAD SET 6 DISP. EKG NIHON KOHDEN

## (undated) DEVICE — SYRINGE SAFETY 5 ML 18 GA X 1-1/2 BLUNT LL (100/BX 4BX/CA)

## (undated) DEVICE — CATHETER HEYMAN FOLLOW COUDE 18FR - (10EA/CA)

## (undated) DEVICE — STAPLER SKIN DISP - (6/BX 10BX/CA) VISISTAT

## (undated) DEVICE — BAG ISOLATION 20X20 INVISI - SHEILD (10EA/BX)

## (undated) DEVICE — GLOVE BIOGEL SZ 8.5 SURGICAL PF LTX - (50PR/BX 4BX/CA)

## (undated) DEVICE — CONTAINER SPECIMEN BAG OR - STERILE 4 OZ W/LID (100EA/CA)

## (undated) DEVICE — WIPE INSTRUMENT MICROWIPE (20EA/SP)

## (undated) DEVICE — MARKER SIZING OMNIFLUSH 5F 70 - 5/BX .035 20CM SEGMENT

## (undated) DEVICE — DISH PETRI STERILE (50EA/CA)

## (undated) DEVICE — MASK, LARYNGEAL AIRWAY #4

## (undated) DEVICE — SENSOR SPO2 NEO LNCS ADHESIVE (20/BX) SEE USER NOTES

## (undated) DEVICE — HEAD HOLDER JUNIOR/ADULT

## (undated) DEVICE — PACK MAJOR BASIN - (2EA/CA)

## (undated) DEVICE — BLADE SURGICAL #15 - (50/BX 3BX/CA)

## (undated) DEVICE — SUTURE 2-0 VICRYL PLUS CT-1 36 (36PK/BX)"

## (undated) DEVICE — SPONGE XRAY 8X4 STERL. 12PL - (10EA/TY 80TY/CA)

## (undated) DEVICE — KIT SURGIFLO W/OUT THROMBIN - (6EA/CA)

## (undated) DEVICE — SUCTION INSTRUMENT YANKAUER BULBOUS TIP W/O VENT (50EA/CA)

## (undated) DEVICE — CLIP HEMOCLIP TITANIUM SMALL - (12/BX)

## (undated) DEVICE — SUTURE GENERAL

## (undated) DEVICE — DRAPE LARGE 3 QUARTER - (20/CA)

## (undated) DEVICE — SET FLUID WARMING STANDARD FLOW - (10/CA)

## (undated) DEVICE — CLIP MED LG INTNL HRZN TI ESCP - (20/BX)

## (undated) DEVICE — CATHETER HEYMAN FOLLOW COUDE 16FR (10EA/CA)

## (undated) DEVICE — LACTATED RINGERS INJ 1000 ML - (14EA/CA 60CA/PF)

## (undated) DEVICE — ELECTRODE 850 FOAM ADHESIVE - HYDROGEL RADIOTRNSPRNT (50/PK)

## (undated) DEVICE — WATER IRRIG. STER. 1500 ML - (9/CA)

## (undated) DEVICE — GLOVE BIOGEL ECLIPSE PF LATEX SIZE 8.0  (50PR/BX)

## (undated) DEVICE — SURGIFOAM (SIZE 100) - (6EA/CA)

## (undated) DEVICE — DEVICE INFLATION DIGITAL BLUE DIAMOND (5EA/BX)

## (undated) DEVICE — BLADE SURGICAL CLIPPER - (50EA/CA)

## (undated) DEVICE — GOWN SURGEONS X-LARGE - DISP. (30/CA)

## (undated) DEVICE — CLIP LG INTNL HRZN TI ESCP LGT - (20/BX)

## (undated) DEVICE — GLIDECATH 4FR STRAIGHT 65CM (5EA/BX)

## (undated) DEVICE — CHLORAPREP 26 ML APPLICATOR - ORANGE TINT(25/CA)

## (undated) DEVICE — GLOVE BIOGEL SZ 7 SURGICAL PF LTX - (50PR/BX 4BX/CA)

## (undated) DEVICE — GLOVE BIOGEL SZ 7.5 SURGICAL PF LTX - (50PR/BX 4BX/CA)

## (undated) DEVICE — SUTURE 4-0 SILK 12 X 18 INCH - (36/BX)

## (undated) DEVICE — CATHETER EMBOLECTOMY 4FR (5EA/CA)

## (undated) DEVICE — SOD. CHL. INJ. 0.9% 1000 ML - (14EA/CA 60CA/PF)

## (undated) DEVICE — SODIUM CHL. INJ. 0.9% 500ML (24EA/CA 50CA/PF)

## (undated) DEVICE — TUBING CLEARLINK DUO-VENT - C-FLO (48EA/CA)

## (undated) DEVICE — NEEDLE NON SAFETY HYPO 22 GA X 1 1/2 IN (100/BX)

## (undated) DEVICE — VESSELOOP MAXI BLUE STERILE- SURG-I-LOOP (10EA/BX)

## (undated) DEVICE — SET LEADWIRE 5 LEAD BEDSIDE DISPOSABLE ECG (1SET OF 5/EA)

## (undated) DEVICE — GELAQUASONIC 100 ULTRASOUND - 48/BX 20GM STERILE FOIL POUCH

## (undated) DEVICE — SYRINGE 10 ML CONTROL LL (25EA/BX 4BX/CA)

## (undated) DEVICE — NEPTUNE 4 PORT MANIFOLD - (20/PK)

## (undated) DEVICE — SUTURE 3-0 VICRYL PLUS SH - 27 INCH (36/BX)

## (undated) DEVICE — DRESSING TRANSPARENT FILM TEGADERM 2.375 X 2.75"  (100EA/BX)"

## (undated) DEVICE — PACK LOWER EXTREMITY - (2/CA)

## (undated) DEVICE — KIT RADIAL ARTERY 20GA W/MAX BARRIER AND BIOPATCH  (5EA/CA) #10740 IS FOR THE SET RADIAL ARTERIAL

## (undated) DEVICE — SUTURE CV

## (undated) DEVICE — BANDAGE ELASTIC LATEX STERILE VELCRO 4 X 5 YDS (25EA/CA)

## (undated) DEVICE — MASK ANESTHESIA ADULT  - (100/CA)

## (undated) DEVICE — PACK ENDO VASCULAR - (6/CA)

## (undated) DEVICE — CLIP MED INTNL HRZN TI ESCP - (25/BX)

## (undated) DEVICE — SUTURE 2-0 VICRYL PLUS SH - 8 X 18 INCH (12/BX)

## (undated) DEVICE — DRAPE SURGICAL U 77X120 - (10/CA)

## (undated) DEVICE — SET BIFURCATED BLOOD - (48EA/CS)

## (undated) DEVICE — SODIUM CHL IRRIGATION 0.9% 1000ML (12EA/CA)

## (undated) DEVICE — SYRINGE DISP. 12 CC LL - (100/BX)

## (undated) DEVICE — CONNECTOR HOSE NEPTUNE FOR CYSTO ROOM

## (undated) DEVICE — BLADE SURGICAL #11 - (50/BX)

## (undated) DEVICE — PROTECTOR ULNA NERVE - (36PR/CA)

## (undated) DEVICE — DRESSING XEROFORM 1X8 - (50/BX 4BX/CA)

## (undated) DEVICE — SLEEVE, VASO, THIGH, MED

## (undated) DEVICE — MULTI TORQUE DEVICE DISP (5EA/BX)

## (undated) DEVICE — TRAY CATHETER FOLEY URINE METER W/STATLOCK 350ML (10EA/CA)

## (undated) DEVICE — GOWN WARMING STANDARD FLEX - (30/CA)

## (undated) DEVICE — KIT ANESTHESIA W/CIRCUIT & 3/LT BAG W/FILTER (20EA/CA)

## (undated) DEVICE — CATHETER HEYMAN FOLLOWER STR 14FR (10EA/CA)

## (undated) DEVICE — SYRINGE 30 ML LL (56/BX)

## (undated) DEVICE — BAG DECANTER (50EA/CS)

## (undated) DEVICE — PAD PREP 24 X 48 CUFFED - (100/CA)

## (undated) DEVICE — SHEATH RO 6F 10CM (10EA/BX)

## (undated) DEVICE — SET INTRO MIRCROPUNCTURE - MPIS-501-SST

## (undated) DEVICE — CLIP SM INTNL HRZN TI ESCP LGT - (24EA/PK 25PK/BX)

## (undated) DEVICE — GLOVE BIOGEL INDICATOR SZ 7.5 SURGICAL PF LTX - (50PR/BX 4BX/CA)

## (undated) DEVICE — LEAD SET 6 DISP. EKG NIHON KOHDEN (100EA/CA) [9859].

## (undated) DEVICE — SPONGE GAUZESTER 4 X 4 4PLY - (128PK/CA)

## (undated) DEVICE — GLOVE BIOGEL PI INDICATOR SZ 8.0 SURGICAL PF LF -(50/BX 4BX/CA)

## (undated) DEVICE — KIT ROOM DECONTAMINATION

## (undated) DEVICE — SUTURE 3-0 SILK 12 X 18 IN - (36/BX)

## (undated) DEVICE — SUTURE 5-0 PROLENE C-1 -(36PK/BX)

## (undated) DEVICE — ZIPWIRE STIFF .035 STRAIGHT TIP (5EA/BX)

## (undated) DEVICE — GLOVE BIOGEL INDICATOR SZ 8.5 SURGICAL PF LTX - (50/BX 4BX/CA)

## (undated) DEVICE — SYRINGE SAFETY 10 ML 18 GA X 1 1/2 BLUNT LL (100/BX 4BX/CA)

## (undated) DEVICE — SHEATH RO 4F 10CM (10EA/BX)

## (undated) DEVICE — SUTURE 5-0 PROLENE BLUE C-1 HS 1 X 30 (36EA/BX)"

## (undated) DEVICE — SUTURE 6-0 PROLENE BV-1 D/A 24 (36PK/BX)"

## (undated) DEVICE — TUBE E-T HI-LO CUFF 8.0MM (10EA/PK)

## (undated) DEVICE — SAW BLADE

## (undated) DEVICE — GUIDEWIRE AMPLATZ SUPER STIFF .035 260CM 7 STRAIGHT (5/BX)

## (undated) DEVICE — SUTURE 3-0 ETHILON FS-1 - (36/BX) 30 INCH

## (undated) DEVICE — CATHETER HEYMAN FOLLOW COUDE 20FR - (10EA/CA)

## (undated) DEVICE — GUIDEWIRES STARTER (PTFE COATED) J CURVED FIXED CORE 0.035 180CM 10 3 MM J FS

## (undated) DEVICE — BLADE SAGITTAL 6 SYSTEM 25MM

## (undated) DEVICE — GAUZE FLUFF STERILE 2-PLY 36 X 36 (100EA/CA)

## (undated) DEVICE — GLIDEWIRE ANGLED .035 X 180 (5EA/BX)

## (undated) DEVICE — PAD LAP STERILE 18 X 18 - (5/PK 40PK/CA)

## (undated) DEVICE — GLOVE BIOGEL PI INDICATOR SZ 7.5 SURGICAL PF LF -(50/BX 4BX/CA)

## (undated) DEVICE — SYRINGE 50ML CATHETER TIP (40EA/BX 4BX/CA)

## (undated) DEVICE — ELECTRODE DUAL RETURN W/ CORD - (50/PK)

## (undated) DEVICE — JELLY, KY 2 0Z STERILE

## (undated) DEVICE — SUTURE 2-0 VICRYL PLUS CT-1 - 8 X 18 INCH(12/BX)

## (undated) DEVICE — CANISTER SUCTION 3000ML MECHANICAL FILTER AUTO SHUTOFF MEDI-VAC NONSTERILE LF DISP  (40EA/CA)

## (undated) DEVICE — VESSELOOP MINI BLUE STERILE - SURG-I-LOOP (10EA/BX)

## (undated) DEVICE — DRESSING TRANSPARENT FILM TEGADERM 4 X 4.75" (50EA/BX)"

## (undated) DEVICE — BLADE SAGITTAL SAW 9.4MM X 25.5MM X .4MM FINE TOOTH (1/EA)

## (undated) DEVICE — BANDAGE ROLL STERILE BULKEE 4.5 IN X 4 YD (100EA/CA)

## (undated) DEVICE — GOWN SURGICAL X-LARGE ULTRA - FILM-REINFORCED (20/CA)

## (undated) DEVICE — GLOVE SZ 7.5 BIOGEL PI MICRO - PF LF (50PR/BX)

## (undated) DEVICE — SUTURE 0 VICRYL PLUS CT-1 - 8 X 18 INCH (12/BX)

## (undated) DEVICE — CANNULA W/ SUPPLY TUBING O2 - (50/CA)

## (undated) DEVICE — WATER IRRIG. STER 3000 ML - (4/CA)

## (undated) DEVICE — DISC, CD-R PLAT 700MB MEMOREX

## (undated) DEVICE — RESERVOIR SUCTION 100 CC - SILICONE (20EA/CA)

## (undated) DEVICE — DRAIN J-VAC 10MM FLAT - (10/CA)

## (undated) DEVICE — SUTURE 0 PROLENE CT-1 30 (36PK/BX)"

## (undated) DEVICE — BAG DRAINAGE URINARY CLOSED 2000ML (20EA/CA)

## (undated) DEVICE — STERI STRIP COMPOUND BENZOIN - TINCTURE 0.6ML WITH APPLICATOR (40EA/BX)

## (undated) DEVICE — GLOVE BIOGEL SZ 8 SURGICAL PF LTX - (50PR/BX 4BX/CA)

## (undated) DEVICE — SUTURE 4-0 30CM STRATAFIX SPIRAL PS-2 (12EA/BX)

## (undated) DEVICE — SPONGE GAUZESTER. 2X2 4-PL - (2/PK 50PK/BX 30BX/CS)

## (undated) DEVICE — VALVULOTOME LE MAITRE---MUST ORDER MINIMUM OF 10----

## (undated) DEVICE — TRAY, CV CATH MULTI-LUM.AK-25703

## (undated) DEVICE — CATHETER URETHRAL FOLEY SILICONE OD20 FR 10 ML (10EA/CA)

## (undated) DEVICE — DRAPE C-ARM LARGE 41IN X 74 IN - (10/BX 2BX/CA)

## (undated) DEVICE — SUTURE 5-0 PROLENE C-1 D/A 24 (36PK/BX)"

## (undated) DEVICE — GLOVE BIOGEL PI ORTHO SZ 6 SURGICAL PF LF (40PR/BX)

## (undated) DEVICE — SET IRRIGATION CYSTOSCOPY Y-TYPE L81 IN (20EA/CA)

## (undated) DEVICE — SUTURE 4-0 VICRYL PLUSFS-1 - 27 INCH (36/BX)

## (undated) DEVICE — TRANSDUCER ADULT DISP. SINGLE BONDED STERILE - (20EA/CA)

## (undated) DEVICE — GLOVE BIOGEL PI INDICATOR SZ 7.0 SURGICAL PF LF - (50/BX 4BX/CA)

## (undated) DEVICE — GLOVE, LITE (PAIR)

## (undated) DEVICE — CATHETER ANGIO OMNI FLUSH 4FR 65CM - (10/BX)

## (undated) DEVICE — CATHETER HEYMAN FOLLOW COUDE 22FR (10EA/CA)

## (undated) DEVICE — SUTURE 2-0 SILK 12 X 18" (36PK/BX)"

## (undated) DEVICE — GLOVE BIOGEL SZ 6 PF LATEX - (50EA/BX 4BX/CA)

## (undated) DEVICE — GOWN SURGEONS LARGE - (32/CA)

## (undated) DEVICE — TUBE E-T HI-LO CUFF 7.0MM (10EA/PK)